# Patient Record
Sex: FEMALE | Race: WHITE | NOT HISPANIC OR LATINO | Employment: OTHER | URBAN - METROPOLITAN AREA
[De-identification: names, ages, dates, MRNs, and addresses within clinical notes are randomized per-mention and may not be internally consistent; named-entity substitution may affect disease eponyms.]

---

## 2017-03-09 DIAGNOSIS — F33.1 MAJOR DEPRESSIVE DISORDER, RECURRENT EPISODE, MODERATE (H): ICD-10-CM

## 2017-03-09 RX ORDER — PAROXETINE 40 MG/1
TABLET, FILM COATED ORAL
Qty: 30 TABLET | Refills: 6 | OUTPATIENT
Start: 2017-03-09

## 2017-03-10 DIAGNOSIS — F33.1 MAJOR DEPRESSIVE DISORDER, RECURRENT EPISODE, MODERATE (H): ICD-10-CM

## 2017-03-10 RX ORDER — PAROXETINE 40 MG/1
TABLET, FILM COATED ORAL
Qty: 30 TABLET | Refills: 6 | OUTPATIENT
Start: 2017-03-10

## 2017-03-11 DIAGNOSIS — F33.1 MAJOR DEPRESSIVE DISORDER, RECURRENT EPISODE, MODERATE (H): Primary | ICD-10-CM

## 2017-03-13 RX ORDER — PAROXETINE 40 MG/1
TABLET, FILM COATED ORAL
Qty: 30 TABLET | Refills: 6 | OUTPATIENT
Start: 2017-03-13

## 2017-03-13 NOTE — TELEPHONE ENCOUNTER
Refill not appropriate at this time.  On 03/08/2017, patient given 30 tablets with 6 refills.     Paulina Garrett RN

## 2017-03-13 NOTE — TELEPHONE ENCOUNTER
Paroxetine       Last Written Prescription Date: 3/8/17  Last Fill Quantity: 30, # refills: 6  Last Office Visit with [facility] primary care provider:  9/23/16        Last PHQ-9 score on record=   PHQ-9 SCORE 8/15/2016   Total Score -   Total Score 5

## 2017-03-22 ENCOUNTER — TELEPHONE (OUTPATIENT)
Dept: INTERNAL MEDICINE | Facility: CLINIC | Age: 77
End: 2017-03-22

## 2017-03-22 DIAGNOSIS — E11.42 TYPE 2 DIABETES MELLITUS WITH DIABETIC POLYNEUROPATHY, WITHOUT LONG-TERM CURRENT USE OF INSULIN (H): Primary | ICD-10-CM

## 2017-03-22 DIAGNOSIS — E78.5 HYPERLIPIDEMIA LDL GOAL <100: ICD-10-CM

## 2017-03-22 NOTE — TELEPHONE ENCOUNTER
Reason for Call: Request for an order or referral:    Order or referral being requested: labs, a1c, and per patient whatever else Mihai wants    Date needed: as soon as possible    Has the patient been seen by the PCP for this problem? YES    Additional comments: please call patient when orders are placed    Phone number Patient can be reached at:  Home number on file [phone] (home)    Best Time:  any    Can we leave a detailed message on this number?  YES    Call taken on 3/22/2017 at 3:54 PM by Alexandra Thorne

## 2017-04-05 DIAGNOSIS — E11.42 TYPE 2 DIABETES MELLITUS WITH DIABETIC POLYNEUROPATHY, WITHOUT LONG-TERM CURRENT USE OF INSULIN (H): ICD-10-CM

## 2017-04-05 DIAGNOSIS — E78.5 HYPERLIPIDEMIA LDL GOAL <100: ICD-10-CM

## 2017-04-05 LAB
ALBUMIN SERPL-MCNC: 3.7 G/DL (ref 3.4–5)
ALP SERPL-CCNC: 82 U/L (ref 40–150)
ALT SERPL W P-5'-P-CCNC: 24 U/L (ref 0–50)
ANION GAP SERPL CALCULATED.3IONS-SCNC: 12 MMOL/L (ref 3–14)
AST SERPL W P-5'-P-CCNC: 17 U/L (ref 0–45)
BILIRUB SERPL-MCNC: 0.5 MG/DL (ref 0.2–1.3)
BUN SERPL-MCNC: 31 MG/DL (ref 7–30)
CALCIUM SERPL-MCNC: 8.3 MG/DL (ref 8.5–10.1)
CHLORIDE SERPL-SCNC: 107 MMOL/L (ref 94–109)
CHOLEST SERPL-MCNC: 167 MG/DL
CO2 SERPL-SCNC: 27 MMOL/L (ref 20–32)
CREAT SERPL-MCNC: 1.21 MG/DL (ref 0.52–1.04)
GFR SERPL CREATININE-BSD FRML MDRD: 43 ML/MIN/1.7M2
GLUCOSE SERPL-MCNC: 242 MG/DL (ref 70–99)
HDLC SERPL-MCNC: 43 MG/DL
LDLC SERPL CALC-MCNC: ABNORMAL MG/DL
LDLC SERPL DIRECT ASSAY-MCNC: 81 MG/DL
NONHDLC SERPL-MCNC: 124 MG/DL
POTASSIUM SERPL-SCNC: 4.4 MMOL/L (ref 3.4–5.3)
PROT SERPL-MCNC: 6.8 G/DL (ref 6.8–8.8)
SODIUM SERPL-SCNC: 146 MMOL/L (ref 133–144)
TRIGL SERPL-MCNC: 462 MG/DL

## 2017-04-05 PROCEDURE — 36415 COLL VENOUS BLD VENIPUNCTURE: CPT | Performed by: INTERNAL MEDICINE

## 2017-04-05 PROCEDURE — 80061 LIPID PANEL: CPT | Performed by: INTERNAL MEDICINE

## 2017-04-05 PROCEDURE — 80053 COMPREHEN METABOLIC PANEL: CPT | Performed by: INTERNAL MEDICINE

## 2017-04-05 PROCEDURE — 83036 HEMOGLOBIN GLYCOSYLATED A1C: CPT | Mod: QW | Performed by: INTERNAL MEDICINE

## 2017-04-05 PROCEDURE — 83721 ASSAY OF BLOOD LIPOPROTEIN: CPT | Mod: 59 | Performed by: INTERNAL MEDICINE

## 2017-04-06 ENCOUNTER — OFFICE VISIT (OUTPATIENT)
Dept: INTERNAL MEDICINE | Facility: CLINIC | Age: 77
End: 2017-04-06
Payer: COMMERCIAL

## 2017-04-06 VITALS
HEIGHT: 64 IN | RESPIRATION RATE: 16 BRPM | HEART RATE: 76 BPM | DIASTOLIC BLOOD PRESSURE: 78 MMHG | BODY MASS INDEX: 26.46 KG/M2 | WEIGHT: 155 LBS | OXYGEN SATURATION: 97 % | SYSTOLIC BLOOD PRESSURE: 124 MMHG | TEMPERATURE: 97 F

## 2017-04-06 DIAGNOSIS — F33.41 RECURRENT MAJOR DEPRESSIVE DISORDER, IN PARTIAL REMISSION (H): ICD-10-CM

## 2017-04-06 DIAGNOSIS — E11.42 TYPE 2 DIABETES MELLITUS WITH DIABETIC POLYNEUROPATHY, WITHOUT LONG-TERM CURRENT USE OF INSULIN (H): Primary | ICD-10-CM

## 2017-04-06 DIAGNOSIS — L30.8 OTHER ECZEMA: ICD-10-CM

## 2017-04-06 DIAGNOSIS — I10 HYPERTENSION GOAL BP (BLOOD PRESSURE) < 140/90: ICD-10-CM

## 2017-04-06 LAB — HBA1C MFR BLD: 8 % (ref 4.3–6)

## 2017-04-06 PROCEDURE — 99214 OFFICE O/P EST MOD 30 MIN: CPT | Performed by: INTERNAL MEDICINE

## 2017-04-06 PROCEDURE — 99207 C FOOT EXAM  NO CHARGE: CPT | Performed by: INTERNAL MEDICINE

## 2017-04-06 RX ORDER — METHYLPREDNISOLONE 4 MG
TABLET, DOSE PACK ORAL
Qty: 21 TABLET | Refills: 0 | Status: SHIPPED | OUTPATIENT
Start: 2017-04-06 | End: 2017-11-06

## 2017-04-06 RX ORDER — METOPROLOL SUCCINATE 100 MG/1
100 TABLET, EXTENDED RELEASE ORAL DAILY
Qty: 90 TABLET | Refills: 3 | Status: SHIPPED | OUTPATIENT
Start: 2017-04-06 | End: 2018-04-07

## 2017-04-06 RX ORDER — TRIAMCINOLONE ACETONIDE 5 MG/G
CREAM TOPICAL
Qty: 30 G | Refills: 1 | Status: SHIPPED | OUTPATIENT
Start: 2017-04-06 | End: 2018-01-11

## 2017-04-06 RX ORDER — GLIPIZIDE 10 MG/1
TABLET ORAL
Qty: 120 TABLET | Refills: 4 | Status: SHIPPED | OUTPATIENT
Start: 2017-04-06 | End: 2017-08-31

## 2017-04-06 RX ORDER — METFORMIN HCL 500 MG
1000 TABLET, EXTENDED RELEASE 24 HR ORAL 2 TIMES DAILY WITH MEALS
Qty: 360 TABLET | Refills: 0 | Status: SHIPPED | OUTPATIENT
Start: 2017-04-06 | End: 2017-07-09

## 2017-04-06 RX ORDER — LISINOPRIL AND HYDROCHLOROTHIAZIDE 20; 25 MG/1; MG/1
1 TABLET ORAL DAILY
Qty: 90 TABLET | Refills: 2 | Status: SHIPPED | OUTPATIENT
Start: 2017-04-06 | End: 2018-02-24

## 2017-04-06 ASSESSMENT — ANXIETY QUESTIONNAIRES
7. FEELING AFRAID AS IF SOMETHING AWFUL MIGHT HAPPEN: NOT AT ALL
1. FEELING NERVOUS, ANXIOUS, OR ON EDGE: NOT AT ALL
GAD7 TOTAL SCORE: 0
6. BECOMING EASILY ANNOYED OR IRRITABLE: NOT AT ALL
5. BEING SO RESTLESS THAT IT IS HARD TO SIT STILL: NOT AT ALL
2. NOT BEING ABLE TO STOP OR CONTROL WORRYING: NOT AT ALL
IF YOU CHECKED OFF ANY PROBLEMS ON THIS QUESTIONNAIRE, HOW DIFFICULT HAVE THESE PROBLEMS MADE IT FOR YOU TO DO YOUR WORK, TAKE CARE OF THINGS AT HOME, OR GET ALONG WITH OTHER PEOPLE: NOT DIFFICULT AT ALL
3. WORRYING TOO MUCH ABOUT DIFFERENT THINGS: NOT AT ALL

## 2017-04-06 ASSESSMENT — PATIENT HEALTH QUESTIONNAIRE - PHQ9: 5. POOR APPETITE OR OVEREATING: NOT AT ALL

## 2017-04-06 ASSESSMENT — PAIN SCALES - GENERAL: PAINLEVEL: NO PAIN (0)

## 2017-04-06 NOTE — MR AVS SNAPSHOT
"              After Visit Summary   4/6/2017    Eliza Dubois    MRN: [id number]           Patient Information     Date Of Birth          1940        Visit Information        Provider Department      4/6/2017 8:20 AM Byron Holm DO [facility]        Today's Diagnoses     Type 2 diabetes mellitus with diabetic polyneuropathy, without long-term current use of insulin (H)    -  1    Hypertension goal BP (blood pressure) < 140/90        Recurrent major depressive disorder, in partial remission (H)        Other eczema           Follow-ups after your visit        Who to contact     If you have questions or need follow up information about today's clinic visit or your schedule please contact [facility] directly at [phone].  Normal or non-critical lab and imaging results will be communicated to you by [employer], letter or phone within 4 business days after the clinic has received the results. If you do not hear from us within 7 days, please contact the clinic through [employer] or phone. If you have a critical or abnormal lab result, we will notify you by phone as soon as possible.  Submit refill requests through [employer] or call your pharmacy and they will forward the refill request to us. Please allow 3 business days for your refill to be completed.          Additional Information About Your Visit        MyChart Information     [employer] lets you send messages to your doctor, view your test results, renew your prescriptions, schedule appointments and more. To sign up, go to [email or web address] . Click on \"Log in\" on the left side of the screen, which will take you to the Welcome page. Then click on \"Sign up Now\" on the right side of the page.     You will be asked to enter the access code listed below, as well as some personal information. Please follow the directions to create your username and password.     Your access code is: [email or web address]  Expires: 7/23/2017  " "8:38 AM     Your access code will  in 90 days. If you need help or a new code, please call your [facility] clinic or [phone].        Care EveryWhere ID     This is your Care EveryWhere ID. This could be used by other organizations to access your [facility] medical records  [id number]        Your Vitals Were     Pulse Temperature Respirations Height Pulse Oximetry BMI (Body Mass Index)    76 97  F (36.1  C) (Tympanic) 16 5' 4\" (1.626 m) 97% 26.61 kg/m2       Blood Pressure from Last 3 Encounters:   17 124/78   16 128/66   16 135/80    Weight from Last 3 Encounters:   17 155 lb (70.3 kg)   16 155 lb 11.2 oz (70.6 kg)   16 154 lb (69.9 kg)              We Performed the Following     DEPRESSION ACTION PLAN (DAP)     FOOT EXAM          Today's Medication Changes          These changes are accurate as of: 17 11:59 PM.  If you have any questions, ask your nurse or doctor.               Start taking these medicines.        Dose/Directions    methylPREDNISolone 4 MG tablet   Commonly known as:  MEDROL DOSEPAK   Used for:  Other eczema   Started by:  Byron Holm DO        Follow package instructions   Quantity:  21 tablet   Refills:  0         These medicines have changed or have updated prescriptions.        Dose/Directions    lisinopril-hydrochlorothiazide 20-25 MG per tablet   Commonly known as:  PRINZIDE/ZESTORETIC   This may have changed:  See the new instructions.   Used for:  Hypertension goal BP (blood pressure) < 140/90   Changed by:  Byron Holm DO        Dose:  1 tablet   Take 1 tablet by mouth daily   Quantity:  90 tablet   Refills:  2       metoprolol 100 MG 24 hr tablet   Commonly known as:  TOPROL-XL   This may have changed:  See the new instructions.   Used for:  Hypertension goal BP (blood pressure) < 140/90   Changed by:  Byron Holm DO        Dose:  100 mg   Take 1 tablet (100 mg) by mouth daily   Quantity:  90 " tablet   Refills:  3       * triamcinolone 0.1 % ointment   Commonly known as:  KENALOG   This may have changed:  Another medication with the same name was added. Make sure you understand how and when to take each.   Used for:  Psoriasis   Changed by:  Byron Holm, DO        Apply sparingly to affected area three times daily for 14 days.   Quantity:  30 g   Refills:  0       * triamcinolone 0.5 % cream   Commonly known as:  KENALOG   This may have changed:  You were already taking a medication with the same name, and this prescription was added. Make sure you understand how and when to take each.   Used for:  Other eczema   Changed by:  Byron Holm, DO        Apply sparingly to affected area three times daily.   Quantity:  30 g   Refills:  1       * Notice:  This list has 2 medication(s) that are the same as other medications prescribed for you. Read the directions carefully, and ask your doctor or other care provider to review them with you.         Where to get your medicines      These medications were sent to [facility] - [address]  [address] [facility] [address]     Phone:  [phone]     glipiZIDE 10 MG tablet    lisinopril-hydrochlorothiazide 20-25 MG per tablet    metFORMIN 500 MG 24 hr tablet    methylPREDNISolone 4 MG tablet    metoprolol 100 MG 24 hr tablet    triamcinolone 0.5 % cream                Primary Care Provider Office Phone # Fax #    Byron Holm [phone]       [address]   [facility] [address]        Thank you!     Thank you for choosing [facility]  for your care. Our goal is always to provide you with excellent care. Hearing back from our patients is one way we can continue to improve our services. Please take a few minutes to complete the written survey that you may receive in the mail after your visit with us. Thank you!             Your Updated Medication  List - Protect others around you: Learn how to safely use, store and throw away your medicines at www.disposemymeds.org.          This list is accurate as of: 4/6/17 11:59 PM.  Always use your most recent med list.                   Brand Name Dispense Instructions for use    aspirin 81 MG tablet      1 TABLET DAILY       atorvastatin 80 MG tablet    LIPITOR     Take 80 mg by mouth daily       blood glucose monitoring lancets     1 Box    Use to test blood sugar 2 times daily or as directed.       blood glucose monitoring meter device kit     1 kit    Use to test blood sugars 2 times daily or as directed.       blood glucose monitoring test strip    ACCU-CHEK COMPACT PLUS    102 each    USE TO CHECK BLOOD SUGARS TWO TIMES A DAY OR AS DIRECTED.  Appointment needed for additional refills.       fluticasone 50 MCG/ACT spray    FLONASE    16 g    INSTILL ONE TO TWO SPRAYS INTO BOTH NOSTRILS DAILY       gabapentin 300 MG capsule    NEURONTIN    60 capsule    TAKE ONE TO TWO CAPSULES BY MOUTH EVERY EVENING       glipiZIDE 10 MG tablet    GLUCOTROL    120 tablet    TAKE TWO TABLETS BY MOUTH TWICE A DAY BEFORE MEALS       IMDUR PO      Take 30 mg by mouth daily       lisinopril-hydrochlorothiazide 20-25 MG per tablet    PRINZIDE/ZESTORETIC    90 tablet    Take 1 tablet by mouth daily       MELATONIN PO      Take 3 mg by mouth nightly as needed       metFORMIN 500 MG 24 hr tablet    GLUCOPHAGE-XR    360 tablet    Take 2 tablets (1,000 mg) by mouth 2 times daily (with meals)       methylPREDNISolone 4 MG tablet    MEDROL DOSEPAK    21 tablet    Follow package instructions       metoprolol 100 MG 24 hr tablet    TOPROL-XL    90 tablet    Take 1 tablet (100 mg) by mouth daily       omeprazole 40 MG capsule    priLOSEC     Take 40 mg by mouth daily Take one tablet daily       PARoxetine 40 MG tablet    PAXIL    30 tablet    TAKE ONE TABLET BY MOUTH EVERY MORNING       PLAVIX PO      Take 75 mg by mouth daily       *  triamcinolone 0.1 % ointment    KENALOG    30 g    Apply sparingly to affected area three times daily for 14 days.       * triamcinolone 0.5 % cream    KENALOG    30 g    Apply sparingly to affected area three times daily.       * Notice:  This list has 2 medication(s) that are the same as other medications prescribed for you. Read the directions carefully, and ask your doctor or other care provider to review them with you.

## 2017-04-06 NOTE — PROGRESS NOTES
SUBJECTIVE:                                                    Eliza Dubois is a 76 year old female who presents to clinic today for the following health issues:      Diabetes Follow-up      Patient is checking blood sugars: not at all    Diabetic concerns: None     Symptoms of hypoglycemia (low blood sugar): none     Paresthesias (numbness or burning in feet) or sores: No     Date of last diabetic eye exam: 2016       Amount of exercise or physical activity: None    Problems taking medications regularly: No    Medication side effects: none    Diet: diabetic      The patient is a pleasant 70 sexual female presents today for follow-up of her diabetes. She has been doing quite well and has been compliant with her medication.  She has concurrent history of hypertension coronary artery disease.  Her blood pressures have been well-controlled she's had no chest pain.  She denies any significant dyspnea upon exertion or radiation or diaphoresis.in the past she's had some anxiety which is currently well-controlled.  She does use the Paxil with good results. With respect to her diabetes, she denies any polyuria or polydipsia.  She's had no visual changes or symptoms of uncontrolled hyperglycemia.  Her home blood sugars are generally not checked.  She is relatively compliant with her diet.  Her last ocular examination was last year. She's also had a slight exacerbation of eczema.  It's on the knees and elbows and lower legs.  She's had this in the past and it generally responds well to topical steroid therapy once control is obtained.                       PAST, FAMILY,SOCIAL HISTORY:     Medical  History:   has a past medical history of Diabetic eye exam (H) (05/01/14); Heart attack (H); Pure hypercholesterolemia; Stented coronary artery; Type II or unspecified type diabetes mellitus without mention of complication, not stated as uncontrolled (2002); Unspecified disease of pericardium (12/05/08); and Unspecified  essential hypertension.     Surgical History:   has a past surgical history that includes NONSPECIFIC PROCEDURE (1988); LAPAROSCOPY, SURGICAL; CHOLECYSTECTOMY (1997); colonoscopy (04/15/09); Colonoscopy (6/18/2014); Abdomen surgery; Phacoemulsification clear cornea with standard intraocular lens implant (Right, 3/16/2016); and Phacoemulsification clear cornea with standard intraocular lens implant (Left, 3/30/2016).     Social History:   reports that she has never smoked. She has never used smokeless tobacco. She reports that she does not drink alcohol or use illicit drugs.     Family History:  family history includes Arthritis in her mother; CANCER in her maternal grandmother; DIABETES in her father and mother; EYE* in her mother; Genitourinary Problems in her father; HEART DISEASE in her father, maternal grandmother, and mother; Hypertension in her mother; Lipids in her mother; Neurologic Disorder in her mother; OSTEOPOROSIS in her mother; Obesity in her mother. There is no history of Alcohol/Drug, Alzheimer Disease, Anesthesia Reaction, Blood Disease, Depression, Genetic Disorder, GASTROINTESTINAL DISEASE, Gynecology, Psychotic Disorder, Respiratory, or CEREBROVASCULAR DISEASE.            MEDICATIONS  Current Outpatient Prescriptions   Medication Sig Dispense Refill     metoprolol (TOPROL-XL) 100 MG 24 hr tablet Take 1 tablet (100 mg) by mouth daily 90 tablet 3     lisinopril-hydrochlorothiazide (PRINZIDE/ZESTORETIC) 20-25 MG per tablet Take 1 tablet by mouth daily 90 tablet 2     metFORMIN (GLUCOPHAGE-XR) 500 MG 24 hr tablet Take 2 tablets (1,000 mg) by mouth 2 times daily (with meals) 360 tablet 0     glipiZIDE (GLUCOTROL) 10 MG tablet TAKE TWO TABLETS BY MOUTH TWICE A DAY BEFORE MEALS 120 tablet 4     methylPREDNISolone (MEDROL DOSEPAK) 4 MG tablet Follow package instructions 21 tablet 0     triamcinolone (KENALOG) 0.5 % cream Apply sparingly to affected area three times daily. 30 g 1     PARoxetine (PAXIL) 40  MG tablet TAKE ONE TABLET BY MOUTH EVERY MORNING 30 tablet 6     fluticasone (FLONASE) 50 MCG/ACT spray INSTILL ONE TO TWO SPRAYS INTO BOTH NOSTRILS DAILY 16 g 8     gabapentin (NEURONTIN) 300 MG capsule TAKE ONE TO TWO CAPSULES BY MOUTH EVERY EVENING 60 capsule 7     blood glucose monitoring (ACCU-CHEK COMPACT PLUS) test strip USE TO CHECK BLOOD SUGARS TWO TIMES A DAY OR AS DIRECTED.  Appointment needed for additional refills. 102 each 0     omeprazole (PRILOSEC) 40 MG capsule Take 40 mg by mouth daily Take one tablet daily       atorvastatin (LIPITOR) 80 MG tablet Take 80 mg by mouth daily       triamcinolone (KENALOG) 0.1 % ointment Apply sparingly to affected area three times daily for 14 days. 30 g 0     blood glucose monitoring (ACCU-CHEK COMPACT CARE KIT) meter device kit Use to test blood sugars 2 times daily or as directed. 1 kit 0     blood glucose monitoring (ACCU-CHEK MULTICLIX) lancets Use to test blood sugar 2 times daily or as directed. 1 Box 11     Clopidogrel Bisulfate (PLAVIX PO) Take 75 mg by mouth daily        Isosorbide Mononitrate (IMDUR PO) Take 30 mg by mouth daily       MELATONIN PO Take 3 mg by mouth nightly as needed        ASPIRIN 81 MG PO TABS 1 TABLET DAILY  11         --------------------------------------------------------------------------------------------------------------------                       PAST, FAMILY,SOCIAL HISTORY:     Medical  History:   has a past medical history of Diabetic eye exam (H) (05/01/14); Heart attack (H); Pure hypercholesterolemia; Stented coronary artery; Type II or unspecified type diabetes mellitus without mention of complication, not stated as uncontrolled (2002); Unspecified disease of pericardium (12/05/08); and Unspecified essential hypertension.     Surgical History:   has a past surgical history that includes NONSPECIFIC PROCEDURE (1988); LAPAROSCOPY, SURGICAL; CHOLECYSTECTOMY (1997); colonoscopy (04/15/09); Colonoscopy (6/18/2014); Abdomen  surgery; Phacoemulsification clear cornea with standard intraocular lens implant (Right, 3/16/2016); and Phacoemulsification clear cornea with standard intraocular lens implant (Left, 3/30/2016).     Social History:   reports that she has never smoked. She has never used smokeless tobacco. She reports that she does not drink alcohol or use illicit drugs.     Family History:  family history includes Arthritis in her mother; CANCER in her maternal grandmother; DIABETES in her father and mother; EYE* in her mother; Genitourinary Problems in her father; HEART DISEASE in her father, maternal grandmother, and mother; Hypertension in her mother; Lipids in her mother; Neurologic Disorder in her mother; OSTEOPOROSIS in her mother; Obesity in her mother. There is no history of Alcohol/Drug, Alzheimer Disease, Anesthesia Reaction, Blood Disease, Depression, Genetic Disorder, GASTROINTESTINAL DISEASE, Gynecology, Psychotic Disorder, Respiratory, or CEREBROVASCULAR DISEASE.            MEDICATIONS  Current Outpatient Prescriptions   Medication Sig Dispense Refill     metoprolol (TOPROL-XL) 100 MG 24 hr tablet Take 1 tablet (100 mg) by mouth daily 90 tablet 3     lisinopril-hydrochlorothiazide (PRINZIDE/ZESTORETIC) 20-25 MG per tablet Take 1 tablet by mouth daily 90 tablet 2     metFORMIN (GLUCOPHAGE-XR) 500 MG 24 hr tablet Take 2 tablets (1,000 mg) by mouth 2 times daily (with meals) 360 tablet 0     glipiZIDE (GLUCOTROL) 10 MG tablet TAKE TWO TABLETS BY MOUTH TWICE A DAY BEFORE MEALS 120 tablet 4     methylPREDNISolone (MEDROL DOSEPAK) 4 MG tablet Follow package instructions 21 tablet 0     triamcinolone (KENALOG) 0.5 % cream Apply sparingly to affected area three times daily. 30 g 1     PARoxetine (PAXIL) 40 MG tablet TAKE ONE TABLET BY MOUTH EVERY MORNING 30 tablet 6     fluticasone (FLONASE) 50 MCG/ACT spray INSTILL ONE TO TWO SPRAYS INTO BOTH NOSTRILS DAILY 16 g 8     gabapentin (NEURONTIN) 300 MG capsule TAKE ONE TO TWO  CAPSULES BY MOUTH EVERY EVENING 60 capsule 7     blood glucose monitoring (ACCU-CHEK COMPACT PLUS) test strip USE TO CHECK BLOOD SUGARS TWO TIMES A DAY OR AS DIRECTED.  Appointment needed for additional refills. 102 each 0     omeprazole (PRILOSEC) 40 MG capsule Take 40 mg by mouth daily Take one tablet daily       atorvastatin (LIPITOR) 80 MG tablet Take 80 mg by mouth daily       triamcinolone (KENALOG) 0.1 % ointment Apply sparingly to affected area three times daily for 14 days. 30 g 0     blood glucose monitoring (ACCU-CHEK COMPACT CARE KIT) meter device kit Use to test blood sugars 2 times daily or as directed. 1 kit 0     blood glucose monitoring (ACCU-CHEK MULTICLIX) lancets Use to test blood sugar 2 times daily or as directed. 1 Box 11     Clopidogrel Bisulfate (PLAVIX PO) Take 75 mg by mouth daily        Isosorbide Mononitrate (IMDUR PO) Take 30 mg by mouth daily       MELATONIN PO Take 3 mg by mouth nightly as needed        ASPIRIN 81 MG PO TABS 1 TABLET DAILY  11         --------------------------------------------------------------------------------------------------------------------                  Review of Systems     LUNGS: Pt denies: cough,excess sputum, hemoptysis, or shortness of breath.   HEART: Pt denies: chest pain, arrythmia, syncope, tachy or bradyarrhythmia.   GI: Pt denies: nausea, vomitting, diarrhea, constipation, melena, or hematochezia.   NEURO: Pt denies: seizures, strokes, diplopia, weakness, paraesthesias, or paralysis.   SKIN: Pt denies: itching,  discoloration, or specific lesions of concern. Denies recent hair loss.   She does have some erythema on the elbows, lower extremities.  This is modest and consistent with eczema.  It is slightly worse than usual.  There is some erythema associated with it.                  Examination     Vital Signs:  B/P: 124/78, T: 97, P: 76, R: 16, BMI: Body mass index is 26.61 kg/(m^2).   Constitutional: The patient appears to be in no acute  distress. The patient appears to be adequately hydrated. No acute respiratory or hemodynamic distress is noted at this time.   LUNGS: clear bilaterally, airflow is brisk, no intercostal retraction or stridor is noted. No coughing is noted during visit.   HEART:  regular without rubs, clicks, gallops, or murmurs. PMI is nondisplaced. Upstrokes are brisk. S1,S2 are heard.   GI: Abdomen is soft, without rebound, guarding or tenderness. Bowel sounds are appropriate. No renal bruits are heard.    NEURO: Pt is alert and appropriate. No neurologic lateralization is noted. Cranial nerves 2-12 are intact. Peripheral sensory and motor function are grossly normal.    SKIN:  warm and dry. Inflammation associated her eczema as described is noted.  No secondary infection is present.  No additionallesions of concern are noted.                        Decision-Making            1. Type 2 diabetes mellitus with diabetic polyneuropathy, without long-term current use of insulin (H)  Recommend checking blood sugars at least once daily in recording.  We discussed appropriate diet.  Continue statin, ACE inhibitor, aspirin, metformin, glipizide  - metFORMIN (GLUCOPHAGE-XR) 500 MG 24 hr tablet; Take 2 tablets (1,000 mg) by mouth 2 times daily (with meals)  Dispense: 360 tablet; Refill: 0  - glipiZIDE (GLUCOTROL) 10 MG tablet; TAKE TWO TABLETS BY MOUTH TWICE A DAY BEFORE MEALS  Dispense: 120 tablet; Refill: 4  - FOOT EXAM    2. Hypertension goal BP (blood pressure) < 140/90  Continue current medications  - metoprolol (TOPROL-XL) 100 MG 24 hr tablet; Take 1 tablet (100 mg) by mouth daily  Dispense: 90 tablet; Refill: 3  - lisinopril-hydrochlorothiazide (PRINZIDE/ZESTORETIC) 20-25 MG per tablet; Take 1 tablet by mouth daily  Dispense: 90 tablet; Refill: 2    3. Recurrent major depressive disorder, in partial remission (H)  Discussed depression action plan, continue current Paxil  - DEPRESSION ACTION PLAN (DAP)    4. Other eczema  Short course  of Medrol and then resume triamcinolone on an as-needed basis.  If symptoms do not resolve, she will notify me.  - methylPREDNISolone (MEDROL DOSEPAK) 4 MG tablet; Follow package instructions  Dispense: 21 tablet; Refill: 0  - triamcinolone (KENALOG) 0.5 % cream; Apply sparingly to affected area three times daily.  Dispense: 30 g; Refill: 1                           FOLLOW UP   I have asked the patient to make an appointment for followup with me   In one month with blood sugar log        I have carefully explained the diagnosis and treatment options with the patient. The patient has displayed an understanding of the above, and all subsequent questions were answered.               DO GEMA Campos    Portions of this note were produced using [employer]  Although every attempt at real-time proof reading has been made, occasional grammar/syntax errors may have been missed.

## 2017-04-06 NOTE — NURSING NOTE
"Chief Complaint   Patient presents with     Diabetes     Recheck       Initial /78  Pulse 76  Temp 97  F (36.1  C) (Tympanic)  Resp 16  Ht 5' 4\" (1.626 m)  Wt 155 lb (70.3 kg)  SpO2 97%  BMI 26.61 kg/m2 Estimated body mass index is 26.61 kg/(m^2) as calculated from the following:    Height as of this encounter: 5' 4\" (1.626 m).    Weight as of this encounter: 155 lb (70.3 kg).  Medication Reconciliation: complete    "

## 2017-04-07 ASSESSMENT — PATIENT HEALTH QUESTIONNAIRE - PHQ9: SUM OF ALL RESPONSES TO PHQ QUESTIONS 1-9: 0

## 2017-04-07 ASSESSMENT — ANXIETY QUESTIONNAIRES: GAD7 TOTAL SCORE: 0

## 2017-04-13 DIAGNOSIS — E78.1 HIGH TRIGLYCERIDES: Primary | ICD-10-CM

## 2017-04-13 NOTE — PROGRESS NOTES
Please contact the patient and notify her of the following:  The glycosylated hemoglobin is down to 8.0 from the previous 8.5.  The cholesterol is well-controlled with an LDL of 81.  The chemistry test demonstrates a blood sugar of 242 [address] too high.    Kidney function is slightly decreased but stable.  Triglycerides are markedly elevated at 462.  This may require specific therapy.  She should try to decrease the fats in her diet and recheck the triglycerides in a couple months.  If they are still greater than 350, we should initiate gemfibrozil or similar medication.          Thank you.  DO GEMA Campos

## 2017-06-30 DIAGNOSIS — I10 HYPERTENSION GOAL BP (BLOOD PRESSURE) < 140/90: ICD-10-CM

## 2017-06-30 RX ORDER — METOPROLOL SUCCINATE 100 MG/1
TABLET, EXTENDED RELEASE ORAL
Qty: 90 TABLET | Refills: 3 | OUTPATIENT
Start: 2017-06-30

## 2017-06-30 NOTE — TELEPHONE ENCOUNTER
Metoprolol      Last Written Prescription Date: 4/6/17  Last Fill Quantity: 90, # refills: 3    Last Office Visit with G, P or [facility] prescribing provider:  4/6/17   Future Office Visit:        BP Readings from Last 3 Encounters:   04/06/17 124/78   09/23/16 128/66   08/29/16 135/80

## 2017-06-30 NOTE — TELEPHONE ENCOUNTER
Prescription was sent 4/6/17 for #90 with 3 refills.  Pharmacy notified via E-Prescribe refusal.     Sheri Francois RN  [facility]

## 2017-07-01 DIAGNOSIS — I10 HYPERTENSION GOAL BP (BLOOD PRESSURE) < 140/90: ICD-10-CM

## 2017-07-03 RX ORDER — METOPROLOL SUCCINATE 100 MG/1
TABLET, EXTENDED RELEASE ORAL
Qty: 90 TABLET | Refills: 3 | OUTPATIENT
Start: 2017-07-03

## 2017-07-03 NOTE — TELEPHONE ENCOUNTER
metoprolol (TOPROL-XL) 100 MG 24 hr tablet 90 tablet 3 4/6/2017  No     Sig: Take 1 tablet (100 mg) by mouth daily     Patient has refills.  Henrik Montana MA

## 2017-07-03 NOTE — TELEPHONE ENCOUNTER
Metoprolol  Refill refused. Filled 4/6/17 for 90 tabs with 3 refills, too soon.    Ki Hernandez RN, BSN

## 2017-07-09 DIAGNOSIS — E11.42 TYPE 2 DIABETES MELLITUS WITH DIABETIC POLYNEUROPATHY, WITHOUT LONG-TERM CURRENT USE OF INSULIN (H): ICD-10-CM

## 2017-07-10 NOTE — TELEPHONE ENCOUNTER
metformin         Last Written Prescription Date: 4/6/17  Last Fill Quantity: 360, # refills: 0  Last Office Visit with [facility], [facility] or [facility] prescribing provider:  4/6/17        BP Readings from Last 3 Encounters:   04/06/17 124/78   09/23/16 128/66   08/29/16 135/80     Lab Results   Component Value Date    MICROL 58 07/25/2016     Lab Results   Component Value Date    UMALCR 52.64 07/25/2016     Creatinine   Date Value Ref Range Status   04/05/2017 1.21 (H) 0.52 - 1.04 mg/dL Final   ]  GFR Estimate   Date Value Ref Range Status   04/05/2017 43 (L) >60 mL/min/1.7m2 Final     Comment:     Non  GFR Calc   07/25/2016 44 (L) >60 mL/min/1.7m2 Final     Comment:     Non  GFR Calc   02/02/2016 57 (L) >60 mL/min/1.7m2 Final     Comment:     Non  GFR Calc     GFR Estimate If Black   Date Value Ref Range Status   04/05/2017 52 (L) >60 mL/min/1.7m2 Final     Comment:      GFR Calc   07/25/2016 53 (L) >60 mL/min/1.7m2 Final     Comment:      GFR Calc   02/02/2016 70 >60 mL/min/1.7m2 Final     Comment:      GFR Calc     Lab Results   Component Value Date    CHOL 167 04/05/2017     Lab Results   Component Value Date    HDL 43 04/05/2017     Lab Results   Component Value Date    LDL  04/05/2017     Cannot estimate LDL when triglyceride exceeds 400 mg/dL    LDL 81 04/05/2017     Lab Results   Component Value Date    TRIG 462 04/05/2017     Lab Results   Component Value Date    CHOLHDLRATIO 4.2 06/09/2015     Lab Results   Component Value Date    AST 17 04/05/2017     Lab Results   Component Value Date    ALT 24 04/05/2017     Lab Results   Component Value Date    A1C 8.0 04/05/2017    A1C 8.5 07/25/2016    A1C 7.8 09/25/2015    A1C 7.9 07/13/2015    A1C 7.9 06/09/2015     Potassium   Date Value Ref Range Status   04/05/2017 4.4 3.4 - 5.3 mmol/L Final

## 2017-07-12 RX ORDER — METFORMIN HCL 500 MG
TABLET, EXTENDED RELEASE 24 HR ORAL
Qty: 360 TABLET | Refills: 0 | Status: SHIPPED | OUTPATIENT
Start: 2017-07-12 | End: 2017-10-16

## 2017-07-12 NOTE — TELEPHONE ENCOUNTER
Routing refill request to provider for review/approval because:  Labs out of range:  Microalbumin, creatinine, LDL.     PCP is currently out of office, will route to covering provider.     Paulina Garrett RN

## 2017-08-31 DIAGNOSIS — E11.42 TYPE 2 DIABETES MELLITUS WITH DIABETIC POLYNEUROPATHY, WITHOUT LONG-TERM CURRENT USE OF INSULIN (H): ICD-10-CM

## 2017-08-31 RX ORDER — GLIPIZIDE 10 MG/1
TABLET ORAL
Qty: 120 TABLET | Refills: 4 | Status: SHIPPED | OUTPATIENT
Start: 2017-08-31 | End: 2018-02-10

## 2017-09-21 DIAGNOSIS — K21.9 GASTROESOPHAGEAL REFLUX DISEASE WITHOUT ESOPHAGITIS: ICD-10-CM

## 2017-09-21 RX ORDER — OMEPRAZOLE 40 MG/1
CAPSULE, DELAYED RELEASE ORAL
Qty: 90 CAPSULE | Refills: 3 | Status: SHIPPED | OUTPATIENT
Start: 2017-09-21 | End: 2018-09-06

## 2017-09-21 NOTE — TELEPHONE ENCOUNTER
Prilosec      Last Written Prescription Date: historical  Last Fill Quantity: ,  # refills:    Last Office Visit with [facility], [facility] or [facility] prescribing provider: 4-6-2017

## 2017-11-03 DIAGNOSIS — E78.1 HIGH TRIGLYCERIDES: ICD-10-CM

## 2017-11-03 LAB — TRIGL SERPL-MCNC: 404 MG/DL

## 2017-11-03 PROCEDURE — 36415 COLL VENOUS BLD VENIPUNCTURE: CPT | Performed by: INTERNAL MEDICINE

## 2017-11-03 PROCEDURE — 84478 ASSAY OF TRIGLYCERIDES: CPT | Performed by: INTERNAL MEDICINE

## 2017-11-06 ENCOUNTER — TELEPHONE (OUTPATIENT)
Dept: INTERNAL MEDICINE | Facility: CLINIC | Age: 77
End: 2017-11-06

## 2017-11-06 ENCOUNTER — OFFICE VISIT (OUTPATIENT)
Dept: INTERNAL MEDICINE | Facility: CLINIC | Age: 77
End: 2017-11-06
Payer: COMMERCIAL

## 2017-11-06 VITALS
TEMPERATURE: 96.5 F | OXYGEN SATURATION: 96 % | SYSTOLIC BLOOD PRESSURE: 138 MMHG | HEART RATE: 80 BPM | DIASTOLIC BLOOD PRESSURE: 70 MMHG | BODY MASS INDEX: 26.43 KG/M2 | WEIGHT: 154 LBS

## 2017-11-06 DIAGNOSIS — Z71.89 ADVANCED DIRECTIVES, COUNSELING/DISCUSSION: ICD-10-CM

## 2017-11-06 DIAGNOSIS — F33.1 MAJOR DEPRESSIVE DISORDER, RECURRENT EPISODE, MODERATE (H): ICD-10-CM

## 2017-11-06 DIAGNOSIS — I10 HYPERTENSION GOAL BP (BLOOD PRESSURE) < 140/90: ICD-10-CM

## 2017-11-06 DIAGNOSIS — I25.10 CORONARY ARTERY DISEASE INVOLVING NATIVE CORONARY ARTERY OF NATIVE HEART WITHOUT ANGINA PECTORIS: ICD-10-CM

## 2017-11-06 DIAGNOSIS — Z23 NEED FOR PROPHYLACTIC VACCINATION AND INOCULATION AGAINST INFLUENZA: ICD-10-CM

## 2017-11-06 DIAGNOSIS — E11.42 TYPE 2 DIABETES MELLITUS WITH DIABETIC POLYNEUROPATHY, WITHOUT LONG-TERM CURRENT USE OF INSULIN (H): ICD-10-CM

## 2017-11-06 DIAGNOSIS — E78.5 HYPERLIPIDEMIA LDL GOAL <100: Primary | ICD-10-CM

## 2017-11-06 LAB
ANION GAP SERPL CALCULATED.3IONS-SCNC: 7 MMOL/L (ref 3–14)
BUN SERPL-MCNC: 34 MG/DL (ref 7–30)
CALCIUM SERPL-MCNC: 8.2 MG/DL (ref 8.5–10.1)
CHLORIDE SERPL-SCNC: 108 MMOL/L (ref 94–109)
CO2 SERPL-SCNC: 28 MMOL/L (ref 20–32)
CREAT SERPL-MCNC: 1.16 MG/DL (ref 0.52–1.04)
GFR SERPL CREATININE-BSD FRML MDRD: 45 ML/MIN/1.7M2
GLUCOSE SERPL-MCNC: 238 MG/DL (ref 70–99)
HBA1C MFR BLD: 8.8 % (ref 4.3–6)
POTASSIUM SERPL-SCNC: 4.7 MMOL/L (ref 3.4–5.3)
SODIUM SERPL-SCNC: 143 MMOL/L (ref 133–144)

## 2017-11-06 PROCEDURE — 83036 HEMOGLOBIN GLYCOSYLATED A1C: CPT | Performed by: INTERNAL MEDICINE

## 2017-11-06 PROCEDURE — 90662 IIV NO PRSV INCREASED AG IM: CPT | Performed by: INTERNAL MEDICINE

## 2017-11-06 PROCEDURE — 80048 BASIC METABOLIC PNL TOTAL CA: CPT | Performed by: INTERNAL MEDICINE

## 2017-11-06 PROCEDURE — 36415 COLL VENOUS BLD VENIPUNCTURE: CPT | Performed by: INTERNAL MEDICINE

## 2017-11-06 PROCEDURE — 99214 OFFICE O/P EST MOD 30 MIN: CPT | Mod: 25 | Performed by: INTERNAL MEDICINE

## 2017-11-06 PROCEDURE — G0008 ADMIN INFLUENZA VIRUS VAC: HCPCS | Performed by: INTERNAL MEDICINE

## 2017-11-06 RX ORDER — PAROXETINE 40 MG/1
TABLET, FILM COATED ORAL
Qty: 30 TABLET | Refills: 5 | Status: SHIPPED | OUTPATIENT
Start: 2017-11-06 | End: 2018-05-10

## 2017-11-06 ASSESSMENT — PAIN SCALES - GENERAL: PAINLEVEL: NO PAIN (0)

## 2017-11-06 ASSESSMENT — PATIENT HEALTH QUESTIONNAIRE - PHQ9: SUM OF ALL RESPONSES TO PHQ QUESTIONS 1-9: 8

## 2017-11-06 NOTE — MR AVS SNAPSHOT
After Visit Summary   11/6/2017    Eliza Dubois    MRN: [id number]           Patient Information     Date Of Birth          1940        Visit Information        Provider Department      11/6/2017 8:40 AM Bryon Holm DO [facility]        Today's Diagnoses     Hyperlipidemia LDL goal <100    -  1    Hypertension goal BP (blood pressure) < 140/90        Coronary artery disease involving native coronary artery of native heart without angina pectoris        Type 2 diabetes mellitus with diabetic polyneuropathy, without long-term current use of insulin (H)        Need for prophylactic vaccination and inoculation against influenza        Advanced directives, counseling/discussion           Follow-ups after your visit        Future tests that were ordered for you today     Open Future Orders        Priority Expected Expires Ordered    Albumin Random Urine Quantitative with Creat Ratio Routine  11/6/2018 11/6/2017    *UA reflex to Microscopic and Culture ([place]; [facility]; [facility]; [facility]; [facility]; [facility]; [place]; [place]) Routine  11/6/2018 11/6/2017            Who to contact     If you have questions or need follow up information about today's clinic visit or your schedule please contact [facility] directly at [phone].  Normal or non-critical lab and imaging results will be communicated to you by MyChart, letter or phone within 4 business days after the clinic has received the results. If you do not hear from us within 7 days, please contact the clinic through MyChart or phone. If you have a critical or abnormal lab result, we will notify you by phone as soon as possible.  Submit refill requests through [employer] or call your pharmacy and they will forward the refill request to us. Please allow 3 business days for your refill to be completed.          Additional Information About Your Visit        [facility]  "Information     [employer] lets you send messages to your doctor, view your test results, renew your prescriptions, schedule appointments and more. To sign up, go to [email or web address] . Click on \"Log in\" on the left side of the screen, which will take you to the Welcome page. Then click on \"Sign up Now\" on the right side of the page.     You will be asked to enter the access code listed below, as well as some personal information. Please follow the directions to create your username and password.     Your access code is: [email or web address]  Expires: 2018  9:43 PM     Your access code will  in 90 days. If you need help or a new code, please call your [facility] clinic or [phone].        Care EveryWhere ID     This is your Care EveryWhere ID. This could be used by other organizations to access your [facility] medical records  [id number]        Your Vitals Were     Pulse Temperature Pulse Oximetry BMI (Body Mass Index)          80 96.5  F (35.8  C) (Temporal) 96% 26.43 kg/m2         Blood Pressure from Last 3 Encounters:   17 138/70   17 124/78   16 128/66    Weight from Last 3 Encounters:   17 154 lb (69.9 kg)   17 155 lb (70.3 kg)   16 155 lb 11.2 oz (70.6 kg)              We Performed the Following     ADMIN INFLUENZA (For MEDICARE Patients ONLY) []     Basic metabolic panel     FLU VACCINE, INCREASED ANTIGEN, PRESV FREE, AGE 65+ [12496]     Hemoglobin A1c          Today's Medication Changes          These changes are accurate as of: 17  9:43 PM.  If you have any questions, ask your nurse or doctor.               These medicines have changed or have updated prescriptions.        Dose/Directions    PARoxetine 40 MG tablet   Commonly known as:  PAXIL   This may have changed:  See the new instructions.   Used for:  Major depressive disorder, recurrent episode, moderate (H)   Changed by:  Byron Holm, DO        TAKE ONE TABLET BY MOUTH EVERY MORNING "   Quantity:  30 tablet   Refills:  5            Where to get your medicines      These medications were sent to [facility] - [place], [place] - [address]  [address], [facility] [address]     Phone:  [phone]     PARoxetine 40 MG tablet                Primary Care Provider Office Phone # Fax #    Byron Holm [phone]       [address] [place] [address]        Equal Access to Services     FAIZA LÓPEZ : Hadii aad ku hadasho Soomaali, waaxda luqadaha, qaybta kaalmada adeegyada, waxay idiin hayaan adeeg kharash la'estrellan . So [facility] [phone].    ATENCIÓN: Si habla español, tiene a hearn disposición servicios gratuitos de asistencia lingüística. [facility] [phone].    We comply with applicable federal civil rights laws and [place] laws. We do not discriminate on the basis of race, color, national origin, age, disability, sex, sexual orientation, or gender identity.            Thank you!     Thank you for choosing [facility]  for your care. Our goal is always to provide you with excellent care. Hearing back from our patients is one way we can continue to improve our services. Please take a few minutes to complete the written survey that you may receive in the mail after your visit with us. Thank you!             Your Updated Medication List - Protect others around you: Learn how to safely use, store and throw away your medicines at www.disposemymeds.org.          This list is accurate as of: 11/6/17  9:43 PM.  Always use your most recent med list.                   Brand Name Dispense Instructions for use Diagnosis    aspirin 81 MG tablet      1 TABLET DAILY        atorvastatin 80 MG tablet    LIPITOR     Take 80 mg by mouth daily        blood glucose monitoring lancets     1 Box    Use to test blood sugar 2 times daily or as directed.    Type 2 diabetes mellitus with diabetic polyneuropathy (H)       blood glucose monitoring test strip    ACCU-CHEK  COMPACT PLUS    102 each    USE TO CHECK BLOOD SUGARS TWO TIMES A DAY OR AS DIRECTED.  Appointment needed for additional refills.    Type 2 diabetes mellitus with diabetic polyneuropathy, without long-term current use of insulin (H)       fluticasone 50 MCG/ACT spray    FLONASE    16 g    INSTILL ONE TO TWO SPRAYS INTO BOTH NOSTRILS DAILY    Seasonal allergic rhinitis       gabapentin 300 MG capsule    NEURONTIN    60 capsule    TAKE ONE TO TWO CAPSULES BY MOUTH EVERY EVENING    Type 2 diabetes mellitus with diabetic polyneuropathy, without long-term current use of insulin (H)       glipiZIDE 10 MG tablet    GLUCOTROL    120 tablet    TAKE TWO TABLETS BY MOUTH TWO TIMES A DAY BEFORE MEALS    Type 2 diabetes mellitus with diabetic polyneuropathy, without long-term current use of insulin (H)       IMDUR PO      Take 30 mg by mouth daily    ASCVD (arteriosclerotic cardiovascular disease)       lisinopril-hydrochlorothiazide 20-25 MG per tablet    PRINZIDE/ZESTORETIC    90 tablet    Take 1 tablet by mouth daily    Hypertension goal BP (blood pressure) < 140/90       MELATONIN PO      Take 3 mg by mouth nightly as needed        metFORMIN 500 MG 24 hr tablet    GLUCOPHAGE-XR    120 tablet    TAKE TWO TABLETS BY MOUTH TWICE A DAY WITH MEALS    Type 2 diabetes mellitus with diabetic polyneuropathy, without long-term current use of insulin (H)       metoprolol 100 MG 24 hr tablet    TOPROL-XL    90 tablet    Take 1 tablet (100 mg) by mouth daily    Hypertension goal BP (blood pressure) < 140/90       * omeprazole 40 MG capsule    priLOSEC     Take 40 mg by mouth daily Take one tablet daily        * omeprazole 40 MG capsule    priLOSEC    90 capsule    TAKE ONE CAPSULE BY MOUTH TWICE A DAY AS NEEDED    Gastroesophageal reflux disease without esophagitis       PARoxetine 40 MG tablet    PAXIL    30 tablet    TAKE ONE TABLET BY MOUTH EVERY MORNING    Major depressive disorder, recurrent episode, moderate (H)       PLAVIX PO       Take 75 mg by mouth daily    ASCVD (arteriosclerotic cardiovascular disease)       * triamcinolone 0.1 % ointment    KENALOG    30 g    Apply sparingly to affected area three times daily for 14 days.    Psoriasis       * triamcinolone 0.5 % cream    KENALOG    30 g    Apply sparingly to affected area three times daily.    Other eczema       * Notice:  This list has 4 medication(s) that are the same as other medications prescribed for you. Read the directions carefully, and ask your doctor or other care provider to review them with you.

## 2017-11-06 NOTE — TELEPHONE ENCOUNTER
Requested Prescriptions   Pending Prescriptions Disp Refills     PARoxetine (PAXIL) 40 MG tablet [Pharmacy Med Name: PAROXETINE HCL 40MG TABS] 30 tablet 0     Sig: TAKE ONE TABLET BY MOUTH EVERY MORNING    SSRIs Protocol Failed    11/6/2017  8:46 AM       Failed - PHQ-9 score less than 5 in past 6 months    Please review PHQ-9 score.          Passed - Medication is NOT Bupropion    If the medication is Bupropion (Wellbutrin), and the patient is taking for smoking cessation; OK to refill.         Passed - Patient is age 18 or older       Passed - No active pregnancy on record       Passed - No positive pregnancy test in last 12 months       Passed - Recent (6 mo) or future visit with authorizing provider's specialty    Patient had office visit in the last 6 months or has a visit in the next 30 days with authorizing provider.  See chart review.             PHQ-9 score:    PHQ-9 SCORE 11/6/2017   Total Score -   Total Score 8

## 2017-11-06 NOTE — TELEPHONE ENCOUNTER
Routing refill request to provider for review/approval because:  PHQ-9 >4    Sheri Francois, RN  [facility]

## 2017-11-06 NOTE — PROGRESS NOTES
CHIEF COMPLAINT:    The patient is a very pleasant 77-year-old female who presents today for follow-up of her type 2 diabetes. She notes that she does not check her blood sugar very often takes her medications compliantly. She's had no symptoms of hyper or hypoglycemia. She watches her diet closely. She does have a history of coronary artery disease but has had no chest pain or shortness of breath with exertion. Her hypertension is well controlled, she takes her medications compliantly including the metoprolol, lisinopril/hydrochlorothiazide. She is appropriate on a statin as well as aspirin. She's had no muscle pain from medication.                       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     Surgical History:   has a past surgical history that includes NONSPECIFIC PROCEDURE (1988); LAPAROSCOPY, SURGICAL; CHOLECYSTECTOMY (1997); colonoscopy (04/15/09); Colonoscopy (6/18/2014); Abdomen surgery; Phacoemulsification clear cornea with standard intraocular lens implant (Right, 3/16/2016); and Phacoemulsification clear cornea with standard intraocular lens implant (Left, 3/30/2016).     Social History:   reports that she has never smoked. She has never used smokeless tobacco. She reports that she does not drink alcohol or use illicit drugs.     Family History:  family history includes Arthritis in her mother; CANCER in her maternal grandmother; DIABETES in her father and mother; EYE* in her mother; Genitourinary Problems in her father; HEART DISEASE in her father, maternal grandmother, and mother; Hypertension in her mother; Lipids in her mother; Neurologic Disorder in her  mother; OSTEOPOROSIS in her mother; Obesity in her mother. There is no history of Alcohol/Drug, Alzheimer Disease, Anesthesia Reaction, Blood Disease, Depression, Genetic Disorder, GASTROINTESTINAL DISEASE, Gynecology, Psychotic Disorder, Respiratory, or CEREBROVASCULAR DISEASE.            MEDICATIONS  Current Outpatient Prescriptions   Medication Sig Dispense Refill     metFORMIN (GLUCOPHAGE-XR) 500 MG 24 hr tablet TAKE TWO TABLETS BY MOUTH TWICE A DAY WITH MEALS 120 tablet 0     PARoxetine (PAXIL) 40 MG tablet Take 1 tablet (40 mg) by mouth every morning 30 tablet 0     omeprazole (PRILOSEC) 40 MG capsule TAKE ONE CAPSULE BY MOUTH TWICE A DAY AS NEEDED 90 capsule 3     glipiZIDE (GLUCOTROL) 10 MG tablet TAKE TWO TABLETS BY MOUTH TWO TIMES A DAY BEFORE MEALS 120 tablet 4     metoprolol (TOPROL-XL) 100 MG 24 hr tablet Take 1 tablet (100 mg) by mouth daily 90 tablet 3     lisinopril-hydrochlorothiazide (PRINZIDE/ZESTORETIC) 20-25 MG per tablet Take 1 tablet by mouth daily 90 tablet 2     triamcinolone (KENALOG) 0.5 % cream Apply sparingly to affected area three times daily. 30 g 1     fluticasone (FLONASE) 50 MCG/ACT spray INSTILL ONE TO TWO SPRAYS INTO BOTH NOSTRILS DAILY 16 g 8     gabapentin (NEURONTIN) 300 MG capsule TAKE ONE TO TWO CAPSULES BY MOUTH EVERY EVENING 60 capsule 7     blood glucose monitoring (ACCU-CHEK COMPACT PLUS) test strip USE TO CHECK BLOOD SUGARS TWO TIMES A DAY OR AS DIRECTED.  Appointment needed for additional refills. 102 each 0     omeprazole (PRILOSEC) 40 MG capsule Take 40 mg by mouth daily Take one tablet daily       atorvastatin (LIPITOR) 80 MG tablet Take 80 mg by mouth daily       triamcinolone (KENALOG) 0.1 % ointment Apply sparingly to affected area three times daily for 14 days. 30 g 0     blood glucose monitoring (ACCU-CHEK MULTICLIX) lancets Use to test blood sugar 2 times daily or as directed. 1 Box 11     Clopidogrel Bisulfate (PLAVIX PO) Take 75 mg by mouth daily         Isosorbide Mononitrate (IMDUR PO) Take 30 mg by mouth daily       MELATONIN PO Take 3 mg by mouth nightly as needed        ASPIRIN 81 MG PO TABS 1 TABLET DAILY  11         --------------------------------------------------------------------------------------------------------------------                          REVIEW OF SYSTEMS:         LUNGS: Pt denies: cough,excess sputum, hemoptysis, or shortness of breath.   HEART: Pt denies: chest pain, arrythmia, syncope, tachy or bradyarrhythmia or excess edema.   GI: Pt denies: nausea, vomitting, diarrhea, constipation, melena, or hematochezia.   NEURO: Pt denies: seizures, strokes, diplopia, weakness, paraesthesias, or paralysis.   MS: Pt denies: significant joint pain, swelling, or acute loss of function. Able to ambulate with little or no assistance.                          EXAMINATION:         /70 (BP Location: Right arm, Patient Position: Chair, Cuff Size: Adult Regular)  Pulse 80  Temp 96.5  F (35.8  C) (Temporal)  Wt 154 lb (69.9 kg)  SpO2 96%  BMI 26.43 kg/m2   Constitutional: The patient appears to be in no acute distress. The patient appears to be adequately hydrated. No acute respiratory or hemodynamic distress is noted at this time.   LUNGS: clear bilaterally, airflow is brisk, no intercostal retraction or stridor is noted. No coughing is noted during visit.   HEART:  regular without rubs, clicks, gallops, or murmurs. PMI is nondisplaced. Upstrokes are brisk. S1,S2 are heard.   GI: Abdomen is soft, without rebound, guarding or tenderness. Bowel sounds are appropriate. No renal bruits are heard.    NEURO: Pt is alert and appropriate. No neurologic lateralization is noted. Cranial nerves 2-12 are intact. Peripheral sensory and motor function are grossly normal   SKIN:  warm and dry. No erythema, or rashes are noted. No specific lesions of concern are noted.                           DECISION MAKIN. Hyperlipidemia LDL goal <100  Continue statin  medication.  Limit fats in the diet  Do not wish to start gemfibrozil or similar at this time    2. Hypertension goal BP (blood pressure) < 140/90  Continue current medication  - Basic metabolic panel  - Albumin Random Urine Quantitative with Creat Ratio  - *UA reflex to Microscopic and Culture ([place]; [facility]; [facility]; [facility]; [facility]; [facility]; [place]; [place])    3. Coronary artery disease involving native coronary artery of native heart without angina pectoris  Stable on current meds.  Continue statin, blood pressure management, beta blocker    4. Type 2 diabetes mellitus with diabetic polyneuropathy, without long-term current use of insulin (H)  Check A1c  Continue current meds as reviewed  - Albumin Random Urine Quantitative with Creat Ratio  - Hemoglobin A1c    5. Need for prophylactic vaccination and inoculation against influenza    - FLU VACCINE, INCREASED ANTIGEN, PRESV FREE, AGE 65+ [53079]  - ADMIN INFLUENZA (For MEDICARE Patients ONLY) []    6. Advanced directives, counseling/discussion  Discussed                               FOLLOW UP    I have asked the patient to make an appointment for follow up with me in 4 months or as needed        I have carefully explained the diagnosis and treatment options with the patient. The patient has displayed an understanding of the above, and all subsequent questions were answered.         DO GEMA Campos    Portions of this note were produced using [employer]  Although every attempt at real-time proof reading has been made, occasional grammar/syntax errors may have been missed.

## 2017-11-06 NOTE — TELEPHONE ENCOUNTER
Patient did not leave enough urine for her tests to be done, Please see if she is able to come in an leave another sample. Left message at # to call back.  Makenzie CONROY

## 2017-11-06 NOTE — PROGRESS NOTES
.    Injectable Influenza Immunization Documentation    1.  Is the person to be vaccinated sick today?   No    2. Does the person to be vaccinated have an allergy to a component   of the vaccine?   No  Egg Allergy Algorithm Link    3. Has the person to be vaccinated ever had a serious reaction   to influenza vaccine in the past?   No    4. Has the person to be vaccinated ever had Guillain-Barré syndrome?   No    Form completed by Makenzie CONROY

## 2017-11-07 ENCOUNTER — TELEPHONE (OUTPATIENT)
Dept: INTERNAL MEDICINE | Facility: CLINIC | Age: 77
End: 2017-11-07

## 2017-11-07 NOTE — PROGRESS NOTES
Please contact the patient and notify her of the following:  Chemistry panel shows a blood sugar of 238.  Renal function is decreased but stable.  The hemoglobin A1c has gone up from 8.0 up to 8.8.  We need to set up an appointment to discuss her triglycerides as well as uncontrolled blood sugar.  Thank you.  DO GEMA Campos

## 2017-11-07 NOTE — TELEPHONE ENCOUNTER
----- Message from Byron Holm DO sent at 11/6/2017 10:59 PM CST -----    Please contact the patient and notify her of the following:  Chemistry panel shows a blood sugar of 238.  Renal function is decreased but stable.  The hemoglobin A1c has gone up from 8.0 up to 8.8.  We need to set up an appointment to discuss her triglycerides as well as uncontrolled blood sugar.  Thank you.  Byron Holm DO FACOI

## 2017-11-07 NOTE — TELEPHONE ENCOUNTER
----- Message from Byron Holm DO sent at 11/6/2017 10:58 PM CST -----    Please contact the patient and notify her of the following:  Fasting triglycerides have returned at 404.  Given her history of diabetes and increased cardiac risk, we should set up an appointment to discuss skin, use of statin medications and fibrates.  Thank you.  Byron Holm DO FACOI

## 2017-11-07 NOTE — PROGRESS NOTES
Please contact the patient and notify her of the following:  Fasting triglycerides have returned at 404.  Given her history of diabetes and increased cardiac risk, we should set up an appointment to discuss skin, use of statin medications and fibrates.  Thank you.  DO GEMA Campos

## 2017-11-16 ENCOUNTER — OFFICE VISIT (OUTPATIENT)
Dept: INTERNAL MEDICINE | Facility: CLINIC | Age: 77
End: 2017-11-16
Payer: COMMERCIAL

## 2017-11-16 VITALS
RESPIRATION RATE: 16 BRPM | WEIGHT: 152.8 LBS | BODY MASS INDEX: 26.23 KG/M2 | HEART RATE: 75 BPM | TEMPERATURE: 98.1 F | DIASTOLIC BLOOD PRESSURE: 72 MMHG | OXYGEN SATURATION: 96 % | SYSTOLIC BLOOD PRESSURE: 135 MMHG

## 2017-11-16 DIAGNOSIS — I25.10 CORONARY ARTERY DISEASE INVOLVING NATIVE CORONARY ARTERY OF NATIVE HEART WITHOUT ANGINA PECTORIS: ICD-10-CM

## 2017-11-16 DIAGNOSIS — I10 ESSENTIAL HYPERTENSION WITH GOAL BLOOD PRESSURE LESS THAN 140/90: ICD-10-CM

## 2017-11-16 DIAGNOSIS — E11.42 TYPE 2 DIABETES MELLITUS WITH DIABETIC POLYNEUROPATHY, WITHOUT LONG-TERM CURRENT USE OF INSULIN (H): Primary | ICD-10-CM

## 2017-11-16 PROCEDURE — 99214 OFFICE O/P EST MOD 30 MIN: CPT | Performed by: INTERNAL MEDICINE

## 2017-11-16 ASSESSMENT — PAIN SCALES - GENERAL: PAINLEVEL: NO PAIN (0)

## 2017-11-16 NOTE — PROGRESS NOTES
SUBJECTIVE:   Eliza Dubois is a 77 year old female who presents to clinic today for the following health issues:      Chief Complaint   Patient presents with     RECHECK     results, skin, statin medication, fibrates                       Chief Complaint           The patient is a pleasant 77-year-old female who presents today for follow-up of her diabetes.  She notes that the dry skin on her upper arms and that she has improved significantly.  She has been using some cream.  Notably, her recent glycosylated chemical and has returned at 8.8.  We discussed that this is in adequately controlled.  She is taking her medications compliantly, she's watching her diet fairly well.  Previous A1c's have not been much better.  She does have a history of coronary artery disease and does have an appointment with her cardiologist first thing next week.  With respect her diabetes, she denies any polyuria or polydipsia, she denies any acute vision changes are symptoms of acute hypoglycemia. She does have some hypertension which is fairly well controlled On the beta-blocker as well as ACE inhibitor/hydrochlorothiazide combination.  Her neuropathy is well-controlled with the gabapentin, she takes compliantly and has had no side effects.                       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     Surgical History:   has a past surgical history that includes NONSPECIFIC PROCEDURE (1988); LAPAROSCOPY, SURGICAL; CHOLECYSTECTOMY (1997); colonoscopy (04/15/09); Colonoscopy (6/18/2014); Abdomen surgery; Phacoemulsification clear cornea with standard intraocular lens implant (Right,  3/16/2016); and Phacoemulsification clear cornea with standard intraocular lens implant (Left, 3/30/2016).     Social History:   reports that she has never smoked. She has never used smokeless tobacco. She reports that she does not drink alcohol or use illicit drugs.     Family History:  family history includes Arthritis in her mother; CANCER in her maternal grandmother; DIABETES in her father and mother; EYE* in her mother; Genitourinary Problems in her father; HEART DISEASE in her father, maternal grandmother, and mother; Hypertension in her mother; Lipids in her mother; Neurologic Disorder in her mother; OSTEOPOROSIS in her mother; Obesity in her mother. There is no history of Alcohol/Drug, Alzheimer Disease, Anesthesia Reaction, Blood Disease, Depression, Genetic Disorder, GASTROINTESTINAL DISEASE, Gynecology, Psychotic Disorder, Respiratory, or CEREBROVASCULAR DISEASE.            MEDICATIONS  Current Outpatient Prescriptions   Medication Sig Dispense Refill     canagliflozin (INVOKANA) 100 MG tablet Take 1 tablet (100 mg) by mouth every morning (before breakfast) 30 tablet 0     PARoxetine (PAXIL) 40 MG tablet TAKE ONE TABLET BY MOUTH EVERY MORNING 30 tablet 5     metFORMIN (GLUCOPHAGE-XR) 500 MG 24 hr tablet TAKE TWO TABLETS BY MOUTH TWICE A DAY WITH MEALS 120 tablet 0     omeprazole (PRILOSEC) 40 MG capsule TAKE ONE CAPSULE BY MOUTH TWICE A DAY AS NEEDED 90 capsule 3     glipiZIDE (GLUCOTROL) 10 MG tablet TAKE TWO TABLETS BY MOUTH TWO TIMES A DAY BEFORE MEALS 120 tablet 4     metoprolol (TOPROL-XL) 100 MG 24 hr tablet Take 1 tablet (100 mg) by mouth daily 90 tablet 3     lisinopril-hydrochlorothiazide (PRINZIDE/ZESTORETIC) 20-25 MG per tablet Take 1 tablet by mouth daily 90 tablet 2     triamcinolone (KENALOG) 0.5 % cream Apply sparingly to affected area three times daily. 30 g 1     fluticasone (FLONASE) 50 MCG/ACT spray INSTILL ONE TO TWO SPRAYS INTO BOTH NOSTRILS DAILY 16 g 8     gabapentin (NEURONTIN) 300  MG capsule TAKE ONE TO TWO CAPSULES BY MOUTH EVERY EVENING 60 capsule 7     blood glucose monitoring (ACCU-CHEK COMPACT PLUS) test strip USE TO CHECK BLOOD SUGARS TWO TIMES A DAY OR AS DIRECTED.  Appointment needed for additional refills. 102 each 0     omeprazole (PRILOSEC) 40 MG capsule Take 40 mg by mouth daily Take one tablet daily       atorvastatin (LIPITOR) 80 MG tablet Take 80 mg by mouth daily       triamcinolone (KENALOG) 0.1 % ointment Apply sparingly to affected area three times daily for 14 days. 30 g 0     blood glucose monitoring (ACCU-CHEK MULTICLIX) lancets Use to test blood sugar 2 times daily or as directed. 1 Box 11     Clopidogrel Bisulfate (PLAVIX PO) Take 75 mg by mouth daily        Isosorbide Mononitrate (IMDUR PO) Take 30 mg by mouth daily       MELATONIN PO Take 3 mg by mouth nightly as needed        ASPIRIN 81 MG PO TABS 1 TABLET DAILY  11         --------------------------------------------------------------------------------------------------------------------                  Review of Systems     LUNGS: Pt denies: cough,excess sputum, hemoptysis, or shortness of breath.   HEART: Pt denies: chest pain, arrythmia, syncope, tachy or bradyarrhythmia.   GI: Pt denies: nausea, vomitting, diarrhea, constipation, melena, or hematochezia.   NEURO: Pt denies: seizures, strokes, diplopia, weakness, paraesthesias, or paralysis.   SKIN: Pt denies: itching, rashes, discoloration, or specific lesions of concern. Denies recent hair loss.   PSYCH: The patient denies significant depression, anxiety, mood imbalance. Specifically denies any suicidal ideation.                        Examination       Vital Signs:  B/P: 142/72, T: 98.1, P: 75, R: 16, BMI: Body mass index is 26.23 kg/(m^2).   Constitutional: The patient appears to be in no acute distress. The patient appears to be adequately hydrated. No acute respiratory or hemodynamic distress is noted at this time.   LUNGS: clear bilaterally, airflow is  brisk, no intercostal retraction or stridor is noted. No coughing is noted during visit.   HEART:  regular without rubs, clicks, gallops, or murmurs. PMI is nondisplaced. Upstrokes are brisk. S1,S2 are heard.   NEURO: Pt is alert and appropriate. No neurologic lateralization is noted. Cranial nerves 2-12 are intact. Peripheral sensory and motor function are grossly normal.    SKIN:  warm and dry. No erythema, or rashes are noted. No specific lesions of concern are noted.                        Decision-Making          1. Type 2 diabetes mellitus with diabetic polyneuropathy, without long-term current use of insulin (H)  Will add Invokana at 100 mg daily  Discuss the potential for yeast infection  Discussed possible diuresis and need to discontinue Heidegger thiazide  - canagliflozin (INVOKANA) 100 MG tablet; Take 1 tablet (100 mg) by mouth every morning (before breakfast)  Dispense: 30 tablet; Refill: 0    2. Essential hypertension with goal blood pressure less than 140/90  As above, continue current medication    3. Coronary artery disease involving native coronary artery of native heart without angina pectoris  Stable, follow up with cardiology next week.                         FOLLOW UP   I have asked the patient to make an appointment for followup with me   In two weeks will blood sugar log        I have carefully explained the diagnosis and treatment options with the patient. The patient has displayed an understanding of the above, and all subsequent questions were answered.               DO GEMA Campos    Portions of this note were produced using [employer]  Although every attempt at real-time proof reading has been made, occasional grammar/syntax errors may have been missed.

## 2017-11-16 NOTE — NURSING NOTE
"Chief Complaint   Patient presents with     RECHECK     results, skin, statin medication, fibrates       Initial /72  Pulse 75  Temp 98.1  F (36.7  C) (Tympanic)  Resp 16  Wt 152 lb 12.8 oz (69.3 kg)  SpO2 96%  BMI 26.23 kg/m2 Estimated body mass index is 26.23 kg/(m^2) as calculated from the following:    Height as of 4/6/17: 5' 4\" (1.626 m).    Weight as of this encounter: 152 lb 12.8 oz (69.3 kg).  Medication Reconciliation: complete  "

## 2017-11-16 NOTE — MR AVS SNAPSHOT
"              After Visit Summary   11/16/2017    Eliza Dubois    MRN: [id number]           Patient Information     Date Of Birth          1940        Visit Information        Provider Department      11/16/2017 8:40 AM Byron Holm DO [facility]        Today's Diagnoses     Type 2 diabetes mellitus with diabetic polyneuropathy, without long-term current use of insulin (H)    -  1    Essential hypertension with goal blood pressure less than 140/90        Coronary artery disease involving native coronary artery of native heart without angina pectoris           Follow-ups after your visit        Who to contact     If you have questions or need follow up information about today's clinic visit or your schedule please contact [facility] directly at [phone].  Normal or non-critical lab and imaging results will be communicated to you by [employer], letter or phone within 4 business days after the clinic has received the results. If you do not hear from us within 7 days, please contact the clinic through [employer] or phone. If you have a critical or abnormal lab result, we will notify you by phone as soon as possible.  Submit refill requests through [employer] or call your pharmacy and they will forward the refill request to us. Please allow 3 business days for your refill to be completed.          Additional Information About Your Visit        MyChart Information     [employer] lets you send messages to your doctor, view your test results, renew your prescriptions, schedule appointments and more. To sign up, go to [email or web address] . Click on \"Log in\" on the left side of the screen, which will take you to the Welcome page. Then click on \"Sign up Now\" on the right side of the page.     You will be asked to enter the access code listed below, as well as some personal information. Please follow the directions to create your username and password.     Your access code " is: [email or web address]  Expires: 2018  9:43 PM     Your access code will  in 90 days. If you need help or a new code, please call your [facility] clinic or [phone].        Care EveryWhere ID     This is your Care EveryWhere ID. This could be used by other organizations to access your [facility] medical records  [id number]        Your Vitals Were     Pulse Temperature Respirations Pulse Oximetry BMI (Body Mass Index)       75 98.1  F (36.7  C) (Tympanic) 16 96% 26.23 kg/m2        Blood Pressure from Last 3 Encounters:   17 135/72   17 138/70   17 124/78    Weight from Last 3 Encounters:   17 152 lb 12.8 oz (69.3 kg)   17 154 lb (69.9 kg)   17 155 lb (70.3 kg)              Today, you had the following     No orders found for display         Today's Medication Changes          These changes are accurate as of: 17 11:59 PM.  If you have any questions, ask your nurse or doctor.               Start taking these medicines.        Dose/Directions    canagliflozin 100 MG tablet   Commonly known as:  INVOKANA   Used for:  Type 2 diabetes mellitus with diabetic polyneuropathy, without long-term current use of insulin (H)   Started by:  Byron Holm DO        Dose:  100 mg   Take 1 tablet (100 mg) by mouth every morning (before breakfast)   Quantity:  30 tablet   Refills:  0            Where to get your medicines      These medications were sent to [address] [place] - [address]  [address], [facility] [address]     Phone:  [phone]     canagliflozin 100 MG tablet                Primary Care Provider Office Phone # Fax #    Byron Holm [phone]       [address] [place] [address]        Equal Access to Services     FAIZA LÓPEZ AH: Lana blanca Sogume, waandreea luqadaha, qaybta kaalmawade rivera, elgin zheng. So [facility] [phone].    ATENCIÓN: Si matteo brumfield  a hearn disposición servicios gratuitos de asistencia lingüística. Chance jean baptiste [phone].    We comply with applicable federal civil rights laws and [place] laws. We do not discriminate on the basis of race, color, national origin, age, disability, sex, sexual orientation, or gender identity.            Thank you!     Thank you for choosing [facility]  for your care. Our goal is always to provide you with excellent care. Hearing back from our patients is one way we can continue to improve our services. Please take a few minutes to complete the written survey that you may receive in the mail after your visit with us. Thank you!             Your Updated Medication List - Protect others around you: Learn how to safely use, store and throw away your medicines at www.disposemymeds.org.          This list is accurate as of: 11/16/17 11:59 PM.  Always use your most recent med list.                   Brand Name Dispense Instructions for use Diagnosis    aspirin 81 MG tablet      1 TABLET DAILY        atorvastatin 80 MG tablet    LIPITOR     Take 80 mg by mouth daily        blood glucose monitoring lancets     1 Box    Use to test blood sugar 2 times daily or as directed.    Type 2 diabetes mellitus with diabetic polyneuropathy (H)       blood glucose monitoring test strip    ACCU-CHEK COMPACT PLUS    102 each    USE TO CHECK BLOOD SUGARS TWO TIMES A DAY OR AS DIRECTED.  Appointment needed for additional refills.    Type 2 diabetes mellitus with diabetic polyneuropathy, without long-term current use of insulin (H)       canagliflozin 100 MG tablet    INVOKANA    30 tablet    Take 1 tablet (100 mg) by mouth every morning (before breakfast)    Type 2 diabetes mellitus with diabetic polyneuropathy, without long-term current use of insulin (H)       fluticasone 50 MCG/ACT spray    FLONASE    16 g    INSTILL ONE TO TWO SPRAYS INTO BOTH NOSTRILS DAILY    Seasonal allergic rhinitis       gabapentin 300 MG capsule     NEURONTIN    60 capsule    TAKE ONE TO TWO CAPSULES BY MOUTH EVERY EVENING    Type 2 diabetes mellitus with diabetic polyneuropathy, without long-term current use of insulin (H)       glipiZIDE 10 MG tablet    GLUCOTROL    120 tablet    TAKE TWO TABLETS BY MOUTH TWO TIMES A DAY BEFORE MEALS    Type 2 diabetes mellitus with diabetic polyneuropathy, without long-term current use of insulin (H)       IMDUR PO      Take 30 mg by mouth daily    ASCVD (arteriosclerotic cardiovascular disease)       lisinopril-hydrochlorothiazide 20-25 MG per tablet    PRINZIDE/ZESTORETIC    90 tablet    Take 1 tablet by mouth daily    Hypertension goal BP (blood pressure) < 140/90       MELATONIN PO      Take 3 mg by mouth nightly as needed        metFORMIN 500 MG 24 hr tablet    GLUCOPHAGE-XR    120 tablet    TAKE TWO TABLETS BY MOUTH TWICE A DAY WITH MEALS    Type 2 diabetes mellitus with diabetic polyneuropathy, without long-term current use of insulin (H)       metoprolol 100 MG 24 hr tablet    TOPROL-XL    90 tablet    Take 1 tablet (100 mg) by mouth daily    Hypertension goal BP (blood pressure) < 140/90       * omeprazole 40 MG capsule    priLOSEC     Take 40 mg by mouth daily Take one tablet daily        * omeprazole 40 MG capsule    priLOSEC    90 capsule    TAKE ONE CAPSULE BY MOUTH TWICE A DAY AS NEEDED    Gastroesophageal reflux disease without esophagitis       PARoxetine 40 MG tablet    PAXIL    30 tablet    TAKE ONE TABLET BY MOUTH EVERY MORNING    Major depressive disorder, recurrent episode, moderate (H)       PLAVIX PO      Take 75 mg by mouth daily    ASCVD (arteriosclerotic cardiovascular disease)       * triamcinolone 0.1 % ointment    KENALOG    30 g    Apply sparingly to affected area three times daily for 14 days.    Psoriasis       * triamcinolone 0.5 % cream    KENALOG    30 g    Apply sparingly to affected area three times daily.    Other eczema       * Notice:  This list has 4 medication(s) that are the same as  other medications prescribed for you. Read the directions carefully, and ask your doctor or other care provider to review them with you.

## 2017-11-20 ENCOUNTER — TRANSFERRED RECORDS (OUTPATIENT)
Dept: HEALTH INFORMATION MANAGEMENT | Facility: CLINIC | Age: 77
End: 2017-11-20

## 2017-11-21 DIAGNOSIS — E11.42 TYPE 2 DIABETES MELLITUS WITH DIABETIC POLYNEUROPATHY, WITHOUT LONG-TERM CURRENT USE OF INSULIN (H): ICD-10-CM

## 2017-11-21 NOTE — TELEPHONE ENCOUNTER
Requested Prescriptions   Pending Prescriptions Disp Refills     metFORMIN (GLUCOPHAGE-XR) 500 MG 24 hr tablet [Pharmacy Med Name: METFORMIN HCL ER 500MG TB24] 120 tablet 0     Sig: TAKE TWO TABLETS BY MOUTH TWICE A DAY WITH MEALS    Biguanide Agents Failed    11/21/2017  1:01 AM       Failed - Patient has had a Microalbumin in the past 12 mos.    Recent Labs   Lab Test  07/25/16   0815   MICROL  58   UMALCR  52.64*            Passed - Patient's BP is less than 140/90    BP Readings from Last 3 Encounters:   11/16/17 135/72   11/06/17 138/70   04/06/17 124/78                Passed - Patient has documented LDL within the past 12 mos.    Recent Labs   Lab Test  04/05/17   0821   LDL  Cannot estimate LDL when triglyceride exceeds 400 mg/dL  81            Passed - Patient is age 10 or older       Passed - Patient has documented A1c within the specified period of time.    Recent Labs   Lab Test  11/06/17   0928   A1C  8.8*            Passed - Patient's CR is NOT>1.4 OR Patient's EGFR is NOT<45 within past 12 mos.    Recent Labs   Lab Test  11/06/17   0928   GFRESTIMATED  45*   GFRESTBLACK  55*       Recent Labs   Lab Test  11/06/17   0928   CR  1.16*            Passed - Patient does NOT have a diagnosis of CHF.       Passed - Patient is not pregnant       Passed - Patient has not had a positive pregnancy test within the past 12 mos.        Passed - Recent (6 mos) or future visit with authorizing provider's specialty    Patient had office visit in the last 6 months or has a visit in the next 30 days with authorizing provider.  See chart review.

## 2017-11-22 RX ORDER — METFORMIN HCL 500 MG
TABLET, EXTENDED RELEASE 24 HR ORAL
Qty: 120 TABLET | Refills: 0 | Status: SHIPPED | OUTPATIENT
Start: 2017-11-22 | End: 2017-12-26

## 2017-11-22 NOTE — TELEPHONE ENCOUNTER
Routing refill request to provider for review/approval because:  Labs out of range:  A1c, GFR, Creatinine.   Labs not current:  Microalbumin.     Paulina Garrett RN

## 2017-12-12 DIAGNOSIS — E11.42 TYPE 2 DIABETES MELLITUS WITH DIABETIC POLYNEUROPATHY, WITHOUT LONG-TERM CURRENT USE OF INSULIN (H): ICD-10-CM

## 2017-12-12 NOTE — TELEPHONE ENCOUNTER
Requested Prescriptions   Pending Prescriptions Disp Refills     INVOKANA 100 MG tablet [Pharmacy Med Name: INVOKANA 100MG TABS] 30 tablet 0     Sig: TAKE ONE TABLET BY MOUTH EVERY MORNING BEFORE BREAKFAST    Sodium Glucose Co-Transport Inhibitor Agents Failed    12/12/2017  1:01 AM       Failed - Patient has had a Microalbumin in the past 12 mos.    Recent Labs   Lab Test  07/25/16   0815   MICROL  58   UMALCR  52.64*            Passed - Patient's BP is less than 140/90    BP Readings from Last 3 Encounters:   11/16/17 135/72   11/06/17 138/70   04/06/17 124/78                Passed - Patient has documented LDL within the past 12 mos.    Recent Labs   Lab Test  04/05/17   0821   LDL  Cannot estimate LDL when triglyceride exceeds 400 mg/dL  81            Passed - Patient has documented A1c within the specified period of time.    Recent Labs   Lab Test  11/06/17   0928   A1C  8.8*            Passed - No creatinine >1.4 or GFR <45 within the past 12 mos    Recent Labs   Lab Test  11/06/17   0928   GFRESTIMATED  45*   GFRESTBLACK  55*       Recent Labs   Lab Test  11/06/17   0928   CR  1.16*            Passed - Patient is age 18 or older       Passed - Patient is not pregnant       Passed - Patient has documented normal Potassium within the last 12 mos.    Recent Labs   Lab Test  11/06/17   0928   POTASSIUM  4.7            Passed - Patient has no positive pregnancy test within the past 12 mos.       Passed - Patient has had an appointment within the past 6 mos.or has a future appt within the next 30 days    Patient had office visit in the last 6 months or has a visit in the next 30 days with authorizing provider.  See chart review.

## 2017-12-13 RX ORDER — CANAGLIFLOZIN 100 MG/1
TABLET, FILM COATED ORAL
Qty: 30 TABLET | Refills: 5 | Status: SHIPPED | OUTPATIENT
Start: 2017-12-13 | End: 2018-06-09

## 2017-12-13 NOTE — TELEPHONE ENCOUNTER
Routing refill request to provider for review/approval because:  Labs out of range:  Microalbumin, LDL, GFR, Creatinine    Sheri Francois RN  [facility]

## 2017-12-26 ENCOUNTER — OFFICE VISIT (OUTPATIENT)
Dept: FAMILY MEDICINE | Facility: OTHER | Age: 77
End: 2017-12-26
Payer: COMMERCIAL

## 2017-12-26 VITALS
BODY MASS INDEX: 25.27 KG/M2 | SYSTOLIC BLOOD PRESSURE: 130 MMHG | RESPIRATION RATE: 16 BRPM | DIASTOLIC BLOOD PRESSURE: 64 MMHG | OXYGEN SATURATION: 98 % | TEMPERATURE: 96 F | WEIGHT: 147.2 LBS | HEART RATE: 72 BPM

## 2017-12-26 DIAGNOSIS — N30.00 ACUTE CYSTITIS WITHOUT HEMATURIA: ICD-10-CM

## 2017-12-26 DIAGNOSIS — I10 ESSENTIAL HYPERTENSION WITH GOAL BLOOD PRESSURE LESS THAN 140/90: ICD-10-CM

## 2017-12-26 DIAGNOSIS — E11.42 TYPE 2 DIABETES MELLITUS WITH DIABETIC POLYNEUROPATHY, WITHOUT LONG-TERM CURRENT USE OF INSULIN (H): ICD-10-CM

## 2017-12-26 DIAGNOSIS — I25.10 CORONARY ARTERY DISEASE INVOLVING NATIVE CORONARY ARTERY OF NATIVE HEART WITHOUT ANGINA PECTORIS: Primary | ICD-10-CM

## 2017-12-26 PROCEDURE — 99214 OFFICE O/P EST MOD 30 MIN: CPT | Performed by: INTERNAL MEDICINE

## 2017-12-26 PROCEDURE — 99207 ZZC FOOT EXAM  NO CHARGE: CPT | Mod: 25 | Performed by: INTERNAL MEDICINE

## 2017-12-26 RX ORDER — METFORMIN HCL 500 MG
TABLET, EXTENDED RELEASE 24 HR ORAL
Qty: 120 TABLET | Refills: 3 | Status: SHIPPED | OUTPATIENT
Start: 2017-12-26 | End: 2018-05-10

## 2017-12-26 ASSESSMENT — PAIN SCALES - GENERAL: PAINLEVEL: NO PAIN (0)

## 2017-12-26 NOTE — PROGRESS NOTES
SUBJECTIVE:   Eliza Dubois is a 77 year old female who presents to clinic today for the following health issues:      Diabetes Follow-up    Patient is checking blood sugars: twice daily.    Blood sugar testing frequency justification: start of a new medication.  Results are as follows:       am - 100       suppertime - 100        Diabetic concerns: None     Symptoms of hypoglycemia (low blood sugar): none     Paresthesias (numbness or burning in feet) or sores: Yes bilateral feet numbness     Date of last diabetic eye exam: due for an eye exam  BP Readings from Last 2 Encounters:   12/26/17 130/64   11/16/17 135/72     Hemoglobin A1C (%)   Date Value   11/06/2017 8.8 (H)   04/05/2017 8.0 (H)     LDL Cholesterol Calculated (mg/dL)   Date Value   04/05/2017     Cannot estimate LDL when triglyceride exceeds 400 mg/dL   01/28/2016 40     LDL Cholesterol Direct (mg/dL)   Date Value   04/05/2017 81                      Chief Complaint    The patient is a pleasant 77-year-old female who presents today for follow-up of her diabetes.  She has type 2 diabetes and control has been less than perfect.  Her most recent glycosylated hemoglobin was 8.8.  She is now much more compliant with her medications and has been watching her diet.  She continues the metformin in combination with the Invokana and glipizide.  She has had no yeast infections from the Invokana.  She does have some mild neuropathy for which she takes gabapentin with good results.  She is appropriately an ACE inhibitor, aspirin, statin.  She recently had some symptoms of suprapubic irritation and frequency of urination.  She questions the possibility of urinary tract infection.  Has had no external burning or symptoms associated with the yeast infection.                         PAST, FAMILY,SOCIAL HISTORY:     Medical  History:   has a past medical history of Diabetic eye exam (H) (05/01/14); Heart attack; Pure hypercholesterolemia; Stented coronary artery;  Type II or unspecified type diabetes mellitus without mention of complication, not stated as uncontrolled (2002); Unspecified disease of pericardium (12/05/08); and Unspecified essential hypertension.     Surgical History:   has a past surgical history that includes NONSPECIFIC PROCEDURE (1988); LAPAROSCOPY, SURGICAL; CHOLECYSTECTOMY (1997); colonoscopy (04/15/09); Colonoscopy (6/18/2014); Abdomen surgery; Phacoemulsification clear cornea with standard intraocular lens implant (Right, 3/16/2016); and Phacoemulsification clear cornea with standard intraocular lens implant (Left, 3/30/2016).     Social History:   reports that she has never smoked. She has never used smokeless tobacco. She reports that she does not drink alcohol or use illicit drugs.     Family History:  family history includes Arthritis in her mother; CANCER in her maternal grandmother and another family member; DIABETES in her father and mother; EYE* in her mother; Genitourinary Problems in her father; HEART DISEASE in her father, maternal grandmother, and mother; Hypertension in her mother; Lipids in her mother; Neurologic Disorder in her mother; OSTEOPOROSIS in her mother; Obesity in her mother. There is no history of Alcohol/Drug, Alzheimer Disease, Anesthesia Reaction, Blood Disease, Depression, Genetic Disorder, GASTROINTESTINAL DISEASE, Gynecology, Psychotic Disorder, Respiratory, or CEREBROVASCULAR DISEASE.            MEDICATIONS  Current Outpatient Prescriptions   Medication Sig Dispense Refill     blood glucose monitoring (ACCU-CHEK COMPACT PLUS) test strip USE TO CHECK BLOOD SUGARS TWO TIMES A DAY OR AS DIRECTED.  Appointment needed for additional refills. 102 each 0     metFORMIN (GLUCOPHAGE-XR) 500 MG 24 hr tablet TAKE TWO TABLETS BY MOUTH TWICE A DAY WITH MEALS 120 tablet 3     INVOKANA 100 MG tablet TAKE ONE TABLET BY MOUTH EVERY MORNING BEFORE BREAKFAST 30 tablet 5     PARoxetine (PAXIL) 40 MG tablet TAKE ONE TABLET BY MOUTH EVERY  MORNING 30 tablet 5     omeprazole (PRILOSEC) 40 MG capsule TAKE ONE CAPSULE BY MOUTH TWICE A DAY AS NEEDED 90 capsule 3     glipiZIDE (GLUCOTROL) 10 MG tablet TAKE TWO TABLETS BY MOUTH TWO TIMES A DAY BEFORE MEALS 120 tablet 4     metoprolol (TOPROL-XL) 100 MG 24 hr tablet Take 1 tablet (100 mg) by mouth daily 90 tablet 3     lisinopril-hydrochlorothiazide (PRINZIDE/ZESTORETIC) 20-25 MG per tablet Take 1 tablet by mouth daily 90 tablet 2     triamcinolone (KENALOG) 0.5 % cream Apply sparingly to affected area three times daily. 30 g 1     fluticasone (FLONASE) 50 MCG/ACT spray INSTILL ONE TO TWO SPRAYS INTO BOTH NOSTRILS DAILY 16 g 8     gabapentin (NEURONTIN) 300 MG capsule TAKE ONE TO TWO CAPSULES BY MOUTH EVERY EVENING 60 capsule 7     omeprazole (PRILOSEC) 40 MG capsule Take 40 mg by mouth daily Take one tablet daily       atorvastatin (LIPITOR) 80 MG tablet Take 80 mg by mouth daily       triamcinolone (KENALOG) 0.1 % ointment Apply sparingly to affected area three times daily for 14 days. 30 g 0     blood glucose monitoring (ACCU-CHEK MULTICLIX) lancets Use to test blood sugar 2 times daily or as directed. 1 Box 11     Clopidogrel Bisulfate (PLAVIX PO) Take 75 mg by mouth daily        Isosorbide Mononitrate (IMDUR PO) Take 30 mg by mouth daily       MELATONIN PO Take 3 mg by mouth nightly as needed        ASPIRIN 81 MG PO TABS 1 TABLET DAILY  11         --------------------------------------------------------------------------------------------------------------------                              Review of Systems     LUNGS: Pt denies: cough,excess sputum, hemoptysis, or shortness of breath.   HEART: Pt denies: chest pain, arrythmia, syncope, tachy or bradyarrhythmia.   GI: Pt denies: nausea, vomitting, diarrhea, constipation, melena, or hematochezia.   NEURO: Pt denies: seizures, strokes, diplopia, weakness, paraesthesias, or paralysis.   SKIN: Pt denies: itching, rashes, discoloration, or specific lesions  of concern. Denies recent hair loss.                                     Examination      /64 (BP Location: Left arm, Patient Position: Chair, Cuff Size: Adult Large)  Pulse 72  Temp 96  F (35.6  C) (Oral)  Resp 16  Wt 147 lb 3.2 oz (66.8 kg)  SpO2 98%  BMI 25.27 kg/m2   Constitutional: The patient appears to be in no acute distress. The patient appears to be adequately hydrated. No acute respiratory or hemodynamic distress is noted at this time.   LUNGS: clear bilaterally, airflow is brisk, no intercostal retraction or stridor is noted. No coughing is noted during visit.   HEART:  regular without rubs, clicks, gallops, or murmurs. PMI is nondisplaced. Upstrokes are brisk. S1,S2 are heard.   GI: Abdomen is soft, without rebound, guarding or tenderness. Bowel sounds are appropriate. No renal bruits are heard.   NEURO: Pt is alert and appropriate. No neurologic lateralization is noted. Cranial nerves 2-12 are intact. Peripheral sensory and motor function are grossly normal.    SKIN:  warm and dry. No erythema, or rashes are noted. No specific lesions of concern are noted.                                           Decision Making    1. Type 2 diabetes mellitus with diabetic polyneuropathy, without long-term current use of insulin (H)  Continue blood sugar monitoring closely.  Continue current medications.  Continue dietary restriction.  - blood glucose monitoring (ACCU-CHEK COMPACT PLUS) test strip; USE TO CHECK BLOOD SUGARS TWO TIMES A DAY OR AS DIRECTED.  Appointment needed for additional refills.  Dispense: 102 each; Refill: 0  - metFORMIN (GLUCOPHAGE-XR) 500 MG 24 hr tablet; TAKE TWO TABLETS BY MOUTH TWICE A DAY WITH MEALS  Dispense: 120 tablet; Refill: 3    2. Coronary artery disease involving native coronary artery of native heart without angina pectoris  Currently free of chest pain, continue statin, aspirin, Imdur, Plavix.    3. Essential hypertension with goal blood pressure less than  140/90  Currently controlled on medication    4. Acute cystitis without hematuria  Check urinalysis for clearance of previous urinary tract infection  - *UA reflex to Microscopic and Culture ([facility] (except [place] and [place]); Future                          FOLLOW UP   I have asked the patient to make an appointment for followup with me as predicated by needs or in approximately 4 months.        I have carefully explained the diagnosis and treatment options to the patient.  The patient has displayed an understanding of the above, and all subsequent questions were answered.      DO GEMA Campos    Portions of this note were produced using [employer]  Although every attempt at real-time proof reading has been made, occasional grammar/syntax errors may have been missed.

## 2017-12-26 NOTE — NURSING NOTE
"Chief Complaint   Patient presents with     Diabetes       Initial /64 (BP Location: Left arm, Patient Position: Chair, Cuff Size: Adult Large)  Pulse 72  Temp 96  F (35.6  C) (Oral)  Resp 16  Wt 147 lb 3.2 oz (66.8 kg)  SpO2 98%  BMI 25.27 kg/m2 Estimated body mass index is 25.27 kg/(m^2) as calculated from the following:    Height as of 4/6/17: 5' 4\" (1.626 m).    Weight as of this encounter: 147 lb 3.2 oz (66.8 kg).  Medication Reconciliation: complete     Serenity BASS LPN      "

## 2017-12-26 NOTE — MR AVS SNAPSHOT
"              After Visit Summary   12/26/2017    Eliza Dubois    MRN: [id number]           Patient Information     Date Of Birth          1940        Visit Information        Provider Department      12/26/2017 2:40 PM Byron Holm DO [facility]        Today's Diagnoses     Coronary artery disease involving native coronary artery of native heart without angina pectoris    -  1    Type 2 diabetes mellitus with diabetic polyneuropathy, without long-term current use of insulin (H)        Essential hypertension with goal blood pressure less than 140/90        Acute cystitis without hematuria           Follow-ups after your visit        Who to contact     If you have questions or need follow up information about today's clinic visit or your schedule please contact [facility] directly at [phone].  Normal or non-critical lab and imaging results will be communicated to you by MyChart, letter or phone within 4 business days after the clinic has received the results. If you do not hear from us within 7 days, please contact the clinic through MyChart or phone. If you have a critical or abnormal lab result, we will notify you by phone as soon as possible.  Submit refill requests through [employer] or call your pharmacy and they will forward the refill request to us. Please allow 3 business days for your refill to be completed.          Additional Information About Your Visit        MyChart Information     [employer] lets you send messages to your doctor, view your test results, renew your prescriptions, schedule appointments and more. To sign up, go to [email or web address] . Click on \"Log in\" on the left side of the screen, which will take you to the Welcome page. Then click on \"Sign up Now\" on the right side of the page.     You will be asked to enter the access code listed below, as well as some personal information. Please follow the directions to create your username " and password.     Your access code is: [email or web address]  Expires: 2018  9:43 PM     Your access code will  in 90 days. If you need help or a new code, please call your [facility] clinic or [phone].        Care EveryWhere ID     This is your Care EveryWhere ID. This could be used by other organizations to access your [facility] medical records  [id number]        Your Vitals Were     Pulse Temperature Respirations Pulse Oximetry BMI (Body Mass Index)       72 96  F (35.6  C) (Oral) 16 98% 25.27 kg/m2        Blood Pressure from Last 3 Encounters:   17 130/64   17 135/72   17 138/70    Weight from Last 3 Encounters:   17 147 lb 3.2 oz (66.8 kg)   17 152 lb 12.8 oz (69.3 kg)   17 154 lb (69.9 kg)                 Today's Medication Changes          These changes are accurate as of: 17 11:59 PM.  If you have any questions, ask your nurse or doctor.               These medicines have changed or have updated prescriptions.        Dose/Directions    metFORMIN 500 MG 24 hr tablet   Commonly known as:  GLUCOPHAGE-XR   This may have changed:  See the new instructions.   Used for:  Type 2 diabetes mellitus with diabetic polyneuropathy, without long-term current use of insulin (H)   Changed by:  Byron Holm DO        TAKE TWO TABLETS BY MOUTH TWICE A DAY WITH MEALS   Quantity:  120 tablet   Refills:  3            Where to get your medicines      [facility] [place] [facility] [address]  [address], [facility] [address]     Phone:  [phone]     blood glucose monitoring test strip    metFORMIN 500 MG 24 hr tablet                Primary Care Provider Office Phone # Fax #    Byron Holm [phone]       [address] [place] [address]        Equal Access to Services     FAIZA LÓPEZ AH: Lana hernandezo Soomaali, waaxda luqadaha, qaybta kaalthomas rivera, elgin crowley  lagudelian norm. So [facility] [phone].    ATENCIÓN: Si habla annalise, tiene a hearn disposición servicios gratuitos de asistencia lingüística. Chance al [phone].    We comply with applicable federal civil rights laws and [place] laws. We do not discriminate on the basis of race, color, national origin, age, disability, sex, sexual orientation, or gender identity.            Thank you!     Thank you for choosing [facility]  for your care. Our goal is always to provide you with excellent care. Hearing back from our patients is one way we can continue to improve our services. Please take a few minutes to complete the written survey that you may receive in the mail after your visit with us. Thank you!             Your Updated Medication List - Protect others around you: Learn how to safely use, store and throw away your medicines at www.disposemymeds.org.          This list is accurate as of: 12/26/17 11:59 PM.  Always use your most recent med list.                   Brand Name Dispense Instructions for use Diagnosis    aspirin 81 MG tablet      1 TABLET DAILY        atorvastatin 80 MG tablet    LIPITOR     Take 80 mg by mouth daily        blood glucose monitoring lancets     1 Box    Use to test blood sugar 2 times daily or as directed.    Type 2 diabetes mellitus with diabetic polyneuropathy (H)       blood glucose monitoring test strip    ACCU-CHEK COMPACT PLUS    102 each    USE TO CHECK BLOOD SUGARS TWO TIMES A DAY OR AS DIRECTED.  Appointment needed for additional refills.    Type 2 diabetes mellitus with diabetic polyneuropathy, without long-term current use of insulin (H)       fluticasone 50 MCG/ACT spray    FLONASE    16 g    INSTILL ONE TO TWO SPRAYS INTO BOTH NOSTRILS DAILY    Seasonal allergic rhinitis       gabapentin 300 MG capsule    NEURONTIN    60 capsule    TAKE ONE TO TWO CAPSULES BY MOUTH EVERY EVENING    Type 2 diabetes mellitus with diabetic polyneuropathy, without long-term current use  of insulin (H)       glipiZIDE 10 MG tablet    GLUCOTROL    120 tablet    TAKE TWO TABLETS BY MOUTH TWO TIMES A DAY BEFORE MEALS    Type 2 diabetes mellitus with diabetic polyneuropathy, without long-term current use of insulin (H)       IMDUR PO      Take 30 mg by mouth daily    ASCVD (arteriosclerotic cardiovascular disease)       INVOKANA 100 MG tablet   Generic drug:  canagliflozin     30 tablet    TAKE ONE TABLET BY MOUTH EVERY MORNING BEFORE BREAKFAST    Type 2 diabetes mellitus with diabetic polyneuropathy, without long-term current use of insulin (H)       lisinopril-hydrochlorothiazide 20-25 MG per tablet    PRINZIDE/ZESTORETIC    90 tablet    Take 1 tablet by mouth daily    Hypertension goal BP (blood pressure) < 140/90       MELATONIN PO      Take 3 mg by mouth nightly as needed        metFORMIN 500 MG 24 hr tablet    GLUCOPHAGE-XR    120 tablet    TAKE TWO TABLETS BY MOUTH TWICE A DAY WITH MEALS    Type 2 diabetes mellitus with diabetic polyneuropathy, without long-term current use of insulin (H)       metoprolol 100 MG 24 hr tablet    TOPROL-XL    90 tablet    Take 1 tablet (100 mg) by mouth daily    Hypertension goal BP (blood pressure) < 140/90       * omeprazole 40 MG capsule    priLOSEC     Take 40 mg by mouth daily Take one tablet daily        * omeprazole 40 MG capsule    priLOSEC    90 capsule    TAKE ONE CAPSULE BY MOUTH TWICE A DAY AS NEEDED    Gastroesophageal reflux disease without esophagitis       PARoxetine 40 MG tablet    PAXIL    30 tablet    TAKE ONE TABLET BY MOUTH EVERY MORNING    Major depressive disorder, recurrent episode, moderate (H)       PLAVIX PO      Take 75 mg by mouth daily    ASCVD (arteriosclerotic cardiovascular disease)       * triamcinolone 0.1 % ointment    KENALOG    30 g    Apply sparingly to affected area three times daily for 14 days.    Psoriasis       * triamcinolone 0.5 % cream    KENALOG    30 g    Apply sparingly to affected area three times daily.    Other  eczema       * Notice:  This list has 4 medication(s) that are the same as other medications prescribed for you. Read the directions carefully, and ask your doctor or other care provider to review them with you.

## 2018-01-11 ENCOUNTER — APPOINTMENT (OUTPATIENT)
Dept: GENERAL RADIOLOGY | Facility: CLINIC | Age: 78
End: 2018-01-11
Attending: FAMILY MEDICINE
Payer: MEDICARE

## 2018-01-11 ENCOUNTER — HOSPITAL ENCOUNTER (EMERGENCY)
Facility: CLINIC | Age: 78
Discharge: HOME | End: 2018-01-11
Attending: FAMILY MEDICINE | Admitting: FAMILY MEDICINE
Payer: MEDICARE

## 2018-01-11 VITALS
BODY MASS INDEX: 25.23 KG/M2 | HEART RATE: 74 BPM | TEMPERATURE: 97.9 F | DIASTOLIC BLOOD PRESSURE: 71 MMHG | SYSTOLIC BLOOD PRESSURE: 155 MMHG | OXYGEN SATURATION: 97 % | WEIGHT: 147 LBS | RESPIRATION RATE: 18 BRPM

## 2018-01-11 DIAGNOSIS — J20.9 ACUTE BRONCHITIS, UNSPECIFIED ORGANISM: ICD-10-CM

## 2018-01-11 DIAGNOSIS — N39.0 URINARY TRACT INFECTION WITHOUT HEMATURIA, SITE UNSPECIFIED: ICD-10-CM

## 2018-01-11 LAB
ALBUMIN UR-MCNC: NEGATIVE MG/DL
ANION GAP SERPL CALCULATED.3IONS-SCNC: 7 MMOL/L (ref 3–14)
APPEARANCE UR: ABNORMAL
BACTERIA #/AREA URNS HPF: ABNORMAL /HPF
BASOPHILS # BLD AUTO: 0 10E9/L (ref 0–0.2)
BASOPHILS NFR BLD AUTO: 0.2 %
BILIRUB UR QL STRIP: NEGATIVE
BUN SERPL-MCNC: 63 MG/DL (ref 7–30)
CALCIUM SERPL-MCNC: 8.9 MG/DL (ref 8.5–10.1)
CHLORIDE SERPL-SCNC: 103 MMOL/L (ref 94–109)
CO2 SERPL-SCNC: 30 MMOL/L (ref 20–32)
COLOR UR AUTO: YELLOW
CREAT SERPL-MCNC: 1.65 MG/DL (ref 0.52–1.04)
DIFFERENTIAL METHOD BLD: ABNORMAL
EOSINOPHIL # BLD AUTO: 1.4 10E9/L (ref 0–0.7)
EOSINOPHIL NFR BLD AUTO: 13.5 %
ERYTHROCYTE [DISTWIDTH] IN BLOOD BY AUTOMATED COUNT: 13.9 % (ref 10–15)
GFR SERPL CREATININE-BSD FRML MDRD: 30 ML/MIN/1.7M2
GLUCOSE SERPL-MCNC: 245 MG/DL (ref 70–99)
GLUCOSE UR STRIP-MCNC: >499 MG/DL
HCT VFR BLD AUTO: 39.8 % (ref 35–47)
HGB BLD-MCNC: 12.8 G/DL (ref 11.7–15.7)
HGB UR QL STRIP: NEGATIVE
HYALINE CASTS #/AREA URNS LPF: 9 /LPF (ref 0–2)
IMM GRANULOCYTES # BLD: 0 10E9/L (ref 0–0.4)
IMM GRANULOCYTES NFR BLD: 0.3 %
KETONES UR STRIP-MCNC: NEGATIVE MG/DL
LEUKOCYTE ESTERASE UR QL STRIP: ABNORMAL
LYMPHOCYTES # BLD AUTO: 2.4 10E9/L (ref 0.8–5.3)
LYMPHOCYTES NFR BLD AUTO: 23.4 %
MCH RBC QN AUTO: 29.5 PG (ref 26.5–33)
MCHC RBC AUTO-ENTMCNC: 32.2 G/DL (ref 31.5–36.5)
MCV RBC AUTO: 92 FL (ref 78–100)
MONOCYTES # BLD AUTO: 0.7 10E9/L (ref 0–1.3)
MONOCYTES NFR BLD AUTO: 7 %
NEUTROPHILS # BLD AUTO: 5.6 10E9/L (ref 1.6–8.3)
NEUTROPHILS NFR BLD AUTO: 55.6 %
NITRATE UR QL: POSITIVE
PH UR STRIP: 5 PH (ref 5–7)
PLATELET # BLD AUTO: 225 10E9/L (ref 150–450)
POTASSIUM SERPL-SCNC: 4.4 MMOL/L (ref 3.4–5.3)
RBC # BLD AUTO: 4.34 10E12/L (ref 3.8–5.2)
RBC #/AREA URNS AUTO: 2 /HPF (ref 0–2)
SODIUM SERPL-SCNC: 140 MMOL/L (ref 133–144)
SOURCE: ABNORMAL
SP GR UR STRIP: 1.02 (ref 1–1.03)
SQUAMOUS #/AREA URNS AUTO: 3 /HPF (ref 0–1)
UROBILINOGEN UR STRIP-MCNC: 0 MG/DL (ref 0–2)
WBC # BLD AUTO: 10 10E9/L (ref 4–11)
WBC #/AREA URNS AUTO: 158 /HPF (ref 0–2)
WBC CLUMPS #/AREA URNS HPF: PRESENT /HPF

## 2018-01-11 PROCEDURE — 87088 URINE BACTERIA CULTURE: CPT | Performed by: FAMILY MEDICINE

## 2018-01-11 PROCEDURE — 96360 HYDRATION IV INFUSION INIT: CPT | Performed by: FAMILY MEDICINE

## 2018-01-11 PROCEDURE — 96361 HYDRATE IV INFUSION ADD-ON: CPT | Performed by: FAMILY MEDICINE

## 2018-01-11 PROCEDURE — 81001 URINALYSIS AUTO W/SCOPE: CPT | Performed by: FAMILY MEDICINE

## 2018-01-11 PROCEDURE — 25000128 H RX IP 250 OP 636: Performed by: FAMILY MEDICINE

## 2018-01-11 PROCEDURE — 71046 X-RAY EXAM CHEST 2 VIEWS: CPT | Mod: TC

## 2018-01-11 PROCEDURE — 99284 EMERGENCY DEPT VISIT MOD MDM: CPT | Mod: Z6 | Performed by: FAMILY MEDICINE

## 2018-01-11 PROCEDURE — 87086 URINE CULTURE/COLONY COUNT: CPT | Performed by: FAMILY MEDICINE

## 2018-01-11 PROCEDURE — 99283 EMERGENCY DEPT VISIT LOW MDM: CPT | Mod: 25 | Performed by: FAMILY MEDICINE

## 2018-01-11 PROCEDURE — 80048 BASIC METABOLIC PNL TOTAL CA: CPT | Performed by: FAMILY MEDICINE

## 2018-01-11 PROCEDURE — 85025 COMPLETE CBC W/AUTO DIFF WBC: CPT | Performed by: FAMILY MEDICINE

## 2018-01-11 PROCEDURE — 87186 SC STD MICRODIL/AGAR DIL: CPT | Performed by: FAMILY MEDICINE

## 2018-01-11 RX ORDER — CEPHALEXIN 500 MG/1
500 CAPSULE ORAL 3 TIMES DAILY
Qty: 21 CAPSULE | Refills: 0 | Status: SHIPPED | OUTPATIENT
Start: 2018-01-11 | End: 2018-01-18

## 2018-01-11 RX ADMIN — SODIUM CHLORIDE 1000 ML: 9 INJECTION, SOLUTION INTRAVENOUS at 10:49

## 2018-01-11 NOTE — ED NOTES
Pt here with her daughter.  Pt lives with her  independently and has a hx of dizziness/falls.  She has not felt well x a week ago Tuesday.  Pt has had a cough, some nausea and vomiting that has resolved, but pt cont to feel very weak, sleeping a lot, and more dizzy than usual.  She has trouble just doing ADL's.  She has no appetite.  Pt is alert.  Mucus membranes are dry.  Afebrile.      Pt was exposed to family member with Flu A.

## 2018-01-11 NOTE — ED NOTES
States has been sick for over a week. Comes in today because a cough developed and also states she is dizzy.

## 2018-01-11 NOTE — DISCHARGE INSTRUCTIONS
"Thank you for giving us the opportunity to see you. The impression that you have bronchitis, but also a urinary tract infection.    Begin Keflex and take 500 mg 3 times a day for 7 days.    Plenty of fluids.  Rest.  Expect improvement in the next 3-5 days.  A urine culture is pending;  the final results may still be pending. We will call you if your plan of care needs to change.     If you are not seeing an improvement within 5 days, please follow up with your primary care provider or clinic.     Return to the Emergency Department at any time if your symptoms worsen.           * BLADDER INFECTION,Female (Adult)    A bladder infection (\"cystitis\" or \"UTI\") usually causes a constant urge to urinate and a burning when passing urine. Urine may be cloudy, smelly or dark. There may be pain in the lower abdomen. A bladder infection occurs when bacteria from the vaginal area enter the bladder opening (urethra). This can occur from sexual intercourse, wearing tight clothing, dehydration and other factors.  HOME CARE:  1. Drink lots of fluids (at least 6-8 glasses a day, unless you must restrict fluids for other medical reasons). This will force the medicine into your urinary system and flush the bacteria out of your body. Cranberry juice has been shown to help clear out the bacteria.  2. Avoid sexual intercourse until your symptoms are gone.  3. A bladder infection is treated with antibiotics. You may also be given Pyridium (generic = phenazopyridine) to reduce the burning sensation. This medicine will cause your urine to become a bright orange color. The orange urine may stain clothing. You may wear a pad or panty-liner to protect clothing.  PREVENTING FUTURE INFECTIONS:  1. Always wipe from front to back after a bowel movement.  2. Keep the genital area clean and dry.  3. Drink plenty of fluids each day to avoid dehydration.  4. Urinate right after intercourse to flush out the bladder.  5. Wear cotton underwear and " cotton-lined panty hose; avoid tight-fitting pants.  6. If you are on birth control pills and are having frequent bladder infections, discuss with your doctor.  FOLLOW UP: Return to this facility or see your doctor if ALL symptoms are not gone after three days of treatment.  GET PROMPT MEDICAL ATTENTION if any of the following occur:    Fever over 101 F (38.3 C)    No improvement by the third day of treatment    Increasing back or abdominal pain    Repeated vomiting; unable to keep medicine down    Weakness, dizziness or fainting    Vaginal discharge    Pain, redness or swelling in the labia (outer vaginal area)    1147-5849 [employer]. [address] [place] [address], [place] [address]. All rights reserved. This information is not intended as a substitute for professional medical care. Always follow your healthcare professional's instructions.  This information has been modified by your health care provider with permission from the publisher.      Acute Bronchitis  Your healthcare provider has told you that you have acute bronchitis. Bronchitis is infection or inflammation of the bronchial tubes (airways in the lungs). Normally, air moves easily in and out of the airways. Bronchitis narrows the airways, making it harder for air to flow in and out of the lungs. This causes symptoms such as shortness of breath, coughing up yellow or green mucus, and wheezing. Bronchitis can be acute or chronic. Acute means the condition comes on quickly and goes away in a short time, usually within 3 to 10 days. Chronic means a condition lasts a long time and often comes back.    What causes acute bronchitis?  Acute bronchitis almost always starts as a viral respiratory infection, such as a cold or the flu. Certain factors make it more likely for a cold or flu to turn into bronchitis. These include being very young, being elderly, having a heart or lung problem, or having a weak immune system. Cigarette smoking also makes  bronchitis more likely.  When bronchitis develops, the airways become swollen. The airways may also become infected with bacteria. This is known as a secondary infection.  Diagnosing acute bronchitis  Your healthcare provider will examine you and ask about your symptoms and health history. You may also have a sputum culture to test the fluid in your lungs. Chest X-rays may be done to look for infection in the lungs.  Treating acute bronchitis  Bronchitis usually clears up as the cold or flu goes away. You can help feel better faster by doing the following:    Take medicine as directed. You may be told to take ibuprofen or other over-the-counter medicines. These help relieve inflammation in your bronchial tubes. Your healthcare provider may prescribe an inhaler to help open up the bronchial tubes. Most of the time, acute bronchitis is caused by a viral infection. Antibiotics are usually not prescribed for viral infections.    Drink plenty of fluids, such as water, juice, or warm soup. Fluids loosen mucus so that you can cough it up. This helps you breathe more easily. Fluids also prevent dehydration.    Make sure you get plenty of rest.    Do not smoke. Do not allow anyone else to smoke in your home.  Recovery and follow-up  Follow up with your doctor as you are told. You will likely feel better in a week or two. But a dry cough can linger beyond that time. Let your doctor know if you still have symptoms (other than a dry cough) after 2 weeks, or if you re prone to getting bronchial infections. Take steps to protect yourself from future infections. These steps include stopping smoking and avoiding tobacco smoke, washing your hands often, and getting a yearly flu shot.  When to call your healthcare provider  Call the healthcare provider if you have any of the following:    Fever of 100.4 F (38.0 C) or higher, or as advised    Symptoms that get worse, or new symptoms    Trouble breathing    Symptoms that don t start to  improve within a week, or within 3 days of taking antibiotics   Date Last Reviewed: 12/1/2016 2000-2017 [employer]. [address] [place] [address], [place] [address]. All rights reserved. This information is not intended as a substitute for professional medical care. Always follow your healthcare professional's instructions.

## 2018-01-11 NOTE — ED AVS SNAPSHOT
[facility] Emergency Department    [address] [place] [address]    Phone:  [phone]    Fax:  [phone]                                       Eliza Dubois   MRN: [id number]    Department:  [facility] Emergency Department   Date of Visit:  1/11/2018           After Visit Summary Signature Page     I have received my discharge instructions, and my questions have been answered. I have discussed any challenges I see with this plan with the nurse or doctor.    ..........................................................................................................................................  Patient/Patient Representative Signature      ..........................................................................................................................................  Patient Representative Print Name and Relationship to Patient    ..................................................               ................................................  Date                                            Time    ..........................................................................................................................................  Reviewed by Signature/Title    ...................................................              ..............................................  Date                                                            Time

## 2018-01-11 NOTE — ED NOTES
Pt ate lunch and was able to get up and walk to BR.  She was wobbly and states she normally holds onto the walls.  She has a cane/walker at home but the walker has wheels on it and she does not feel it is secure. Urine is cloudy.  Pt waiting for labs/md eval/dispo plan.

## 2018-01-11 NOTE — ED PROVIDER NOTES
[facility] ED Provider Note   CC:     Chief Complaint   Patient presents with     Cough     Generalized Weakness     HPI:  Eliza Dubois is a 77 year old female who was brought to the emergency department by her daughter with concerns for a prolonged illness that has lasted for approximately 10-11 days.  She was exposed over new years to a grandson with influenza, and she has had a respiratory illness leading to laryngitis, and now a nonproductive cough for over a week.  She feels extremely weak, and does not have much of an appetite.  She feels thirsty this morning however.  She has not taken her medications this morning.  There has been possible low-grade fevers.  Patient denies any vomiting or diarrhea.  She has a history of diabetes mellitus with a blood sugar this morning of 82.  The patient's  called the daughter to have her bring the patient in to be checked.    Problem List:  Patient Active Problem List    Diagnosis     Type 2 diabetes mellitus with diabetic polyneuropathy, without long-term current use of insulin (H)     Essential hypertension with goal blood pressure less than 140/90     Gastroesophageal reflux disease without esophagitis     Coronary artery disease involving native coronary artery of native heart without angina pectoris     Major depressive disorder, recurrent episode, moderate (H)     Anxiety     Advanced directives, counseling/discussion     Insomnia     Hypertension goal BP (blood pressure) < 140/90     HYPERLIPIDEMIA LDL GOAL <100     Esophageal reflux     Irritable bowel syndrome       MEDS:   Discharge Medication List as of 1/11/2018  1:32 PM      CONTINUE these medications which have NOT CHANGED    Details   metFORMIN (GLUCOPHAGE-XR) 500 MG 24 hr tablet TAKE TWO TABLETS BY MOUTH TWICE A DAY WITH MEALS, Disp-120 tablet, R-3, E-Prescribe      INVOKANA 100 MG tablet TAKE ONE TABLET BY MOUTH EVERY MORNING  BEFORE BREAKFAST, Disp-30 tablet, R-5, E-Prescribe      PARoxetine (PAXIL) 40 MG tablet TAKE ONE TABLET BY MOUTH EVERY MORNING, Disp-30 tablet, R-5, E-Prescribe      omeprazole (PRILOSEC) 40 MG capsule TAKE ONE CAPSULE BY MOUTH TWICE A DAY AS NEEDED, Disp-90 capsule, R-3, E-Prescribe      metoprolol (TOPROL-XL) 100 MG 24 hr tablet Take 1 tablet (100 mg) by mouth daily, Disp-90 tablet, R-3, E-Prescribe      lisinopril-hydrochlorothiazide (PRINZIDE/ZESTORETIC) 20-25 MG per tablet Take 1 tablet by mouth daily, Disp-90 tablet, R-2, E-Prescribe      fluticasone (FLONASE) 50 MCG/ACT spray INSTILL ONE TO TWO SPRAYS INTO BOTH NOSTRILS DAILY, Disp-16 g, R-8, E-Prescribe      gabapentin (NEURONTIN) 300 MG capsule TAKE ONE TO TWO CAPSULES BY MOUTH EVERY EVENING, Disp-60 capsule, R-7, E-Prescribe      atorvastatin (LIPITOR) 80 MG tablet Take 80 mg by mouth daily, Historical      Clopidogrel Bisulfate (PLAVIX PO) Take 75 mg by mouth daily , Historical      Isosorbide Mononitrate (IMDUR PO) Take 30 mg by mouth daily, Historical      MELATONIN PO Take 3 mg by mouth nightly as needed , Historical      ASPIRIN 81 MG PO TABS Take 1 tablet by mouth once daily, R-11, Historical      blood glucose monitoring (ACCU-CHEK COMPACT PLUS) test strip USE TO CHECK BLOOD SUGARS TWO TIMES A DAY OR AS DIRECTED.  Appointment needed for additional refills., Disp-102 each, R-0, E-Prescribe      glipiZIDE (GLUCOTROL) 10 MG tablet TAKE TWO TABLETS BY MOUTH TWO TIMES A DAY BEFORE MEALS, Disp-120 tablet, R-4, E-Prescribe      blood glucose monitoring (ACCU-CHEK MULTICLIX) lancets Use to test blood sugar 2 times daily or as directed.Disp-1 Box, [id number]             ALLERGIES:    Allergies   Allergen Reactions     Sulfa Drugs Hives       Past Surgical History:   Procedure Laterality Date     ABDOMEN SURGERY       C NONSPECIFIC PROCEDURE  1988    Hysterectomy     COLONOSCOPY  04/15/09     COLONOSCOPY  6/18/2014    Procedure: COMBINED COLONOSCOPY,  SINGLE BIOPSY/POLYPECTOMY BY BIOPSY;  Surgeon: Earle Mon MD;  Location: [facility] LAPAROSCOPY, SURGICAL; CHOLECYSTECTOMY  1997    Cholecystectomy, Laparoscopic     PHACOEMULSIFICATION CLEAR CORNEA WITH STANDARD INTRAOCULAR LENS IMPLANT Right 3/16/2016    Procedure: PHACOEMULSIFICATION CLEAR CORNEA WITH STANDARD INTRAOCULAR LENS IMPLANT;  Surgeon: George Lemus MD;  Location: [facility]     PHACOEMULSIFICATION CLEAR CORNEA WITH STANDARD INTRAOCULAR LENS IMPLANT Left 3/30/2016    Procedure: PHACOEMULSIFICATION CLEAR CORNEA WITH STANDARD INTRAOCULAR LENS IMPLANT;  Surgeon: George Lemus MD;  Location: [facility]       Social History   Substance Use Topics     Smoking status: Never Smoker     Smokeless tobacco: Never Used     Alcohol use No         Review of Systems   Except as noted in HPI, all other systems were reviewed and are negative    Physical Exam     Vitals were reviewed  Patient Vitals for the past 8 hrs:   BP Temp Temp src Pulse Heart Rate Resp SpO2 Weight   01/11/18 1331 155/71 - - 74 - 18 97 % -   01/11/18 1222 173/73 - - - 68 16 99 % -   01/11/18 1200 - - - - 64 16 - -   01/11/18 1100 - - - - 61 16 - -   01/11/18 1021 185/80 97.9  F (36.6  C) Oral - 69 18 99 % 66.7 kg (147 lb)     GENERAL APPEARANCE: Alert, pleasant, oriented ×3, no respiratory difficulty  FACE: normal facies  EYES: Pupils are equal; no nystagmus  HENT: normal external exam; mucous membranes are dry  NECK: no adenopathy or asymmetry  RESP: normal respiratory effort; clear breath sounds bilaterally  CV: regular rate and rhythm; no significant murmurs, gallops or rubs  ABD: soft, no tenderness; no rebound or guarding; bowel sounds are normal  MS: no gross deformities noted; normal muscle tone.  EXT: No calf tenderness or pitting edema  SKIN: no worrisome rash  NEURO: no facial droop; no focal deficits, speech is normal  PSYCH: Flat affect      Available Lab/Imaging Results     Results for orders placed or performed during the  hospital encounter of 01/11/18 (from the past 24 hour(s))   CBC with platelets differential   Result Value Ref Range    WBC 10.0 4.0 - 11.0 10e9/L    RBC Count 4.34 3.8 - 5.2 10e12/L    Hemoglobin 12.8 11.7 - 15.7 g/dL    Hematocrit 39.8 35.0 - 47.0 %    MCV 92 78 - 100 fl    MCH 29.5 26.5 - 33.0 pg    MCHC 32.2 31.5 - 36.5 g/dL    RDW 13.9 10.0 - 15.0 %    Platelet Count 225 150 - 450 10e9/L    Diff Method Automated Method     % Neutrophils 55.6 %    % Lymphocytes 23.4 %    % Monocytes 7.0 %    % Eosinophils 13.5 %    % Basophils 0.2 %    % Immature Granulocytes 0.3 %    Absolute Neutrophil 5.6 1.6 - 8.3 10e9/L    Absolute Lymphocytes 2.4 0.8 - 5.3 10e9/L    Absolute Monocytes 0.7 0.0 - 1.3 10e9/L    Absolute Eosinophils 1.4 (H) 0.0 - 0.7 10e9/L    Absolute Basophils 0.0 0.0 - 0.2 10e9/L    Abs Immature Granulocytes 0.0 0 - 0.4 10e9/L   Basic metabolic panel   Result Value Ref Range    Sodium 140 133 - 144 mmol/L    Potassium 4.4 3.4 - 5.3 mmol/L    Chloride 103 94 - 109 mmol/L    Carbon Dioxide 30 20 - 32 mmol/L    Anion Gap 7 3 - 14 mmol/L    Glucose 245 (H) 70 - 99 mg/dL    Urea Nitrogen 63 (H) 7 - 30 mg/dL    Creatinine 1.65 (H) 0.52 - 1.04 mg/dL    GFR Estimate 30 (L) >60 mL/min/1.7m2    GFR Estimate If Black 36 (L) >60 mL/min/1.7m2    Calcium 8.9 8.5 - 10.1 mg/dL   UA reflex to Microscopic   Result Value Ref Range    Color Urine Yellow     Appearance Urine Slightly Cloudy     Glucose Urine >499 (A) NEG^Negative mg/dL    Bilirubin Urine Negative NEG^Negative    Ketones Urine Negative NEG^Negative mg/dL    Specific Gravity Urine 1.017 1.003 - 1.035    Blood Urine Negative NEG^Negative    pH Urine 5.0 5.0 - 7.0 pH    Protein Albumin Urine Negative NEG^Negative mg/dL    Urobilinogen mg/dL 0.0 0.0 - 2.0 mg/dL    Nitrite Urine Positive (A) NEG^Negative    Leukocyte Esterase Urine Moderate (A) NEG^Negative    Source Unspecified Urine     RBC Urine 2 0 - 2 /HPF    WBC Urine 158 (H) 0 - 2 /HPF    WBC Clumps Present  (A) NEG^Negative /HPF    Bacteria Urine Many (A) NEG^Negative /HPF    Squamous Epithelial /HPF Urine 3 (H) 0 - 1 /HPF    Hyaline Casts 9 (H) 0 - 2 /LPF   XR Chest 2 Views    Narrative    CHEST TWO VIEWS   1/11/2018 11:14 AM     HISTORY: Cough x10 days.     COMPARISON: Chest x-rays dated 12/5/2008.    FINDINGS:  The lungs are clear. Heart size, mediastinum and pulmonary  vascularity are within normal limits. No pneumothorax or significant  pleural fluid collection. No fracture.      Impression    IMPRESSION: No evidence of acute cardiopulmonary disease is seen.         Impression     Final diagnoses:   Acute bronchitis   Urinary tract infection       ED Course & Medical Decision Making   Eliza Dubois is a 77 year old female who presented to the emergency department with a prolonged respiratory illness that lasted 10-11 days ago. She was exposed to influenza, developed a upper respiratory infection with loss of her voice, and nonproductive cough.  She continues to have those symptoms but is now extremely weak and fatigued.  Patient was seen shortly after arrival.  Temp is 97.9, blood pressure 185/80, rechecked later at 155/71.  Patient has normal heart rate and oxygen saturation.  Exam revealed clear breath sounds, but a persistent bronchial cough.  The rest of her exam was unremarkable.  She had some mild lower abdominal tenderness.  Laboratory workup reveals a normal white blood count of 10.0 without left shift.  Basic metabolic panel reveals normal electrolytes, but elevated glucose of 245.  BUN is 63, creatinine is 1.65.  Urinalysis revealed greater than 499 glucose, no ketones, positive nitrates, 158 white blood cells and 2 red blood cells.  Patient's chest x-ray was personally reviewed by me and reveals no acute infiltrates.  Patient has acute bronchitis, mild to moderate dehydration, and evidence for a urinary tract infection.  Patient will begin Keflex 500 mg 3 times a day for 7 days.  She has some  limited options for antibiotics due to her other medications.  A urine culture is pending.  I asked her to follow-up in 3-5 days if not improving, sooner if symptoms worsen.      Written after-visit summary and instructions were given at the time of discharge.      Discharge Medication List as of 1/11/2018  1:32 PM      START taking these medications    Details   cephALEXin (KEFLEX) 500 MG capsule Take 1 capsule (500 mg) by mouth 3 times daily for 7 days, Disp-21 capsule, R-0, E-Prescribe               This note was completed in part using Dragon voice recognition, and may contain word and grammatical errors.        Aisha Jameson MD  01/11/18 [id number]

## 2018-01-11 NOTE — ED AVS SNAPSHOT
" [facility] Emergency Department    [address] [place] [address]    Phone:  [phone]    Fax:  [phone]                                       Eliza Dubois   MRN: [id number]    Department:  [facility] Emergency Department   Date of Visit:  1/11/2018           Patient Information     Date Of Birth          1940        Your diagnoses for this visit were:     Acute bronchitis     Urinary tract infection        You were seen by Aisha Jameson MD.      Follow-up Information     Follow up with Byron Holm DO In 5 days.    Specialty:  Internal Medicine    Why:  if not improving    Contact information:    [address] [place] [address]  [phone]          Discharge Instructions       Thank you for giving us the opportunity to see you. The impression that you have bronchitis, but also a urinary tract infection.    Begin Keflex and take 500 mg 3 times a day for 7 days.    Plenty of fluids.  Rest.  Expect improvement in the next 3-5 days.  A urine culture is pending;  the final results may still be pending. We will call you if your plan of care needs to change.     If you are not seeing an improvement within 5 days, please follow up with your primary care provider or clinic.     Return to the Emergency Department at any time if your symptoms worsen.           * BLADDER INFECTION,Female (Adult)    A bladder infection (\"cystitis\" or \"UTI\") usually causes a constant urge to urinate and a burning when passing urine. Urine may be cloudy, smelly or dark. There may be pain in the lower abdomen. A bladder infection occurs when bacteria from the vaginal area enter the bladder opening (urethra). This can occur from sexual intercourse, wearing tight clothing, dehydration and other factors.  HOME CARE:  1. Drink lots of fluids (at least 6-8 glasses a day, unless you must restrict fluids for other medical reasons). This will force the medicine into your urinary " system and flush the bacteria out of your body. Cranberry juice has been shown to help clear out the bacteria.  2. Avoid sexual intercourse until your symptoms are gone.  3. A bladder infection is treated with antibiotics. You may also be given Pyridium (generic = phenazopyridine) to reduce the burning sensation. This medicine will cause your urine to become a bright orange color. The orange urine may stain clothing. You may wear a pad or panty-liner to protect clothing.  PREVENTING FUTURE INFECTIONS:  1. Always wipe from front to back after a bowel movement.  2. Keep the genital area clean and dry.  3. Drink plenty of fluids each day to avoid dehydration.  4. Urinate right after intercourse to flush out the bladder.  5. Wear cotton underwear and cotton-lined panty hose; avoid tight-fitting pants.  6. If you are on birth control pills and are having frequent bladder infections, discuss with your doctor.  FOLLOW UP: Return to this facility or see your doctor if ALL symptoms are not gone after three days of treatment.  GET PROMPT MEDICAL ATTENTION if any of the following occur:    Fever over 101 F (38.3 C)    No improvement by the third day of treatment    Increasing back or abdominal pain    Repeated vomiting; unable to keep medicine down    Weakness, dizziness or fainting    Vaginal discharge    Pain, redness or swelling in the labia (outer vaginal area)    7008-8176 [employer]. [address] [place] [address], [place] [id number]. All rights reserved. This information is not intended as a substitute for professional medical care. Always follow your healthcare professional's instructions.  This information has been modified by your health care provider with permission from the publisher.      Acute Bronchitis  Your healthcare provider has told you that you have acute bronchitis. Bronchitis is infection or inflammation of the bronchial tubes (airways in the lungs). Normally, air moves easily in and out of the  airways. Bronchitis narrows the airways, making it harder for air to flow in and out of the lungs. This causes symptoms such as shortness of breath, coughing up yellow or green mucus, and wheezing. Bronchitis can be acute or chronic. Acute means the condition comes on quickly and goes away in a short time, usually within 3 to 10 days. Chronic means a condition lasts a long time and often comes back.    What causes acute bronchitis?  Acute bronchitis almost always starts as a viral respiratory infection, such as a cold or the flu. Certain factors make it more likely for a cold or flu to turn into bronchitis. These include being very young, being elderly, having a heart or lung problem, or having a weak immune system. Cigarette smoking also makes bronchitis more likely.  When bronchitis develops, the airways become swollen. The airways may also become infected with bacteria. This is known as a secondary infection.  Diagnosing acute bronchitis  Your healthcare provider will examine you and ask about your symptoms and health history. You may also have a sputum culture to test the fluid in your lungs. Chest X-rays may be done to look for infection in the lungs.  Treating acute bronchitis  Bronchitis usually clears up as the cold or flu goes away. You can help feel better faster by doing the following:    Take medicine as directed. You may be told to take ibuprofen or other over-the-counter medicines. These help relieve inflammation in your bronchial tubes. Your healthcare provider may prescribe an inhaler to help open up the bronchial tubes. Most of the time, acute bronchitis is caused by a viral infection. Antibiotics are usually not prescribed for viral infections.    Drink plenty of fluids, such as water, juice, or warm soup. Fluids loosen mucus so that you can cough it up. This helps you breathe more easily. Fluids also prevent dehydration.    Make sure you get plenty of rest.    Do not smoke. Do not allow anyone  else to smoke in your home.  Recovery and follow-up  Follow up with your doctor as you are told. You will likely feel better in a week or two. But a dry cough can linger beyond that time. Let your doctor know if you still have symptoms (other than a dry cough) after 2 weeks, or if you re prone to getting bronchial infections. Take steps to protect yourself from future infections. These steps include stopping smoking and avoiding tobacco smoke, washing your hands often, and getting a yearly flu shot.  When to call your healthcare provider  Call the healthcare provider if you have any of the following:    Fever of 100.4 F (38.0 C) or higher, or as advised    Symptoms that get worse, or new symptoms    Trouble breathing    Symptoms that don t start to improve within a week, or within 3 days of taking antibiotics   Date Last Reviewed: 12/1/2016 2000-2017 [employer]. [address] [place] [address]. All rights reserved. This information is not intended as a substitute for professional medical care. Always follow your healthcare professional's instructions.          24 Hour Appointment Hotline       To make an appointment at any [facility], call [phone] ([phone]). If you don't have a family doctor or clinic, we will help you find one. [facility] are conveniently located to serve the needs of you and your family.             Review of your medicines      START taking        Dose / Directions Last dose taken    cephALEXin 500 MG capsule   Commonly known as:  KEFLEX   Dose:  500 mg   Quantity:  21 capsule        Take 1 capsule (500 mg) by mouth 3 times daily for 7 days   Refills:  0          Our records show that you are taking the medicines listed below. If these are incorrect, please call your family doctor or clinic.        Dose / Directions Last dose taken    aspirin 81 MG tablet        Take 1 tablet by mouth once daily   Refills:  11        atorvastatin 80 MG tablet    Commonly known as:  LIPITOR   Dose:  80 mg        Take 80 mg by mouth daily   Refills:  0        blood glucose monitoring lancets   Quantity:  1 Box        Use to test blood sugar 2 times daily or as directed.   Refills:  11        blood glucose monitoring test strip   Commonly known as:  ACCU-CHEK COMPACT PLUS   Quantity:  102 each        USE TO CHECK BLOOD SUGARS TWO TIMES A DAY OR AS DIRECTED.  Appointment needed for additional refills.   Refills:  0        fluticasone 50 MCG/ACT spray   Commonly known as:  FLONASE   Quantity:  16 g        INSTILL ONE TO TWO SPRAYS INTO BOTH NOSTRILS DAILY   Refills:  8        gabapentin 300 MG capsule   Commonly known as:  NEURONTIN   Quantity:  60 capsule        TAKE ONE TO TWO CAPSULES BY MOUTH EVERY EVENING   Refills:  7        glipiZIDE 10 MG tablet   Commonly known as:  GLUCOTROL   Quantity:  120 tablet        TAKE TWO TABLETS BY MOUTH TWO TIMES A DAY BEFORE MEALS   Refills:  4        IMDUR PO   Dose:  30 mg        Take 30 mg by mouth daily   Refills:  0        INVOKANA 100 MG tablet   Quantity:  30 tablet   Generic drug:  canagliflozin        TAKE ONE TABLET BY MOUTH EVERY MORNING BEFORE BREAKFAST   Refills:  5        lisinopril-hydrochlorothiazide 20-25 MG per tablet   Commonly known as:  PRINZIDE/ZESTORETIC   Dose:  1 tablet   Quantity:  90 tablet        Take 1 tablet by mouth daily   Refills:  2        MELATONIN PO   Dose:  3 mg        Take 3 mg by mouth nightly as needed   Refills:  0        metFORMIN 500 MG 24 hr tablet   Commonly known as:  GLUCOPHAGE-XR   Quantity:  120 tablet        TAKE TWO TABLETS BY MOUTH TWICE A DAY WITH MEALS   Refills:  3        metoprolol succinate 100 MG 24 hr tablet   Commonly known as:  TOPROL-XL   Dose:  100 mg   Quantity:  90 tablet        Take 1 tablet (100 mg) by mouth daily   Refills:  3        omeprazole 40 MG capsule   Commonly known as:  priLOSEC   Quantity:  90 capsule        TAKE ONE CAPSULE BY MOUTH TWICE A DAY AS NEEDED    Refills:  3        PARoxetine 40 MG tablet   Commonly known as:  PAXIL   Quantity:  30 tablet        TAKE ONE TABLET BY MOUTH EVERY MORNING   Refills:  5        PLAVIX PO   Dose:  75 mg        Take 75 mg by mouth daily   Refills:  0                Prescriptions were sent or printed at these locations (1 Prescription)                   [facility] - [place], [place] - [address]   [address], [facility] [address]    Telephone:  [phone]   Fax:  [phone]   Hours:                  E-Prescribed (1 of 1)         cephALEXin (KEFLEX) 500 MG capsule                Procedures and tests performed during your visit     Basic metabolic panel    CBC with platelets differential    Peripheral IV catheter    UA reflex to Microscopic    Urine Culture Aerobic Bacterial    XR Chest 2 Views      Orders Needing Specimen Collection     None      Pending Results     Date and Time Order Name Status Description    1/11/2018 1314 Urine Culture Aerobic Bacterial In process     1/11/2018 1036 XR Chest 2 Views Preliminary             Pending Culture Results     Date and Time Order Name Status Description    1/11/2018 1314 Urine Culture Aerobic Bacterial In process             Pending Results Instructions     If you had any lab results that were not finalized at the time of your Discharge, you can call the ED Lab Result RN at [phone]. You will be contacted by this team for any positive Lab results or changes in treatment. The nurses are available 7 days a week from 10A to 6:30P.  You can leave a message 24 hours per day and they will return your call.        Thank you for choosing [facility]       Thank you for choosing [facility] for your care. Our goal is always to provide you with excellent care. Hearing back from our patients is one way we can continue to improve our services. Please take a few minutes to complete the written survey that you may receive in the mail after you visit with us. Thank you!        MyChart Information  "    [email or web address] lets you send messages to your doctor, view your test results, renew your prescriptions, schedule appointments and more. To sign up, go to [email or web address] . Click on \"Log in\" on the left side of the screen, which will take you to the Welcome page. Then click on \"Sign up Now\" on the right side of the page.     You will be asked to enter the access code listed below, as well as some personal information. Please follow the directions to create your username and password.     Your access code is: [email or web address]  Expires: 2018  9:43 PM     Your access code will  in 90 days. If you need help or a new code, please call your [facility] clinic or [phone].        Care EveryWhere ID     This is your Care EveryWhere ID. This could be used by other organizations to access your [facility] medical records  [id number]        Equal Access to Services     FAIZA LÓPEZ AH: Hadii jeff blanca Sogume, wabritnida fernando, qabrauliota kaalmada nicole, elgin iqbal . So [facility] [phone].    ATENCIÓN: Si habla español, tiene a hearn disposición servicios gratuitos de asistencia lingüística. Llame al [phone].    We comply with applicable federal civil rights laws and [place] laws. We do not discriminate on the basis of race, color, national origin, age, disability, sex, sexual orientation, or gender identity.            After Visit Summary       This is your record. Keep this with you and show to your community pharmacist(s) and doctor(s) at your next visit.                  "

## 2018-01-12 DIAGNOSIS — E11.42 TYPE 2 DIABETES MELLITUS WITH DIABETIC POLYNEUROPATHY, WITHOUT LONG-TERM CURRENT USE OF INSULIN (H): ICD-10-CM

## 2018-01-12 RX ORDER — GABAPENTIN 300 MG/1
CAPSULE ORAL
Qty: 60 CAPSULE | Refills: 0 | Status: SHIPPED | OUTPATIENT
Start: 2018-01-12 | End: 2018-03-17

## 2018-01-12 NOTE — TELEPHONE ENCOUNTER
Gabapentin      Last Written Prescription Date:  11/15/16  Last Fill Quantity: 60,   # refills: 7  Last Office Visit: 12/26/17  Future Office visit:       Routing refill request to provider for review/approval because:  Drug not on the G, P or [facility] refill protocol or controlled substance

## 2018-01-14 LAB
BACTERIA SPEC CULT: ABNORMAL
BACTERIA SPEC CULT: ABNORMAL
Lab: ABNORMAL
SPECIMEN SOURCE: ABNORMAL

## 2018-01-24 ENCOUNTER — OFFICE VISIT (OUTPATIENT)
Dept: FAMILY MEDICINE | Facility: OTHER | Age: 78
End: 2018-01-24
Payer: COMMERCIAL

## 2018-01-24 VITALS
WEIGHT: 138.6 LBS | BODY MASS INDEX: 23.66 KG/M2 | TEMPERATURE: 97.6 F | DIASTOLIC BLOOD PRESSURE: 62 MMHG | OXYGEN SATURATION: 97 % | HEIGHT: 64 IN | SYSTOLIC BLOOD PRESSURE: 132 MMHG | HEART RATE: 84 BPM

## 2018-01-24 DIAGNOSIS — R42 VERTIGO: Primary | ICD-10-CM

## 2018-01-24 DIAGNOSIS — E11.42 TYPE 2 DIABETES MELLITUS WITH DIABETIC POLYNEUROPATHY, WITHOUT LONG-TERM CURRENT USE OF INSULIN (H): ICD-10-CM

## 2018-01-24 PROCEDURE — 99214 OFFICE O/P EST MOD 30 MIN: CPT | Performed by: INTERNAL MEDICINE

## 2018-01-24 ASSESSMENT — PAIN SCALES - GENERAL: PAINLEVEL: NO PAIN (0)

## 2018-01-24 NOTE — NURSING NOTE
"Chief Complaint   Patient presents with     ER F/U       Initial /62 (BP Location: Left arm, Patient Position: Chair, Cuff Size: Adult Regular)  Pulse 84  Temp 97.6  F (36.4  C) (Temporal)  Ht 5' 4\" (1.626 m)  Wt 138 lb 9.6 oz (62.9 kg)  SpO2 97%  BMI 23.79 kg/m2 Estimated body mass index is 23.79 kg/(m^2) as calculated from the following:    Height as of this encounter: 5' 4\" (1.626 m).    Weight as of this encounter: 138 lb 9.6 oz (62.9 kg).  Medication Reconciliation: complete  Makenzie CONROY    "

## 2018-01-24 NOTE — PROGRESS NOTES
SUBJECTIVE:   Eliza Dubois is a 77 year old female who presents to clinic today for the following health issues:    ED/UC Followup:    Facility:  [facility]  Date of visit: 1/11/18  Reason for visit: Bronchitis  Current Status: not better        CHIEF COMPLAINT:    The patient is a pleasant 77-year-old female who recently had bronchitis. She presents today with symptoms of new onset vertigo. She notes that the world seems to be spinning around whenever she moves her head. It's worse when she lays down or rolls over at night. It has been so bad that on a couple occasions, she has had emesis. She has recently had acute bronchitis and was in the emergency department. That is doing much better now. She notes the cough has essentially resolved, she has no excessive sputum production. She does not have any loss of hearing but does have some cerumen in her ears. Patient notes that during the time that she had the bronchitis, her blood sugars drop significantly and she had a temporarily discontinue her metformin. She is now back on metformin as well as glipizide and blood sugars are back to normal.                       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     Surgical History:   has a past surgical history that includes NONSPECIFIC PROCEDURE (1988); LAPAROSCOPY, SURGICAL; CHOLECYSTECTOMY (1997); colonoscopy (04/15/09); Colonoscopy (6/18/2014); Abdomen surgery; Phacoemulsification clear cornea with standard intraocular lens implant (Right, 3/16/2016); and Phacoemulsification clear cornea with standard intraocular lens implant (Left, 3/30/2016).     Social History:    reports that she has never smoked. She has never used smokeless tobacco. She reports that she does not drink alcohol or use illicit drugs.     Family History:  family history includes Arthritis in her mother; CANCER in her maternal grandmother and another family member; DIABETES in her father and mother; EYE* in her mother; Genitourinary Problems in her father; HEART DISEASE in her father, maternal grandmother, and mother; Hypertension in her mother; Lipids in her mother; Neurologic Disorder in her mother; OSTEOPOROSIS in her mother; Obesity in her mother. There is no history of Alcohol/Drug, Alzheimer Disease, Anesthesia Reaction, Blood Disease, Depression, Genetic Disorder, GASTROINTESTINAL DISEASE, Gynecology, Psychotic Disorder, Respiratory, or CEREBROVASCULAR DISEASE.            MEDICATIONS  Current Outpatient Prescriptions   Medication Sig Dispense Refill     gabapentin (NEURONTIN) 300 MG capsule TAKE 1 TO 2 CAPSULES BY MOUTH EVERY EVENING. 60 capsule 0     blood glucose monitoring (ACCU-CHEK COMPACT PLUS) test strip USE TO CHECK BLOOD SUGARS TWO TIMES A DAY OR AS DIRECTED.  Appointment needed for additional refills. 102 each 0     metFORMIN (GLUCOPHAGE-XR) 500 MG 24 hr tablet TAKE TWO TABLETS BY MOUTH TWICE A DAY WITH MEALS 120 tablet 3     INVOKANA 100 MG tablet TAKE ONE TABLET BY MOUTH EVERY MORNING BEFORE BREAKFAST 30 tablet 5     PARoxetine (PAXIL) 40 MG tablet TAKE ONE TABLET BY MOUTH EVERY MORNING 30 tablet 5     omeprazole (PRILOSEC) 40 MG capsule TAKE ONE CAPSULE BY MOUTH TWICE A DAY AS NEEDED (Patient taking differently: TAKE ONE CAPSULE BY MOUTH TWICE A DAY AS NEEDED - Pt states she usually only takes 1 daily) 90 capsule 3     glipiZIDE (GLUCOTROL) 10 MG tablet TAKE TWO TABLETS BY MOUTH TWO TIMES A DAY BEFORE MEALS 120 tablet 4     metoprolol (TOPROL-XL) 100 MG 24 hr tablet Take 1 tablet (100 mg) by mouth daily 90 tablet 3     lisinopril-hydrochlorothiazide (PRINZIDE/ZESTORETIC) 20-25 MG per tablet  "Take 1 tablet by mouth daily 90 tablet 2     fluticasone (FLONASE) 50 MCG/ACT spray INSTILL ONE TO TWO SPRAYS INTO BOTH NOSTRILS DAILY (Patient taking differently: INSTILL ONE TO TWO SPRAYS INTO BOTH NOSTRILS DAILY PRN) 16 g 8     atorvastatin (LIPITOR) 80 MG tablet Take 80 mg by mouth daily       blood glucose monitoring (ACCU-CHEK MULTICLIX) lancets Use to test blood sugar 2 times daily or as directed. 1 Box 11     Clopidogrel Bisulfate (PLAVIX PO) Take 75 mg by mouth daily        Isosorbide Mononitrate (IMDUR PO) Take 30 mg by mouth daily       MELATONIN PO Take 3 mg by mouth nightly as needed        ASPIRIN 81 MG PO TABS Take 1 tablet by mouth once daily  11         --------------------------------------------------------------------------------------------------------------------                          REVIEW OF SYSTEMS:         LUNGS: Pt denies: cough,excess sputum, hemoptysis, or shortness of breath.   HEART: Pt denies: chest pain, arrythmia, syncope, tachy or bradyarrhythmia or excess edema.   GI: Pt denies: nausea, vomitting, diarrhea, constipation, melena, or hematochezia.   NEURO: Pt denies: seizures, strokes, diplopia, weakness, paraesthesias, or paralysis. Severe vertigo is noted.   SKIN: Pt denies: itching, rashes, discoloration, or specific lesions of concern. Denies recent hair loss.                            EXAMINATION:         /62 (BP Location: Left arm, Patient Position: Chair, Cuff Size: Adult Regular)  Pulse 84  Temp 97.6  F (36.4  C) (Temporal)  Ht 5' 4\" (1.626 m)  Wt 138 lb 9.6 oz (62.9 kg)  SpO2 97%  BMI 23.79 kg/m2   Constitutional: The patient appears to be in no acute distress. The patient appears to be adequately hydrated. No acute respiratory or hemodynamic distress is noted at this time.   LUNGS: clear bilaterally, airflow is brisk, no intercostal retraction or stridor is noted. No coughing is noted during visit.   HEART:  regular without rubs, clicks, gallops, or " murmurs. PMI is nondisplaced. Upstrokes are brisk. S1,S2 are heard.   GI: Abdomen is soft, without rebound, guarding or tenderness. Bowel sounds are appropriate. No renal bruits are heard.    NEURO: Pt is alert and appropriate. No neurologic lateralization is noted. Cranial nerves 2-12 are intact. Peripheral sensory and motor function are grossly normal   SKIN:  warm and dry. No erythema, or rashes are noted. No specific lesions of concern are noted.                 ENT: Nasal mucosa is moist, no exudates are seen. No obstruction is present. Pharynx is clear of exudates, no significant cervical adenopathy is present. Tympanic membranes show modest retraction but no acute infection.. Thyroid is not nodular or enlarged. Significant vertigo and nystagmus is noted with any head motion. Some cerumen is present but is not occlusive.                        DECISION MAKIN. Vertigo  Recommend meclizine on an as-needed basis.  - meclizine (ANTIVERT) 25 MG tablet; Take 1 tablet (25 mg) by mouth every 6 hours as needed for dizziness  Dispense: 30 tablet; Refill: 1    2. Type 2 diabetes mellitus with diabetic polyneuropathy, without long-term current use of insulin (H)  Continue current medication and follow for diabetes                               FOLLOW UP    I have asked the patient to make an appointment for follow up with me in one week if not markedly improved        I have carefully explained the diagnosis and treatment options with the patient. The patient has displayed an understanding of the above, and all subsequent questions were answered.         DO GEMA Campos    Portions of this note were produced using [employer]  Although every attempt at real-time proof reading has been made, occasional grammar/syntax errors may have been missed.

## 2018-01-24 NOTE — MR AVS SNAPSHOT
"              After Visit Summary   2018    Eliza Dubois    MRN: [id number]           Patient Information     Date Of Birth          1940        Visit Information        Provider Department      2018 1:40 PM Byron Holm DO [facility]        Today's Diagnoses     Vertigo    -  1    Type 2 diabetes mellitus with diabetic polyneuropathy, without long-term current use of insulin (H)           Follow-ups after your visit        Who to contact     If you have questions or need follow up information about today's clinic visit or your schedule please contact [facility] directly at [phone].  Normal or non-critical lab and imaging results will be communicated to you by [employer], letter or phone within 4 business days after the clinic has received the results. If you do not hear from us within 7 days, please contact the clinic through [employer] or phone. If you have a critical or abnormal lab result, we will notify you by phone as soon as possible.  Submit refill requests through [employer] or call your pharmacy and they will forward the refill request to us. Please allow 3 business days for your refill to be completed.          Additional Information About Your Visit        MyChart Information     [employer] lets you send messages to your doctor, view your test results, renew your prescriptions, schedule appointments and more. To sign up, go to [email or web address] . Click on \"Log in\" on the left side of the screen, which will take you to the Welcome page. Then click on \"Sign up Now\" on the right side of the page.     You will be asked to enter the access code listed below, as well as some personal information. Please follow the directions to create your username and password.     Your access code is: [email or web address]  Expires: 2018  2:19 PM     Your access code will  in 90 days. If you need help or a new code, please call your [facility] clinic or " "[phone].        Care EveryWhere ID     This is your Care EveryWhere ID. This could be used by other organizations to access your [facility] medical records  [id number]        Your Vitals Were     Pulse Temperature Height Pulse Oximetry BMI (Body Mass Index)       84 97.6  F (36.4  C) (Temporal) 5' 4\" (1.626 m) 97% 23.79 kg/m2        Blood Pressure from Last 3 Encounters:   01/24/18 132/62   01/11/18 155/71   12/26/17 130/64    Weight from Last 3 Encounters:   01/24/18 138 lb 9.6 oz (62.9 kg)   01/11/18 147 lb (66.7 kg)   12/26/17 147 lb 3.2 oz (66.8 kg)                 Today's Medication Changes          These changes are accurate as of 1/24/18 11:59 PM.  If you have any questions, ask your nurse or doctor.               Start taking these medicines.        Dose/Directions    meclizine 25 MG tablet   Commonly known as:  ANTIVERT   Used for:  Vertigo   Started by:  Byron Holm DO        Dose:  25 mg   Take 1 tablet (25 mg) by mouth every 6 hours as needed for dizziness   Quantity:  30 tablet   Refills:  1         These medicines have changed or have updated prescriptions.        Dose/Directions    fluticasone 50 MCG/ACT spray   Commonly known as:  FLONASE   This may have changed:  See the new instructions.   Used for:  Seasonal allergic rhinitis        INSTILL ONE TO TWO SPRAYS INTO BOTH NOSTRILS DAILY   Quantity:  16 g   Refills:  8       omeprazole 40 MG capsule   Commonly known as:  priLOSEC   This may have changed:  See the new instructions.   Used for:  Gastroesophageal reflux disease without esophagitis        TAKE ONE CAPSULE BY MOUTH TWICE A DAY AS NEEDED   Quantity:  90 capsule   Refills:  3            Where to get your medicines      These medications were sent to [address] - [address]  [address], [facility] [address]     Phone:  [phone]     meclizine 25 MG tablet                Primary Care Provider Office Phone # Fax #    Byron Holm DO " [phone]       [address] [place] [address]        Equal Access to Services     FAIZA LÓPEZ : Hadmalcom aad ku hadwilfridkrzysztof Sukhjinder, wabritnida luqadaha, qaybta kaalmada nicole, elgin jazminin hayaaheidi francogissel crowley laIraidasho zheng. So [facility] [phone].    ATENCIÓN: Si habla español, tiene a hearn disposición servicios gratuitos de asistencia lingüística. Nazarioame al [phone].    We comply with applicable federal civil rights laws and [place] laws. We do not discriminate on the basis of race, color, national origin, age, disability, sex, sexual orientation, or gender identity.            Thank you!     Thank you for choosing [facility]  for your care. Our goal is always to provide you with excellent care. Hearing back from our patients is one way we can continue to improve our services. Please take a few minutes to complete the written survey that you may receive in the mail after your visit with us. Thank you!             Your Updated Medication List - Protect others around you: Learn how to safely use, store and throw away your medicines at www.disposemymeds.org.          This list is accurate as of 1/24/18 11:59 PM.  Always use your most recent med list.                   Brand Name Dispense Instructions for use Diagnosis    aspirin 81 MG tablet      Take 1 tablet by mouth once daily        atorvastatin 80 MG tablet    LIPITOR     Take 80 mg by mouth daily        blood glucose monitoring lancets     1 Box    Use to test blood sugar 2 times daily or as directed.    Type 2 diabetes mellitus with diabetic polyneuropathy (H)       blood glucose monitoring test strip    ACCU-CHEK COMPACT PLUS    102 each    USE TO CHECK BLOOD SUGARS TWO TIMES A DAY OR AS DIRECTED.  Appointment needed for additional refills.    Type 2 diabetes mellitus with diabetic polyneuropathy, without long-term current use of insulin (H)       fluticasone 50 MCG/ACT spray    FLONASE    16 g    INSTILL ONE TO TWO SPRAYS INTO BOTH  NOSTRILS DAILY    Seasonal allergic rhinitis       gabapentin 300 MG capsule    NEURONTIN    60 capsule    TAKE 1 TO 2 CAPSULES BY MOUTH EVERY EVENING.    Type 2 diabetes mellitus with diabetic polyneuropathy, without long-term current use of insulin (H)       IMDUR PO      Take 30 mg by mouth daily    ASCVD (arteriosclerotic cardiovascular disease)       INVOKANA 100 MG tablet   Generic drug:  canagliflozin     30 tablet    TAKE ONE TABLET BY MOUTH EVERY MORNING BEFORE BREAKFAST    Type 2 diabetes mellitus with diabetic polyneuropathy, without long-term current use of insulin (H)       lisinopril-hydrochlorothiazide 20-25 MG per tablet    PRINZIDE/ZESTORETIC    90 tablet    Take 1 tablet by mouth daily    Hypertension goal BP (blood pressure) < 140/90       meclizine 25 MG tablet    ANTIVERT    30 tablet    Take 1 tablet (25 mg) by mouth every 6 hours as needed for dizziness    Vertigo       MELATONIN PO      Take 3 mg by mouth nightly as needed        metFORMIN 500 MG 24 hr tablet    GLUCOPHAGE-XR    120 tablet    TAKE TWO TABLETS BY MOUTH TWICE A DAY WITH MEALS    Type 2 diabetes mellitus with diabetic polyneuropathy, without long-term current use of insulin (H)       metoprolol succinate 100 MG 24 hr tablet    TOPROL-XL    90 tablet    Take 1 tablet (100 mg) by mouth daily    Hypertension goal BP (blood pressure) < 140/90       omeprazole 40 MG capsule    priLOSEC    90 capsule    TAKE ONE CAPSULE BY MOUTH TWICE A DAY AS NEEDED    Gastroesophageal reflux disease without esophagitis       PARoxetine 40 MG tablet    PAXIL    30 tablet    TAKE ONE TABLET BY MOUTH EVERY MORNING    Major depressive disorder, recurrent episode, moderate (H)       PLAVIX PO      Take 75 mg by mouth daily    ASCVD (arteriosclerotic cardiovascular disease)

## 2018-01-29 ENCOUNTER — TELEPHONE (OUTPATIENT)
Dept: FAMILY MEDICINE | Facility: OTHER | Age: 78
End: 2018-01-29

## 2018-01-29 DIAGNOSIS — H81.13 BENIGN PAROXYSMAL POSITIONAL VERTIGO DUE TO BILATERAL VESTIBULAR DISORDER: Primary | ICD-10-CM

## 2018-01-29 RX ORDER — MECLIZINE HYDROCHLORIDE 25 MG/1
25 TABLET ORAL EVERY 6 HOURS PRN
Qty: 30 TABLET | Refills: 1 | Status: SHIPPED | OUTPATIENT
Start: 2018-01-29 | End: 2020-01-20

## 2018-01-29 NOTE — TELEPHONE ENCOUNTER
The medication we spoke of is available over-the-counter but I have sent a prescription to her pharmacy.  Mihai

## 2018-01-29 NOTE — TELEPHONE ENCOUNTER
Reason for Call:  Medication or medication refill:    Do you use a [facility]?  Name of the pharmacy and phone number for the current request:  [facility] - [phone]    Name of the medication requested: Patient states a medication for dizziness was going to be sent after OV on 1.24, please advise    Other request:     Can we leave a detailed message on this number? YES    Phone number patient can be reached at: Home number on file [phone] (home)    Best Time:     Call taken on 1/29/2018 at 10:20 AM by Kristina Zamudio

## 2018-02-10 DIAGNOSIS — E11.42 TYPE 2 DIABETES MELLITUS WITH DIABETIC POLYNEUROPATHY, WITHOUT LONG-TERM CURRENT USE OF INSULIN (H): ICD-10-CM

## 2018-02-12 NOTE — TELEPHONE ENCOUNTER
"Last Written Prescription Date:  8/31/2017  Last Fill Quantity: 120,  # refills: 4   Last office visit: 11/16/2017 with prescribing provider:  1/24/2018   Future Office Visit:    Requested Prescriptions   Pending Prescriptions Disp Refills     glipiZIDE (GLUCOTROL) 10 MG tablet [Pharmacy Med Name: GLIPIZIDE 10MG TABS] 120 tablet 4     Sig: TAKE TWO TABLETS BY MOUTH TWICE A DAY BEFORE MEALS    Sulfonylurea Agents Failed    2/10/2018  1:00 AM       Failed - Patient has had a Microalbumin in the past 12 mos.    Recent Labs   Lab Test  07/25/16   0815   MICROL  58   UMALCR  52.64*            Failed - Patient has documented A1c within the specified period of time.    Recent Labs   Lab Test  11/06/17   0928   A1C  8.8*            Failed - Patient has a recent creatinine (normal) within the past 12 mos.    Recent Labs   Lab Test  01/11/18   1045   CR  1.65*            Passed - Patient's BP is less than 140/90    BP Readings from Last 3 Encounters:   01/24/18 132/62   01/11/18 155/71   12/26/17 130/64                Passed - Patient has documented LDL within the past 12 mos.    Recent Labs   Lab Test  04/05/17   0821   LDL  Cannot estimate LDL when triglyceride exceeds 400 mg/dL  81            Passed - Patient is age 18 or older       Passed - No active pregnancy on record       Passed - Patient has not had a positive pregnancy test within the past 12 mos.       Passed - Patient has had an appointment with authorizing provider within the past 6 mos. or  within next 30 days    Patient had office visit in the last 6 months or has a visit in the next 30 days with authorizing provider.  See \"Patient Info\" tab in inbasket, or \"Choose Columns\" in Meds & Orders section of the refill encounter.              "

## 2018-02-12 NOTE — TELEPHONE ENCOUNTER
Routing GLUCOTROL refill request to provider for review/approval because:  Pt was seen within the past 30 days, therefore will route to provider........................GEOVANNY Hernandez

## 2018-02-13 RX ORDER — GLIPIZIDE 10 MG/1
TABLET ORAL
Qty: 120 TABLET | Refills: 4 | Status: SHIPPED | OUTPATIENT
Start: 2018-02-13 | End: 2018-07-08

## 2018-02-18 RX ORDER — MECLIZINE HYDROCHLORIDE 25 MG/1
25 TABLET ORAL EVERY 6 HOURS PRN
Qty: 30 TABLET | Refills: 1 | Status: SHIPPED | OUTPATIENT
Start: 2018-02-18 | End: 2018-04-17

## 2018-02-24 DIAGNOSIS — I10 HYPERTENSION GOAL BP (BLOOD PRESSURE) < 140/90: ICD-10-CM

## 2018-02-26 NOTE — TELEPHONE ENCOUNTER
"Requested Prescriptions   Pending Prescriptions Disp Refills     lisinopril-hydrochlorothiazide (PRINZIDE/ZESTORETIC) 20-25 MG per tablet [Pharmacy Med Name: LISINOPRIL-HYDROCHLOROTH 20-25 TABS] 90 tablet 2     Sig: TAKE ONE TABLET BY MOUTH DAILY    Diuretics (Including Combos) Protocol Failed    2/24/2018  1:00 AM       Failed - Normal serum creatinine on file in past 12 months    Recent Labs   Lab Test  01/11/18   1045   CR  1.65*             Failed - No positive pregnancy test in past 12 months       Passed - Blood pressure under 140/90 in past 12 months    BP Readings from Last 3 Encounters:   01/24/18 132/62   01/11/18 155/71   12/26/17 130/64                Passed - Recent or future visit with authorizing provider's specialty    Patient had office visit in the last year or has a visit in the next 30 days with authorizing provider.  See \"Patient Info\" tab in inbasket, or \"Choose Columns\" in Meds & Orders section of the refill encounter.            Passed - Patient is age 18 or older       Passed - No active pregancy on record       Passed - Normal serum potassium on file in past 12 months    Recent Labs   Lab Test  01/11/18   1045   POTASSIUM  4.4                   Passed - Normal serum sodium on file in past 12 months    Recent Labs   Lab Test  01/11/18   1045   NA  140              Last Written Prescription Date:  4/6/17  Last Fill Quantity: 90,  # refills: 2   Last office visit: 11/16/2017 with prescribing provider:     Future Office Visit:      "

## 2018-02-27 RX ORDER — LISINOPRIL AND HYDROCHLOROTHIAZIDE 20; 25 MG/1; MG/1
TABLET ORAL
Qty: 90 TABLET | Refills: 2 | Status: SHIPPED | OUTPATIENT
Start: 2018-02-27 | End: 2018-11-22

## 2018-02-27 NOTE — TELEPHONE ENCOUNTER
Routing refill request to provider for review/approval because:  Labs out of range:  Cr 1.65  Glenda Butler RN

## 2018-03-17 DIAGNOSIS — E11.42 TYPE 2 DIABETES MELLITUS WITH DIABETIC POLYNEUROPATHY, WITHOUT LONG-TERM CURRENT USE OF INSULIN (H): ICD-10-CM

## 2018-03-19 RX ORDER — GABAPENTIN 300 MG/1
CAPSULE ORAL
Qty: 60 CAPSULE | Refills: 0 | Status: SHIPPED | OUTPATIENT
Start: 2018-03-19 | End: 2018-05-17

## 2018-03-19 NOTE — TELEPHONE ENCOUNTER
Gabapentin      Last Written Prescription Date:  1/12/2018  Last Fill Quantity: 60,   # refills: 0  Last Office Visit: 1/24/2018  Future Office visit:       Routing refill request to provider for review/approval because:  Drug not on the G, P or [facility] refill protocol or controlled substance

## 2018-04-17 ENCOUNTER — OFFICE VISIT (OUTPATIENT)
Dept: FAMILY MEDICINE | Facility: OTHER | Age: 78
End: 2018-04-17
Payer: COMMERCIAL

## 2018-04-17 VITALS
SYSTOLIC BLOOD PRESSURE: 138 MMHG | WEIGHT: 144.9 LBS | BODY MASS INDEX: 24.87 KG/M2 | OXYGEN SATURATION: 97 % | TEMPERATURE: 97.1 F | RESPIRATION RATE: 16 BRPM | DIASTOLIC BLOOD PRESSURE: 72 MMHG | HEART RATE: 77 BPM

## 2018-04-17 DIAGNOSIS — I25.10 CORONARY ARTERY DISEASE INVOLVING NATIVE CORONARY ARTERY OF NATIVE HEART WITHOUT ANGINA PECTORIS: ICD-10-CM

## 2018-04-17 DIAGNOSIS — E11.42 TYPE 2 DIABETES MELLITUS WITH DIABETIC POLYNEUROPATHY, WITHOUT LONG-TERM CURRENT USE OF INSULIN (H): Primary | ICD-10-CM

## 2018-04-17 DIAGNOSIS — E78.5 HYPERLIPIDEMIA LDL GOAL <100: ICD-10-CM

## 2018-04-17 DIAGNOSIS — I10 HYPERTENSION GOAL BP (BLOOD PRESSURE) < 140/90: ICD-10-CM

## 2018-04-17 LAB
CREAT UR-MCNC: 78 MG/DL
HBA1C MFR BLD: 6.6 % (ref 0–6.4)
LDLC SERPL DIRECT ASSAY-MCNC: 77 MG/DL
MICROALBUMIN UR-MCNC: 148 MG/L
MICROALBUMIN/CREAT UR: 189.99 MG/G CR (ref 0–25)
TSH SERPL DL<=0.005 MIU/L-ACNC: 1.47 MU/L (ref 0.4–4)

## 2018-04-17 PROCEDURE — 83036 HEMOGLOBIN GLYCOSYLATED A1C: CPT | Performed by: INTERNAL MEDICINE

## 2018-04-17 PROCEDURE — 36415 COLL VENOUS BLD VENIPUNCTURE: CPT | Performed by: INTERNAL MEDICINE

## 2018-04-17 PROCEDURE — 83721 ASSAY OF BLOOD LIPOPROTEIN: CPT | Performed by: INTERNAL MEDICINE

## 2018-04-17 PROCEDURE — 82043 UR ALBUMIN QUANTITATIVE: CPT | Performed by: INTERNAL MEDICINE

## 2018-04-17 PROCEDURE — 84443 ASSAY THYROID STIM HORMONE: CPT | Performed by: INTERNAL MEDICINE

## 2018-04-17 PROCEDURE — 99207 C FOOT EXAM  NO CHARGE: CPT | Performed by: INTERNAL MEDICINE

## 2018-04-17 PROCEDURE — 99214 OFFICE O/P EST MOD 30 MIN: CPT | Performed by: INTERNAL MEDICINE

## 2018-04-17 ASSESSMENT — ANXIETY QUESTIONNAIRES
IF YOU CHECKED OFF ANY PROBLEMS ON THIS QUESTIONNAIRE, HOW DIFFICULT HAVE THESE PROBLEMS MADE IT FOR YOU TO DO YOUR WORK, TAKE CARE OF THINGS AT HOME, OR GET ALONG WITH OTHER PEOPLE: SOMEWHAT DIFFICULT
1. FEELING NERVOUS, ANXIOUS, OR ON EDGE: NOT AT ALL
3. WORRYING TOO MUCH ABOUT DIFFERENT THINGS: NOT AT ALL
2. NOT BEING ABLE TO STOP OR CONTROL WORRYING: NOT AT ALL
7. FEELING AFRAID AS IF SOMETHING AWFUL MIGHT HAPPEN: NOT AT ALL
6. BECOMING EASILY ANNOYED OR IRRITABLE: SEVERAL DAYS
GAD7 TOTAL SCORE: 1
5. BEING SO RESTLESS THAT IT IS HARD TO SIT STILL: NOT AT ALL

## 2018-04-17 ASSESSMENT — PAIN SCALES - GENERAL: PAINLEVEL: NO PAIN (0)

## 2018-04-17 ASSESSMENT — PATIENT HEALTH QUESTIONNAIRE - PHQ9: 5. POOR APPETITE OR OVEREATING: NOT AT ALL

## 2018-04-17 NOTE — NURSING NOTE
"Chief Complaint   Patient presents with     Diabetes     Hypertension       Initial /72  Pulse 77  Temp 97.1  F (36.2  C) (Temporal)  Resp 16  Wt 144 lb 14.4 oz (65.7 kg)  SpO2 97%  BMI 24.87 kg/m2 Estimated body mass index is 24.87 kg/(m^2) as calculated from the following:    Height as of 1/24/18: 5' 4\" (1.626 m).    Weight as of this encounter: 144 lb 14.4 oz (65.7 kg).  Medication Reconciliation: complete  Ivon Don CMA (AAMA)    "

## 2018-04-17 NOTE — PROGRESS NOTES
SUBJECTIVE:   Eliza Dubois is a 77 year old female who presents to clinic today for the following health issues:    Diabetes Follow-up    Patient is checking blood sugars: once daily.  Results are as follows:         am - 70s-120s    Diabetic concerns: None     Symptoms of hypoglycemia (low blood sugar): none     Paresthesias (numbness or burning in feet) or sores: Yes      Date of last diabetic eye exam: Due    BP Readings from Last 2 Encounters:   04/17/18 138/72   01/24/18 132/62     Hemoglobin A1C (%)   Date Value   11/06/2017 8.8 (H)   04/05/2017 8.0 (H)     LDL Cholesterol Calculated (mg/dL)   Date Value   04/05/2017     Cannot estimate LDL when triglyceride exceeds 400 mg/dL   01/28/2016 40     LDL Cholesterol Direct (mg/dL)   Date Value   04/05/2017 81     Hypertension Follow-up      Outpatient blood pressures are not being checked.    Low Salt Diet: not monitoring salt      Amount of exercise or physical activity: None    Problems taking medications regularly: No    Medication side effects: none    Diet: regular (no restrictions)      CHIEF COMPLAINT:    The patient is a pleasant 77-year-old female who presents today for follow-up of her diabetes. She notes she has been doing well and her blood sugars are generally fairly well controlled. She states that she is eating a proper diet and exercising as much is reasonable. She denies any recent hyper or hypoglycemic episodes. She continues to be on the glipizide as well as the metformin and interval,. She's had no yeast infections or other symptoms associated with the interval,. Specifically, she has no signs of severe peripheral vascular disease and none of her toes are currently in the process of falling off. She is also appropriately on an ACE inhibitor as well as aspirin and a statin. Her most recent glycosylated hemoglobin was 8.8 in November. Recheck today confirms 6.6. This is significantly better.                         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     Surgical History:   has a past surgical history that includes NONSPECIFIC PROCEDURE (1988); LAPAROSCOPY, SURGICAL; CHOLECYSTECTOMY (1997); colonoscopy (04/15/09); Colonoscopy (6/18/2014); Abdomen surgery; Phacoemulsification clear cornea with standard intraocular lens implant (Right, 3/16/2016); and Phacoemulsification clear cornea with standard intraocular lens implant (Left, 3/30/2016).     Social History:   reports that she has never smoked. She has never used smokeless tobacco. She reports that she does not drink alcohol or use illicit drugs.     Family History:  family history includes Arthritis in her mother; CANCER in her maternal grandmother and another family member; DIABETES in her father and mother; EYE* in her mother; Genitourinary Problems in her father; HEART DISEASE in her father, maternal grandmother, and mother; Hypertension in her mother; Lipids in her mother; Neurologic Disorder in her mother; OSTEOPOROSIS in her mother; Obesity in her mother. There is no history of Alcohol/Drug, Alzheimer Disease, Anesthesia Reaction, Blood Disease, Depression, Genetic Disorder, GASTROINTESTINAL DISEASE, Gynecology, Psychotic Disorder, Respiratory, or CEREBROVASCULAR DISEASE.            MEDICATIONS  Current Outpatient Prescriptions   Medication Sig Dispense Refill     metoprolol succinate (TOPROL-XL) 100 MG 24 hr tablet Take 1 tablet (100 mg) by mouth daily Appointment needed for additional refills. 30 tablet 0     gabapentin (NEURONTIN) 300 MG capsule TAKE 1 - 2 CAPSULES BY MOUTH EVERY EVENING 60 capsule 0     lisinopril-hydrochlorothiazide  (PRINZIDE/ZESTORETIC) 20-25 MG per tablet TAKE ONE TABLET BY MOUTH DAILY 90 tablet 2     glipiZIDE (GLUCOTROL) 10 MG tablet TAKE TWO TABLETS BY MOUTH TWICE A DAY BEFORE MEALS 120 tablet 4     metFORMIN (GLUCOPHAGE-XR) 500 MG 24 hr tablet TAKE TWO TABLETS BY MOUTH TWICE A DAY WITH MEALS 120 tablet 3     INVOKANA 100 MG tablet TAKE ONE TABLET BY MOUTH EVERY MORNING BEFORE BREAKFAST 30 tablet 5     PARoxetine (PAXIL) 40 MG tablet TAKE ONE TABLET BY MOUTH EVERY MORNING 30 tablet 5     omeprazole (PRILOSEC) 40 MG capsule TAKE ONE CAPSULE BY MOUTH TWICE A DAY AS NEEDED (Patient taking differently: TAKE ONE CAPSULE BY MOUTH TWICE A DAY AS NEEDED - Pt states she usually only takes 1 daily) 90 capsule 3     atorvastatin (LIPITOR) 80 MG tablet Take 80 mg by mouth daily       Clopidogrel Bisulfate (PLAVIX PO) Take 75 mg by mouth daily        Isosorbide Mononitrate (IMDUR PO) Take 30 mg by mouth daily       MELATONIN PO Take 3 mg by mouth nightly as needed        ASPIRIN 81 MG PO TABS Take 1 tablet by mouth once daily  11     meclizine (ANTIVERT) 25 MG tablet Take 1 tablet (25 mg) by mouth every 6 hours as needed for dizziness (Patient not taking: Reported on 4/17/2018) 30 tablet 1     blood glucose monitoring (ACCU-CHEK COMPACT PLUS) test strip USE TO CHECK BLOOD SUGARS TWO TIMES A DAY OR AS DIRECTED.  Appointment needed for additional refills. 102 each 0     fluticasone (FLONASE) 50 MCG/ACT spray INSTILL ONE TO TWO SPRAYS INTO BOTH NOSTRILS DAILY (Patient not taking: Reported on 4/17/2018) 16 g 8     blood glucose monitoring (ACCU-CHEK MULTICLIX) lancets Use to test blood sugar 2 times daily or as directed. 1 Box 11         --------------------------------------------------------------------------------------------------------------------                          REVIEW OF SYSTEMS:         LUNGS: Pt denies: cough,excess sputum, hemoptysis, or shortness of breath.   HEART: Pt denies: chest pain, arrythmia, syncope, tachy or  bradyarrhythmia or excess edema.   GI: Pt denies: nausea, vomitting, diarrhea, constipation, melena, or hematochezia.   NEURO: Pt denies: seizures, strokes, diplopia, weakness,  or paralysis. Her seizures are controlled with use of the gabapentin.   SKIN: Pt denies: itching, rashes, discoloration, or specific lesions of concern. Denies recent hair loss.                          EXAMINATION:         /72  Pulse 77  Temp 97.1  F (36.2  C) (Temporal)  Resp 16  Wt 144 lb 14.4 oz (65.7 kg)  SpO2 97%  BMI 24.87 kg/m2   Constitutional: The patient appears to be in no acute distress. The patient appears to be adequately hydrated. No acute respiratory or hemodynamic distress is noted at this time.   LUNGS: clear bilaterally, airflow is brisk, no intercostal retraction or stridor is noted. No coughing is noted during visit.   HEART:  regular without rubs, clicks, gallops, or murmurs. PMI is nondisplaced. Upstrokes are brisk. S1,S2 are heard.   GI: Abdomen is soft, without rebound, guarding or tenderness. Bowel sounds are appropriate. No renal bruits are heard.    NEURO: Pt is alert and appropriate. No neurologic lateralization is noted. Cranial nerves 2-12 are intact. Peripheral sensory function is slightly decreased in the feet.   SKIN:  warm and dry. No erythema, or rashes are noted. No specific lesions of concern are noted.    Feet: Examination of the feet demonstrates no ulceration, skin breakdown or ischemic changes. Sensation is intact to both vibration and monofilament. Capillary refill is brisk in the toes. No significant skin changes are noted.                        DECISION MAKIN. Type 2 diabetes mellitus with diabetic polyneuropathy, without long-term current use of insulin (H)  Check A1c and appropriate lab work  - Hemoglobin A1c  - Albumin Random Urine Quantitative with Creat Ratio  - TSH with free T4 reflex  - FOOT EXAM    2. Hypertension goal BP (blood pressure) < 140/90  Currently  controlled    3. Coronary artery disease involving native coronary artery of native heart without angina pectoris  Stable. Has had no chest pain or symptoms of ischemia    4. Hyperlipidemia LDL goal <100  Check cholesterol level and adjust statin as necessary  - LDL cholesterol direct                             FOLLOW UP    I have asked the patient to make an appointment for follow up with me in 4 months or as per dictated by her lab results        I have carefully explained the diagnosis and treatment options with the patient. The patient has displayed an understanding of the above, and all subsequent questions were answered.         DO GEMA Campos    Portions of this note were produced using [employer]oc[employer]ore  Although every attempt at real-time proof reading has been made, occasional grammar/syntax errors may have been missed.

## 2018-04-17 NOTE — MR AVS SNAPSHOT
"              After Visit Summary   4/17/2018    Eliza Dubois    MRN: [id number]           Patient Information     Date Of Birth          1940        Visit Information        Provider Department      4/17/2018 1:40 PM Byron Holm DO [facility]        Today's Diagnoses     Type 2 diabetes mellitus with diabetic polyneuropathy, without long-term current use of insulin (H)    -  1    Hypertension goal BP (blood pressure) < 140/90        Coronary artery disease involving native coronary artery of native heart without angina pectoris        Hyperlipidemia LDL goal <100           Follow-ups after your visit        Who to contact     If you have questions or need follow up information about today's clinic visit or your schedule please contact [facility] directly at [phone].  Normal or non-critical lab and imaging results will be communicated to you by MyChart, letter or phone within 4 business days after the clinic has received the results. If you do not hear from us within 7 days, please contact the clinic through MyChart or phone. If you have a critical or abnormal lab result, we will notify you by phone as soon as possible.  Submit refill requests through [employer] or call your pharmacy and they will forward the refill request to us. Please allow 3 business days for your refill to be completed.          Additional Information About Your Visit        MyChart Information     [employer] lets you send messages to your doctor, view your test results, renew your prescriptions, schedule appointments and more. To sign up, go to [email or web address] . Click on \"Log in\" on the left side of the screen, which will take you to the Welcome page. Then click on \"Sign up Now\" on the right side of the page.     You will be asked to enter the access code listed below, as well as some personal information. Please follow the directions to create your username and password.     Your " access code is: [email or web address]  Expires: 2018  3:19 PM     Your access code will  in 90 days. If you need help or a new code, please call your [place] clinic or [phone].        Care EveryWhere ID     This is your Care EveryWhere ID. This could be used by other organizations to access your [place] medical records  [id number]        Your Vitals Were     Pulse Temperature Respirations Pulse Oximetry BMI (Body Mass Index)       77 97.1  F (36.2  C) (Temporal) 16 97% 24.87 kg/m2        Blood Pressure from Last 3 Encounters:   18 138/72   18 132/62   18 155/71    Weight from Last 3 Encounters:   18 144 lb 14.4 oz (65.7 kg)   18 138 lb 9.6 oz (62.9 kg)   18 147 lb (66.7 kg)              We Performed the Following     Albumin Random Urine Quantitative with Creat Ratio     FOOT EXAM     Hemoglobin A1c     LDL cholesterol direct     TSH with free T4 reflex          Today's Medication Changes          These changes are accurate as of 18  5:41 PM.  If you have any questions, ask your nurse or doctor.               These medicines have changed or have updated prescriptions.        Dose/Directions    meclizine 25 MG tablet   Commonly known as:  ANTIVERT   This may have changed:  Another medication with the same name was removed. Continue taking this medication, and follow the directions you see here.   Used for:  Benign paroxysmal positional vertigo due to bilateral vestibular disorder   Changed by:  Byron Holm DO        Dose:  25 mg   Take 1 tablet (25 mg) by mouth every 6 hours as needed for dizziness   Quantity:  30 tablet   Refills:  1       omeprazole 40 MG capsule   Commonly known as:  priLOSEC   This may have changed:  See the new instructions.   Used for:  Gastroesophageal reflux disease without esophagitis        TAKE ONE CAPSULE BY MOUTH TWICE A DAY AS NEEDED   Quantity:  90 capsule   Refills:  3                Primary Care Provider Office Phone  # Fax #    Byron Holm [phone]       [address] [place] [address]        Equal Access to Services     FAIZA LÓPEZ : Hadii aad ku hadwilfrido Soomaali, waaxda luqadaha, qaybta kaalmada adebenny, elgin crowley laIraidasho zheng. So [facility] [phone].    ATENCIÓN: Si habla español, tiene a hearn disposición servicios gratuitos de asistencia lingüística. Llame al [phone].    We comply with applicable federal civil rights laws and [place] laws. We do not discriminate on the basis of race, color, national origin, age, disability, sex, sexual orientation, or gender identity.            Thank you!     Thank you for choosing [facility]  for your care. Our goal is always to provide you with excellent care. Hearing back from our patients is one way we can continue to improve our services. Please take a few minutes to complete the written survey that you may receive in the mail after your visit with us. Thank you!             Your Updated Medication List - Protect others around you: Learn how to safely use, store and throw away your medicines at www.disposemymeds.org.          This list is accurate as of 4/17/18  5:41 PM.  Always use your most recent med list.                   Brand Name Dispense Instructions for use Diagnosis    aspirin 81 MG tablet      Take 1 tablet by mouth once daily        atorvastatin 80 MG tablet    LIPITOR     Take 80 mg by mouth daily        blood glucose monitoring lancets     1 Box    Use to test blood sugar 2 times daily or as directed.    Type 2 diabetes mellitus with diabetic polyneuropathy (H)       blood glucose monitoring test strip    ACCU-CHEK COMPACT PLUS    102 each    USE TO CHECK BLOOD SUGARS TWO TIMES A DAY OR AS DIRECTED.  Appointment needed for additional refills.    Type 2 diabetes mellitus with diabetic polyneuropathy, without long-term current use of insulin (H)       fluticasone 50 MCG/ACT spray    FLONASE    16  g    INSTILL ONE TO TWO SPRAYS INTO BOTH NOSTRILS DAILY    Seasonal allergic rhinitis       gabapentin 300 MG capsule    NEURONTIN    60 capsule    TAKE 1 - 2 CAPSULES BY MOUTH EVERY EVENING    Type 2 diabetes mellitus with diabetic polyneuropathy, without long-term current use of insulin (H)       glipiZIDE 10 MG tablet    GLUCOTROL    120 tablet    TAKE TWO TABLETS BY MOUTH TWICE A DAY BEFORE MEALS    Type 2 diabetes mellitus with diabetic polyneuropathy, without long-term current use of insulin (H)       IMDUR PO      Take 30 mg by mouth daily    ASCVD (arteriosclerotic cardiovascular disease)       INVOKANA 100 MG tablet   Generic drug:  canagliflozin     30 tablet    TAKE ONE TABLET BY MOUTH EVERY MORNING BEFORE BREAKFAST    Type 2 diabetes mellitus with diabetic polyneuropathy, without long-term current use of insulin (H)       lisinopril-hydrochlorothiazide 20-25 MG per tablet    PRINZIDE/ZESTORETIC    90 tablet    TAKE ONE TABLET BY MOUTH DAILY    Hypertension goal BP (blood pressure) < 140/90       meclizine 25 MG tablet    ANTIVERT    30 tablet    Take 1 tablet (25 mg) by mouth every 6 hours as needed for dizziness    Benign paroxysmal positional vertigo due to bilateral vestibular disorder       MELATONIN PO      Take 3 mg by mouth nightly as needed        metFORMIN 500 MG 24 hr tablet    GLUCOPHAGE-XR    120 tablet    TAKE TWO TABLETS BY MOUTH TWICE A DAY WITH MEALS    Type 2 diabetes mellitus with diabetic polyneuropathy, without long-term current use of insulin (H)       metoprolol succinate 100 MG 24 hr tablet    TOPROL-XL    30 tablet    Take 1 tablet (100 mg) by mouth daily Appointment needed for additional refills.    Hypertension goal BP (blood pressure) < 140/90       omeprazole 40 MG capsule    priLOSEC    90 capsule    TAKE ONE CAPSULE BY MOUTH TWICE A DAY AS NEEDED    Gastroesophageal reflux disease without esophagitis       PARoxetine 40 MG tablet    PAXIL    30 tablet    TAKE ONE TABLET BY  MOUTH EVERY MORNING    Major depressive disorder, recurrent episode, moderate (H)       PLAVIX PO      Take 75 mg by mouth daily    ASCVD (arteriosclerotic cardiovascular disease)

## 2018-04-18 ASSESSMENT — PATIENT HEALTH QUESTIONNAIRE - PHQ9: SUM OF ALL RESPONSES TO PHQ QUESTIONS 1-9: 8

## 2018-04-18 ASSESSMENT — ANXIETY QUESTIONNAIRES: GAD7 TOTAL SCORE: 1

## 2018-04-24 ENCOUNTER — TELEPHONE (OUTPATIENT)
Dept: FAMILY MEDICINE | Facility: OTHER | Age: 78
End: 2018-04-24

## 2018-04-24 NOTE — TELEPHONE ENCOUNTER
"** Patient Call Attempt With Normal Lab or Test Results**    I have attempted to contact this patient by phone with the following results: left message to return my call on answering machine.    When patient returns call, please advise of the following result.  If patient has further questions or concerns route call back to team, thank you.    Please contact the patient and notify her of the following:  The microalbumin is markedly elevated.  Thyroid is well adjusted.  The cholesterol is well-controlled with an LDL of 77.  Hemoglobin A1c has improved significantly and is now down to 6.6 from the previous 8.8. This would suggest marked improvement in blood sugar management.    (At this point, in a very high and annoyingly shrill voice, yell \"good job! You did it\")      Thank you.   DO GEMA Campos CMA ([facility])    "

## 2018-04-24 NOTE — LETTER
April 27, 2018      Eliza Dubois  [address]  [facility] [address]        Dear ,    We are writing to inform you of your test results.    The microalbumin is markedly elevated.  Thyroid is well adjusted.  The cholesterol is well-controlled with an LDL of 77.  Hemoglobin A1c has improved significantly and is now down to 6.6 from the previous 8.8. This would suggest marked improvement in blood sugar management.    Resulted Orders   LDL cholesterol direct   Result Value Ref Range    LDL Cholesterol Direct 77 <100 mg/dL      Comment:      Desirable:       <100 mg/dl   Hemoglobin A1c   Result Value Ref Range    Hemoglobin A1C 6.6 (H) 0 - 6.4 %      Comment:      Normal <5.7% Prediabetes 5.7-6.4%  Diabetes 6.5% or higher - adopted from ADA   consensus guidelines.     Albumin Random Urine Quantitative with Creat Ratio   Result Value Ref Range    Creatinine Urine 78 mg/dL    Albumin Urine mg/L 148 mg/L    Albumin Urine mg/g Cr 189.99 (H) 0 - 25 mg/g Cr   TSH with free T4 reflex   Result Value Ref Range    TSH 1.47 0.40 - 4.00 mU/L       If you have any questions or concerns, please call the clinic at the number listed above.     Sincerely,      Byron Holm, DO

## 2018-04-24 NOTE — PROGRESS NOTES
"Please contact the patient and notify her of the following:  The microalbumin is markedly elevated.  Thyroid is well adjusted.  The cholesterol is well-controlled with an LDL of 77.  Hemoglobin A1c has improved significantly and is now down to 6.6 from the previous 8.8. This would suggest marked improvement in blood sugar management.    (At this point, in a very high and annoyingly shrill voice, yell \"good job! You did it\")      Thank you.   DO GEMA Campos  "

## 2018-05-10 DIAGNOSIS — I10 HYPERTENSION GOAL BP (BLOOD PRESSURE) < 140/90: ICD-10-CM

## 2018-05-10 DIAGNOSIS — F33.1 MAJOR DEPRESSIVE DISORDER, RECURRENT EPISODE, MODERATE (H): ICD-10-CM

## 2018-05-10 DIAGNOSIS — E11.42 TYPE 2 DIABETES MELLITUS WITH DIABETIC POLYNEUROPATHY, WITHOUT LONG-TERM CURRENT USE OF INSULIN (H): ICD-10-CM

## 2018-05-10 RX ORDER — PAROXETINE 40 MG/1
TABLET, FILM COATED ORAL
Qty: 30 TABLET | Refills: 5 | Status: SHIPPED | OUTPATIENT
Start: 2018-05-10 | End: 2018-11-01

## 2018-05-10 RX ORDER — METOPROLOL SUCCINATE 100 MG/1
TABLET, EXTENDED RELEASE ORAL
Qty: 30 TABLET | Refills: 0 | Status: SHIPPED | OUTPATIENT
Start: 2018-05-10 | End: 2018-06-09

## 2018-05-10 NOTE — TELEPHONE ENCOUNTER
"Requested Prescriptions   Pending Prescriptions Disp Refills     metoprolol succinate (TOPROL-XL) 100 MG 24 hr tablet [Pharmacy Med Name: METOPROLOL SUCCINATE ER 100MG TB24] 30 tablet 0     Sig: TAKE ONE TABLET BY MOUTH ONCE DAILY ...MUST SEE DOCTOR FOR MORE REFILLS...    Beta-Blockers Protocol Passed    5/10/2018  1:01 AM       Passed - Blood pressure under 140/90 in past 12 months    BP Readings from Last 3 Encounters:   04/17/18 138/72   01/24/18 132/62   01/11/18 155/71                Passed - Patient is age 6 or older       Passed - Recent (12 mo) or future (30 days) visit within the authorizing provider's specialty    Patient had office visit in the last 12 months or has a visit in the next 30 days with authorizing provider or within the authorizing provider's specialty.  See \"Patient Info\" tab in [employer], or \"Choose Columns\" in Meds & Orders section of the refill encounter.         Last Written Prescription Date:  4/9/2018  Last Fill Quantity: 30,  # refills: 0   Last office visit: 11/16/2017 with prescribing provider:  4/17/2018   Future Office Visit:             PARoxetine (PAXIL) 40 MG tablet [Pharmacy Med Name: PAROXETINE HCL 40MG TABS] 30 tablet 5     Sig: TAKE ONE TABLET BY MOUTH EVERY MORNING    SSRIs Protocol Failed    5/10/2018  1:01 AM       Failed - PHQ-9 score less than 5 in past 6 months    Please review last PHQ-9 score.          Passed - Patient is age 18 or older       Passed - No active pregnancy on record       Passed - No positive pregnancy test in last 12 months       Passed - Recent (6 mo) or future (30 days) visit within the authorizing provider's specialty    Patient had office visit in the last 6 months or has a visit in the next 30 days with authorizing provider or within the authorizing provider's specialty.  See \"Patient Info\" tab in inbasket, or \"Choose Columns\" in Meds & Orders section of the refill encounter.            Last Written Prescription Date:  11/6/2017  Last Fill " Quantity: 30,  # refills: 5   Last office visit: 11/16/2017 with prescribing provider:  4/17/2018   Future Office Visit:

## 2018-05-10 NOTE — TELEPHONE ENCOUNTER
"Requested Prescriptions   Pending Prescriptions Disp Refills     metFORMIN (GLUCOPHAGE-XR) 500 MG 24 hr tablet 120 tablet 3    Last Written Prescription Date:  12-  Last Fill Quantity: 120,  # refills: 3   Last office visit: 4/17/2018 with prescribing provider:  4-17-18   Future Office Visit:     Sig: TAKE TWO TABLETS BY MOUTH TWICE A DAY WITH MEALS    Biguanide Agents Failed    5/10/2018  4:07 PM       Failed - Patient's CR is NOT>1.4 OR Patient's EGFR is NOT<45 within past 12 mos.    Recent Labs   Lab Test  01/11/18   1045   GFRESTIMATED  30*   GFRESTBLACK  36*       Recent Labs   Lab Test  01/11/18   1045   CR  1.65*            Passed - Blood pressure less than 140/90 in past 6 months    BP Readings from Last 3 Encounters:   04/17/18 138/72   01/24/18 132/62   01/11/18 155/71                Passed - Patient has documented LDL within the past 12 mos.    Recent Labs   Lab Test  04/17/18   1436   LDL  77            Passed - Patient has had a Microalbumin in the past 12 mos.    Recent Labs   Lab Test  04/17/18   1450   MICROL  148   UMALCR  189.99*            Passed - Patient is age 10 or older       Passed - Patient has documented A1c within the specified period of time.    If HgbA1C is 8 or greater, it needs to be on file within the past 3 months.  If less than 8, must be on file within the past 6 months.     Recent Labs   Lab Test  04/17/18   1436   A1C  6.6*            Passed - Patient does NOT have a diagnosis of CHF.       Passed - Patient is not pregnant       Passed - Patient has not had a positive pregnancy test within the past 12 mos.        Passed - Recent (6 mo) or future (30 days) visit within the authorizing provider's specialty    Patient had office visit in the last 6 months or has a visit in the next 30 days with authorizing provider or within the authorizing provider's specialty.  See \"Patient Info\" tab in inbasket, or \"Choose Columns\" in Meds & Orders section of the refill encounter.        "

## 2018-05-10 NOTE — TELEPHONE ENCOUNTER
"Requested Prescriptions   Pending Prescriptions Disp Refills     metFORMIN (GLUCOPHAGE-XR) 500 MG 24 hr tablet 120 tablet 3     Sig: TAKE TWO TABLETS BY MOUTH TWICE A DAY WITH MEALS    Biguanide Agents Failed    5/10/2018  4:07 PM       Failed - Patient's CR is NOT>1.4 OR Patient's EGFR is NOT<45 within past 12 mos.    Recent Labs   Lab Test  01/11/18   1045   GFRESTIMATED  30*   GFRESTBLACK  36*       Recent Labs   Lab Test  01/11/18   1045   CR  1.65*            Passed - Blood pressure less than 140/90 in past 6 months    BP Readings from Last 3 Encounters:   04/17/18 138/72   01/24/18 132/62   01/11/18 155/71                Passed - Patient has documented LDL within the past 12 mos.    Recent Labs   Lab Test  04/17/18   1436   LDL  77            Passed - Patient has had a Microalbumin in the past 12 mos.    Recent Labs   Lab Test  04/17/18   1450   MICROL  148   UMALCR  189.99*            Passed - Patient is age 10 or older       Passed - Patient has documented A1c within the specified period of time.    If HgbA1C is 8 or greater, it needs to be on file within the past 3 months.  If less than 8, must be on file within the past 6 months.     Recent Labs   Lab Test  04/17/18   1436   A1C  6.6*            Passed - Patient does NOT have a diagnosis of CHF.       Passed - Patient is not pregnant       Passed - Patient has not had a positive pregnancy test within the past 12 mos.        Passed - Recent (6 mo) or future (30 days) visit within the authorizing provider's specialty    Patient had office visit in the last 6 months or has a visit in the next 30 days with authorizing provider or within the authorizing provider's specialty.  See \"Patient Info\" tab in inbasket, or \"Choose Columns\" in Meds & Orders section of the refill encounter.              Last Written Prescription Date:  12/26/17  Last Fill Quantity: 120,  # refills: 3   Last Office Visit with G, P or [facility] prescribing provider:  4/17/18   Future " Office Visit:

## 2018-05-11 RX ORDER — METFORMIN HCL 500 MG
TABLET, EXTENDED RELEASE 24 HR ORAL
Qty: 120 TABLET | Refills: 5 | Status: SHIPPED | OUTPATIENT
Start: 2018-05-11 | End: 2018-11-01

## 2018-05-11 NOTE — TELEPHONE ENCOUNTER
Routing refill request to provider for review/approval because:  Labs out of range:  GFR, Creatinine, Microalbumin    Sheri Francois RN  [facility]

## 2018-05-17 DIAGNOSIS — E11.42 TYPE 2 DIABETES MELLITUS WITH DIABETIC POLYNEUROPATHY, WITHOUT LONG-TERM CURRENT USE OF INSULIN (H): ICD-10-CM

## 2018-05-17 RX ORDER — GABAPENTIN 300 MG/1
CAPSULE ORAL
Qty: 60 CAPSULE | Refills: 0 | Status: SHIPPED | OUTPATIENT
Start: 2018-05-17 | End: 2018-06-19

## 2018-05-17 NOTE — TELEPHONE ENCOUNTER
Gabapentin      Last Written Prescription Date:  3-  Last Fill Quantity: 60,   # refills: 0  Last Office Visit: 4-17-18  Future Office visit:       Routing refill request to provider for review/approval because:  Drug not on the G, P or [facility] refill protocol or controlled substance

## 2018-06-09 DIAGNOSIS — I10 HYPERTENSION GOAL BP (BLOOD PRESSURE) < 140/90: ICD-10-CM

## 2018-06-09 DIAGNOSIS — E11.42 TYPE 2 DIABETES MELLITUS WITH DIABETIC POLYNEUROPATHY, WITHOUT LONG-TERM CURRENT USE OF INSULIN (H): ICD-10-CM

## 2018-06-11 RX ORDER — METOPROLOL SUCCINATE 100 MG/1
100 TABLET, EXTENDED RELEASE ORAL DAILY
Qty: 30 TABLET | Refills: 5 | Status: SHIPPED | OUTPATIENT
Start: 2018-06-11 | End: 2018-12-02

## 2018-06-11 RX ORDER — CANAGLIFLOZIN 100 MG/1
TABLET, FILM COATED ORAL
Qty: 30 TABLET | Refills: 5 | Status: SHIPPED | OUTPATIENT
Start: 2018-06-11 | End: 2018-12-02

## 2018-06-11 NOTE — TELEPHONE ENCOUNTER
Routing refill request to provider for review/approval because:  Labs out of range:  GFR, Creatinine, Microalbumin    hSeri Francois RN  [facility]

## 2018-06-11 NOTE — TELEPHONE ENCOUNTER
Invokana  Last Written Prescription Date:  12/13/2017  Last Fill Quantity: 30,  # refills: 5   Last office visit: 4/17/2018 with prescribing provider:  Dr. Mihai Wooten Office Visit:      Metoprolol  Last Written Prescription Date:  5/10/2018  Last Fill Quantity: 30,  # refills: 0   Last office visit: 4/17/2018 with prescribing provider:  Dr. Mihai Wooten Office Visit:      Requested Prescriptions   Pending Prescriptions Disp Refills     INVOKANA 100 MG tablet [Pharmacy Med Name: INVOKANA 100MG TABS] 30 tablet 5     Sig: TAKE ONE TABLET BY MOUTH EVERY MORNING BEFORE BREAKFAST    Sodium Glucose Co-Transport Inhibitor Agents Failed    6/9/2018  1:01 AM       Failed - No creatinine >1.4 or GFR <45 within the past 12 mos    Recent Labs   Lab Test  01/11/18   1045   GFRESTIMATED  30*   GFRESTBLACK  36*       Recent Labs   Lab Test  01/11/18   1045   CR  1.65*            Passed - Blood pressure less than 140/90 in past 6 months    BP Readings from Last 3 Encounters:   04/17/18 138/72   01/24/18 132/62   01/11/18 155/71                Passed - Patient has documented LDL within the past 12 mos.    Recent Labs   Lab Test  04/17/18   1436   LDL  77            Passed - Patient has had a Microalbumin in the past 12 mos.    Recent Labs   Lab Test  04/17/18   1450   MICROL  148   UMALCR  189.99*            Passed - Patient has documented A1c within the specified period of time.    If HgbA1C is 8 or greater, it needs to be on file within the past 3 months.  If less than 8, must be on file within the past 6 months.     Recent Labs   Lab Test  04/17/18   1436   A1C  6.6*            Passed - Patient is age 18 or older       Passed - Patient is not pregnant       Passed - Patient has documented normal Potassium within the last 12 mos.    Recent Labs   Lab Test  01/11/18   1045   POTASSIUM  4.4            Passed - Patient has no positive pregnancy test within the past 12 mos.       Passed - Recent (6 mo) or future (30 days)  "visit within the authorizing provider's specialty    Patient had office visit in the last 6 months or has a visit in the next 30 days with authorizing provider or within the authorizing provider's specialty.  See \"Patient Info\" tab in inbasket, or \"Choose Columns\" in Meds & Orders section of the refill encounter.            metoprolol succinate (TOPROL-XL) 100 MG 24 hr tablet [Pharmacy Med Name: METOPROLOL SUCCINATE ER 100MG TB24] 30 tablet 0     Sig: TAKE ONE TABLET BY MOUTH ONCE DAILY **MUST SEE DOCTOR FOR MORE REFILLS**    Beta-Blockers Protocol Passed    6/9/2018  1:01 AM       Passed - Blood pressure under 140/90 in past 12 months    BP Readings from Last 3 Encounters:   04/17/18 138/72   01/24/18 132/62   01/11/18 155/71                Passed - Patient is age 6 or older       Passed - Recent (12 mo) or future (30 days) visit within the authorizing provider's specialty    Patient had office visit in the last 12 months or has a visit in the next 30 days with authorizing provider or within the authorizing provider's specialty.  See \"Patient Info\" tab in inbasket, or \"Choose Columns\" in Meds & Orders section of the refill encounter.              "

## 2018-06-19 DIAGNOSIS — E11.42 TYPE 2 DIABETES MELLITUS WITH DIABETIC POLYNEUROPATHY, WITHOUT LONG-TERM CURRENT USE OF INSULIN (H): ICD-10-CM

## 2018-06-19 RX ORDER — GABAPENTIN 300 MG/1
CAPSULE ORAL
Qty: 60 CAPSULE | Refills: 0 | Status: SHIPPED | OUTPATIENT
Start: 2018-06-19 | End: 2018-09-17

## 2018-06-19 NOTE — TELEPHONE ENCOUNTER
Gabapentin      Last Written Prescription Date:  5-  Last Fill Quantity: 60,   # refills: 0  Last Office Visit: 4-  Future Office visit:       Routing refill request to provider for review/approval because:  Drug not on the G, P or [facility] refill protocol or controlled substance

## 2018-07-08 DIAGNOSIS — E11.42 TYPE 2 DIABETES MELLITUS WITH DIABETIC POLYNEUROPATHY, WITHOUT LONG-TERM CURRENT USE OF INSULIN (H): ICD-10-CM

## 2018-07-09 NOTE — TELEPHONE ENCOUNTER
"Requested Prescriptions   Pending Prescriptions Disp Refills     glipiZIDE (GLUCOTROL) 10 MG tablet [Pharmacy Med Name: GLIPIZIDE 10MG TABS] 120 tablet 4     Sig: TAKE TWO TABLETS BY MOUTH TWO TIMES A DAY BEFORE MEALS    Sulfonylurea Agents Failed    7/8/2018  1:03 AM       Failed - Patient has a recent creatinine (normal) within the past 12 mos.    Recent Labs   Lab Test  01/11/18   1045   CR  1.65*            Passed - Blood pressure less than 140/90 in past 6 months    BP Readings from Last 3 Encounters:   04/17/18 138/72   01/24/18 132/62   01/11/18 155/71                Passed - Patient has documented LDL within the past 12 mos.    Recent Labs   Lab Test  04/17/18   1436   LDL  77            Passed - Patient has had a Microalbumin in the past 12 mos.    Recent Labs   Lab Test  04/17/18   1450   MICROL  148   UMALCR  189.99*            Passed - Patient has documented A1c within the specified period of time.    If HgbA1C is 8 or greater, it needs to be on file within the past 3 months.  If less than 8, must be on file within the past 6 months.     Recent Labs   Lab Test  04/17/18   1436   A1C  6.6*            Passed - Patient is age 18 or older       Passed - No active pregnancy on record       Passed - Patient has not had a positive pregnancy test within the past 12 mos.       Passed - Recent (6 mo) or future (30 days) visit within the authorizing provider's specialty    Patient had office visit in the last 6 months or has a visit in the next 30 days with authorizing provider or within the authorizing provider's specialty.  See \"Patient Info\" tab in inbasket, or \"Choose Columns\" in Meds & Orders section of the refill encounter.              Last Written Prescription Date:  2/13/18  Last Fill Quantity: 120,  # refills: 4   Last Office Visit with [facility], P or [facility] prescribing provider:  4/17/18   Future Office Visit:       "

## 2018-07-10 RX ORDER — GLIPIZIDE 10 MG/1
TABLET ORAL
Qty: 120 TABLET | Refills: 4 | Status: SHIPPED | OUTPATIENT
Start: 2018-07-10 | End: 2018-12-15

## 2018-07-10 NOTE — TELEPHONE ENCOUNTER
Routing refill request to provider for review/approval because:  Labs out of range:  Creatinine, Microalbumin    Sheri Francois RN  [facility]

## 2018-09-06 DIAGNOSIS — K21.9 GASTROESOPHAGEAL REFLUX DISEASE WITHOUT ESOPHAGITIS: ICD-10-CM

## 2018-09-06 NOTE — TELEPHONE ENCOUNTER
"Requested Prescriptions   Pending Prescriptions Disp Refills     omeprazole (PRILOSEC) 40 MG capsule [Pharmacy Med Name: OMEPRAZOLE 40MG CPDR] 90 capsule 3     Sig: TAKE ONE CAPSULE BY MOUTH TWICE A DAY AS NEEDED    PPI Protocol Failed    9/6/2018  1:03 AM       Failed - Not on Clopidogrel (unless Pantoprazole ordered)       Passed - No diagnosis of osteoporosis on record       Passed - Recent (12 mo) or future (30 days) visit within the authorizing provider's specialty    Patient had office visit in the last 12 months or has a visit in the next 30 days with authorizing provider or within the authorizing provider's specialty.  See \"Patient Info\" tab in inbasket, or \"Choose Columns\" in Meds & Orders section of the refill encounter.           Passed - Patient is age 18 or older       Passed - No active pregnacy on record       Passed - No positive pregnancy test in past 12 months        Last Written Prescription Date:  9/21/2017  Last Fill Quantity: 90,  # refills: 3   Last office visit: 11/16/2017 with prescribing provider:   Byron Holm,   Future Office Visit:    NONE  "

## 2018-09-07 NOTE — TELEPHONE ENCOUNTER
Routing refill request to provider for review/approval because:  Clopidogrel on medication list. Need PCP to review.     CHARLES CrossN, RN  [facility]

## 2018-09-10 RX ORDER — OMEPRAZOLE 40 MG/1
CAPSULE, DELAYED RELEASE ORAL
Qty: 90 CAPSULE | Refills: 1 | Status: SHIPPED | OUTPATIENT
Start: 2018-09-10 | End: 2018-09-13

## 2018-09-13 ENCOUNTER — TELEPHONE (OUTPATIENT)
Dept: FAMILY MEDICINE | Facility: OTHER | Age: 78
End: 2018-09-13

## 2018-09-13 DIAGNOSIS — K21.9 GASTROESOPHAGEAL REFLUX DISEASE WITHOUT ESOPHAGITIS: Primary | ICD-10-CM

## 2018-09-13 RX ORDER — LANSOPRAZOLE 30 MG/1
30 CAPSULE, DELAYED RELEASE ORAL DAILY
Qty: 90 CAPSULE | Refills: 0 | Status: SHIPPED | OUTPATIENT
Start: 2018-09-13 | End: 2020-10-08

## 2018-09-13 NOTE — TELEPHONE ENCOUNTER
Note from pharmacy: please advise, patient on Clopidogrel and Omeprazole. Potential risk of decreased platelet aggregation of Clopidogrel.     Will route to provider.     RAMESH Cross, RN  [facility]

## 2018-09-13 NOTE — TELEPHONE ENCOUNTER
As there are concerns for a possible interaction between the omeprazole and the Plavix, I have taken the liberty of sending a prescription for Protonix to the patient's pharmacy.  This should eliminate any potential risk.    Mihai

## 2018-09-17 DIAGNOSIS — E11.42 TYPE 2 DIABETES MELLITUS WITH DIABETIC POLYNEUROPATHY, WITHOUT LONG-TERM CURRENT USE OF INSULIN (H): ICD-10-CM

## 2018-09-18 RX ORDER — GABAPENTIN 300 MG/1
CAPSULE ORAL
Qty: 60 CAPSULE | Refills: 0 | Status: SHIPPED | OUTPATIENT
Start: 2018-09-18 | End: 2018-11-08

## 2018-09-18 NOTE — TELEPHONE ENCOUNTER
Last Written Prescription Date:  6/19/18  Last Fill Quantity: 60,  # refills: 0   Last office visit: 4/17/2018 with prescribing provider:  4/17/18   Future Office Visit:      Requested Prescriptions   Pending Prescriptions Disp Refills     gabapentin (NEURONTIN) 300 MG capsule [Pharmacy Med Name: GABAPENTIN 300MG CAPS] 60 capsule 0     Sig: TAKE 1 - 2 CAPSULES BY MOUTH IN THE EVENING    There is no refill protocol information for this order          Routing refill request to provider for review/approval because:  Drug not on the [facility] refill protocol       Teean Lugo RN. . .  9/18/2018, 1:06 PM

## 2018-10-11 ENCOUNTER — ALLIED HEALTH/NURSE VISIT (OUTPATIENT)
Dept: FAMILY MEDICINE | Facility: CLINIC | Age: 78
End: 2018-10-11
Payer: COMMERCIAL

## 2018-10-11 DIAGNOSIS — Z23 NEED FOR PROPHYLACTIC VACCINATION AND INOCULATION AGAINST INFLUENZA: Primary | ICD-10-CM

## 2018-10-11 PROCEDURE — G0008 ADMIN INFLUENZA VIRUS VAC: HCPCS

## 2018-10-11 PROCEDURE — 90662 IIV NO PRSV INCREASED AG IM: CPT

## 2018-10-11 NOTE — MR AVS SNAPSHOT
After Visit Summary   10/11/2018    Eliza Dubois    MRN: [id number]           Patient Information     Date Of Birth          1940        Visit Information        Provider Department      10/11/2018 10:00 AM CESAR DONNELLY MA [facility]        Today's Diagnoses     Need for prophylactic vaccination and inoculation against influenza    -  1       Follow-ups after your visit        Who to contact     If you have questions or need follow up information about today's clinic visit or your schedule please contact [facility] directly at [phone].  Normal or non-critical lab and imaging results will be communicated to you by MyChart, letter or phone within 4 business days after the clinic has received the results. If you do not hear from us within 7 days, please contact the clinic through MyChart or phone. If you have a critical or abnormal lab result, we will notify you by phone as soon as possible.  Submit refill requests through [employer] or call your pharmacy and they will forward the refill request to us. Please allow 3 business days for your refill to be completed.          Additional Information About Your Visit        Care EveryWhere ID     This is your Care EveryWhere ID. This could be used by other organizations to access your [place] medical records  [id number]         Blood Pressure from Last 3 Encounters:   04/17/18 138/72   01/24/18 132/62   01/11/18 155/71    Weight from Last 3 Encounters:   04/17/18 144 lb 14.4 oz (65.7 kg)   01/24/18 138 lb 9.6 oz (62.9 kg)   01/11/18 147 lb (66.7 kg)              We Performed the Following     FLU VACCINE, INCREASED ANTIGEN, PRESV FREE, AGE 65+ [20502]     Vaccine Administration, Initial [84635]        Primary Care Provider Office Phone # Fax #    Byron Marvin Holm [phone]       [address] [place] [address]        Equal Access to Services     FAIZA RUDD: Lana blanca  Sukhjinder, wabritnida luqadaha, qaybta kadaisha rivera, elgin jazminin hayaan salvadorgissel lynneyamileth laIraidasho norm. So [facility] [phone].    ATENCIÓN: Si perla woody, tiene a hearn disposición servicios gratuitos de asistencia lingüística. Chance al [phone].    We comply with applicable federal civil rights laws and [place] laws. We do not discriminate on the basis of race, color, national origin, age, disability, sex, sexual orientation, or gender identity.            Thank you!     Thank you for choosing [facility]  for your care. Our goal is always to provide you with excellent care. Hearing back from our patients is one way we can continue to improve our services. Please take a few minutes to complete the written survey that you may receive in the mail after your visit with us. Thank you!             Your Updated Medication List - Protect others around you: Learn how to safely use, store and throw away your medicines at www.disposemymeds.org.          This list is accurate as of 10/11/18 10:24 AM.  Always use your most recent med list.                   Brand Name Dispense Instructions for use Diagnosis    aspirin 81 MG tablet      Take 1 tablet by mouth once daily        atorvastatin 80 MG tablet    LIPITOR     Take 80 mg by mouth daily        blood glucose monitoring lancets     1 Box    Use to test blood sugar 2 times daily or as directed.    Type 2 diabetes mellitus with diabetic polyneuropathy (H)       blood glucose monitoring test strip    ACCU-CHEK COMPACT PLUS    102 each    USE TO CHECK BLOOD SUGARS TWO TIMES A DAY OR AS DIRECTED.  Appointment needed for additional refills.    Type 2 diabetes mellitus with diabetic polyneuropathy, without long-term current use of insulin (H)       fluticasone 50 MCG/ACT spray    FLONASE    16 g    INSTILL ONE TO TWO SPRAYS INTO BOTH NOSTRILS DAILY    Seasonal allergic rhinitis       gabapentin 300 MG capsule    NEURONTIN    60 capsule    TAKE 1 - 2 CAPSULES BY MOUTH  IN THE EVENING    Type 2 diabetes mellitus with diabetic polyneuropathy, without long-term current use of insulin (H)       glipiZIDE 10 MG tablet    GLUCOTROL    120 tablet    TAKE TWO TABLETS BY MOUTH TWO TIMES A DAY BEFORE MEALS    Type 2 diabetes mellitus with diabetic polyneuropathy, without long-term current use of insulin (H)       IMDUR PO      Take 30 mg by mouth daily    ASCVD (arteriosclerotic cardiovascular disease)       INVOKANA 100 MG tablet   Generic drug:  canagliflozin     30 tablet    TAKE ONE TABLET BY MOUTH EVERY MORNING BEFORE BREAKFAST    Type 2 diabetes mellitus with diabetic polyneuropathy, without long-term current use of insulin (H)       LANsoprazole 30 MG CR capsule    PREVACID    90 capsule    Take 1 capsule (30 mg) by mouth daily Take 30-60 minutes before a meal.    Gastroesophageal reflux disease without esophagitis       lisinopril-hydrochlorothiazide 20-25 MG per tablet    PRINZIDE/ZESTORETIC    90 tablet    TAKE ONE TABLET BY MOUTH DAILY    Hypertension goal BP (blood pressure) < 140/90       meclizine 25 MG tablet    ANTIVERT    30 tablet    Take 1 tablet (25 mg) by mouth every 6 hours as needed for dizziness    Benign paroxysmal positional vertigo due to bilateral vestibular disorder       MELATONIN PO      Take 3 mg by mouth nightly as needed        metFORMIN 500 MG 24 hr tablet    GLUCOPHAGE-XR    120 tablet    TAKE TWO TABLETS BY MOUTH TWICE A DAY WITH MEALS    Type 2 diabetes mellitus with diabetic polyneuropathy, without long-term current use of insulin (H)       metoprolol succinate 100 MG 24 hr tablet    TOPROL-XL    30 tablet    Take 1 tablet (100 mg) by mouth daily    Hypertension goal BP (blood pressure) < 140/90       PARoxetine 40 MG tablet    PAXIL    30 tablet    TAKE ONE TABLET BY MOUTH EVERY MORNING    Major depressive disorder, recurrent episode, moderate (H)       PLAVIX PO      Take 75 mg by mouth daily    ASCVD (arteriosclerotic cardiovascular disease)

## 2018-10-11 NOTE — PROGRESS NOTES
Injectable Influenza Immunization Documentation    1.  Is the person to be vaccinated sick today?   No    2. Does the person to be vaccinated have an allergy to a component   of the vaccine?   No  Egg Allergy Algorithm Link    3. Has the person to be vaccinated ever had a serious reaction   to influenza vaccine in the past?   No    4. Has the person to be vaccinated ever had Guillain-Barré syndrome?   No    Form completed by Elisabeth Mart CMA  Prior to injection verified patient identity using patient's name and date of birth.  Due to injection administration, patient instructed to remain in clinic for 15 minutes  afterwards, and to report any adverse reaction to me immediately.

## 2018-10-12 ASSESSMENT — PATIENT HEALTH QUESTIONNAIRE - PHQ9: SUM OF ALL RESPONSES TO PHQ QUESTIONS 1-9: 8

## 2018-10-26 ENCOUNTER — OFFICE VISIT (OUTPATIENT)
Dept: FAMILY MEDICINE | Facility: OTHER | Age: 78
End: 2018-10-26
Payer: COMMERCIAL

## 2018-10-26 DIAGNOSIS — I25.10 CORONARY ARTERY DISEASE INVOLVING NATIVE CORONARY ARTERY OF NATIVE HEART WITHOUT ANGINA PECTORIS: ICD-10-CM

## 2018-10-26 DIAGNOSIS — I10 ESSENTIAL HYPERTENSION WITH GOAL BLOOD PRESSURE LESS THAN 140/90: ICD-10-CM

## 2018-10-26 DIAGNOSIS — E11.42 TYPE 2 DIABETES MELLITUS WITH DIABETIC POLYNEUROPATHY, WITHOUT LONG-TERM CURRENT USE OF INSULIN (H): Primary | ICD-10-CM

## 2018-10-26 LAB
ANION GAP SERPL CALCULATED.3IONS-SCNC: 11 MMOL/L (ref 3–14)
BUN SERPL-MCNC: 30 MG/DL (ref 7–30)
CALCIUM SERPL-MCNC: 8.3 MG/DL (ref 8.5–10.1)
CHLORIDE SERPL-SCNC: 106 MMOL/L (ref 94–109)
CO2 SERPL-SCNC: 28 MMOL/L (ref 20–32)
CREAT SERPL-MCNC: 1.2 MG/DL (ref 0.52–1.04)
CREAT UR-MCNC: 60 MG/DL
GFR SERPL CREATININE-BSD FRML MDRD: 43 ML/MIN/1.7M2
GLUCOSE SERPL-MCNC: 165 MG/DL (ref 70–99)
HBA1C MFR BLD: 7 % (ref 0–5.6)
LDLC SERPL DIRECT ASSAY-MCNC: 70 MG/DL
MICROALBUMIN UR-MCNC: 149 MG/L
MICROALBUMIN/CREAT UR: 247.92 MG/G CR (ref 0–25)
POTASSIUM SERPL-SCNC: 4.3 MMOL/L (ref 3.4–5.3)
SODIUM SERPL-SCNC: 145 MMOL/L (ref 133–144)

## 2018-10-26 PROCEDURE — 36415 COLL VENOUS BLD VENIPUNCTURE: CPT | Performed by: INTERNAL MEDICINE

## 2018-10-26 PROCEDURE — 83721 ASSAY OF BLOOD LIPOPROTEIN: CPT | Performed by: INTERNAL MEDICINE

## 2018-10-26 PROCEDURE — 83036 HEMOGLOBIN GLYCOSYLATED A1C: CPT | Performed by: INTERNAL MEDICINE

## 2018-10-26 PROCEDURE — 99214 OFFICE O/P EST MOD 30 MIN: CPT | Performed by: INTERNAL MEDICINE

## 2018-10-26 PROCEDURE — 80048 BASIC METABOLIC PNL TOTAL CA: CPT | Performed by: INTERNAL MEDICINE

## 2018-10-26 PROCEDURE — 82043 UR ALBUMIN QUANTITATIVE: CPT | Performed by: INTERNAL MEDICINE

## 2018-10-26 ASSESSMENT — PAIN SCALES - GENERAL: PAINLEVEL: NO PAIN (0)

## 2018-10-26 NOTE — MR AVS SNAPSHOT
After Visit Summary   10/26/2018    Eliza Dubois    MRN: [id number]           Patient Information     Date Of Birth          1940        Visit Information        Provider Department      10/26/2018 1:00 PM Byron Holm DO [facility]        Today's Diagnoses     Type 2 diabetes mellitus with diabetic polyneuropathy, without long-term current use of insulin (H)    -  1    Coronary artery disease involving native coronary artery of native heart without angina pectoris        Essential hypertension with goal blood pressure less than 140/90           Follow-ups after your visit        Follow-up notes from your care team     Return in about 4 months (around 2/26/2019) for follow up.      Who to contact     If you have questions or need follow up information about today's clinic visit or your schedule please contact [facility] directly at [phone].  Normal or non-critical lab and imaging results will be communicated to you by MyChart, letter or phone within 4 business days after the clinic has received the results. If you do not hear from us within 7 days, please contact the clinic through MyChart or phone. If you have a critical or abnormal lab result, we will notify you by phone as soon as possible.  Submit refill requests through [employer] or call your pharmacy and they will forward the refill request to us. Please allow 3 business days for your refill to be completed.          Additional Information About Your Visit        Care EveryWhere ID     This is your Care EveryWhere ID. This could be used by other organizations to access your [place] medical records  [id number]        Your Vitals Were     Pulse Temperature Respirations Pulse Oximetry BMI (Body Mass Index)       74 97.2  F (36.2  C) (Temporal) 12 100% 25.58 kg/m2        Blood Pressure from Last 3 Encounters:   10/26/18 135/70   04/17/18 138/72   01/24/18 132/62    Weight from Last 3  Encounters:   10/26/18 149 lb (67.6 kg)   04/17/18 144 lb 14.4 oz (65.7 kg)   01/24/18 138 lb 9.6 oz (62.9 kg)              We Performed the Following     Albumin Random Urine Quantitative with Creat Ratio     Basic metabolic panel     Hemoglobin A1c     LDL cholesterol direct        Primary Care Provider Office Phone # Fax #    Byron Holm, [phone]       [address] [place] [address]        Equal Access to Services     FAIZA LÓPEZ : Hadii aad ku hadasho Soomaali, waaxda luqadaha, qaybta kaalmada adeegyada, waxay idiin hayaan adeeg kharash rasheed . So [facility] [phone].    ATENCIÓN: Si habla español, tiene a hearn disposición servicios gratuitos de asistencia lingüística. [facility] [phone].    We comply with applicable federal civil rights laws and [place] laws. We do not discriminate on the basis of race, color, national origin, age, disability, sex, sexual orientation, or gender identity.            Thank you!     Thank you for choosing [facility]  for your care. Our goal is always to provide you with excellent care. Hearing back from our patients is one way we can continue to improve our services. Please take a few minutes to complete the written survey that you may receive in the mail after your visit with us. Thank you!             Your Updated Medication List - Protect others around you: Learn how to safely use, store and throw away your medicines at www.disposemymeds.org.          This list is accurate as of 10/26/18 11:59 PM.  Always use your most recent med list.                   Brand Name Dispense Instructions for use Diagnosis    aspirin 81 MG tablet    ASA     Take 1 tablet by mouth once daily        atorvastatin 80 MG tablet    LIPITOR     Take 80 mg by mouth daily        blood glucose monitoring lancets     1 Box    Use to test blood sugar 2 times daily or as directed.    Type 2 diabetes mellitus with diabetic polyneuropathy (H)        blood glucose monitoring test strip    ACCU-CHEK COMPACT PLUS    102 each    USE TO CHECK BLOOD SUGARS TWO TIMES A DAY OR AS DIRECTED.  Appointment needed for additional refills.    Type 2 diabetes mellitus with diabetic polyneuropathy, without long-term current use of insulin (H)       fluticasone 50 MCG/ACT nasal spray    FLONASE    16 g    INSTILL ONE TO TWO SPRAYS INTO BOTH NOSTRILS DAILY    Seasonal allergic rhinitis       glipiZIDE 10 MG tablet    GLUCOTROL    120 tablet    TAKE TWO TABLETS BY MOUTH TWO TIMES A DAY BEFORE MEALS    Type 2 diabetes mellitus with diabetic polyneuropathy, without long-term current use of insulin (H)       IMDUR PO      Take 30 mg by mouth daily    ASCVD (arteriosclerotic cardiovascular disease)       INVOKANA 100 MG tablet   Generic drug:  canagliflozin     30 tablet    TAKE ONE TABLET BY MOUTH EVERY MORNING BEFORE BREAKFAST    Type 2 diabetes mellitus with diabetic polyneuropathy, without long-term current use of insulin (H)       LANsoprazole 30 MG DR capsule    PREVACID    90 capsule    Take 1 capsule (30 mg) by mouth daily Take 30-60 minutes before a meal.    Gastroesophageal reflux disease without esophagitis       meclizine 25 MG tablet    ANTIVERT    30 tablet    Take 1 tablet (25 mg) by mouth every 6 hours as needed for dizziness    Benign paroxysmal positional vertigo due to bilateral vestibular disorder       MELATONIN PO      Take 3 mg by mouth nightly as needed        metoprolol succinate 100 MG 24 hr tablet    TOPROL-XL    30 tablet    Take 1 tablet (100 mg) by mouth daily    Hypertension goal BP (blood pressure) < 140/90       OMEPRAZOLE PO      Take 40 mg by mouth daily    Type 2 diabetes mellitus with diabetic polyneuropathy, without long-term current use of insulin (H)       PLAVIX PO      Take 75 mg by mouth daily    ASCVD (arteriosclerotic cardiovascular disease)

## 2018-10-26 NOTE — PROGRESS NOTES
SUBJECTIVE:   Eliza Dubois is a 78 year old female who presents to clinic today for the following health issues:      Diabetes Follow-up    Patient is checking blood sugars: once daily.  Results are as follows:         am - 70s-80s    Diabetic concerns: None     Symptoms of hypoglycemia (low blood sugar): sometimes     Paresthesias (numbness or burning in feet) or sores: Yes     Date of last diabetic eye exam: she plans to schedule     Diabetes Management Resources    Hyperlipidemia Follow-Up      Rate your low fat/cholesterol diet?: not monitoring fat    Taking statin?  Yes, no muscle aches from statin    Other lipid medications/supplements?:  none    Hypertension Follow-up      Outpatient blood pressures are not being checked.    Low Salt Diet: no added salt    BP Readings from Last 2 Encounters:   10/26/18 142/70   04/17/18 138/72     Hemoglobin A1C (%)   Date Value   04/17/2018 6.6 (H)   11/06/2017 8.8 (H)     LDL Cholesterol Calculated (mg/dL)   Date Value   04/05/2017     Cannot estimate LDL when triglyceride exceeds 400 mg/dL   01/28/2016 40     LDL Cholesterol Direct (mg/dL)   Date Value   04/17/2018 77   04/05/2017 81       Amount of exercise or physical activity: None    Problems taking medications regularly: No    Medication side effects: none    Diet: regular (no restrictions)                      Chief Complaint         The patient is a pleasant 78-year-old female who presents today for review of her diabetes, hypertension and hyperlipidemia.  Notably, she has been taking her medications compliantly without any side effects.  She is managing her sugar with the use of glipizide, Invokana, and metformin.  She is appropriately on aspirin, statin, ACE inhibitor.  Her most recent glycosylated hemoglobin was 6.6 in April and will be rechecked today.  With respect to her hypertension, current blood pressure is 135/70 and she is doing well.  She denies any headaches, chest pain or symptoms of  hypertensive urgency.  She watches her diet fairly well with respect to her fat intake and is aggressively trying to maintain her good health.                       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     Surgical History:   has a past surgical history that includes NONSPECIFIC PROCEDURE (1988); LAPAROSCOPY, SURGICAL; CHOLECYSTECTOMY (1997); colonoscopy (04/15/09); Colonoscopy (6/18/2014); Abdomen surgery; Phacoemulsification clear cornea with standard intraocular lens implant (Right, 3/16/2016); and Phacoemulsification clear cornea with standard intraocular lens implant (Left, 3/30/2016).     Social History:   reports that she has never smoked. She has never used smokeless tobacco. She reports that she does not drink alcohol or use illicit drugs.     Family History:  family history includes Arthritis in her mother; Cancer in her maternal grandmother and another family member; Diabetes in her father and mother; EYE* in her mother; Genitourinary Problems in her father; Heart Disease in her father, maternal grandmother, and mother; Hypertension in her mother; Lipids in her mother; Neurologic Disorder in her mother; Obesity in her mother; Osteoporosis in her mother. There is no history of Alcohol/Drug, Alzheimer Disease, Anesthesia Reaction, Blood Disease, Depression, Genetic Disorder, GASTROINTESTINAL DISEASE, Gynecology, Psychotic Disorder, Respiratory, or Cerebrovascular Disease.            MEDICATIONS  Current Outpatient Prescriptions   Medication Sig Dispense Refill     ASPIRIN 81 MG PO TABS Take 1 tablet by mouth once daily  11     atorvastatin (LIPITOR) 80 MG tablet  Take 80 mg by mouth daily       blood glucose monitoring (ACCU-CHEK COMPACT PLUS) test strip USE TO CHECK BLOOD SUGARS TWO TIMES A DAY OR AS DIRECTED.  Appointment needed for additional refills. 102 each 0     blood glucose monitoring (ACCU-CHEK MULTICLIX) lancets Use to test blood sugar 2 times daily or as directed. 1 Box 11     Clopidogrel Bisulfate (PLAVIX PO) Take 75 mg by mouth daily        fluticasone (FLONASE) 50 MCG/ACT spray INSTILL ONE TO TWO SPRAYS INTO BOTH NOSTRILS DAILY 16 g 8     glipiZIDE (GLUCOTROL) 10 MG tablet TAKE TWO TABLETS BY MOUTH TWO TIMES A DAY BEFORE MEALS 120 tablet 4     INVOKANA 100 MG tablet TAKE ONE TABLET BY MOUTH EVERY MORNING BEFORE BREAKFAST 30 tablet 5     Isosorbide Mononitrate (IMDUR PO) Take 30 mg by mouth daily       meclizine (ANTIVERT) 25 MG tablet Take 1 tablet (25 mg) by mouth every 6 hours as needed for dizziness 30 tablet 1     MELATONIN PO Take 3 mg by mouth nightly as needed        metoprolol succinate (TOPROL-XL) 100 MG 24 hr tablet Take 1 tablet (100 mg) by mouth daily 30 tablet 5     OMEPRAZOLE PO Take 40 mg by mouth daily       gabapentin (NEURONTIN) 300 MG capsule TAKE 1 - 2 CAPSULES BY MOUTH IN THE EVENING 60 capsule 0     LANsoprazole (PREVACID) 30 MG CR capsule Take 1 capsule (30 mg) by mouth daily Take 30-60 minutes before a meal. (Patient not taking: Reported on 10/26/2018) 90 capsule 0     lisinopril-hydrochlorothiazide (PRINZIDE/ZESTORETIC) 20-25 MG per tablet TAKE ONE TABLET BY MOUTH ONCE DAILY 90 tablet 0     metFORMIN (GLUCOPHAGE-XR) 500 MG 24 hr tablet TAKE TWO TABLETS BY MOUTH TWICE A DAY WITH MEALS 120 tablet 5     PARoxetine (PAXIL) 40 MG tablet TAKE ONE TABLET BY MOUTH EVERY MORNING 30 tablet 5         --------------------------------------------------------------------------------------------------------------------                              Review of Systems       LUNGS: Pt denies: cough,excess sputum, hemoptysis, or shortness of  breath.   HEART: Pt denies: chest pain, arrythmia, syncope, tachy or bradyarrhythmia.   GI: Pt denies: nausea, vomitting, diarrhea, constipation, melena, or hematochezia.   NEURO: Pt denies: seizures, strokes, diplopia, weakness, paraesthesias, or paralysis.   SKIN: Pt denies: itching, rashes, discoloration, or specific lesions of concern. Denies recent hair loss.   PSYCH: The patient denies significant depression, anxiety, mood imbalance. Specifically denies any suicidal ideation.                                     Examination         LUNGS: clear bilaterally, airflow is brisk, no intercostal retraction or stridor is noted. No coughing is noted during visit.   HEART:  regular without rubs, clicks, gallops, or murmurs. PMI is nondisplaced. Upstrokes are brisk. S1,S2 are heard.   GI: Abdomen is soft, without rebound, guarding or tenderness. Bowel sounds are appropriate. No renal bruits are heard.   NEURO: Pt is alert and appropriate. No neurologic lateralization is noted. Cranial nerves 2-12 are intact. Peripheral sensory and motor function are grossly normal.  Her peripheral neuropathy is well controlled.   SKIN:  warm and dry. No erythema, or rashes are noted. No specific lesions of concern are noted.    PSYCH: The patient appears grossly appropriate. Maintains good eye contact, does not have any jittery or atypical motion. Displays appropriate affect.                                           Decision Making    1. Type 2 diabetes mellitus with diabetic polyneuropathy, without long-term current use of insulin (H)  Check A1c and renal function, continue management as current  - OMEPRAZOLE PO; Take 40 mg by mouth daily  - Hemoglobin A1c  - Basic metabolic panel  - Albumin Random Urine Quantitative with Creat Ratio    2. Coronary artery disease involving native coronary artery of native heart without angina pectoris  Check lipid levels and continue beta-blocker, Plavix, aspirin, Imdur,  LDL cholesterol direct    3.  Essential hypertension with goal blood pressure less than 140/90  Currently well controlled, continue medication                         FOLLOW UP   I have asked the patient to make an appointment for followup with me in 4 months        I have carefully explained the diagnosis and treatment options to the patient.  The patient has displayed an understanding of the above, and all subsequent questions were answered.      DO GEMA Campos    Portions of this note were produced using [employer]  Although every attempt at real-time proof reading has been made, occasional grammar/syntax errors may have been missed.

## 2018-11-01 DIAGNOSIS — E11.42 TYPE 2 DIABETES MELLITUS WITH DIABETIC POLYNEUROPATHY, WITHOUT LONG-TERM CURRENT USE OF INSULIN (H): ICD-10-CM

## 2018-11-01 DIAGNOSIS — F33.1 MAJOR DEPRESSIVE DISORDER, RECURRENT EPISODE, MODERATE (H): ICD-10-CM

## 2018-11-01 RX ORDER — PAROXETINE 40 MG/1
TABLET, FILM COATED ORAL
Qty: 30 TABLET | Refills: 5 | Status: SHIPPED | OUTPATIENT
Start: 2018-11-01 | End: 2019-04-30

## 2018-11-01 RX ORDER — METFORMIN HCL 500 MG
TABLET, EXTENDED RELEASE 24 HR ORAL
Qty: 120 TABLET | Refills: 5 | Status: SHIPPED | OUTPATIENT
Start: 2018-11-01 | End: 2019-04-30

## 2018-11-01 NOTE — TELEPHONE ENCOUNTER
Patient has been seen in the past 30 days.  Routing to PCP to advise.    CHARLES CrossN, RN  [facility]

## 2018-11-01 NOTE — TELEPHONE ENCOUNTER
"Requested Prescriptions   Pending Prescriptions Disp Refills     PARoxetine (PAXIL) 40 MG tablet [Pharmacy Med Name: PAROXETINE HCL 40MG TABS] 30 tablet 5    Last Written Prescription Date:  5/10/18  Last Fill Quantity: 30,  # refills: 5   Last office visit: 11/16/2017 with prescribing provider:  10/26/18   Future Office Visit:     Sig: TAKE ONE TABLET BY MOUTH EVERY MORNING    SSRIs Protocol Failed    11/1/2018  1:02 AM       Failed - PHQ-9 score less than 5 in past 6 months    Please review last PHQ-9 score.   PHQ-9 score:    PHQ-9 SCORE 10/11/2018   Total Score -   Total Score 8          Passed - Patient is age 18 or older       Passed - No active pregnancy on record       Passed - No positive pregnancy test in last 12 months       Passed - Recent (6 mo) or future (30 days) visit within the authorizing provider's specialty    Patient had office visit in the last 6 months or has a visit in the next 30 days with authorizing provider or within the authorizing provider's specialty.  See \"Patient Info\" tab in inbasket, or \"Choose Columns\" in Meds & Orders section of the refill encounter.            metFORMIN (GLUCOPHAGE-XR) 500 MG 24 hr tablet [Pharmacy Med Name: METFORMIN HCL ER 500MG TB24] 120 tablet 5    Last Written Prescription Date:  5/11/18  Last Fill Quantity: 120,  # refills: 5   Last office visit: 11/16/2017 with prescribing provider:  10/26/18   Future Office Visit:     Sig: TAKE TWO TABLETS BY MOUTH TWICE A DAY WITH MEALS    Biguanide Agents Failed    11/1/2018  1:02 AM       Failed - Blood pressure less than 140/90 in past 6 months    BP Readings from Last 3 Encounters:   10/26/18 142/70   04/17/18 138/72   01/24/18 132/62                Failed - Patient's CR is NOT>1.4 OR Patient's EGFR is NOT<45 within past 12 mos.    Recent Labs   Lab Test  10/26/18   1341   GFRESTIMATED  43*   GFRESTBLACK  53*       Recent Labs   Lab Test  10/26/18   1341   CR  1.20*            Passed - Patient has documented LDL " "within the past 12 mos.    Recent Labs   Lab Test  10/26/18   1341   LDL  70            Passed - Patient has had a Microalbumin in the past 15 mos.    Recent Labs   Lab Test  10/26/18   1401   MICROL  149   UMALCR  247.92*            Passed - Patient is age 10 or older       Passed - Patient has documented A1c within the specified period of time.    If HgbA1C is 8 or greater, it needs to be on file within the past 3 months.  If less than 8, must be on file within the past 6 months.     Recent Labs   Lab Test  10/26/18   1341   A1C  7.0*            Passed - Patient does NOT have a diagnosis of CHF.       Passed - Patient is not pregnant       Passed - Patient has not had a positive pregnancy test within the past 12 mos.        Passed - Recent (6 mo) or future (30 days) visit within the authorizing provider's specialty    Patient had office visit in the last 6 months or has a visit in the next 30 days with authorizing provider or within the authorizing provider's specialty.  See \"Patient Info\" tab in inbasket, or \"Choose Columns\" in Meds & Orders section of the refill encounter.            "

## 2018-11-05 NOTE — PROGRESS NOTES
Please contact the patient and notify her of the following:  The microalbumin is elevated suggesting protein loss to the kidneys.  Cholesterol is well controlled with an LDL of 70.  The chemistry panel shows an elevated blood sugar of 165.  Kidney function has improved slightly.  Hemoglobin A1c is stable at 7.0 suggesting adequate blood sugar control.    Thank you.   DO GEMA Campos

## 2018-11-06 ENCOUNTER — TELEPHONE (OUTPATIENT)
Dept: FAMILY MEDICINE | Facility: OTHER | Age: 78
End: 2018-11-06

## 2018-11-06 NOTE — TELEPHONE ENCOUNTER
----- Message from Byron Holm DO sent at 11/5/2018  5:58 PM CST -----  Please contact the patient and notify her of the following:  The microalbumin is elevated suggesting protein loss to the kidneys.  Cholesterol is well controlled with an LDL of 70.  The chemistry panel shows an elevated blood sugar of 165.  Kidney function has improved slightly.  Hemoglobin A1c is stable at 7.0 suggesting adequate blood sugar control.    Thank you.   Byron Holm DO FACOI

## 2018-11-07 NOTE — TELEPHONE ENCOUNTER
Called patient and relayed information.  No further action is needed as of right now.     Marjan Brown, CMA

## 2018-11-08 DIAGNOSIS — E11.42 TYPE 2 DIABETES MELLITUS WITH DIABETIC POLYNEUROPATHY, WITHOUT LONG-TERM CURRENT USE OF INSULIN (H): ICD-10-CM

## 2018-11-08 NOTE — TELEPHONE ENCOUNTER
Gabapentin 300 MG       Last Written Prescription Date:  9/18/18  Last Fill Quantity: 60,   # refills: 0  Last Office Visit: 10/26/18  Future Office visit:       Routing refill request to provider for review/approval because:  Drug not on the G, P or [facility] refill protocol or controlled substance

## 2018-11-09 RX ORDER — GABAPENTIN 300 MG/1
CAPSULE ORAL
Qty: 60 CAPSULE | Refills: 0 | Status: SHIPPED | OUTPATIENT
Start: 2018-11-09 | End: 2019-01-24

## 2018-11-22 DIAGNOSIS — I10 HYPERTENSION GOAL BP (BLOOD PRESSURE) < 140/90: ICD-10-CM

## 2018-11-23 RX ORDER — LISINOPRIL AND HYDROCHLOROTHIAZIDE 20; 25 MG/1; MG/1
TABLET ORAL
Qty: 90 TABLET | Refills: 0 | Status: SHIPPED | OUTPATIENT
Start: 2018-11-23 | End: 2019-03-01

## 2018-11-23 NOTE — TELEPHONE ENCOUNTER
Routing refill request to provider for review/approval because:  Labs out of range:  BP, Creatinine, Sodium    RAMESH Cross, RN  [facility]

## 2018-11-23 NOTE — TELEPHONE ENCOUNTER
"Requested Prescriptions   Pending Prescriptions Disp Refills     lisinopril-hydrochlorothiazide (PRINZIDE/ZESTORETIC) 20-25 MG per tablet [Pharmacy Med Name: LISINOPRIL-HYDROCHLOROTH 20-25 TABS] 90 tablet 2    Last Written Prescription Date:  2/27/18  Last Fill Quantity: 90,  # refills: 2   Last office visit: 11/16/2017 with prescribing provider:  10/26/18   Future Office Visit:     Sig: TAKE ONE TABLET BY MOUTH ONCE DAILY    Diuretics (Including Combos) Protocol Failed    11/22/2018  1:02 AM       Failed - Blood pressure under 140/90 in past 12 months    BP Readings from Last 3 Encounters:   10/26/18 142/70   04/17/18 138/72   01/24/18 132/62                Failed - Normal serum creatinine on file in past 12 months    Recent Labs   Lab Test  10/26/18   1341   CR  1.20*             Failed - Normal serum sodium on file in past 12 months    Recent Labs   Lab Test  10/26/18   1341   NA  145*             Passed - Recent (12 mo) or future (30 days) visit within the authorizing provider's specialty    Patient had office visit in the last 12 months or has a visit in the next 30 days with authorizing provider or within the authorizing provider's specialty.  See \"Patient Info\" tab in inbasket, or \"Choose Columns\" in Meds & Orders section of the refill encounter.             Passed - Patient is age 18 or older       Passed - No active pregancy on record       Passed - Normal serum potassium on file in past 12 months    Recent Labs   Lab Test  10/26/18   1341   POTASSIUM  4.3                   Passed - No positive pregnancy test in past 12 months        "

## 2018-11-28 VITALS
SYSTOLIC BLOOD PRESSURE: 135 MMHG | OXYGEN SATURATION: 100 % | TEMPERATURE: 97.2 F | RESPIRATION RATE: 12 BRPM | DIASTOLIC BLOOD PRESSURE: 70 MMHG | HEART RATE: 74 BPM | BODY MASS INDEX: 25.58 KG/M2 | WEIGHT: 149 LBS

## 2018-12-02 DIAGNOSIS — I10 HYPERTENSION GOAL BP (BLOOD PRESSURE) < 140/90: ICD-10-CM

## 2018-12-02 DIAGNOSIS — K21.9 GASTROESOPHAGEAL REFLUX DISEASE WITHOUT ESOPHAGITIS: ICD-10-CM

## 2018-12-02 DIAGNOSIS — E11.42 TYPE 2 DIABETES MELLITUS WITH DIABETIC POLYNEUROPATHY, WITHOUT LONG-TERM CURRENT USE OF INSULIN (H): ICD-10-CM

## 2018-12-04 RX ORDER — METOPROLOL SUCCINATE 100 MG/1
TABLET, EXTENDED RELEASE ORAL
Qty: 30 TABLET | Refills: 10 | Status: SHIPPED | OUTPATIENT
Start: 2018-12-04 | End: 2019-11-16

## 2018-12-04 RX ORDER — CANAGLIFLOZIN 100 MG/1
TABLET, FILM COATED ORAL
Qty: 30 TABLET | Refills: 5 | Status: SHIPPED | OUTPATIENT
Start: 2018-12-04 | End: 2019-02-15

## 2018-12-04 RX ORDER — OMEPRAZOLE 40 MG/1
CAPSULE, DELAYED RELEASE ORAL
Qty: 90 CAPSULE | Refills: 1 | Status: SHIPPED | OUTPATIENT
Start: 2018-12-04 | End: 2019-03-01

## 2018-12-04 NOTE — TELEPHONE ENCOUNTER
Metoprolol Succ  Last Written Prescription Date:  06/11/2018  Last Fill Quantity: 30,  # refills: 5   Last office visit: 10/26/2018 with prescribing provider:  Mihai Wooten Office Visit:  None  Prescription approved per [facility] Refill Protocol.    Omeprazole  Last office visit: 10/28/2018with prescribing provider:  mihai Wooten Office Visit:  none  Routing refill request to provider for review/approval because:  Medication is reported/historical    Invokana  Last Written Prescription Date:  06/11/2018  Last Fill Quantity: 30,  # refills: 5   Last office visit: 10/26/2018 with prescribing provider:  Mihai Wooten Office Visit:  None  Routing refill request to provider for review/approval because:  Labs out of range:  GFR, Creatinine    Requested Prescriptions   Pending Prescriptions Disp Refills     INVOKANA 100 MG tablet [Pharmacy Med Name: INVOKANA 100MG TABS] 30 tablet 5     Sig: TAKE ONE TABLET BY MOUTH EVERY MORNING BEFORE BREAKFAST    Sodium Glucose Co-Transport Inhibitor Agents Failed    12/2/2018  1:02 AM       Failed - No creatinine >1.4 or GFR <45 within the past 12 mos    Recent Labs   Lab Test  10/26/18   1341   GFRESTIMATED  43*   GFRESTBLACK  53*     Recent Labs   Lab Test  10/26/18   1341   CR  1.20*          Passed - Blood pressure less than 140/90 in past 6 months    BP Readings from Last 3 Encounters:   10/26/18 135/70   04/17/18 138/72   01/24/18 132/62          Passed - Patient has documented LDL within the past 12 mos.    Recent Labs   Lab Test  10/26/18   1341   LDL  70          Passed - Patient has had a Microalbumin in the past 15 mos.    Recent Labs   Lab Test  10/26/18   1401   MICROL  149   UMALCR  247.92*          Passed - Patient has documented A1c within the specified period of time.    If HgbA1C is 8 or greater, it needs to be on file within the past 3 months.  If less than 8, must be on file within the past 6 months.   Recent Labs   Lab Test  10/26/18   1341   A1C  7.0*        "   Passed - Patient is age 18 or older       Passed - Patient is not pregnant       Passed - Patient has documented normal Potassium within the last 12 mos.    Recent Labs   Lab Test  10/26/18   1341   POTASSIUM  4.3          Passed - Patient has no positive pregnancy test within the past 12 mos.       Passed - Recent (6 mo) or future (30 days) visit within the authorizing provider's specialty    Patient had office visit in the last 6 months or has a visit in the next 30 days with authorizing provider or within the authorizing provider's specialty.  See \"Patient Info\" tab in inbasket, or \"Choose Columns\" in Meds & Orders section of the refill encounter.         omeprazole (PRILOSEC) 40 MG DR capsule [Pharmacy Med Name: OMEPRAZOLE 40MG CPDR] 90 capsule 1     Sig: TAKE ONE CAPSULE BY MOUTH TWICE A DAY AS NEEDED    PPI Protocol Failed    12/2/2018  1:02 AM       Failed - Not on Clopidogrel (unless Pantoprazole ordered)       Passed - No diagnosis of osteoporosis on record       Passed - Recent (12 mo) or future (30 days) visit within the authorizing provider's specialty    Patient had office visit in the last 12 months or has a visit in the next 30 days with authorizing provider or within the authorizing provider's specialty.  See \"Patient Info\" tab in inbasket, or \"Choose Columns\" in Meds & Orders section of the refill encounter.         Passed - Patient is age 18 or older       Passed - No active pregnacy on record       Passed - No positive pregnancy test in past 12 months        metoprolol succinate ER (TOPROL-XL) 100 MG 24 hr tablet [Pharmacy Med Name: METOPROLOL SUCCINATE ER 100MG TB24] 30 tablet 5     Sig: TAKE ONE TABLET BY MOUTH ONCE DAILY    Beta-Blockers Protocol Passed    12/2/2018  1:02 AM       Passed - Blood pressure under 140/90 in past 12 months    BP Readings from Last 3 Encounters:   10/26/18 135/70   04/17/18 138/72   01/24/18 132/62          Passed - Patient is age 6 or older       Passed - Recent " "(12 mo) or future (30 days) visit within the authorizing provider's specialty    Patient had office visit in the last 12 months or has a visit in the next 30 days with authorizing provider or within the authorizing provider's specialty.  See \"Patient Info\" tab in inbasket, or \"Choose Columns\" in Meds & Orders section of the refill encounter.        Paulina Garrett RN  "

## 2018-12-15 DIAGNOSIS — E11.42 TYPE 2 DIABETES MELLITUS WITH DIABETIC POLYNEUROPATHY, WITHOUT LONG-TERM CURRENT USE OF INSULIN (H): ICD-10-CM

## 2018-12-18 RX ORDER — GLIPIZIDE 10 MG/1
TABLET ORAL
Qty: 120 TABLET | Refills: 0 | Status: SHIPPED | OUTPATIENT
Start: 2018-12-18 | End: 2019-01-16

## 2018-12-18 NOTE — TELEPHONE ENCOUNTER
"Requested Prescriptions   Pending Prescriptions Disp Refills     glipiZIDE (GLUCOTROL) 10 MG tablet [Pharmacy Med Name: GLIPIZIDE 10MG TABS] 120 tablet 4    Last Written Prescription Date:  7/20/18  Last Fill Quantity: 120,  # refills: 4   Last office visit: 11/16/2017 with prescribing provider:     Future Office Visit:     Sig: TAKE TWO TABLETS BY MOUTH TWICE A DAY BEFORE MEALS    Sulfonylurea Agents Failed - 12/15/2018  1:03 AM       Failed - Patient has a recent creatinine (normal) within the past 12 mos.    Recent Labs   Lab Test 10/26/18  1341   CR 1.20*          Passed - Blood pressure less than 140/90 in past 6 months    BP Readings from Last 3 Encounters:   10/26/18 135/70   04/17/18 138/72   01/24/18 132/62          Passed - Patient has documented LDL within the past 12 mos.    Recent Labs   Lab Test 10/26/18  1341   LDL 70          Passed - Patient has had a Microalbumin in the past 12 mos.    Recent Labs   Lab Test 10/26/18  1401   MICROL 149   UMALCR 247.92*          Passed - Patient has documented A1c within the specified period of time.    If HgbA1C is 8 or greater, it needs to be on file within the past 3 months.  If less than 8, must be on file within the past 6 months.     Recent Labs   Lab Test 10/26/18  1341   A1C 7.0*            Passed - Patient is age 18 or older       Passed - No active pregnancy on record       Passed - Patient has not had a positive pregnancy test within the past 12 mos.       Passed - Recent (6 mo) or future (30 days) visit within the authorizing provider's specialty    Patient had office visit in the last 6 months or has a visit in the next 30 days with authorizing provider or within the authorizing provider's specialty.  See \"Patient Info\" tab in inbasket, or \"Choose Columns\" in Meds & Orders section of the refill encounter.            Routing refill request to provider for review/approval because:  Labs out of range:  CR, UMALCR, A1C    T'd up 1 month for provider " review.    Mylene Mensah RN

## 2018-12-31 ENCOUNTER — TELEPHONE (OUTPATIENT)
Dept: INTERNAL MEDICINE | Facility: CLINIC | Age: 78
End: 2018-12-31

## 2018-12-31 DIAGNOSIS — E11.42 TYPE 2 DIABETES MELLITUS WITH DIABETIC POLYNEUROPATHY, WITHOUT LONG-TERM CURRENT USE OF INSULIN (H): Primary | ICD-10-CM

## 2018-12-31 NOTE — TELEPHONE ENCOUNTER
Reason for Call:  Medication or medication refill:    Do you use a [facility]?  Name of the pharmacy and phone number for the current request:  [facility] - [phone]    Name of the medication requested: Jardianes? She thought it was this med.    Other request: Pt states her insurance won't invokana anymore, needing a new prescription. She states she talked to you Dr. Holm about this. Please call and advise.     Can we leave a detailed message on this number? YES    Phone number patient can be reached at: Home number on file [phone] (home)    Best Time: anytime    Call taken on 12/31/2018 at 11:42 AM by Laura Abreu

## 2019-01-02 ENCOUNTER — TELEPHONE (OUTPATIENT)
Dept: INTERNAL MEDICINE | Facility: CLINIC | Age: 79
End: 2019-01-02

## 2019-01-02 NOTE — TELEPHONE ENCOUNTER
PA initiated by pharmacy via Cover  Meds  KEY: [id number]    Prior Authorization Retail Medication Request    Medication/Dose: empagliflozin (JARDIANCE) 25 MG TABS tablet  ICD code (if different than what is on RX):  Type 2 diabetes mellitus with diabetic polyneuropathy, without long-term current use of insulin (H) [E11.42]  Previously Tried and Failed:  Invokana  Rationale:  Insurance no longer covers    Insurance Name:  [employer]  Insurance ID:  [id number]      Pharmacy Information (if different than what is on RX)  [facility]  Phone:  [phone]

## 2019-01-04 NOTE — TELEPHONE ENCOUNTER
Central Prior Authorization Team   Phone: [phone]      PA Initiation    Medication: empagliflozin (JARDIANCE) 25 MG TABS tablet  Insurance Company: [employer] - Phone [phone] Fax [phone]  Pharmacy Filling the Rx: [facility] - [place], [place] - [address]  Filling Pharmacy Phone: [phone]  Filling Pharmacy Fax:    Start Date: 1/4/2019

## 2019-01-04 NOTE — TELEPHONE ENCOUNTER
Prior Authorization Not Needed per Insurance    Medication: empagliflozin (JARDIANCE) 25 MG TABS tablet  Insurance Company: [employer] - Phone [phone] Fax [phone]  Expected CoPay:      Pharmacy Filling the Rx: [facility] - [place], [place] - [address]  Pharmacy Notified: Yes  Patient Notified: Yes

## 2019-01-16 DIAGNOSIS — E11.42 TYPE 2 DIABETES MELLITUS WITH DIABETIC POLYNEUROPATHY, WITHOUT LONG-TERM CURRENT USE OF INSULIN (H): ICD-10-CM

## 2019-01-17 RX ORDER — GLIPIZIDE 10 MG/1
TABLET ORAL
Qty: 360 TABLET | Refills: 0 | Status: SHIPPED | OUTPATIENT
Start: 2019-01-17 | End: 2019-04-30

## 2019-01-17 NOTE — TELEPHONE ENCOUNTER
Routing refill request to provider for review/approval because:  Labs out of range:  Creatinine, Microalbumin    RAMESH Cross, RN  [facility]

## 2019-01-17 NOTE — TELEPHONE ENCOUNTER
"Requested Prescriptions   Pending Prescriptions Disp Refills     glipiZIDE (GLUCOTROL) 10 MG tablet [Pharmacy Med Name: GLIPIZIDE 10MG TABS] 120 tablet 0    Last Written Prescription Date:  12/18/18  Last Fill Quantity: 120,  # refills: 0   Last office visit: 11/16/2017 with prescribing provider:  10/26/18   Future Office Visit:     Sig: TAKE TWO TABLETS BY MOUTH TWICE A DAY BEFORE MEALS    Sulfonylurea Agents Failed - 1/16/2019  1:02 AM       Failed - Patient has a recent creatinine (normal) within the past 12 mos.    Recent Labs   Lab Test 10/26/18  1341   CR 1.20*            Passed - Blood pressure less than 140/90 in past 6 months    BP Readings from Last 3 Encounters:   10/26/18 135/70   04/17/18 138/72   01/24/18 132/62                Passed - Patient has documented LDL within the past 12 mos.    Recent Labs   Lab Test 10/26/18  1341   LDL 70            Passed - Patient has had a Microalbumin in the past 12 mos.    Recent Labs   Lab Test 10/26/18  1401   MICROL 149   UMALCR 247.92*            Passed - Patient has documented A1c within the specified period of time.    If HgbA1C is 8 or greater, it needs to be on file within the past 3 months.  If less than 8, must be on file within the past 6 months.     Recent Labs   Lab Test 10/26/18  1341   A1C 7.0*            Passed - Medication is active on med list       Passed - Patient is age 18 or older       Passed - No active pregnancy on record       Passed - Patient has not had a positive pregnancy test within the past 12 mos.       Passed - Recent (6 mo) or future (30 days) visit within the authorizing provider's specialty    Patient had office visit in the last 6 months or has a visit in the next 30 days with authorizing provider or within the authorizing provider's specialty.  See \"Patient Info\" tab in inbasket, or \"Choose Columns\" in Meds & Orders section of the refill encounter.            "

## 2019-01-22 ENCOUNTER — TELEPHONE (OUTPATIENT)
Dept: INTERNAL MEDICINE | Facility: CLINIC | Age: 79
End: 2019-01-22

## 2019-01-22 NOTE — TELEPHONE ENCOUNTER
Called and spoke with Eliza, she will be checking with [facility] pharmacy on status of medication.    Mimi Gutiérrez XRO/  [facility]

## 2019-01-22 NOTE — TELEPHONE ENCOUNTER
Eliza is calling to find out the status of her medication request, patient has 1 week left of her current medication. Please let patient know.

## 2019-01-24 DIAGNOSIS — E11.42 TYPE 2 DIABETES MELLITUS WITH DIABETIC POLYNEUROPATHY, WITHOUT LONG-TERM CURRENT USE OF INSULIN (H): ICD-10-CM

## 2019-01-24 RX ORDER — GABAPENTIN 300 MG/1
CAPSULE ORAL
Qty: 60 CAPSULE | Refills: 0 | Status: SHIPPED | OUTPATIENT
Start: 2019-01-24 | End: 2019-02-20

## 2019-01-24 NOTE — TELEPHONE ENCOUNTER
Gabapentin 300 MG       Last Written Prescription Date:  11/9/18  Last Fill Quantity: 60,   # refills: 0  Last Office Visit: 10/26/18  Future Office visit:       Routing refill request to provider for review/approval because:  Drug not on the G, P or [facility] refill protocol or controlled substance

## 2019-02-05 ENCOUNTER — TELEPHONE (OUTPATIENT)
Dept: INTERNAL MEDICINE | Facility: CLINIC | Age: 79
End: 2019-02-05

## 2019-02-05 NOTE — TELEPHONE ENCOUNTER
PA initiated by pharmacy via Cover my Meds  KEY: [id number]    Prior Authorization Retail Medication Request    Medication/Dose: INVOKANA 100 MG tablet  ICD code (if different than what is on RX):  Type 2 diabetes mellitus with diabetic polyneuropathy, without long-term current use of insulin (H) [E11.42]   Previously Tried and Failed:  na  Rationale:  na    Insurance Name:  PA initiated by pharmacy via Cover my Meds  Insurance ID:  na      Pharmacy Information (if different than what is on RX)  [facility]:  [facility]  Phone:  [phone]

## 2019-02-08 NOTE — TELEPHONE ENCOUNTER
CENTRAL PRIOR AUTHORIZATION  [phone]    PA Initiation    Medication: INVOKANA 100 MG tablet -  INITATED 02/08/2019  Insurance Company: [employer] - Phone [phone] Fax [phone]  Pharmacy Filling the Rx: [facility] - [place] - [address]  Filling Pharmacy Phone: [phone]  Filling Pharmacy Fax:    Start Date: 2/8/2019

## 2019-02-09 NOTE — TELEPHONE ENCOUNTER
Prior Authorization Approval    Authorization Effective Date: 12/30/2018  Authorization Expiration Date: 12/31/2019  Medication: INVOKANA 100 MG tablet - APPROVED 02/08/2019  Approved Dose/Quantity:   Reference #:     Insurance Company: [employer] - Phone [phone] Fax [phone]  Expected CoPay:       CoPay Card Available:      Foundation Assistance Needed:    Which Pharmacy is filling the prescription (Not needed for infusion/clinic administered): [facility] - [place], [place] - [facility] [address]  Pharmacy Notified: Yes  Patient Notified: Yes

## 2019-02-15 ENCOUNTER — OFFICE VISIT (OUTPATIENT)
Dept: FAMILY MEDICINE | Facility: OTHER | Age: 79
End: 2019-02-15
Payer: COMMERCIAL

## 2019-02-15 VITALS
SYSTOLIC BLOOD PRESSURE: 130 MMHG | OXYGEN SATURATION: 97 % | RESPIRATION RATE: 16 BRPM | DIASTOLIC BLOOD PRESSURE: 78 MMHG | BODY MASS INDEX: 25.8 KG/M2 | WEIGHT: 145.6 LBS | HEART RATE: 76 BPM | TEMPERATURE: 95.3 F | HEIGHT: 63 IN

## 2019-02-15 DIAGNOSIS — R45.89 DEPRESSED MOOD: ICD-10-CM

## 2019-02-15 DIAGNOSIS — B49 FUNGAL INFECTION: Primary | ICD-10-CM

## 2019-02-15 PROCEDURE — 99214 OFFICE O/P EST MOD 30 MIN: CPT | Performed by: PHYSICIAN ASSISTANT

## 2019-02-15 RX ORDER — TERBINAFINE HYDROCHLORIDE 250 MG/1
250 TABLET ORAL DAILY
Qty: 14 TABLET | Refills: 0 | Status: SHIPPED | OUTPATIENT
Start: 2019-02-15 | End: 2019-03-01

## 2019-02-15 ASSESSMENT — PAIN SCALES - GENERAL: PAINLEVEL: NO PAIN (0)

## 2019-02-15 ASSESSMENT — MIFFLIN-ST. JEOR: SCORE: 1113.53

## 2019-02-15 NOTE — PROGRESS NOTES
"  SUBJECTIVE:   Eliza Dubois is a 78 year old female who presents to clinic today for the following health issues:      Concern - check under right hand finger nails  Onset: 3 months    Description:   Check finger nails    Intensity: severe    Progression of Symptoms:  worsening    Accompanying Signs & Symptoms:  none    Previous history of similar problem:   no    Precipitating factors:   Worsened by: \" moist nails\"    Alleviating factors:  Improved by: nothing    Therapies Tried and outcome: fungal medication; slight relief    Patient is a 78 year old female who presents with suspected fungal infection under the nail of her right 4th finger. Patient has acrylic nails on and says that she frequently has these placed on, estimated monthly. Patient has noticed some small irritation and discharge from the nail. None today. History of neuropathy, but denies worsening numbness or tingling. Has been trying OTC topical antifungals without relief.     Patient's  passed one month ago due to aspiration during episode of vomiting. Per her report patient was hospitalized following a fall due to broken right hip. He underwent surgery and seemed to be recovering when he suddenly passed. Patient says that she has several family members and friends who have reached out to her, but that she does feel quite lonely. We discussed counseling to help process the grief. Offered [facility] or suggested inquiring with local churches. Patient will consider.     Problem list and histories reviewed & adjusted, as indicated.  Additional history: as documented    Reviewed and updated as needed this visit by clinical staff  Tobacco  Allergies  Meds  Med Hx  Surg Hx  Fam Hx  Soc Hx      Reviewed and updated as needed this visit by Provider         ROS:  Constitutional, HEENT, cardiovascular, pulmonary, gi and gu systems are negative, except as otherwise noted.    OBJECTIVE:     /78 (BP Location: Left arm, Patient " "Position: Chair, Cuff Size: Adult Large)   Pulse 76   Temp 95.3  F (35.2  C) (Oral)   Resp 16   Ht 1.607 m (5' 3.25\")   Wt 66 kg (145 lb 9.6 oz)   SpO2 97%   BMI 25.59 kg/m    Body mass index is 25.59 kg/m .  GENERAL: healthy, alert and no distress  RESP: lungs clear to auscultation - no rales, rhonchi or wheezes  CV: regular rate and rhythm, normal S1 S2, no S3 or S4, no murmur, click or rub, no peripheral edema and peripheral pulses strong  SKIN: patient has red acrylic nails on, small amount of dried discharge present under nail bed of right 4th digit  NEURO: Normal strength and tone, mentation intact and speech normal  PSYCH: mentation appears normal, affect normal/bright    Diagnostic Test Results:  none     ASSESSMENT/PLAN:     1. Fungal infection  Discussed with patient small risk for bradycardia if taken at the same time as metoprolol. Patient will take at opposite time of day and will call if adverse effects occur. Reviewed home care to reduce fungal infections and recommended removal of acrylic nails.   - terbinafine (LAMISIL) 250 MG tablet; Take 1 tablet (250 mg) by mouth daily for 14 days  Dispense: 14 tablet; Refill: 0    2. Depressed mood  Patient will consider counseling and will contact clinic if she would like to use the services available at [facility]. Alternative options include [facility] in [place] as well as services offered through [facility].     Follow up with clinic as needed or sooner if conditions change, worsen or fail to improve as expected.      Reyes Echavarria PA-C  [facility]    "

## 2019-02-15 NOTE — NURSING NOTE
Health Maintenance Due   Topic Date Due     ZOSTER IMMUNIZATION (1 of 2) 07/18/1990     EYE EXAM Q1 YEAR  05/01/2015     FALL RISK ASSESSMENT  12/26/2018     Serenity BASS LPN

## 2019-02-20 DIAGNOSIS — E11.42 TYPE 2 DIABETES MELLITUS WITH DIABETIC POLYNEUROPATHY, WITHOUT LONG-TERM CURRENT USE OF INSULIN (H): ICD-10-CM

## 2019-02-20 RX ORDER — GABAPENTIN 300 MG/1
CAPSULE ORAL
Qty: 60 CAPSULE | Refills: 0 | Status: SHIPPED | OUTPATIENT
Start: 2019-02-20 | End: 2019-03-21

## 2019-02-20 NOTE — TELEPHONE ENCOUNTER
Requested Prescriptions   Pending Prescriptions Disp Refills     gabapentin (NEURONTIN) 300 MG capsule [Pharmacy Med Name: GABAPENTIN 300MG CAPS] 60 capsule 0     Sig: TAKE 1-2 CAPSULES BY MOUTH IN THE EVENING    There is no refill protocol information for this order

## 2019-02-20 NOTE — TELEPHONE ENCOUNTER
gabapentin (NEURONTIN) 300 MG capsule      Last Written Prescription Date:  1/24/19  Last Fill Quantity: 60,   # refills: 0  Last Office Visit: 10/26/18  Future Office visit:       Routing refill request to provider for review/approval because:  Drug not on the [facility], [facility] or [facility] refill protocol or controlled substance

## 2019-03-01 DIAGNOSIS — I10 HYPERTENSION GOAL BP (BLOOD PRESSURE) < 140/90: ICD-10-CM

## 2019-03-01 DIAGNOSIS — K21.9 GASTROESOPHAGEAL REFLUX DISEASE WITHOUT ESOPHAGITIS: ICD-10-CM

## 2019-03-04 RX ORDER — LISINOPRIL AND HYDROCHLOROTHIAZIDE 20; 25 MG/1; MG/1
TABLET ORAL
Qty: 90 TABLET | Refills: 0 | Status: SHIPPED | OUTPATIENT
Start: 2019-03-04 | End: 2019-06-02

## 2019-03-04 RX ORDER — OMEPRAZOLE 40 MG/1
CAPSULE, DELAYED RELEASE ORAL
Qty: 90 CAPSULE | Refills: 1 | Status: SHIPPED | OUTPATIENT
Start: 2019-03-04 | End: 2019-09-24

## 2019-03-04 NOTE — TELEPHONE ENCOUNTER
"Last Written Prescription Date:  11/23/18  Last Fill Quantity: 90,  # refills: 0   Last office visit: 10/26/2017 with prescribing provider:  Milad Wright   Future Office Visit:      Requested Prescriptions   Pending Prescriptions Disp Refills     lisinopril-hydrochlorothiazide (PRINZIDE/ZESTORETIC) 20-25 MG tablet [Pharmacy Med Name: LISINOPRIL-HYDROCHLOROTH 20-25 TABS] 90 tablet 0     Sig: TAKE ONE TABLET BY MOUTH ONCE DAILY    Diuretics (Including Combos) Protocol Failed - 3/1/2019  1:01 AM       Failed - Normal serum creatinine on file in past 12 months    Recent Labs   Lab Test 10/26/18  1341   CR 1.20*             Failed - Normal serum sodium on file in past 12 months    Recent Labs   Lab Test 10/26/18  1341   *             Passed - Blood pressure under 140/90 in past 12 months    BP Readings from Last 3 Encounters:   02/15/19 130/78   10/26/18 135/70   04/17/18 138/72                Passed - Recent (12 mo) or future (30 days) visit within the authorizing provider's specialty    Patient had office visit in the last 12 months or has a visit in the next 30 days with authorizing provider or within the authorizing provider's specialty.  See \"Patient Info\" tab in inbasket, or \"Choose Columns\" in Meds & Orders section of the refill encounter.             Passed - Medication is active on med list       Passed - Patient is age 18 or older       Passed - No active pregancy on record       Passed - Normal serum potassium on file in past 12 months    Recent Labs   Lab Test 10/26/18  1341   POTASSIUM 4.3                   Passed - No positive pregnancy test in past 12 months        Routing refill request to provider for review/approval because:  Labs out of range:  CR, NA    Regla Gutiérrez, RN on 3/4/2019 at 9:34 AM          "

## 2019-03-04 NOTE — TELEPHONE ENCOUNTER
"Requested Prescriptions   Pending Prescriptions Disp Refills     omeprazole (PRILOSEC) 40 MG DR capsule [Pharmacy Med Name: OMEPRAZOLE 40MG CPDR] 90 capsule 1    Last Written Prescription Date:  12/4/18  Last Fill Quantity: 90,  # refills: 1   Last office visit: 10/26/18 with prescribing provider:     Future Office Visit:     Sig: TAKE ONE CAPSULE BY MOUTH TWICE A DAY AS NEEDED    PPI Protocol Failed - 3/1/2019  1:01 AM       Failed - Not on Clopidogrel (unless Pantoprazole ordered)       Passed - No diagnosis of osteoporosis on record       Passed - Recent (12 mo) or future (30 days) visit within the authorizing provider's specialty    Patient had office visit in the last 12 months or has a visit in the next 30 days with authorizing provider or within the authorizing provider's specialty.  See \"Patient Info\" tab in inbasket, or \"Choose Columns\" in Meds & Orders section of the refill encounter.             Passed - Medication is active on med list       Passed - Patient is age 18 or older       Passed - No active pregnacy on record       Passed - No positive pregnancy test in past 12 months        Routing refill request to provider for review/approval because:  Per RN protocol, Failed as stated above    T'd up refill request for provider review.    Mylnee Mensah RN          "

## 2019-03-21 DIAGNOSIS — E11.42 TYPE 2 DIABETES MELLITUS WITH DIABETIC POLYNEUROPATHY, WITHOUT LONG-TERM CURRENT USE OF INSULIN (H): ICD-10-CM

## 2019-03-21 RX ORDER — GABAPENTIN 300 MG/1
CAPSULE ORAL
Qty: 60 CAPSULE | Refills: 0 | Status: SHIPPED | OUTPATIENT
Start: 2019-03-21 | End: 2019-04-30

## 2019-03-21 NOTE — TELEPHONE ENCOUNTER
Gabapentin 300 MG       Last Written Prescription Date:  2/20/19  Last Fill Quantity: 60,   # refills: 0  Last Office Visit: 10/26/18  Future Office visit:       Routing refill request to provider for review/approval because:  Drug not on the [facility], [facility] or [facility] refill protocol or controlled substance

## 2019-04-30 ENCOUNTER — TELEPHONE (OUTPATIENT)
Dept: FAMILY MEDICINE | Facility: OTHER | Age: 79
End: 2019-04-30

## 2019-04-30 DIAGNOSIS — E11.42 TYPE 2 DIABETES MELLITUS WITH DIABETIC POLYNEUROPATHY, WITHOUT LONG-TERM CURRENT USE OF INSULIN (H): ICD-10-CM

## 2019-04-30 DIAGNOSIS — F33.1 MAJOR DEPRESSIVE DISORDER, RECURRENT EPISODE, MODERATE (H): ICD-10-CM

## 2019-04-30 RX ORDER — PAROXETINE 40 MG/1
TABLET, FILM COATED ORAL
Qty: 30 TABLET | Refills: 5 | Status: SHIPPED | OUTPATIENT
Start: 2019-04-30 | End: 2019-12-04

## 2019-04-30 RX ORDER — METFORMIN HCL 500 MG
TABLET, EXTENDED RELEASE 24 HR ORAL
Qty: 120 TABLET | Refills: 5 | Status: SHIPPED | OUTPATIENT
Start: 2019-04-30 | End: 2019-11-16

## 2019-04-30 RX ORDER — GABAPENTIN 300 MG/1
CAPSULE ORAL
Qty: 60 CAPSULE | Refills: 0 | Status: SHIPPED | OUTPATIENT
Start: 2019-04-30 | End: 2019-05-26

## 2019-04-30 RX ORDER — GLIPIZIDE 10 MG/1
TABLET ORAL
Qty: 360 TABLET | Refills: 0 | Status: SHIPPED | OUTPATIENT
Start: 2019-04-30 | End: 2019-07-08

## 2019-05-15 ENCOUNTER — OFFICE VISIT (OUTPATIENT)
Dept: FAMILY MEDICINE | Facility: OTHER | Age: 79
End: 2019-05-15
Payer: COMMERCIAL

## 2019-05-15 DIAGNOSIS — E11.42 TYPE 2 DIABETES MELLITUS WITH DIABETIC POLYNEUROPATHY, WITHOUT LONG-TERM CURRENT USE OF INSULIN (H): ICD-10-CM

## 2019-05-15 DIAGNOSIS — Z13.89 SCREENING FOR DIABETIC PERIPHERAL NEUROPATHY: Primary | ICD-10-CM

## 2019-05-15 DIAGNOSIS — I10 HYPERTENSION GOAL BP (BLOOD PRESSURE) < 140/90: ICD-10-CM

## 2019-05-15 DIAGNOSIS — N18.30 CKD (CHRONIC KIDNEY DISEASE) STAGE 3, GFR 30-59 ML/MIN (H): ICD-10-CM

## 2019-05-15 LAB
ANION GAP SERPL CALCULATED.3IONS-SCNC: 10 MMOL/L (ref 3–14)
BUN SERPL-MCNC: 33 MG/DL (ref 7–30)
CALCIUM SERPL-MCNC: 8.2 MG/DL (ref 8.5–10.1)
CHLORIDE SERPL-SCNC: 107 MMOL/L (ref 94–109)
CO2 SERPL-SCNC: 27 MMOL/L (ref 20–32)
CREAT SERPL-MCNC: 1.31 MG/DL (ref 0.52–1.04)
GFR SERPL CREATININE-BSD FRML MDRD: 39 ML/MIN/{1.73_M2}
GLUCOSE SERPL-MCNC: 233 MG/DL (ref 70–99)
HBA1C MFR BLD: 7.6 % (ref 0–5.6)
POTASSIUM SERPL-SCNC: 5 MMOL/L (ref 3.4–5.3)
SODIUM SERPL-SCNC: 144 MMOL/L (ref 133–144)

## 2019-05-15 PROCEDURE — 99207 C FOOT EXAM  NO CHARGE: CPT | Performed by: INTERNAL MEDICINE

## 2019-05-15 PROCEDURE — 80048 BASIC METABOLIC PNL TOTAL CA: CPT | Performed by: INTERNAL MEDICINE

## 2019-05-15 PROCEDURE — 83036 HEMOGLOBIN GLYCOSYLATED A1C: CPT | Performed by: INTERNAL MEDICINE

## 2019-05-15 PROCEDURE — 36415 COLL VENOUS BLD VENIPUNCTURE: CPT | Performed by: INTERNAL MEDICINE

## 2019-05-15 PROCEDURE — 99214 OFFICE O/P EST MOD 30 MIN: CPT | Performed by: INTERNAL MEDICINE

## 2019-05-15 ASSESSMENT — PAIN SCALES - GENERAL: PAINLEVEL: NO PAIN (0)

## 2019-05-15 ASSESSMENT — PATIENT HEALTH QUESTIONNAIRE - PHQ9: SUM OF ALL RESPONSES TO PHQ QUESTIONS 1-9: 6

## 2019-05-15 NOTE — LETTER
May 30, 2019      Eliza Dubois  [address]  [facility] [address]        Dear ,    We are writing to inform you of your test results.    The chemistry panel shows an elevated blood sugar of 233 with decreased kidney function.   Hemoglobin A1c is up slightly at 7.6.  This is up from the previous 7.0 only 7 months ago.     Feel free to contact me via the office or My Chart if you have any questions regarding the above.     Resulted Orders   HEMOGLOBIN A1C   Result Value Ref Range    Hemoglobin A1C 7.6 (H) 0 - 5.6 %      Comment:      Normal <5.7% Prediabetes 5.7-6.4%  Diabetes 6.5% or higher - adopted from ADA   consensus guidelines.     Basic metabolic panel   Result Value Ref Range    Sodium 144 133 - 144 mmol/L    Potassium 5.0 3.4 - 5.3 mmol/L    Chloride 107 94 - 109 mmol/L    Carbon Dioxide 27 20 - 32 mmol/L    Anion Gap 10 3 - 14 mmol/L    Glucose 233 (H) 70 - 99 mg/dL    Urea Nitrogen 33 (H) 7 - 30 mg/dL    Creatinine 1.31 (H) 0.52 - 1.04 mg/dL    GFR Estimate 39 (L) >60 mL/min/[1.73_m2]      Comment:      Non  GFR Calc  Starting 12/18/2018, serum creatinine based estimated GFR (eGFR) will be   calculated using the Chronic Kidney Disease Epidemiology Collaboration   (CKD-EPI) equation.      GFR Estimate If Black 45 (L) >60 mL/min/[1.73_m2]      Comment:       GFR Calc  Starting 12/18/2018, serum creatinine based estimated GFR (eGFR) will be   calculated using the Chronic Kidney Disease Epidemiology Collaboration   (CKD-EPI) equation.      Calcium 8.2 (L) 8.5 - 10.1 mg/dL       If you have any questions or concerns, please call the clinic at the number listed above.       Sincerely,        Byron Holm, DO

## 2019-05-15 NOTE — PROGRESS NOTES
SUBJECTIVE:   Eliza Dubois is a 78 year old female who presents to clinic today for the following   health issues:      Diabetes Follow-up    Patient is checking blood sugars: once daily.  Results are a little high    Diabetic concerns: None     Symptoms of hypoglycemia (low blood sugar): none     Paresthesias (numbness or burning in feet) or sores: Yes      Date of last diabetic eye exam: Dec 2018.     BP Readings from Last 2 Encounters:   05/15/19 158/80   02/15/19 130/78     Hemoglobin A1C (%)   Date Value   10/26/2018 7.0 (H)   04/17/2018 6.6 (H)     LDL Cholesterol Calculated (mg/dL)   Date Value   04/05/2017     Cannot estimate LDL when triglyceride exceeds 400 mg/dL   01/28/2016 40     LDL Cholesterol Direct (mg/dL)   Date Value   10/26/2018 70   04/17/2018 77       Diabetes Management Resources                        Chief Complaint         The patient is a pleasant 78-year-old female who presents today for follow-up of her type 2 diabetes.  She has a history of some mild neuropathy for which she takes gabapentin and has good results.  She has been controlling the blood sugars with diet, metformin, and glipizide.  She is also on Jardiance and this seems to be causing no problems with polyuria or recurrent yeast infection.  She is appropriately on an ACE inhibitor as well as aspirin and statin.  She is having no side effects from the medications including cough, muscle pain, GI bleeding etc.                       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     Surgical History:   has a past surgical history that includes NONSPECIFIC PROCEDURE (1988); LAPAROSCOPY,  SURGICAL; CHOLECYSTECTOMY (1997); colonoscopy (04/15/09); Colonoscopy (6/18/2014); Abdomen surgery; Phacoemulsification clear cornea with standard intraocular lens implant (Right, 3/16/2016); and Phacoemulsification clear cornea with standard intraocular lens implant (Left, 3/30/2016).     Social History:   reports that she has never smoked. She has never used smokeless tobacco. She reports that she does not drink alcohol or use drugs.     Family History:  family history includes Arthritis in her mother; Cancer in her maternal grandmother and another family member; Diabetes in her father and mother; EYE* in her mother; Genitourinary Problems in her father; Heart Disease in her father, maternal grandmother, and mother; Hypertension in her mother; Lipids in her mother; Neurologic Disorder in her mother; Obesity in her mother; Osteoporosis in her mother.            MEDICATIONS  Current Outpatient Medications   Medication Sig Dispense Refill     ASPIRIN 81 MG PO TABS Take 1 tablet by mouth once daily  11     atorvastatin (LIPITOR) 80 MG tablet Take 80 mg by mouth daily       blood glucose monitoring (ACCU-CHEK COMPACT PLUS) test strip USE TO CHECK BLOOD SUGARS TWO TIMES A DAY OR AS DIRECTED.  Appointment needed for additional refills. 102 each 0     blood glucose monitoring (ACCU-CHEK MULTICLIX) lancets Use to test blood sugar 2 times daily or as directed. 1 Box 11     Clopidogrel Bisulfate (PLAVIX PO) Take 75 mg by mouth daily        empagliflozin (JARDIANCE) 25 MG TABS tablet Take 1 tablet (25 mg) by mouth daily 90 tablet 3     fluticasone (FLONASE) 50 MCG/ACT spray INSTILL ONE TO TWO SPRAYS INTO BOTH NOSTRILS DAILY 16 g 8     gabapentin (NEURONTIN) 300 MG capsule TAKE ONE TO TWO CAPSULES BY MOUTH EVERY EVENING 60 capsule 0     glipiZIDE (GLUCOTROL) 10 MG tablet TAKE TWO TABLETS BY MOUTH TWICE A DAY BEFORE MEALS 360 tablet 0     Isosorbide Mononitrate (IMDUR PO) Take 30 mg by mouth daily       LANsoprazole (PREVACID)  30 MG CR capsule Take 1 capsule (30 mg) by mouth daily Take 30-60 minutes before a meal. 90 capsule 0     lisinopril-hydrochlorothiazide (PRINZIDE/ZESTORETIC) 20-25 MG tablet TAKE ONE TABLET BY MOUTH ONCE DAILY 90 tablet 0     metFORMIN (GLUCOPHAGE-XR) 500 MG 24 hr tablet TAKE TWO TABLETS BY MOUTH TWICE A DAY WITH MEALS 120 tablet 5     metoprolol succinate ER (TOPROL-XL) 100 MG 24 hr tablet TAKE ONE TABLET BY MOUTH ONCE DAILY 30 tablet 10     omeprazole (PRILOSEC) 40 MG DR capsule TAKE ONE CAPSULE BY MOUTH TWICE A DAY AS NEEDED 90 capsule 1     PARoxetine (PAXIL) 40 MG tablet TAKE ONE TABLET BY MOUTH EVERY MORNING 30 tablet 5     meclizine (ANTIVERT) 25 MG tablet Take 1 tablet (25 mg) by mouth every 6 hours as needed for dizziness (Patient not taking: Reported on 2/15/2019) 30 tablet 1     MELATONIN PO Take 3 mg by mouth nightly as needed            --------------------------------------------------------------------------------------------------------------------                              Review of Systems       LUNGS: Pt denies: cough, excess sputum, hemoptysis, or shortness of breath.   HEART: Pt denies: chest pain, arrhythmia, syncope, tachy or bradyarrhythmia.   GI: Pt denies: nausea, vomiting, diarrhea, constipation, melena, or hematochezia.   NEURO: Pt denies: seizures, strokes, diplopia, weakness, paraesthesias, or paralysis.   SKIN: Pt denies: itching, rashes, discoloration, or specific lesions of concern. Denies recent hair loss.   PSYCH: The patient denies significant depression, anxiety, mood imbalance. Specifically denies any suicidal ideation.                                     Examination    /80 (BP Location: Left arm, Patient Position: Sitting, Cuff Size: Adult Regular)   Pulse 68   Temp 97.4  F (36.3  C) (Temporal)   Resp 18   Wt 65.5 kg (144 lb 4.8 oz)   SpO2 94%   BMI 25.36 kg/m       Constitutional: The patient appears to be in no acute distress. The patient appears to be  adequately hydrated. No acute respiratory or hemodynamic distress is noted at this time.   LUNGS: clear bilaterally, airflow is brisk, no intercostal retraction or stridor is noted. No coughing is noted during visit.   HEART:  regular without rubs, clicks, gallops, or murmurs. PMI is nondisplaced. Upstrokes are brisk. S1,S2 are heard.   GI: Abdomen is soft, without rebound, guarding or tenderness. Bowel sounds are appropriate. No renal bruits are heard.   NEURO: Pt is alert and appropriate. No neurologic lateralization is noted. Cranial nerves 2-12 are intact. Peripheral sensory and motor function are grossly normal.    SKIN:  warm and dry. No erythema, or rashes are noted. No specific lesions of concern are noted.    PSYCH: The patient appears grossly appropriate. Maintains good eye contact, does not have any jittery or atypical motion. Displays appropriate affect.   Feet: no evidence of skin breakdown or ulceration is noted.  Sensation is intact to monofilament and vibration.  Pulses are strong, capillary refill is brisk.                                        Decision Making    1. Type 2 diabetes mellitus with diabetic polyneuropathy, without long-term current use of insulin (H)  Check A1c and renal function  - HEMOGLOBIN A1C    2. Screening for diabetic peripheral neuropathy  Done  - FOOT EXAM  NO CHARGE [64776.114]    3. Hypertension goal BP (blood pressure) < 140/90  Continue current medication  - Basic metabolic panel    4. CKD (chronic kidney disease) stage 3, GFR 30-59 ml/min (H)  Blood pressure and blood sugar management                             FOLLOW UP   I have asked the patient to make an appointment for followup with me in 4 months or as needed        I have carefully explained the diagnosis and treatment options to the patient.  The patient has displayed an understanding of the above, and all subsequent questions were answered.      DO GEMA Campos    Portions of this note were  produced using Dragon [employer] 360  Although every attempt at real-time proof reading has been made, occasional grammar/syntax errors may have been missed.

## 2019-05-26 DIAGNOSIS — E11.42 TYPE 2 DIABETES MELLITUS WITH DIABETIC POLYNEUROPATHY, WITHOUT LONG-TERM CURRENT USE OF INSULIN (H): ICD-10-CM

## 2019-05-27 VITALS
SYSTOLIC BLOOD PRESSURE: 138 MMHG | BODY MASS INDEX: 25.36 KG/M2 | WEIGHT: 144.3 LBS | TEMPERATURE: 97.4 F | RESPIRATION RATE: 18 BRPM | DIASTOLIC BLOOD PRESSURE: 80 MMHG | OXYGEN SATURATION: 94 % | HEART RATE: 68 BPM

## 2019-05-27 PROBLEM — N18.30 CKD (CHRONIC KIDNEY DISEASE) STAGE 3, GFR 30-59 ML/MIN (H): Status: ACTIVE | Noted: 2019-05-27

## 2019-05-28 RX ORDER — GABAPENTIN 300 MG/1
CAPSULE ORAL
Qty: 60 CAPSULE | Refills: 0 | Status: SHIPPED | OUTPATIENT
Start: 2019-05-28 | End: 2019-09-21

## 2019-05-28 NOTE — TELEPHONE ENCOUNTER
Requested Prescriptions   Pending Prescriptions Disp Refills     gabapentin (NEURONTIN) 300 MG capsule [Pharmacy Med Name: GABAPENTIN 300MG CAPS] 60 capsule 0     Sig: TAKE 1 TO 2 CAPSULES BY MOUTH IN THE EVENING       There is no refill protocol information for this order        Last Written Prescription Date:  4/30/2019  Last Fill Quantity: 60,  # refills:    Last office visit: 5/15/2019 with prescribing provider:     Future Office Visit:      Routing refill request to provider for review/approval because:  Drug not on the [facility] refill protocol     Mylene Mensah RN

## 2019-05-30 NOTE — RESULT ENCOUNTER NOTE
Dear Eliza, your recent test results are attached.  The chemistry panel shows an elevated blood sugar of 233 with decreased kidney function.  Hemoglobin A1c is up slightly at 7.6.  This is up from the previous 7.0 only 7 months ago.    Feel free to contact me via the office or My Chart if you have any questions regarding the above.

## 2019-06-02 DIAGNOSIS — I10 HYPERTENSION GOAL BP (BLOOD PRESSURE) < 140/90: ICD-10-CM

## 2019-06-03 RX ORDER — LISINOPRIL AND HYDROCHLOROTHIAZIDE 20; 25 MG/1; MG/1
TABLET ORAL
Qty: 90 TABLET | Refills: 1 | Status: SHIPPED | OUTPATIENT
Start: 2019-06-03 | End: 2019-12-04

## 2019-06-03 NOTE — TELEPHONE ENCOUNTER
Routing refill request to provider for review/approval because:  Labs out of range:  Creatinine    CHARLES CrossN, RN  [facility]

## 2019-06-03 NOTE — TELEPHONE ENCOUNTER
"Requested Prescriptions   Pending Prescriptions Disp Refills     lisinopril-hydrochlorothiazide (PRINZIDE/ZESTORETIC) 20-25 MG tablet [Pharmacy Med Name: LISINOPRIL-HYDROCHLOROTH 20-25 TABS] 90 tablet 0     Sig: TAKE ONE TABLET BY MOUTH ONCE DAILY   Last Written Prescription Date:  3/4/19  Last Fill Quantity: 90,  # refills: 0   Last office visit: 11/16/2017 with prescribing provider:  5/15/19   Future Office Visit:        Diuretics (Including Combos) Protocol Failed - 6/2/2019  1:05 AM        Failed - Normal serum creatinine on file in past 12 months     Recent Labs   Lab Test 05/15/19  1132   CR 1.31*              Passed - Blood pressure under 140/90 in past 12 months     BP Readings from Last 3 Encounters:   05/15/19 138/80   02/15/19 130/78   10/26/18 135/70                 Passed - Recent (12 mo) or future (30 days) visit within the authorizing provider's specialty     Patient had office visit in the last 12 months or has a visit in the next 30 days with authorizing provider or within the authorizing provider's specialty.  See \"Patient Info\" tab in inbasket, or \"Choose Columns\" in Meds & Orders section of the refill encounter.              Passed - Medication is active on med list        Passed - Patient is age 18 or older        Passed - No active pregancy on record        Passed - Normal serum potassium on file in past 12 months     Recent Labs   Lab Test 05/15/19  1132   POTASSIUM 5.0                    Passed - Normal serum sodium on file in past 12 months     Recent Labs   Lab Test 05/15/19  1132                 Passed - No positive pregnancy test in past 12 months        "

## 2019-07-08 DIAGNOSIS — E11.42 TYPE 2 DIABETES MELLITUS WITH DIABETIC POLYNEUROPATHY, WITHOUT LONG-TERM CURRENT USE OF INSULIN (H): ICD-10-CM

## 2019-07-08 RX ORDER — GLIPIZIDE 10 MG/1
TABLET ORAL
Qty: 360 TABLET | Refills: 0 | Status: SHIPPED | OUTPATIENT
Start: 2019-07-08 | End: 2019-09-28

## 2019-07-08 NOTE — TELEPHONE ENCOUNTER
"Requested Prescriptions   Pending Prescriptions Disp Refills     glipiZIDE (GLUCOTROL) 10 MG tablet [Pharmacy Med Name: GLIPIZIDE 10MG TABS] 360 tablet 0     Sig: TAKE TWO TABLETS BY MOUTH TWICE A DAY BEFORE MEALS   Last Written Prescription Date:  4/30/19  Last Fill Quantity: 360,  # refills: 0   Last office visit: 5/15/2019 with prescribing provider:  5/15/19   Future Office Visit:        Sulfonylurea Agents Failed - 7/8/2019  1:04 AM        Failed - Patient has a recent creatinine (normal) within the past 12 mos.     Recent Labs   Lab Test 05/15/19  1132   CR 1.31*             Passed - Blood pressure less than 140/90 in past 6 months     BP Readings from Last 3 Encounters:   05/15/19 138/80   02/15/19 130/78   10/26/18 135/70                 Passed - Patient has documented LDL within the past 12 mos.     Recent Labs   Lab Test 10/26/18  1341   LDL 70             Passed - Patient has had a Microalbumin in the past 15 mos.     Recent Labs   Lab Test 10/26/18  1401   MICROL 149   UMALCR 247.92*             Passed - Patient has documented A1c within the specified period of time.     If HgbA1C is 8 or greater, it needs to be on file within the past 3 months.  If less than 8, must be on file within the past 6 months.     Recent Labs   Lab Test 05/15/19  1132   A1C 7.6*             Passed - Medication is active on med list        Passed - Patient is age 18 or older        Passed - No active pregnancy on record        Passed - Patient has not had a positive pregnancy test within the past 12 mos.        Passed - Recent (6 mo) or future (30 days) visit within the authorizing provider's specialty     Patient had office visit in the last 6 months or has a visit in the next 30 days with authorizing provider or within the authorizing provider's specialty.  See \"Patient Info\" tab in inbasket, or \"Choose Columns\" in Meds & Orders section of the refill encounter.            "

## 2019-07-08 NOTE — TELEPHONE ENCOUNTER
Routing refill request to provider for review/approval because:  Labs out of range:  Creatinine    CHARLES CrossN, RN  [facility]

## 2019-07-10 DIAGNOSIS — E11.42 TYPE 2 DIABETES MELLITUS WITH DIABETIC POLYNEUROPATHY, WITHOUT LONG-TERM CURRENT USE OF INSULIN (H): ICD-10-CM

## 2019-07-11 RX ORDER — GLIPIZIDE 10 MG/1
TABLET ORAL
Qty: 360 TABLET | Refills: 0 | OUTPATIENT
Start: 2019-07-11

## 2019-07-11 NOTE — TELEPHONE ENCOUNTER
Prescription was sent 7/8/19 for #360 with 0 refills.  Pharmacy notified via E-Prescribe refusal.     Sheri Francois RN  [facility]

## 2019-07-11 NOTE — TELEPHONE ENCOUNTER
"Requested Prescriptions   Pending Prescriptions Disp Refills     glipiZIDE (GLUCOTROL) 10 MG tablet [Pharmacy Med Name: GLIPIZIDE 10MG TABS] 360 tablet 0     Sig: TAKE TWO TABLETS BY MOUTH TWICE A DAY BEFORE MEALS   Last Written Prescription Date:  7/8/19  Last Fill Quantity: 360,  # refills: 0   Last office visit: 5/15/2019 with prescribing provider:  5/15/19   Future Office Visit:        Sulfonylurea Agents Failed - 7/10/2019  1:05 AM        Failed - Patient has a recent creatinine (normal) within the past 12 mos.     Recent Labs   Lab Test 05/15/19  1132   CR 1.31*             Passed - Blood pressure less than 140/90 in past 6 months     BP Readings from Last 3 Encounters:   05/15/19 138/80   02/15/19 130/78   10/26/18 135/70                 Passed - Patient has documented LDL within the past 12 mos.     Recent Labs   Lab Test 10/26/18  1341   LDL 70             Passed - Patient has had a Microalbumin in the past 15 mos.     Recent Labs   Lab Test 10/26/18  1401   MICROL 149   UMALCR 247.92*             Passed - Patient has documented A1c within the specified period of time.     If HgbA1C is 8 or greater, it needs to be on file within the past 3 months.  If less than 8, must be on file within the past 6 months.     Recent Labs   Lab Test 05/15/19  1132   A1C 7.6*             Passed - Medication is active on med list        Passed - Patient is age 18 or older        Passed - No active pregnancy on record        Passed - Patient has not had a positive pregnancy test within the past 12 mos.        Passed - Recent (6 mo) or future (30 days) visit within the authorizing provider's specialty     Patient had office visit in the last 6 months or has a visit in the next 30 days with authorizing provider or within the authorizing provider's specialty.  See \"Patient Info\" tab in inbasket, or \"Choose Columns\" in Meds & Orders section of the refill encounter.            "

## 2019-09-21 DIAGNOSIS — E11.42 TYPE 2 DIABETES MELLITUS WITH DIABETIC POLYNEUROPATHY, WITHOUT LONG-TERM CURRENT USE OF INSULIN (H): ICD-10-CM

## 2019-09-23 RX ORDER — GABAPENTIN 300 MG/1
CAPSULE ORAL
Qty: 60 CAPSULE | Refills: 0 | Status: SHIPPED | OUTPATIENT
Start: 2019-09-23 | End: 2019-12-07

## 2019-09-23 NOTE — TELEPHONE ENCOUNTER
Requested Prescriptions   Pending Prescriptions Disp Refills     gabapentin (NEURONTIN) 300 MG capsule [Pharmacy Med Name: GABAPENTIN 300MG CAPS] 60 capsule 0     Sig: TAKE 1 TO 2 CAPSULES BY MOUTH IN THE EVENING       There is no refill protocol information for this order

## 2019-09-23 NOTE — TELEPHONE ENCOUNTER
gabapentin (NEURONTIN) 300 MG capsule      Last Written Prescription Date:  5/28/19  Last Fill Quantity: 60,   # refills: 0  Last Office Visit: 5/15/19  Future Office visit:       Routing refill request to provider for review/approval because:  Drug not on the [facility], [facility] or [facility] refill protocol or controlled substance

## 2019-09-24 DIAGNOSIS — K21.9 GASTROESOPHAGEAL REFLUX DISEASE WITHOUT ESOPHAGITIS: ICD-10-CM

## 2019-09-24 RX ORDER — OMEPRAZOLE 40 MG/1
CAPSULE, DELAYED RELEASE ORAL
Qty: 90 CAPSULE | Refills: 0 | Status: SHIPPED | OUTPATIENT
Start: 2019-09-24 | End: 2019-11-21

## 2019-09-24 NOTE — TELEPHONE ENCOUNTER
"Omeprazole  Last Written Prescription Date:  03/04/2019  Last Fill Quantity: 90,  # refills: 1   Last office visit: 05/15/2019 with prescribing provider:  Mihai   Future Office Visit:  None  Routing refill request to provider for review/approval because:  Patient has Plavix on med list.     Requested Prescriptions   Pending Prescriptions Disp Refills     omeprazole (PRILOSEC) 40 MG DR capsule [Pharmacy Med Name: OMEPRAZOLE 40MG CPDR] 90 capsule 1     Sig: TAKE ONE CAPSULE BY MOUTH TWICE A DAY AS NEEDED       PPI Protocol Failed - 9/24/2019  1:05 AM        Failed - Not on Clopidogrel (unless Pantoprazole ordered)        Passed - No diagnosis of osteoporosis on record        Passed - Recent (12 mo) or future (30 days) visit within the authorizing provider's specialty     Patient had office visit in the last 12 months or has a visit in the next 30 days with authorizing provider or within the authorizing provider's specialty.  See \"Patient Info\" tab in inbasket, or \"Choose Columns\" in Meds & Orders section of the refill encounter.          Passed - Medication is active on med list        Passed - Patient is age 18 or older        Passed - No active pregnacy on record        Passed - No positive pregnancy test in past 12 months      Paulina Garrett RN   "

## 2019-09-28 DIAGNOSIS — E11.42 TYPE 2 DIABETES MELLITUS WITH DIABETIC POLYNEUROPATHY, WITHOUT LONG-TERM CURRENT USE OF INSULIN (H): ICD-10-CM

## 2019-09-30 RX ORDER — GLIPIZIDE 10 MG/1
TABLET ORAL
Qty: 360 TABLET | Refills: 0 | Status: SHIPPED | OUTPATIENT
Start: 2019-09-30 | End: 2020-01-06

## 2019-09-30 NOTE — TELEPHONE ENCOUNTER
"Last Written Prescription Date:  7/8/19  Last Fill Quantity: 360,  # refills: 0   Last office visit: 5/15/2019 with prescribing provider:  Byron Holm   Future Office Visit:      Requested Prescriptions   Pending Prescriptions Disp Refills     glipiZIDE (GLUCOTROL) 10 MG tablet [Pharmacy Med Name: GLIPIZIDE 10MG TABS] 360 tablet 0     Sig: TAKE TWO TABLETS BY MOUTH TWICE A DAY BEFORE MEALS       Sulfonylurea Agents Failed - 9/28/2019  1:03 AM        Failed - Patient has a recent creatinine (normal) within the past 12 mos.     Recent Labs   Lab Test 05/15/19  1132   CR 1.31*             Passed - Blood pressure less than 140/90 in past 6 months     BP Readings from Last 3 Encounters:   05/15/19 138/80   02/15/19 130/78   10/26/18 135/70                 Passed - Patient has documented LDL within the past 12 mos.     Recent Labs   Lab Test 10/26/18  1341   LDL 70             Passed - Patient has had a Microalbumin in the past 15 mos.     Recent Labs   Lab Test 10/26/18  1401   MICROL 149   UMALCR 247.92*             Passed - Patient has documented A1c within the specified period of time.     If HgbA1C is 8 or greater, it needs to be on file within the past 3 months.  If less than 8, must be on file within the past 6 months.     Recent Labs   Lab Test 05/15/19  1132   A1C 7.6*             Passed - Medication is active on med list        Passed - Patient is age 18 or older        Passed - No active pregnancy on record        Passed - Patient has not had a positive pregnancy test within the past 12 mos.        Passed - Recent (6 mo) or future (30 days) visit within the authorizing provider's specialty     Patient had office visit in the last 6 months or has a visit in the next 30 days with authorizing provider or within the authorizing provider's specialty.  See \"Patient Info\" tab in inbasket, or \"Choose Columns\" in Meds & Orders section of the refill encounter.            Routing refill request to provider for " review/approval because:  Labs out of range:  CESAR Gutiérrez RN on 9/30/2019 at 8:50 AM

## 2019-10-31 ENCOUNTER — OFFICE VISIT (OUTPATIENT)
Dept: FAMILY MEDICINE | Facility: OTHER | Age: 79
End: 2019-10-31
Payer: COMMERCIAL

## 2019-10-31 VITALS
HEIGHT: 63 IN | SYSTOLIC BLOOD PRESSURE: 128 MMHG | RESPIRATION RATE: 16 BRPM | BODY MASS INDEX: 25.07 KG/M2 | WEIGHT: 141.5 LBS | OXYGEN SATURATION: 97 % | HEART RATE: 72 BPM | DIASTOLIC BLOOD PRESSURE: 62 MMHG | TEMPERATURE: 98.1 F

## 2019-10-31 DIAGNOSIS — Z00.00 MEDICARE ANNUAL WELLNESS VISIT, INITIAL: ICD-10-CM

## 2019-10-31 DIAGNOSIS — R82.90 NONSPECIFIC FINDING ON EXAMINATION OF URINE: ICD-10-CM

## 2019-10-31 DIAGNOSIS — E11.42 TYPE 2 DIABETES MELLITUS WITH DIABETIC POLYNEUROPATHY, WITHOUT LONG-TERM CURRENT USE OF INSULIN (H): Primary | ICD-10-CM

## 2019-10-31 DIAGNOSIS — I10 ESSENTIAL HYPERTENSION WITH GOAL BLOOD PRESSURE LESS THAN 140/90: ICD-10-CM

## 2019-10-31 DIAGNOSIS — Z23 FLU VACCINE NEED: ICD-10-CM

## 2019-10-31 DIAGNOSIS — E78.5 HYPERLIPIDEMIA LDL GOAL <100: ICD-10-CM

## 2019-10-31 DIAGNOSIS — I25.10 CORONARY ARTERY DISEASE INVOLVING NATIVE CORONARY ARTERY OF NATIVE HEART WITHOUT ANGINA PECTORIS: ICD-10-CM

## 2019-10-31 LAB
ALBUMIN UR-MCNC: 30 MG/DL
ANION GAP SERPL CALCULATED.3IONS-SCNC: 8 MMOL/L (ref 3–14)
APPEARANCE UR: CLEAR
BACTERIA #/AREA URNS HPF: ABNORMAL /HPF
BILIRUB UR QL STRIP: NEGATIVE
BUN SERPL-MCNC: 37 MG/DL (ref 7–30)
CALCIUM SERPL-MCNC: 8 MG/DL (ref 8.5–10.1)
CHLORIDE SERPL-SCNC: 105 MMOL/L (ref 94–109)
CHOLEST SERPL-MCNC: 157 MG/DL
CO2 SERPL-SCNC: 27 MMOL/L (ref 20–32)
COLOR UR AUTO: YELLOW
CREAT SERPL-MCNC: 1.59 MG/DL (ref 0.52–1.04)
CREAT UR-MCNC: 88 MG/DL
GFR SERPL CREATININE-BSD FRML MDRD: 31 ML/MIN/{1.73_M2}
GLUCOSE SERPL-MCNC: 151 MG/DL (ref 70–99)
GLUCOSE UR STRIP-MCNC: 500 MG/DL
HBA1C MFR BLD: 7.9 % (ref 0–5.6)
HDLC SERPL-MCNC: 50 MG/DL
HGB UR QL STRIP: ABNORMAL
KETONES UR STRIP-MCNC: ABNORMAL MG/DL
LDLC SERPL CALC-MCNC: 44 MG/DL
LEUKOCYTE ESTERASE UR QL STRIP: ABNORMAL
MICROALBUMIN UR-MCNC: 84 MG/L
MICROALBUMIN/CREAT UR: 96.02 MG/G CR (ref 0–25)
NITRATE UR QL: POSITIVE
NON-SQ EPI CELLS #/AREA URNS LPF: ABNORMAL /LPF
NONHDLC SERPL-MCNC: 107 MG/DL
PH UR STRIP: 5.5 PH (ref 5–7)
POTASSIUM SERPL-SCNC: 5 MMOL/L (ref 3.4–5.3)
RBC #/AREA URNS AUTO: ABNORMAL /HPF
SODIUM SERPL-SCNC: 140 MMOL/L (ref 133–144)
SOURCE: ABNORMAL
SP GR UR STRIP: 1.02 (ref 1–1.03)
TRIGL SERPL-MCNC: 317 MG/DL
UROBILINOGEN UR STRIP-ACNC: 0.2 EU/DL (ref 0.2–1)
WBC #/AREA URNS AUTO: ABNORMAL /HPF

## 2019-10-31 PROCEDURE — 80048 BASIC METABOLIC PNL TOTAL CA: CPT | Performed by: INTERNAL MEDICINE

## 2019-10-31 PROCEDURE — 99397 PER PM REEVAL EST PAT 65+ YR: CPT | Mod: 25 | Performed by: INTERNAL MEDICINE

## 2019-10-31 PROCEDURE — G0008 ADMIN INFLUENZA VIRUS VAC: HCPCS | Performed by: INTERNAL MEDICINE

## 2019-10-31 PROCEDURE — 82043 UR ALBUMIN QUANTITATIVE: CPT | Performed by: INTERNAL MEDICINE

## 2019-10-31 PROCEDURE — 83036 HEMOGLOBIN GLYCOSYLATED A1C: CPT | Performed by: INTERNAL MEDICINE

## 2019-10-31 PROCEDURE — 87186 SC STD MICRODIL/AGAR DIL: CPT | Performed by: INTERNAL MEDICINE

## 2019-10-31 PROCEDURE — 36415 COLL VENOUS BLD VENIPUNCTURE: CPT | Performed by: INTERNAL MEDICINE

## 2019-10-31 PROCEDURE — 81001 URINALYSIS AUTO W/SCOPE: CPT | Performed by: INTERNAL MEDICINE

## 2019-10-31 PROCEDURE — 87086 URINE CULTURE/COLONY COUNT: CPT | Performed by: INTERNAL MEDICINE

## 2019-10-31 PROCEDURE — 90662 IIV NO PRSV INCREASED AG IM: CPT | Performed by: INTERNAL MEDICINE

## 2019-10-31 PROCEDURE — 87088 URINE BACTERIA CULTURE: CPT | Performed by: INTERNAL MEDICINE

## 2019-10-31 PROCEDURE — 80061 LIPID PANEL: CPT | Performed by: INTERNAL MEDICINE

## 2019-10-31 RX ORDER — LANCETS
EACH MISCELLANEOUS
Qty: 100 EACH | Refills: 6 | Status: SHIPPED | OUTPATIENT
Start: 2019-10-31 | End: 2024-01-12

## 2019-10-31 RX ORDER — GLUCOSAMINE HCL/CHONDROITIN SU 500-400 MG
CAPSULE ORAL
Qty: 100 EACH | Refills: 3 | Status: SHIPPED | OUTPATIENT
Start: 2019-10-31 | End: 2023-12-12

## 2019-10-31 ASSESSMENT — MIFFLIN-ST. JEOR: SCORE: 1089.14

## 2019-10-31 ASSESSMENT — PAIN SCALES - GENERAL: PAINLEVEL: NO PAIN (0)

## 2019-10-31 ASSESSMENT — ACTIVITIES OF DAILY LIVING (ADL): CURRENT_FUNCTION: NO ASSISTANCE NEEDED

## 2019-10-31 NOTE — PROGRESS NOTES
"SUBJECTIVE:   Eliza Dubois is a 79 year old female who presents for Preventive Visit.    Are you in the first 12 months of your Medicare coverage?  No    Healthy Habits:    In general, how would you rate your overall health?  Fair    Frequency of exercise:  4-5 days/week    Duration of exercise:  Other    Do you usually eat at least 4 servings of fruit and vegetables a day, include whole grains    & fiber and avoid regularly eating high fat or \"junk\" foods?  Yes    Taking medications regularly:  Yes    Barriers to taking medications:  None    Medication side effects:  None    Ability to successfully perform activities of daily living:  No assistance needed    Home Safety:  No safety concerns identified    Hearing Impairment:  No hearing concerns    In the past 6 months, have you been bothered by leaking of urine? Yes    In general, how would you rate your overall mental or emotional health?  Good      PHQ-2 Total Score:    Additional concerns today:  No    Do you feel safe in your environment? Yes    Have you ever done Advance Care Planning? (For example, a Health Directive, POLST, or a discussion with a medical provider about your wishes): No, advance care planning information given to patient to review.  Patient plans to discuss their wishes with loved ones or provider.        Fall risk  Fallen 2 or more times in the past year?: No  Any fall with injury in the past year?: No    Cognitive Screening   1) Repeat 3 items (Leader, Season, Table)    2) Clock draw: NORMAL  3) 3 item recall: Recalls 3 objects  Results: 3 items recalled: COGNITIVE IMPAIRMENT LESS LIKELY    Mini-CogTM Copyright SHELBY Tanner. Licensed by the author for use in [facility]; reprinted with permission ([email or web address]). All rights reserved.      Do you have sleep apnea, excessive snoring or daytime drowsiness?: no    Reviewed and updated as needed this visit by clinical staff  Tobacco  Allergies  Meds  Med Hx  Surg Hx  Fam Hx  " Soc Hx        Reviewed and updated as needed this visit by Provider        Social History     Tobacco Use     Smoking status: Never Smoker     Smokeless tobacco: Never Used   Substance Use Topics     Alcohol use: No     Alcohol/week: 0.0 standard drinks         No flowsheet data found.        -------------------------------------    Current providers sharing in care for this patient include:   Patient Care Team:  yBron Holm DO as PCP - General (Internal Medicine)  Byron Holm DO as Assigned PCP    The following health maintenance items are reviewed in Epic and correct as of today:  Health Maintenance   Topic Date Due     ZOSTER IMMUNIZATION (1 of 2) 07/18/1990     MEDICARE ANNUAL WELLNESS VISIT  12/07/2008     EYE EXAM  05/01/2015     LIPID  04/05/2018     INFLUENZA VACCINE (1) 09/01/2019     DTAP/TDAP/TD IMMUNIZATION (2 - Td) 09/23/2019     MICROALBUMIN  10/26/2019     PHQ-9  11/15/2019     A1C  11/15/2019     FALL RISK ASSESSMENT  02/15/2020     TSH W/FREE T4 REFLEX  04/17/2020     BMP  05/15/2020     DIABETIC FOOT EXAM  05/15/2020     ADVANCE CARE PLANNING  11/06/2022     DEXA  Completed     DEPRESSION ACTION PLAN  Completed     PNEUMOCOCCAL IMMUNIZATION 65+ LOW/MEDIUM RISK  Completed     IPV IMMUNIZATION  Aged Out     MENINGITIS IMMUNIZATION  Aged Out     Lab work is in process  Labs reviewed in EPIC  BP Readings from Last 3 Encounters:   10/31/19 128/62   05/15/19 138/80   02/15/19 130/78    Wt Readings from Last 3 Encounters:   10/31/19 64.2 kg (141 lb 8 oz)   05/15/19 65.5 kg (144 lb 4.8 oz)   02/15/19 66 kg (145 lb 9.6 oz)                  Patient Active Problem List   Diagnosis     Irritable bowel syndrome     Esophageal reflux     HYPERLIPIDEMIA LDL GOAL <100     Hypertension goal BP (blood pressure) < 140/90     Advanced directives, counseling/discussion     Insomnia     Anxiety     Major depressive disorder, recurrent episode, moderate (H)     Coronary artery disease  involving native coronary artery of native heart without angina pectoris     Gastroesophageal reflux disease without esophagitis     Essential hypertension with goal blood pressure less than 140/90     Type 2 diabetes mellitus with diabetic polyneuropathy, without long-term current use of insulin (H)     CKD (chronic kidney disease) stage 3, GFR 30-59 ml/min (H)     Past Surgical History:   Procedure Laterality Date     ABDOMEN SURGERY       C NONSPECIFIC PROCEDURE  1988    Hysterectomy     COLONOSCOPY  04/15/09     COLONOSCOPY  6/18/2014    Procedure: COMBINED COLONOSCOPY, SINGLE BIOPSY/POLYPECTOMY BY BIOPSY;  Surgeon: Earle Mon MD;  Location: [facility] LAPAROSCOPY, SURGICAL; CHOLECYSTECTOMY  1997    Cholecystectomy, Laparoscopic     PHACOEMULSIFICATION CLEAR CORNEA WITH STANDARD INTRAOCULAR LENS IMPLANT Right 3/16/2016    Procedure: PHACOEMULSIFICATION CLEAR CORNEA WITH STANDARD INTRAOCULAR LENS IMPLANT;  Surgeon: George Lemus MD;  Location: [facility]     PHACOEMULSIFICATION CLEAR CORNEA WITH STANDARD INTRAOCULAR LENS IMPLANT Left 3/30/2016    Procedure: PHACOEMULSIFICATION CLEAR CORNEA WITH STANDARD INTRAOCULAR LENS IMPLANT;  Surgeon: George Lemus MD;  Location: [facility]       Social History     Tobacco Use     Smoking status: Never Smoker     Smokeless tobacco: Never Used   Substance Use Topics     Alcohol use: No     Alcohol/week: 0.0 standard drinks     Family History   Problem Relation Age of Onset     Diabetes Mother      Arthritis Mother      Heart Disease Mother      Hypertension Mother      Lipids Mother      Neurologic Disorder Mother         neuropathy     Osteoporosis Mother      Obesity Mother      EYE* Mother         blind     Diabetes Father      Genitourinary Problems Father         gall stones     Heart Disease Father         MI     Cancer Maternal Grandmother      Heart Disease Maternal Grandmother      Cancer Other         Grandparents     Alcohol/Drug No family hx of       Alzheimer Disease No family hx of      Anesthesia Reaction No family hx of      Blood Disease No family hx of      Depression No family hx of      Genetic Disorder No family hx of      Gastrointestinal Disease No family hx of      Gynecology No family hx of      Psychotic Disorder No family hx of      Respiratory No family hx of      Cerebrovascular Disease No family hx of          Current Outpatient Medications   Medication Sig Dispense Refill     alcohol swab prep pads Use to swab area of injection/sarah as directed. 100 each 3     ASPIRIN 81 MG PO TABS Take 1 tablet by mouth once daily  11     atorvastatin (LIPITOR) 80 MG tablet Take 80 mg by mouth daily       blood glucose (NO BRAND SPECIFIED) test strip Use to test blood sugar 2 times daily or as directed. To accompany: Blood Glucose Monitor Brands: per insurance. 100 strip 6     blood glucose calibration (NO BRAND SPECIFIED) solution To accompany: Blood Glucose Monitor Brands: per insurance. 1 Bottle 3     blood glucose monitoring (ACCU-CHEK COMPACT PLUS) test strip USE TO CHECK BLOOD SUGARS TWO TIMES A DAY OR AS DIRECTED.  Appointment needed for additional refills. 102 each 0     blood glucose monitoring (ACCU-CHEK MULTICLIX) lancets Use to test blood sugar 2 times daily or as directed. 1 Box 11     blood glucose monitoring (NO BRAND SPECIFIED) meter device kit Use to test blood sugar 2 times daily or as directed. Preferred blood glucose meter OR supplies to accompany: Blood Glucose Monitor Brands: per insurance. 1 kit 0     Clopidogrel Bisulfate (PLAVIX PO) Take 75 mg by mouth daily        empagliflozin (JARDIANCE) 25 MG TABS tablet Take 1 tablet (25 mg) by mouth daily 90 tablet 3     fluticasone (FLONASE) 50 MCG/ACT spray INSTILL ONE TO TWO SPRAYS INTO BOTH NOSTRILS DAILY 16 g 8     glipiZIDE (GLUCOTROL) 10 MG tablet TAKE TWO TABLETS BY MOUTH TWICE A DAY BEFORE MEALS 360 tablet 0     Isosorbide Mononitrate (IMDUR PO) Take 30 mg by mouth daily        LANsoprazole (PREVACID) 30 MG CR capsule Take 1 capsule (30 mg) by mouth daily Take 30-60 minutes before a meal. 90 capsule 0     MELATONIN PO Take 3 mg by mouth nightly as needed        thin (NO BRAND SPECIFIED) lancets Use with lanceting device. To accompany: Blood Glucose Monitor Brands: per insurance. 100 each 6     gabapentin (NEURONTIN) 300 MG capsule TAKE ONE TO TWO CAPSULES BY MOUTH EVERY EVENING 60 capsule 0     lisinopril-hydrochlorothiazide (PRINZIDE/ZESTORETIC) 20-25 MG tablet TAKE ONE TABLET BY MOUTH ONCE DAILY 90 tablet 1     meclizine (ANTIVERT) 25 MG tablet Take 1 tablet (25 mg) by mouth every 6 hours as needed for dizziness (Patient not taking: Reported on 2/15/2019) 30 tablet 1     metFORMIN (GLUCOPHAGE-XR) 500 MG 24 hr tablet TAKE TWO TABLETS BY MOUTH TWICE A DAY WITH MEALS 120 tablet 0     metoprolol succinate ER (TOPROL-XL) 100 MG 24 hr tablet TAKE ONE TABLET BY MOUTH ONCE DAILY 30 tablet 8     omeprazole (PRILOSEC) 40 MG DR capsule TAKE ONE CAPSULE BY MOUTH TWICE A DAY AS NEEDED 90 capsule 3     PARoxetine (PAXIL) 40 MG tablet TAKE ONE TABLET BY MOUTH EVERY MORNING 30 tablet 5     Allergies   Allergen Reactions     Sulfa Drugs Hives     Recent Labs   Lab Test 10/31/19  1449 05/15/19  1132 10/26/18  1341 04/17/18  1436  11/03/17  0828 04/05/17  0821  01/28/16  0831  06/09/15  0740  05/23/14  0802 03/14/13  0745   A1C 7.9* 7.6* 7.0* 6.6*   < >  --  8.0*   < >  --    < > 7.9*   < > 8.9* 7.7*   LDL 44  --  70 77  --   --  Cannot estimate LDL when triglyceride exceeds 400 mg/dL  81  --  40  --  40  --  69 44   HDL 50  --   --   --   --   --  43*  --  36*  --  37*  --  30* 35*   TRIG 317*  --   --   --   --  404* 462*  --  362*  --  386*  --  315* 358*   ALT  --   --   --   --   --   --  24  --   --   --   --   --  42 55*   CR 1.59* 1.31* 1.20*  --    < >  --  1.21*   < > 1.13*   < > 1.03  --  0.87 1.00   GFRESTIMATED 31* 39* 43*  --    < >  --  43*   < > 47*   < > 52*  --  64 55*   GFRESTBLACK 35*  "45* 53*  --    < >  --  52*   < > 57*   < > 63  --  77 66   POTASSIUM 5.0 5.0 4.3  --    < >  --  4.4   < > 4.3   < > 4.1  --  4.1 4.0   TSH  --   --   --  1.47  --   --   --   --   --   --  2.78   < >  --  3.91    < > = values in this interval not displayed.      Pneumonia Vaccine:Adults age 65+ who received Pneumovax (PPSV23) at 65 years or older: Should be given PCV13 > 1 year after their most recent PPSV23  Mammogram Screening: Patient over age 75, has elected to stop mammography screening.    Review of Systems  CONSTITUTIONAL: NEGATIVE for fever, chills, change in weight  INTEGUMENTARY/SKIN: NEGATIVE for worrisome rashes, moles or lesions  EYES: NEGATIVE for vision changes or irritation  ENT/MOUTH: NEGATIVE for ear, mouth and throat problems  RESP: NEGATIVE for significant cough or SOB  CV: NEGATIVE for chest pain, palpitations or peripheral edema  GI: NEGATIVE for nausea, abdominal pain, heartburn, or change in bowel habits  : NEGATIVE for frequency, dysuria, or hematuria  MUSCULOSKELETAL: NEGATIVE for significant arthralgias or myalgia  NEURO: NEGATIVE for weakness, dizziness or paresthesias  ENDOCRINE: NEGATIVE for temperature intolerance, skin/hair changes  HEME: NEGATIVE for bleeding problems  PSYCHIATRIC: NEGATIVE for changes in mood or affect    OBJECTIVE:   /62 (BP Location: Left arm, Patient Position: Sitting, Cuff Size: Adult Regular)   Pulse 72   Temp 98.1  F (36.7  C) (Temporal)   Resp 16   Ht 1.605 m (5' 3.2\")   Wt 64.2 kg (141 lb 8 oz)   SpO2 97%   BMI 24.91 kg/m   Estimated body mass index is 24.91 kg/m  as calculated from the following:    Height as of this encounter: 1.605 m (5' 3.2\").    Weight as of this encounter: 64.2 kg (141 lb 8 oz).  Physical Exam  GENERAL: healthy, alert and no distress  EYES: Eyes grossly normal to inspection, PERRL and conjunctivae and sclerae normal  HENT: ear canals and TM's normal, nose and mouth without ulcers or lesions  NECK: no adenopathy, no " asymmetry, masses, or scars and thyroid normal to palpation  RESP: lungs clear to auscultation - no rales, rhonchi or wheezes  CV: regular rate and rhythm, normal S1 S2, no S3 or S4, no murmur, click or rub, no peripheral edema and peripheral pulses strong  ABDOMEN: soft, nontender, no hepatosplenomegaly, no masses and bowel sounds normal  MS: no gross musculoskeletal defects noted, no edema  SKIN: no suspicious lesions or rashes  NEURO: Normal strength and tone, mentation intact and speech normal  PSYCH: mentation appears normal, affect normal/bright    Diagnostic Test Results:  Results for orders placed or performed in visit on 10/31/19   Lipid panel reflex to direct LDL Non-fasting     Status: Abnormal   Result Value Ref Range    Cholesterol 157 <200 mg/dL    Triglycerides 317 (H) <150 mg/dL    HDL Cholesterol 50 >49 mg/dL    LDL Cholesterol Calculated 44 <100 mg/dL    Non HDL Cholesterol 107 <130 mg/dL   Albumin Random Urine Quantitative with Creat Ratio     Status: Abnormal   Result Value Ref Range    Creatinine Urine 88 mg/dL    Albumin Urine mg/L 84 mg/L    Albumin Urine mg/g Cr 96.02 (H) 0 - 25 mg/g Cr   Hemoglobin A1c     Status: Abnormal   Result Value Ref Range    Hemoglobin A1C 7.9 (H) 0 - 5.6 %   Basic metabolic panel     Status: Abnormal   Result Value Ref Range    Sodium 140 133 - 144 mmol/L    Potassium 5.0 3.4 - 5.3 mmol/L    Chloride 105 94 - 109 mmol/L    Carbon Dioxide 27 20 - 32 mmol/L    Anion Gap 8 3 - 14 mmol/L    Glucose 151 (H) 70 - 99 mg/dL    Urea Nitrogen 37 (H) 7 - 30 mg/dL    Creatinine 1.59 (H) 0.52 - 1.04 mg/dL    GFR Estimate 31 (L) >60 mL/min/[1.73_m2]    GFR Estimate If Black 35 (L) >60 mL/min/[1.73_m2]    Calcium 8.0 (L) 8.5 - 10.1 mg/dL   *UA reflex to Microscopic and Culture ([facility] (except [place] and [place])     Status: Abnormal   Result Value Ref Range    Color Urine Yellow     Appearance Urine Clear     Glucose Urine 500 (A) NEG^Negative mg/dL     Bilirubin Urine Negative NEG^Negative    Ketones Urine Trace (A) NEG^Negative mg/dL    Specific Gravity Urine 1.020 1.003 - 1.035    Blood Urine Small (A) NEG^Negative    pH Urine 5.5 5.0 - 7.0 pH    Protein Albumin Urine 30 (A) NEG^Negative mg/dL    Urobilinogen Urine 0.2 0.2 - 1.0 EU/dL    Nitrite Urine Positive (A) NEG^Negative    Leukocyte Esterase Urine Small (A) NEG^Negative    Source Midstream Urine    Urine Microscopic     Status: Abnormal   Result Value Ref Range    WBC Urine 10-25 (A) OTO5^0 - 5 /HPF    RBC Urine 2-5 (A) OTO2^O - 2 /HPF    Squamous Epithelial /LPF Urine Few FEW^Few /LPF    Bacteria Urine Moderate (A) NEG^Negative /HPF   Urine Culture Aerobic Bacterial     Status: Abnormal   Result Value Ref Range    Specimen Description Midstream Urine     Culture Micro >100,000 colonies/mL  Escherichia coli   (A)        Susceptibility    Escherichia coli - LACEY     AMPICILLIN <=2 Sensitive ug/mL     CEFAZOLIN* <=4 Sensitive ug/mL      * Cefazolin LACEY breakpoints are for the treatment of uncomplicated urinary tract infections.  For the treatment of systemic infections, please contact the laboratory for additional testing.     CEFOXITIN <=4 Sensitive ug/mL     CEFTAZIDIME <=1 Sensitive ug/mL     CEFTRIAXONE <=1 Sensitive ug/mL     CIPROFLOXACIN <=0.25 Sensitive ug/mL     GENTAMICIN <=1 Sensitive ug/mL     LEVOFLOXACIN <=0.12 Sensitive ug/mL     NITROFURANTOIN <=16 Sensitive ug/mL     TOBRAMYCIN <=1 Sensitive ug/mL     Trimethoprim/Sulfa <=1/19 Sensitive ug/mL     AMPICILLIN/SULBACTAM <=2 Sensitive ug/mL     Piperacillin/Tazo <=4 Sensitive ug/mL     CEFEPIME <=1 Sensitive ug/mL       ASSESSMENT / PLAN:       ICD-10-CM    1. Type 2 diabetes mellitus with diabetic polyneuropathy, without long-term current use of insulin (H) E11.42 Albumin Random Urine Quantitative with Creat Ratio     blood glucose monitoring (NO BRAND SPECIFIED) meter device kit     blood glucose (NO BRAND SPECIFIED) test strip     blood  "glucose calibration (NO BRAND SPECIFIED) solution     thin (NO BRAND SPECIFIED) lancets     alcohol swab prep pads     Hemoglobin A1c     Basic metabolic panel     *UA reflex to Microscopic and Culture ([facility] (except [place] and [place])     Urine Microscopic   2. Medicare annual wellness visit, initial Z00.00    3. Coronary artery disease involving native coronary artery of native heart without angina pectoris I25.10    4. Hyperlipidemia LDL goal <100 E78.5 Lipid panel reflex to direct LDL Non-fasting   5. Flu vaccine need Z23 INFLUENZA (HIGH DOSE) 3 VALENT VACCINE [98154]   6. Essential hypertension with goal blood pressure less than 140/90 I10    7. Nonspecific finding on examination of urine R82.90 Urine Culture Aerobic Bacterial       COUNSELING:  Reviewed preventive health counseling, as reflected in patient instructions       Regular exercise       Healthy diet/nutrition       Vision screening       Hearing screening       Dental care       Bladder control       Osteoporosis Prevention/Bone Health       Colon cancer screening    Estimated body mass index is 24.91 kg/m  as calculated from the following:    Height as of this encounter: 1.605 m (5' 3.2\").    Weight as of this encounter: 64.2 kg (141 lb 8 oz).         reports that she has never smoked. She has never used smokeless tobacco.      Appropriate preventive services were discussed with this patient, including applicable screening as appropriate for cardiovascular disease, diabetes, osteopenia/osteoporosis, and glaucoma.  As appropriate for age/gender, discussed screening for colorectal cancer, prostate cancer, breast cancer, and cervical cancer. Checklist reviewing preventive services available has been given to the patient.    Reviewed patients plan of care and provided an AVS. The Basic Care Plan (routine screening as documented in Health Maintenance) for Eliza meets the Care Plan requirement. This Care Plan has been " established and reviewed with the Patient.    Counseling Resources:  ATP IV Guidelines  Pooled Cohorts Equation Calculator  Breast Cancer Risk Calculator  FRAX Risk Assessment  ICSI Preventive Guidelines  Dietary Guidelines for Americans, 2010  USDA's MyPlate  ASA Prophylaxis  Lung CA Screening    Byron Holm DO  [facility]    Identified Health Risks:

## 2019-11-01 LAB
BACTERIA SPEC CULT: ABNORMAL
SPECIMEN SOURCE: ABNORMAL

## 2019-11-16 DIAGNOSIS — E11.42 TYPE 2 DIABETES MELLITUS WITH DIABETIC POLYNEUROPATHY, WITHOUT LONG-TERM CURRENT USE OF INSULIN (H): ICD-10-CM

## 2019-11-16 DIAGNOSIS — I10 HYPERTENSION GOAL BP (BLOOD PRESSURE) < 140/90: ICD-10-CM

## 2019-11-18 RX ORDER — METFORMIN HCL 500 MG
TABLET, EXTENDED RELEASE 24 HR ORAL
Qty: 120 TABLET | Refills: 0 | Status: SHIPPED | OUTPATIENT
Start: 2019-11-18 | End: 2019-12-18

## 2019-11-18 RX ORDER — EMPAGLIFLOZIN 25 MG/1
TABLET, FILM COATED ORAL
Qty: 90 TABLET | Refills: 3 | OUTPATIENT
Start: 2019-11-18

## 2019-11-18 RX ORDER — METOPROLOL SUCCINATE 100 MG/1
TABLET, EXTENDED RELEASE ORAL
Qty: 30 TABLET | Refills: 8 | Status: SHIPPED | OUTPATIENT
Start: 2019-11-18 | End: 2020-08-19

## 2019-11-18 NOTE — TELEPHONE ENCOUNTER
metFORMIN (GLUCOPHAGE-XR) 500 MG 24 hr tablet [Pharmacy Med Name: METFORMIN HCL ER 500MG TB24] 120 tablet 5    Sig: TAKE TWO TABLETS BY MOUTH TWICE A DAY WITH MEALS     Failed - Patient's CR is NOT>1.4 OR Patient's EGFR is NOT<45 within past 12 mos.   Recent Labs   Lab Test 10/31/19  1449   GFRESTIMATED 31*   GFRESTBLACK 35*     Recent Labs   Lab Test 10/31/19  1449   CR 1.59*      Routing refill request to provider for review/approval because:  Labs out of range:  CR  T'd up 1 month for provider review.      Mylene Mensah RN

## 2019-11-18 NOTE — TELEPHONE ENCOUNTER
"Requested Prescriptions   Pending Prescriptions Disp Refills     metFORMIN (GLUCOPHAGE-XR) 500 MG 24 hr tablet [Pharmacy Med Name: METFORMIN HCL ER 500MG TB24] 120 tablet 5     Sig: TAKE TWO TABLETS BY MOUTH TWICE A DAY WITH MEALS   Last Written Prescription Date:  4/30/2019  Last Fill Quantity: 120,  # refills: 5   Last office visit: 10/31/2019 with prescribing provider:     Future Office Visit:        Biguanide Agents Failed - 11/16/2019  1:15 AM        Failed - Patient's CR is NOT>1.4 OR Patient's EGFR is NOT<45 within past 12 mos.     Recent Labs   Lab Test 10/31/19  1449   GFRESTIMATED 31*   GFRESTBLACK 35*     Recent Labs   Lab Test 10/31/19  1449   CR 1.59*           Passed - Blood pressure less than 140/90 in past 6 months     BP Readings from Last 3 Encounters:   10/31/19 128/62   05/15/19 138/80   02/15/19 130/78           Passed - Patient has documented LDL within the past 12 mos.     Recent Labs   Lab Test 10/31/19  1449   LDL 44             Passed - Patient has had a Microalbumin in the past 15 mos.     Recent Labs   Lab Test 10/31/19  1513   MICROL 84   UMALCR 96.02*           Passed - Patient is age 10 or older        Passed - Patient has documented A1c within the specified period of time.     If HgbA1C is 8 or greater, it needs to be on file within the past 3 months.  If less than 8, must be on file within the past 6 months.     Recent Labs   Lab Test 10/31/19  1449   A1C 7.9*           Passed - Patient does NOT have a diagnosis of CHF.        Passed - Medication is active on med list        Passed - Patient is not pregnant        Passed - Patient has not had a positive pregnancy test within the past 12 mos.         Passed - Recent (6 mo) or future (30 days) visit within the authorizing provider's specialty     Patient had office visit in the last 6 months or has a visit in the next 30 days with authorizing provider or within the authorizing provider's specialty.  See \"Patient Info\" tab in inbasket, " "or \"Choose Columns\" in Meds & Orders section of the refill encounter.       Routing refill request to provider for review/approval        JARDIANCE 25 MG TABS tablet [Pharmacy Med Name: JARDIANCE 25MG TABS] 90 tablet 3     Sig: TAKE ONE TABLET BY MOUTH ONCE DAILY   Last Written Prescription Date:  12/31/2018  Last Fill Quantity: 90,  # refills: 3   Last office visit: 10/31/2019 with prescribing provider:     Future Office Visit:        Sodium Glucose Co-Transport Inhibitor Agents Failed - 11/16/2019  1:15 AM        Failed - No creatinine >1.4 or GFR <45 within the past 12 mos     Recent Labs   Lab Test 10/31/19  1449   GFRESTIMATED 31*   GFRESTBLACK 35*       Recent Labs   Lab Test 10/31/19  1449   CR 1.59*           Passed - Blood pressure less than 140/90 in past 6 months     BP Readings from Last 3 Encounters:   10/31/19 128/62   05/15/19 138/80   02/15/19 130/78             Passed - Patient has documented LDL within the past 12 mos.     Recent Labs   Lab Test 10/31/19  1449   LDL 44           Passed - Patient has had a Microalbumin in the past 15 mos.     Recent Labs   Lab Test 10/31/19  1513   MICROL 84   UMALCR 96.02*           Passed - Patient has documented A1c within the specified period of time.     If HgbA1C is 8 or greater, it needs to be on file within the past 3 months.  If less than 8, must be on file within the past 6 months.     Recent Labs   Lab Test 10/31/19  1449   A1C 7.9*           Passed - Medication is active on med list        Passed - Patient is age 18 or older        Passed - Patient is not pregnant        Passed - Patient has documented normal Potassium within the last 12 mos.     Recent Labs   Lab Test 10/31/19  1449   POTASSIUM 5.0           Passed - Patient has no positive pregnancy test within the past 12 mos.        Passed - Recent (6 mo) or future (30 days) visit within the authorizing provider's specialty     Patient had office visit in the last 6 months or has a visit in the next " "30 days with authorizing provider or within the authorizing provider's specialty.  See \"Patient Info\" tab in inbasket, or \"Choose Columns\" in Meds & Orders section of the refill encounter.       Denied  Too early for refill  Refill after 12/31/2019           metoprolol succinate ER (TOPROL-XL) 100 MG 24 hr tablet [Pharmacy Med Name: METOPROLOL SUCCINATE ER 100MG TB24] 30 tablet 10     Sig: TAKE ONE TABLET BY MOUTH ONCE DAILY   Last Written Prescription Date:  12/4/2018  Last Fill Quantity: 30,  # refills: 10   Last office visit: 10/31/2019 with prescribing provider:     Future Office Visit:        Beta-Blockers Protocol Passed - 11/16/2019  1:15 AM        Passed - Blood pressure under 140/90 in past 12 months     BP Readings from Last 3 Encounters:   10/31/19 128/62   05/15/19 138/80   02/15/19 130/78           Passed - Patient is age 6 or older        Passed - Recent (12 mo) or future (30 days) visit within the authorizing provider's specialty     Patient has had an office visit with the authorizing provider or a provider within the authorizing providers department within the previous 12 mos or has a future within next 30 days. See \"Patient Info\" tab in inbasket, or \"Choose Columns\" in Meds & Orders section of the refill encounter.            Passed - Medication is active on med list      Prescription approved per [facility] Refill Protocol.  Mylene Mensah RN      "

## 2019-11-21 DIAGNOSIS — K21.9 GASTROESOPHAGEAL REFLUX DISEASE WITHOUT ESOPHAGITIS: ICD-10-CM

## 2019-11-21 RX ORDER — OMEPRAZOLE 40 MG/1
CAPSULE, DELAYED RELEASE ORAL
Qty: 90 CAPSULE | Refills: 3 | Status: SHIPPED | OUTPATIENT
Start: 2019-11-21 | End: 2020-06-05

## 2019-11-21 NOTE — TELEPHONE ENCOUNTER
"Requested Prescriptions   Pending Prescriptions Disp Refills     omeprazole (PRILOSEC) 40 MG DR capsule [Pharmacy Med Name: OMEPRAZOLE 40MG CPDR] 90 capsule 0     Sig: TAKE ONE CAPSULE BY MOUTH TWICE A DAY AS NEEDED   Last Written Prescription Date:  9/24/19  Last Fill Quantity: 90,  # refills: 0   Last office visit: 11/16/2017 with prescribing provider:  10/31/19   Future Office Visit:        PPI Protocol Failed - 11/21/2019  3:57 AM        Failed - Not on Clopidogrel (unless Pantoprazole ordered)        Passed - No diagnosis of osteoporosis on record        Passed - Recent (12 mo) or future (30 days) visit within the authorizing provider's specialty     Patient has had an office visit with the authorizing provider or a provider within the authorizing providers department within the previous 12 mos or has a future within next 30 days. See \"Patient Info\" tab in inbasket, or \"Choose Columns\" in Meds & Orders section of the refill encounter.              Passed - Medication is active on med list        Passed - Patient is age 18 or older        Passed - No active pregnacy on record        Passed - No positive pregnancy test in past 12 months        "

## 2019-11-21 NOTE — TELEPHONE ENCOUNTER
Routing refill request to provider for review/approval because:  On Plavix, needs PCP to refill    CHARLES CrossN, RN  [facility]

## 2019-12-01 ENCOUNTER — TRANSFERRED RECORDS (OUTPATIENT)
Dept: MULTI SPECIALTY CLINIC | Facility: CLINIC | Age: 79
End: 2019-12-01

## 2019-12-01 LAB — RETINOPATHY: NORMAL

## 2019-12-03 ENCOUNTER — TELEPHONE (OUTPATIENT)
Dept: FAMILY MEDICINE | Facility: OTHER | Age: 79
End: 2019-12-03

## 2019-12-03 NOTE — TELEPHONE ENCOUNTER
----- Message from Byron Holm DO sent at 12/2/2019 10:10 PM CST -----  Some abnormalities of previous blood work are noted.  Please asked the patient to set up an appointment to discuss management as her A1c is persistently elevated.    Mihai

## 2019-12-03 NOTE — TELEPHONE ENCOUNTER
Called and LM for patient to call back. Please relay results below to patient. Help schedule appointment.     Jaqueline Musa MA

## 2019-12-04 DIAGNOSIS — F33.1 MAJOR DEPRESSIVE DISORDER, RECURRENT EPISODE, MODERATE (H): ICD-10-CM

## 2019-12-04 DIAGNOSIS — I10 HYPERTENSION GOAL BP (BLOOD PRESSURE) < 140/90: ICD-10-CM

## 2019-12-04 NOTE — TELEPHONE ENCOUNTER
Spoke with patient and informed of results below. Patient understood. Appointment made for next week.     Jaqueline Musa MA

## 2019-12-05 NOTE — TELEPHONE ENCOUNTER
"Requested Prescriptions   Pending Prescriptions Disp Refills     PARoxetine (PAXIL) 40 MG tablet [Pharmacy Med Name: PAROXETINE HCL 40MG TABS] 30 tablet 5     Sig: TAKE ONE TABLET BY MOUTH EVERY MORNING   Last Written Prescription Date:  4/30/19  Last Fill Quantity: 30,  # refills: 5   Last office visit: 10/31/2019 with prescribing provider:  Mihai Wooten Office Visit:   Next 5 appointments (look out 90 days)    Dec 11, 2019  8:40 AM CST  SHORT with Byron Holm DO  [facility] [address] [place] [address]  [phone]             SSRIs Protocol Failed - 12/4/2019  1:25 AM        Failed - PHQ-9 score less than 5 in past 6 months     Please review last PHQ-9 score.   PHQ-9 score:    PHQ-9 SCORE 5/15/2019   PHQ-9 Total Score -   PHQ-9 Total Score 6           Passed - Medication is active on med list        Passed - Patient is age 18 or older        Passed - No active pregnancy on record        Passed - No positive pregnancy test in last 12 months        Passed - Recent (6 mo) or future (30 days) visit within the authorizing provider's specialty     Patient had office visit in the last 6 months or has a visit in the next 30 days with authorizing provider or within the authorizing provider's specialty.  See \"Patient Info\" tab in inbasket, or \"Choose Columns\" in Meds & Orders section of the refill encounter.            lisinopril-hydrochlorothiazide (PRINZIDE/ZESTORETIC) 20-25 MG tablet [Pharmacy Med Name: LISINOPRIL-HCTZ 20-25MG TABS] 90 tablet 1     Sig: TAKE ONE TABLET BY MOUTH ONCE DAILY   Last Written Prescription Date:  6/3/19  Last Fill Quantity: 90,  # refills: 1   Last office visit: 10/31/2019 with prescribing provider:  Mihai Wooten Office Visit:   Next 5 appointments (look out 90 days)    Dec 11, 2019  8:40 AM CST  SHORT with Byron Holm DO  [facility] [address] [facility] " "[id number]  [phone]             Diuretics (Including Combos) Protocol Failed - 12/4/2019  1:25 AM        Failed - Normal serum creatinine on file in past 12 months     Recent Labs   Lab Test 10/31/19  1449   CR 1.59*            Passed - Blood pressure under 140/90 in past 12 months     BP Readings from Last 3 Encounters:   10/31/19 128/62   05/15/19 138/80   02/15/19 130/78           Passed - Recent (12 mo) or future (30 days) visit within the authorizing provider's specialty     Patient has had an office visit with the authorizing provider or a provider within the authorizing providers department within the previous 12 mos or has a future within next 30 days. See \"Patient Info\" tab in inbasket, or \"Choose Columns\" in Meds & Orders section of the refill encounter.              Passed - Medication is active on med list        Passed - Patient is age 18 or older        Passed - No active pregancy on record        Passed - Normal serum potassium on file in past 12 months     Recent Labs   Lab Test 10/31/19  1449   POTASSIUM 5.0            Passed - Normal serum sodium on file in past 12 months     Recent Labs   Lab Test 10/31/19  1449               Passed - No positive pregnancy test in past 12 months          "

## 2019-12-05 NOTE — TELEPHONE ENCOUNTER
Routing refill request to provider for review/approval because:  Labs out of range:  PHQ 9    Makenzie Barraza RN on 12/5/2019 at 2:44 PM

## 2019-12-07 DIAGNOSIS — E11.42 TYPE 2 DIABETES MELLITUS WITH DIABETIC POLYNEUROPATHY, WITHOUT LONG-TERM CURRENT USE OF INSULIN (H): ICD-10-CM

## 2019-12-09 RX ORDER — GABAPENTIN 300 MG/1
CAPSULE ORAL
Qty: 60 CAPSULE | Refills: 0 | Status: SHIPPED | OUTPATIENT
Start: 2019-12-09 | End: 2020-02-06

## 2019-12-09 RX ORDER — LISINOPRIL AND HYDROCHLOROTHIAZIDE 20; 25 MG/1; MG/1
TABLET ORAL
Qty: 90 TABLET | Refills: 1 | Status: SHIPPED | OUTPATIENT
Start: 2019-12-09 | End: 2020-06-05

## 2019-12-09 RX ORDER — PAROXETINE 40 MG/1
TABLET, FILM COATED ORAL
Qty: 30 TABLET | Refills: 5 | Status: SHIPPED | OUTPATIENT
Start: 2019-12-09 | End: 2020-06-22

## 2019-12-09 NOTE — TELEPHONE ENCOUNTER
Gabapentin      Last Written Prescription Date:  9/23/2019  Last Fill Quantity: 60,   # refills: 0  Last Office Visit: 10/31/2019  Future Office visit:    Next 5 appointments (look out 90 days)    Dec 11, 2019  8:40 AM HUA FLEMING with Byron Holm DO  [facility] [address] [facility] [address]  [phone]           Routing refill request to provider for review/approval because:  Drug not on the [facility], [facility] or [facility] refill protocol or controlled substance

## 2019-12-18 ENCOUNTER — TRANSFERRED RECORDS (OUTPATIENT)
Dept: HEALTH INFORMATION MANAGEMENT | Facility: CLINIC | Age: 79
End: 2019-12-18

## 2019-12-18 DIAGNOSIS — E11.42 TYPE 2 DIABETES MELLITUS WITH DIABETIC POLYNEUROPATHY, WITHOUT LONG-TERM CURRENT USE OF INSULIN (H): ICD-10-CM

## 2019-12-18 RX ORDER — METFORMIN HCL 500 MG
TABLET, EXTENDED RELEASE 24 HR ORAL
Qty: 120 TABLET | Refills: 3 | Status: SHIPPED | OUTPATIENT
Start: 2019-12-18 | End: 2020-03-30

## 2019-12-18 NOTE — TELEPHONE ENCOUNTER
"Requested Prescriptions   Pending Prescriptions Disp Refills     metFORMIN (GLUCOPHAGE-XR) 500 MG 24 hr tablet [Pharmacy Med Name: METFORMIN HCL ER 500MG TB24] 120 tablet 0     Sig: TAKE TWO TABLETS BY MOUTH TWICE A DAY WITH MEALS   Last Written Prescription Date:  11/18/19  Last Fill Quantity: 120,  # refills: 0   Last office visit: 10/31/2019 with prescribing provider:  10/31/19   Future Office Visit:        Biguanide Agents Failed - 12/18/2019  1:06 AM        Failed - Patient's CR is NOT>1.4 OR Patient's EGFR is NOT<45 within past 12 mos.     Recent Labs   Lab Test 10/31/19  1449   GFRESTIMATED 31*   GFRESTBLACK 35*       Recent Labs   Lab Test 10/31/19  1449   CR 1.59*             Passed - Blood pressure less than 140/90 in past 6 months     BP Readings from Last 3 Encounters:   10/31/19 128/62   05/15/19 138/80   02/15/19 130/78                 Passed - Patient has documented LDL within the past 12 mos.     Recent Labs   Lab Test 10/31/19  1449   LDL 44             Passed - Patient has had a Microalbumin in the past 15 mos.     Recent Labs   Lab Test 10/31/19  1513   MICROL 84   UMALCR 96.02*             Passed - Patient is age 10 or older        Passed - Patient has documented A1c within the specified period of time.     If HgbA1C is 8 or greater, it needs to be on file within the past 3 months.  If less than 8, must be on file within the past 6 months.     Recent Labs   Lab Test 10/31/19  1449   A1C 7.9*             Passed - Patient does NOT have a diagnosis of CHF.        Passed - Medication is active on med list        Passed - Patient is not pregnant        Passed - Patient has not had a positive pregnancy test within the past 12 mos.         Passed - Recent (6 mo) or future (30 days) visit within the authorizing provider's specialty     Patient had office visit in the last 6 months or has a visit in the next 30 days with authorizing provider or within the authorizing provider's specialty.  See \"Patient " "Info\" tab in inbasket, or \"Choose Columns\" in Meds & Orders section of the refill encounter.            "

## 2019-12-18 NOTE — TELEPHONE ENCOUNTER
Routing refill request to provider for review/approval because:  Labs out of range:  Microalbumin, Creatinine, GFR    CHARLES CrossN, RN  [facility]

## 2020-01-04 DIAGNOSIS — E11.42 TYPE 2 DIABETES MELLITUS WITH DIABETIC POLYNEUROPATHY, WITHOUT LONG-TERM CURRENT USE OF INSULIN (H): ICD-10-CM

## 2020-01-06 RX ORDER — GLIPIZIDE 10 MG/1
TABLET ORAL
Qty: 360 TABLET | Refills: 0 | Status: SHIPPED | OUTPATIENT
Start: 2020-01-06 | End: 2020-04-13

## 2020-01-06 NOTE — TELEPHONE ENCOUNTER
"Glipizide  Last Written Prescription Date:  09/30/2019  Last Fill Quantity: 360,  # refills: 0   Last office visit: 10/31/2019 with prescribing provider:     Future Office Visit:    Requested Prescriptions   Pending Prescriptions Disp Refills     glipiZIDE (GLUCOTROL) 10 MG tablet [Pharmacy Med Name: GLIPIZIDE 10MG TABS] 360 tablet 0     Sig: TAKE TWO TABLETS BY MOUTH TWICE A DAY BEFORE MEALS       Sulfonylurea Agents Failed - 1/4/2020  1:26 AM        Failed - Patient has a recent creatinine (normal) within the past 12 mos.     Recent Labs   Lab Test 10/31/19  1449   CR 1.59*             Passed - Blood pressure less than 140/90 in past 6 months     BP Readings from Last 3 Encounters:   10/31/19 128/62   05/15/19 138/80   02/15/19 130/78                 Passed - Patient has documented LDL within the past 12 mos.     Recent Labs   Lab Test 10/31/19  1449   LDL 44             Passed - Patient has had a Microalbumin in the past 15 mos.     Recent Labs   Lab Test 10/31/19  1513   MICROL 84   UMALCR 96.02*             Passed - Patient has documented A1c within the specified period of time.     If HgbA1C is 8 or greater, it needs to be on file within the past 3 months.  If less than 8, must be on file within the past 6 months.     Recent Labs   Lab Test 10/31/19  1449   A1C 7.9*             Passed - Medication is active on med list        Passed - Patient is age 18 or older        Passed - No active pregnancy on record        Passed - Patient has not had a positive pregnancy test within the past 12 mos.        Passed - Recent (6 mo) or future (30 days) visit within the authorizing provider's specialty     Patient had office visit in the last 6 months or has a visit in the next 30 days with authorizing provider or within the authorizing provider's specialty.  See \"Patient Info\" tab in inbasket, or \"Choose Columns\" in Meds & Orders section of the refill encounter.              "

## 2020-01-06 NOTE — TELEPHONE ENCOUNTER
Routing refill request to provider for review/approval because:  Labs out of range:  Creatinine and microalbumin    Janine Mckinney RN BSN

## 2020-01-20 ENCOUNTER — OFFICE VISIT (OUTPATIENT)
Dept: FAMILY MEDICINE | Facility: OTHER | Age: 80
End: 2020-01-20
Payer: COMMERCIAL

## 2020-01-20 VITALS
OXYGEN SATURATION: 98 % | WEIGHT: 139 LBS | SYSTOLIC BLOOD PRESSURE: 134 MMHG | HEIGHT: 64 IN | DIASTOLIC BLOOD PRESSURE: 62 MMHG | RESPIRATION RATE: 18 BRPM | BODY MASS INDEX: 23.73 KG/M2 | HEART RATE: 72 BPM | TEMPERATURE: 98.2 F

## 2020-01-20 DIAGNOSIS — F33.41 RECURRENT MAJOR DEPRESSIVE DISORDER, IN PARTIAL REMISSION (H): ICD-10-CM

## 2020-01-20 DIAGNOSIS — R82.90 NONSPECIFIC FINDING ON EXAMINATION OF URINE: ICD-10-CM

## 2020-01-20 DIAGNOSIS — N18.30 CKD (CHRONIC KIDNEY DISEASE) STAGE 3, GFR 30-59 ML/MIN (H): ICD-10-CM

## 2020-01-20 DIAGNOSIS — F43.21 GRIEF: Primary | ICD-10-CM

## 2020-01-20 DIAGNOSIS — E11.42 TYPE 2 DIABETES MELLITUS WITH DIABETIC POLYNEUROPATHY, WITHOUT LONG-TERM CURRENT USE OF INSULIN (H): ICD-10-CM

## 2020-01-20 LAB
ALBUMIN UR-MCNC: NEGATIVE MG/DL
ANION GAP SERPL CALCULATED.3IONS-SCNC: 4 MMOL/L (ref 3–14)
APPEARANCE UR: CLEAR
BACTERIA #/AREA URNS HPF: ABNORMAL /HPF
BILIRUB UR QL STRIP: NEGATIVE
BUN SERPL-MCNC: 46 MG/DL (ref 7–30)
CALCIUM SERPL-MCNC: 8.2 MG/DL (ref 8.5–10.1)
CHLORIDE SERPL-SCNC: 108 MMOL/L (ref 94–109)
CO2 SERPL-SCNC: 27 MMOL/L (ref 20–32)
COLOR UR AUTO: YELLOW
CREAT SERPL-MCNC: 1.47 MG/DL (ref 0.52–1.04)
CREAT UR-MCNC: 66 MG/DL
GFR SERPL CREATININE-BSD FRML MDRD: 33 ML/MIN/{1.73_M2}
GLUCOSE SERPL-MCNC: 210 MG/DL (ref 70–99)
GLUCOSE UR STRIP-MCNC: 500 MG/DL
HBA1C MFR BLD: 7.5 % (ref 0–5.6)
HGB UR QL STRIP: ABNORMAL
KETONES UR STRIP-MCNC: NEGATIVE MG/DL
LEUKOCYTE ESTERASE UR QL STRIP: ABNORMAL
MICROALBUMIN UR-MCNC: 46 MG/L
MICROALBUMIN/CREAT UR: 69.8 MG/G CR (ref 0–25)
NITRATE UR QL: POSITIVE
NON-SQ EPI CELLS #/AREA URNS LPF: ABNORMAL /LPF
PH UR STRIP: 5.5 PH (ref 5–7)
POTASSIUM SERPL-SCNC: 4.8 MMOL/L (ref 3.4–5.3)
RBC #/AREA URNS AUTO: ABNORMAL /HPF
SODIUM SERPL-SCNC: 139 MMOL/L (ref 133–144)
SOURCE: ABNORMAL
SP GR UR STRIP: 1.02 (ref 1–1.03)
UROBILINOGEN UR STRIP-ACNC: 0.2 EU/DL (ref 0.2–1)
WBC #/AREA URNS AUTO: ABNORMAL /HPF

## 2020-01-20 PROCEDURE — 87186 SC STD MICRODIL/AGAR DIL: CPT | Performed by: INTERNAL MEDICINE

## 2020-01-20 PROCEDURE — 87086 URINE CULTURE/COLONY COUNT: CPT | Performed by: INTERNAL MEDICINE

## 2020-01-20 PROCEDURE — 82043 UR ALBUMIN QUANTITATIVE: CPT | Performed by: INTERNAL MEDICINE

## 2020-01-20 PROCEDURE — 80048 BASIC METABOLIC PNL TOTAL CA: CPT | Performed by: INTERNAL MEDICINE

## 2020-01-20 PROCEDURE — 83036 HEMOGLOBIN GLYCOSYLATED A1C: CPT | Performed by: INTERNAL MEDICINE

## 2020-01-20 PROCEDURE — 81001 URINALYSIS AUTO W/SCOPE: CPT | Performed by: INTERNAL MEDICINE

## 2020-01-20 PROCEDURE — 87088 URINE BACTERIA CULTURE: CPT | Performed by: INTERNAL MEDICINE

## 2020-01-20 PROCEDURE — 36415 COLL VENOUS BLD VENIPUNCTURE: CPT | Performed by: INTERNAL MEDICINE

## 2020-01-20 PROCEDURE — 99214 OFFICE O/P EST MOD 30 MIN: CPT | Performed by: INTERNAL MEDICINE

## 2020-01-20 RX ORDER — CEPHALEXIN 500 MG/1
500 CAPSULE ORAL 3 TIMES DAILY
Qty: 21 CAPSULE | Refills: 0 | Status: SHIPPED | OUTPATIENT
Start: 2020-01-20 | End: 2020-10-08

## 2020-01-20 ASSESSMENT — MIFFLIN-ST. JEOR: SCORE: 1082.56

## 2020-01-20 ASSESSMENT — PAIN SCALES - GENERAL: PAINLEVEL: NO PAIN (0)

## 2020-01-20 ASSESSMENT — PATIENT HEALTH QUESTIONNAIRE - PHQ9: SUM OF ALL RESPONSES TO PHQ QUESTIONS 1-9: 0

## 2020-01-20 NOTE — PROGRESS NOTES
Subjective     Eliza Dubois is a 79 year old female who presents to clinic today for the following health issues:    HPI   Diabetes Follow-up       How often are you checking your blood sugar? Not at all    What concerns do you have today about your diabetes? None     Do you have any of these symptoms? (Select all that apply)  Numbness in feet and Burning in feet    Have you had a diabetic eye exam in the last 12 months? Yes- Date of last eye exam: 12/2019,  Location: [facility]        BP Readings from Last 2 Encounters:   01/20/20 134/62   10/31/19 128/62     Hemoglobin A1C (%)   Date Value   10/31/2019 7.9 (H)   05/15/2019 7.6 (H)     LDL Cholesterol Calculated (mg/dL)   Date Value   10/31/2019 44   04/05/2017     Cannot estimate LDL when triglyceride exceeds 400 mg/dL     LDL Cholesterol Direct (mg/dL)   Date Value   10/26/2018 70   04/17/2018 77                               Chief Complaint         The patient is a pleasant 79-year-old female who presents today for follow-up of her diabetes.  Most notably, her most recent like also hemoglobin was slightly elevated at 7.9.  She has been otherwise doing quite well.  She takes her medications compliantly including the metformin and glipizide.  She is had no hyper or hypoglycemic episodes.  She is appropriately on an ACE inhibitor as well as aspirin.  She is not on a statin stating that she does not want to be on a statin.  She tolerating the Jardiance without difficulty.  She notes that it is little difficult on the pocketbook.  She notes it has been about a year since the passing of her .  She still has some grief does have good family support but is interested in a counselor.  She has seen people for group grief counseling but states this is not as beneficial as she thought it would be.  With respect to her diabetes, she watches her blood sugar, she watches her diet.  She is had no hyper or hypoglycemic episodes.  She denies any polyuria or polydipsia.   With respect to her depression, she continues the Paxil at 40 mg daily with good results.  She has no suicidal ideation.                       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     Surgical History:   has a past surgical history that includes NONSPECIFIC PROCEDURE (1988); LAPAROSCOPY, SURGICAL; CHOLECYSTECTOMY (1997); colonoscopy (04/15/09); Colonoscopy (6/18/2014); Abdomen surgery; Phacoemulsification clear cornea with standard intraocular lens implant (Right, 3/16/2016); and Phacoemulsification clear cornea with standard intraocular lens implant (Left, 3/30/2016).     Social History:   reports that she has never smoked. She has never used smokeless tobacco. She reports that she does not drink alcohol or use drugs.     Family History:  family history includes Arthritis in her mother; Cancer in her maternal grandmother and another family member; Diabetes in her father and mother; EYE* in her mother; Genitourinary Problems in her father; Heart Disease in her father, maternal grandmother, and mother; Hypertension in her mother; Lipids in her mother; Neurologic Disorder in her mother; Obesity in her mother; Osteoporosis in her mother.            MEDICATIONS  Current Outpatient Medications   Medication Sig Dispense Refill     ASPIRIN 81 MG PO TABS Take 1 tablet by mouth once daily  11     atorvastatin (LIPITOR) 80 MG tablet Take 80 mg by mouth daily       Clopidogrel Bisulfate (PLAVIX PO) Take 75 mg by mouth daily        empagliflozin (JARDIANCE) 25 MG TABS tablet Take 1 tablet (25 mg) by mouth daily 90 tablet 3     gabapentin (NEURONTIN) 300 MG capsule TAKE ONE TO TWO  CAPSULES BY MOUTH EVERY EVENING 60 capsule 0     glipiZIDE (GLUCOTROL) 10 MG tablet TAKE TWO TABLETS BY MOUTH TWICE A DAY BEFORE MEALS 360 tablet 0     Isosorbide Mononitrate (IMDUR PO) Take 30 mg by mouth daily       LANsoprazole (PREVACID) 30 MG CR capsule Take 1 capsule (30 mg) by mouth daily Take 30-60 minutes before a meal. 90 capsule 0     lisinopril-hydrochlorothiazide (PRINZIDE/ZESTORETIC) 20-25 MG tablet TAKE ONE TABLET BY MOUTH ONCE DAILY 90 tablet 1     MELATONIN PO Take 3 mg by mouth nightly as needed        metFORMIN (GLUCOPHAGE-XR) 500 MG 24 hr tablet TAKE TWO TABLETS BY MOUTH TWICE A DAY WITH MEALS 120 tablet 3     metoprolol succinate ER (TOPROL-XL) 100 MG 24 hr tablet TAKE ONE TABLET BY MOUTH ONCE DAILY 30 tablet 8     omeprazole (PRILOSEC) 40 MG DR capsule TAKE ONE CAPSULE BY MOUTH TWICE A DAY AS NEEDED 90 capsule 3     PARoxetine (PAXIL) 40 MG tablet TAKE ONE TABLET BY MOUTH EVERY MORNING 30 tablet 5     alcohol swab prep pads Use to swab area of injection/sarah as directed. (Patient not taking: Reported on 1/20/2020) 100 each 3     blood glucose (NO BRAND SPECIFIED) test strip Use to test blood sugar 2 times daily or as directed. To accompany: Blood Glucose Monitor Brands: per insurance. (Patient not taking: Reported on 1/20/2020) 100 strip 6     blood glucose calibration (NO BRAND SPECIFIED) solution To accompany: Blood Glucose Monitor Brands: per insurance. (Patient not taking: Reported on 1/20/2020) 1 Bottle 3     blood glucose monitoring (ACCU-CHEK COMPACT PLUS) test strip USE TO CHECK BLOOD SUGARS TWO TIMES A DAY OR AS DIRECTED.  Appointment needed for additional refills. (Patient not taking: Reported on 1/20/2020) 102 each 0     blood glucose monitoring (ACCU-CHEK MULTICLIX) lancets Use to test blood sugar 2 times daily or as directed. (Patient not taking: Reported on 1/20/2020) 1 Box 11     blood glucose monitoring (NO BRAND SPECIFIED) meter device kit Use to test blood sugar 2 times daily  "or as directed. Preferred blood glucose meter OR supplies to accompany: Blood Glucose Monitor Brands: per insurance. (Patient not taking: Reported on 1/20/2020) 1 kit 0     thin (NO BRAND SPECIFIED) lancets Use with lanceting device. To accompany: Blood Glucose Monitor Brands: per insurance. (Patient not taking: Reported on 1/20/2020) 100 each 6         --------------------------------------------------------------------------------------------------------------------                              Review of Systems       LUNGS: Pt denies: cough, excess sputum, hemoptysis, or shortness of breath.   HEART: Pt denies: chest pain, arrhythmia, syncope, tachy or bradyarrhythmia.   GI: Pt denies: nausea, vomiting, diarrhea, constipation, melena, or hematochezia.   NEURO: Pt denies: seizures, strokes, diplopia, weakness, paraesthesias, or paralysis.   SKIN: Pt denies: itching, rashes, discoloration, or specific lesions of concern. Denies recent hair loss.   PSYCH: The patient denies significant depression, anxiety, mood imbalance. Specifically denies any suicidal ideation.                                     Examination    /62 (BP Location: Right arm, Patient Position: Sitting, Cuff Size: Adult Regular)   Pulse 72   Temp 98.2  F (36.8  C) (Temporal)   Resp 18   Ht 1.613 m (5' 3.5\")   Wt 63 kg (139 lb)   SpO2 98%   BMI 24.24 kg/m       Constitutional: The patient appears to be in no acute distress. The patient appears to be adequately hydrated. No acute respiratory or hemodynamic distress is noted at this time.   LUNGS: clear bilaterally, airflow is brisk, no intercostal retraction or stridor is noted. No coughing is noted during visit.   HEART:  regular without rubs, clicks, gallops, or murmurs. PMI is nondisplaced. Upstrokes are brisk. S1,S2 are heard.   GI: Abdomen is soft, without rebound, guarding or tenderness. Bowel sounds are appropriate. No renal bruits are heard.   NEURO: Pt is alert and appropriate. No " neurologic lateralization is noted. Cranial nerves 2-12 are intact. Peripheral sensory and motor function are grossly normal.    SKIN:  warm and dry. No erythema, or rashes are noted. No specific lesions of concern are noted.    PSYCH: The patient appears grossly appropriate. Maintains good eye contact, does not have any jittery or atypical motion. Displays appropriate affect.                                           Decision Making    1. Recurrent major depressive disorder, in partial remission (H)  Markedly improved medical therapy    2. Type 2 diabetes mellitus with diabetic polyneuropathy, without long-term current use of insulin (H)  Check A1c and renal function.  Continue current medications and adjust accordingly  - Hemoglobin A1c  - Basic metabolic panel  - *UA reflex to Microscopic and Culture ([facility] (except [place] and [place])  - Albumin Random Urine Quantitative with Creat Ratio    3. CKD (chronic kidney disease) stage 3, GFR 30-59 ml/min (H)  Check renal function    4. Grief  Set up with primary care integrated behavioral evaluation  - PRIMARY CARE [facility] REFERRAL  - Urine Microscopic    5. Nonspecific finding on examination of urine  Await culture results.  May be contaminant variant  - Urine Culture Aerobic Bacterial                           FOLLOW UP   I have asked the patient to make an appointment for followup with me as predicated by lab results or in 4 months.        I have carefully explained the diagnosis and treatment options to the patient.  The patient has displayed an understanding of the above, and all subsequent questions were answered.      DO GEMA Campos    Portions of this note were produced using [employer]  Although every attempt at real-time proof reading has been made, occasional grammar/syntax errors may have been missed.

## 2020-01-21 ENCOUNTER — TELEPHONE (OUTPATIENT)
Dept: FAMILY MEDICINE | Facility: OTHER | Age: 80
End: 2020-01-21

## 2020-01-21 NOTE — TELEPHONE ENCOUNTER
2nd attempt, unable to reach patient line is busy. Looked on C2C attempted to call daughter Lucy and number is unable to make call. Called Son Terrell C2C on file and LM to have Eliza call the clinic back. Please relay results below to patient.     Jaqueline Musa MA

## 2020-01-21 NOTE — TELEPHONE ENCOUNTER
3rd attempt tired reaching patient at new cell number provided. There is no VM set up unable to leave message. If she calls back. Please relay results below. Otherwise will try to call patient tomorrow.     Jaqueline Musa MA

## 2020-01-21 NOTE — TELEPHONE ENCOUNTER
Son called to let us know that Eliza has been having issues with her home phone. She does have a cell phone now. Said to try her on that number. She can be reached at [phone].  Thank you,  Yeni Wright   for [facility]

## 2020-01-21 NOTE — TELEPHONE ENCOUNTER
The patient called back and information has been given. Would like a call back from PCP to explain what that all means.    Thank you,  Yeni Wright   for [facility]

## 2020-01-21 NOTE — RESULT ENCOUNTER NOTE
Dear Eliza, your recent test results are attached.  The urinary analysis shows a very high sugar content.  Additionally, it would appear that there is a urinary tract infection.  The chemistry panel shows a blood sugar of 210 and stable but decreased kidney function.  The hemoglobin A1c is decreased slightly and is 7.5.    I have called over a prescription for antibiotics for the urinary tract infection.  We should repeat the urinary analysis upon completion of the antibiotic.    Feel free to contact me via the office or My Chart if you have any questions regarding the above.

## 2020-01-21 NOTE — TELEPHONE ENCOUNTER
----- Message from Byron Holm DO sent at 1/20/2020  8:17 PM CST -----  Dear Eliza, your recent test results are attached.  The urinary analysis shows a very high sugar content.  Additionally, it would appear that there is a urinary tract infection.  The chemistry panel shows a blood sugar of 210 and stable but decreased kidney function.  The hemoglobin A1c is decreased slightly and is 7.5.    I have called over a prescription for antibiotics for the urinary tract infection.  We should repeat the urinary analysis upon completion of the antibiotic.    Feel free to contact me via the office or My Chart if you have any questions regarding the above.

## 2020-01-21 NOTE — LETTER
January 29, 2020      Eliza Dubois  [address] [facility] [id number]        Dear ,    We are writing to inform you of your test results.    Dear Eliza, your recent test results are attached.  The urine culture demonstrates sensitivity to the antibiotics upon which you were already placed.  Please follow-up for repeat urinary analysis upon conclusion of the antibiotics.     Feel free to contact me via the office or My Chart if you have any questions regarding the above.    Resulted Orders   Hemoglobin A1c   Result Value Ref Range    Hemoglobin A1C 7.5 (H) 0 - 5.6 %      Comment:      Normal <5.7% Prediabetes 5.7-6.4%  Diabetes 6.5% or higher - adopted from ADA   consensus guidelines.     Basic metabolic panel   Result Value Ref Range    Sodium 139 133 - 144 mmol/L    Potassium 4.8 3.4 - 5.3 mmol/L    Chloride 108 94 - 109 mmol/L    Carbon Dioxide 27 20 - 32 mmol/L    Anion Gap 4 3 - 14 mmol/L    Glucose 210 (H) 70 - 99 mg/dL    Urea Nitrogen 46 (H) 7 - 30 mg/dL    Creatinine 1.47 (H) 0.52 - 1.04 mg/dL    GFR Estimate 33 (L) >60 mL/min/[1.73_m2]      Comment:      Non  GFR Calc  Starting 12/18/2018, serum creatinine based estimated GFR (eGFR) will be   calculated using the Chronic Kidney Disease Epidemiology Collaboration   (CKD-EPI) equation.      GFR Estimate If Black 39 (L) >60 mL/min/[1.73_m2]      Comment:       GFR Calc  Starting 12/18/2018, serum creatinine based estimated GFR (eGFR) will be   calculated using the Chronic Kidney Disease Epidemiology Collaboration   (CKD-EPI) equation.      Calcium 8.2 (L) 8.5 - 10.1 mg/dL   *UA reflex to Microscopic and Culture ([place] [facility] (except [place] and [place])   Result Value Ref Range    Color Urine Yellow     Appearance Urine Clear     Glucose Urine 500 (A) NEG^Negative mg/dL    Bilirubin Urine Negative NEG^Negative    Ketones Urine Negative NEG^Negative mg/dL    Specific Gravity Urine 1.020  1.003 - 1.035    Blood Urine Trace (A) NEG^Negative    pH Urine 5.5 5.0 - 7.0 pH    Protein Albumin Urine Negative NEG^Negative mg/dL    Urobilinogen Urine 0.2 0.2 - 1.0 EU/dL    Nitrite Urine Positive (A) NEG^Negative    Leukocyte Esterase Urine Small (A) NEG^Negative    Source Midstream Urine    Albumin Random Urine Quantitative with Creat Ratio   Result Value Ref Range    Creatinine Urine 66 mg/dL    Albumin Urine mg/L 46 mg/L    Albumin Urine mg/g Cr 69.80 (H) 0 - 25 mg/g Cr   Urine Microscopic   Result Value Ref Range    WBC Urine 5-10 (A) OTO5^0 - 5 /HPF    RBC Urine 2-5 (A) OTO2^O - 2 /HPF    Squamous Epithelial /LPF Urine Few FEW^Few /LPF    Bacteria Urine Moderate (A) NEG^Negative /HPF   Urine Culture Aerobic Bacterial   Result Value Ref Range    Specimen Description Midstream Urine     Special Requests Specimen received in preservative     Culture Micro 50,000 to 100,000 colonies/mL  Escherichia coli   (A)     Culture Micro (A)      10,000 to 50,000 colonies/mL  Strain 2  Escherichia coli         If you have any questions or concerns, please call the clinic at the number listed above.       Sincerely,        Byron Holm, DO

## 2020-01-21 NOTE — TELEPHONE ENCOUNTER
Attempted to reach patient line is busy. Will try again later. If patient calls back. Please relay results below to patient.     Jaqueline Musa MA

## 2020-01-22 ENCOUNTER — OFFICE VISIT (OUTPATIENT)
Dept: BEHAVIORAL HEALTH | Facility: CLINIC | Age: 80
End: 2020-01-22
Payer: COMMERCIAL

## 2020-01-22 DIAGNOSIS — F43.20 ADJUSTMENT DISORDER, UNSPECIFIED TYPE: Primary | ICD-10-CM

## 2020-01-22 LAB
BACTERIA SPEC CULT: ABNORMAL
BACTERIA SPEC CULT: ABNORMAL
Lab: ABNORMAL
SPECIMEN SOURCE: ABNORMAL

## 2020-01-22 PROCEDURE — 90834 PSYTX W PT 45 MINUTES: CPT | Performed by: MARRIAGE & FAMILY THERAPIST

## 2020-01-22 ASSESSMENT — ANXIETY QUESTIONNAIRES
7. FEELING AFRAID AS IF SOMETHING AWFUL MIGHT HAPPEN: NOT AT ALL
GAD7 TOTAL SCORE: 2
1. FEELING NERVOUS, ANXIOUS, OR ON EDGE: NOT AT ALL
5. BEING SO RESTLESS THAT IT IS HARD TO SIT STILL: NOT AT ALL
2. NOT BEING ABLE TO STOP OR CONTROL WORRYING: NOT AT ALL
6. BECOMING EASILY ANNOYED OR IRRITABLE: SEVERAL DAYS
3. WORRYING TOO MUCH ABOUT DIFFERENT THINGS: SEVERAL DAYS
IF YOU CHECKED OFF ANY PROBLEMS ON THIS QUESTIONNAIRE, HOW DIFFICULT HAVE THESE PROBLEMS MADE IT FOR YOU TO DO YOUR WORK, TAKE CARE OF THINGS AT HOME, OR GET ALONG WITH OTHER PEOPLE: SOMEWHAT DIFFICULT

## 2020-01-22 ASSESSMENT — PATIENT HEALTH QUESTIONNAIRE - PHQ9: 5. POOR APPETITE OR OVEREATING: NOT AT ALL

## 2020-01-22 NOTE — Clinical Note
Hi Dr. Holm,I met with Eliza and she stated that she feels that she is doing really well and only came because her daughter felt she needed it. It sounds like there is more concern about their relationship rather than grief. I informed her that she can schedule as she would like to address either of those. If there are any other concerns or questions regarding this patient, please let me know. Thanks, Jaja

## 2020-01-22 NOTE — PROGRESS NOTES
"[facility]: Integrated Behavioral Health  2020      Behavioral Health Clinician Progress Note    Patient Name: Eliza Dubois           Service Type:  Individual      Service Location:   Face to Face in Clinic     Session Start Time: 3:00  Session End Time: 3:50      Session Length: 38 - 52      Attendees: Patient    Visit Activities (Refresh list every visit): NEW and [facility] Only    Diagnostic Assessment Date: due next visit  Treatment Plan Review Date: due 3rd visit  See Flowsheets for today's PHQ-9 and MATIAS-7 results  Previous PHQ-9:   PHQ-9 SCORE 10/11/2018 5/15/2019 2020   PHQ-9 Total Score - - -   PHQ-9 Total Score 8 6 0     Previous MATIAS-7:   MATIAS-7 SCORE 2017   Total Score 0 1 2       SHERRI LEVEL:  SHERRI Score (Last Two) 2011   SHERRI Raw Score 43   Activation Score 68.5   SHERRI Level 4       DATA  Extended Session (60+ minutes): No  Interactive Complexity: No  Crisis: No  [facility] Patient: No    Treatment Objective(s) Addressed in This Session:  Target Behavior(s): disease management/lifestyle changes grief, relationship dynamics with daughter    Adjustment Difficulties: will develop coping/problem-solving skills to facilitate more adaptive adjustment  Relationship Problems: will address relationship difficulties in a more adaptive manner  Grief / Loss: will engage in effective approach to address and resolve grief/loss issues    Current Stressors / Issues:  Patient was referred to this writer by her PCP. She states that she is here because her \"daughter believes I need to be here\". Patient's spouse  a year ago and she attended two separate grief groups. She states that the second time she went to grief group she had her daughter join as patient was concerned about her daughter's grief process. Patient states that she has tried to understand why her daughter wants her to talk with a therapist and she is uncertain. Patient states that there are " "times that she will not be as active and sit around. Patient denies being sad or frequent crying that is disruptive to her ability to function. She reports that she continues to be active and volunteers.   She just recently passed the one year anniversary of her spouse's death. They were  for 42 years. Patient states that she never had any birth children. She has a step-son whom is in his 50's and has a 23yo son. She states that she has good relationships with both of them.  Patient states that there has been some issues in her relationship with her daughter. Patient was her  and ended up adopting her at the age of 10. She reported that her daughter was the \"worst case of abuse\" she had seen. Her daughter has a 15yo son that is being treated for behavioral issues. Patient states that she has helped raise her grandson and he has stayed with her and her spouse off an on throughout his life.   Patient reports that she lives alone.     Processed recent events and explored relationship dynamics between patient and her daughter. Discussed boundaries and boundary setting. Reinforced patient previously attending grief group. Encouraged her to continue to volunteer and maintain social connections.     Progress on Treatment Objective(s) / Homework:  In development-first visit    Motivational Interviewing    MI Intervention: Expressed Empathy/Understanding, Supported Autonomy, Collaboration, Evocation, Permission to raise concern or advise, Open-ended questions, Reflections: simple and complex, Change talk (evoked) and Reframe     Change Talk Expressed by the Patient: Desire to change Ability to change Reasons to change Need to change Committment to change Activation Taking steps    Provider Response to Change Talk: E - Evoked more info from patient about behavior change, A - Affirmed patient's thoughts, decisions, or attempts at behavior change, R - Reflected patient's change talk and S - " Summarized patient's change talk statements    Also provided psychoeducation about behavioral health condition, symptoms, and treatment options    Care Plan review completed: Yes    Medication Review:  No changes to current psychiatric medication(s)    Medication Compliance:  Yes    Changes in Health Issues:   None reported    Chemical Use Review:   Substance Use: Chemical use reviewed, no active concerns identified      Tobacco Use: No current tobacco use.      Assessment: Current Emotional / Mental Status (status of significant symptoms):  Risk status (Self / Other harm or suicidal ideation)  Patient denies a history of suicidal ideation, suicide attempts, self-injurious behavior, homicidal ideation, homicidal behavior and and other safety concerns  Patient denies current fears or concerns for personal safety.  Patient denies current or recent suicidal ideation or behaviors.  Patient denies current or recent homicidal ideation or behaviors.  Patient denies current or recent self injurious behavior or ideation.  Patient denies other safety concerns.  A safety and risk management plan has not been developed at this time, however patient was encouraged to call [place] [phone] should there be a change in any of these risk factors.    Appearance:   Appropriate   Eye Contact:   Good   Psychomotor Behavior: Normal   Attitude:   Cooperative   Orientation:   All  Speech   Rate / Production: Normal    Volume:  Normal   Mood:    Normal  Affect:    Appropriate   Thought Content:  Clear   Thought Form:  Coherent  Logical   Insight:    Good     Diagnoses:  1. Adjustment disorder, unspecified type        Collateral Reports Completed:  Routed note to PCP  GAD7, WHODAS 2.0    Plan: (Homework, other):  Patient was given information about behavioral services and encouraged to schedule a follow up appointment with the clinic [facility] as needed.  She was also given information about mental health symptoms and treatment options  and  Cognitive Behavioral Therapy skills to practice when experiencing depression and grief.  CD Recommendations: No indications of CD issues. Patient does not feel the need to attend counseling as she feels that she is doing well, overall. She understands that she can schedule as needed to address grief and/or boundaries and relationship dynamics with daughter.  YANDY Longo, [facility]

## 2020-01-22 NOTE — RESULT ENCOUNTER NOTE
Dear Eliza, your recent test results are attached.    The urine culture demonstrates E. coli.  The microalbumin is slightly elevated.  Antibiotic susceptibilities are not yet available.  Please continue your current antibiotic.  Feel free to contact me via the office or My Chart if you have any questions regarding the above.

## 2020-01-23 ASSESSMENT — ANXIETY QUESTIONNAIRES: GAD7 TOTAL SCORE: 2

## 2020-01-28 NOTE — TELEPHONE ENCOUNTER
2nd Attempted to reach patient to inform of results below. Unable to leave message. Will try again later.   Please relay message if patient calls back.   Dr. Holm would suggest of visit if she would like to discuss more about her sugars and results of the previous message.      Jaqueline Musa MA

## 2020-01-28 NOTE — TELEPHONE ENCOUNTER
----- Message from Byron Holm DO sent at 1/27/2020  9:30 PM CST -----  Dear Eliza, your recent test results are attached.  The urine culture demonstrates sensitivity to the antibiotics upon which you were already placed.  Please follow-up for repeat urinary analysis upon conclusion of the antibiotics.    Feel free to contact me via the office or My Chart if you have any questions regarding the above.

## 2020-01-28 NOTE — TELEPHONE ENCOUNTER
Attempted to reach patient to inform of results below. Unable to leave message. Will try again later.   Please relay message if patient calls back.   Dr. Holm would suggest of visit if she would like to discuss more about her sugars and results of the previous message.     Jaqueline Musa MA

## 2020-01-28 NOTE — RESULT ENCOUNTER NOTE
Dear Eliza, your recent test results are attached.  The urine culture demonstrates sensitivity to the antibiotics upon which you were already placed.  Please follow-up for repeat urinary analysis upon conclusion of the antibiotics.    Feel free to contact me via the office or My Chart if you have any questions regarding the above.

## 2020-01-29 NOTE — TELEPHONE ENCOUNTER
3rd attempt, unable to reach patient. Letter sent. Please relay results to patient if she calls back.     Jaqueline Musa MA

## 2020-02-05 DIAGNOSIS — E11.42 TYPE 2 DIABETES MELLITUS WITH DIABETIC POLYNEUROPATHY, WITHOUT LONG-TERM CURRENT USE OF INSULIN (H): ICD-10-CM

## 2020-02-06 RX ORDER — EMPAGLIFLOZIN 25 MG/1
TABLET, FILM COATED ORAL
Qty: 90 TABLET | Refills: 1 | Status: SHIPPED | OUTPATIENT
Start: 2020-02-06 | End: 2020-08-04

## 2020-02-06 RX ORDER — GABAPENTIN 300 MG/1
CAPSULE ORAL
Qty: 60 CAPSULE | Refills: 0 | Status: SHIPPED | OUTPATIENT
Start: 2020-02-06 | End: 2020-03-26

## 2020-02-06 NOTE — TELEPHONE ENCOUNTER
Requested Prescriptions   Pending Prescriptions Disp Refills     gabapentin (NEURONTIN) 300 MG capsule [Pharmacy Med Name: GABAPENTIN 300MG CAPS] 60 capsule 0     Sig: TAKE ONE TO TWO CAPSULES BY MOUTH EVERY EVENING   Last Written Prescription Date:  12/9/19  Last Fill Quantity: 60,  # refills: 0   Last office visit: 1/20/2020 with prescribing provider:  1/20/20   Future Office Visit:        There is no refill protocol information for this order        JARDIANCE 25 MG TABS tablet [Pharmacy Med Name: JARDIANCE 25MG TABS] 90 tablet 3     Sig: TAKE ONE TABLET BY MOUTH ONCE DAILY   Last Written Prescription Date:  12/31/19  Last Fill Quantity: 90,  # refills: 3   Last office visit: 1/20/2020 with prescribing provider:  1/20/2020   Future Office Visit:        Sodium Glucose Co-Transport Inhibitor Agents Failed - 2/5/2020  2:18 PM        Failed - No creatinine >1.4 or GFR <45 within the past 12 mos     Recent Labs   Lab Test 01/20/20  1029   GFRESTIMATED 33*   GFRESTBLACK 39*       Recent Labs   Lab Test 01/20/20  1029   CR 1.47*             Failed - Medication is active on med list        Passed - Blood pressure less than 140/90 in past 6 months     BP Readings from Last 3 Encounters:   01/20/20 134/62   10/31/19 128/62   05/15/19 138/80                 Passed - Patient has documented LDL within the past 12 mos.     Recent Labs   Lab Test 10/31/19  1449   LDL 44             Passed - Patient has had a Microalbumin in the past 15 mos.     Recent Labs   Lab Test 01/20/20  1041   MICROL 46   UMALCR 69.80*             Passed - Patient has documented A1c within the specified period of time.     If HgbA1C is 8 or greater, it needs to be on file within the past 3 months.  If less than 8, must be on file within the past 6 months.     Recent Labs   Lab Test 01/20/20  1029   A1C 7.5*             Passed - Patient is age 18 or older        Passed - Patient is not pregnant        Passed - Patient has documented normal Potassium within  "the last 12 mos.     Recent Labs   Lab Test 01/20/20  1029   POTASSIUM 4.8             Passed - Patient has no positive pregnancy test within the past 12 mos.        Passed - Recent (6 mo) or future (30 days) visit within the authorizing provider's specialty     Patient had office visit in the last 6 months or has a visit in the next 30 days with authorizing provider or within the authorizing provider's specialty.  See \"Patient Info\" tab in inbasket, or \"Choose Columns\" in Meds & Orders section of the refill encounter.            "

## 2020-02-06 NOTE — TELEPHONE ENCOUNTER
Routing refill request to provider for review/approval because:  Drug not on the [facility] refill protocol   Labs out of range:  GFR, Creatinine, Microalbumin    CHARLES CrossN, RN  [facility]

## 2020-02-10 ENCOUNTER — TELEPHONE (OUTPATIENT)
Dept: FAMILY MEDICINE | Facility: OTHER | Age: 80
End: 2020-02-10

## 2020-02-10 DIAGNOSIS — R30.0 DYSURIA: Primary | ICD-10-CM

## 2020-02-10 NOTE — TELEPHONE ENCOUNTER
Reason for Call: Request for an order or referral:    Order or referral being requested: lab-UA    Date needed: at your convenience    Has the patient been seen by the PCP for this problem? YES    Additional comments: pt stated she finished her antibiotic and needs to do another UA to make sure infection is gone. Aware CM out of clinic today.    Phone number Patient can be reached at:  Home number on file [phone] (home)    Best Time:      Can we leave a detailed message on this number?  YES    Call taken on 2/10/2020 at 11:54 AM by Bety Sheridan

## 2020-03-25 DIAGNOSIS — E11.42 TYPE 2 DIABETES MELLITUS WITH DIABETIC POLYNEUROPATHY, WITHOUT LONG-TERM CURRENT USE OF INSULIN (H): ICD-10-CM

## 2020-03-25 NOTE — TELEPHONE ENCOUNTER
Gabapentin      Last Written Prescription Date:  2/6/20  Last Fill Quantity: 60,   # refills: 0  Last Office Visit: 1/20/20  Future Office visit:       Routing refill request to provider for review/approval because:  Drug not on the G, P or [facility] refill protocol or controlled substance    Makenzie Barraza RN on 3/25/2020 at 6:28 PM

## 2020-03-26 RX ORDER — GABAPENTIN 300 MG/1
CAPSULE ORAL
Qty: 60 CAPSULE | Refills: 0 | Status: SHIPPED | OUTPATIENT
Start: 2020-03-26 | End: 2020-06-09

## 2020-03-30 DIAGNOSIS — E11.42 TYPE 2 DIABETES MELLITUS WITH DIABETIC POLYNEUROPATHY, WITHOUT LONG-TERM CURRENT USE OF INSULIN (H): ICD-10-CM

## 2020-03-30 RX ORDER — METFORMIN HCL 500 MG
TABLET, EXTENDED RELEASE 24 HR ORAL
Qty: 120 TABLET | Refills: 3 | Status: SHIPPED | OUTPATIENT
Start: 2020-03-30 | End: 2020-07-30

## 2020-03-30 NOTE — TELEPHONE ENCOUNTER
"Requested Prescriptions   Pending Prescriptions Disp Refills     metFORMIN (GLUCOPHAGE-XR) 500 MG 24 hr tablet [Pharmacy Med Name: METFORMIN HCL ER 500MG TB24] 120 tablet 3     Sig: TAKE TWO TABLETS BY MOUTH TWICE A DAY WITH MEALS       Biguanide Agents Failed - 3/30/2020  1:05 AM        Failed - Patient's CR is NOT>1.4 OR Patient's EGFR is NOT<45 within past 12 mos.     Recent Labs   Lab Test 01/20/20  1029   GFRESTIMATED 33*   GFRESTBLACK 39*       Recent Labs   Lab Test 01/20/20  1029   CR 1.47*             Passed - Patient is age 10 or older        Passed - Patient has documented A1c within the specified period of time.     If HgbA1C is 8 or greater, it needs to be on file within the past 3 months.  If less than 8, must be on file within the past 6 months.     Recent Labs   Lab Test 01/20/20  1029   A1C 7.5*             Passed - Patient does NOT have a diagnosis of CHF.        Passed - Medication is active on med list        Passed - Patient is not pregnant        Passed - Patient has not had a positive pregnancy test within the past 12 mos.         Passed - Recent (6 mo) or future (30 days) visit within the authorizing provider's specialty     Patient had office visit in the last 6 months or has a visit in the next 30 days with authorizing provider or within the authorizing provider's specialty.  See \"Patient Info\" tab in inbasket, or \"Choose Columns\" in Meds & Orders section of the refill encounter.                 "

## 2020-04-12 DIAGNOSIS — E11.42 TYPE 2 DIABETES MELLITUS WITH DIABETIC POLYNEUROPATHY, WITHOUT LONG-TERM CURRENT USE OF INSULIN (H): ICD-10-CM

## 2020-04-13 RX ORDER — GLIPIZIDE 10 MG/1
TABLET ORAL
Qty: 360 TABLET | Refills: 0 | Status: SHIPPED | OUTPATIENT
Start: 2020-04-13 | End: 2020-07-10

## 2020-04-13 NOTE — TELEPHONE ENCOUNTER
"Glipizide  Last Written Prescription Date:  01/06/2020  Last Fill Quantity: 360,  # refills: 0   Last office visit: 1/20/2020 with prescribing provider:  Mihai Wooten Office Visit:  None  Routing refill request to provider for review/approval because:  Labs out of range:  Creatinine.     Requested Prescriptions   Pending Prescriptions Disp Refills     glipiZIDE (GLUCOTROL) 10 MG tablet [Pharmacy Med Name: GLIPIZIDE 10MG TABS] 360 tablet 0     Sig: TAKE TWO TABLETS BY MOUTH TWICE A DAY BEFORE MEALS       Sulfonylurea Agents Failed - 4/12/2020  1:05 AM        Failed - Patient has a recent creatinine (normal) within the past 12 mos.     Recent Labs   Lab Test 01/20/20  1029   CR 1.47*     Ok to refill medication if creatinine is low        Passed - Patient has documented A1c within the specified period of time.     If HgbA1C is 8 or greater, it needs to be on file within the past 3 months.  If less than 8, must be on file within the past 6 months.   Recent Labs   Lab Test 01/20/20  1029   A1C 7.5*           Passed - Medication is active on med list        Passed - Patient is age 18 or older        Passed - No active pregnancy on record        Passed - Patient has not had a positive pregnancy test within the past 12 mos.        Passed - Recent (6 mo) or future (30 days) visit within the authorizing provider's specialty     Patient had office visit in the last 6 months or has a visit in the next 30 days with authorizing provider or within the authorizing provider's specialty.  See \"Patient Info\" tab in inbasket, or \"Choose Columns\" in Meds & Orders section of the refill encounter.           Paulina Garrett RN   "

## 2020-06-04 DIAGNOSIS — K21.9 GASTROESOPHAGEAL REFLUX DISEASE WITHOUT ESOPHAGITIS: ICD-10-CM

## 2020-06-04 DIAGNOSIS — I10 HYPERTENSION GOAL BP (BLOOD PRESSURE) < 140/90: ICD-10-CM

## 2020-06-05 RX ORDER — OMEPRAZOLE 40 MG/1
CAPSULE, DELAYED RELEASE ORAL
Qty: 180 CAPSULE | Refills: 0 | Status: SHIPPED | OUTPATIENT
Start: 2020-06-05 | End: 2020-09-01

## 2020-06-05 RX ORDER — LISINOPRIL AND HYDROCHLOROTHIAZIDE 20; 25 MG/1; MG/1
TABLET ORAL
Qty: 90 TABLET | Refills: 0 | Status: SHIPPED | OUTPATIENT
Start: 2020-06-05 | End: 2020-09-01

## 2020-06-05 NOTE — TELEPHONE ENCOUNTER
Routing refill request to provider for review/approval because:  Labs out of range:  Creatinine  On Plavix, Omeprazole needs provider approval    CHARLES CrossN, RN  [facility]

## 2020-06-09 DIAGNOSIS — E11.42 TYPE 2 DIABETES MELLITUS WITH DIABETIC POLYNEUROPATHY, WITHOUT LONG-TERM CURRENT USE OF INSULIN (H): ICD-10-CM

## 2020-06-09 RX ORDER — GABAPENTIN 300 MG/1
CAPSULE ORAL
Qty: 60 CAPSULE | Refills: 0 | Status: SHIPPED | OUTPATIENT
Start: 2020-06-09 | End: 2020-08-19

## 2020-06-09 NOTE — TELEPHONE ENCOUNTER
Gabapentin      Last Written Prescription Date:  3/26/2020  Last Fill Quantity: 60,   # refills: 0  Last Office Visit: 1/20/2020  Future Office visit:       Routing refill request to provider for review/approval because:  Drug not on the [facility], [facility] or [facility] refill protocol or controlled substance

## 2020-06-21 DIAGNOSIS — F33.1 MAJOR DEPRESSIVE DISORDER, RECURRENT EPISODE, MODERATE (H): ICD-10-CM

## 2020-06-22 RX ORDER — PAROXETINE 40 MG/1
TABLET, FILM COATED ORAL
Qty: 30 TABLET | Refills: 0 | Status: SHIPPED | OUTPATIENT
Start: 2020-06-22 | End: 2020-07-23

## 2020-06-22 NOTE — TELEPHONE ENCOUNTER
Prescription approved per [facility] Refill Protocol.    CHARLES CrossN, RN  [facility]

## 2020-07-10 DIAGNOSIS — E11.42 TYPE 2 DIABETES MELLITUS WITH DIABETIC POLYNEUROPATHY, WITHOUT LONG-TERM CURRENT USE OF INSULIN (H): ICD-10-CM

## 2020-07-10 RX ORDER — GLIPIZIDE 10 MG/1
TABLET ORAL
Qty: 360 TABLET | Refills: 0 | Status: SHIPPED | OUTPATIENT
Start: 2020-07-10 | End: 2020-10-16

## 2020-07-23 DIAGNOSIS — F33.1 MAJOR DEPRESSIVE DISORDER, RECURRENT EPISODE, MODERATE (H): ICD-10-CM

## 2020-07-23 RX ORDER — PAROXETINE 40 MG/1
TABLET, FILM COATED ORAL
Qty: 30 TABLET | Refills: 1 | Status: SHIPPED | OUTPATIENT
Start: 2020-07-23 | End: 2020-09-15

## 2020-07-23 NOTE — TELEPHONE ENCOUNTER
Routing refill request to provider for review/approval because:  PHQ-9 overdue    CHARLES CrossN, RN  [facility]

## 2020-07-30 DIAGNOSIS — E11.42 TYPE 2 DIABETES MELLITUS WITH DIABETIC POLYNEUROPATHY, WITHOUT LONG-TERM CURRENT USE OF INSULIN (H): ICD-10-CM

## 2020-07-30 NOTE — TELEPHONE ENCOUNTER
Routing refill request to provider for review/approval because:  Labs out of range:  Creatinine, GFR  Labs not current:  A1C    CHARLES CrossN, RN  [facility]

## 2020-07-31 RX ORDER — METFORMIN HCL 500 MG
TABLET, EXTENDED RELEASE 24 HR ORAL
Qty: 360 TABLET | Refills: 0 | Status: SHIPPED | OUTPATIENT
Start: 2020-07-31 | End: 2020-10-30

## 2020-08-03 DIAGNOSIS — E11.42 TYPE 2 DIABETES MELLITUS WITH DIABETIC POLYNEUROPATHY, WITHOUT LONG-TERM CURRENT USE OF INSULIN (H): ICD-10-CM

## 2020-08-04 RX ORDER — EMPAGLIFLOZIN 25 MG/1
TABLET, FILM COATED ORAL
Qty: 90 TABLET | Refills: 0 | Status: SHIPPED | OUTPATIENT
Start: 2020-08-04 | End: 2020-10-21

## 2020-08-04 NOTE — TELEPHONE ENCOUNTER
Routing refill request to provider for review/approval because:  Labs out of range:  Creatinine, GFR  Labs not current:  A1C    CHARLES CrossN, RN  [facility]

## 2020-08-19 DIAGNOSIS — E11.42 TYPE 2 DIABETES MELLITUS WITH DIABETIC POLYNEUROPATHY, WITHOUT LONG-TERM CURRENT USE OF INSULIN (H): ICD-10-CM

## 2020-08-19 RX ORDER — GABAPENTIN 300 MG/1
CAPSULE ORAL
Qty: 60 CAPSULE | Refills: 0 | Status: SHIPPED | OUTPATIENT
Start: 2020-08-19 | End: 2020-10-13

## 2020-08-19 NOTE — TELEPHONE ENCOUNTER
Gabapentin 300 MG       Last Written Prescription Date:  6-9-2020  Last Fill Quantity: 60,   # refills: 0  Last Office Visit: 1/20/2020  Future Office visit:       Routing refill request to provider for review/approval because:  Drug not on the [facility], [facility] or [facility] refill protocol or controlled substance

## 2020-09-01 DIAGNOSIS — K21.9 GASTROESOPHAGEAL REFLUX DISEASE WITHOUT ESOPHAGITIS: ICD-10-CM

## 2020-09-01 DIAGNOSIS — I10 HYPERTENSION GOAL BP (BLOOD PRESSURE) < 140/90: ICD-10-CM

## 2020-09-01 RX ORDER — LISINOPRIL AND HYDROCHLOROTHIAZIDE 20; 25 MG/1; MG/1
TABLET ORAL
Qty: 90 TABLET | Refills: 0 | Status: ON HOLD | OUTPATIENT
Start: 2020-09-01 | End: 2020-12-11

## 2020-09-01 RX ORDER — OMEPRAZOLE 40 MG/1
CAPSULE, DELAYED RELEASE ORAL
Qty: 180 CAPSULE | Refills: 0 | Status: ON HOLD | OUTPATIENT
Start: 2020-09-01 | End: 2020-12-11

## 2020-09-01 NOTE — TELEPHONE ENCOUNTER
Routing refill request to provider for review/approval because:  Labs out of range:  Creatinine  On Plavix, needs provider approval for Omeprazole    CHARLES CrossN, RN  [facility]

## 2020-09-15 DIAGNOSIS — F33.1 MAJOR DEPRESSIVE DISORDER, RECURRENT EPISODE, MODERATE (H): ICD-10-CM

## 2020-09-15 RX ORDER — PAROXETINE 40 MG/1
TABLET, FILM COATED ORAL
Qty: 30 TABLET | Refills: 1 | Status: SHIPPED | OUTPATIENT
Start: 2020-09-15 | End: 2020-11-13

## 2020-09-15 NOTE — TELEPHONE ENCOUNTER
Routing refill request to provider for review/approval because:  PHQ-9 overdue    CHARLES CrossN, RN  [facility]

## 2020-10-08 ENCOUNTER — OFFICE VISIT (OUTPATIENT)
Dept: INTERNAL MEDICINE | Facility: CLINIC | Age: 80
End: 2020-10-08
Payer: COMMERCIAL

## 2020-10-08 VITALS
RESPIRATION RATE: 16 BRPM | TEMPERATURE: 97.1 F | SYSTOLIC BLOOD PRESSURE: 132 MMHG | WEIGHT: 132.8 LBS | HEART RATE: 68 BPM | DIASTOLIC BLOOD PRESSURE: 70 MMHG | OXYGEN SATURATION: 95 % | BODY MASS INDEX: 23.16 KG/M2

## 2020-10-08 DIAGNOSIS — I25.10 CORONARY ARTERY DISEASE INVOLVING NATIVE CORONARY ARTERY OF NATIVE HEART WITHOUT ANGINA PECTORIS: ICD-10-CM

## 2020-10-08 DIAGNOSIS — Z23 NEED FOR PROPHYLACTIC VACCINATION AND INOCULATION AGAINST INFLUENZA: Primary | ICD-10-CM

## 2020-10-08 DIAGNOSIS — I10 HYPERTENSION GOAL BP (BLOOD PRESSURE) < 140/90: ICD-10-CM

## 2020-10-08 DIAGNOSIS — N30.00 ACUTE CYSTITIS WITHOUT HEMATURIA: ICD-10-CM

## 2020-10-08 DIAGNOSIS — E11.42 TYPE 2 DIABETES MELLITUS WITH DIABETIC POLYNEUROPATHY, WITHOUT LONG-TERM CURRENT USE OF INSULIN (H): ICD-10-CM

## 2020-10-08 DIAGNOSIS — E78.5 HYPERLIPIDEMIA LDL GOAL <100: ICD-10-CM

## 2020-10-08 LAB
ALBUMIN UR-MCNC: 100 MG/DL
ANION GAP SERPL CALCULATED.3IONS-SCNC: 8 MMOL/L (ref 3–14)
APPEARANCE UR: ABNORMAL
BACTERIA #/AREA URNS HPF: ABNORMAL /HPF
BILIRUB UR QL STRIP: NEGATIVE
BUN SERPL-MCNC: 51 MG/DL (ref 7–30)
CALCIUM SERPL-MCNC: 8.4 MG/DL (ref 8.5–10.1)
CHLORIDE SERPL-SCNC: 107 MMOL/L (ref 94–109)
CHOLEST SERPL-MCNC: 131 MG/DL
CO2 SERPL-SCNC: 29 MMOL/L (ref 20–32)
COLOR UR AUTO: ABNORMAL
CREAT SERPL-MCNC: 1.93 MG/DL (ref 0.52–1.04)
GFR SERPL CREATININE-BSD FRML MDRD: 24 ML/MIN/{1.73_M2}
GLUCOSE SERPL-MCNC: 139 MG/DL (ref 70–99)
GLUCOSE UR STRIP-MCNC: >499 MG/DL
HBA1C MFR BLD: 6.9 % (ref 0–5.6)
HDLC SERPL-MCNC: 41 MG/DL
HGB UR QL STRIP: ABNORMAL
HYALINE CASTS #/AREA URNS LPF: 20 /LPF (ref 0–2)
KETONES UR STRIP-MCNC: NEGATIVE MG/DL
LDLC SERPL CALC-MCNC: 39 MG/DL
LEUKOCYTE ESTERASE UR QL STRIP: ABNORMAL
MUCOUS THREADS #/AREA URNS LPF: PRESENT /LPF
NITRATE UR QL: NEGATIVE
NONHDLC SERPL-MCNC: 90 MG/DL
PH UR STRIP: 5 PH (ref 5–7)
POTASSIUM SERPL-SCNC: 4 MMOL/L (ref 3.4–5.3)
RBC #/AREA URNS AUTO: 21 /HPF (ref 0–2)
SODIUM SERPL-SCNC: 144 MMOL/L (ref 133–144)
SOURCE: ABNORMAL
SP GR UR STRIP: 1.02 (ref 1–1.03)
SQUAMOUS #/AREA URNS AUTO: 23 /HPF (ref 0–1)
TRANS CELLS #/AREA URNS HPF: 4 /HPF
TRIGL SERPL-MCNC: 255 MG/DL
UROBILINOGEN UR STRIP-MCNC: 2 MG/DL (ref 0–2)
WBC #/AREA URNS AUTO: 83 /HPF (ref 0–5)
WBC CLUMPS #/AREA URNS HPF: PRESENT /HPF

## 2020-10-08 PROCEDURE — 80048 BASIC METABOLIC PNL TOTAL CA: CPT | Performed by: INTERNAL MEDICINE

## 2020-10-08 PROCEDURE — G0008 ADMIN INFLUENZA VIRUS VAC: HCPCS | Performed by: INTERNAL MEDICINE

## 2020-10-08 PROCEDURE — 99214 OFFICE O/P EST MOD 30 MIN: CPT | Mod: 25 | Performed by: INTERNAL MEDICINE

## 2020-10-08 PROCEDURE — 87086 URINE CULTURE/COLONY COUNT: CPT | Performed by: INTERNAL MEDICINE

## 2020-10-08 PROCEDURE — 80061 LIPID PANEL: CPT | Performed by: INTERNAL MEDICINE

## 2020-10-08 PROCEDURE — 90662 IIV NO PRSV INCREASED AG IM: CPT | Performed by: INTERNAL MEDICINE

## 2020-10-08 PROCEDURE — 83036 HEMOGLOBIN GLYCOSYLATED A1C: CPT | Performed by: INTERNAL MEDICINE

## 2020-10-08 PROCEDURE — 99207 PR FOOT EXAM NO CHARGE: CPT | Performed by: INTERNAL MEDICINE

## 2020-10-08 PROCEDURE — 87186 SC STD MICRODIL/AGAR DIL: CPT | Performed by: INTERNAL MEDICINE

## 2020-10-08 PROCEDURE — 81001 URINALYSIS AUTO W/SCOPE: CPT | Performed by: INTERNAL MEDICINE

## 2020-10-08 PROCEDURE — 36415 COLL VENOUS BLD VENIPUNCTURE: CPT | Performed by: INTERNAL MEDICINE

## 2020-10-08 PROCEDURE — 87088 URINE BACTERIA CULTURE: CPT | Performed by: INTERNAL MEDICINE

## 2020-10-08 ASSESSMENT — PATIENT HEALTH QUESTIONNAIRE - PHQ9: SUM OF ALL RESPONSES TO PHQ QUESTIONS 1-9: 1

## 2020-10-08 ASSESSMENT — PAIN SCALES - GENERAL: PAINLEVEL: NO PAIN (1)

## 2020-10-08 NOTE — LETTER
October 14, 2020      Eliza Dubois  [address] [facility] [address]        Dear ,    We are writing to inform you of your test results.    Renal function is markedly decreased.   At this time, if you do not have one, I recommend we set up a nephrology referral.     Urinary analysis is consistent with urinary tract infection.   I have taken the opportunity of sending an antibiotic to your pharmacy.  We should repeat urinary analysis upon conclusion of the antibiotic.   You will be contacted with any outstanding results as they are available.   Feel free to contact me via the office or My Chart if you have any questions regarding the above.     Resulted Orders   Lipid panel reflex to direct LDL Non-fasting   Result Value Ref [place]    Cholesterol 131 <200 mg/dL    Triglycerides 255 (H) <150 mg/dL      Comment:      Borderline high:  150-199 mg/dl  High:             200-499 mg/dl  Very high:       >499 mg/dl  Fasting specimen      HDL Cholesterol 41 (L) >49 mg/dL    LDL Cholesterol Calculated 39 <100 mg/dL      Comment:      Desirable:       <100 mg/dl    Non HDL Cholesterol 90 <130 mg/dL   Hemoglobin A1c   Result Value Ref [place]    Hemoglobin A1C 6.9 (H) 0 - 5.6 %      Comment:      Normal <5.7% Prediabetes 5.7-6.4%  Diabetes 6.5% or higher - adopted from ADA   consensus guidelines.     Basic metabolic panel  (Ca, Cl, CO2, Creat, Gluc, K, Na, BUN)   Result Value Ref [place]    Sodium 144 133 - 144 mmol/L    Potassium 4.0 3.4 - 5.3 mmol/L    Chloride 107 94 - 109 mmol/L    Carbon Dioxide 29 20 - 32 mmol/L    Anion Gap 8 3 - 14 mmol/L    Glucose 139 (H) 70 - 99 mg/dL      Comment:      Fasting specimen    Urea Nitrogen 51 (H) 7 - 30 mg/dL    Creatinine 1.93 (H) 0.52 - 1.04 mg/dL    GFR Estimate 24 (L) >60 mL/min/[1.73_m2]      Comment:      Non  GFR Calc  Starting 12/18/2018, serum creatinine based estimated GFR (eGFR) will be   calculated using the Chronic Kidney Disease  Epidemiology Collaboration   (CKD-EPI) equation.      GFR Estimate If Black 28 (L) >60 mL/min/[1.73_m2]      Comment:       GFR Calc  Starting 12/18/2018, serum creatinine based estimated GFR (eGFR) will be   calculated using the Chronic Kidney Disease Epidemiology Collaboration   (CKD-EPI) equation.      Calcium 8.4 (L) 8.5 - 10.1 mg/dL   *UA reflex to Microscopic and Culture ([place]; [facility]; [facility]; [facility]; [facility]; [facility]; [place]; [place])   Result Value Ref [place]    Color Urine Leyla     Appearance Urine Cloudy     Glucose Urine >499 (A) NEG^Negative mg/dL    Bilirubin Urine Negative NEG^Negative    Ketones Urine Negative NEG^Negative mg/dL    Specific Gravity Urine 1.021 1.003 - 1.035    Blood Urine Small (A) NEG^Negative    pH Urine 5.0 5.0 - 7.0 pH    Protein Albumin Urine 100 (A) NEG^Negative mg/dL    Urobilinogen mg/dL 2.0 0.0 - 2.0 mg/dL    Nitrite Urine Negative NEG^Negative    Leukocyte Esterase Urine Large (A) NEG^Negative    Source Unspecified Urine     RBC Urine 21 (H) 0 - 2 /HPF    WBC Urine 83 (H) 0 - 5 /HPF    WBC Clumps Present (A) NEG^Negative /HPF    Bacteria Urine Many (A) NEG^Negative /HPF    Squamous Epithelial /HPF Urine 23 (H) 0 - 1 /HPF    Transitional Epi 4 (A) FEW^Few /HPF    Mucous Urine Present (A) NEG^Negative /LPF    Hyaline Casts 20 (H) 0 - 2 /LPF   Urine Culture Aerobic Bacterial   Result Value Ref [place]    Specimen Description Unspecified Urine     Culture Micro >100,000 colonies/mL  Escherichia coli   (A)     Culture Micro <10,000 colonies/mL  urogenital yaima          If you have any questions or concerns, please call the clinic at the number listed above.       Sincerely,    Byron Holm DO

## 2020-10-08 NOTE — PROGRESS NOTES
Subjective     Eliza Dubois is a 80 year old female who presents to clinic today for the following health issues:    HPI         Diabetes Follow-up      How often are you checking your blood sugar? Not at all    What concerns do you have today about your diabetes? None     Do you have any of these symptoms? (Select all that apply)  Numbness in feet and Burning in feet      BP Readings from Last 2 Encounters:   10/08/20 132/70   01/20/20 134/62     Hemoglobin A1C (%)   Date Value   01/20/2020 7.5 (H)   10/31/2019 7.9 (H)     LDL Cholesterol Calculated (mg/dL)   Date Value   10/31/2019 44   04/05/2017     Cannot estimate LDL when triglyceride exceeds 400 mg/dL     LDL Cholesterol Direct (mg/dL)   Date Value   10/26/2018 70   04/17/2018 77        EMR reviewed including: History of chief complaint, pertinent distant history, medications, concurrent diagnoses.             Chief Complaint:  #1   Follow-up on diabetes.  Taking medication compliantly.  Questionable rare hypoglycemic episodes  Eating appropriate diet  No skin lesions  Continued diabetic neuropathy of the feet.  Ocular examination is up-to-date.  On aspirin, ACE inhibitor, statin.    #2   Follow-up of hypertension.  Controlled on current medication  No headache or neurologic changes  No chest pain or cardiac concerns    #3   History of coronary artery disease.  Blood pressure and blood sugar control  Compliant with medication: Plavix, beta-blocker, aspirin.                         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     Surgical History:   has a past surgical history that includes NONSPECIFIC PROCEDURE  (1988); LAPAROSCOPY, SURGICAL; CHOLECYSTECTOMY (1997); colonoscopy (04/15/09); Colonoscopy (6/18/2014); Abdomen surgery; Phacoemulsification clear cornea with standard intraocular lens implant (Right, 3/16/2016); and Phacoemulsification clear cornea with standard intraocular lens implant (Left, 3/30/2016).     Social History:   reports that she has never smoked. She has never used smokeless tobacco. She reports that she does not drink alcohol or use drugs.     Family History:  family history includes Arthritis in her mother; Cancer in her maternal grandmother and another family member; Diabetes in her father and mother; EYE* in her mother; Genitourinary Problems in her father; Heart Disease in her father, maternal grandmother, and mother; Hypertension in her mother; Lipids in her mother; Neurologic Disorder in her mother; Obesity in her mother; Osteoporosis in her mother.            MEDICATIONS  Current Outpatient Medications   Medication Sig Dispense Refill     alcohol swab prep pads Use to swab area of injection/sarah as directed. 100 each 3     ASPIRIN 81 MG PO TABS Take 1 tablet by mouth once daily  11     atorvastatin (LIPITOR) 80 MG tablet Take 80 mg by mouth daily       Clopidogrel Bisulfate (PLAVIX PO) Take 75 mg by mouth daily        gabapentin (NEURONTIN) 300 MG capsule TAKE ONE TO TWO CAPSULES BY MOUTH EVERY EVENING 60 capsule 0     glipiZIDE (GLUCOTROL) 10 MG tablet TAKE TWO TABLETS BY MOUTH TWICE A DAY BEFORE MEALS 360 tablet 0     Isosorbide Mononitrate (IMDUR PO) Take 30 mg by mouth daily       JARDIANCE 25 MG TABS tablet TAKE ONE TABLET BY MOUTH ONCE DAILY 90 tablet 0     lisinopril-hydrochlorothiazide (ZESTORETIC) 20-25 MG tablet TAKE ONE TABLET BY MOUTH ONCE DAILY 90 tablet 0     MELATONIN PO Take 3 mg by mouth nightly as needed        metFORMIN (GLUCOPHAGE-XR) 500 MG 24 hr tablet TAKE TWO TABLETS BY MOUTH TWICE A DAY WITH MEALS 360 tablet 0     metoprolol succinate ER (TOPROL-XL) 100 MG 24 hr  tablet TAKE ONE TABLET BY MOUTH ONCE DAILY 30 tablet 4     omeprazole (PRILOSEC) 40 MG DR capsule TAKE ONE CAPSULE BY MOUTH TWICE A DAY AS NEEDED 180 capsule 0     PARoxetine (PAXIL) 40 MG tablet TAKE ONE TABLET BY MOUTH EVERY MORNING 30 tablet 1     blood glucose (NO BRAND SPECIFIED) test strip Use to test blood sugar 2 times daily or as directed. To accompany: Blood Glucose Monitor Brands: per insurance. (Patient not taking: Reported on 1/20/2020) 100 strip 6     blood glucose calibration (NO BRAND SPECIFIED) solution To accompany: Blood Glucose Monitor Brands: per insurance. (Patient not taking: Reported on 1/20/2020) 1 Bottle 3     blood glucose monitoring (ACCU-CHEK COMPACT PLUS) test strip USE TO CHECK BLOOD SUGARS TWO TIMES A DAY OR AS DIRECTED.  Appointment needed for additional refills. (Patient not taking: Reported on 1/20/2020) 102 each 0     blood glucose monitoring (ACCU-CHEK MULTICLIX) lancets Use to test blood sugar 2 times daily or as directed. (Patient not taking: Reported on 1/20/2020) 1 Box 11     blood glucose monitoring (NO BRAND SPECIFIED) meter device kit Use to test blood sugar 2 times daily or as directed. Preferred blood glucose meter OR supplies to accompany: Blood Glucose Monitor Brands: per insurance. (Patient not taking: Reported on 1/20/2020) 1 kit 0     thin (NO BRAND SPECIFIED) lancets Use with lanceting device. To accompany: Blood Glucose Monitor Brands: per insurance. (Patient not taking: Reported on 1/20/2020) 100 each 6         --------------------------------------------------------------------------------------------------------------------                              Review of Systems       LUNGS: Pt denies: cough, excess sputum, hemoptysis, or shortness of breath.   HEART: Pt denies: chest pain, arrhythmia, syncope, tachy or bradyarrhythmia.   GI: Pt denies: nausea, vomiting, diarrhea, constipation, melena, or hematochezia.   NEURO: Pt denies: seizures, strokes, diplopia,  "weakness, paraesthesias, or paralysis.   SKIN: Pt denies: itching, rashes, discoloration, or specific lesions of concern. Denies recent hair loss.   PSYCH: The patient denies significant depression, anxiety, mood imbalance. Specifically denies any suicidal ideation.        Examination    /70 (BP Location: Left arm, Patient Position: Sitting, Cuff Size: Adult Regular)   Pulse 68   Temp 97.1  F (36.2  C) (Temporal)   Resp 16   Wt 60.2 kg (132 lb 12.8 oz)   SpO2 95%   BMI 23.16 kg/m      Constitutional: The patient appears to be in no acute distress. The patient appears to be adequately hydrated. No acute respiratory or hemodynamic distress is noted at this time.   LUNGS: clear bilaterally, airflow is brisk, no intercostal retraction or stridor is noted. No coughing is noted during visit.   HEART:  regular without rubs, clicks, gallops, or murmurs. PMI is nondisplaced. Upstrokes are brisk. S1,S2 are heard.   GI: Abdomen is soft, without rebound, guarding or tenderness. Bowel sounds are appropriate. No renal bruits are heard.   NEURO: Pt is alert and appropriate. No neurologic lateralization is noted. Cranial nerves 2-12 are intact.  Peripheral sensory function is decreased in the feet   SKIN:  warm and dry. No erythema, or rashes are noted. No specific lesions of concern are noted.    PSYCH: The patient appears grossly appropriate. Maintains good eye contact, does not have any jittery or atypical motion. Displays appropriate affect.   Feet: no evidence of skin breakdown or ulceration is noted.  Sensation is decreased to monofilament and vibration.  Pulses are strong, capillary refill is brisk.         Decision Making       1. Type 2 diabetes mellitus with diabetic polyneuropathy, without long-term current use of insulin (H)  Check A1c and renal function.  Continue current medications.  Check blood sugar during \"a spell\" to determine if truly hypoglycemic.  - Hemoglobin A1c  - FOOT EXAM    2. Hypertension " goal BP (blood pressure) < 140/90  Check renal function and electrolytes  - Basic metabolic panel  (Ca, Cl, CO2, Creat, Gluc, K, Na, BUN)  - *UA reflex to Microscopic and Culture ([place]; [facility]; [facility]; [facility]; [facility]; [facility]; [place]; [place])    3. Coronary artery disease involving native coronary artery of native heart without angina pectoris  Continue current medication.  Currently stable    4. Need for prophylactic vaccination and inoculation against influenza  Given  - FLUZONE HIGH DOSE 65+  [59010]  - ADMIN INFLUENZA (For MEDICARE Patients ONLY) []    5. Hyperlipidemia LDL goal <100  Check lipids.  Adjust statin accordingly  - Lipid panel reflex to direct LDL Non-fasting                                 FOLLOW UP   I have asked the patient to make an appointment for followup with me in 4 months            I have carefully explained the diagnosis and treatment options to the patient.  The patient has displayed an understanding of the above, and all subsequent questions were answered.      DO GEMA Campos    Portions of this note were produced using [employer]  Although every attempt at real-time proof reading has been made, occasional grammar/syntax errors may have been missed.

## 2020-10-09 LAB
BACTERIA SPEC CULT: ABNORMAL
BACTERIA SPEC CULT: ABNORMAL
SPECIMEN SOURCE: ABNORMAL

## 2020-10-12 DIAGNOSIS — E11.42 TYPE 2 DIABETES MELLITUS WITH DIABETIC POLYNEUROPATHY, WITHOUT LONG-TERM CURRENT USE OF INSULIN (H): ICD-10-CM

## 2020-10-12 NOTE — TELEPHONE ENCOUNTER
Gabapentin      Last Written Prescription Date:  08/19/2020  Last Fill Quantity: 60,   # refills: 0  Last Office Visit: 10/08/2020  Future Office visit:   None    Routing refill request to provider for review/approval because:  Drug not on the [facility], [facility] or [facility] refill protocol or controlled substance    Paulina Garrett RN

## 2020-10-13 RX ORDER — CEPHALEXIN 500 MG/1
500 CAPSULE ORAL 3 TIMES DAILY
Qty: 21 CAPSULE | Refills: 0 | Status: ON HOLD | OUTPATIENT
Start: 2020-10-13 | End: 2020-12-11

## 2020-10-13 RX ORDER — GABAPENTIN 300 MG/1
CAPSULE ORAL
Qty: 60 CAPSULE | Refills: 0 | Status: ON HOLD | OUTPATIENT
Start: 2020-10-13 | End: 2020-12-11

## 2020-10-14 NOTE — RESULT ENCOUNTER NOTE
Letter sent to patient informing of results.  ................Juan Luis Duggan LPN,   October 14, 2020,      9:30 AM,   [facility]

## 2020-10-14 NOTE — RESULT ENCOUNTER NOTE
Please call        Dear Eliza, your recent test results are attached.    Renal function is markedly decreased.  At this time, if you do not have one, I recommend we set up a nephrology referral.    Urinary analysis is consistent with urinary tract infection.  I have taken the opportunity of sending an antibiotic to your pharmacy.  We should repeat urinary analysis upon conclusion of the antibiotic.  You will be contacted with any outstanding results as they are available.  Feel free to contact me via the office or My Chart if you have any questions regarding the above.

## 2020-10-16 DIAGNOSIS — E11.42 TYPE 2 DIABETES MELLITUS WITH DIABETIC POLYNEUROPATHY, WITHOUT LONG-TERM CURRENT USE OF INSULIN (H): ICD-10-CM

## 2020-10-16 NOTE — TELEPHONE ENCOUNTER
Routing refill request to provider for review/approval because:  Labs out of range:  Creatinine    CHARLES CrossN, RN  [facility]

## 2020-10-19 RX ORDER — GLIPIZIDE 10 MG/1
TABLET ORAL
Qty: 360 TABLET | Refills: 1 | Status: ON HOLD | OUTPATIENT
Start: 2020-10-19 | End: 2020-12-11

## 2020-10-21 DIAGNOSIS — E11.42 TYPE 2 DIABETES MELLITUS WITH DIABETIC POLYNEUROPATHY, WITHOUT LONG-TERM CURRENT USE OF INSULIN (H): ICD-10-CM

## 2020-10-30 DIAGNOSIS — E11.42 TYPE 2 DIABETES MELLITUS WITH DIABETIC POLYNEUROPATHY, WITHOUT LONG-TERM CURRENT USE OF INSULIN (H): ICD-10-CM

## 2020-10-30 RX ORDER — METFORMIN HCL 500 MG
TABLET, EXTENDED RELEASE 24 HR ORAL
Qty: 360 TABLET | Refills: 1 | Status: ON HOLD | OUTPATIENT
Start: 2020-10-30 | End: 2020-12-11

## 2020-10-30 NOTE — TELEPHONE ENCOUNTER
Routing refill request to provider for review/approval because:  Labs out of range:  Creatinine, GFR    CHARLES CrossN, RN  [facility]

## 2020-11-22 ENCOUNTER — APPOINTMENT (OUTPATIENT)
Dept: GENERAL RADIOLOGY | Facility: CLINIC | Age: 80
End: 2020-11-22
Attending: FAMILY MEDICINE
Payer: MEDICARE

## 2020-11-22 ENCOUNTER — APPOINTMENT (OUTPATIENT)
Dept: CT IMAGING | Facility: CLINIC | Age: 80
End: 2020-11-22
Attending: FAMILY MEDICINE
Payer: MEDICARE

## 2020-11-22 ENCOUNTER — HOSPITAL ENCOUNTER (EMERGENCY)
Facility: CLINIC | Age: 80
Discharge: HOME | End: 2020-11-22
Attending: FAMILY MEDICINE | Admitting: FAMILY MEDICINE
Payer: MEDICARE

## 2020-11-22 VITALS
HEART RATE: 70 BPM | WEIGHT: 134 LBS | BODY MASS INDEX: 23.36 KG/M2 | RESPIRATION RATE: 18 BRPM | OXYGEN SATURATION: 98 % | SYSTOLIC BLOOD PRESSURE: 198 MMHG | DIASTOLIC BLOOD PRESSURE: 89 MMHG | TEMPERATURE: 98 F
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
## 2020-11-22 DIAGNOSIS — S42.202A CLOSED FRACTURE OF PROXIMAL END OF LEFT HUMERUS, UNSPECIFIED FRACTURE MORPHOLOGY, INITIAL ENCOUNTER: ICD-10-CM

## 2020-11-22 PROCEDURE — 99285 EMERGENCY DEPT VISIT HI MDM: CPT | Mod: 25 | Performed by: FAMILY MEDICINE

## 2020-11-22 PROCEDURE — 73060 X-RAY EXAM OF HUMERUS: CPT | Mod: LT

## 2020-11-22 PROCEDURE — 23600 CLTX PROX HUMRL FX W/O MNPJ: CPT | Mod: 54 | Performed by: FAMILY MEDICINE

## 2020-11-22 PROCEDURE — 73030 X-RAY EXAM OF SHOULDER: CPT | Mod: LT

## 2020-11-22 PROCEDURE — 250N000011 HC RX IP 250 OP 636: Performed by: FAMILY MEDICINE

## 2020-11-22 PROCEDURE — 72125 CT NECK SPINE W/O DYE: CPT

## 2020-11-22 PROCEDURE — 70450 CT HEAD/BRAIN W/O DYE: CPT

## 2020-11-22 PROCEDURE — 96374 THER/PROPH/DIAG INJ IV PUSH: CPT | Performed by: FAMILY MEDICINE

## 2020-11-22 PROCEDURE — 23600 CLTX PROX HUMRL FX W/O MNPJ: CPT | Mod: LT | Performed by: FAMILY MEDICINE

## 2020-11-22 RX ORDER — OXYCODONE HYDROCHLORIDE 5 MG/1
5 TABLET ORAL EVERY 4 HOURS PRN
Qty: 10 TABLET | Refills: 0 | Status: ON HOLD | OUTPATIENT
Start: 2020-11-22 | End: 2020-12-11

## 2020-11-22 RX ORDER — OXYCODONE HYDROCHLORIDE 5 MG/1
5 TABLET ORAL EVERY 4 HOURS PRN
Qty: 4 TABLET | Refills: 0 | Status: SHIPPED | OUTPATIENT
Start: 2020-11-22 | End: 2020-11-22

## 2020-11-22 RX ORDER — FENTANYL CITRATE 50 UG/ML
25 INJECTION, SOLUTION INTRAMUSCULAR; INTRAVENOUS ONCE
Status: COMPLETED | OUTPATIENT
Start: 2020-11-22 | End: 2020-11-22

## 2020-11-22 RX ORDER — OXYCODONE HYDROCHLORIDE 5 MG/1
5 TABLET ORAL EVERY 4 HOURS PRN
Status: DISCONTINUED | OUTPATIENT
Start: 2020-11-22 | End: 2020-11-23 | Stop reason: HOSPADM

## 2020-11-22 RX ADMIN — FENTANYL CITRATE 25 MCG: 50 INJECTION INTRAMUSCULAR; INTRAVENOUS at 19:56

## 2020-11-22 NOTE — ED AVS SNAPSHOT
[facility] Emergency Dept  [address] [place] [address]  Phone: [phone]  Fax: [phone]                                    Eliza Dubois   MRN: [id number]    Department: [facility] Emergency Dept   Date of Visit: 11/22/2020           After Visit Summary Signature Page    I have received my discharge instructions, and my questions have been answered. I have discussed any challenges I see with this plan with the nurse or doctor.    ..........................................................................................................................................  Patient/Patient Representative Signature      ..........................................................................................................................................  Patient Representative Print Name and Relationship to Patient    ..................................................               ................................................  Date                                   Time    ..........................................................................................................................................  Reviewed by Signature/Title    ...................................................              ..............................................  Date                                               Time          22EPIC Rev 08/18

## 2020-11-23 NOTE — ED NOTES
Patient reports feeling wet after xray. Noted to have urinated in underwear. Patient changed into brief and dry scrubs. New ice pack in place under arm.

## 2020-11-23 NOTE — ED TRIAGE NOTES
Pt was walking into her home and fell.  Landed on her left shoulder/arm.  Also hit her head. No loc, Is on blood thinners.

## 2020-11-23 NOTE — ED PROVIDER NOTES
[facility] Trauma Record    Level of trauma activation: Trauma Evaluation  MD lily degroot at: 1940     History obtained from:  the patient    Chief Complaint:  Major Trauma    History of Present Illness   Eliza Dubois is a 80 year old old female who was transported by ambulance from home after a traumatic event that occurred just prior to arrival.   Injury mechanism is specifically described as a fall. Height of fall:  Ground level  Protective devices used by the patient include: None.   Patient arrived without rigid cervical collar or back board.   Current Symptoms: left shoulder/upper arm pain  Suspected Intoxicants:  None    Prehospital interventions:   C-collar placement: Not indicated    Spine board placement: Not indicated    Respiratory Support: None  Pre-hospital Medications Administered: none  Other: N/A      Walking from her car to the house and lost her balance and fell backwards.  She hit the back of her head on concrete but she states it was not very hard.  Main complaint is left shoulder and upper arm pain.  Denies any hip pain.  No neck or back pain.  No chest abdominal pain, shortness of breath or other injuries.  She did not lose consciousness.  Is alert and oriented x3.  She takes clopidogrel.        Past Medical, Surgical, Social History     Active Problem List:   Patient Active Problem List   Diagnosis     Irritable bowel syndrome     Esophageal reflux     HYPERLIPIDEMIA LDL GOAL <100     Hypertension goal BP (blood pressure) < 140/90     Advanced directives, counseling/discussion     Insomnia     Anxiety     Major depressive disorder, recurrent episode, moderate (H)     Coronary artery disease involving native coronary artery of native heart without angina pectoris     Gastroesophageal reflux disease without esophagitis     Essential hypertension with goal blood pressure less than 140/90     Type 2 diabetes mellitus with diabetic polyneuropathy, without long-term current use of  insulin (H)     CKD (chronic kidney disease) stage 3, GFR 30-59 ml/min       Past Medical History:   Diagnosis Date     Diabetic eye exam (H) 05/01/14     Heart attack (H)      Pure hypercholesterolemia      Stented coronary artery      Type II or unspecified type diabetes mellitus without mention of complication, not stated as uncontrolled 2002    [place]and[place]et.     Unspecified disease of pericardium 12/05/08    Pericarditis w/mild to moderate pericardial effusion.     Unspecified essential hypertension        Past Surgical History:   Procedure Laterality Date     ABDOMEN SURGERY       COLONOSCOPY  04/15/09     COLONOSCOPY  6/18/2014    Procedure: COMBINED COLONOSCOPY, SINGLE BIOPSY/POLYPECTOMY BY BIOPSY;  Surgeon: Earle Mon MD;  Location: [facility] LAPAROSCOPY, SURGICAL; CHOLECYSTECTOMY  1997    Cholecystectomy, Laparoscopic     PHACOEMULSIFICATION CLEAR CORNEA WITH STANDARD INTRAOCULAR LENS IMPLANT Right 3/16/2016    Procedure: PHACOEMULSIFICATION CLEAR CORNEA WITH STANDARD INTRAOCULAR LENS IMPLANT;  Surgeon: George Lemus MD;  Location: [facility]     PHACOEMULSIFICATION CLEAR CORNEA WITH STANDARD INTRAOCULAR LENS IMPLANT Left 3/30/2016    Procedure: PHACOEMULSIFICATION CLEAR CORNEA WITH STANDARD INTRAOCULAR LENS IMPLANT;  Surgeon: George Lemus MD;  Location:  [facility]     ZZC NONSPECIFIC PROCEDURE  1988    Hysterectomy       Social History     Socioeconomic History     Marital status:      Spouse name: Not on file     Number of children: Not on file     Years of education: Not on file     Highest education level: Not on file   Occupational History     Not on file   Social Needs     Financial resource strain: Not on file     Food insecurity     Worry: Not on file     Inability: Not on file     Transportation needs     Medical: Not on file     Non-medical: Not on file   Tobacco Use     Smoking status: Never Smoker     Smokeless tobacco: Never Used   Substance and Sexual Activity     Alcohol use: No      Alcohol/week: 0.0 standard drinks     Drug use: No     Sexual activity: Yes     Partners: Male     Birth control/protection: Surgical   Lifestyle     Physical activity     Days per week: Not on file     Minutes per session: Not on file     Stress: Not on file   Relationships     Social connections     Talks on phone: Not on file     Gets together: Not on file     Attends Oriental orthodox service: Not on file     Active member of club or organization: Not on file     Attends meetings of clubs or organizations: Not on file     Relationship status: Not on file     Intimate partner violence     Fear of current or ex partner: Not on file     Emotionally abused: Not on file     Physically abused: Not on file     Forced sexual activity: Not on file   Other Topics Concern     Parent/sibling w/ CABG, MI or angioplasty before 65F 55M? Not Asked   Social History Narrative     Not on file          Allergies   Allergen Reactions     Sulfa Drugs Hives       Med List Reviewed        Past medical, surgical, and social history reviewed to the extent possible.      ROS: All other systems reviewed and are negative.       Physical Examination   Vitals were reviewed    BP (!) 198/89   Pulse 70   Temp 97.9  F (36.6  C) (Oral)   Resp 20   Wt 60.8 kg (134 lb)   SpO2 99%   BMI 23.36 kg/m      Primary Survey:  Airway: Intact, Patent and Talking  Breathing: Spontaneous, Bilateral breath sounds and Non labored  Circulation: Awake and alert with normal blood pressure and normal central and peripheral perfusion   Disability:  Pupils: EOMI PERRL     GCS:   Motor 6=Obeys commands   Verbal 5=Oriented   Eye Opening 4=Spontaneous   Total: 15     Environment & Exposure: Patient was completely exposed and a head-to-toe visual inspection was done.      Bedside FAST Exam: not indicated, no chest/abd pain or injury      Secondary Survey:  Neurologic: Alert, oriented, and coherent., Motor strength intact in the upper and lower extremities on manual  "muscle testing except left upper arm due to pain., Sensation intact in the upper and lower extremities,   HEENT  Eyes: PERRL, EOMI, lids, lashes, conjunctivae and corneas normal  Head: Atraumatic normocephalic, no areas of erythema, ecchymosis, swelling, deformity or other injury.  She does have some exophytes on her scalp  Ears: Pinnas normal. EACs clear.  TMs normal. No hemotympanum otorrhea  Nose/Sinus: No external injury. No pain or instability on palpation. Nares normal. Septum midline. No septal hematoma,  Throat/Oropharynx: Lips, tongue, and buccal mucosa intact without evidence of injury. , Teeth intact, Posterior pharynx clear. and Jaw motion normal and without trismus or jaw tendersness. No malocclusion.  Face: No areas of  erythema, ecchymosis, swelling, or deformity.  Neck/C-Spine: Able to focus attention on neck, Full, pain free active range of motion of neck and No tenderness to palpation or percussion over the midline cervical spine  Chest: External Exam - No areas of  erythema, ecchymosis, swelling, or deformity, crepitus or subcutaneous emphysema, \"External Exam - No areas of  erythema, ecchymosis, swelling, or deformity, crepitus or subcutaneous emphysema\",     Pulmonary: Breathing unlabored. Breath sounds clear bilaterally with good air entry and no retractions, tachypnea, or adventitious sounds.  Cardiovascular: Rhythm regular, rate normal, no murmur  Pulses: Bilateral DP and PT normal   Abdomen: Non-distended, bowel sounds active, soft, non-tender, no hepatosplenomegaly or masses. No areas of  abrasion, laceration, or ecchymosis.  Rectal: Not examined  Genitourinary: Not examined  Back:  No areas of  erythema, ecchymosis, swelling, or deformity. and No midline tenderness to palpation or percussion over the length of the spine  Extremities: Tenderness about the left shoulder and proximal humerus.  Decreased range of motion due to pain.  Elbow wrist and hand are normal.  Hips are nontender and " pelvis is stable.  No clavicular tenderness or swelling.            C-spine clearance: CT negative and no midline tenderness   Spine clearance: Patient competent, no spine tenderness or pain - Time Spine Board Removed: NA    Available Lab/Imaging Results     Results for orders placed or performed during the hospital encounter of 11/22/20 (from the past 24 hour(s))   Head CT w/o contrast    Narrative    CT SCAN OF THE HEAD WITHOUT CONTRAST   11/22/2020 8:28 PM     HISTORY: fall, hit back of head, on anticoagulation    TECHNIQUE:  Axial images of the head and coronal reformations without  IV contrast material. Radiation dose for this scan was reduced using  automated exposure control, adjustment of the mA and/or kV according  to patient size, or iterative reconstruction technique.    COMPARISON: None.    FINDINGS:     Intracranial contents: There is diffuse parenchymal volume loss.   White matter changes are present in the cerebral hemispheres that are  consistent with small vessel ischemic disease in this age patient.  There is no evidence of intracranial hemorrhage, mass, acute infarct  or anomaly.    Visualized orbits/sinuses/mastoids:  The visualized portions of the  sinuses and mastoids appear normal.    Osseous structures/soft tissues:  No fractures are identified. There  is a 1 cm osteoma projected off the outer table of the skull in the  right frontal region. several hypodense cystic structures are seen in  the subcutaneous fat. These are consistent with sebaceous cyst.      Impression    IMPRESSION: No intracranial hemorrhage or skull fractures.      BRITANY MIRAMONTES MD   Cervical spine CT w/o contrast    Narrative    CT CERVICAL SPINE WITHOUT CONTRAST   11/22/2020 8:28 PM     HISTORY: fell backwards, hit head     TECHNIQUE: Axial images of the cervical spine were obtained without  intravenous contrast. Multiplanar reformations were performed.   Radiation dose for this scan was reduced using automated  exposure  control, adjustment of the mA and/or kV according to patient size, or  iterative reconstruction technique.    COMPARISON: None.    FINDINGS: There is no evidence of fracture. There is reversal of the  cervical lordosis.      Craniocervical junction: Normal.     C1-C2:  Calcified pannus is seen posterior to the odontoid. This can  be seen in patients with calcium pyrophosphate deposition disease.     C2-C3:  Mild annular disc bulge. Degenerative changes in the facet  joints with ankylosis of the facet joints.     C3-C4:  Mild annular disc bulge. Right central disc protrusion causing  mass effect on the dural sac. This extends laterally to the level of  the right C3-4 neural foramen. There are severe degenerative changes  in the right facet joint.     C4-C5:  Mild annular bulge. Central canal and neural foramen are  patent.     C5-C6:  Central canal and neural foramen are patent. Degenerative  change left facet joint      C6-C7:  Central canal and neural foramen are patent.      C7-T1:   Central canal and neural foramen are patent.      Impression    IMPRESSION:    1. No fractures are identified.  2. Multilevel degenerative changes.    3. There is a small right-sided disc protrusion at C3-4 extending  laterally to the level of the neural foramen. This could affect the  exiting right C4 nerve root.    BRITANY MIRAMONTES MD   XR Shoulder Left G/E 3 Views    Narrative    EXAM: XR SHOULDER LT G/E 3 VW  LOCATION: [facility]  DATE/TIME: 11/22/2020 8:43 PM    INDICATION: Pain after fall  COMPARISON: None.      Impression    IMPRESSION: Acute comminuted fracture of the proximal left humerus. There is a transversely oriented fracture line at the surgical neck with mild impaction. There is longitudinally oriented fracture line at the greater tuberosity with mild displacement.   Normal joint alignment maintained. There is normal joint spacing. Osteopenia. Aortic atherosclerosis.   Humerus XR,  G/E 2 views, left     Narrative    EXAM: XR HUMERUS LT G/E 2 VW  LOCATION: [facility]  DATE/TIME: 11/22/2020 8:43 PM    INDICATION: Pain after fall  COMPARISON: None.      Impression    IMPRESSION: Acute comminuted fracture of the proximal left humerus. There is a transversely oriented fracture line at the surgical neck with impaction. There is longitudinally oriented fracture line at the greater tuberosity with mild displacement.   Normal joint alignment maintained. There is normal joint spacing. Osteopenia.             Impression     Final diagnoses:   Closed fracture of proximal end of left humerus, unspecified fracture morphology, initial encounter   Fall from ground level       ED Course & Medical Decision Making:  Eliza Dubois is a 80 year old female seen in the emergency department following trauma activation for left shoulder and upper arm pain after a ground-level fall at home.  She hit the back of her head and is on clopidogrel so we did do a CT of the head and C-spine, both of which were negative.    Left shoulder and humerus x-rays show impacted comminuted fracture of the proximal humerus fracture line at the greater tuberosity with mild displacement.    I spoke with Dr. Cameron from orthopedics.  He recommended either a sling or shoulder immobilizer whichever is more comfortable and then follow-up with orthopedics in 1 week.  The safest medication for her to use would be Tylenol for pain.  A prescription for oxycodone was also provided to use for more severe pain.  She opted for the sling and I asked registration to set her up to see orthopedics in 1 week.  Written discharge instructions given.  She is comfortable with this plan.           GCS prior to admit/transfer/discharge:  Motor 6=Obeys commands   Verbal 5=Oriented   Eye Opening 4=Spontaneous   Total: 15       0 mins of critical care time, not including procedures, was spent directly at patient s bedside, reviewing records, and coordinating care  with consultants.       Bernardo Hopper MD  11/22/20 [id number]

## 2020-11-23 NOTE — ED NOTES
Bed: ED03  Expected date: 11/22/20  Expected time: 7:30 PM  Means of arrival: Ambulance  Comments:

## 2020-11-23 NOTE — ED NOTES
Spoke with patient's daughter, Neyda, on the phone about discharge instructions regarding pain management and when to return to ED. Also informed her that we will be setting up a follow up appointment with orthopedics. She verbalized understanding and will be here to  patient shortly. Gladys Chase RN

## 2020-11-23 NOTE — DISCHARGE INSTRUCTIONS
Wear the sling for comfort and support.  Ice 20 minutes per hour, (20 minutes on, 40 minutes off) at least 4 times a day.  Tylenol would be the safest medication to use for pain.  You can use the oxycodone sparingly for more severe pain.    All narcotics can cause drowsiness and constipation so be careful to avoid those problems.  Take an OTC stool softener if needed.  Narcotics also have addictive potential and can actually make you more sensitive to pain in as little as 3 days so only use them for a very short time.  You should not drive or operate machinery while taking narcotics.  Follow-up with orthopedics in clinic in 1 week.  The CT of your head and neck did not show any bleeding or fractures which is reassuring.  It was a true pleasure visiting with you this evening.  I hope you heal up quickly. Happy Thanksgiving!    Thank you for choosing [facility]. We appreciate the opportunity to meet your urgent medical needs. Please let us know if we could have done anything to make your stay more satisfying.    After discharge, please closely monitor for any new or worsening symptoms. Return to the Emergency Department if you develop any acute worsening signs or symptoms.    If you had lab work, cultures or imaging studies done during your stay, the final results may still be pending. We will call you if your plan of care needs to change. However, if you are not improving as expected, please follow up with your primary care provider or clinic.     Start any prescription medications that were prescribed to you and take them as directed.     Please see additional handouts that may be pertinent to your condition.

## 2020-12-05 ENCOUNTER — TELEPHONE (OUTPATIENT)
Dept: FAMILY MEDICINE | Facility: OTHER | Age: 80
End: 2020-12-05

## 2020-12-05 NOTE — TELEPHONE ENCOUNTER
Reason for call:  Other   Patient called regarding (reason for call): call back  Additional comments: Patient's daughter is calling to get some direction or information concerning Eliza's recent COVID diagnosis    Phone number to reach patient:  Cell number on file:    No relevant phone numbers on file.       Best Time:  As soon as possible    Can we leave a detailed message on this number?  YES    Travel screening: Positive: unable to schedule an appointment, due to positive travel screen.  Informed patient/caller that a message will be sent to the clinic; who will then call them back to discuss next steps.     Patient screened positive for: Coronavirus

## 2020-12-08 ENCOUNTER — HOSPITAL ENCOUNTER (INPATIENT)
Facility: CLINIC | Age: 80
LOS: 4 days | Discharge: NURSING, REHAB OR LONG-TERM CARE | DRG: 177 | End: 2020-12-12
Attending: FAMILY MEDICINE | Admitting: INTERNAL MEDICINE
Payer: MEDICARE

## 2020-12-08 DIAGNOSIS — K21.9 GASTROESOPHAGEAL REFLUX DISEASE WITHOUT ESOPHAGITIS: ICD-10-CM

## 2020-12-08 DIAGNOSIS — I10 HYPERTENSION GOAL BP (BLOOD PRESSURE) < 140/90: ICD-10-CM

## 2020-12-08 DIAGNOSIS — E11.42 TYPE 2 DIABETES MELLITUS WITH DIABETIC POLYNEUROPATHY, WITHOUT LONG-TERM CURRENT USE OF INSULIN (H): ICD-10-CM

## 2020-12-08 DIAGNOSIS — F33.1 MAJOR DEPRESSIVE DISORDER, RECURRENT EPISODE, MODERATE (H): ICD-10-CM

## 2020-12-08 DIAGNOSIS — M62.81 GENERALIZED MUSCLE WEAKNESS: ICD-10-CM

## 2020-12-08 DIAGNOSIS — N39.0 E. COLI UTI: ICD-10-CM

## 2020-12-08 DIAGNOSIS — B96.20 E. COLI UTI: ICD-10-CM

## 2020-12-08 DIAGNOSIS — U07.1 2019 NOVEL CORONAVIRUS DISEASE (COVID-19): ICD-10-CM

## 2020-12-08 DIAGNOSIS — S42.202D CLOSED FRACTURE OF PROXIMAL END OF LEFT HUMERUS WITH ROUTINE HEALING, UNSPECIFIED FRACTURE MORPHOLOGY, SUBSEQUENT ENCOUNTER: ICD-10-CM

## 2020-12-08 DIAGNOSIS — N39.0 URINARY TRACT INFECTION WITHOUT HEMATURIA, SITE UNSPECIFIED: ICD-10-CM

## 2020-12-08 DIAGNOSIS — I25.10 CORONARY ARTERY DISEASE INVOLVING NATIVE CORONARY ARTERY OF NATIVE HEART WITHOUT ANGINA PECTORIS: Primary | ICD-10-CM

## 2020-12-08 LAB
ALBUMIN SERPL-MCNC: 3.3 G/DL (ref 3.4–5)
ALBUMIN UR-MCNC: 30 MG/DL
ALP SERPL-CCNC: 121 U/L (ref 40–150)
ALT SERPL W P-5'-P-CCNC: 19 U/L (ref 0–50)
ANION GAP SERPL CALCULATED.3IONS-SCNC: 10 MMOL/L (ref 3–14)
APPEARANCE UR: ABNORMAL
AST SERPL W P-5'-P-CCNC: 24 U/L (ref 0–45)
BACTERIA #/AREA URNS HPF: ABNORMAL /HPF
BASOPHILS # BLD AUTO: 0 10E9/L (ref 0–0.2)
BASOPHILS NFR BLD AUTO: 0.2 %
BILIRUB SERPL-MCNC: 0.7 MG/DL (ref 0.2–1.3)
BILIRUB UR QL STRIP: NEGATIVE
BUN SERPL-MCNC: 38 MG/DL (ref 7–30)
CALCIUM SERPL-MCNC: 8.8 MG/DL (ref 8.5–10.1)
CHLORIDE SERPL-SCNC: 103 MMOL/L (ref 94–109)
CO2 SERPL-SCNC: 25 MMOL/L (ref 20–32)
COLOR UR AUTO: YELLOW
CREAT SERPL-MCNC: 1.12 MG/DL (ref 0.52–1.04)
CRP SERPL-MCNC: 10.5 MG/L (ref 0–8)
D DIMER PPP FEU-MCNC: 1.1 UG/ML FEU (ref 0–0.5)
DIFFERENTIAL METHOD BLD: ABNORMAL
EOSINOPHIL NFR BLD AUTO: 0 %
ERYTHROCYTE [DISTWIDTH] IN BLOOD BY AUTOMATED COUNT: 15.4 % (ref 10–15)
GFR SERPL CREATININE-BSD FRML MDRD: 46 ML/MIN/{1.73_M2}
GLUCOSE SERPL-MCNC: 289 MG/DL (ref 70–99)
GLUCOSE UR STRIP-MCNC: >499 MG/DL
HCT VFR BLD AUTO: 39 % (ref 35–47)
HGB BLD-MCNC: 12.3 G/DL (ref 11.7–15.7)
HGB UR QL STRIP: ABNORMAL
IMM GRANULOCYTES # BLD: 0 10E9/L (ref 0–0.4)
IMM GRANULOCYTES NFR BLD: 0.4 %
KETONES UR STRIP-MCNC: 20 MG/DL
LEUKOCYTE ESTERASE UR QL STRIP: ABNORMAL
LYMPHOCYTES # BLD AUTO: 0.6 10E9/L (ref 0.8–5.3)
LYMPHOCYTES NFR BLD AUTO: 11.2 %
MCH RBC QN AUTO: 28.5 PG (ref 26.5–33)
MCHC RBC AUTO-ENTMCNC: 31.5 G/DL (ref 31.5–36.5)
MCV RBC AUTO: 90 FL (ref 78–100)
MONOCYTES # BLD AUTO: 0.4 10E9/L (ref 0–1.3)
MONOCYTES NFR BLD AUTO: 7.6 %
MUCOUS THREADS #/AREA URNS LPF: PRESENT /LPF
NEUTROPHILS # BLD AUTO: 4.3 10E9/L (ref 1.6–8.3)
NEUTROPHILS NFR BLD AUTO: 80.6 %
NITRATE UR QL: POSITIVE
NRBC # BLD AUTO: 0 10*3/UL
NRBC BLD AUTO-RTO: 0 /100
PH UR STRIP: 5 PH (ref 5–7)
PLATELET # BLD AUTO: 164 10E9/L (ref 150–450)
POTASSIUM SERPL-SCNC: 3.9 MMOL/L (ref 3.4–5.3)
PROT SERPL-MCNC: 7.9 G/DL (ref 6.8–8.8)
RBC # BLD AUTO: 4.32 10E12/L (ref 3.8–5.2)
RBC #/AREA URNS AUTO: 10 /HPF (ref 0–2)
SODIUM SERPL-SCNC: 138 MMOL/L (ref 133–144)
SOURCE: ABNORMAL
SP GR UR STRIP: 1.02 (ref 1–1.03)
SQUAMOUS #/AREA URNS AUTO: <1 /HPF (ref 0–1)
TROPONIN I SERPL-MCNC: 0.03 UG/L (ref 0–0.04)
UROBILINOGEN UR STRIP-MCNC: 0 MG/DL (ref 0–2)
WBC # BLD AUTO: 5.4 10E9/L (ref 4–11)
WBC #/AREA URNS AUTO: 8 /HPF (ref 0–5)

## 2020-12-08 PROCEDURE — 86140 C-REACTIVE PROTEIN: CPT | Performed by: FAMILY MEDICINE

## 2020-12-08 PROCEDURE — 250N000011 HC RX IP 250 OP 636: Performed by: FAMILY MEDICINE

## 2020-12-08 PROCEDURE — 96361 HYDRATE IV INFUSION ADD-ON: CPT | Performed by: FAMILY MEDICINE

## 2020-12-08 PROCEDURE — 85025 COMPLETE CBC W/AUTO DIFF WBC: CPT | Performed by: FAMILY MEDICINE

## 2020-12-08 PROCEDURE — 258N000003 HC RX IP 258 OP 636: Performed by: FAMILY MEDICINE

## 2020-12-08 PROCEDURE — 87088 URINE BACTERIA CULTURE: CPT | Performed by: FAMILY MEDICINE

## 2020-12-08 PROCEDURE — 99285 EMERGENCY DEPT VISIT HI MDM: CPT | Performed by: FAMILY MEDICINE

## 2020-12-08 PROCEDURE — 99222 1ST HOSP IP/OBS MODERATE 55: CPT | Mod: AI | Performed by: INTERNAL MEDICINE

## 2020-12-08 PROCEDURE — 84484 ASSAY OF TROPONIN QUANT: CPT | Performed by: FAMILY MEDICINE

## 2020-12-08 PROCEDURE — 99285 EMERGENCY DEPT VISIT HI MDM: CPT | Mod: 25 | Performed by: FAMILY MEDICINE

## 2020-12-08 PROCEDURE — 87086 URINE CULTURE/COLONY COUNT: CPT | Performed by: FAMILY MEDICINE

## 2020-12-08 PROCEDURE — 80053 COMPREHEN METABOLIC PANEL: CPT | Performed by: FAMILY MEDICINE

## 2020-12-08 PROCEDURE — 85379 FIBRIN DEGRADATION QUANT: CPT | Performed by: INTERNAL MEDICINE

## 2020-12-08 PROCEDURE — 96367 TX/PROPH/DG ADDL SEQ IV INF: CPT | Performed by: FAMILY MEDICINE

## 2020-12-08 PROCEDURE — 81001 URINALYSIS AUTO W/SCOPE: CPT | Performed by: FAMILY MEDICINE

## 2020-12-08 PROCEDURE — 120N000001 HC R&B MED SURG/OB

## 2020-12-08 PROCEDURE — 96376 TX/PRO/DX INJ SAME DRUG ADON: CPT | Performed by: FAMILY MEDICINE

## 2020-12-08 PROCEDURE — 96365 THER/PROPH/DIAG IV INF INIT: CPT | Performed by: FAMILY MEDICINE

## 2020-12-08 PROCEDURE — 87186 SC STD MICRODIL/AGAR DIL: CPT | Performed by: FAMILY MEDICINE

## 2020-12-08 RX ORDER — SENNOSIDES 8.6 MG
650 CAPSULE ORAL EVERY 8 HOURS PRN
Status: ON HOLD | COMMUNITY
End: 2020-12-11

## 2020-12-08 RX ORDER — CLOPIDOGREL BISULFATE 75 MG/1
75 TABLET ORAL DAILY
Status: ON HOLD | COMMUNITY
Start: 2020-10-17 | End: 2020-12-11

## 2020-12-08 RX ORDER — PAROXETINE 20 MG/1
40 TABLET, FILM COATED ORAL AT BEDTIME
Status: DISCONTINUED | OUTPATIENT
Start: 2020-12-09 | End: 2020-12-12 | Stop reason: HOSPADM

## 2020-12-08 RX ORDER — DEXTROSE MONOHYDRATE 25 G/50ML
25-50 INJECTION, SOLUTION INTRAVENOUS
Status: DISCONTINUED | OUTPATIENT
Start: 2020-12-08 | End: 2020-12-12 | Stop reason: HOSPADM

## 2020-12-08 RX ORDER — ASPIRIN 81 MG/1
81 TABLET, CHEWABLE ORAL DAILY
Status: DISCONTINUED | OUTPATIENT
Start: 2020-12-09 | End: 2020-12-09

## 2020-12-08 RX ORDER — METOPROLOL SUCCINATE 100 MG/1
100 TABLET, EXTENDED RELEASE ORAL AT BEDTIME
Status: DISCONTINUED | OUTPATIENT
Start: 2020-12-09 | End: 2020-12-10

## 2020-12-08 RX ORDER — CLOPIDOGREL BISULFATE 75 MG/1
75 TABLET ORAL AT BEDTIME
Status: DISCONTINUED | OUTPATIENT
Start: 2020-12-09 | End: 2020-12-12 | Stop reason: HOSPADM

## 2020-12-08 RX ORDER — ISOSORBIDE MONONITRATE 30 MG/1
30 TABLET, EXTENDED RELEASE ORAL DAILY
Status: ON HOLD | COMMUNITY
Start: 2020-10-17 | End: 2020-12-11

## 2020-12-08 RX ORDER — ONDANSETRON 4 MG/1
4 TABLET, ORALLY DISINTEGRATING ORAL EVERY 6 HOURS PRN
Status: DISCONTINUED | OUTPATIENT
Start: 2020-12-08 | End: 2020-12-12 | Stop reason: HOSPADM

## 2020-12-08 RX ORDER — ISOSORBIDE MONONITRATE 30 MG/1
30 TABLET, EXTENDED RELEASE ORAL AT BEDTIME
Status: DISCONTINUED | OUTPATIENT
Start: 2020-12-09 | End: 2020-12-12 | Stop reason: HOSPADM

## 2020-12-08 RX ORDER — HEPARIN SODIUM 5000 [USP'U]/.5ML
5000 INJECTION, SOLUTION INTRAVENOUS; SUBCUTANEOUS EVERY 12 HOURS
Status: DISCONTINUED | OUTPATIENT
Start: 2020-12-09 | End: 2020-12-09

## 2020-12-08 RX ORDER — ATORVASTATIN CALCIUM 40 MG/1
80 TABLET, FILM COATED ORAL AT BEDTIME
Status: DISCONTINUED | OUTPATIENT
Start: 2020-12-09 | End: 2020-12-12 | Stop reason: HOSPADM

## 2020-12-08 RX ORDER — IBUPROFEN 200 MG
200 TABLET ORAL EVERY 4 HOURS PRN
Status: ON HOLD | COMMUNITY
End: 2020-12-11

## 2020-12-08 RX ORDER — CEFTRIAXONE 1 G/1
1 INJECTION, POWDER, FOR SOLUTION INTRAMUSCULAR; INTRAVENOUS EVERY 24 HOURS
Status: DISCONTINUED | OUTPATIENT
Start: 2020-12-09 | End: 2020-12-11

## 2020-12-08 RX ORDER — NICOTINE POLACRILEX 4 MG
15-30 LOZENGE BUCCAL
Status: DISCONTINUED | OUTPATIENT
Start: 2020-12-08 | End: 2020-12-12 | Stop reason: HOSPADM

## 2020-12-08 RX ORDER — ALBUTEROL SULFATE 90 UG/1
2 AEROSOL, METERED RESPIRATORY (INHALATION) EVERY 4 HOURS PRN
Status: DISCONTINUED | OUTPATIENT
Start: 2020-12-08 | End: 2020-12-12 | Stop reason: HOSPADM

## 2020-12-08 RX ORDER — PANTOPRAZOLE SODIUM 40 MG/1
40 TABLET, DELAYED RELEASE ORAL
Status: DISCONTINUED | OUTPATIENT
Start: 2020-12-09 | End: 2020-12-12 | Stop reason: HOSPADM

## 2020-12-08 RX ORDER — LISINOPRIL 10 MG/1
20 TABLET ORAL AT BEDTIME
Status: DISCONTINUED | OUTPATIENT
Start: 2020-12-09 | End: 2020-12-10

## 2020-12-08 RX ORDER — CEFTRIAXONE 1 G/1
1 INJECTION, POWDER, FOR SOLUTION INTRAMUSCULAR; INTRAVENOUS ONCE
Status: COMPLETED | OUTPATIENT
Start: 2020-12-08 | End: 2020-12-08

## 2020-12-08 RX ORDER — LIDOCAINE 40 MG/G
CREAM TOPICAL
Status: DISCONTINUED | OUTPATIENT
Start: 2020-12-08 | End: 2020-12-12 | Stop reason: HOSPADM

## 2020-12-08 RX ORDER — ONDANSETRON 2 MG/ML
4 INJECTION INTRAMUSCULAR; INTRAVENOUS ONCE
Status: COMPLETED | OUTPATIENT
Start: 2020-12-08 | End: 2020-12-08

## 2020-12-08 RX ORDER — LABETALOL HYDROCHLORIDE 5 MG/ML
10 INJECTION, SOLUTION INTRAVENOUS EVERY 4 HOURS PRN
Status: DISCONTINUED | OUTPATIENT
Start: 2020-12-08 | End: 2020-12-12 | Stop reason: HOSPADM

## 2020-12-08 RX ORDER — ACETAMINOPHEN 500 MG
500-1000 TABLET ORAL EVERY 6 HOURS PRN
Status: ON HOLD | COMMUNITY
End: 2020-12-11

## 2020-12-08 RX ORDER — ONDANSETRON 2 MG/ML
4 INJECTION INTRAMUSCULAR; INTRAVENOUS EVERY 6 HOURS PRN
Status: DISCONTINUED | OUTPATIENT
Start: 2020-12-08 | End: 2020-12-12 | Stop reason: HOSPADM

## 2020-12-08 RX ORDER — SODIUM CHLORIDE 9 MG/ML
INJECTION, SOLUTION INTRAVENOUS CONTINUOUS
Status: DISCONTINUED | OUTPATIENT
Start: 2020-12-09 | End: 2020-12-09

## 2020-12-08 RX ADMIN — CEFTRIAXONE SODIUM 1 G: 1 INJECTION, POWDER, FOR SOLUTION INTRAMUSCULAR; INTRAVENOUS at 21:40

## 2020-12-08 RX ADMIN — SODIUM CHLORIDE 1000 ML: 9 INJECTION, SOLUTION INTRAVENOUS at 20:19

## 2020-12-08 RX ADMIN — ONDANSETRON 4 MG: 2 INJECTION INTRAMUSCULAR; INTRAVENOUS at 19:54

## 2020-12-08 RX ADMIN — SODIUM CHLORIDE 1000 ML: 9 INJECTION, SOLUTION INTRAVENOUS at 19:28

## 2020-12-08 NOTE — TELEPHONE ENCOUNTER
Would recommend isolation per CDC guidelines.  I believe it is now 10 days and 24 hours without a fever or the use of Tylenol or ibuprofen.  Fluids, rest, over-the-counter cough medication as needed.  If symptoms worsen, presentation to the emergency department is recommended.  Mihai

## 2020-12-08 NOTE — TELEPHONE ENCOUNTER
Patients daughter was notified C2C on file. Patients daughter stated she may be bringing her in to ER today, patient is not drinking and severe diarrhea.   Katrina Lobo MA 12/8/2020

## 2020-12-09 ENCOUNTER — APPOINTMENT (OUTPATIENT)
Dept: OCCUPATIONAL THERAPY | Facility: CLINIC | Age: 80
DRG: 177 | End: 2020-12-09
Attending: INTERNAL MEDICINE
Payer: MEDICARE

## 2020-12-09 ENCOUNTER — APPOINTMENT (OUTPATIENT)
Dept: PHYSICAL THERAPY | Facility: CLINIC | Age: 80
DRG: 177 | End: 2020-12-09
Attending: INTERNAL MEDICINE
Payer: MEDICARE

## 2020-12-09 PROBLEM — S42.202D CLOSED FRACTURE OF PROXIMAL END OF LEFT HUMERUS WITH ROUTINE HEALING: Status: ACTIVE | Noted: 2020-12-09

## 2020-12-09 LAB
ANION GAP SERPL CALCULATED.3IONS-SCNC: 8 MMOL/L (ref 3–14)
BASOPHILS # BLD AUTO: 0 10E9/L (ref 0–0.2)
BASOPHILS NFR BLD AUTO: 0 %
BUN SERPL-MCNC: 31 MG/DL (ref 7–30)
CALCIUM SERPL-MCNC: 7.8 MG/DL (ref 8.5–10.1)
CHLORIDE SERPL-SCNC: 113 MMOL/L (ref 94–109)
CO2 SERPL-SCNC: 22 MMOL/L (ref 20–32)
CREAT SERPL-MCNC: 0.92 MG/DL (ref 0.52–1.04)
CRP SERPL-MCNC: 9 MG/L (ref 0–8)
D DIMER PPP FEU-MCNC: 1 UG/ML FEU (ref 0–0.5)
DIFFERENTIAL METHOD BLD: ABNORMAL
EOSINOPHIL NFR BLD AUTO: 0 %
ERYTHROCYTE [DISTWIDTH] IN BLOOD BY AUTOMATED COUNT: 15.3 % (ref 10–15)
FIBRINOGEN PPP-MCNC: 575 MG/DL (ref 200–420)
GFR SERPL CREATININE-BSD FRML MDRD: 58 ML/MIN/{1.73_M2}
GLUCOSE BLDC GLUCOMTR-MCNC: 143 MG/DL (ref 70–99)
GLUCOSE BLDC GLUCOMTR-MCNC: 196 MG/DL (ref 70–99)
GLUCOSE BLDC GLUCOMTR-MCNC: 216 MG/DL (ref 70–99)
GLUCOSE BLDC GLUCOMTR-MCNC: 221 MG/DL (ref 70–99)
GLUCOSE BLDC GLUCOMTR-MCNC: 221 MG/DL (ref 70–99)
GLUCOSE SERPL-MCNC: 160 MG/DL (ref 70–99)
HCT VFR BLD AUTO: 30.1 % (ref 35–47)
HGB BLD-MCNC: 9.5 G/DL (ref 11.7–15.7)
IMM GRANULOCYTES # BLD: 0 10E9/L (ref 0–0.4)
IMM GRANULOCYTES NFR BLD: 0.4 %
INR PPP: 1.07 (ref 0.86–1.14)
LACTATE BLD-SCNC: 0.8 MMOL/L (ref 0.7–2)
LDH SERPL L TO P-CCNC: 157 U/L (ref 81–234)
LYMPHOCYTES # BLD AUTO: 1.1 10E9/L (ref 0.8–5.3)
LYMPHOCYTES NFR BLD AUTO: 22.2 %
MAGNESIUM SERPL-MCNC: 1.2 MG/DL (ref 1.6–2.3)
MCH RBC QN AUTO: 28.8 PG (ref 26.5–33)
MCHC RBC AUTO-ENTMCNC: 31.6 G/DL (ref 31.5–36.5)
MCV RBC AUTO: 91 FL (ref 78–100)
MONOCYTES # BLD AUTO: 0.5 10E9/L (ref 0–1.3)
MONOCYTES NFR BLD AUTO: 10.8 %
NEUTROPHILS # BLD AUTO: 3.1 10E9/L (ref 1.6–8.3)
NEUTROPHILS NFR BLD AUTO: 66.6 %
NRBC # BLD AUTO: 0 10*3/UL
NRBC BLD AUTO-RTO: 0 /100
PHOSPHATE SERPL-MCNC: 3.6 MG/DL (ref 2.5–4.5)
PLATELET # BLD AUTO: 154 10E9/L (ref 150–450)
POTASSIUM SERPL-SCNC: 3.9 MMOL/L (ref 3.4–5.3)
PROCALCITONIN SERPL-MCNC: <0.05 NG/ML
RBC # BLD AUTO: 3.3 10E12/L (ref 3.8–5.2)
RETICS # AUTO: 63.4 10E9/L (ref 25–95)
RETICS/RBC NFR AUTO: 1.9 % (ref 0.5–2)
SODIUM SERPL-SCNC: 143 MMOL/L (ref 133–144)
TROPONIN I SERPL-MCNC: 0.04 UG/L (ref 0–0.04)
TROPONIN I SERPL-MCNC: 0.04 UG/L (ref 0–0.04)
WBC # BLD AUTO: 4.7 10E9/L (ref 4–11)

## 2020-12-09 PROCEDURE — 120N000001 HC R&B MED SURG/OB

## 2020-12-09 PROCEDURE — 84484 ASSAY OF TROPONIN QUANT: CPT | Performed by: INTERNAL MEDICINE

## 2020-12-09 PROCEDURE — 83605 ASSAY OF LACTIC ACID: CPT | Performed by: INTERNAL MEDICINE

## 2020-12-09 PROCEDURE — 83615 LACTATE (LD) (LDH) ENZYME: CPT | Performed by: INTERNAL MEDICINE

## 2020-12-09 PROCEDURE — 250N000011 HC RX IP 250 OP 636: Performed by: PEDIATRICS

## 2020-12-09 PROCEDURE — 83735 ASSAY OF MAGNESIUM: CPT | Performed by: INTERNAL MEDICINE

## 2020-12-09 PROCEDURE — 258N000003 HC RX IP 258 OP 636: Performed by: PEDIATRICS

## 2020-12-09 PROCEDURE — 250N000011 HC RX IP 250 OP 636: Performed by: INTERNAL MEDICINE

## 2020-12-09 PROCEDURE — 85379 FIBRIN DEGRADATION QUANT: CPT | Performed by: INTERNAL MEDICINE

## 2020-12-09 PROCEDURE — 99207 PR CDG-MDM COMPONENT: MEETS HIGH - UP CODED: CPT | Performed by: PEDIATRICS

## 2020-12-09 PROCEDURE — 84100 ASSAY OF PHOSPHORUS: CPT | Performed by: INTERNAL MEDICINE

## 2020-12-09 PROCEDURE — 97162 PT EVAL MOD COMPLEX 30 MIN: CPT | Mod: GP

## 2020-12-09 PROCEDURE — 99233 SBSQ HOSP IP/OBS HIGH 50: CPT | Performed by: PEDIATRICS

## 2020-12-09 PROCEDURE — 80048 BASIC METABOLIC PNL TOTAL CA: CPT | Performed by: INTERNAL MEDICINE

## 2020-12-09 PROCEDURE — 250N000013 HC RX MED GY IP 250 OP 250 PS 637: Performed by: PEDIATRICS

## 2020-12-09 PROCEDURE — 250N000012 HC RX MED GY IP 250 OP 636 PS 637: Performed by: INTERNAL MEDICINE

## 2020-12-09 PROCEDURE — 250N000013 HC RX MED GY IP 250 OP 250 PS 637: Performed by: INTERNAL MEDICINE

## 2020-12-09 PROCEDURE — 85025 COMPLETE CBC W/AUTO DIFF WBC: CPT | Performed by: INTERNAL MEDICINE

## 2020-12-09 PROCEDURE — 97530 THERAPEUTIC ACTIVITIES: CPT | Mod: GP

## 2020-12-09 PROCEDURE — 97166 OT EVAL MOD COMPLEX 45 MIN: CPT | Mod: GO

## 2020-12-09 PROCEDURE — 36415 COLL VENOUS BLD VENIPUNCTURE: CPT | Performed by: PEDIATRICS

## 2020-12-09 PROCEDURE — 999N001017 HC STATISTIC GLUCOSE BY METER IP

## 2020-12-09 PROCEDURE — 84484 ASSAY OF TROPONIN QUANT: CPT | Performed by: PEDIATRICS

## 2020-12-09 PROCEDURE — 85384 FIBRINOGEN ACTIVITY: CPT | Performed by: INTERNAL MEDICINE

## 2020-12-09 PROCEDURE — 258N000003 HC RX IP 258 OP 636: Performed by: INTERNAL MEDICINE

## 2020-12-09 PROCEDURE — 84145 PROCALCITONIN (PCT): CPT | Performed by: INTERNAL MEDICINE

## 2020-12-09 PROCEDURE — 36415 COLL VENOUS BLD VENIPUNCTURE: CPT | Performed by: INTERNAL MEDICINE

## 2020-12-09 PROCEDURE — 86140 C-REACTIVE PROTEIN: CPT | Performed by: INTERNAL MEDICINE

## 2020-12-09 PROCEDURE — 85610 PROTHROMBIN TIME: CPT | Performed by: INTERNAL MEDICINE

## 2020-12-09 PROCEDURE — 85045 AUTOMATED RETICULOCYTE COUNT: CPT | Performed by: INTERNAL MEDICINE

## 2020-12-09 RX ORDER — SODIUM CHLORIDE, SODIUM LACTATE, POTASSIUM CHLORIDE, CALCIUM CHLORIDE 600; 310; 30; 20 MG/100ML; MG/100ML; MG/100ML; MG/100ML
INJECTION, SOLUTION INTRAVENOUS CONTINUOUS
Status: DISCONTINUED | OUTPATIENT
Start: 2020-12-09 | End: 2020-12-10

## 2020-12-09 RX ORDER — ASPIRIN 81 MG/1
81 TABLET, CHEWABLE ORAL AT BEDTIME
Status: DISCONTINUED | OUTPATIENT
Start: 2020-12-09 | End: 2020-12-12 | Stop reason: HOSPADM

## 2020-12-09 RX ORDER — ACETAMINOPHEN 500 MG
1000 TABLET ORAL EVERY 6 HOURS PRN
Status: DISCONTINUED | OUTPATIENT
Start: 2020-12-09 | End: 2020-12-12 | Stop reason: HOSPADM

## 2020-12-09 RX ORDER — MAGNESIUM OXIDE 400 MG/1
400 TABLET ORAL 2 TIMES DAILY
Status: CANCELLED | OUTPATIENT
Start: 2020-12-09 | End: 2020-12-11

## 2020-12-09 RX ORDER — ISOSORBIDE MONONITRATE 30 MG/1
30 TABLET, EXTENDED RELEASE ORAL ONCE
Status: DISCONTINUED | OUTPATIENT
Start: 2020-12-09 | End: 2020-12-09

## 2020-12-09 RX ORDER — MAGNESIUM SULFATE HEPTAHYDRATE 40 MG/ML
2 INJECTION, SOLUTION INTRAVENOUS ONCE
Status: COMPLETED | OUTPATIENT
Start: 2020-12-09 | End: 2020-12-09

## 2020-12-09 RX ADMIN — ASPIRIN 81 MG 81 MG: 81 TABLET ORAL at 00:42

## 2020-12-09 RX ADMIN — ASPIRIN 81 MG 81 MG: 81 TABLET ORAL at 21:36

## 2020-12-09 RX ADMIN — PANTOPRAZOLE SODIUM 40 MG: 40 TABLET, DELAYED RELEASE ORAL at 06:39

## 2020-12-09 RX ADMIN — INSULIN ASPART 1 UNITS: 100 INJECTION, SOLUTION INTRAVENOUS; SUBCUTANEOUS at 17:24

## 2020-12-09 RX ADMIN — LISINOPRIL 20 MG: 10 TABLET ORAL at 21:33

## 2020-12-09 RX ADMIN — HEPARIN SODIUM 5000 UNITS: 10000 INJECTION, SOLUTION INTRAVENOUS; SUBCUTANEOUS at 10:05

## 2020-12-09 RX ADMIN — CLOPIDOGREL BISULFATE 75 MG: 75 TABLET ORAL at 21:37

## 2020-12-09 RX ADMIN — METOPROLOL SUCCINATE 100 MG: 100 TABLET, EXTENDED RELEASE ORAL at 00:46

## 2020-12-09 RX ADMIN — MAGNESIUM SULFATE IN WATER 2 G: 40 INJECTION, SOLUTION INTRAVENOUS at 10:48

## 2020-12-09 RX ADMIN — PAROXETINE HYDROCHLORIDE 40 MG: 20 TABLET, FILM COATED ORAL at 00:46

## 2020-12-09 RX ADMIN — ATORVASTATIN CALCIUM 80 MG: 40 TABLET, FILM COATED ORAL at 00:43

## 2020-12-09 RX ADMIN — ONDANSETRON 4 MG: 2 INJECTION INTRAMUSCULAR; INTRAVENOUS at 21:28

## 2020-12-09 RX ADMIN — ISOSORBIDE MONONITRATE 30 MG: 30 TABLET, EXTENDED RELEASE ORAL at 00:42

## 2020-12-09 RX ADMIN — METOPROLOL SUCCINATE 100 MG: 100 TABLET, EXTENDED RELEASE ORAL at 21:37

## 2020-12-09 RX ADMIN — ENOXAPARIN SODIUM 40 MG: 40 INJECTION SUBCUTANEOUS at 21:31

## 2020-12-09 RX ADMIN — ISOSORBIDE MONONITRATE 30 MG: 30 TABLET, EXTENDED RELEASE ORAL at 21:33

## 2020-12-09 RX ADMIN — CLOPIDOGREL BISULFATE 75 MG: 75 TABLET ORAL at 00:44

## 2020-12-09 RX ADMIN — CEFTRIAXONE SODIUM 1 G: 1 INJECTION, POWDER, FOR SOLUTION INTRAMUSCULAR; INTRAVENOUS at 21:41

## 2020-12-09 RX ADMIN — ONDANSETRON 4 MG: 2 INJECTION INTRAMUSCULAR; INTRAVENOUS at 08:49

## 2020-12-09 RX ADMIN — ONDANSETRON 4 MG: 2 INJECTION INTRAMUSCULAR; INTRAVENOUS at 14:41

## 2020-12-09 RX ADMIN — ACETAMINOPHEN 1000 MG: 500 TABLET, FILM COATED ORAL at 08:43

## 2020-12-09 RX ADMIN — ACETAMINOPHEN 1000 MG: 500 TABLET, FILM COATED ORAL at 15:41

## 2020-12-09 RX ADMIN — LISINOPRIL 20 MG: 10 TABLET ORAL at 00:42

## 2020-12-09 RX ADMIN — INSULIN ASPART 1 UNITS: 100 INJECTION, SOLUTION INTRAVENOUS; SUBCUTANEOUS at 12:23

## 2020-12-09 RX ADMIN — ATORVASTATIN CALCIUM 80 MG: 40 TABLET, FILM COATED ORAL at 21:34

## 2020-12-09 RX ADMIN — SODIUM CHLORIDE, POTASSIUM CHLORIDE, SODIUM LACTATE AND CALCIUM CHLORIDE: 600; 310; 30; 20 INJECTION, SOLUTION INTRAVENOUS at 13:25

## 2020-12-09 RX ADMIN — HEPARIN SODIUM 5000 UNITS: 10000 INJECTION, SOLUTION INTRAVENOUS; SUBCUTANEOUS at 00:56

## 2020-12-09 RX ADMIN — ACETAMINOPHEN 1000 MG: 500 TABLET, FILM COATED ORAL at 22:23

## 2020-12-09 RX ADMIN — SODIUM CHLORIDE: 9 INJECTION, SOLUTION INTRAVENOUS at 00:47

## 2020-12-09 RX ADMIN — PAROXETINE HYDROCHLORIDE 40 MG: 20 TABLET, FILM COATED ORAL at 21:36

## 2020-12-09 ASSESSMENT — ACTIVITIES OF DAILY LIVING (ADL)
ADLS_ACUITY_SCORE: 16
WEAR_GLASSES_OR_BLIND: NO
NUMBER_OF_TIMES_PATIENT_HAS_FALLEN_WITHIN_LAST_SIX_MONTHS: 1
ADLS_ACUITY_SCORE: 22
PREVIOUS_RESPONSIBILITIES: MEAL PREP;HOUSEKEEPING;LAUNDRY;SHOPPING;MEDICATION MANAGEMENT;FINANCES;DRIVING;WORK
TOILETING_ISSUES: NO
DRESSING/BATHING: BATHING DIFFICULTY, ASSISTANCE 1 PERSON
WALKING_OR_CLIMBING_STAIRS: AMBULATION DIFFICULTY, DEPENDENT
ADLS_ACUITY_SCORE: 16
WALKING_OR_CLIMBING_STAIRS_DIFFICULTY: NO
DEPENDENT_IADLS:: SHOPPING
DIFFICULTY_EATING/SWALLOWING: NO
FALL_HISTORY_WITHIN_LAST_SIX_MONTHS: YES
ADLS_ACUITY_SCORE: 16
ADLS_ACUITY_SCORE: 22
DRESSING/BATHING_DIFFICULTY: YES
ADLS_ACUITY_SCORE: 22
HEARING_DIFFICULTY_OR_DEAF: NO
CONCENTRATING,_REMEMBERING_OR_MAKING_DECISIONS_DIFFICULTY: NO
DOING_ERRANDS_INDEPENDENTLY_DIFFICULTY: NO
EQUIPMENT_CURRENTLY_USED_AT_HOME: CANE, STRAIGHT
DIFFICULTY_COMMUNICATING: NO

## 2020-12-09 ASSESSMENT — MIFFLIN-ST. JEOR: SCORE: 1026.53

## 2020-12-09 NOTE — PROGRESS NOTES
12/09/20 0807   Quick Adds   Type of Visit Initial PT Evaluation       Present no   Living Environment   People in home alone   Current Living Arrangements house   Home Accessibility stairs to enter home   Number of Stairs, Main Entrance 6   Stair Railings, Main Entrance railings safe and in good condition   Transportation Anticipated car, drives self   Living Environment Comments Patient lives alone in a house with 6 stairs to enter and rails. She has family that lives by and can assist her if needed when called upon. Patient PLOF was independent while using an SEC and was able to perform all house tasks and errands independently.   Self-Care   Usual Activity Tolerance good   Current Activity Tolerance poor   Regular Exercise No   Equipment Currently Used at Home cane, straight;raised toilet seat   Disability/Function   Walking or Climbing Stairs ambulation difficulty, requires equipment   Mobility Management SEC   General Information   Onset of Illness/Injury or Date of Surgery 12/08/20   Referring Physician Brady Boyle MD   Patient/Family Therapy Goals Statement (PT) To regain her strength so she can stand and walk around without limitations.   Pertinent History of Current Problem (include personal factors and/or comorbidities that impact the POC) Patient is an 80 year old female admitted to Inpatient care from ED for Weakness, UTI and COVID-19. Patient has a PMHx of CAD, HTN, HL, NIDDM, Left Humeral Fracture and Fall History. Patient required minimal assistance for supine to sit and sit to supine, maximal assistance for scooting in sitting and in supine, and was unable to perform transfers or standing due to dizziness, nausea and weakness.   Existing Precautions/Restrictions shoulder   Weight-Bearing Status - LUE nonweight-bearing   Weight-Bearing Status - RUE full weight-bearing   Weight-Bearing Status - LLE full weight-bearing   Weight-Bearing Status - RLE full weight-bearing   Heart  Disease Risk Factors Medical history;High blood pressure   General Observations Currently wearing sling in left shoulder.   Cognition   Orientation Status (Cognition) oriented x 4   Pain Assessment   Patient Currently in Pain Yes, see Vital Sign flowsheet   Integumentary/Edema   Integumentary/Edema other (describe)   Integumentary/Edema Comments Sling for left shoulder.   Posture    Posture Forward head position;Protracted shoulders   Posture Comments Unremarkable   Range of Motion (ROM)   ROM Quick Adds ROM WNL   ROM Comment Unremarkable in LE's   Strength Comprehensive (MMT)   Comment, General Manual Muscle Testing (MMT) Assessment 4/5 Hip Flexors, Quads and Hamstrings, however had to discontinue due to dizziness and nausea developing.   Strength   Strength Comments 4/5 Hip Flexors, Quads and Hamstrings, however had to discontinue due to dizziness and nausea developing.   Bed Mobility   Bed Mobility supine-sit;sit-supine;scooting/bridging   Scooting/Bridging [place] (Bed Mobility) maximum assist (25% patient effort)   Supine-Sit [place] (Bed Mobility) minimum assist (75% patient effort)   Sit-Supine [place] (Bed Mobility) minimum assist (75% patient effort)   Bed Mobility Limitations decreased ability to use arms for pushing/pulling   Impairments Contributing to Impaired Bed Mobility decreased strength   Transfers   Impairments Contributing to Impaired Transfers decreased strength   Transfer Safety Comments Unable to assess today due to weakness and development of dizziness/nausea.   Gait/Stairs (Locomotion)   Cost[place] Level (Gait) unable to assess;not tested   Comment (Gait/Stairs) Unable to assess today due to weakness and development of dizziness/nausea.   Balance   Balance Comments Only sitting balance assessed. Sitting EOB independent.   Coordination   Coordination no deficits were identified   Muscle Tone   Muscle Tone no deficits were identified   Clinical Impression   Criteria for  Skilled Therapeutic Intervention yes, treatment indicated   PT Diagnosis (PT) Weakness   Influenced by the following impairments Weakness, Dizziness, Deconditioning, Pain   Functional limitations due to impairments Patient is unable to independently perform bed mobility, transfers or ambulation due to weakness, development of dizziness and nausea.   Clinical Presentation Evolving/Changing   Clinical Presentation Rationale Multiple symptoms, multiple systems involved, pmhx, observation, evaluation and clinical reasoning   Clinical Decision Making (Complexity) moderate complexity   Therapy Frequency (PT) Daily   Predicted Duration of Therapy Intervention (days/wks) 2-4 days   Planned Therapy Interventions (PT) neuromuscular re-education;ROM (range of motion);strengthening;stair training;stretching;transfer training;bed mobility training;gait training   Anticipated Equipment Needs at Discharge (PT) cane, straight   Risk & Benefits of therapy have been explained evaluation/treatment results reviewed;care plan/treatment goals reviewed;risks/benefits reviewed;current/potential barriers reviewed;participants voiced agreement with care plan;participants included;patient   Clinical Impression Comments Patient was unable to perform functional mobility independently due to weakness, unable to use left shoulder due to fracture, dizziness and needed to vomit.  Patient previously was modified independent with all activities using a SEC, however does have a fall history of 3+ falls in the last 6 months including one resulting in a left humeral fracture.   PT Discharge Planning    PT Discharge Recommendation (DC Rec) Transitional Care Facility   PT Rationale for DC Rec Patient is too weak, too dizzy and experiencing too many symptoms to tolerate MMT sitting EOB, Transfers, Standing or Ambulation. Patient has a recent fall history with 3+ falls in the last 6 months as well as 1 fall at the end of Novemeber resulting in a left  humeral fracture.    Total Evaluation Time   Total Evaluation Time (Minutes) 14

## 2020-12-09 NOTE — PROGRESS NOTES
[facility]    Medicine Progress Note - Hospitalist Service       Date of Admission:  12/8/2020  Assessment & Plan       80-year-old woman with positive COVID-19 PCR testing December 3, 2020 admitted last night due to concerns for gastrointestinal infection probably due to coronavirus but also possible UTI with associated severe weakness and functional decline superimposed upon limitations she had sustained since proximal left humeral fracture about 2 to 3 weeks ago.  She continues to have significant nausea with poor oral intake although no vomiting or diarrhea.  Urine culture results are pending.  She continues to be quite weak and orthopedics has advised nonweightbearing on the left upper extremity because of her humerus fracture.  Severe hypertension last night was probably due to her not taking oral medications at home for a day prior to admission because she was so weak she could not refill her medication box at home.  Blood pressure has improved after restarting her normal antihypertensive medications with the exception of hydrochlorothiazide.  She is hypomagnesemic today probably due to a combination of poor oral nutritional intake recently and chronic hydrochlorothiazide therapy, and hypomagnesemia could contribute to muscle weakness.  Troponin has started to trend upward today for reasons that are unclear, but she does have history of coronary artery disease although has not had anginal pain.  Severe hypertension yesterday may have contributed to rising troponin.  Glycemic control of diabetes has not been optimal here in the hospital although she is normally well controlled but is likely exacerbated by corticosteroid therapy for treatment of COVID-19.    Principal Problem:    2019 novel coronavirus disease (COVID-19)  Active Problems:    Nausea vomiting and diarrhea    Coronary artery disease involving native coronary artery of native heart without angina pectoris    Type 2  diabetes mellitus with diabetic polyneuropathy, without long-term current use of insulin (H)    CKD (chronic kidney disease) stage 3, GFR 30-59 ml/min    Generalized muscle weakness    Urinary tract infection without hematuria, site unspecified    Hypomagnesemia    Hypertension goal BP (blood pressure) < 140/90    Closed fracture of proximal end of left humerus with routine healing    Follow serial troponin until it has stabilized or improved, will check an EKG and possibly echocardiogram if it continues to trend upward into the abnormal range or if there is a change in her clinical status raising concern for acute heart disease  Switch subcutaneous heparin to prophylactic Lovenox dosing because renal function is adequate and she has COVID-19  Switch IV fluids to lactated Ringer's because of evolving hyperchloremia with normal saline infusion  Provide magnesium supplementation according to protocol  Continue empiric IV Rocephin while awaiting urine culture results  PT and OT assessments to assist with disposition planning       Diet: Combination Diet [id number] Calories: Moderate Consistent CHO (4-6 CHO units/meal); 2 gm NA Diet  Room Service    DVT Prophylaxis: Enoxaparin (Lovenox) SQ  Uriarte Catheter: not present  Code Status: No CPR- Do NOT Intubate           Disposition Plan   Expected discharge: 2 - 3 days, recommended to Be determined once Adequate oral intake, able to reliably take her oral medications, urine culture results are available, troponins are trending downward, and she is tolerating advancing activity with safe disposition plan.  Entered: Deon Pierre MD 12/09/2020, 12:44 PM       The patient's care was discussed with the Bedside Nurse, Care Coordinator/ and Patient.    Deon Pierre MD  Hospitalist Service  [facility]  Contact information available via [facility]  Arely/Fawad    ______________________________________________________________________    Interval History   She thinks she is starting to feel better today.  She continues to be very nauseated but has been able to tolerate some sips of liquids and bites of food in very small amounts today.  She has not vomited.  She has not had any diarrhea here in the hospital and says that her recent diarrhea had been improving at home.  She says she has not had any antibiotic treatments in the last few months.  She denies any cough or shortness of breath.  She continues to feel weak but feels a bit stronger today than she did yesterday.  Yesterday she says she did not take her home medications.  She says she normally fills her medication box at home for a week at a time and yesterday that medication box was empty but she could not refill it at home yesterday because she was so weak she could not ambulate to get her pills.  Today she denies any dizziness.  Her COVID-19 testing had been performed via the [employer] which is an organization that has partnered with the [place] Department [facility] to perform this testing and she had a PCR test performed on December 3 in [place] that was positive for COVID-19.    Data reviewed today: I reviewed all medications, new labs and imaging results over the last 24 hours. I personally reviewed no images or EKG's today.    Physical Exam   Vital Signs: Temp: 97.8  F (36.6  C) Temp src: Oral BP: (!) 148/74 Pulse: 75   Resp: 18 SpO2: 96 % O2 Device: None (Room air)    Weight: 126 lbs 0 oz   Wt Readings from Last 4 Encounters:   12/09/20 57.2 kg (126 lb)   11/22/20 60.8 kg (134 lb)   10/08/20 60.2 kg (132 lb 12.8 oz)   01/20/20 63 kg (139 lb)     General Appearance: Chronically ill-appearing without signs of acute distress, wearing a sling on her left  upper extremity  Respiratory: Normal respiratory effort, clear lungs  Cardiovascular: Regular rate and rhythm, good radial pulse, brisk  capillary refill  GI: Nondistended abdomen, soft without tenderness  Skin: Pale, no rash  Other: Alert and oriented    Data   Recent Labs   Lab 12/09/20  0614 12/08/20  1929   WBC 4.7 5.4   HGB 9.5* 12.3   MCV 91 90    164   INR 1.07  --     138   POTASSIUM 3.9 3.9   CHLORIDE 113* 103   CO2 22 25   BUN 31* 38*   CR 0.92 1.12*   ANIONGAP 8 10   ANDREI 7.8* 8.8   * 289*   ALBUMIN  --  3.3*   PROTTOTAL  --  7.9   BILITOTAL  --  0.7   ALKPHOS  --  121   ALT  --  19   AST  --  24   TROPI 0.044 0.029     Blood sugars ranged 143-289 over the last day, most recent hemoglobin A1c on October 8 was 6.9    Procalcitonin was undetectable, urine culture is pending  Magnesium 1.2 today, phosphorus 3.6  CRP 9.0 today, improved from 10.5 yesterday    Medications     - MEDICATION INSTRUCTIONS -       sodium chloride 75 mL/hr at 12/09/20 0047       aspirin  81 mg Oral At Bedtime     atorvastatin  80 mg Oral At Bedtime     cefTRIAXone  1 g Intravenous Q24H     clopidogrel  75 mg Oral At Bedtime     heparin ANTICOAGULANT  5,000 Units Subcutaneous Q12H     insulin aspart  1-3 Units Subcutaneous TID AC     insulin aspart  1-3 Units Subcutaneous At Bedtime     isosorbide mononitrate  30 mg Oral At Bedtime     lisinopril  20 mg Oral At Bedtime     metoprolol succinate ER  100 mg Oral At Bedtime     pantoprazole  40 mg Oral QAM AC     PARoxetine  40 mg Oral At Bedtime     sodium chloride (PF)  3 mL Intracatheter Q8H

## 2020-12-09 NOTE — CONSULTS
Care Management Initial Consult    General Information  Assessment completed with: Patient,    Type of CM/SW Visit: Initial Assessment  Primary Care Provider verified and updated as needed: Yes   Readmission within the last 30 days:        Reason for Consult: discharge planning  Advance Care Planning:    Has no scanned ACP documents        Communication Assessment  Patient's communication style: spoken language (English or Bilingual)    Hearing Difficulty or Deaf: no   Wear Glasses or Blind: no    Cognitive  Cognitive/Neuro/Behavioral: WDL                      Living Environment:   People in home: alone     Current living Arrangements: house      Able to return to prior arrangements: yes       Family/Social Support:  Care provided by: self;child(darrell)  Provides care for: no one  Marital Status:   Children          Description of Support System: Supportive;Involved    Support Assessment: Adequate family and caregiver support;Adequate social supports    Current Resources:   Skilled Home Care Services:  None   Community Resources: None  Equipment currently used at home: cane, straight;raised toilet seat  Supplies currently used at home: None    Employment/Financial:  Employment Status: retired        Financial Concerns: No concerns identified           Lifestyle & Psychosocial Needs:        Socioeconomic History     Marital status:      Spouse name: Not on file     Number of children: Not on file     Years of education: Not on file     Highest education level: Not on file     Tobacco Use     Smoking status: Never Smoker     Smokeless tobacco: Never Used   Substance and Sexual Activity     Alcohol use: No     Alcohol/week: 0.0 standard drinks     Drug use: No     Sexual activity: Yes     Partners: Male     Birth control/protection: Surgical       Functional Status:  Prior to admission patient needed assistance:      Dependent IADLs:: Shopping       Mental Health Status:  Mental Health Status: No Current  "Concerns       Chemical Dependency Status:  Chemical Dependency Status: No Current Concerns             Values/Beliefs:  Spiritual, Cultural Beliefs, Confucianist Practices, Values that affect care: no               Additional Information:  Writer visited with patient over the phone.  Discussed current recommendations from hospital PT and OT services for TCU placement at discharge.  Patient not opposed to TCU if it is needed.  She is hoping she will improve enough to be able to return home maybe with some home care, which was also discussed.  Patient was verbally provided with a Medicare certified nursing home list. Pts choices are as follows [facility]  (Phone: [phone] Fax: [phone])  [facility]  (Phone: [phone] Fax [phone])- as they are the closest TCU facilities accepting COVID positive patients. Referrals have been sent.      Patient has been independent with ADL's at home, even with her arm in a sling.  She reported that she has been sponge bathing.  Patient usually drives, but has not recently.  Daughter, Neyda, has been getting patient her groceries and \"lives a block away.\"  Patient reports that she thinks she got COVID from her daughter.      Care Transitions to continue to follow patient and assess for discharge needs.      Sabine Ma [facility]   [phone]       "

## 2020-12-09 NOTE — UTILIZATION REVIEW
Admission Status; Secondary Review Determination    Under the authority of the Utilization Management Committee, the utilization review process indicated a secondary review on the above patient. The review outcome is based on review of the medical records, discussions with staff, and applying clinical experience noted on the date of the review.    (x) Inpatient Status Appropriate - This patient's medical care is consistent with medical management for inpatient care and reasonable inpatient medical practice.    RATIONALE FOR DETERMINATION:80-year-old female with significant comorbidities of coronary disease, hypertension, non-insulin-dependent diabetes and tested positive for Covid 5 days prior to admission now presents with significant profound weakness associated with anorexia, nausea vomiting and diarrhea.  The severity of the symptoms will require supportive cares, including IV fluids for greater than 2 nights in the hospital.  Furthermore patient has positive UTI requiring IV antibiotics appropriate for inpatient management due to the severity of patient's symptoms.    At the time of admission with the information available to the attending physician more than 2 nights Hospital complex care was anticipated, based on patient risk of adverse outcome if treated as outpatient and complex care required. Inpatient admission is appropriate based on the Medicare guidelines.    This document was produced using voice recognition software    The information on this document is developed by the utilization review team in order for the business office to ensure compliance. This only denotes the appropriateness of proper admission status and does not reflect the quality of care rendered.    The definitions of Inpatient Status and Observation Status used in making the determination above are those provided in the CMS Coverage Manual, Chapter 1 and Chapter 6, section 70.4.    Sincerely,    Clinton Lazaro MD  Utilization  Review  Physician Advisor  [facility].

## 2020-12-09 NOTE — PLAN OF CARE
BP elevated, MD aware. BP recheck 148/74, no prns needed. Pt has very little appetite, small snacks given. Pt is drinking well. Pt is incontinent of urine. No BM today. Nausea improved after Zofran 4mg. Mag 1.2, replaced with 2g IV, recheck ordered for am. Daughter updated and given pt's room number.

## 2020-12-09 NOTE — PROGRESS NOTES
Pt arm back in sling this morning.   Pt complaining of shoulder pain and headache this morning.   Tylenol requested.     Pt daughter called for update.   Pt okay to give information to daughter.   Update given to patient daughter.

## 2020-12-09 NOTE — H&P
[facility]    History and Physical  Hospitalist       Date of Admission:  12/8/2020    Assessment & Plan   Eliza Dubois is a 80 year old female with PMH CAD, HTN, HL, NIDDM who presents with general weakness. The patient tested positive for COVID last Thursday as outpatient after her daughter and grandson were both exposed, they also tested positive. She denies fever/chills, diaphoresis, body aches, loss of taste. But she endorses poor appetite, nausea/vomiting, and diarrhea for the past day. This has caused her to feel very weak and unable to get around at home. She has also not taken any of her medications for a few days for the same reason.    She also reports urinary incontinence that began today. She also reports increased urinary frequency. But she denies dysuria, hematuria, foul smell, or cloudy urine.      # UTI  Afebrile, non-tachy, BP elevated, on room air  WBC 5.4  CRP 10.5  Creat 1.12 (at baseline)  UA/micro c/w UTI  Urine cx sent in ED  - cont Ceftriaxone 1G IV daily  - cont IVF NS @ 75cc/hr  - monitor culture data    # COVID Infection  D-Dimer 1.1  - hold Dexamethasone and Remdesivir, patient is stable on room air  - Albuterol inhaler prn  - continuous pulse ox monitoring  - maintain special precautions    # Diarrhea likely related to COVID infection  - check c.diff and stool cultures  - enteric precautions    # General Weakness  - PT/OT eval    # CAD  # HTN  # Hyperlipidemia  - cont ASA, statin, Plavix  - cont Imdur, Lisinopril, Metoprolol  - hold hydrochlorothiazide    # NIDDM  10/2020 Hgb A1c 6.9%  - hold oral agents  - Lispro s/s    # FEN  - low Na, diabetic diet    # Dispo  - admit to inpatient    DVT Prophylaxis: Heparin SQ  Code Status: DNR/DNI  Expected discharge: 2 - 3 days, recommended to pending PT/OT evals once antibiotic plan established.    Brady Boyle MD    Primary Care Physician   Byron Holm    Chief Complaint   General weakness,  urinary incontinence    History is obtained from the patient    History of Present Illness   Eliza Dubois is a 80 year old female with PMH CAD, HTN, HL, NIDDM who presents with general weakness. The patient tested positive for COVID last Thursday as outpatient after her daughter and grandson were both exposed, they also tested positive. She denies fever/chills, diaphoresis, body aches, loss of taste. But she endorses poor appetite, nausea/vomiting, and diarrhea for the past day. This has caused her to feel very weak and unable to get around at home. She has also not taken any of her medications for a few days for the same reason.    Past Medical History    I have reviewed this patient's medical history and updated it with pertinent information if needed.   Past Medical History:   Diagnosis Date     Diabetic eye exam (H) 05/01/14     Heart attack (H)      Pure hypercholesterolemia      Stented coronary artery      Type II or unspecified type diabetes mellitus without mention of complication, not stated as uncontrolled 2002    [place].     Unspecified disease of pericardium 12/05/08    Pericarditis w/mild to moderate pericardial effusion.     Unspecified essential hypertension        Past Surgical History   I have reviewed this patient's surgical history and updated it with pertinent information if needed.  Past Surgical History:   Procedure Laterality Date     ABDOMEN SURGERY       COLONOSCOPY  04/15/09     COLONOSCOPY  6/18/2014    Procedure: COMBINED COLONOSCOPY, SINGLE BIOPSY/POLYPECTOMY BY BIOPSY;  Surgeon: Earle Mon MD;  Location: [facility] LAPAROSCOPY, SURGICAL; CHOLECYSTECTOMY  1997    Cholecystectomy, Laparoscopic     PHACOEMULSIFICATION CLEAR CORNEA WITH STANDARD INTRAOCULAR LENS IMPLANT Right 3/16/2016    Procedure: PHACOEMULSIFICATION CLEAR CORNEA WITH STANDARD INTRAOCULAR LENS IMPLANT;  Surgeon: George Lemus MD;  Location: [facility]     PHACOEMULSIFICATION CLEAR CORNEA WITH STANDARD  INTRAOCULAR LENS IMPLANT Left 3/30/2016    Procedure: PHACOEMULSIFICATION CLEAR CORNEA WITH STANDARD INTRAOCULAR LENS IMPLANT;  Surgeon: George Lemus MD;  Location: [facility] NONSPECIFIC PROCEDURE  1988    Hysterectomy       Prior to Admission Medications   Prior to Admission Medications   Prescriptions Last Dose Informant Patient Reported? Taking?   ASPIRIN 81 MG PO TABS 12/6/2020 at pm Daughter Yes Yes   Sig: Take 1 tablet by mouth once daily   MELATONIN PO  Daughter Yes No   Sig: Take 3 mg by mouth nightly as needed    PARoxetine (PAXIL) 40 MG tablet  Daughter No No   Sig: TAKE ONE TABLET BY MOUTH EVERY MORNING   acetaminophen (TYLENOL 8 HOUR ARTHRITIS PAIN) 650 MG CR tablet  Daughter Yes Yes   Sig: Take 650 mg by mouth every 8 hours as needed for mild pain or fever   acetaminophen (TYLENOL) 500 MG tablet  Daughter Yes Yes   Sig: Take 500-1,000 mg by mouth every 6 hours as needed for mild pain   alcohol swab prep pads Past Week at Unknown time Daughter No Yes   Sig: Use to swab area of injection/sarah as directed.   atorvastatin (LIPITOR) 80 MG tablet 12/6/2020 at pm Daughter Yes Yes   Sig: Take 80 mg by mouth daily   blood glucose (NO BRAND SPECIFIED) test strip Past Week at Unknown time Daughter No Yes   Sig: Use to test blood sugar 2 times daily or as directed. To accompany: Blood Glucose Monitor Brands: per insurance.   blood glucose calibration (NO BRAND SPECIFIED) solution Past Week at Unknown time Daughter No Yes   Sig: To accompany: Blood Glucose Monitor Brands: per insurance.   blood glucose monitoring (NO BRAND SPECIFIED) meter device kit Past Week at Unknown time Daughter No Yes   Sig: Use to test blood sugar 2 times daily or as directed. Preferred blood glucose meter OR supplies to accompany: Blood Glucose Monitor Brands: per insurance.   cephALEXin (KEFLEX) 500 MG capsule  Daughter No No   Sig: Take 1 capsule (500 mg) by mouth 3 times daily   clopidogrel (PLAVIX) 75 MG tablet 12/6/2020 at pm  Daughter Yes Yes   Sig: Take 75 mg by mouth daily   empagliflozin (JARDIANCE) 25 MG TABS tablet 12/6/2020 at pm Daughter No Yes   Sig: Take 1 tablet (25 mg) by mouth daily   gabapentin (NEURONTIN) 300 MG capsule 12/6/2020 at pm Daughter No Yes   Sig: TAKE 1 TO 2 CAPSULES BY MOUTH IN THE EVENING   glipiZIDE (GLUCOTROL) 10 MG tablet 12/6/2020 at pm Daughter No Yes   Sig: TAKE TWO TABLETS BY MOUTH TWICE A DAY BEFORE MEALS   ibuprofen (ADVIL/MOTRIN) 200 MG tablet  Daughter Yes Yes   Sig: Take 200 mg by mouth every 4 hours as needed for mild pain   isosorbide mononitrate (IMDUR) 30 MG 24 hr tablet 12/6/2020 at pm Daughter Yes Yes   Sig: Take 30 mg by mouth daily   lisinopril-hydrochlorothiazide (ZESTORETIC) 20-25 MG tablet 12/6/2020 at pm Daughter No Yes   Sig: TAKE ONE TABLET BY MOUTH ONCE DAILY   metFORMIN (GLUCOPHAGE-XR) 500 MG 24 hr tablet 12/6/2020 at pm Daughter No Yes   Sig: TAKE TWO TABLETS BY MOUTH TWICE A DAY WITH MEALS   metoprolol succinate ER (TOPROL-XL) 100 MG 24 hr tablet 12/6/2020 at pm Daughter No Yes   Sig: TAKE ONE TABLET BY MOUTH ONCE DAILY   omeprazole (PRILOSEC) 40 MG DR capsule 12/6/2020 at pm Daughter No Yes   Sig: TAKE ONE CAPSULE BY MOUTH TWICE A DAY AS NEEDED   oxyCODONE (ROXICODONE) 5 MG tablet Unknown at Unknown time Daughter No No   Sig: Take 1 tablet (5 mg) by mouth every 4 hours as needed for pain   thin (NO BRAND SPECIFIED) lancets Past Week at Unknown time Daughter No Yes   Sig: Use with lanceting device. To accompany: Blood Glucose Monitor Brands: per insurance.      Facility-Administered Medications: None     Allergies   Allergies   Allergen Reactions     Sulfa Drugs Hives       Social History   I have reviewed this patient's social history and updated it with pertinent information if needed. Eliza Dubois  reports that she has never smoked. She has never used smokeless tobacco. She reports that she does not drink alcohol or use drugs.    Family History   I have reviewed this  patient's family history and updated it with pertinent information if needed.   Family History   Problem Relation Age of Onset     Diabetes Mother      Arthritis Mother      Heart Disease Mother      Hypertension Mother      Lipids Mother      Neurologic Disorder Mother         neuropathy     Osteoporosis Mother      Obesity Mother      EYE* Mother         blind     Diabetes Father      Genitourinary Problems Father         gall stones     Heart Disease Father         MI     Cancer Maternal Grandmother      Heart Disease Maternal Grandmother      Cancer Other         Grandparents     Alcohol/Drug No family hx of      Alzheimer Disease No family hx of      Anesthesia Reaction No family hx of      Blood Disease No family hx of      Depression No family hx of      Genetic Disorder No family hx of      Gastrointestinal Disease No family hx of      Gynecology No family hx of      Psychotic Disorder No family hx of      Respiratory No family hx of      Cerebrovascular Disease No family hx of        Review of Systems   The 10 point Review of Systems is negative other than noted in the HPI.     Physical Exam   Temp: 97.8  F (36.6  C) Temp src: Oral BP: (!) 181/74 Pulse: 84   Resp: 16 SpO2: 97 % O2 Device: None (Room air)    Vital Signs with Ranges  Temp:  [97.8  F (36.6  C)] 97.8  F (36.6  C)  Pulse:  [77-91] 84  Resp:  [15-20] 16  BP: (157-187)/(74-97) 181/74  SpO2:  [95 %-98 %] 97 %  0 lbs 0 oz    Constitutional: Awake, appears tired, but pleasant and cooperative, no apparent distress.  Respiratory: Clear to auscultation bilaterally, no crackles or wheezing.  Cardiovascular: Regular rate and rhythm, normal S1 and S2, and no murmur noted.  GI: Soft, non-distended, non-tender, normal bowel sounds.  Skin: No rashes, no cyanosis, no edema.  Musculoskeletal: No joint swelling, erythema or tenderness.  Neurologic: Cranial nerves 2-12 intact, normal strength and sensation.  Psychiatric: Alert, oriented to person, place and time,  no obvious anxiety or depression.    Data   Data reviewed today:  I personally reviewed no images or EKG's today.  Recent Labs   Lab 12/08/20 1929   WBC 5.4   HGB 12.3   MCV 90         POTASSIUM 3.9   CHLORIDE 103   CO2 25   BUN 38*   CR 1.12*   ANIONGAP 10   ANDREI 8.8   *   ALBUMIN 3.3*   PROTTOTAL 7.9   BILITOTAL 0.7   ALKPHOS 121   ALT 19   AST 24   TROPI 0.029       No results found for this or any previous visit (from the past 24 hour(s)).

## 2020-12-09 NOTE — PROGRESS NOTES
S-(situation): Patient arrives to room 267 via cart from ED    B-(background): UTI    A-(assessment): Pt arrived alert and oriented x4 , pleasant and cooperative and able to participate in the admission process.   Pt denies any pain or arrival despite reporting a recent broken left shoulder.   On arrival patient was able to stand and and transfer with assist of one. Pt is weak and needs help with simple tasks with the broken left shoulder.   Skin is CDI.   Lungs are clear.   Purwik in place for frequent voids with UTI.   Initial vitals present with tachycardia and hypertension. Blood pressure medications given shortly after arrival to the floor.   BP (!) 210/95   Pulse 102   Temp 98.4  F (36.9  C) (Oral)   Resp 20   SpO2 98%   Initial glucose stable.   Iv patent and infusing.   Pt states that she has not taken her normal medications in a couple of days because she was too weak, but also reported that she was able to get up and walk to the door when EMS arrived.   Covid isolation precautions in place.  Pt demonstrated an understanding of the admission process.     R-(recommendations): Orders reviewed with patient. Will monitor patient per MD orders. YES    Inpatient nursing criteria listed below were met:    Health care directives status obtained and documented: Yes - Patient  States no documents.   SCD's Documented: No - Heparin for VTE  Skin issues/needs documented:Yes  Isolation addressed and Signage used: Yes  Fall Prevention: Care plan updated, Education given and documented Yes  Care Plan initiated: Yes  Education Assessment documented:Yes  Education Documented (Pre-existing chronic infection such as, MRSA/VRE need education on admission): Yes  Allergies Reviewed: Yes  Admission Medication Reconciliation completed: Yes  New medication patient education completed and documented (Possible Side Effects of Common Medications handout): Yes  Home medications if not able to send immediately home with family stored  here: Yes  Reminder note placed in discharge instructions: Yes  Discharge planning review completed (admission navigator) Yes

## 2020-12-09 NOTE — ED NOTES
Offered patient oral fluids and crackers. Patient states she is not nauseated but does not feel like eating or drinking. Patient appears very tired and needs to be aroused when RN enters room.

## 2020-12-09 NOTE — PROGRESS NOTES
12/09/20 0800   Quick Adds   Type of Visit Initial Occupational Therapy Evaluation   Living Environment   People in home alone   Current Living Arrangements house   Living Environment Comments See PT note for house details.    Self-Care   Usual Activity Tolerance good   Current Activity Tolerance poor   Equipment Currently Used at Home cane, straight;raised toilet seat   Activity/Exercise/Self-Care Comment walk in shower with built in chair but not successful to transfer from.    Disability/Function   Hearing Difficulty or Deaf no   Wear Glasses or Blind no   Concentrating, Remembering or Making Decisions Difficulty no   Difficulty Communicating no   Difficulty Eating/Swallowing no   Walking or Climbing Stairs Difficulty yes   Walking or Climbing Stairs ambulation difficulty, requires equipment   Mobility Management used cane   Dressing/Bathing Difficulty no   Toileting issues no   Doing Errands Independently Difficulty (such as shopping) yes   Fall history within last six months yes   Number of times patient has fallen within last six months 1   General Information   Onset of Illness/Injury or Date of Surgery 12/08/20   Referring Physician Brady Boyle MD   Patient/Family Therapy Goal Statement (OT) unsure at this time due to weakness.    Additional Occupational Profile Info/Pertinent History of Current Problem Patient is an 80 year old female with PMH CAD, HTN, HL, NIDDM who presents with general weakness. The patient tested positive for COVID last Thursday. Per chart review, She endorsed poor appetite, nausea/vomiting, and diarrhea for the past day. Noted not taken any of her medications for a few days for the same reason.  Patient with UTI.    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    Limitations/Impairments other (see comments)  (L shoulder sling)   General Observations and Info Pt significantly fatigued, lower arousal, pleasant, oriented to self, place  and time.    Cognitive Status Examination   Orientation Status orientation to person, place and time   Affect/Mental Status (Cognitive) WNL   Follows Commands WNL   Cognitive Status Comments None identified.    Pain Assessment   Patient Currently in Pain   (minor pain in left shoulder, improving. )   Integumentary/Edema   Integumentary/Edema Comments None noted in left UE   Range of Motion Comprehensive   Comment, General Range of Motion Not assessed on this date. L shoulder precautions unknown.    Strength Comprehensive (MMT)   Comment, General Manual Muscle Testing (MMT) Assessment Not assessed on this date. L shoulder precautions unknown.    Instrumental Activities of Daily Living (IADL)   Previous Responsibilities meal prep;housekeeping;laundry;shopping;medication management;finances;driving;work   Clinical Impression   Criteria for Skilled Therapeutic Interventions Met (OT) yes;meets criteria;skilled treatment is necessary   OT Diagnosis decreased ease and efficiency with BADLs/IADLs   OT Problem List-Impairments impacting ADL activity tolerance impaired;range of motion (ROM);strength;pain   Assessment of Occupational Performance 3-5 Performance Deficits   Identified Performance Deficits dressing, grooming, bathing, community mobility, work, leisure, driving, safety with transfers and ambulation; covid+, L shoulder fracture.    Planned Therapy Interventions (OT) ADL retraining;ROM;progressive activity/exercise   Clinical Decision Making Complexity (OT) moderate complexity   Therapy Frequency (OT) Daily   Predicted Duration of Therapy 2-3x   Anticipated Equipment Needs Upon Discharge (OT) shower chair   Risks and Benefits of Treatment have been explained. Yes   Patient, Family & other staff in agreement with plan of care Yes   Comment-Clinical Impression Patient would benefit from skilled OT to work on activity tolerance with endurance and BADLs with L shoulder fracture.    OT Discharge Planning    OT Discharge  Recommendation (DC Rec) Transitional Care Facility   OT Rationale for DC Rec TCU recommended at this time due to significant fatigue and poor activity tolerance.  Patient does have family support, however, patient may have higher level of need.  Re-assess tomorrow with possible recommendation of home with family assist.    Total Evaluation Time (Minutes)   Total Evaluation Time (Minutes) 20     Thank you for your referral.     DONOVAN Koenig  [facility]  Occupational Therapy     Phone: [phone]  Email: [email or web address]

## 2020-12-09 NOTE — ED NOTES
Spoke with patient's daughter, Neyda, who is very scared about her mother's deterioration over the last few days. Reports patient has not been eating or drinking much and has been unable to take her medications. Informed her that patient is resting comfortably and as soon as there is an update, RN will contact her.

## 2020-12-09 NOTE — ED TRIAGE NOTES
Pt reports about 2 weeks ago she fell and broke her left shoulder. On Thursday last week was diagnosed with covid. Pt states she has been having increased weakness over the past couple day. Pt reports she hasn't been eating or drinking much and has not taken her medications for the past 2 days. Pt vomited when getting into the ambulance and states at that time she was incontinent of stool.

## 2020-12-09 NOTE — ED PROVIDER NOTES
[facility] ED Provider Note   Patient: Eliza Dubois  MRN #:  [id number]  Date of Visit: December 8, 2020    CC:     Chief Complaint   Patient presents with     Fatigue     HPI:  Eliza Dubois is a 80 year old female who presented to the emergency department with severe weakness that has been gradually worsening over the past several days.  Tonight she had a bout of profuse diarrhea and was incontinent of stool.  Patient is concerned that she may be dehydrated.  She was positive for COVID-19 last Thursday, and has not had any fevers, headache, sore throat, chest pain, shortness of breath, or abdominal pain.  She has not had much of an appetite but was able to drink some Ensure today.  She has had decreased urine output.  Patient lives by herself, but her daughter and grandson are nearby.  All 3 of them tested positive for coronavirus.  Patient has a history of diabetes, hypertension and coronary artery disease.  She has not been taking her medications these last few days because she has not been able to eat.  She fell at the end of November and had a left humeral fracture.    Problem List:  Patient Active Problem List    Diagnosis Date Noted     Generalized muscle weakness 12/08/2020     Priority: Medium     Urinary tract infection without hematuria, site unspecified 12/08/2020     Priority: Medium     2019 novel coronavirus disease (COVID-19) 12/08/2020     Priority: Medium     CKD (chronic kidney disease) stage 3, GFR 30-59 ml/min 05/27/2019     Priority: Medium     Type 2 diabetes mellitus with diabetic polyneuropathy, without long-term current use of insulin (H) 10/11/2016     Priority: Medium     Essential hypertension with goal blood pressure less than 140/90 09/12/2016     Priority: Medium     Gastroesophageal reflux disease without esophagitis 05/13/2016     Priority: Medium     Coronary artery disease involving native coronary  artery of native heart without angina pectoris 02/25/2016     Priority: Medium     Major depressive disorder, recurrent episode, moderate (H) 11/10/2015     Priority: Medium     Anxiety 05/08/2012     Priority: Medium     Advanced directives, counseling/discussion 05/03/2012     Priority: Medium     Doing at home through her [employer], will bring copy when complete       Insomnia 05/03/2012     Priority: Medium     Hypertension goal BP (blood pressure) < 140/90 01/20/2011     Priority: Medium     HYPERLIPIDEMIA LDL GOAL <100 10/31/2010     Priority: Medium     Esophageal reflux 08/30/2007     Priority: Medium     Irritable bowel syndrome 09/25/2006     Priority: Medium       Past Medical History:   Diagnosis Date     Diabetic eye exam (H) 05/01/14     Heart attack (H)      Pure hypercholesterolemia      Stented coronary artery      Type II or unspecified type diabetes mellitus without mention of complication, not stated as uncontrolled 2002     Unspecified disease of pericardium 12/05/08     Unspecified essential hypertension        MEDS:      acetaminophen (TYLENOL 8 HOUR ARTHRITIS PAIN) 650 MG CR tablet       acetaminophen (TYLENOL) 500 MG tablet       alcohol swab prep pads       ASPIRIN 81 MG PO TABS       atorvastatin (LIPITOR) 80 MG tablet       blood glucose (NO BRAND SPECIFIED) test strip       blood glucose calibration (NO BRAND SPECIFIED) solution       blood glucose monitoring (NO BRAND SPECIFIED) meter device kit       clopidogrel (PLAVIX) 75 MG tablet       empagliflozin (JARDIANCE) 25 MG TABS tablet       gabapentin (NEURONTIN) 300 MG capsule       glipiZIDE (GLUCOTROL) 10 MG tablet       ibuprofen (ADVIL/MOTRIN) 200 MG tablet       isosorbide mononitrate (IMDUR) 30 MG 24 hr tablet       lisinopril-hydrochlorothiazide (ZESTORETIC) 20-25 MG tablet       metFORMIN (GLUCOPHAGE-XR) 500 MG 24 hr tablet       metoprolol succinate ER (TOPROL-XL) 100 MG 24 hr tablet       omeprazole (PRILOSEC) 40 MG   capsule       thin (NO BRAND SPECIFIED) lancets       cephALEXin (KEFLEX) 500 MG capsule       MELATONIN PO       oxyCODONE (ROXICODONE) 5 MG tablet       PARoxetine (PAXIL) 40 MG tablet        ALLERGIES:    Allergies   Allergen Reactions     Sulfa Drugs Hives       Past Surgical History:   Procedure Laterality Date     ABDOMEN SURGERY       COLONOSCOPY  04/15/09     COLONOSCOPY  6/18/2014    Procedure: COMBINED COLONOSCOPY, SINGLE BIOPSY/POLYPECTOMY BY BIOPSY;  Surgeon: Earle Mon MD;  Location: [facility] LAPAROSCOPY, SURGICAL; CHOLECYSTECTOMY  1997    Cholecystectomy, Laparoscopic     PHACOEMULSIFICATION CLEAR CORNEA WITH STANDARD INTRAOCULAR LENS IMPLANT Right 3/16/2016    Procedure: PHACOEMULSIFICATION CLEAR CORNEA WITH STANDARD INTRAOCULAR LENS IMPLANT;  Surgeon: George Lemus MD;  Location:  [facility]     PHACOEMULSIFICATION CLEAR CORNEA WITH STANDARD INTRAOCULAR LENS IMPLANT Left 3/30/2016    Procedure: PHACOEMULSIFICATION CLEAR CORNEA WITH STANDARD INTRAOCULAR LENS IMPLANT;  Surgeon: George Lemus MD;  Location: [facility]     ZZC NONSPECIFIC PROCEDURE  1988    Hysterectomy       Social History     Tobacco Use     Smoking status: Never Smoker     Smokeless tobacco: Never Used   Substance Use Topics     Alcohol use: No     Alcohol/week: 0.0 standard drinks     Drug use: No         Review of Systems   Except as noted in HPI, all other systems were reviewed and are negative    Physical Exam     Vitals were reviewed  Patient Vitals for the past 12 hrs:   BP Temp Temp src Pulse Resp SpO2   12/08/20 2050 -- -- -- 84 16 97 %   12/08/20 2045 -- -- -- 80 17 96 %   12/08/20 2040 (!) 181/74 -- -- 77 16 96 %   12/1940 (!) 181/79 -- -- 82 15 95 %   12/08/20 1925 (!) 157/97 -- -- 91 -- 98 %   12/08/20 1915 (!) 175/91 -- -- 88 -- 95 %   12/08/20 1856 (!) 187/92 97.8  F (36.6  C) Oral 88 20 97 %     GENERAL APPEARANCE: Alert and oriented x3.  Patient is generally weak  FACE: normal facies  EYES: Pupils are  equal: Eyes are slightly sunken  HENT: normal external exam; mucous membranes are dry  NECK: no adenopathy or asymmetry  RESP: normal respiratory effort; clear breath sounds bilaterally  CV: regular rate and rhythm; no significant murmurs, gallops or rubs  ABD: soft, no tenderness; no rebound or guarding; bowel sounds are normal  MS: no gross deformities noted; normal muscle tone.  EXT: No calf tenderness or pitting edema  SKIN: no worrisome rash  NEURO: no facial droop; no focal deficits, speech is normal  PSYCH: normal mood and affect      Available Lab/Imaging Results     Results for orders placed or performed during the hospital encounter of 12/08/20 (from the past 24 hour(s))   CBC with platelets differential   Result Value Ref Range    WBC 5.4 4.0 - 11.0 10e9/L    RBC Count 4.32 3.8 - 5.2 10e12/L    Hemoglobin 12.3 11.7 - 15.7 g/dL    Hematocrit 39.0 35.0 - 47.0 %    MCV 90 78 - 100 fl    MCH 28.5 26.5 - 33.0 pg    MCHC 31.5 31.5 - 36.5 g/dL    RDW 15.4 (H) 10.0 - 15.0 %    Platelet Count 164 150 - 450 10e9/L    Diff Method Automated Method     % Neutrophils 80.6 %    % Lymphocytes 11.2 %    % Monocytes 7.6 %    % Eosinophils 0.0 %    % Basophils 0.2 %    % Immature Granulocytes 0.4 %    Nucleated RBCs 0 0 /100    Absolute Neutrophil 4.3 1.6 - 8.3 10e9/L    Absolute Lymphocytes 0.6 (L) 0.8 - 5.3 10e9/L    Absolute Monocytes 0.4 0.0 - 1.3 10e9/L    Absolute Basophils 0.0 0.0 - 0.2 10e9/L    Abs Immature Granulocytes 0.0 0 - 0.4 10e9/L    Absolute Nucleated RBC 0.0    Comprehensive metabolic panel   Result Value Ref Range    Sodium 138 133 - 144 mmol/L    Potassium 3.9 3.4 - 5.3 mmol/L    Chloride 103 94 - 109 mmol/L    Carbon Dioxide 25 20 - 32 mmol/L    Anion Gap 10 3 - 14 mmol/L    Glucose 289 (H) 70 - 99 mg/dL    Urea Nitrogen 38 (H) 7 - 30 mg/dL    Creatinine 1.12 (H) 0.52 - 1.04 mg/dL    GFR Estimate 46 (L) >60 mL/min/[1.73_m2]    GFR Estimate If Black 54 (L) >60 mL/min/[1.73_m2]    Calcium 8.8 8.5 - 10.1  mg/dL    Bilirubin Total 0.7 0.2 - 1.3 mg/dL    Albumin 3.3 (L) 3.4 - 5.0 g/dL    Protein Total 7.9 6.8 - 8.8 g/dL    Alkaline Phosphatase 121 40 - 150 U/L    ALT 19 0 - 50 U/L    AST 24 0 - 45 U/L   Troponin I   Result Value Ref Range    Troponin I ES 0.029 0.000 - 0.045 ug/L   CRP inflammation   Result Value Ref Range    CRP Inflammation 10.5 (H) 0.0 - 8.0 mg/L   UA reflex to Microscopic   Result Value Ref Range    Color Urine Yellow     Appearance Urine Slightly Cloudy     Glucose Urine >499 (A) NEG^Negative mg/dL    Bilirubin Urine Negative NEG^Negative    Ketones Urine 20 (A) NEG^Negative mg/dL    Specific Gravity Urine 1.017 1.003 - 1.035    Blood Urine Moderate (A) NEG^Negative    pH Urine 5.0 5.0 - 7.0 pH    Protein Albumin Urine 30 (A) NEG^Negative mg/dL    Urobilinogen mg/dL 0.0 0.0 - 2.0 mg/dL    Nitrite Urine Positive (A) NEG^Negative    Leukocyte Esterase Urine Trace (A) NEG^Negative    Source Midstream Urine     RBC Urine 10 (H) 0 - 2 /HPF    WBC Urine 8 (H) 0 - 5 /HPF    Bacteria Urine Many (A) NEG^Negative /HPF    Squamous Epithelial /HPF Urine <1 0 - 1 /HPF    Mucous Urine Present (A) NEG^Negative /LPF           Impression     Final diagnoses:   Generalized muscle weakness   2019 novel coronavirus disease (COVID-19)   Urinary tract infection without hematuria, site unspecified         ED Course & Medical Decision Making   Eliza Dubois is a 80 year old female who presented to the emergency department with with profound fatigue and weakness in the setting of recent coronavirus infection.  Patient has not been able to eat or drink and was concerned about dehydration as well.  She had a profuse episode of diarrhea this evening and was incontinent of both stool and urine.  She lives by herself, and has not been able to manage safely at home.  Patient was seen shortly after arrival.  Vital signs reveal a temp of 97.8, blood pressure 187/92, heart rate of 88, respiratory rate of 20 with 97% oxygen  saturation.  Exam revealed elderly female who appears weak with sunken eyes and dry mucous membranes.  CBC reveals white blood count of 5.4, hemoglobin of 12.3, platelet count of 164.  Comprehensive metabolic panel reveals normal electrolytes, glucose of 289, BUN of 38, creatinine of 1.12.  Troponin is 0.029 and CRP is 10.5.  Patient received IV fluids and eventually provided a urine specimen which revealed positive nitrites with 10 red blood cells and 8 white blood cells with many bacteria.  Formal urine culture is pending.  Patient was treated with Rocephin 1 g IV.  We did a test trial of ambulation and the patient was too weak to be safely ambulatory on her own.  Patient will be admitted to observation.  Continue supportive measures.  Anticipated length of stay is less than 48 hours.           ED to Inpatient Handoff:    Discussed with Dr. Brady Boyle at 9:50 PM  Patient accepted for Observation Stay  Pending studies include urine culture  Code Status: Full Code                  Disclaimer: This note consists of words and symbols derived from keyboarding and dictation using voice recognition software.  As a result, there may be errors that have gone undetected.  Please consider this when interpreting information found in this note.       Aisha Jameson MD  12/08/20 2132

## 2020-12-09 NOTE — ED NOTES
Pt incontinent of stool, jacy care complete, no skin complications noted. Pt denies any episodes of diarrhea prior to tonight.

## 2020-12-09 NOTE — ED NOTES
"Patient up to use the bedside commode with heavy assistance of RN. Patient unable to hold bladder while transferring to commode. 700 mL Dark yellow urine in hat. UA collected and sent. Patient tolerated fair. States, \"I don't know if I feel safe to go home. I'm so weak.\" Dr. Jameson notified and will speak with patient.   "

## 2020-12-10 ENCOUNTER — APPOINTMENT (OUTPATIENT)
Dept: OCCUPATIONAL THERAPY | Facility: CLINIC | Age: 80
DRG: 177 | End: 2020-12-10
Payer: MEDICARE

## 2020-12-10 ENCOUNTER — APPOINTMENT (OUTPATIENT)
Dept: PHYSICAL THERAPY | Facility: CLINIC | Age: 80
DRG: 177 | End: 2020-12-10
Payer: MEDICARE

## 2020-12-10 PROBLEM — B96.20 E. COLI UTI: Status: ACTIVE | Noted: 2020-12-08

## 2020-12-10 LAB
ANION GAP SERPL CALCULATED.3IONS-SCNC: 5 MMOL/L (ref 3–14)
BACTERIA SPEC CULT: ABNORMAL
BACTERIA SPEC CULT: ABNORMAL
BUN SERPL-MCNC: 25 MG/DL (ref 7–30)
CALCIUM SERPL-MCNC: 8.1 MG/DL (ref 8.5–10.1)
CHLORIDE SERPL-SCNC: 109 MMOL/L (ref 94–109)
CO2 SERPL-SCNC: 26 MMOL/L (ref 20–32)
CREAT SERPL-MCNC: 0.85 MG/DL (ref 0.52–1.04)
CRP SERPL-MCNC: 10.4 MG/L (ref 0–8)
D DIMER PPP FEU-MCNC: 0.7 UG/ML FEU (ref 0–0.5)
ERYTHROCYTE [DISTWIDTH] IN BLOOD BY AUTOMATED COUNT: 15.4 % (ref 10–15)
FIBRINOGEN PPP-MCNC: 521 MG/DL (ref 200–420)
GFR SERPL CREATININE-BSD FRML MDRD: 64 ML/MIN/{1.73_M2}
GLUCOSE BLDC GLUCOMTR-MCNC: 149 MG/DL (ref 70–99)
GLUCOSE BLDC GLUCOMTR-MCNC: 175 MG/DL (ref 70–99)
GLUCOSE BLDC GLUCOMTR-MCNC: 195 MG/DL (ref 70–99)
GLUCOSE BLDC GLUCOMTR-MCNC: 210 MG/DL (ref 70–99)
GLUCOSE BLDC GLUCOMTR-MCNC: 227 MG/DL (ref 70–99)
GLUCOSE SERPL-MCNC: 164 MG/DL (ref 70–99)
HCT VFR BLD AUTO: 31.3 % (ref 35–47)
HGB BLD-MCNC: 9.9 G/DL (ref 11.7–15.7)
INR PPP: 1.11 (ref 0.86–1.14)
LDH SERPL L TO P-CCNC: 177 U/L (ref 81–234)
Lab: ABNORMAL
MAGNESIUM SERPL-MCNC: 1.7 MG/DL (ref 1.6–2.3)
MCH RBC QN AUTO: 28.7 PG (ref 26.5–33)
MCHC RBC AUTO-ENTMCNC: 31.6 G/DL (ref 31.5–36.5)
MCV RBC AUTO: 91 FL (ref 78–100)
PLATELET # BLD AUTO: 149 10E9/L (ref 150–450)
POTASSIUM SERPL-SCNC: 3.9 MMOL/L (ref 3.4–5.3)
RBC # BLD AUTO: 3.45 10E12/L (ref 3.8–5.2)
RETICS # AUTO: 76.2 10E9/L (ref 25–95)
RETICS/RBC NFR AUTO: 2.2 % (ref 0.5–2)
SODIUM SERPL-SCNC: 140 MMOL/L (ref 133–144)
SPECIMEN SOURCE: ABNORMAL
WBC # BLD AUTO: 4.2 10E9/L (ref 4–11)

## 2020-12-10 PROCEDURE — 85045 AUTOMATED RETICULOCYTE COUNT: CPT | Performed by: INTERNAL MEDICINE

## 2020-12-10 PROCEDURE — 85610 PROTHROMBIN TIME: CPT | Performed by: INTERNAL MEDICINE

## 2020-12-10 PROCEDURE — 250N000013 HC RX MED GY IP 250 OP 250 PS 637: Performed by: PEDIATRICS

## 2020-12-10 PROCEDURE — 250N000011 HC RX IP 250 OP 636: Performed by: PEDIATRICS

## 2020-12-10 PROCEDURE — 999N001017 HC STATISTIC GLUCOSE BY METER IP

## 2020-12-10 PROCEDURE — 36415 COLL VENOUS BLD VENIPUNCTURE: CPT | Performed by: INTERNAL MEDICINE

## 2020-12-10 PROCEDURE — 85027 COMPLETE CBC AUTOMATED: CPT | Performed by: INTERNAL MEDICINE

## 2020-12-10 PROCEDURE — 250N000011 HC RX IP 250 OP 636: Performed by: INTERNAL MEDICINE

## 2020-12-10 PROCEDURE — 99233 SBSQ HOSP IP/OBS HIGH 50: CPT | Performed by: PEDIATRICS

## 2020-12-10 PROCEDURE — 258N000003 HC RX IP 258 OP 636: Performed by: PEDIATRICS

## 2020-12-10 PROCEDURE — 86140 C-REACTIVE PROTEIN: CPT | Performed by: INTERNAL MEDICINE

## 2020-12-10 PROCEDURE — 97535 SELF CARE MNGMENT TRAINING: CPT | Mod: GO

## 2020-12-10 PROCEDURE — 83735 ASSAY OF MAGNESIUM: CPT | Performed by: INTERNAL MEDICINE

## 2020-12-10 PROCEDURE — 83615 LACTATE (LD) (LDH) ENZYME: CPT | Performed by: INTERNAL MEDICINE

## 2020-12-10 PROCEDURE — 85379 FIBRIN DEGRADATION QUANT: CPT | Performed by: INTERNAL MEDICINE

## 2020-12-10 PROCEDURE — 80048 BASIC METABOLIC PNL TOTAL CA: CPT | Performed by: INTERNAL MEDICINE

## 2020-12-10 PROCEDURE — 85384 FIBRINOGEN ACTIVITY: CPT | Performed by: INTERNAL MEDICINE

## 2020-12-10 PROCEDURE — 99207 PR CDG-MDM COMPONENT: MEETS HIGH - UP CODED: CPT | Performed by: PEDIATRICS

## 2020-12-10 PROCEDURE — 250N000013 HC RX MED GY IP 250 OP 250 PS 637: Performed by: INTERNAL MEDICINE

## 2020-12-10 PROCEDURE — 120N000001 HC R&B MED SURG/OB

## 2020-12-10 PROCEDURE — 97110 THERAPEUTIC EXERCISES: CPT | Mod: GP | Performed by: PHYSICAL THERAPIST

## 2020-12-10 RX ORDER — PROCHLORPERAZINE 25 MG
12.5 SUPPOSITORY, RECTAL RECTAL EVERY 12 HOURS PRN
Status: DISCONTINUED | OUTPATIENT
Start: 2020-12-10 | End: 2020-12-12 | Stop reason: HOSPADM

## 2020-12-10 RX ORDER — LISINOPRIL 40 MG/1
40 TABLET ORAL AT BEDTIME
Status: DISCONTINUED | OUTPATIENT
Start: 2020-12-10 | End: 2020-12-12 | Stop reason: HOSPADM

## 2020-12-10 RX ORDER — PROCHLORPERAZINE MALEATE 5 MG
5 TABLET ORAL EVERY 6 HOURS PRN
Status: DISCONTINUED | OUTPATIENT
Start: 2020-12-10 | End: 2020-12-12 | Stop reason: HOSPADM

## 2020-12-10 RX ORDER — AMLODIPINE BESYLATE 5 MG/1
5 TABLET ORAL DAILY
Status: DISCONTINUED | OUTPATIENT
Start: 2020-12-10 | End: 2020-12-11

## 2020-12-10 RX ORDER — CLONIDINE 0.1 MG/24H
1 PATCH, EXTENDED RELEASE TRANSDERMAL WEEKLY
Status: DISCONTINUED | OUTPATIENT
Start: 2020-12-10 | End: 2020-12-11

## 2020-12-10 RX ADMIN — INSULIN ASPART 1 UNITS: 100 INJECTION, SOLUTION INTRAVENOUS; SUBCUTANEOUS at 17:27

## 2020-12-10 RX ADMIN — PROCHLORPERAZINE EDISYLATE 5 MG: 5 INJECTION INTRAMUSCULAR; INTRAVENOUS at 18:08

## 2020-12-10 RX ADMIN — ONDANSETRON 4 MG: 2 INJECTION INTRAMUSCULAR; INTRAVENOUS at 21:15

## 2020-12-10 RX ADMIN — ENOXAPARIN SODIUM 40 MG: 40 INJECTION SUBCUTANEOUS at 21:15

## 2020-12-10 RX ADMIN — LISINOPRIL 40 MG: 40 TABLET ORAL at 21:15

## 2020-12-10 RX ADMIN — AMLODIPINE BESYLATE 5 MG: 5 TABLET ORAL at 18:08

## 2020-12-10 RX ADMIN — PROCHLORPERAZINE EDISYLATE 5 MG: 5 INJECTION INTRAMUSCULAR; INTRAVENOUS at 12:25

## 2020-12-10 RX ADMIN — ISOSORBIDE MONONITRATE 30 MG: 30 TABLET, EXTENDED RELEASE ORAL at 21:16

## 2020-12-10 RX ADMIN — PAROXETINE HYDROCHLORIDE 40 MG: 20 TABLET, FILM COATED ORAL at 21:16

## 2020-12-10 RX ADMIN — ASPIRIN 81 MG 81 MG: 81 TABLET ORAL at 21:16

## 2020-12-10 RX ADMIN — CEFTRIAXONE SODIUM 1 G: 1 INJECTION, POWDER, FOR SOLUTION INTRAMUSCULAR; INTRAVENOUS at 21:15

## 2020-12-10 RX ADMIN — ACETAMINOPHEN 1000 MG: 500 TABLET, FILM COATED ORAL at 09:56

## 2020-12-10 RX ADMIN — CLOPIDOGREL BISULFATE 75 MG: 75 TABLET ORAL at 21:16

## 2020-12-10 RX ADMIN — INSULIN ASPART 1 UNITS: 100 INJECTION, SOLUTION INTRAVENOUS; SUBCUTANEOUS at 08:16

## 2020-12-10 RX ADMIN — ATORVASTATIN CALCIUM 80 MG: 40 TABLET, FILM COATED ORAL at 21:15

## 2020-12-10 RX ADMIN — PANTOPRAZOLE SODIUM 40 MG: 40 TABLET, DELAYED RELEASE ORAL at 06:54

## 2020-12-10 RX ADMIN — SODIUM CHLORIDE, POTASSIUM CHLORIDE, SODIUM LACTATE AND CALCIUM CHLORIDE: 600; 310; 30; 20 INJECTION, SOLUTION INTRAVENOUS at 03:47

## 2020-12-10 RX ADMIN — CLONIDINE 1 PATCH: 0.1 PATCH TRANSDERMAL at 12:22

## 2020-12-10 RX ADMIN — LABETALOL HYDROCHLORIDE 10 MG: 5 INJECTION INTRAVENOUS at 08:16

## 2020-12-10 RX ADMIN — METOPROLOL SUCCINATE 150 MG: 100 TABLET, EXTENDED RELEASE ORAL at 21:16

## 2020-12-10 RX ADMIN — ONDANSETRON 4 MG: 2 INJECTION INTRAMUSCULAR; INTRAVENOUS at 08:03

## 2020-12-10 RX ADMIN — INSULIN ASPART 1 UNITS: 100 INJECTION, SOLUTION INTRAVENOUS; SUBCUTANEOUS at 12:24

## 2020-12-10 RX ADMIN — ACETAMINOPHEN 1000 MG: 500 TABLET, FILM COATED ORAL at 18:07

## 2020-12-10 ASSESSMENT — ACTIVITIES OF DAILY LIVING (ADL)
ADLS_ACUITY_SCORE: 18
ADLS_ACUITY_SCORE: 22

## 2020-12-10 NOTE — PROGRESS NOTES
Pt continues to have a poor appetite.   Pt stated that she only wanted a banana and cheerios. Nutrition was called and it was ordered.

## 2020-12-10 NOTE — PROGRESS NOTES
SPIRITUAL HEALTH SERVICES  SPIRITUAL ASSESSMENT Progress Note  [facility]    REFERRAL SOURCE: Patient    Eliza requested a  visit, but I haven't seen her d/t her COVID+ dx.     PLAN: I remain available to visit w/her electronically, if requested.    Chaplain Edward Lopez, Ph.D  Office: [phone]

## 2020-12-10 NOTE — PLAN OF CARE
Took over care at 1530..  Pt continues to have very little appetite, was taken off room service as pt unable to order independently. Continue to be incontinent, purewick in place. No BM this shift. IV infusing per orders. Will continue to monitor. Pt declined talking to daughter this shift, nurse did give daughter and update though.

## 2020-12-10 NOTE — PROGRESS NOTES
Care Management Follow Up    Length of Stay (days): 2    Expected Discharge Date: 12/11/20     Concerns to be Addressed: discharge planning       Patient plan of care discussed at interdisciplinary rounds: Yes    Anticipated Discharge Disposition: Transitional Care  Disposition Comments: Patient in agreement with TCU placement.    Patient/family educated on Medicare website which has current facility and service quality ratings: yes     Patient/Family in Agreement with the Plan: yes    Referrals Placed by CM/SW: Internal Clinic Care Coordination;Post Acute Facilities     Private pay costs discussed: Not applicable    Additional Information:  Writer visited with patient over the phone regarding discharge planning.  Discussed continued recommendation for TCU placement. Patient in agreement, stating that she does not feel strong enough to be pipe to manage at home alone. Reviewed with patient that she has been accepted for TCU placement at both [facility] and [facility].  Discussed that it is anticipated that she may be ready for discharge tomorrow, but if she is not, then could be over the weekend.  Both facilities are checking into whether they could accommodate at pre-arranged weekend admission.  Patient stated that her first choice is [facility].      Discussed transportation. Patient stated that she does not want to ask her daughter to transport her (daughter is COVID positive also).  Discussed agency transport for COVID positive patients at no charge and patient is interested in this.  Care Transitions to help set up transport when discharge date is determined.      Patient thanked writer for the visit over the phone today.        Sabine Ma YIFAN  [facility]   [phone]

## 2020-12-10 NOTE — PLAN OF CARE
"  Problem: Adult Inpatient Plan of Care  Goal: Plan of Care Review  Outcome: Improving  Goal: Absence of Hospital-Acquired Illness or Injury  Outcome: Improving  Intervention: Identify and Manage Fall Risk  Recent Flowsheet Documentation  Taken 12/10/2020 0147 by Corey Gabriel RN  Safety Promotion/Fall Prevention:   activity supervised   clutter free environment maintained   fall prevention program maintained   lighting adjusted   nonskid shoes/slippers when out of bed   room organization consistent   safety round/check completed   supervised activity  Intervention: Prevent Skin Injury  Recent Flowsheet Documentation  Taken 12/10/2020 0147 by Corey Gabriel RN  Body Position: position changed independently  Intervention: Prevent and Manage VTE (Venous Thromboembolism) Risk  Recent Flowsheet Documentation  Taken 12/10/2020 0147 by Corey Gabriel RN  VTE Prevention/Management: anticoagulant therapy maintained  Goal: Optimal Comfort and Wellbeing  Outcome: Improving  Goal: Readiness for Transition of Care  Outcome: Improving     Problem: UTI (Urinary Tract Infection)  Goal: Improved Infection Symptoms  Outcome: Improving     Problem: OT General Care Plan  Goal: Toilet Transfer/Toileting (OT)  Description: Toilet Transfer/Toileting (OT)  Outcome: Improving     Problem: Discharge Planning  Goal: Discharge Planning (Adult, OB, Behavioral, Peds)  Outcome: Improving       Pt experienced some moderate nausea early this shift , zofran given with favorable results.   Tylenol given for shoulder pain.   Purwik remains in place.   Covid precautions remain in place.   Pt hypertensive this shift.   BP (!) 168/76 (BP Location: Right arm)   Pulse 68   Temp 98.2  F (36.8  C) (Oral)   Resp 18   Ht 1.626 m (5' 4\")   Wt 57.2 kg (126 lb)   SpO2 94%   BMI 21.63 kg/m    Iv patent and infusing.   Will continue to monitor and follow plan of care.  "

## 2020-12-10 NOTE — PROGRESS NOTES
[facility]    Medicine Progress Note - Hospitalist Service       Date of Admission:  12/8/2020  Assessment & Plan       80-year-old woman admitted with COVID-19 with predominantly gastrointestinal symptoms which are starting to improve and with fatigue.  E. coli UTI has also been identified.  Hypertension has been worsened although she has been asymptomatic from moderately to intermittently severely elevated blood pressure readings.  Chronic kidney disease and CAD have been stable.  Glycemic control of diabetes has not been optimal.  She has had hypomagnesemia.  She appears to be continuing to recover from her previous left proximal humerus fracture, but functional capacity remains limited with plan to discharge to TCU for rehab.    Principal Problem:    2019 novel coronavirus disease (COVID-19)  Active Problems:    E. coli UTI    Hypertension goal BP (blood pressure) < 140/90    Coronary artery disease involving native coronary artery of native heart without angina pectoris    Type 2 diabetes mellitus with diabetic polyneuropathy, without long-term current use of insulin (H)    CKD (chronic kidney disease) stage 3, GFR 30-59 ml/min    Nausea vomiting and diarrhea    Hypomagnesemia    Generalized muscle weakness    Closed fracture of proximal end of left humerus with routine healing    Discontinue IV fluids and advance diet as tolerated  Continue symptomatic treatment of nausea, added prochlorperazine as needed  Add clonidine patch for treatment of hypertension because of her nausea  Add amlodipine for treatment of hypertension  Increase doses of lisinopril and Toprol-XL tonight for treatment of hypertension  May discontinue enteric isolation because she has not had any stools and will also discontinue testing for enteric pathogens or C. difficile as GI infection other than COVID-19 is not suspected       Diet: Combination Diet 1053-9078 Calories: Moderate Consistent CHO (4-6 CHO  units/meal); 2 gm NA Diet    DVT Prophylaxis: Enoxaparin (Lovenox) SQ  Uriarte Catheter: not present  Code Status: No CPR- Do NOT Intubate           Disposition Plan   Expected discharge: 1 to 2 days, recommended to transitional care unit once Medically stable with improved blood pressure and adequate oral intake.  Entered: Deon Pierre MD 12/10/2020, 3:57 PM       The patient's care was discussed with the Care Coordinator/ and Patient.    Deon Pierre MD  Hospitalist Service  [facility]  Contact information available via [facility] Paging/Directory    ______________________________________________________________________    Interval History   She says she feels tired.  She was able to sleep this afternoon for the first time in days.  Her nausea is better and she has been able to tolerate small amounts of oral intake today.  She has not vomited.  She has not had any diarrhea during her hospital stay.  She denies any shortness of breath or cough.  She denies chest pain or headache.  Nursing had noted some vaginal discharge, but patient denies having noticed any vaginal discharge herself and she denies any vaginal irritation or itching.  She has intermittently had a pure wick urinary collection device placed.  She has been afebrile.  Blood pressure has ranged from 148/70 4-2 100/89 over the last day.  Oxygenation remains normal.  Urine output has been adequate.    Data reviewed today: I reviewed all medications, new labs and imaging results over the last 24 hours. I personally reviewed no images or EKG's today.    Physical Exam   Vital Signs: Temp: 97.6  F (36.4  C) Temp src: Oral BP: (!) 188/77 Pulse: 71   Resp: 20 SpO2: 95 % O2 Device: None (Room air)    Weight: 126 lbs 0 oz  General Appearance: Appears tired but no signs of acute distress while resting in bed  Respiratory: Normal respiratory effort, clear lungs  Cardiovascular: Regular rate and rhythm, good radial  pulse, brisk capillary refill  GI: Nondistended abdomen, soft, no tenderness  Skin: No rash, mild flushing in the cheeks  Other: Alert and oriented, able to follow simple instructions, moves all limbs purposefully and symmetrically    Data   Recent Labs   Lab 12/10/20  0633 12/09/20  1243 12/09/20  0614 12/08/20  1929   WBC 4.2  --  4.7 5.4   HGB 9.9*  --  9.5* 12.3   MCV 91  --  91 90   *  --  154 164   INR 1.11  --  1.07  --      --  143 138   POTASSIUM 3.9  --  3.9 3.9   CHLORIDE 109  --  113* 103   CO2 26  --  22 25   BUN 25  --  31* 38*   CR 0.85  --  0.92 1.12*   ANIONGAP 5  --  8 10   ANDREI 8.1*  --  7.8* 8.8   *  --  160* 289*   ALBUMIN  --   --   --  3.3*   PROTTOTAL  --   --   --  7.9   BILITOTAL  --   --   --  0.7   ALKPHOS  --   --   --  121   ALT  --   --   --  19   AST  --   --   --  24   TROPI  --  0.041 0.044 0.029     Urine culture grew E. Coli ,000 colonies per mL that was susceptible to all antibiotics tested     Magnesium 1.7 today    Blood sugars range 149-221 over the last day    CRP 10.4 today, unchanged from 10.5 yesterday   today,  not significantly changed from 157 yesterday    Medications     lactated ringers 75 mL/hr at 12/10/20 0347     - MEDICATION INSTRUCTIONS -         aspirin  81 mg Oral At Bedtime     atorvastatin  80 mg Oral At Bedtime     cefTRIAXone  1 g Intravenous Q24H     cloNIDine   Transdermal Q8H     cloNIDine  1 patch Transdermal Weekly     clopidogrel  75 mg Oral At Bedtime     enoxaparin ANTICOAGULANT  40 mg Subcutaneous Q24H     insulin aspart  1-3 Units Subcutaneous TID AC     insulin aspart  1-3 Units Subcutaneous At Bedtime     isosorbide mononitrate  30 mg Oral At Bedtime     lisinopril  20 mg Oral At Bedtime     metoprolol succinate ER  100 mg Oral At Bedtime     pantoprazole  40 mg Oral QAM AC     PARoxetine  40 mg Oral At Bedtime     sodium chloride (PF)  3 mL Intracatheter Q8H

## 2020-12-10 NOTE — PLAN OF CARE
Major shift events: nausea and hypertension-allen meds  Neuro: alert and oriented, very tired  Pulmonary: lung sounds clear throughout  CV: hypertensive-given labetolol prn x1 started on clonidine patch to right arm  GI: continuous nausea throughout the day, given both zofran and compazine. Poor appetite but tolerating food well. Encouraged to eat as much as able. No stools, passing large amounts of gas  : purewick in place with adequate urine output  Skin: intact  Lines/Tubes/Drains: peripheral IV x1  Drips: LR infusing    Plan: continue to monitor blood pressures and vitals

## 2020-12-10 NOTE — PLAN OF CARE
S-(situation): PT treatment scheduled for AM session.    B-(background): Pt is admitted secondary to COVID+ however also recently fractured her L shoulder and is NWB on the L UE.    A-(assessment): PT attempted to see pt this AM.  Pt was awake but reports being very nauseous and not feeling well.  States her arm is feeling fine.  Reports not moving with RN staff much.  Would prefer if PT come back later.    R-(recommendations): PT will attempt treatment session this PM.    Thank you for your referral.    Miroslava Lanza, PT, DPT  [facility]  [phone]    PM: PT was able to see pt for bed exercises this PM.   Please refer to flowsheet and care plan for information.  However PT is recommending trying to encourage pt to move more and get out of bed for meals and use bed side commode to void at this time.  Pt is declining bed mobility today for PT and OT.  If pt wants to go home we need to get her moving and assess ambulation and stairs without walker, would need unilateral assistive device secondary to NWB on L UE.    Thank you for your referral.    Miroslava Lanza, PT, DPT  [facility]  [phone]

## 2020-12-11 ENCOUNTER — APPOINTMENT (OUTPATIENT)
Dept: PHYSICAL THERAPY | Facility: CLINIC | Age: 80
DRG: 177 | End: 2020-12-11
Payer: MEDICARE

## 2020-12-11 ENCOUNTER — APPOINTMENT (OUTPATIENT)
Dept: OCCUPATIONAL THERAPY | Facility: CLINIC | Age: 80
DRG: 177 | End: 2020-12-11
Payer: MEDICARE

## 2020-12-11 LAB
ALDOST SERPL-MCNC: 5.2 NG/DL (ref 0–31)
ANION GAP SERPL CALCULATED.3IONS-SCNC: 6 MMOL/L (ref 3–14)
BUN SERPL-MCNC: 21 MG/DL (ref 7–30)
CALCIUM SERPL-MCNC: 8 MG/DL (ref 8.5–10.1)
CHLORIDE SERPL-SCNC: 103 MMOL/L (ref 94–109)
CO2 SERPL-SCNC: 27 MMOL/L (ref 20–32)
CREAT SERPL-MCNC: 0.81 MG/DL (ref 0.52–1.04)
CRP SERPL-MCNC: 26.4 MG/L (ref 0–8)
ERYTHROCYTE [DISTWIDTH] IN BLOOD BY AUTOMATED COUNT: 15.1 % (ref 10–15)
GFR SERPL CREATININE-BSD FRML MDRD: 69 ML/MIN/{1.73_M2}
GLUCOSE BLDC GLUCOMTR-MCNC: 189 MG/DL (ref 70–99)
GLUCOSE BLDC GLUCOMTR-MCNC: 190 MG/DL (ref 70–99)
GLUCOSE BLDC GLUCOMTR-MCNC: 218 MG/DL (ref 70–99)
GLUCOSE BLDC GLUCOMTR-MCNC: 279 MG/DL (ref 70–99)
GLUCOSE BLDC GLUCOMTR-MCNC: 369 MG/DL (ref 70–99)
GLUCOSE SERPL-MCNC: 226 MG/DL (ref 70–99)
HCT VFR BLD AUTO: 34.8 % (ref 35–47)
HGB BLD-MCNC: 11.2 G/DL (ref 11.7–15.7)
LDH SERPL L TO P-CCNC: 210 U/L (ref 81–234)
MAGNESIUM SERPL-MCNC: 1.3 MG/DL (ref 1.6–2.3)
MCH RBC QN AUTO: 28.5 PG (ref 26.5–33)
MCHC RBC AUTO-ENTMCNC: 32.2 G/DL (ref 31.5–36.5)
MCV RBC AUTO: 89 FL (ref 78–100)
PHOSPHATE SERPL-MCNC: 2.3 MG/DL (ref 2.5–4.5)
PHOSPHATE SERPL-MCNC: 2.4 MG/DL (ref 2.5–4.5)
PLATELET # BLD AUTO: 160 10E9/L (ref 150–450)
POTASSIUM SERPL-SCNC: 3.6 MMOL/L (ref 3.4–5.3)
RBC # BLD AUTO: 3.93 10E12/L (ref 3.8–5.2)
SODIUM SERPL-SCNC: 136 MMOL/L (ref 133–144)
WBC # BLD AUTO: 5.9 10E9/L (ref 4–11)

## 2020-12-11 PROCEDURE — 80048 BASIC METABOLIC PNL TOTAL CA: CPT | Performed by: INTERNAL MEDICINE

## 2020-12-11 PROCEDURE — 999N000156 HC STATISTIC RCP CONSULT EA 30 MIN

## 2020-12-11 PROCEDURE — 83615 LACTATE (LD) (LDH) ENZYME: CPT | Performed by: INTERNAL MEDICINE

## 2020-12-11 PROCEDURE — 84100 ASSAY OF PHOSPHORUS: CPT | Performed by: INTERNAL MEDICINE

## 2020-12-11 PROCEDURE — 250N000013 HC RX MED GY IP 250 OP 250 PS 637: Performed by: PEDIATRICS

## 2020-12-11 PROCEDURE — 999N000123 HC STATISTIC OXYGEN O2DAILY TECH TIME

## 2020-12-11 PROCEDURE — 84100 ASSAY OF PHOSPHORUS: CPT | Performed by: PEDIATRICS

## 2020-12-11 PROCEDURE — 250N000011 HC RX IP 250 OP 636: Performed by: PEDIATRICS

## 2020-12-11 PROCEDURE — 99207 PR CDG-MDM COMPONENT: MEETS HIGH - UP CODED: CPT | Performed by: PEDIATRICS

## 2020-12-11 PROCEDURE — 258N000003 HC RX IP 258 OP 636: Performed by: PEDIATRICS

## 2020-12-11 PROCEDURE — 120N000001 HC R&B MED SURG/OB

## 2020-12-11 PROCEDURE — 97535 SELF CARE MNGMENT TRAINING: CPT | Mod: GO

## 2020-12-11 PROCEDURE — 86140 C-REACTIVE PROTEIN: CPT | Performed by: INTERNAL MEDICINE

## 2020-12-11 PROCEDURE — 250N000013 HC RX MED GY IP 250 OP 250 PS 637: Performed by: INTERNAL MEDICINE

## 2020-12-11 PROCEDURE — 85027 COMPLETE CBC AUTOMATED: CPT | Performed by: INTERNAL MEDICINE

## 2020-12-11 PROCEDURE — 97530 THERAPEUTIC ACTIVITIES: CPT | Mod: GP | Performed by: PHYSICAL THERAPIST

## 2020-12-11 PROCEDURE — 83735 ASSAY OF MAGNESIUM: CPT | Performed by: INTERNAL MEDICINE

## 2020-12-11 PROCEDURE — 97110 THERAPEUTIC EXERCISES: CPT | Mod: GO

## 2020-12-11 PROCEDURE — 999N001017 HC STATISTIC GLUCOSE BY METER IP

## 2020-12-11 PROCEDURE — 36415 COLL VENOUS BLD VENIPUNCTURE: CPT | Performed by: PEDIATRICS

## 2020-12-11 PROCEDURE — 82088 ASSAY OF ALDOSTERONE: CPT | Performed by: PEDIATRICS

## 2020-12-11 PROCEDURE — 99233 SBSQ HOSP IP/OBS HIGH 50: CPT | Performed by: PEDIATRICS

## 2020-12-11 PROCEDURE — 250N000011 HC RX IP 250 OP 636: Performed by: INTERNAL MEDICINE

## 2020-12-11 PROCEDURE — 84244 ASSAY OF RENIN: CPT | Performed by: INTERNAL MEDICINE

## 2020-12-11 PROCEDURE — 36415 COLL VENOUS BLD VENIPUNCTURE: CPT | Performed by: INTERNAL MEDICINE

## 2020-12-11 PROCEDURE — 250N000009 HC RX 250: Performed by: PEDIATRICS

## 2020-12-11 RX ORDER — CLOPIDOGREL BISULFATE 75 MG/1
75 TABLET ORAL DAILY
Qty: 30 TABLET | Refills: 0 | Status: SHIPPED | OUTPATIENT
Start: 2020-12-11 | End: 2021-09-28

## 2020-12-11 RX ORDER — MAGNESIUM SULFATE HEPTAHYDRATE 40 MG/ML
2 INJECTION, SOLUTION INTRAVENOUS ONCE
Status: COMPLETED | OUTPATIENT
Start: 2020-12-11 | End: 2020-12-11

## 2020-12-11 RX ORDER — GABAPENTIN 300 MG/1
300 CAPSULE ORAL AT BEDTIME
Status: DISCONTINUED | OUTPATIENT
Start: 2020-12-11 | End: 2020-12-12 | Stop reason: HOSPADM

## 2020-12-11 RX ORDER — AMLODIPINE BESYLATE 5 MG/1
10 TABLET ORAL DAILY
Status: DISCONTINUED | OUTPATIENT
Start: 2020-12-11 | End: 2020-12-12 | Stop reason: HOSPADM

## 2020-12-11 RX ORDER — METOPROLOL SUCCINATE 100 MG/1
150 TABLET, EXTENDED RELEASE ORAL DAILY
Qty: 45 TABLET | Refills: 0 | Status: SHIPPED | OUTPATIENT
Start: 2020-12-11 | End: 2021-04-02

## 2020-12-11 RX ORDER — GABAPENTIN 300 MG/1
300 CAPSULE ORAL AT BEDTIME
Qty: 30 CAPSULE | Refills: 0 | Status: SHIPPED | OUTPATIENT
Start: 2020-12-11 | End: 2021-01-12

## 2020-12-11 RX ORDER — LISINOPRIL 40 MG/1
40 TABLET ORAL AT BEDTIME
Qty: 30 TABLET | Refills: 0 | Status: SHIPPED | OUTPATIENT
Start: 2020-12-11 | End: 2021-01-14

## 2020-12-11 RX ORDER — HYDRALAZINE HYDROCHLORIDE 20 MG/ML
10 INJECTION INTRAMUSCULAR; INTRAVENOUS ONCE
Status: COMPLETED | OUTPATIENT
Start: 2020-12-11 | End: 2020-12-11

## 2020-12-11 RX ORDER — CLONIDINE 0.1 MG/24H
2 PATCH, EXTENDED RELEASE TRANSDERMAL WEEKLY
Status: DISCONTINUED | OUTPATIENT
Start: 2020-12-11 | End: 2020-12-12 | Stop reason: HOSPADM

## 2020-12-11 RX ORDER — PANTOPRAZOLE SODIUM 40 MG/1
40 TABLET, DELAYED RELEASE ORAL
Qty: 30 TABLET | Refills: 0 | Status: SHIPPED | OUTPATIENT
Start: 2020-12-12 | End: 2021-01-14

## 2020-12-11 RX ORDER — CEFDINIR 300 MG/1
600 CAPSULE ORAL EVERY EVENING
Qty: 6 CAPSULE | Refills: 0 | Status: ON HOLD | OUTPATIENT
Start: 2020-12-12 | End: 2020-12-15

## 2020-12-11 RX ORDER — AMLODIPINE BESYLATE 10 MG/1
10 TABLET ORAL DAILY
Qty: 30 TABLET | Refills: 0 | Status: SHIPPED | OUTPATIENT
Start: 2020-12-12 | End: 2020-12-24

## 2020-12-11 RX ORDER — ACETAMINOPHEN 500 MG
500-1000 TABLET ORAL EVERY 6 HOURS PRN
Qty: 100 TABLET | Refills: 0 | Status: SHIPPED | OUTPATIENT
Start: 2020-12-11 | End: 2023-12-12

## 2020-12-11 RX ORDER — ISOSORBIDE MONONITRATE 30 MG/1
30 TABLET, EXTENDED RELEASE ORAL DAILY
Qty: 30 TABLET | Refills: 0 | Status: SHIPPED | OUTPATIENT
Start: 2020-12-11 | End: 2022-08-02

## 2020-12-11 RX ORDER — ATORVASTATIN CALCIUM 80 MG/1
80 TABLET, FILM COATED ORAL DAILY
Qty: 30 TABLET | Refills: 0 | Status: SHIPPED | OUTPATIENT
Start: 2020-12-11 | End: 2021-01-28

## 2020-12-11 RX ORDER — GLIPIZIDE 5 MG/1
20 TABLET ORAL
Status: DISCONTINUED | OUTPATIENT
Start: 2020-12-11 | End: 2020-12-12 | Stop reason: HOSPADM

## 2020-12-11 RX ORDER — PAROXETINE 40 MG/1
40 TABLET, FILM COATED ORAL EVERY MORNING
Qty: 30 TABLET | Refills: 0 | Status: SHIPPED | OUTPATIENT
Start: 2020-12-11 | End: 2021-04-21

## 2020-12-11 RX ORDER — METFORMIN HCL 500 MG
1000 TABLET, EXTENDED RELEASE 24 HR ORAL 2 TIMES DAILY WITH MEALS
Qty: 120 TABLET | Refills: 0 | Status: SHIPPED | OUTPATIENT
Start: 2020-12-11 | End: 2021-05-11

## 2020-12-11 RX ORDER — CEFDINIR 300 MG/1
600 CAPSULE ORAL EVERY EVENING
Status: DISCONTINUED | OUTPATIENT
Start: 2020-12-11 | End: 2020-12-12 | Stop reason: HOSPADM

## 2020-12-11 RX ORDER — CLONIDINE 0.1 MG/24H
2 PATCH, EXTENDED RELEASE TRANSDERMAL WEEKLY
Qty: 10 PATCH | Refills: 0 | Status: SHIPPED | OUTPATIENT
Start: 2020-12-18 | End: 2020-12-22

## 2020-12-11 RX ORDER — GLIPIZIDE 10 MG/1
20 TABLET ORAL
Qty: 120 TABLET | Refills: 0 | Status: SHIPPED | OUTPATIENT
Start: 2020-12-11 | End: 2021-05-11

## 2020-12-11 RX ADMIN — HYDRALAZINE HYDROCHLORIDE 10 MG: 20 INJECTION INTRAMUSCULAR; INTRAVENOUS at 05:14

## 2020-12-11 RX ADMIN — ISOSORBIDE MONONITRATE 30 MG: 30 TABLET, EXTENDED RELEASE ORAL at 20:32

## 2020-12-11 RX ADMIN — AMLODIPINE BESYLATE 5 MG: 5 TABLET ORAL at 08:01

## 2020-12-11 RX ADMIN — CEFDINIR 600 MG: 300 CAPSULE ORAL at 20:33

## 2020-12-11 RX ADMIN — ENOXAPARIN SODIUM 40 MG: 40 INJECTION SUBCUTANEOUS at 20:34

## 2020-12-11 RX ADMIN — LABETALOL HYDROCHLORIDE 10 MG: 5 INJECTION INTRAVENOUS at 08:06

## 2020-12-11 RX ADMIN — GABAPENTIN 300 MG: 300 CAPSULE ORAL at 20:32

## 2020-12-11 RX ADMIN — PROCHLORPERAZINE EDISYLATE 5 MG: 5 INJECTION INTRAMUSCULAR; INTRAVENOUS at 07:49

## 2020-12-11 RX ADMIN — PANTOPRAZOLE SODIUM 40 MG: 40 TABLET, DELAYED RELEASE ORAL at 06:43

## 2020-12-11 RX ADMIN — ONDANSETRON 4 MG: 4 TABLET, ORALLY DISINTEGRATING ORAL at 06:43

## 2020-12-11 RX ADMIN — GLIPIZIDE 20 MG: 5 TABLET ORAL at 16:42

## 2020-12-11 RX ADMIN — INSULIN ASPART 1 UNITS: 100 INJECTION, SOLUTION INTRAVENOUS; SUBCUTANEOUS at 07:54

## 2020-12-11 RX ADMIN — CLOPIDOGREL BISULFATE 75 MG: 75 TABLET ORAL at 20:32

## 2020-12-11 RX ADMIN — SODIUM PHOSPHATE, MONOBASIC, MONOHYDRATE 9 MMOL: 276; 142 INJECTION, SOLUTION INTRAVENOUS at 11:46

## 2020-12-11 RX ADMIN — MAGNESIUM SULFATE IN WATER 2 G: 40 INJECTION, SOLUTION INTRAVENOUS at 11:39

## 2020-12-11 RX ADMIN — INSULIN ASPART 2 UNITS: 100 INJECTION, SOLUTION INTRAVENOUS; SUBCUTANEOUS at 11:43

## 2020-12-11 RX ADMIN — ATORVASTATIN CALCIUM 80 MG: 40 TABLET, FILM COATED ORAL at 20:33

## 2020-12-11 RX ADMIN — LABETALOL HYDROCHLORIDE 10 MG: 5 INJECTION INTRAVENOUS at 04:19

## 2020-12-11 RX ADMIN — AMLODIPINE BESYLATE 10 MG: 5 TABLET ORAL at 08:09

## 2020-12-11 RX ADMIN — ASPIRIN 81 MG 81 MG: 81 TABLET ORAL at 20:31

## 2020-12-11 RX ADMIN — PAROXETINE HYDROCHLORIDE 40 MG: 20 TABLET, FILM COATED ORAL at 20:33

## 2020-12-11 RX ADMIN — METOPROLOL SUCCINATE 150 MG: 100 TABLET, EXTENDED RELEASE ORAL at 20:31

## 2020-12-11 RX ADMIN — CLONIDINE 2 PATCH: 0.1 PATCH TRANSDERMAL at 08:33

## 2020-12-11 RX ADMIN — LISINOPRIL 40 MG: 40 TABLET ORAL at 20:32

## 2020-12-11 ASSESSMENT — ACTIVITIES OF DAILY LIVING (ADL)
ADLS_ACUITY_SCORE: 18
ADLS_ACUITY_SCORE: 18
ADLS_ACUITY_SCORE: 22
ADLS_ACUITY_SCORE: 18
ADLS_ACUITY_SCORE: 18
ADLS_ACUITY_SCORE: 22

## 2020-12-11 NOTE — PLAN OF CARE
Physical Therapy Discharge Summary    Reason for therapy discharge:    Discharged to transitional care facility.    Progress towards therapy goal(s). See goals on Care Plan in [facility] electronic health record for goal details.  Goals not met.  Barriers to achieving goals:   discharge from facility.    Therapy recommendation(s):    Continued therapy is recommended.  Rationale/Recommendations:  strength, mobility, transfers, and ambulation.     Thank you for your referral.    Miroslava Lanza, PT, DPT  [facility]  [phone]

## 2020-12-11 NOTE — PROGRESS NOTES
Occupational Therapy Discharge Summary    Reason for therapy discharge:    Discharged to transitional care facility.    Progress towards therapy goal(s). See goals on Care Plan in [facility] electronic health record for goal details.  Goals partially met.  Barriers to achieving goals:   discharge from facility.    Therapy recommendation(s):    Continued therapy is recommended.  Rationale/Recommendations:  UE strength and activity tolerance required for functional independence in I/ADLs.      Zeinab Paiz, OTR/L   [facility]

## 2020-12-11 NOTE — PLAN OF CARE
S: End of shift note    B:Covid +, nausea, UTI    A:BP  182/81 Labetalol given this a.m recheck /83 .  Pt up in recliner for breakfast and lunch, transfers well with assist of 1 gait belt.  Pt up to bedside commode.  Pure afua are out of stock. Pt continent at home.  Mag and phos both replaced today. Saline locked, anticipates discharge to [facility] tomorrow.    R: continue with POC

## 2020-12-11 NOTE — PLAN OF CARE
A/Ox4. BP elevated this shift. Received a dose of labetalol this morning for elevated BP, with recheck pts blood pressure was even more elevated. Received one time dose of hydralazine. Pt awake most of the night. Denies pain. Just reports being unable to sleep. Purewick in place draining adequate amounts of urine. Heart rate regular, lung sounds clear. IV to right arm, saline locked. Will continue to monitor.

## 2020-12-11 NOTE — PLAN OF CARE
Problem: Adult Inpatient Plan of Care  Goal: Absence of Hospital-Acquired Illness or Injury  Intervention: Identify and Manage Fall Risk  Recent Flowsheet Documentation  Taken 12/10/2020 1800 by Roseann Silverman RN  Safety Promotion/Fall Prevention: activity supervised  Refused to get up in chair for supper. Intermittent nausea, receiving alternating Zofran and Compazine. Was able to tolerate clear liquids and strawberries without incident.   Intervention: Prevent Skin Injury  Recent Flowsheet Documentation  Taken 12/10/2020 1800 by Roseann Silverman RN  Body Position:   supine   supine, legs elevated   supine, head elevated  Intervention: Prevent and Manage VTE (Venous Thromboembolism) Risk  Recent Flowsheet Documentation  Taken 12/10/2020 1800 by Roseann Silverman RN  VTE Prevention/Management: anticoagulant therapy maintained

## 2020-12-11 NOTE — PROGRESS NOTES
[facility]    Medicine Progress Note - Hospitalist Service       Date of Admission:  12/8/2020  Assessment & Plan       80-year-old woman admitted with COVID-19 and symptoms of fatigue, nausea, vomiting, and diarrhea.  She has not had symptoms or signs of respiratory tract infection so far.  She also had E. coli urinary tract infection.  She appears to be improving, starting to tolerate more reliable oral intake and diarrhea has resolved.  However, severe systolic hypertension persists although she has not been overtly symptomatic from the severely elevated blood pressure readings.  She continues to require intermittent doses of IV antihypertensive medications because of severely elevated blood pressure.  She also remains moderately hyperglycemic although is not symptomatic from hyperglycemia and diabetes is normally well controlled with most recent A1c 6.9 in October.  Nevertheless, uncontrolled hyperglycemia could impair healing from an infection.  She has persistent hypomagnesemia and now has hypophosphatemia today likely due to inadequate oral nutritional intake.  These could contribute to muscle weakness.    Principal Problem:    2019 novel coronavirus disease (COVID-19)  Active Problems:    E. coli UTI    Hypertension goal BP (blood pressure) < 140/90    Coronary artery disease involving native coronary artery of native heart without angina pectoris    Type 2 diabetes mellitus with diabetic polyneuropathy, without long-term current use of insulin (H)    Hypomagnesemia    Hypophosphatemia    CKD (chronic kidney disease) stage 3, GFR 30-59 ml/min    Generalized muscle weakness    Closed fracture of proximal end of left humerus with routine healing    Nausea vomiting and diarrhea    Switch IV Rocephin to oral cefdinir today now that she is more reliably tolerating oral intake  Doses of Toprol-XL and lisinopril were increased last evening and amlodipine and clonidine were added  yesterday so today will increase her dose of amlodipine and clonidine  Check serum aldosterone and plasma renin activity for evaluation of possible hyperaldosteronism given severe refractory hypertension and relative hypokalemia  Resume chronic dose of glipizide 20 mg twice daily and increase NovoLog sliding scale dose today  Provide magnesium and phosphorus supplementation today according to respective protocols  Resume home dose of chronic gabapentin in the evening     Diet: Combination Diet 2919-2484 Calories: Moderate Consistent CHO (4-6 CHO units/meal); 2 gm NA Diet    DVT Prophylaxis: Enoxaparin (Lovenox) SQ  Uriarte Catheter: not present  Code Status: No CPR- Do NOT Intubate           Disposition Plan   Expected discharge: Tomorrow, recommended to transitional care unit once safe disposition plan/ TCU bed available and Blood pressure is improved not requiring regular doses of IV antihypertensive medications.  Entered: Deon Pierre MD 12/11/2020, 1:15 PM       The patient's care was discussed with the Patient.    Deon Pierre MD  Hospitalist Service  [facility]  Contact information available via [facility] Paging/Directory    ______________________________________________________________________    Interval History   She is not sure if she feels better today.  However, she has not been vomiting nor has she had diarrhea and she is tolerating more oral intake.  She feels tired.  She has not had fever.  Heart rate has been normal.  Systolic blood pressure continues to be elevated ranging from 166-203 over the last day with normal diastolic readings.  She received doses of IV hydralazine and IV labetalol overnight because of severe systolic hypertension.  She denies shortness of breath and oxygenation has been normal.  Urine output has been good.    Data reviewed today: I reviewed all medications, new labs and imaging results over the last 24 hours. I personally reviewed no images  or EKG's today.    Physical Exam   Vital Signs: Temp: 98.3  F (36.8  C) Temp src: Oral BP: (!) 150/74 Pulse: 78   Resp: 18 SpO2: 96 % O2 Device: None (Room air)    Weight: 126 lbs 0 oz  General Appearance: No acute distress sitting up in a chair  Respiratory: Normal respiratory effort, clear lungs  Cardiovascular: Regular rate and rhythm, brisk capillary refill  GI: Soft abdomen without tenderness  Skin: Pale color  Other: Alert and oriented    Data   Recent Labs   Lab 12/11/20  0637 12/10/20  0633 12/09/20  1243 12/09/20  0614 12/08/20  1929   WBC 5.9 4.2  --  4.7 5.4   HGB 11.2* 9.9*  --  9.5* 12.3   MCV 89 91  --  91 90    149*  --  154 164   INR  --  1.11  --  1.07  --     140  --  143 138   POTASSIUM 3.6 3.9  --  3.9 3.9   CHLORIDE 103 109  --  113* 103   CO2 27 26  --  22 25   BUN 21 25  --  31* 38*   CR 0.81 0.85  --  0.92 1.12*   ANIONGAP 6 5  --  8 10   ANDREI 8.0* 8.1*  --  7.8* 8.8   * 164*  --  160* 289*   ALBUMIN  --   --   --   --  3.3*   PROTTOTAL  --   --   --   --  7.9   BILITOTAL  --   --   --   --  0.7   ALKPHOS  --   --   --   --  121   ALT  --   --   --   --  19   AST  --   --   --   --  24   TROPI  --   --  0.041 0.044 0.029     Blood sugars range 189-279 over the last day, hemoglobin A1c on October 8 was 6.9    Magnesium 1.3, phosphorus 2.4 today    CRP 26.4, increased from 10.4 yesterday  , increased from 177 yesterday    Medications     - MEDICATION INSTRUCTIONS -         amLODIPine  10 mg Oral Daily     aspirin  81 mg Oral At Bedtime     atorvastatin  80 mg Oral At Bedtime     cefTRIAXone  1 g Intravenous Q24H     cloNIDine   Transdermal Q8H     cloNIDine  2 patch Transdermal Weekly     clopidogrel  75 mg Oral At Bedtime     enoxaparin ANTICOAGULANT  40 mg Subcutaneous Q24H     insulin aspart  1-3 Units Subcutaneous TID AC     insulin aspart  1-3 Units Subcutaneous At Bedtime     isosorbide mononitrate  30 mg Oral At Bedtime     lisinopril  40 mg Oral At Bedtime      metoprolol succinate ER  150 mg Oral At Bedtime     pantoprazole  40 mg Oral QAM AC     PARoxetine  40 mg Oral At Bedtime     sodium chloride (PF)  3 mL Intracatheter Q8H     sodium phosphate  9 mmol Intravenous Once

## 2020-12-11 NOTE — PROGRESS NOTES
Care Management Follow Up    Length of Stay (days): 3    Expected Discharge Date: 12/12/20     Concerns to be Addressed: discharge planning        Patient plan of care discussed at interdisciplinary rounds: Yes    Anticipated Discharge Disposition: Transitional Care  Disposition Comments: Patient in agreement with TCU placement at [facility] for Saturday or Monday.    Anticipated Discharge Services: None      Patient/family educated on Medicare website which has current facility and service quality ratings: yes    Education Provided on the Discharge Plan:  yes    Patient/Family in Agreement with the Plan: yes    Referrals Placed by CM/SW: Internal Clinic Care Coordination;Post Acute Facilities    Private pay costs discussed: Not applicable    Additional Information:  Patient is not ready for discharge today.  [facility] and [facility] have accepted patient for TCU admission.  Patient has chosen [facility].  Writer spoke with Migdalia in admissions at [facility] and she stated that [facility] is able to accept patient for admission on Saturday or Monday, not on Sunday, due to staffing.      Writer has called the COVID transport line- [facility] #[phone] option #3, they are looking for a wheelchair transport agency to provide transport for this patient tomorrow to [facility] at 10:30 am.  Writer is awaiting their call back.      [facility]- [facility] Transport line called back and stated that [employer] agency #[phone] will  patient tomorrow at 1045 for transport to [facility].  [employer] has stated that they will be bringing a wheelchair for the transport.  No transport fee for this ride.      2217- Writer has spoken with patient to review discharge planning.  Discussed that it is anticipated that patient will be ready or discharge tomorrow to the TCU at [facility].  Patient has spoken with her daughter, Neyda.  Daughter told patient that she could transport her tomorrow at 1030, as daughter  is COVID positive also.  Patient now agreeing that her daughter can transport her.  Writer has cancelled the ride with [employer] by calling [facility].      PAS-RR    Per DHS regulation, CTS team completed and submitted PAS-RR to [place] [employer] Direct Connect via the Senior [employer] Line. CTS team advised SNF and they are aware a PAS-RR has been submitted.     CTS team reviewed with pt or health care agent that they may be contacted for a follow up appointment within 10 days of hospital discharge if SNF stay is <30 days. Contact information for [employer] was also provided.     Pt or health care agent verbalized understanding.     PAS-RR # [id number]      DELORES Alvarado  "[employer]"[facility]   [phone]

## 2020-12-12 ENCOUNTER — APPOINTMENT (OUTPATIENT)
Dept: GENERAL RADIOLOGY | Facility: CLINIC | Age: 80
DRG: 177 | End: 2020-12-12
Attending: FAMILY MEDICINE
Payer: MEDICARE

## 2020-12-12 ENCOUNTER — APPOINTMENT (OUTPATIENT)
Dept: CT IMAGING | Facility: CLINIC | Age: 80
DRG: 177 | End: 2020-12-12
Attending: FAMILY MEDICINE
Payer: MEDICARE

## 2020-12-12 ENCOUNTER — TELEPHONE (OUTPATIENT)
Dept: GERIATRICS | Facility: CLINIC | Age: 80
End: 2020-12-12

## 2020-12-12 ENCOUNTER — HOSPITAL ENCOUNTER (INPATIENT)
Facility: CLINIC | Age: 80
LOS: 3 days | Discharge: NURSING, REHAB OR LONG-TERM CARE | DRG: 177 | End: 2020-12-15
Attending: FAMILY MEDICINE | Admitting: HOSPITALIST
Payer: MEDICARE

## 2020-12-12 VITALS
DIASTOLIC BLOOD PRESSURE: 76 MMHG | OXYGEN SATURATION: 92 % | TEMPERATURE: 98.3 F | HEART RATE: 78 BPM | RESPIRATION RATE: 20 BRPM | WEIGHT: 126 LBS | SYSTOLIC BLOOD PRESSURE: 154 MMHG | HEIGHT: 64 IN | BODY MASS INDEX: 21.51 KG/M2

## 2020-12-12 DIAGNOSIS — E11.42 TYPE 2 DIABETES MELLITUS WITH DIABETIC POLYNEUROPATHY, WITHOUT LONG-TERM CURRENT USE OF INSULIN (H): ICD-10-CM

## 2020-12-12 DIAGNOSIS — R79.89 ELEVATED D-DIMER: ICD-10-CM

## 2020-12-12 DIAGNOSIS — N18.31 STAGE 3A CHRONIC KIDNEY DISEASE (H): ICD-10-CM

## 2020-12-12 DIAGNOSIS — Z79.84 LONG TERM CURRENT USE OF ORAL HYPOGLYCEMIC DRUG: ICD-10-CM

## 2020-12-12 DIAGNOSIS — R79.89 ELEVATED BRAIN NATRIURETIC PEPTIDE (BNP) LEVEL: ICD-10-CM

## 2020-12-12 DIAGNOSIS — R79.82 ELEVATED C-REACTIVE PROTEIN (CRP): ICD-10-CM

## 2020-12-12 DIAGNOSIS — R53.1 GENERALIZED WEAKNESS: ICD-10-CM

## 2020-12-12 DIAGNOSIS — J12.82 PNEUMONIA DUE TO 2019 NOVEL CORONAVIRUS: ICD-10-CM

## 2020-12-12 DIAGNOSIS — N18.31 TYPE 2 DIABETES MELLITUS WITH STAGE 3A CHRONIC KIDNEY DISEASE, WITHOUT LONG-TERM CURRENT USE OF INSULIN (H): ICD-10-CM

## 2020-12-12 DIAGNOSIS — R11.0 NAUSEA: ICD-10-CM

## 2020-12-12 DIAGNOSIS — J96.01 ACUTE RESPIRATORY FAILURE WITH HYPOXIA (H): ICD-10-CM

## 2020-12-12 DIAGNOSIS — I10 ESSENTIAL HYPERTENSION WITH GOAL BLOOD PRESSURE LESS THAN 140/90: ICD-10-CM

## 2020-12-12 DIAGNOSIS — E11.22 TYPE 2 DIABETES MELLITUS WITH STAGE 3A CHRONIC KIDNEY DISEASE, WITHOUT LONG-TERM CURRENT USE OF INSULIN (H): ICD-10-CM

## 2020-12-12 DIAGNOSIS — I12.9 MALIGNANT HYPERTENSIVE KIDNEY DISEASE WITH CHRONIC KIDNEY DISEASE STAGE I THROUGH STAGE IV, OR UNSPECIFIED(403.00): ICD-10-CM

## 2020-12-12 DIAGNOSIS — R73.9 HYPERGLYCEMIA: ICD-10-CM

## 2020-12-12 DIAGNOSIS — E86.0 DEHYDRATION: ICD-10-CM

## 2020-12-12 DIAGNOSIS — U07.1 PNEUMONIA DUE TO 2019 NOVEL CORONAVIRUS: ICD-10-CM

## 2020-12-12 DIAGNOSIS — E11.65 UNCONTROLLED TYPE 2 DIABETES MELLITUS WITH HYPERGLYCEMIA (H): ICD-10-CM

## 2020-12-12 DIAGNOSIS — R79.89 ELEVATED TROPONIN I LEVEL: ICD-10-CM

## 2020-12-12 PROBLEM — N17.9 ACUTE KIDNEY FAILURE (H): Status: ACTIVE | Noted: 2020-12-12

## 2020-12-12 LAB
ALBUMIN SERPL-MCNC: 2.3 G/DL (ref 3.4–5)
ALP SERPL-CCNC: 94 U/L (ref 40–150)
ALT SERPL W P-5'-P-CCNC: 25 U/L (ref 0–50)
ANION GAP SERPL CALCULATED.3IONS-SCNC: 6 MMOL/L (ref 3–14)
ANION GAP SERPL CALCULATED.3IONS-SCNC: 9 MMOL/L (ref 3–14)
AST SERPL W P-5'-P-CCNC: 32 U/L (ref 0–45)
BASE EXCESS BLDV CALC-SCNC: 2.5 MMOL/L
BASOPHILS # BLD AUTO: 0 10E9/L (ref 0–0.2)
BASOPHILS NFR BLD AUTO: 0 %
BILIRUB SERPL-MCNC: 0.5 MG/DL (ref 0.2–1.3)
BUN SERPL-MCNC: 29 MG/DL (ref 7–30)
BUN SERPL-MCNC: 34 MG/DL (ref 7–30)
CALCIUM SERPL-MCNC: 7.9 MG/DL (ref 8.5–10.1)
CALCIUM SERPL-MCNC: 8.1 MG/DL (ref 8.5–10.1)
CHLORIDE SERPL-SCNC: 102 MMOL/L (ref 94–109)
CHLORIDE SERPL-SCNC: 97 MMOL/L (ref 94–109)
CO2 SERPL-SCNC: 27 MMOL/L (ref 20–32)
CO2 SERPL-SCNC: 27 MMOL/L (ref 20–32)
CREAT SERPL-MCNC: 0.8 MG/DL (ref 0.52–1.04)
CREAT SERPL-MCNC: 1.11 MG/DL (ref 0.52–1.04)
CRP SERPL-MCNC: 40.1 MG/L (ref 0–8)
CRP SERPL-MCNC: 52.8 MG/L (ref 0–8)
D DIMER PPP FEU-MCNC: 1.6 UG/ML FEU (ref 0–0.5)
DIFFERENTIAL METHOD BLD: ABNORMAL
EOSINOPHIL NFR BLD AUTO: 0 %
ERYTHROCYTE [DISTWIDTH] IN BLOOD BY AUTOMATED COUNT: 15.1 % (ref 10–15)
GFR SERPL CREATININE-BSD FRML MDRD: 47 ML/MIN/{1.73_M2}
GFR SERPL CREATININE-BSD FRML MDRD: 70 ML/MIN/{1.73_M2}
GLUCOSE BLDC GLUCOMTR-MCNC: 158 MG/DL (ref 70–99)
GLUCOSE BLDC GLUCOMTR-MCNC: 188 MG/DL (ref 70–99)
GLUCOSE BLDC GLUCOMTR-MCNC: 266 MG/DL (ref 70–99)
GLUCOSE SERPL-MCNC: 194 MG/DL (ref 70–99)
GLUCOSE SERPL-MCNC: 273 MG/DL (ref 70–99)
HCO3 BLDV-SCNC: 27 MMOL/L (ref 21–28)
HCT VFR BLD AUTO: 32.2 % (ref 35–47)
HGB BLD-MCNC: 10.6 G/DL (ref 11.7–15.7)
IMM GRANULOCYTES # BLD: 0 10E9/L (ref 0–0.4)
IMM GRANULOCYTES NFR BLD: 0.5 %
INR PPP: 1.17 (ref 0.86–1.14)
LACTATE BLD-SCNC: 0.9 MMOL/L (ref 0.7–2)
LDH SERPL L TO P-CCNC: 230 U/L (ref 81–234)
LIPASE SERPL-CCNC: 177 U/L (ref 73–393)
LYMPHOCYTES # BLD AUTO: 0.6 10E9/L (ref 0.8–5.3)
LYMPHOCYTES NFR BLD AUTO: 7.2 %
MAGNESIUM SERPL-MCNC: 1.7 MG/DL (ref 1.6–2.3)
MCH RBC QN AUTO: 28.6 PG (ref 26.5–33)
MCHC RBC AUTO-ENTMCNC: 32.9 G/DL (ref 31.5–36.5)
MCV RBC AUTO: 87 FL (ref 78–100)
MONOCYTES # BLD AUTO: 0.4 10E9/L (ref 0–1.3)
MONOCYTES NFR BLD AUTO: 5 %
NEUTROPHILS # BLD AUTO: 7.5 10E9/L (ref 1.6–8.3)
NEUTROPHILS NFR BLD AUTO: 87.3 %
NRBC # BLD AUTO: 0 10*3/UL
NRBC BLD AUTO-RTO: 0 /100
NT-PROBNP SERPL-MCNC: 5933 PG/ML (ref 0–1800)
O2/TOTAL GAS SETTING VFR VENT: NORMAL %
PCO2 BLDV: 42 MM HG (ref 40–50)
PH BLDV: 7.42 PH (ref 7.32–7.43)
PHOSPHATE SERPL-MCNC: 2.6 MG/DL (ref 2.5–4.5)
PLATELET # BLD AUTO: 160 10E9/L (ref 150–450)
PLATELET # BLD AUTO: 171 10E9/L (ref 150–450)
PO2 BLDV: 47 MM HG (ref 25–47)
POTASSIUM SERPL-SCNC: 3.4 MMOL/L (ref 3.4–5.3)
POTASSIUM SERPL-SCNC: 3.7 MMOL/L (ref 3.4–5.3)
PROT SERPL-MCNC: 6.3 G/DL (ref 6.8–8.8)
RBC # BLD AUTO: 3.71 10E12/L (ref 3.8–5.2)
SODIUM SERPL-SCNC: 133 MMOL/L (ref 133–144)
SODIUM SERPL-SCNC: 135 MMOL/L (ref 133–144)
TROPONIN I SERPL-MCNC: 0.05 UG/L (ref 0–0.04)
WBC # BLD AUTO: 8.6 10E9/L (ref 4–11)

## 2020-12-12 PROCEDURE — 250N000013 HC RX MED GY IP 250 OP 250 PS 637: Performed by: PEDIATRICS

## 2020-12-12 PROCEDURE — 84484 ASSAY OF TROPONIN QUANT: CPT | Performed by: FAMILY MEDICINE

## 2020-12-12 PROCEDURE — 86140 C-REACTIVE PROTEIN: CPT | Performed by: INTERNAL MEDICINE

## 2020-12-12 PROCEDURE — 250N000013 HC RX MED GY IP 250 OP 250 PS 637: Performed by: INTERNAL MEDICINE

## 2020-12-12 PROCEDURE — 36415 COLL VENOUS BLD VENIPUNCTURE: CPT | Performed by: INTERNAL MEDICINE

## 2020-12-12 PROCEDURE — 85610 PROTHROMBIN TIME: CPT | Performed by: FAMILY MEDICINE

## 2020-12-12 PROCEDURE — 86140 C-REACTIVE PROTEIN: CPT | Performed by: FAMILY MEDICINE

## 2020-12-12 PROCEDURE — 80048 BASIC METABOLIC PNL TOTAL CA: CPT | Performed by: INTERNAL MEDICINE

## 2020-12-12 PROCEDURE — 85379 FIBRIN DEGRADATION QUANT: CPT | Performed by: FAMILY MEDICINE

## 2020-12-12 PROCEDURE — 85025 COMPLETE CBC W/AUTO DIFF WBC: CPT | Performed by: FAMILY MEDICINE

## 2020-12-12 PROCEDURE — 83605 ASSAY OF LACTIC ACID: CPT | Performed by: FAMILY MEDICINE

## 2020-12-12 PROCEDURE — 85049 AUTOMATED PLATELET COUNT: CPT | Performed by: INTERNAL MEDICINE

## 2020-12-12 PROCEDURE — 93010 ELECTROCARDIOGRAM REPORT: CPT | Performed by: FAMILY MEDICINE

## 2020-12-12 PROCEDURE — 71275 CT ANGIOGRAPHY CHEST: CPT

## 2020-12-12 PROCEDURE — 250N000011 HC RX IP 250 OP 636: Performed by: FAMILY MEDICINE

## 2020-12-12 PROCEDURE — 99285 EMERGENCY DEPT VISIT HI MDM: CPT | Mod: 25 | Performed by: FAMILY MEDICINE

## 2020-12-12 PROCEDURE — 93005 ELECTROCARDIOGRAM TRACING: CPT | Performed by: FAMILY MEDICINE

## 2020-12-12 PROCEDURE — 120N000001 HC R&B MED SURG/OB

## 2020-12-12 PROCEDURE — 258N000003 HC RX IP 258 OP 636: Performed by: FAMILY MEDICINE

## 2020-12-12 PROCEDURE — 99207 PR CDG-CODE CATEGORY CHANGED: CPT | Performed by: PEDIATRICS

## 2020-12-12 PROCEDURE — 82803 BLOOD GASES ANY COMBINATION: CPT | Performed by: FAMILY MEDICINE

## 2020-12-12 PROCEDURE — 999N001017 HC STATISTIC GLUCOSE BY METER IP

## 2020-12-12 PROCEDURE — 83880 ASSAY OF NATRIURETIC PEPTIDE: CPT | Performed by: FAMILY MEDICINE

## 2020-12-12 PROCEDURE — 80053 COMPREHEN METABOLIC PANEL: CPT | Performed by: FAMILY MEDICINE

## 2020-12-12 PROCEDURE — 99232 SBSQ HOSP IP/OBS MODERATE 35: CPT | Performed by: PEDIATRICS

## 2020-12-12 PROCEDURE — 83690 ASSAY OF LIPASE: CPT | Performed by: FAMILY MEDICINE

## 2020-12-12 PROCEDURE — 84100 ASSAY OF PHOSPHORUS: CPT | Performed by: INTERNAL MEDICINE

## 2020-12-12 PROCEDURE — 96361 HYDRATE IV INFUSION ADD-ON: CPT | Performed by: FAMILY MEDICINE

## 2020-12-12 PROCEDURE — 84145 PROCALCITONIN (PCT): CPT | Performed by: FAMILY MEDICINE

## 2020-12-12 PROCEDURE — 71045 X-RAY EXAM CHEST 1 VIEW: CPT

## 2020-12-12 PROCEDURE — 999N000123 HC STATISTIC OXYGEN O2DAILY TECH TIME

## 2020-12-12 PROCEDURE — 999N000156 HC STATISTIC RCP CONSULT EA 30 MIN

## 2020-12-12 PROCEDURE — 83615 LACTATE (LD) (LDH) ENZYME: CPT | Performed by: INTERNAL MEDICINE

## 2020-12-12 PROCEDURE — 83735 ASSAY OF MAGNESIUM: CPT | Performed by: INTERNAL MEDICINE

## 2020-12-12 RX ORDER — DEXAMETHASONE SODIUM PHOSPHATE 10 MG/ML
6 INJECTION, SOLUTION INTRAMUSCULAR; INTRAVENOUS ONCE
Status: COMPLETED | OUTPATIENT
Start: 2020-12-12 | End: 2020-12-13

## 2020-12-12 RX ORDER — SODIUM CHLORIDE 9 MG/ML
INJECTION, SOLUTION INTRAVENOUS CONTINUOUS
Status: DISCONTINUED | OUTPATIENT
Start: 2020-12-12 | End: 2020-12-13

## 2020-12-12 RX ORDER — IOPAMIDOL 755 MG/ML
500 INJECTION, SOLUTION INTRAVASCULAR ONCE
Status: COMPLETED | OUTPATIENT
Start: 2020-12-12 | End: 2020-12-12

## 2020-12-12 RX ORDER — ONDANSETRON 2 MG/ML
4 INJECTION INTRAMUSCULAR; INTRAVENOUS EVERY 30 MIN PRN
Status: DISCONTINUED | OUTPATIENT
Start: 2020-12-12 | End: 2020-12-13

## 2020-12-12 RX ADMIN — AMLODIPINE BESYLATE 10 MG: 5 TABLET ORAL at 09:25

## 2020-12-12 RX ADMIN — SODIUM CHLORIDE 250 ML: 9 INJECTION, SOLUTION INTRAVENOUS at 22:50

## 2020-12-12 RX ADMIN — SODIUM CHLORIDE: 9 INJECTION, SOLUTION INTRAVENOUS at 23:28

## 2020-12-12 RX ADMIN — GLIPIZIDE 20 MG: 5 TABLET ORAL at 09:24

## 2020-12-12 RX ADMIN — PANTOPRAZOLE SODIUM 40 MG: 40 TABLET, DELAYED RELEASE ORAL at 06:36

## 2020-12-12 RX ADMIN — ACETAMINOPHEN 1000 MG: 500 TABLET, FILM COATED ORAL at 05:46

## 2020-12-12 RX ADMIN — IOPAMIDOL 65 ML: 755 INJECTION, SOLUTION INTRAVENOUS at 23:51

## 2020-12-12 ASSESSMENT — ACTIVITIES OF DAILY LIVING (ADL)
ADLS_ACUITY_SCORE: 22

## 2020-12-12 NOTE — DISCHARGE SUMMARY
[facility]  Hospitalist Discharge Summary      Date of Admission:  12/8/2020  Date of Discharge:  12/12/2020 10:40 AM  Discharging Provider: Deon Pierre MD      Discharge Diagnoses   Principal Problem:    2019 novel coronavirus disease (COVID-19)  Active Problems:    E. coli UTI    Hypertension goal BP (blood pressure) < 140/90    Coronary artery disease involving native coronary artery of native heart without angina pectoris    Type 2 diabetes mellitus with diabetic polyneuropathy, without long-term current use of insulin (H)    Hypomagnesemia    Hypophosphatemia    CKD (chronic kidney disease) stage 3, GFR 30-59 ml/min    Generalized muscle weakness    Closed fracture of proximal end of left humerus with routine healing    Nausea vomiting and diarrhea      Follow-ups Needed After Discharge   Follow-up Appointments     Follow Up and recommended labs and tests      Follow up with Nursing home physician. Follow up with Orthopedic surgeon   Dr. Cameron at [facility] on 12/21/20 as scheduled.             Unresulted Labs Ordered in the Past 30 Days of this Admission     Date and Time Order Name Status Description    12/11/2020 0953 Renin activity In process       These results will be followed up by PCP    Discharge Disposition   Discharged to TCU  Condition at discharge: Stable      Hospital Course   80-year-old woman with diabetes, hypertension, CAD, and CKD as well as recent left proximal humerus fracture in November 2020 tested positive for COVID-19 on December 3 and presented to the ER because of 1 day history of nausea, vomiting, diarrhea, and worsening weakness.  Due to concerns for worsening COVID-19 infection and UTI, she was admitted.    Problem #1 COVID-19.  She had no respiratory symptoms or signs associated with COVID-19.  Chest x-ray was negative for infiltrates.  She maintained normal oxygen saturations.  She did not receive any specific COVID-19 treatment such as  dexamethasone or remdesivir during this hospitalization.  She was started on anticoagulation and advised to complete a 30-day treatment course of anticoagulation prophylactically after discharge because of COVID-19.  She did have nausea, vomiting, and diarrhea all of which subsided during hospitalization.  She initially required treatment with IV fluids, but by discharge was maintaining adequate oral intake to maintain hydration.  She also had associated electrolyte disturbances because ofinadequate oral intake and GI losses including hypomagnesemia and hypophosphatemia which were treated with supplementation during hospitalization and improved.      Problem #2 E. coli UTI.  She presented with new onset urinary incontinence and urinary frequency.  Urinalysis was abnormal with pyuria and urine culture grew E. coli.  She was treated with with ceftriaxone during hospitalization and advised to complete a treatment course of UTI with cefdinir after discharge.    Problem #3 severe hypertension.  Systolic blood pressure was severely elevated fairly consistently during hospitalization even after continuing her normal home medications except for hydrochlorothiazide which was held because of vomiting and diarrhea.  She did not have overt symptoms associate with severely elevated blood pressure readings.  Antihypertensive medication treatment was adjusted during hospitalization.  Chronic doses of lisinopril and Toprol-XL were increased.  Amlodipine and clonidine patch were added.  After these adjustments, blood pressures did improve overall although she continued to have persistent hypertension.  She was mildly hypokalemic as well as hypertensive requiring multiple antihypertensive medications raising question of possible underlying hyperaldosteronism.  Serum aldosterone level was normal.  Plasma renin activity results were pending at the time of discharge.    Problem #4 proximal left humerus fracture.  Orthopedics recommended  nonweightbearing of the left upper extremity.  She was evaluated and treated by PT and OT during hospitalization.  Pain was controlled with oral analgesics.  Therapy services advised discharge to TCU for rehabilitation with which the patient was agreeable.      Consultations This Hospital Stay   PHYSICAL THERAPY ADULT IP CONSULT  OCCUPATIONAL THERAPY ADULT IP CONSULT  CARE MANAGEMENT / SOCIAL WORK IP CONSULT  PHYSICAL THERAPY ADULT IP CONSULT  OCCUPATIONAL THERAPY ADULT IP CONSULT    Code Status   No CPR- Do NOT Intubate    Time Spent on this Encounter   I, Deon Pierre MD, personally saw the patient today and spent greater than 30 minutes discharging this patient.       Deon Pierre MD  [address] MEDICAL SURGICAL  [address] [facility]   [place] [address]  Phone: [phone]  ______________________________________________________________________    Physical Exam   Vital Signs: Temp: 98.3  F (36.8  C) Temp src: Oral BP: (!) 154/76 Pulse: 78   Resp: 20 SpO2: 92 % O2 Device: None (Room air)    Weight: 126 lbs 0 oz  General Appearance: Appears tired while sitting in a chair  Respiratory: Normal respiratory effort, clear lungs  Cardiovascular: Regular rate and rhythm, brisk capillary refill  GI: Nondistended abdomen, soft         Primary Care Physician   Byron Holm    Discharge Orders      General info for SNF    Length of Stay Estimate: Short Term Care: Estimated # of Days <30  Condition at Discharge: Stable  Level of care:skilled   Rehabilitation Potential: Fair  Admission H&P remains valid and up-to-date: Yes  Recent Chemotherapy: N/A  Use Nursing Home Standing Orders: Yes     Mantoux instructions    Give two-step Mantoux (PPD) Per Facility Policy Yes     Quarantine/Isolation Instruction    Date of symptom onset:     Date of first positive test:  12/3/20  Quarantine/Isolation per facility policy     Reason for your hospital stay    Hospitalized due to COVID-19 and urinary  infection with vomiting, diarrhea and weakness and improved     Glucose monitor nursing POCT    Twice daily at varying times before meals and at bedtime     Follow Up and recommended labs and tests    Follow up with Nursing home physician. Follow up with Orthopedic surgeon Dr. Cameron at [facility] [place] on 12/21/20 as scheduled.     Activity - Up with nursing assistance     Weight bearing status    Non-weight bearing left arm     No CPR- Do NOT Intubate     Physical Therapy Adult Consult    Evaluate and treat as clinically indicated.    Reason:  COVID-19, weakness     Occupational Therapy Adult Consult    Evaluate and treat as clinically indicated.    Reason:  COVID-19, left proximal humerus fracture     Advance Diet as Tolerated    Follow this diet upon discharge: Orders Placed This Encounter      Combination Diet 3728-4611 Calories: Moderate Consistent CHO (4-6 CHO units/meal); 2 gm NA Diet       Significant Results and Procedures   Most Recent 3 CBC's:  Recent Labs   Lab Test 12/12/20  0554 12/11/20  0637 12/10/20  0633 12/09/20  0614   WBC  --  5.9 4.2 4.7   HGB  --  11.2* 9.9* 9.5*   MCV  --  89 91 91    160 149* 154     Most Recent 3 BMP's:  Recent Labs   Lab Test 12/12/20  0554 12/11/20  0637 12/10/20  0633    136 140   POTASSIUM 3.4 3.6 3.9   CHLORIDE 102 103 109   CO2 27 27 26   BUN 29 21 25   CR 0.80 0.81 0.85   ANIONGAP 6 6 5   ANDREI 7.9* 8.0* 8.1*   * 226* 164*     Most Recent 2 LFT's:  Recent Labs   Lab Test 12/08/20  1929 04/05/17  0821   AST 24 17   ALT 19 24   ALKPHOS 121 82   BILITOTAL 0.7 0.5     Most Recent 3 Troponin's:  Recent Labs   Lab Test 12/09/20  1243 12/09/20  0614 12/08/20 1929   TROPI 0.041 0.044 0.029     Most Recent 3 BNP's:No lab results found.  Most Recent 6 Bacteria Isolates From Any Culture (See EPIC Reports for Culture Details):  Recent Labs   Lab Test 12/08/20  2115 10/08/20  1032 01/20/20  1041 10/31/19  1536 01/11/18  1045 08/18/13  1220   CULT 50,000 to 100,000  colonies/mL  Escherichia coli  *  <10,000 colonies/mL  urogenital yaima  Susceptibility testing not routinely done   >100,000 colonies/mL  Escherichia coli  *  <10,000 colonies/mL  urogenital yaima   50,000 to 100,000 colonies/mL  Escherichia coli  *  10,000 to 50,000 colonies/mL  Strain 2  Escherichia coli  * >100,000 colonies/mL  Escherichia coli  * >100,000 colonies/mL  Escherichia coli  *  50,000 to 100,000 colonies/mL  Escherichia coli  * >100,000 colonies/mL Escherichia coli     Most Recent Hemoglobin A1c:  Recent Labs   Lab Test 10/08/20  1032   A1C 6.9*     Most Recent 6 glucoses:  Recent Labs   Lab Test 12/12/20  0554 12/11/20  0637 12/10/20  0633 12/09/20  0614 12/08/20  1929 10/08/20  1032   * 226* 164* 160* 289* 139*     Most Recent Urinalysis:  Recent Labs   Lab Test 12/08/20  2115 01/20/20  1041 01/20/20  1041   COLOR Yellow   < > Yellow   APPEARANCE Slightly Cloudy   < > Clear   URINEGLC >499*   < > 500*   URINEBILI Negative   < > Negative   URINEKETONE 20*   < > Negative   SG 1.017   < > 1.020   UBLD Moderate*   < > Trace*   URINEPH 5.0   < > 5.5   PROTEIN 30*   < > Negative   UROBILINOGEN  --   --  0.2   NITRITE Positive*   < > Positive*   LEUKEST Trace*   < > Small*   RBCU 10*   < > 2-5*   WBCU 8*   < > 5-10*    < > = values in this interval not displayed.   ,   Results for orders placed or performed during the hospital encounter of 11/22/20   Head CT w/o contrast    Narrative    CT SCAN OF THE HEAD WITHOUT CONTRAST   11/22/2020 8:28 PM     HISTORY: fall, hit back of head, on anticoagulation    TECHNIQUE:  Axial images of the head and coronal reformations without  IV contrast material. Radiation dose for this scan was reduced using  automated exposure control, adjustment of the mA and/or kV according  to patient size, or iterative reconstruction technique.    COMPARISON: None.    FINDINGS:     Intracranial contents: There is diffuse parenchymal volume loss.   White matter changes are  present in the cerebral hemispheres that are  consistent with small vessel ischemic disease in this age patient.  There is no evidence of intracranial hemorrhage, mass, acute infarct  or anomaly.    Visualized orbits/sinuses/mastoids:  The visualized portions of the  sinuses and mastoids appear normal.    Osseous structures/soft tissues:  No fractures are identified. There  is a 1 cm osteoma projected off the outer table of the skull in the  right frontal region. several hypodense cystic structures are seen in  the subcutaneous fat. These are consistent with sebaceous cyst.      Impression    IMPRESSION: No intracranial hemorrhage or skull fractures.      BRITANY MIRAMONTES MD   Cervical spine CT w/o contrast    Narrative    CT CERVICAL SPINE WITHOUT CONTRAST   11/22/2020 8:28 PM     HISTORY: fell backwards, hit head     TECHNIQUE: Axial images of the cervical spine were obtained without  intravenous contrast. Multiplanar reformations were performed.   Radiation dose for this scan was reduced using automated exposure  control, adjustment of the mA and/or kV according to patient size, or  iterative reconstruction technique.    COMPARISON: None.    FINDINGS: There is no evidence of fracture. There is reversal of the  cervical lordosis.      Craniocervical junction: Normal.     C1-C2:  Calcified pannus is seen posterior to the odontoid. This can  be seen in patients with calcium pyrophosphate deposition disease.     C2-C3:  Mild annular disc bulge. Degenerative changes in the facet  joints with ankylosis of the facet joints.     C3-C4:  Mild annular disc bulge. Right central disc protrusion causing  mass effect on the dural sac. This extends laterally to the level of  the right C3-4 neural foramen. There are severe degenerative changes  in the right facet joint.     C4-C5:  Mild annular bulge. Central canal and neural foramen are  patent.     C5-C6:  Central canal and neural foramen are patent. Degenerative  change left facet  joint      C6-C7:  Central canal and neural foramen are patent.      C7-T1:   Central canal and neural foramen are patent.      Impression    IMPRESSION:    1. No fractures are identified.  2. Multilevel degenerative changes.    3. There is a small right-sided disc protrusion at C3-4 extending  laterally to the level of the neural foramen. This could affect the  exiting right C4 nerve root.    BRITANY MIRAMONTES MD   XR Shoulder Left G/E 3 Views    Narrative    EXAM: XR SHOULDER LT G/E 3 VW  LOCATION: [facility]  DATE/TIME: 11/22/2020 8:43 PM    INDICATION: Pain after fall  COMPARISON: None.      Impression    IMPRESSION: Acute comminuted fracture of the proximal left humerus. There is a transversely oriented fracture line at the surgical neck with mild impaction. There is longitudinally oriented fracture line at the greater tuberosity with mild displacement.   Normal joint alignment maintained. There is normal joint spacing. Osteopenia. Aortic atherosclerosis.   Humerus XR,  G/E 2 views, left    Narrative    EXAM: XR HUMERUS LT G/E 2 VW  LOCATION: [facility]  DATE/TIME: 11/22/2020 8:43 PM    INDICATION: Pain after fall  COMPARISON: None.      Impression    IMPRESSION: Acute comminuted fracture of the proximal left humerus. There is a transversely oriented fracture line at the surgical neck with impaction. There is longitudinally oriented fracture line at the greater tuberosity with mild displacement.   Normal joint alignment maintained. There is normal joint spacing. Osteopenia.       Discharge Medications   Current Discharge Medication List      START taking these medications    Details   amLODIPine (NORVASC) 10 MG tablet Take 1 tablet (10 mg) by mouth daily  Qty: 30 tablet, Refills: 0    Associated Diagnoses: Hypertension goal BP (blood pressure) < 140/90      apixaban ANTICOAGULANT (ELIQUIS) 2.5 MG tablet Take 1 tablet (2.5 mg) by mouth 2 times daily  Qty: 60 tablet, Refills: 0    Associated  Diagnoses: 2019 novel coronavirus disease (COVID-19)      aspirin (ASA) 81 MG EC tablet Take 1 tablet (81 mg) by mouth daily  Qty: 30 tablet, Refills: 0    Associated Diagnoses: Coronary artery disease involving native coronary artery of native heart without angina pectoris      cefdinir (OMNICEF) 300 MG capsule Take 2 capsules (600 mg) by mouth every evening for 3 days  Qty: 6 capsule, Refills: 0    Associated Diagnoses: E. coli UTI      cloNIDine (CATAPRES-TTS1) 0.1 MG/24HR WK patch Place 2 patches onto the skin once a week  Qty: 10 patch, Refills: 0    Associated Diagnoses: Hypertension goal BP (blood pressure) < 140/90      lisinopril (ZESTRIL) 40 MG tablet Take 1 tablet (40 mg) by mouth At Bedtime  Qty: 30 tablet, Refills: 0    Associated Diagnoses: Hypertension goal BP (blood pressure) < 140/90      pantoprazole (PROTONIX) 40 MG EC tablet Take 1 tablet (40 mg) by mouth every morning (before breakfast)  Qty: 30 tablet, Refills: 0    Associated Diagnoses: Gastroesophageal reflux disease without esophagitis         CONTINUE these medications which have CHANGED    Details   acetaminophen (TYLENOL) 500 MG tablet Take 1-2 tablets (500-1,000 mg) by mouth every 6 hours as needed for pain  Qty: 100 tablet, Refills: 0    Associated Diagnoses: Closed fracture of proximal end of left humerus with routine healing, unspecified fracture morphology, subsequent encounter      atorvastatin (LIPITOR) 80 MG tablet Take 1 tablet (80 mg) by mouth daily  Qty: 30 tablet, Refills: 0    Associated Diagnoses: Coronary artery disease involving native coronary artery of native heart without angina pectoris      clopidogrel (PLAVIX) 75 MG tablet Take 1 tablet (75 mg) by mouth daily  Qty: 30 tablet, Refills: 0    Associated Diagnoses: Coronary artery disease involving native coronary artery of native heart without angina pectoris      empagliflozin (JARDIANCE) 25 MG TABS tablet Take 1 tablet (25 mg) by mouth daily  Qty: 30 tablet, Refills:  0    Associated Diagnoses: Type 2 diabetes mellitus with diabetic polyneuropathy, without long-term current use of insulin (H)      gabapentin (NEURONTIN) 300 MG capsule Take 1 capsule (300 mg) by mouth At Bedtime  Qty: 30 capsule, Refills: 0    Associated Diagnoses: Type 2 diabetes mellitus with diabetic polyneuropathy, without long-term current use of insulin (H)      glipiZIDE (GLUCOTROL) 10 MG tablet Take 2 tablets (20 mg) by mouth 2 times daily (before meals)  Qty: 120 tablet, Refills: 0    Associated Diagnoses: Type 2 diabetes mellitus with diabetic polyneuropathy, without long-term current use of insulin (H)      isosorbide mononitrate (IMDUR) 30 MG 24 hr tablet Take 1 tablet (30 mg) by mouth daily  Qty: 30 tablet, Refills: 0    Associated Diagnoses: Coronary artery disease involving native coronary artery of native heart without angina pectoris      metFORMIN (GLUCOPHAGE-XR) 500 MG 24 hr tablet Take 2 tablets (1,000 mg) by mouth 2 times daily (with meals)  Qty: 120 tablet, Refills: 0    Associated Diagnoses: Type 2 diabetes mellitus with diabetic polyneuropathy, without long-term current use of insulin (H)      metoprolol succinate ER (TOPROL-XL) 100 MG 24 hr tablet Take 1.5 tablets (150 mg) by mouth daily  Qty: 45 tablet, Refills: 0    Associated Diagnoses: Hypertension goal BP (blood pressure) < 140/90      PARoxetine (PAXIL) 40 MG tablet Take 1 tablet (40 mg) by mouth every morning  Qty: 30 tablet, Refills: 0    Associated Diagnoses: Major depressive disorder, recurrent episode, moderate (H)         CONTINUE these medications which have NOT CHANGED    Details   alcohol swab prep pads Use to swab area of injection/sarah as directed.  Qty: 100 each, Refills: 3    Associated Diagnoses: Type 2 diabetes mellitus with diabetic polyneuropathy, without long-term current use of insulin (H)      blood glucose (NO BRAND SPECIFIED) test strip Use to test blood sugar 2 times daily or as directed. To accompany: Blood  Glucose Monitor Brands: per insurance.  Qty: 100 strip, Refills: 6    Associated Diagnoses: Type 2 diabetes mellitus with diabetic polyneuropathy, without long-term current use of insulin (H)      blood glucose calibration (NO BRAND SPECIFIED) solution To accompany: Blood Glucose Monitor Brands: per insurance.  Qty: 1 Bottle, Refills: 3    Associated Diagnoses: Type 2 diabetes mellitus with diabetic polyneuropathy, without long-term current use of insulin (H)      blood glucose monitoring (NO BRAND SPECIFIED) meter device kit Use to test blood sugar 2 times daily or as directed. Preferred blood glucose meter OR supplies to accompany: Blood Glucose Monitor Brands: per insurance.  Qty: 1 kit, Refills: 0    Associated Diagnoses: Type 2 diabetes mellitus with diabetic polyneuropathy, without long-term current use of insulin (H)      thin (NO BRAND SPECIFIED) lancets Use with lanceting device. To accompany: Blood Glucose Monitor Brands: per insurance.  Qty: 100 each, Refills: 6    Associated Diagnoses: Type 2 diabetes mellitus with diabetic polyneuropathy, without long-term current use of insulin (H)      MELATONIN PO Take 3 mg by mouth nightly as needed          STOP taking these medications       acetaminophen (TYLENOL 8 HOUR ARTHRITIS PAIN) 650 MG CR tablet Comments:   Reason for Stopping:         ASPIRIN 81 MG PO TABS Comments:   Reason for Stopping:         cephALEXin (KEFLEX) 500 MG capsule Comments:   Reason for Stopping:         ibuprofen (ADVIL/MOTRIN) 200 MG tablet Comments:   Reason for Stopping:         lisinopril-hydrochlorothiazide (ZESTORETIC) 20-25 MG tablet Comments:   Reason for Stopping:         omeprazole (PRILOSEC) 40 MG DR capsule Comments:   Reason for Stopping:         oxyCODONE (ROXICODONE) 5 MG tablet Comments:   Reason for Stopping:             Allergies   Allergies   Allergen Reactions     Sulfa Drugs Hives

## 2020-12-12 NOTE — PLAN OF CARE
Vital signs:  Temp: 98.4  F (36.9  C) Temp src: Oral BP: (!) 174/75 Pulse: 88   Resp: 18 SpO2: 92 % O2 Device: None (Room air)   Pt denies pain. A&Ox4. Blood sugar of 369. IV SL. Up in chair for dinner. Good appetite. Voiding adequately and BM this shift. No complaints at this time. Will continue to monitor.

## 2020-12-12 NOTE — PLAN OF CARE
Problem: Adult Inpatient Plan of Care  Goal: Plan of Care Review  Outcome: Improving  Goal: Absence of Hospital-Acquired Illness or Injury  Outcome: Improving  Intervention: Identify and Manage Fall Risk  Recent Flowsheet Documentation  Taken 12/12/2020 0000 by Corey Gabriel RN  Safety Promotion/Fall Prevention: activity supervised  Intervention: Prevent Skin Injury  Recent Flowsheet Documentation  Taken 12/12/2020 0000 by Corey Gabriel RN  Body Position:   supine, legs elevated   supine, head elevated  Intervention: Prevent and Manage VTE (Venous Thromboembolism) Risk  Recent Flowsheet Documentation  Taken 12/12/2020 0000 by Corey Gabriel RN  VTE Prevention/Management: anticoagulant therapy maintained  Goal: Optimal Comfort and Wellbeing  Outcome: Improving  Goal: Readiness for Transition of Care  Outcome: Improving     Problem: UTI (Urinary Tract Infection)  Goal: Improved Infection Symptoms  Outcome: Improving     Problem: Discharge Planning  Goal: Discharge Planning (Adult, OB, Behavioral, Peds)  Outcome: Improving     Pt alert and oriented , weakness continues , oxygen saturations have been boarder line low, vitals otherwise stable, pt afebrile, pt uses call light as advised, covid precautions remain in place, Pt encouraged to use IS , does use it well , and able to get to 500. Lungs clear, Pt complains of left shoulder pain this morning, left arm remains in a sling.   Will continue to monitor and follow plan of care.

## 2020-12-12 NOTE — PROGRESS NOTES
Care Management Discharge Note    Discharge Date: 12/12/20     Discharge Disposition: Transitional Care    Discharge Services: None    Discharge DME:      Discharge Transportation: family or friend will provide    Private pay costs discussed: Not applicable    PAS Confirmation Code: [id number]  Patient/family educated on Medicare website which has current facility and service quality ratings: yes    Education Provided on the Discharge Plan:    Persons Notified of Discharge Plans: Patient/daughter, Neyda  Patient/Family in Agreement with the Plan: yes    Handoff Referral Completed: Yes    Additional Information:  Patient discharging to [facility] (Admissions: [phone] Main Phone: [phone] Fax: [phone]),   COVID + bed.  DaughterNeyda, to transport at 1030.    DELORES [place]  [facility] [phone]/ [facility] [phone]  Care Management

## 2020-12-12 NOTE — PLAN OF CARE
S-(situation): Patient discharged to [facility] via wheelchair with daughter.    B-(background): Covid Positive    A-(assessment): VSS, on room air. Pain to left shoulder tolerable with prn Tylenol. Alert and Orientated x4. Lungs are clear, non-productive cough, denies SOB. Denies nausea, tolerating regular diet, appetite fair. Bruised area to left arm. Sling in place. Up with assist of 1.   Last bowel movement: 12/11     R-(recommendations):Report called to GEOVANNY Carter. Listed belongings gathered and sent with patient.     Discharge Nursing Criteria:     Care Plan and Patient education resolved: Yes    Vaccines  Influenza status verified at discharge:  Yes    [facility]  Home and hospital aquired medications returned to patient: Yes  Medication Bin checked and emptied on discharge Yes  All paperwork sent with patient/Copy of AVS given to patient or family Yes.

## 2020-12-13 ENCOUNTER — APPOINTMENT (OUTPATIENT)
Dept: PHYSICAL THERAPY | Facility: CLINIC | Age: 80
DRG: 177 | End: 2020-12-13
Payer: MEDICARE

## 2020-12-13 PROBLEM — J90 BILATERAL PLEURAL EFFUSION: Status: ACTIVE | Noted: 2020-12-13

## 2020-12-13 PROBLEM — J18.9 BILATERAL PNEUMONIA: Status: ACTIVE | Noted: 2020-12-13

## 2020-12-13 PROBLEM — J12.82 PNEUMONIA DUE TO COVID-19 VIRUS: Status: ACTIVE | Noted: 2020-12-13

## 2020-12-13 PROBLEM — U07.1 PNEUMONIA DUE TO COVID-19 VIRUS: Status: ACTIVE | Noted: 2020-12-13

## 2020-12-13 PROBLEM — R79.89 PRERENAL AZOTEMIA: Status: ACTIVE | Noted: 2020-12-12

## 2020-12-13 LAB
ANION GAP SERPL CALCULATED.3IONS-SCNC: 7 MMOL/L (ref 3–14)
BASOPHILS # BLD AUTO: 0 10E9/L (ref 0–0.2)
BASOPHILS NFR BLD AUTO: 0.1 %
BUN SERPL-MCNC: 31 MG/DL (ref 7–30)
CALCIUM SERPL-MCNC: 8 MG/DL (ref 8.5–10.1)
CHLORIDE SERPL-SCNC: 105 MMOL/L (ref 94–109)
CO2 SERPL-SCNC: 25 MMOL/L (ref 20–32)
CREAT SERPL-MCNC: 0.88 MG/DL (ref 0.52–1.04)
CRP SERPL-MCNC: 54.7 MG/L (ref 0–8)
D DIMER PPP FEU-MCNC: 1.8 UG/ML FEU (ref 0–0.5)
DIFFERENTIAL METHOD BLD: ABNORMAL
EOSINOPHIL NFR BLD AUTO: 0 %
ERYTHROCYTE [DISTWIDTH] IN BLOOD BY AUTOMATED COUNT: 15 % (ref 10–15)
FIBRINOGEN PPP-MCNC: 625 MG/DL (ref 200–420)
GFR SERPL CREATININE-BSD FRML MDRD: 62 ML/MIN/{1.73_M2}
GLUCOSE BLDC GLUCOMTR-MCNC: 201 MG/DL (ref 70–99)
GLUCOSE BLDC GLUCOMTR-MCNC: 266 MG/DL (ref 70–99)
GLUCOSE BLDC GLUCOMTR-MCNC: 284 MG/DL (ref 70–99)
GLUCOSE BLDC GLUCOMTR-MCNC: 304 MG/DL (ref 70–99)
GLUCOSE BLDC GLUCOMTR-MCNC: 360 MG/DL (ref 70–99)
GLUCOSE SERPL-MCNC: 264 MG/DL (ref 70–99)
HCT VFR BLD AUTO: 31.3 % (ref 35–47)
HGB BLD-MCNC: 10.1 G/DL (ref 11.7–15.7)
IMM GRANULOCYTES # BLD: 0 10E9/L (ref 0–0.4)
IMM GRANULOCYTES NFR BLD: 0.6 %
INR PPP: 1.2 (ref 0.86–1.14)
LDH SERPL L TO P-CCNC: 239 U/L (ref 81–234)
LYMPHOCYTES # BLD AUTO: 0.6 10E9/L (ref 0.8–5.3)
LYMPHOCYTES NFR BLD AUTO: 8.4 %
MCH RBC QN AUTO: 28.4 PG (ref 26.5–33)
MCHC RBC AUTO-ENTMCNC: 32.3 G/DL (ref 31.5–36.5)
MCV RBC AUTO: 88 FL (ref 78–100)
MONOCYTES # BLD AUTO: 0.2 10E9/L (ref 0–1.3)
MONOCYTES NFR BLD AUTO: 2.4 %
NEUTROPHILS # BLD AUTO: 5.9 10E9/L (ref 1.6–8.3)
NEUTROPHILS NFR BLD AUTO: 88.5 %
NRBC # BLD AUTO: 0 10*3/UL
NRBC BLD AUTO-RTO: 0 /100
PLATELET # BLD AUTO: 139 10E9/L (ref 150–450)
POTASSIUM SERPL-SCNC: 3.8 MMOL/L (ref 3.4–5.3)
PROCALCITONIN SERPL-MCNC: 0.22 NG/ML
RBC # BLD AUTO: 3.56 10E12/L (ref 3.8–5.2)
RETICS # AUTO: 51.3 10E9/L (ref 25–95)
RETICS/RBC NFR AUTO: 1.4 % (ref 0.5–2)
SODIUM SERPL-SCNC: 137 MMOL/L (ref 133–144)
TROPONIN I SERPL-MCNC: 0.05 UG/L (ref 0–0.04)
WBC # BLD AUTO: 6.7 10E9/L (ref 4–11)

## 2020-12-13 PROCEDURE — 85384 FIBRINOGEN ACTIVITY: CPT | Performed by: HOSPITALIST

## 2020-12-13 PROCEDURE — XW033E5 INTRODUCTION OF REMDESIVIR ANTI-INFECTIVE INTO PERIPHERAL VEIN, PERCUTANEOUS APPROACH, NEW TECHNOLOGY GROUP 5: ICD-10-PCS | Performed by: PEDIATRICS

## 2020-12-13 PROCEDURE — 99207 PR APP CREDIT; MD BILLING SHARED VISIT: CPT | Performed by: PEDIATRICS

## 2020-12-13 PROCEDURE — 84484 ASSAY OF TROPONIN QUANT: CPT | Performed by: HOSPITALIST

## 2020-12-13 PROCEDURE — 36415 COLL VENOUS BLD VENIPUNCTURE: CPT | Performed by: HOSPITALIST

## 2020-12-13 PROCEDURE — 250N000012 HC RX MED GY IP 250 OP 636 PS 637: Performed by: HOSPITALIST

## 2020-12-13 PROCEDURE — 85025 COMPLETE CBC W/AUTO DIFF WBC: CPT | Performed by: HOSPITALIST

## 2020-12-13 PROCEDURE — 85045 AUTOMATED RETICULOCYTE COUNT: CPT | Performed by: HOSPITALIST

## 2020-12-13 PROCEDURE — 99207 PR CDG-CODE CATEGORY CHANGED: CPT | Performed by: HOSPITALIST

## 2020-12-13 PROCEDURE — 250N000011 HC RX IP 250 OP 636: Performed by: FAMILY MEDICINE

## 2020-12-13 PROCEDURE — 250N000013 HC RX MED GY IP 250 OP 250 PS 637: Performed by: PEDIATRICS

## 2020-12-13 PROCEDURE — 85379 FIBRIN DEGRADATION QUANT: CPT | Performed by: HOSPITALIST

## 2020-12-13 PROCEDURE — 80048 BASIC METABOLIC PNL TOTAL CA: CPT | Performed by: HOSPITALIST

## 2020-12-13 PROCEDURE — 96374 THER/PROPH/DIAG INJ IV PUSH: CPT | Mod: 59 | Performed by: FAMILY MEDICINE

## 2020-12-13 PROCEDURE — 87205 SMEAR GRAM STAIN: CPT | Performed by: PEDIATRICS

## 2020-12-13 PROCEDURE — 83615 LACTATE (LD) (LDH) ENZYME: CPT | Performed by: HOSPITALIST

## 2020-12-13 PROCEDURE — 999N001017 HC STATISTIC GLUCOSE BY METER IP

## 2020-12-13 PROCEDURE — 97162 PT EVAL MOD COMPLEX 30 MIN: CPT | Mod: GP | Performed by: PHYSICAL THERAPIST

## 2020-12-13 PROCEDURE — 87070 CULTURE OTHR SPECIMN AEROBIC: CPT | Performed by: PEDIATRICS

## 2020-12-13 PROCEDURE — 85610 PROTHROMBIN TIME: CPT | Performed by: HOSPITALIST

## 2020-12-13 PROCEDURE — 86140 C-REACTIVE PROTEIN: CPT | Performed by: HOSPITALIST

## 2020-12-13 PROCEDURE — 99233 SBSQ HOSP IP/OBS HIGH 50: CPT | Performed by: HOSPITALIST

## 2020-12-13 PROCEDURE — 258N000003 HC RX IP 258 OP 636: Performed by: HOSPITALIST

## 2020-12-13 PROCEDURE — 250N000009 HC RX 250: Performed by: HOSPITALIST

## 2020-12-13 PROCEDURE — 120N000001 HC R&B MED SURG/OB

## 2020-12-13 PROCEDURE — 250N000013 HC RX MED GY IP 250 OP 250 PS 637: Performed by: HOSPITALIST

## 2020-12-13 RX ORDER — LIDOCAINE 40 MG/G
CREAM TOPICAL
Status: DISCONTINUED | OUTPATIENT
Start: 2020-12-13 | End: 2020-12-15 | Stop reason: HOSPADM

## 2020-12-13 RX ORDER — ISOSORBIDE MONONITRATE 30 MG/1
30 TABLET, EXTENDED RELEASE ORAL DAILY
Status: DISCONTINUED | OUTPATIENT
Start: 2020-12-13 | End: 2020-12-15 | Stop reason: HOSPADM

## 2020-12-13 RX ORDER — ASPIRIN 81 MG/1
81 TABLET ORAL DAILY
Status: DISCONTINUED | OUTPATIENT
Start: 2020-12-13 | End: 2020-12-15 | Stop reason: HOSPADM

## 2020-12-13 RX ORDER — NICOTINE POLACRILEX 4 MG
15-30 LOZENGE BUCCAL
Status: DISCONTINUED | OUTPATIENT
Start: 2020-12-13 | End: 2020-12-15 | Stop reason: HOSPADM

## 2020-12-13 RX ORDER — GABAPENTIN 300 MG/1
300 CAPSULE ORAL AT BEDTIME
Status: DISCONTINUED | OUTPATIENT
Start: 2020-12-13 | End: 2020-12-15 | Stop reason: HOSPADM

## 2020-12-13 RX ORDER — DEXTROSE MONOHYDRATE 25 G/50ML
25-50 INJECTION, SOLUTION INTRAVENOUS
Status: DISCONTINUED | OUTPATIENT
Start: 2020-12-13 | End: 2020-12-15 | Stop reason: HOSPADM

## 2020-12-13 RX ORDER — CLONIDINE 0.1 MG/24H
2 PATCH, EXTENDED RELEASE TRANSDERMAL WEEKLY
Status: DISCONTINUED | OUTPATIENT
Start: 2020-12-13 | End: 2020-12-15 | Stop reason: HOSPADM

## 2020-12-13 RX ORDER — VITAMIN B COMPLEX
25 TABLET ORAL DAILY
Status: DISCONTINUED | OUTPATIENT
Start: 2020-12-13 | End: 2020-12-15 | Stop reason: HOSPADM

## 2020-12-13 RX ORDER — ONDANSETRON 2 MG/ML
4 INJECTION INTRAMUSCULAR; INTRAVENOUS EVERY 6 HOURS PRN
Status: DISCONTINUED | OUTPATIENT
Start: 2020-12-13 | End: 2020-12-15 | Stop reason: HOSPADM

## 2020-12-13 RX ORDER — GLIPIZIDE 5 MG/1
20 TABLET ORAL
Status: DISCONTINUED | OUTPATIENT
Start: 2020-12-13 | End: 2020-12-15 | Stop reason: HOSPADM

## 2020-12-13 RX ORDER — CLOPIDOGREL BISULFATE 75 MG/1
75 TABLET ORAL DAILY
Status: DISCONTINUED | OUTPATIENT
Start: 2020-12-13 | End: 2020-12-15 | Stop reason: HOSPADM

## 2020-12-13 RX ORDER — AMLODIPINE BESYLATE 5 MG/1
10 TABLET ORAL DAILY
Status: DISCONTINUED | OUTPATIENT
Start: 2020-12-13 | End: 2020-12-15 | Stop reason: HOSPADM

## 2020-12-13 RX ORDER — ACETAMINOPHEN 325 MG/1
650 TABLET ORAL EVERY 4 HOURS PRN
Status: DISCONTINUED | OUTPATIENT
Start: 2020-12-13 | End: 2020-12-15 | Stop reason: HOSPADM

## 2020-12-13 RX ORDER — PANTOPRAZOLE SODIUM 40 MG/1
40 TABLET, DELAYED RELEASE ORAL
Status: DISCONTINUED | OUTPATIENT
Start: 2020-12-13 | End: 2020-12-13

## 2020-12-13 RX ORDER — ATORVASTATIN CALCIUM 40 MG/1
80 TABLET, FILM COATED ORAL DAILY
Status: DISCONTINUED | OUTPATIENT
Start: 2020-12-13 | End: 2020-12-15 | Stop reason: HOSPADM

## 2020-12-13 RX ORDER — ONDANSETRON 4 MG/1
4 TABLET, ORALLY DISINTEGRATING ORAL EVERY 6 HOURS PRN
Status: DISCONTINUED | OUTPATIENT
Start: 2020-12-13 | End: 2020-12-15 | Stop reason: HOSPADM

## 2020-12-13 RX ORDER — CEFDINIR 300 MG/1
600 CAPSULE ORAL EVERY EVENING
Status: DISCONTINUED | OUTPATIENT
Start: 2020-12-13 | End: 2020-12-15 | Stop reason: HOSPADM

## 2020-12-13 RX ORDER — SODIUM CHLORIDE 9 MG/ML
INJECTION, SOLUTION INTRAVENOUS CONTINUOUS
Status: DISCONTINUED | OUTPATIENT
Start: 2020-12-13 | End: 2020-12-13

## 2020-12-13 RX ORDER — PANTOPRAZOLE SODIUM 40 MG/1
40 TABLET, DELAYED RELEASE ORAL
Status: DISCONTINUED | OUTPATIENT
Start: 2020-12-13 | End: 2020-12-15 | Stop reason: HOSPADM

## 2020-12-13 RX ORDER — PAROXETINE 20 MG/1
40 TABLET, FILM COATED ORAL EVERY MORNING
Status: DISCONTINUED | OUTPATIENT
Start: 2020-12-13 | End: 2020-12-15 | Stop reason: HOSPADM

## 2020-12-13 RX ORDER — ZINC SULFATE 50(220)MG
220 CAPSULE ORAL DAILY
Status: DISCONTINUED | OUTPATIENT
Start: 2020-12-13 | End: 2020-12-15 | Stop reason: HOSPADM

## 2020-12-13 RX ORDER — LISINOPRIL 40 MG/1
40 TABLET ORAL AT BEDTIME
Status: DISCONTINUED | OUTPATIENT
Start: 2020-12-13 | End: 2020-12-15 | Stop reason: HOSPADM

## 2020-12-13 RX ORDER — ASCORBIC ACID 500 MG
1000 TABLET ORAL 2 TIMES DAILY
Status: DISCONTINUED | OUTPATIENT
Start: 2020-12-13 | End: 2020-12-15 | Stop reason: HOSPADM

## 2020-12-13 RX ADMIN — ASPIRIN 81 MG: 81 TABLET, COATED ORAL at 09:46

## 2020-12-13 RX ADMIN — CLOPIDOGREL BISULFATE 75 MG: 75 TABLET ORAL at 09:45

## 2020-12-13 RX ADMIN — SODIUM CHLORIDE: 9 INJECTION, SOLUTION INTRAVENOUS at 02:00

## 2020-12-13 RX ADMIN — SODIUM CHLORIDE: 9 INJECTION, SOLUTION INTRAVENOUS at 13:24

## 2020-12-13 RX ADMIN — METOPROLOL SUCCINATE 150 MG: 100 TABLET, EXTENDED RELEASE ORAL at 09:44

## 2020-12-13 RX ADMIN — CEFDINIR 600 MG: 300 CAPSULE ORAL at 20:26

## 2020-12-13 RX ADMIN — ZINC SULFATE 220 MG (50 MG) CAPSULE 220 MG: CAPSULE at 09:45

## 2020-12-13 RX ADMIN — OXYCODONE HYDROCHLORIDE AND ACETAMINOPHEN 1000 MG: 500 TABLET ORAL at 20:24

## 2020-12-13 RX ADMIN — PANTOPRAZOLE SODIUM 40 MG: 40 TABLET, DELAYED RELEASE ORAL at 06:54

## 2020-12-13 RX ADMIN — OXYCODONE HYDROCHLORIDE AND ACETAMINOPHEN 1000 MG: 500 TABLET ORAL at 09:46

## 2020-12-13 RX ADMIN — ISOSORBIDE MONONITRATE 30 MG: 30 TABLET, EXTENDED RELEASE ORAL at 09:46

## 2020-12-13 RX ADMIN — DEXAMETHASONE 6 MG: 2 TABLET ORAL at 09:45

## 2020-12-13 RX ADMIN — DEXAMETHASONE SODIUM PHOSPHATE 6 MG: 10 INJECTION, SOLUTION INTRAMUSCULAR; INTRAVENOUS at 00:23

## 2020-12-13 RX ADMIN — REMDESIVIR 200 MG: 100 INJECTION, POWDER, LYOPHILIZED, FOR SOLUTION INTRAVENOUS at 03:06

## 2020-12-13 RX ADMIN — AMLODIPINE BESYLATE 10 MG: 5 TABLET ORAL at 09:46

## 2020-12-13 RX ADMIN — PAROXETINE HYDROCHLORIDE 40 MG: 20 TABLET, FILM COATED ORAL at 09:45

## 2020-12-13 RX ADMIN — CLONIDINE 2 PATCH: 0.1 PATCH TRANSDERMAL at 02:55

## 2020-12-13 RX ADMIN — GLIPIZIDE 20 MG: 5 TABLET ORAL at 16:29

## 2020-12-13 RX ADMIN — APIXABAN 2.5 MG: 2.5 TABLET, FILM COATED ORAL at 20:26

## 2020-12-13 RX ADMIN — APIXABAN 2.5 MG: 2.5 TABLET, FILM COATED ORAL at 09:45

## 2020-12-13 RX ADMIN — INSULIN ASPART 3 UNITS: 100 INJECTION, SOLUTION INTRAVENOUS; SUBCUTANEOUS at 09:43

## 2020-12-13 RX ADMIN — INSULIN ASPART 5 UNITS: 100 INJECTION, SOLUTION INTRAVENOUS; SUBCUTANEOUS at 12:13

## 2020-12-13 RX ADMIN — ATORVASTATIN CALCIUM 80 MG: 40 TABLET, FILM COATED ORAL at 10:51

## 2020-12-13 RX ADMIN — GABAPENTIN 300 MG: 300 CAPSULE ORAL at 20:27

## 2020-12-13 RX ADMIN — LISINOPRIL 40 MG: 40 TABLET ORAL at 20:26

## 2020-12-13 RX ADMIN — VITAMIN D 25 MCG: 25 TAB ORAL at 09:46

## 2020-12-13 ASSESSMENT — ACTIVITIES OF DAILY LIVING (ADL)
DRESSING/BATHING_DIFFICULTY: YES
DOING_ERRANDS_INDEPENDENTLY_DIFFICULTY: NO
WEAR_GLASSES_OR_BLIND: NO
DIFFICULTY_EATING/SWALLOWING: NO
ADLS_ACUITY_SCORE: 19
NUMBER_OF_TIMES_PATIENT_HAS_FALLEN_WITHIN_LAST_SIX_MONTHS: 1
TOILETING_ISSUES: NO
ADLS_ACUITY_SCORE: 19
ADLS_ACUITY_SCORE: 19
FALL_HISTORY_WITHIN_LAST_SIX_MONTHS: YES
EQUIPMENT_CURRENTLY_USED_AT_HOME: CANE, STRAIGHT
ADLS_ACUITY_SCORE: 19
CONCENTRATING,_REMEMBERING_OR_MAKING_DECISIONS_DIFFICULTY: NO
ADLS_ACUITY_SCORE: 19
WALKING_OR_CLIMBING_STAIRS_DIFFICULTY: NO
HEARING_DIFFICULTY_OR_DEAF: NO
DIFFICULTY_COMMUNICATING: NO

## 2020-12-13 ASSESSMENT — ENCOUNTER SYMPTOMS
ARTHRALGIAS: 1
NEUROLOGICAL NEGATIVE: 1
EYES NEGATIVE: 1
ABDOMINAL PAIN: 0
NAUSEA: 1
CHEST TIGHTNESS: 1
ALLERGIC/IMMUNOLOGIC NEGATIVE: 1
PALPITATIONS: 0
SHORTNESS OF BREATH: 1
BRUISES/BLEEDS EASILY: 0
DYSURIA: 0
ACTIVITY CHANGE: 1
APPETITE CHANGE: 1
PSYCHIATRIC NEGATIVE: 1
FATIGUE: 1
CHILLS: 1
ENDOCRINE NEGATIVE: 1

## 2020-12-13 ASSESSMENT — MIFFLIN-ST. JEOR: SCORE: 1052

## 2020-12-13 NOTE — PROGRESS NOTES
12/13/20 0800   Quick Adds   Type of Visit Initial PT Evaluation   Living Environment   People in home alone   Current Living Arrangements house   Home Accessibility stairs to enter home   Number of Stairs, Main Entrance 6   Stair Railings, Main Entrance railings safe and in good condition   Transportation Anticipated car, drives self   Living Environment Comments Was living at home with family nearby.  Was ind with [facility] & ind with ADLs/housework/errands.  However, was recently DC to TCU due to UTI/COVID.   Self-Care   Usual Activity Tolerance good   Current Activity Tolerance poor   Regular Exercise No   Equipment Currently Used at Home cane, straight;raised toilet seat   General Information   Onset of Illness/Injury or Date of Surgery 12/13/20   Referring Physician David Cleaning MD   Patient/Family Therapy Goals Statement (PT) plans to return to [facility] for rehab   Pertinent History of Current Problem (include personal factors and/or comorbidities that impact the POC) Pt re-admitted to hospital with COVID-19/pneumonia after just discharging to TCU yesterday.  PT orders received for weakness.  Pt reports she is feeling much better today, nausea from yesterday is gone.  Recent L humeral fx from a fall in November, NWB L UE.   Existing Precautions/Restrictions fall;oxygen therapy device and L/min;shoulder   Weight-Bearing Status - LUE nonweight-bearing   General Observations Up in chair with chair alarm, wearing a sling on the L, pure wick in place, on 3L O2.   Cognition   Orientation Status (Cognition) oriented x 4   Affect/Mental Status (Cognition)   (slightly lethargic)   Cognitive Status Comments no issues noted   Integumentary/Edema   Integumentary/Edema Comments feet appear mildly swollen   Strength Comprehensive (MMT)   Comment, General Manual Muscle Testing (MMT) Assessment at least 3+/5 B ankle DF, B hip flex, R elbow flex/ext   Bed Mobility   Comment (Bed Mobility) min A sit > sup   Transfers    Transfer Safety Comments min A sit>stand   Gait/Stairs (Locomotion)   Comment (Gait/Stairs) able to take a few steps from chair to bed with min A, pt wearing a sling on the L side   Balance   Balance Comments unsteady with standing, reaching for bed rail with R UE, good edge of bed sitting balance   Clinical Impression   Criteria for Skilled Therapeutic Intervention yes, treatment indicated   PT Diagnosis (PT) weakness, impaired bed mobility, transfers and ambulation   Influenced by the following impairments weakness, respiratory compromise, NWB status on L UE   Functional limitations due to impairments bed mobility, transfers, ambulation, self-cares/ADLs   Clinical Presentation Evolving/Changing   Clinical Presentation Rationale PmHx, multiples sx involved, recent readmission; examination and clinical reasoning   Clinical Decision Making (Complexity) moderate complexity   Therapy Frequency (PT) Daily   Predicted Duration of Therapy Intervention (days/wks) 4 days   Planned Therapy Interventions (PT) balance training;bed mobility training;gait training;ROM (range of motion);strengthening;transfer training   Anticipated Equipment Needs at Discharge (PT)   (defer to TCU)   Risk & Benefits of therapy have been explained evaluation/treatment results reviewed;care plan/treatment goals reviewed;participants voiced agreement with care plan;participants included;patient   PT Discharge Planning    PT Discharge Recommendation (DC Rec) Transitional Care Facility   PT Rationale for DC Rec anticipate return to TCU for further rehab as pt currently not able to care for self due to weakness, repiratory compromise and limitations due to L humeral fx   Total Evaluation Time   Total Evaluation Time (Minutes) 25

## 2020-12-13 NOTE — ED TRIAGE NOTES
Pt brought in by [facility] from [facility].   Staff called over concerns of elevated BS (330) and increased fatigue.  Pt is covid +.

## 2020-12-13 NOTE — PROGRESS NOTES
"S-(situation): Patient arrives to room 267 via cart from ED    B-(background): Covid     A-(assessment): Pt arrived alert and oriented x4, weak , and fatigued. Pt moved on slide herve.   Lungs clear with crackles in the bases.   Oxygen saturations in the low 90's on 3lpm.   Clonidine patch falling off on arrival was removed and discarded.   Pt denies having any pain. Left shoulder broken after fall in November, Pt readmitted in less than a day, please see notes for information from prior admission.   Initial vitals stable with hypertension.  BP (!) 160/72   Pulse 86   Temp 98.9  F (37.2  C) (Oral)   Resp 20   Ht 1.626 m (5' 4\")   Wt 59.7 kg (131 lb 9.8 oz)   SpO2 90%   BMI 22.59 kg/m    Iv infusing.   Soft Harvest music played for comfort.   Covid isolation in place.       R-(recommendations): Orders reviewed with patient. Will monitor patient per MD orders. Yes     Inpatient nursing criteria listed below were met:    Health care directives status obtained and documented: Yes  SCD's Documented: Yes  Skin issues/needs documented:Yes  Isolation addressed and Signage used: Yes  Fall Prevention: Care plan updated, Education given and documented Yes  Care Plan initiated: Yes  Education Assessment documented:Yes  Education Documented (Pre-existing chronic infection such as, MRSA/VRE need education on admission): Yes  Allergies Reviewed: Yes  Admission Medication Reconciliation completed: Yes  New medication patient education completed and documented (Possible Side Effects of Common Medications handout): Yes  Home medications if not able to send immediately home with family stored here: NA  Reminder note placed in discharge instructions: NA  Discharge planning review completed (admission navigator) Yes        "

## 2020-12-13 NOTE — H&P
[facility]    History and Physical  Hospitalist       Date of Admission:  12/12/2020    Assessment & Plan   Principal Problem:    Pneumonia due to 2019 novel coronavirus    Acute respiratory failure with hypoxia (H)    Assessment: currently hypoxic was hospitalized and discharge on 12/12/20. Was not require oxygen. D dimer noted to be elevated, on eliquis on discharge. Cxr show pneumonia. Ct chest pending    Plan: will start remdesivir and dexa, will also start vit c , d and zinc, will also continue eliquis for covid coagulopathy.     Active Problems:      Coronary artery disease involving native coronary artery of native heart without angina pectoris    Assessment: on aspirin and also plavix. Also on statin. Trop minimally up at 0.05. ekg is stable    Plan: will trend troponin. Continue aspirin, plavix and statin. Will put on telemetry      Type 2 diabetes mellitus with diabetic polyneuropathy, without long-term current use of insulin (H)    Assessment: on jardiance and glipizide and metformin    Plan: will put on iss novolog. HOLD jardiance and glipizide and metformin      E. coli UTI    Assessment: was admitted the last time with e coli uti, was discharge home with 6 more dose of omnicef    Plan: continue omnicef      Acute kidney failure (H)    Assessment: cr is up t 1.1, was 0.8 on discharge.     Plan: will give hydration monitor kidney fx. Renally dose medication       Hyperlipidemia LDL goal <100    Assessment: on lipitor    Plan: continue lipitor       Essential hypertension with goal blood pressure less than 140/90    Assessment: on amlodipine, clonidine, imdur, and lisinopril    Plan: continue the same       Closed fracture of proximal end of left humerus with routine healing    Assessment: on sling    Plan: continue sling      Eliza Dubois is a 80 year old female who presents with weakness and shortness of breath. Pt was recently hospitalized for weakness and diarrhea.  Apparently has covid on 12/3/20, pt was hospitalized initiallly due to UTI and weakness and diarrhea. Apparently pt was doing better an was discharge on 12/12/20 unfortunately pt continue to get weak and start to have shortness of breath and hypoxic. During hospital stay pt actually has no respiratory issue and was not started on remdesivir and dexa. Pt o2 level was down to 85% and due to that pt was sent to the ER, In the ER pt o2 sat up to 98% with 2 lpm oxygen. Pt has cxr and was noted to have bilateral infiltrate. Pt d dimer was up and cta chest is ordered. Pt has no fever,       # Pain Assessment:  Current Pain Score 12/12/2020   Patient currently in pain? denies   Pain score (0-10) -   Eliza romano pain level was assessed and she currently denies pain.      DVT Prophylaxis: eliquis 2.5mg bid, if CTA chest show PE will change to PE treatment dose  Code Status: DNR / DNI    Disposition: Expected discharge in 4-7 days days once o2 sat improving pt stronger.    David Cleaning    Primary Care Physician   Byron Holm    Chief Complaint   Weakness and hypoxia    History is obtained from the patient and ER physician    History of Present Illness   Eliza Dubois is a 80 year old female who presents with weakness and shortness of breath. Pt was recently hospitalized for weakness and diarrhea. Apparently has covid on 12/3/20, pt was hospitalized initiallly due to UTI and weakness and diarrhea. Apparently pt was doing better an was discharge on 12/12/20 unfortunately pt continue to get weak and start to have shortness of breath and hypoxic. During hospital stay pt actually has no respiratory issue and was not started on remdesivir and dexa. Pt o2 level was down to 85% and due to that pt was sent to the ER, In the ER pt o2 sat up to 98% with 2 lpm oxygen. Pt has cxr and was noted to have bilateral infiltrate. Pt d dimer was up and cta chest is ordered. Pt has no fever,       Past Medical History    I have  reviewed this patient's medical history and updated it with pertinent information if needed.   Past Medical History:   Diagnosis Date     Diabetic eye exam (H) 05/01/14     Heart attack (H)      Pure hypercholesterolemia      Stented coronary artery      Type II or unspecified type diabetes mellitus without mention of complication, not stated as uncontrolled 2002    Avandamet.     Unspecified disease of pericardium 12/05/08    Pericarditis w/mild to moderate pericardial effusion.     Unspecified essential hypertension        Past Surgical History   I have reviewed this patient's surgical history and updated it with pertinent information if needed.  Past Surgical History:   Procedure Laterality Date     ABDOMEN SURGERY       COLONOSCOPY  04/15/09     COLONOSCOPY  6/18/2014    Procedure: COMBINED COLONOSCOPY, SINGLE BIOPSY/POLYPECTOMY BY BIOPSY;  Surgeon: Earle Mon MD;  Location: [facility] LAPAROSCOPY, SURGICAL; CHOLECYSTECTOMY  1997    Cholecystectomy, Laparoscopic     PHACOEMULSIFICATION CLEAR CORNEA WITH STANDARD INTRAOCULAR LENS IMPLANT Right 3/16/2016    Procedure: PHACOEMULSIFICATION CLEAR CORNEA WITH STANDARD INTRAOCULAR LENS IMPLANT;  Surgeon: George Lemus MD;  Location: [facility]     PHACOEMULSIFICATION CLEAR CORNEA WITH STANDARD INTRAOCULAR LENS IMPLANT Left 3/30/2016    Procedure: PHACOEMULSIFICATION CLEAR CORNEA WITH STANDARD INTRAOCULAR LENS IMPLANT;  Surgeon: George Lemus MD;  Location: [facility]     Z NONSPECIFIC PROCEDURE  1988    Hysterectomy       Prior to Admission Medications   Prior to Admission Medications   Prescriptions Last Dose Informant Patient Reported? Taking?   MELATONIN PO  Daughter Yes No   Sig: Take 3 mg by mouth nightly as needed    PARoxetine (PAXIL) 40 MG tablet   No No   Sig: Take 1 tablet (40 mg) by mouth every morning   acetaminophen (TYLENOL) 500 MG tablet   No No   Sig: Take 1-2 tablets (500-1,000 mg) by mouth every 6 hours as needed for pain   alcohol swab prep  pads  Daughter No No   Sig: Use to swab area of injection/sarah as directed.   amLODIPine (NORVASC) 10 MG tablet   No No   Sig: Take 1 tablet (10 mg) by mouth daily   apixaban ANTICOAGULANT (ELIQUIS) 2.5 MG tablet   No No   Sig: Take 1 tablet (2.5 mg) by mouth 2 times daily   aspirin (ASA) 81 MG EC tablet   No No   Sig: Take 1 tablet (81 mg) by mouth daily   atorvastatin (LIPITOR) 80 MG tablet   No No   Sig: Take 1 tablet (80 mg) by mouth daily   blood glucose (NO BRAND SPECIFIED) test strip  Daughter No No   Sig: Use to test blood sugar 2 times daily or as directed. To accompany: Blood Glucose Monitor Brands: per insurance.   blood glucose calibration (NO BRAND SPECIFIED) solution  Daughter No No   Sig: To accompany: Blood Glucose Monitor Brands: per insurance.   blood glucose monitoring (NO BRAND SPECIFIED) meter device kit  Daughter No No   Sig: Use to test blood sugar 2 times daily or as directed. Preferred blood glucose meter OR supplies to accompany: Blood Glucose Monitor Brands: per insurance.   cefdinir (OMNICEF) 300 MG capsule   No No   Sig: Take 2 capsules (600 mg) by mouth every evening for 3 days   cloNIDine (CATAPRES-TTS1) 0.1 MG/24HR WK patch   No No   Sig: Place 2 patches onto the skin once a week   clopidogrel (PLAVIX) 75 MG tablet   No No   Sig: Take 1 tablet (75 mg) by mouth daily   empagliflozin (JARDIANCE) 25 MG TABS tablet   No No   Sig: Take 1 tablet (25 mg) by mouth daily   gabapentin (NEURONTIN) 300 MG capsule   No No   Sig: Take 1 capsule (300 mg) by mouth At Bedtime   glipiZIDE (GLUCOTROL) 10 MG tablet   No No   Sig: Take 2 tablets (20 mg) by mouth 2 times daily (before meals)   isosorbide mononitrate (IMDUR) 30 MG 24 hr tablet   No No   Sig: Take 1 tablet (30 mg) by mouth daily   lisinopril (ZESTRIL) 40 MG tablet   No No   Sig: Take 1 tablet (40 mg) by mouth At Bedtime   metFORMIN (GLUCOPHAGE-XR) 500 MG 24 hr tablet   No No   Sig: Take 2 tablets (1,000 mg) by mouth 2 times daily (with  meals)   metoprolol succinate ER (TOPROL-XL) 100 MG 24 hr tablet   No No   Sig: Take 1.5 tablets (150 mg) by mouth daily   pantoprazole (PROTONIX) 40 MG EC tablet   No No   Sig: Take 1 tablet (40 mg) by mouth every morning (before breakfast)   thin (NO BRAND SPECIFIED) lancets  Daughter No No   Sig: Use with lanceting device. To accompany: Blood Glucose Monitor Brands: per insurance.      Facility-Administered Medications: None     Allergies   Allergies   Allergen Reactions     Sulfa Drugs Hives       Social History   I have reviewed this patient's social history and updated it with pertinent information if needed. Eliza Dubois  reports that she has never smoked. She has never used smokeless tobacco. She reports that she does not drink alcohol or use drugs.    Family History   I have reviewed this patient's family history and updated it with pertinent information if needed.   Family History   Problem Relation Age of Onset     Diabetes Mother      Arthritis Mother      Heart Disease Mother      Hypertension Mother      Lipids Mother      Neurologic Disorder Mother         neuropathy     Osteoporosis Mother      Obesity Mother      EYE* Mother         blind     Diabetes Father      Genitourinary Problems Father         gall stones     Heart Disease Father         MI     Cancer Maternal Grandmother      Heart Disease Maternal Grandmother      Cancer Other         Grandparents     Alcohol/Drug No family hx of      Alzheimer Disease No family hx of      Anesthesia Reaction No family hx of      Blood Disease No family hx of      Depression No family hx of      Genetic Disorder No family hx of      Gastrointestinal Disease No family hx of      Gynecology No family hx of      Psychotic Disorder No family hx of      Respiratory No family hx of      Cerebrovascular Disease No family hx of        Review of Systems   The 10 point Review of Systems is negative other than noted in the HPI or here.     Physical Exam   Temp:  98.5  F (36.9  C) Temp src: Oral BP: (!) 146/66 Pulse: 82   Resp: 20 SpO2: 95 % O2 Device: Nasal cannula Oxygen Delivery: 2 LPM  Vital Signs with Ranges  Temp:  [98.1  F (36.7  C)-99.6  F (37.6  C)] 98.5  F (36.9  C)  Pulse:  [78-91] 82  Resp:  [20-24] 20  BP: (146-174)/(66-82) 146/66  SpO2:  [88 %-96 %] 95 %  0 lbs 0 oz    Constitutional: alert, sleepy, oriented, moderate distress  Eyes: PERRLA  HEENT: within normal limit  Respiratory: minimal crackles noted in all lung area  Cardiovascular: regular rate and rhythm  GI: supple, non tender, non distended,   Lymph/Hematologic: no lymph node enlargement  Genitourinary: not examined  Skin: no rash or bruise  Musculoskeletal: left arm sling noted. No leg edema  Neurologic: sleepy but able to be awaken. Normal speech. Able to follow simple command  Psychiatric: affect normal    Data     Data reviewed today:    Recent Results (from the past 168 hour(s))   CBC with platelets differential    Collection Time: 12/08/20  7:29 PM   Result Value Ref Range    WBC 5.4 4.0 - 11.0 10e9/L    RBC Count 4.32 3.8 - 5.2 10e12/L    Hemoglobin 12.3 11.7 - 15.7 g/dL    Hematocrit 39.0 35.0 - 47.0 %    MCV 90 78 - 100 fl    MCH 28.5 26.5 - 33.0 pg    MCHC 31.5 31.5 - 36.5 g/dL    RDW 15.4 (H) 10.0 - 15.0 %    Platelet Count 164 150 - 450 10e9/L    Diff Method Automated Method     % Neutrophils 80.6 %    % Lymphocytes 11.2 %    % Monocytes 7.6 %    % Eosinophils 0.0 %    % Basophils 0.2 %    % Immature Granulocytes 0.4 %    Nucleated RBCs 0 0 /100    Absolute Neutrophil 4.3 1.6 - 8.3 10e9/L    Absolute Lymphocytes 0.6 (L) 0.8 - 5.3 10e9/L    Absolute Monocytes 0.4 0.0 - 1.3 10e9/L    Absolute Basophils 0.0 0.0 - 0.2 10e9/L    Abs Immature Granulocytes 0.0 0 - 0.4 10e9/L    Absolute Nucleated RBC 0.0    Comprehensive metabolic panel    Collection Time: 12/08/20  7:29 PM   Result Value Ref Range    Sodium 138 133 - 144 mmol/L    Potassium 3.9 3.4 - 5.3 mmol/L    Chloride 103 94 - 109 mmol/L     Carbon Dioxide 25 20 - 32 mmol/L    Anion Gap 10 3 - 14 mmol/L    Glucose 289 (H) 70 - 99 mg/dL    Urea Nitrogen 38 (H) 7 - 30 mg/dL    Creatinine 1.12 (H) 0.52 - 1.04 mg/dL    GFR Estimate 46 (L) >60 mL/min/[1.73_m2]    GFR Estimate If Black 54 (L) >60 mL/min/[1.73_m2]    Calcium 8.8 8.5 - 10.1 mg/dL    Bilirubin Total 0.7 0.2 - 1.3 mg/dL    Albumin 3.3 (L) 3.4 - 5.0 g/dL    Protein Total 7.9 6.8 - 8.8 g/dL    Alkaline Phosphatase 121 40 - 150 U/L    ALT 19 0 - 50 U/L    AST 24 0 - 45 U/L   Troponin I    Collection Time: 12/08/20  7:29 PM   Result Value Ref Range    Troponin I ES 0.029 0.000 - 0.045 ug/L   CRP inflammation    Collection Time: 12/08/20  7:29 PM   Result Value Ref Range    CRP Inflammation 10.5 (H) 0.0 - 8.0 mg/L   D dimer quantitative    Collection Time: 12/08/20  7:29 PM   Result Value Ref Range    D Dimer 1.1 (H) 0.0 - 0.50 ug/ml FEU   UA reflex to Microscopic    Collection Time: 12/08/20  9:15 PM   Result Value Ref Range    Color Urine Yellow     Appearance Urine Slightly Cloudy     Glucose Urine >499 (A) NEG^Negative mg/dL    Bilirubin Urine Negative NEG^Negative    Ketones Urine 20 (A) NEG^Negative mg/dL    Specific Gravity Urine 1.017 1.003 - 1.035    Blood Urine Moderate (A) NEG^Negative    pH Urine 5.0 5.0 - 7.0 pH    Protein Albumin Urine 30 (A) NEG^Negative mg/dL    Urobilinogen mg/dL 0.0 0.0 - 2.0 mg/dL    Nitrite Urine Positive (A) NEG^Negative    Leukocyte Esterase Urine Trace (A) NEG^Negative    Source Midstream Urine     RBC Urine 10 (H) 0 - 2 /HPF    WBC Urine 8 (H) 0 - 5 /HPF    Bacteria Urine Many (A) NEG^Negative /HPF    Squamous Epithelial /HPF Urine <1 0 - 1 /HPF    Mucous Urine Present (A) NEG^Negative /LPF   Urine Culture    Collection Time: 12/08/20  9:15 PM    Specimen: Unspecified Urine   Result Value Ref Range    Specimen Description Unspecified Urine     Special Requests Specimen received in preservative     Culture Micro 50,000 to 100,000 colonies/mL  Escherichia coli    (A)     Culture Micro       <10,000 colonies/mL  urogenital yaima  Susceptibility testing not routinely done         Susceptibility    Escherichia coli - LACEY     AMPICILLIN <=2 Sensitive ug/mL     CEFAZOLIN* <=4 Sensitive ug/mL      * Cefazolin LACEY breakpoints are for the treatment of uncomplicated urinary tract infections.  For the treatment of systemic infections, please contact the laboratory for additional testing.     CEFOXITIN <=4 Sensitive ug/mL     CEFTAZIDIME <=1 Sensitive ug/mL     CEFTRIAXONE <=1 Sensitive ug/mL     CIPROFLOXACIN <=0.25 Sensitive ug/mL     GENTAMICIN <=1 Sensitive ug/mL     LEVOFLOXACIN <=0.12 Sensitive ug/mL     NITROFURANTOIN <=16 Sensitive ug/mL     TOBRAMYCIN <=1 Sensitive ug/mL     Trimethoprim/Sulfa <=1/19 Sensitive ug/mL     AMPICILLIN/SULBACTAM <=2 Sensitive ug/mL     Piperacillin/Tazo <=4 Sensitive ug/mL     CEFEPIME <=1 Sensitive ug/mL   Glucose by meter    Collection Time: 12/09/20 12:50 AM   Result Value Ref Range    Glucose 196 (H) 70 - 99 mg/dL   CBC with Platelets & Differential    Collection Time: 12/09/20  6:14 AM   Result Value Ref Range    WBC 4.7 4.0 - 11.0 10e9/L    RBC Count 3.30 (L) 3.8 - 5.2 10e12/L    Hemoglobin 9.5 (L) 11.7 - 15.7 g/dL    Hematocrit 30.1 (L) 35.0 - 47.0 %    MCV 91 78 - 100 fl    MCH 28.8 26.5 - 33.0 pg    MCHC 31.6 31.5 - 36.5 g/dL    RDW 15.3 (H) 10.0 - 15.0 %    Platelet Count 154 150 - 450 10e9/L    Diff Method Automated Method     % Neutrophils 66.6 %    % Lymphocytes 22.2 %    % Monocytes 10.8 %    % Eosinophils 0.0 %    % Basophils 0.0 %    % Immature Granulocytes 0.4 %    Nucleated RBCs 0 0 /100    Absolute Neutrophil 3.1 1.6 - 8.3 10e9/L    Absolute Lymphocytes 1.1 0.8 - 5.3 10e9/L    Absolute Monocytes 0.5 0.0 - 1.3 10e9/L    Absolute Basophils 0.0 0.0 - 0.2 10e9/L    Abs Immature Granulocytes 0.0 0 - 0.4 10e9/L    Absolute Nucleated RBC 0.0    Reticulocyte count    Collection Time: 12/09/20  6:14 AM   Result Value Ref Range    % Retic  1.9 0.5 - 2.0 %    Absolute Retic 63.4 25 - 95 10e9/L   Basic metabolic panel    Collection Time: 12/09/20  6:14 AM   Result Value Ref Range    Sodium 143 133 - 144 mmol/L    Potassium 3.9 3.4 - 5.3 mmol/L    Chloride 113 (H) 94 - 109 mmol/L    Carbon Dioxide 22 20 - 32 mmol/L    Anion Gap 8 3 - 14 mmol/L    Glucose 160 (H) 70 - 99 mg/dL    Urea Nitrogen 31 (H) 7 - 30 mg/dL    Creatinine 0.92 0.52 - 1.04 mg/dL    GFR Estimate 58 (L) >60 mL/min/[1.73_m2]    GFR Estimate If Black 68 >60 mL/min/[1.73_m2]    Calcium 7.8 (L) 8.5 - 10.1 mg/dL   Lactate Dehydrogenase    Collection Time: 12/09/20  6:14 AM   Result Value Ref Range    Lactate Dehydrogenase 157 81 - 234 U/L   INR    Collection Time: 12/09/20  6:14 AM   Result Value Ref Range    INR 1.07 0.86 - 1.14   Fibrinogen activity    Collection Time: 12/09/20  6:14 AM   Result Value Ref Range    Fibrinogen 575 (H) 200 - 420 mg/dL   CRP inflammation    Collection Time: 12/09/20  6:14 AM   Result Value Ref Range    CRP Inflammation 9.0 (H) 0.0 - 8.0 mg/L   D dimer quantitative    Collection Time: 12/09/20  6:14 AM   Result Value Ref Range    D Dimer 1.0 (H) 0.0 - 0.50 ug/ml FEU   Troponin I    Collection Time: 12/09/20  6:14 AM   Result Value Ref Range    Troponin I ES 0.044 0.000 - 0.045 ug/L   Lactic acid whole blood    Collection Time: 12/09/20  6:14 AM   Result Value Ref Range    Lactic Acid 0.8 0.7 - 2.0 mmol/L   Procalcitonin    Collection Time: 12/09/20  6:14 AM   Result Value Ref Range    Procalcitonin <0.05 ng/ml   Magnesium    Collection Time: 12/09/20  6:14 AM   Result Value Ref Range    Magnesium 1.2 (L) 1.6 - 2.3 mg/dL   Phosphorus    Collection Time: 12/09/20  6:14 AM   Result Value Ref Range    Phosphorus 3.6 2.5 - 4.5 mg/dL   Glucose by meter    Collection Time: 12/09/20  8:11 AM   Result Value Ref Range    Glucose 143 (H) 70 - 99 mg/dL   Glucose by meter    Collection Time: 12/09/20 11:26 AM   Result Value Ref Range    Glucose 221 (H) 70 - 99 mg/dL    Troponin I    Collection Time: 12/09/20 12:43 PM   Result Value Ref Range    Troponin I ES 0.041 0.000 - 0.045 ug/L   Glucose by meter    Collection Time: 12/09/20  5:18 PM   Result Value Ref Range    Glucose 221 (H) 70 - 99 mg/dL   Glucose by meter    Collection Time: 12/09/20  9:29 PM   Result Value Ref Range    Glucose 216 (H) 70 - 99 mg/dL   Glucose by meter    Collection Time: 12/10/20  2:52 AM   Result Value Ref Range    Glucose 175 (H) 70 - 99 mg/dL   Reticulocyte count    Collection Time: 12/10/20  6:33 AM   Result Value Ref Range    % Retic 2.2 (H) 0.5 - 2.0 %    Absolute Retic 76.2 25 - 95 10e9/L   Basic metabolic panel    Collection Time: 12/10/20  6:33 AM   Result Value Ref Range    Sodium 140 133 - 144 mmol/L    Potassium 3.9 3.4 - 5.3 mmol/L    Chloride 109 94 - 109 mmol/L    Carbon Dioxide 26 20 - 32 mmol/L    Anion Gap 5 3 - 14 mmol/L    Glucose 164 (H) 70 - 99 mg/dL    Urea Nitrogen 25 7 - 30 mg/dL    Creatinine 0.85 0.52 - 1.04 mg/dL    GFR Estimate 64 >60 mL/min/[1.73_m2]    GFR Estimate If Black 74 >60 mL/min/[1.73_m2]    Calcium 8.1 (L) 8.5 - 10.1 mg/dL   Lactate Dehydrogenase    Collection Time: 12/10/20  6:33 AM   Result Value Ref Range    Lactate Dehydrogenase 177 81 - 234 U/L   INR    Collection Time: 12/10/20  6:33 AM   Result Value Ref Range    INR 1.11 0.86 - 1.14   Fibrinogen activity    Collection Time: 12/10/20  6:33 AM   Result Value Ref Range    Fibrinogen 521 (H) 200 - 420 mg/dL   CRP inflammation    Collection Time: 12/10/20  6:33 AM   Result Value Ref Range    CRP Inflammation 10.4 (H) 0.0 - 8.0 mg/L   D dimer quantitative    Collection Time: 12/10/20  6:33 AM   Result Value Ref Range    D Dimer 0.7 (H) 0.0 - 0.50 ug/ml FEU   CBC with platelets    Collection Time: 12/10/20  6:33 AM   Result Value Ref Range    WBC 4.2 4.0 - 11.0 10e9/L    RBC Count 3.45 (L) 3.8 - 5.2 10e12/L    Hemoglobin 9.9 (L) 11.7 - 15.7 g/dL    Hematocrit 31.3 (L) 35.0 - 47.0 %    MCV 91 78 - 100 fl    MCH  28.7 26.5 - 33.0 pg    MCHC 31.6 31.5 - 36.5 g/dL    RDW 15.4 (H) 10.0 - 15.0 %    Platelet Count 149 (L) 150 - 450 10e9/L   Magnesium    Collection Time: 12/10/20  6:33 AM   Result Value Ref Range    Magnesium 1.7 1.6 - 2.3 mg/dL   Glucose by meter    Collection Time: 12/10/20  8:01 AM   Result Value Ref Range    Glucose 149 (H) 70 - 99 mg/dL   Glucose by meter    Collection Time: 12/10/20 11:45 AM   Result Value Ref Range    Glucose 195 (H) 70 - 99 mg/dL   Glucose by meter    Collection Time: 12/10/20  5:15 PM   Result Value Ref Range    Glucose 210 (H) 70 - 99 mg/dL   Glucose by meter    Collection Time: 12/10/20  8:36 PM   Result Value Ref Range    Glucose 227 (H) 70 - 99 mg/dL   Glucose by meter    Collection Time: 12/11/20  2:15 AM   Result Value Ref Range    Glucose 189 (H) 70 - 99 mg/dL   Basic metabolic panel    Collection Time: 12/11/20  6:37 AM   Result Value Ref Range    Sodium 136 133 - 144 mmol/L    Potassium 3.6 3.4 - 5.3 mmol/L    Chloride 103 94 - 109 mmol/L    Carbon Dioxide 27 20 - 32 mmol/L    Anion Gap 6 3 - 14 mmol/L    Glucose 226 (H) 70 - 99 mg/dL    Urea Nitrogen 21 7 - 30 mg/dL    Creatinine 0.81 0.52 - 1.04 mg/dL    GFR Estimate 69 >60 mL/min/[1.73_m2]    GFR Estimate If Black 80 >60 mL/min/[1.73_m2]    Calcium 8.0 (L) 8.5 - 10.1 mg/dL   Lactate Dehydrogenase    Collection Time: 12/11/20  6:37 AM   Result Value Ref Range    Lactate Dehydrogenase 210 81 - 234 U/L   CRP inflammation    Collection Time: 12/11/20  6:37 AM   Result Value Ref Range    CRP Inflammation 26.4 (H) 0.0 - 8.0 mg/L   CBC with platelets    Collection Time: 12/11/20  6:37 AM   Result Value Ref Range    WBC 5.9 4.0 - 11.0 10e9/L    RBC Count 3.93 3.8 - 5.2 10e12/L    Hemoglobin 11.2 (L) 11.7 - 15.7 g/dL    Hematocrit 34.8 (L) 35.0 - 47.0 %    MCV 89 78 - 100 fl    MCH 28.5 26.5 - 33.0 pg    MCHC 32.2 31.5 - 36.5 g/dL    RDW 15.1 (H) 10.0 - 15.0 %    Platelet Count 160 150 - 450 10e9/L   Magnesium    Collection Time:  12/11/20  6:37 AM   Result Value Ref Range    Magnesium 1.3 (L) 1.6 - 2.3 mg/dL   Phosphorus    Collection Time: 12/11/20  6:37 AM   Result Value Ref Range    Phosphorus 2.3 (L) 2.5 - 4.5 mg/dL   Glucose by meter    Collection Time: 12/11/20  7:42 AM   Result Value Ref Range    Glucose 218 (H) 70 - 99 mg/dL   Phosphorus    Collection Time: 12/11/20  9:53 AM   Result Value Ref Range    Phosphorus 2.4 (L) 2.5 - 4.5 mg/dL   Aldosterone    Collection Time: 12/11/20  9:54 AM   Result Value Ref Range    Aldosterone 5.2 0.0 - 31.0 ng/dL   Glucose by meter    Collection Time: 12/11/20 11:42 AM   Result Value Ref Range    Glucose 279 (H) 70 - 99 mg/dL   Glucose by meter    Collection Time: 12/11/20  4:39 PM   Result Value Ref Range    Glucose 369 (H) 70 - 99 mg/dL   Glucose by meter    Collection Time: 12/11/20  8:30 PM   Result Value Ref Range    Glucose 190 (H) 70 - 99 mg/dL   Glucose by meter    Collection Time: 12/12/20  2:01 AM   Result Value Ref Range    Glucose 158 (H) 70 - 99 mg/dL   Basic metabolic panel    Collection Time: 12/12/20  5:54 AM   Result Value Ref Range    Sodium 135 133 - 144 mmol/L    Potassium 3.4 3.4 - 5.3 mmol/L    Chloride 102 94 - 109 mmol/L    Carbon Dioxide 27 20 - 32 mmol/L    Anion Gap 6 3 - 14 mmol/L    Glucose 194 (H) 70 - 99 mg/dL    Urea Nitrogen 29 7 - 30 mg/dL    Creatinine 0.80 0.52 - 1.04 mg/dL    GFR Estimate 70 >60 mL/min/[1.73_m2]    GFR Estimate If Black 81 >60 mL/min/[1.73_m2]    Calcium 7.9 (L) 8.5 - 10.1 mg/dL   Lactate Dehydrogenase    Collection Time: 12/12/20  5:54 AM   Result Value Ref Range    Lactate Dehydrogenase 230 81 - 234 U/L   CRP inflammation    Collection Time: 12/12/20  5:54 AM   Result Value Ref Range    CRP Inflammation 40.1 (H) 0.0 - 8.0 mg/L   Platelet count    Collection Time: 12/12/20  5:54 AM   Result Value Ref Range    Platelet Count 171 150 - 450 10e9/L   Magnesium    Collection Time: 12/12/20  5:54 AM   Result Value Ref Range    Magnesium 1.7 1.6 - 2.3  mg/dL   Phosphorus    Collection Time: 12/12/20  5:54 AM   Result Value Ref Range    Phosphorus 2.6 2.5 - 4.5 mg/dL   Glucose by meter    Collection Time: 12/12/20  7:50 AM   Result Value Ref Range    Glucose 188 (H) 70 - 99 mg/dL   Glucose by meter    Collection Time: 12/12/20 10:41 PM   Result Value Ref Range    Glucose 266 (H) 70 - 99 mg/dL   CBC with platelets differential    Collection Time: 12/12/20 10:44 PM   Result Value Ref Range    WBC 8.6 4.0 - 11.0 10e9/L    RBC Count 3.71 (L) 3.8 - 5.2 10e12/L    Hemoglobin 10.6 (L) 11.7 - 15.7 g/dL    Hematocrit 32.2 (L) 35.0 - 47.0 %    MCV 87 78 - 100 fl    MCH 28.6 26.5 - 33.0 pg    MCHC 32.9 31.5 - 36.5 g/dL    RDW 15.1 (H) 10.0 - 15.0 %    Platelet Count 160 150 - 450 10e9/L    Diff Method Automated Method     % Neutrophils 87.3 %    % Lymphocytes 7.2 %    % Monocytes 5.0 %    % Eosinophils 0.0 %    % Basophils 0.0 %    % Immature Granulocytes 0.5 %    Nucleated RBCs 0 0 /100    Absolute Neutrophil 7.5 1.6 - 8.3 10e9/L    Absolute Lymphocytes 0.6 (L) 0.8 - 5.3 10e9/L    Absolute Monocytes 0.4 0.0 - 1.3 10e9/L    Absolute Basophils 0.0 0.0 - 0.2 10e9/L    Abs Immature Granulocytes 0.0 0 - 0.4 10e9/L    Absolute Nucleated RBC 0.0    D dimer quantitative    Collection Time: 12/12/20 10:44 PM   Result Value Ref Range    D Dimer 1.6 (H) 0.0 - 0.50 ug/ml FEU   INR    Collection Time: 12/12/20 10:44 PM   Result Value Ref Range    INR 1.17 (H) 0.86 - 1.14   Comprehensive metabolic panel    Collection Time: 12/12/20 10:44 PM   Result Value Ref Range    Sodium 133 133 - 144 mmol/L    Potassium 3.7 3.4 - 5.3 mmol/L    Chloride 97 94 - 109 mmol/L    Carbon Dioxide 27 20 - 32 mmol/L    Anion Gap 9 3 - 14 mmol/L    Glucose 273 (H) 70 - 99 mg/dL    Urea Nitrogen 34 (H) 7 - 30 mg/dL    Creatinine 1.11 (H) 0.52 - 1.04 mg/dL    GFR Estimate 47 (L) >60 mL/min/[1.73_m2]    GFR Estimate If Black 54 (L) >60 mL/min/[1.73_m2]    Calcium 8.1 (L) 8.5 - 10.1 mg/dL    Bilirubin Total 0.5 0.2  - 1.3 mg/dL    Albumin 2.3 (L) 3.4 - 5.0 g/dL    Protein Total 6.3 (L) 6.8 - 8.8 g/dL    Alkaline Phosphatase 94 40 - 150 U/L    ALT 25 0 - 50 U/L    AST 32 0 - 45 U/L   Lipase    Collection Time: 12/12/20 10:44 PM   Result Value Ref Range    Lipase 177 73 - 393 U/L   Troponin I    Collection Time: 12/12/20 10:44 PM   Result Value Ref Range    Troponin I ES 0.050 (H) 0.000 - 0.045 ug/L   CRP inflammation    Collection Time: 12/12/20 10:44 PM   Result Value Ref Range    CRP Inflammation 52.8 (H) 0.0 - 8.0 mg/L   Nt probnp inpatient (BNP)    Collection Time: 12/12/20 10:44 PM   Result Value Ref Range    N-Terminal Pro BNP Inpatient 5,933 (H) 0 - 1,800 pg/mL   Lactic acid whole blood    Collection Time: 12/12/20 11:03 PM   Result Value Ref Range    Lactic Acid 0.9 0.7 - 2.0 mmol/L   Blood gas venous    Collection Time: 12/12/20 11:03 PM   Result Value Ref Range    Ph Venous 7.42 7.32 - 7.43 pH    PCO2 Venous 42 40 - 50 mm Hg    PO2 Venous 47 25 - 47 mm Hg    Bicarbonate Venous 27 21 - 28 mmol/L    Base Excess Venous 2.5 mmol/L    FIO2 28% Fio2.   (2 LPM)       Recent Results (from the past 24 hour(s))   XR Chest Port 1 View    Narrative    EXAM: XR CHEST PORT 1 VW  LOCATION: [facility]  DATE/TIME: 12/12/2020 10:52 PM    INDICATION: Shortness of breath in a Covid positive patient.  COMPARISON: 01/11/2008      Impression    IMPRESSION: Patchy interstitial and alveolar opacities involving both lower lungs compatible with pneumonia, COVID 19 pneumonia given clinical history. Normal heart size and pulmonary vascularity. Atherosclerotic vascular calcification. Partially   visualized healing oblique fracture proximal left humerus. Degenerative changes in the spine and both shoulders.

## 2020-12-13 NOTE — PROGRESS NOTES
[facility]    Medicine Progress Note - Hospitalist Service       Date of Admission:  12/12/2020  Assessment & Plan       80-year-old woman with COVID-19 diagnosed by testing December 3 and recently hospitalized for gastrointestinal symptoms but no respiratory problems from COVID-19 who was discharged to TCU yesterday but subsequently was readmitted overnight due to pneumonia with acute hypoxic respiratory failure presumably from COVID-19.  She appears to be stabilizing.  She presented with mildly reduced renal function compared with previously with rapid resolution after IV fluid treatment consistent with prerenal azotemia.  She also has mildly elevated troponin new compared with recent hospitalization and is known to have CAD but does not have angina or ischemic EKG changes.  This is probably due to to hypoxia from COVID-19 superimposed upon CAD.  Her BNP was elevated and bilateral pleural effusions were evident radiographically, but she does not have history of heart failure and does not exhibit signs of volume overload otherwise.  Her recently diagnosed E. coli UTI appears to be continuing to improve as does her closed left proximal humerus fracture.  Blood pressure is much better controlled so far this hospital stay than her recent hospital stay after adjustments in her antihypertensive medications.  Glycemic control of diabetes has worsened after starting dexamethasone.    Principal Problem:    Pneumonia due to 2019 novel coronavirus  Active Problems:    Acute respiratory failure with hypoxia (H)    Bilateral pneumonia    Coronary artery disease involving native coronary artery of native heart without angina pectoris    Essential hypertension with goal blood pressure less than 140/90    Type 2 diabetes mellitus with diabetic polyneuropathy, without long-term current use of insulin (H)    Prerenal azotemia    Bilateral pleural effusion    Hyperlipidemia LDL goal <100    E. coli  "UTI    Closed fracture of proximal end of left humerus with routine healing    Discontinue IV fluids  Reconsider adding a loop diuretic if there are concerns that volume overload is contributing to symptoms or signs for example worsening hypoxia  Continuing dexamethasone and remdesivir for treatment of COVID-19  Check sputum Gram stain and culture when able  No antibiotic therapy at this time, monitor clinically but would consider obtaining appropriate cultures with procalcitonin and potentially starting empiric antibiotics if she develops new fever  Resume chronic glipizide 20 mg twice daily  Escalate NovoLog insulin dose to the high correction scale before meals and at bedtime  Does not appear to have clear indication to recheck urine culture, continue cefdinir to complete previous treatment course for E. coli UTI       Diet: Combination Diet 7323-2007 Calories: Moderate Consistent CHO (4-6 CHO units/meal); Low Saturated Fat Na <2400mg Diet    DVT Prophylaxis: Eliquis  Uriarte Catheter: not present  Code Status: No CPR- Do NOT Intubate           Disposition Plan   Expected discharge: 2 to 5 days, recommended to transitional care unit once Medically stable.  Entered: Deon Pierre MD 12/13/2020, 3:09 PM       The patient's care was discussed with the Patient.    Deon Pierre MD  Hospitalist Service  [facility]  Contact information available via [facility] Paging/Directory    ______________________________________________________________________    Interval History   She says she is starting to feel better today.  She denies shortness of breath while wearing oxygen supplementation.  She denies any chest pain.  She reports that at the previous time of heart problems with CAD she had what she describes \"heartburn\" but does not currently have any of those type of symptoms.  She has a cough that is productive of clear phlegm.  She feels tired.  She denies any nausea and has been able to " eat some food today.  She has been afebrile and hemodynamically stable today.  Oxygenation appears to have improved over the course of the day.  Urine output has been adequate.    Data reviewed today: I reviewed all medications, new labs and imaging results over the last 24 hours. I personally reviewed no images or EKG's today.    Physical Exam   Vital Signs: Temp: 97.7  F (36.5  C) Temp src: Axillary BP: 135/68 Pulse: 75   Resp: 20 SpO2: 98 % O2 Device: Nasal cannula Oxygen Delivery: 2 LPM  Weight: 131 lbs 9.83 oz  General Appearance: Appears tired but no signs of acute distress while sitting in a chair, easily speaks full sentences  Respiratory: Normal respiratory effort, diminished breath sounds at the bases bilaterally, inspiratory crackles present more on the right than the left   Cardiovascular: regular rate and rhythm, palpable radial pulse, normal capillary refill, no significant peripheral edema  Skin: Pale, no rash  Other: Somnolent but opens eyes to voice and maintains wakefulness and attention, no confusion evident    Data   Recent Labs   Lab 12/13/20  0603 12/13/20  0300 12/12/20  2244 12/12/20  0554 12/11/20  0637 12/10/20  0633 12/09/20  1243 12/08/20  1929 12/08/20 1929   WBC 6.7  --  8.6  --  5.9 4.2  --    < > 5.4   HGB 10.1*  --  10.6*  --  11.2* 9.9*  --    < > 12.3   MCV 88  --  87  --  89 91  --    < > 90   *  --  160 171 160 149*  --    < > 164   INR 1.20*  --  1.17*  --   --  1.11  --    < >  --      --  133 135 136 140  --    < > 138   POTASSIUM 3.8  --  3.7 3.4 3.6 3.9  --    < > 3.9   CHLORIDE 105  --  97 102 103 109  --    < > 103   CO2 25  --  27 27 27 26  --    < > 25   BUN 31*  --  34* 29 21 25  --    < > 38*   CR 0.88  --  1.11* 0.80 0.81 0.85  --    < > 1.12*   ANIONGAP 7  --  9 6 6 5  --    < > 10   ANDREI 8.0*  --  8.1* 7.9* 8.0* 8.1*  --    < > 8.8   *  --  273* 194* 226* 164*  --    < > 289*   ALBUMIN  --   --  2.3*  --   --   --   --   --  3.3*   PROTTOTAL  --    --  6.3*  --   --   --   --   --  7.9   BILITOTAL  --   --  0.5  --   --   --   --   --  0.7   ALKPHOS  --   --  94  --   --   --   --   --  121   ALT  --   --  25  --   --   --   --   --  19   AST  --   --  32  --   --   --   --   --  24   LIPASE  --   --  177  --   --   --   --   --   --    TROPI  --  0.051* 0.050*  --   --   --  0.041   < > 0.029    < > = values in this interval not displayed.     Blood sugars have ranged from 201-360  CRP 54.7 and was 52.8 yesterday      Recent Results (from the past 24 hour(s))   XR Chest Port 1 View    Narrative    EXAM: XR CHEST PORT 1 VW  LOCATION: [facility]  DATE/TIME: 12/12/2020 10:52 PM    INDICATION: Shortness of breath in a Covid positive patient.  COMPARISON: 01/11/2008      Impression    IMPRESSION: Patchy interstitial and alveolar opacities involving both lower lungs compatible with pneumonia, COVID 19 pneumonia given clinical history. Normal heart size and pulmonary vascularity. Atherosclerotic vascular calcification. Partially   visualized healing oblique fracture proximal left humerus. Degenerative changes in the spine and both shoulders.   CT Chest Pulmonary Embolism w Contrast    Narrative    EXAM: CT CHEST PULMONARY EMBOLISM W CONTRAST  LOCATION: [facility]  DATE/TIME: 12/12/2020 12:12 AM    INDICATION: Shortness of breath.  COMPARISON: 06/12/2011.  TECHNIQUE: CT chest pulmonary angiogram during arterial phase injection of IV contrast. Multiplanar reformats and MIP reconstructions were performed. Dose reduction techniques were used.   CONTRAST: Isovue 370, 65 mL.    FINDINGS:  ANGIOGRAM CHEST: Pulmonary arteries are normal caliber and negative for pulmonary emboli. Thoracic aorta is negative for dissection. No CT evidence of right heart strain.    LUNGS AND PLEURA: There are patchy regions of faint infiltrate throughout both lungs with small pleural effusions. Regions of mucous plugging in the lung  bases.    MEDIASTINUM/AXILLAE: Mild subcarinal and hilar lymph node enlargement.    UPPER ABDOMEN: 2.8 cm well-defined right adrenal nodule increased in size previously measuring 2.3 cm. This should be benign given the relative stability. 3 mm nonobstructing stone in the right kidney. Liver appearance likely cirrhotic. Cholecystectomy.    MUSCULOSKELETAL: Normal.      Impression    IMPRESSION:  1.  No pulmonary embolism.    2.  Patchy regions of infiltrate throughout the lungs with small pleural effusions. Regions of mucous plugging in the lung bases. Mild subcarinal lymphadenopathy likely reactive.    3.  Liver appearance suggests cirrhosis. The spleen is borderline enlarged.    4.  2.8 cm well-defined right adrenal nodule has increased in size since the prior exam 06/12/2011 although given the slow rate of change it should be benign.    5.  Nonobstructing stone in the right kidney.      Medications     - MEDICATION INSTRUCTIONS -       - MEDICATION INSTRUCTIONS -         amLODIPine  10 mg Oral Daily     apixaban ANTICOAGULANT  2.5 mg Oral BID     aspirin  81 mg Oral Daily     atorvastatin  80 mg Oral Daily     cefdinir  600 mg Oral QPM     cloNIDine   Transdermal Q8H     cloNIDine  2 patch Transdermal Weekly     clopidogrel  75 mg Oral Daily     dexamethasone  6 mg Oral Daily     gabapentin  300 mg Oral At Bedtime     glipiZIDE  20 mg Oral BID AC     insulin aspart  1-10 Units Subcutaneous TID AC     insulin aspart  1-7 Units Subcutaneous At Bedtime     isosorbide mononitrate  30 mg Oral Daily     lisinopril  40 mg Oral At Bedtime     metoprolol succinate ER  150 mg Oral Daily     pantoprazole  40 mg Oral QAM AC     PARoxetine  40 mg Oral QAM     [START ON 12/14/2020] remdesivir  100 mg Intravenous Q24H     sodium chloride (PF)  3 mL Intracatheter Q8H     vitamin C  1,000 mg Oral BID     cholecalciferol  25 mcg Oral Daily     zinc sulfate  220 mg Oral Daily

## 2020-12-13 NOTE — TELEPHONE ENCOUNTER
Patient admitted today from [facility]. She had tested positive for COVID on 12/3. While at the hospital she was minimally symptomatic. She did not receive covid treatment.     Now her SaO2 77% on room, she is lethargic, BG 300s.    A/P  Patient now experiencing more severe symptoms and could likely benefit from treatment. Will send back to hospital

## 2020-12-13 NOTE — ED PROVIDER NOTES
History     Chief Complaint   Patient presents with     Fatigue     HPI  Eliza Dubois is a 80 year old female who presented to the emergency room via ambulance from her nursing home in [place], [place] secondary to concerns of increasingly severe weakness, elevated blood glucose levels, loss of appetite, nausea, shortness of breath, and hypoxia.  EMS reported her oxygen saturations in the mid 80s but improved into the 90s with oxygen therapy.  Also reported that her glucose levels were elevated beyond typical.  Glucose level reported to be in 300 range at the nursing home.  Patient admits to feeling increasingly weak.  Patient was discharged from this hospital yesterday after a 3-day stay for symptoms associated with UTI and COVID-19 infection.  Patient has a history for diabetes, hypertension, coronary artery disease, and chronic kidney disease.  She also has a sling on her left arm secondary to a recent fall and fracture to her left upper arm.  This occurred on 11/20/2020.  She denied specific chest pain.  She denied abdominal pain.  She denied any swelling in her extremities more than typical.    I reviewed her discharge summary from her recent hospitalization and copied an excerpt from that summary below:  Discharge Disposition      Discharged to TCU  Condition at discharge: Stable     Hospital Course     80-year-old woman with diabetes, hypertension, CAD, and CKD as well as recent left proximal humerus fracture in November 2020 tested positive for COVID-19 on December 3 and presented to the ER because of 1 day history of nausea, vomiting, diarrhea, and worsening weakness.  Due to concerns for worsening COVID-19 infection and UTI, she was admitted.     Problem #1 COVID-19.  She had no respiratory symptoms or signs associated with COVID-19.  Chest x-ray was negative for infiltrates.  She maintained normal oxygen saturations.  She did not receive any specific COVID-19 treatment such as dexamethasone or  remdesivir during this hospitalization.  She was started on anticoagulation and advised to complete a 30-day treatment course of anticoagulation prophylactically after discharge because of COVID-19.  She did have nausea, vomiting, and diarrhea all of which subsided during hospitalization.  She initially required treatment with IV fluids, but by discharge was maintaining adequate oral intake to maintain hydration.  She also had associated electrolyte disturbances because ofinadequate oral intake and GI losses including hypomagnesemia and hypophosphatemia which were treated with supplementation during hospitalization and improved.       Problem #2 E. coli UTI.  She presented with new onset urinary incontinence and urinary frequency.  Urinalysis was abnormal with pyuria and urine culture grew E. coli.  She was treated with with ceftriaxone during hospitalization and advised to complete a treatment course of UTI with cefdinir after discharge.     Problem #3 severe hypertension.  Systolic blood pressure was severely elevated fairly consistently during hospitalization even after continuing her normal home medications except for hydrochlorothiazide which was held because of vomiting and diarrhea.  She did not have overt symptoms associate with severely elevated blood pressure readings.  Antihypertensive medication treatment was adjusted during hospitalization.  Chronic doses of lisinopril and Toprol-XL were increased.  Amlodipine and clonidine patch were added.  After these adjustments, blood pressures did improve overall although she continued to have persistent hypertension.  She was mildly hypokalemic as well as hypertensive requiring multiple antihypertensive medications raising question of possible underlying hyperaldosteronism.  Serum aldosterone level was normal.  Plasma renin activity results were pending at the time of discharge.     Problem #4 proximal left humerus fracture.  Orthopedics recommended  nonweightbearing of the left upper extremity.  She was evaluated and treated by PT and OT during hospitalization.  Pain was controlled with oral analgesics.  Therapy services advised discharge to TCU for rehabilitation with which the patient was agreeable.      Allergies:  Allergies   Allergen Reactions     Sulfa Drugs Hives       Problem List:    Patient Active Problem List    Diagnosis Date Noted     Pneumonia due to 2019 novel coronavirus 12/12/2020     Priority: Medium     Acute kidney failure (H) 12/12/2020     Priority: Medium     Acute respiratory failure with hypoxia (H) 12/12/2020     Priority: Medium     Hypophosphatemia 12/11/2020     Priority: Medium     Closed fracture of proximal end of left humerus with routine healing 12/09/2020     Priority: Medium     Nausea vomiting and diarrhea 12/09/2020     Priority: Medium     Hypomagnesemia 12/09/2020     Priority: Medium     Generalized muscle weakness 12/08/2020     Priority: Medium     E. coli UTI 12/08/2020     Priority: Medium     2019 novel coronavirus disease (COVID-19) 12/08/2020     Priority: Medium     CKD (chronic kidney disease) stage 3, GFR 30-59 ml/min 05/27/2019     Priority: Medium     Type 2 diabetes mellitus with diabetic polyneuropathy, without long-term current use of insulin (H) 10/11/2016     Priority: Medium     Essential hypertension with goal blood pressure less than 140/90 09/12/2016     Priority: Medium     Gastroesophageal reflux disease without esophagitis 05/13/2016     Priority: Medium     Coronary artery disease involving native coronary artery of native heart without angina pectoris 02/25/2016     Priority: Medium     Major depressive disorder, recurrent episode, moderate (H) 11/10/2015     Priority: Medium     Anxiety 05/08/2012     Priority: Medium     Advanced directives, counseling/discussion 05/03/2012     Priority: Medium     Doing at home through her [employer], will bring copy when complete       Insomnia 05/03/2012      Priority: Medium     Hypertension goal BP (blood pressure) < 140/90 01/20/2011     Priority: Medium     Hyperlipidemia LDL goal <100 10/31/2010     Priority: Medium     Esophageal reflux 08/30/2007     Priority: Medium     Irritable bowel syndrome 09/25/2006     Priority: Medium        Past Medical History:    Past Medical History:   Diagnosis Date     Diabetic eye exam (H) 05/01/14     Heart attack (H)      Pure hypercholesterolemia      Stented coronary artery      Type II or unspecified type diabetes mellitus without mention of complication, not stated as uncontrolled 2002     Unspecified disease of pericardium 12/05/08     Unspecified essential hypertension        Past Surgical History:    Past Surgical History:   Procedure Laterality Date     ABDOMEN SURGERY       COLONOSCOPY  04/15/09     COLONOSCOPY  6/18/2014    Procedure: COMBINED COLONOSCOPY, SINGLE BIOPSY/POLYPECTOMY BY BIOPSY;  Surgeon: Earle Mon MD;  Location:  GI      LAPAROSCOPY, SURGICAL; CHOLECYSTECTOMY  1997    Cholecystectomy, Laparoscopic     PHACOEMULSIFICATION CLEAR CORNEA WITH STANDARD INTRAOCULAR LENS IMPLANT Right 3/16/2016    Procedure: PHACOEMULSIFICATION CLEAR CORNEA WITH STANDARD INTRAOCULAR LENS IMPLANT;  Surgeon: George Lemus MD;  Location: [facility]     PHACOEMULSIFICATION CLEAR CORNEA WITH STANDARD INTRAOCULAR LENS IMPLANT Left 3/30/2016    Procedure: PHACOEMULSIFICATION CLEAR CORNEA WITH STANDARD INTRAOCULAR LENS IMPLANT;  Surgeon: George Lemus MD;  Location: [facility]     ZZC NONSPECIFIC PROCEDURE  1988    Hysterectomy       Family History:    Family History   Problem Relation Age of Onset     Diabetes Mother      Arthritis Mother      Heart Disease Mother      Hypertension Mother      Lipids Mother      Neurologic Disorder Mother         neuropathy     Osteoporosis Mother      Obesity Mother      EYE* Mother         blind     Diabetes Father      Genitourinary Problems Father         gall stones     Heart Disease  Father         MI     Cancer Maternal Grandmother      Heart Disease Maternal Grandmother      Cancer Other         Grandparents     Alcohol/Drug No family hx of      Alzheimer Disease No family hx of      Anesthesia Reaction No family hx of      Blood Disease No family hx of      Depression No family hx of      Genetic Disorder No family hx of      Gastrointestinal Disease No family hx of      Gynecology No family hx of      Psychotic Disorder No family hx of      Respiratory No family hx of      Cerebrovascular Disease No family hx of        Social History:  Marital Status:   [5]  Social History     Tobacco Use     Smoking status: Never Smoker     Smokeless tobacco: Never Used   Substance Use Topics     Alcohol use: No     Alcohol/week: 0.0 standard drinks     Drug use: No        Medications:         acetaminophen (TYLENOL) 500 MG tablet       alcohol swab prep pads       amLODIPine (NORVASC) 10 MG tablet       apixaban ANTICOAGULANT (ELIQUIS) 2.5 MG tablet       aspirin (ASA) 81 MG EC tablet       atorvastatin (LIPITOR) 80 MG tablet       blood glucose (NO BRAND SPECIFIED) test strip       blood glucose calibration (NO BRAND SPECIFIED) solution       blood glucose monitoring (NO BRAND SPECIFIED) meter device kit       cefdinir (OMNICEF) 300 MG capsule       [START ON 12/18/2020] cloNIDine (CATAPRES-TTS1) 0.1 MG/24HR WK patch       clopidogrel (PLAVIX) 75 MG tablet       empagliflozin (JARDIANCE) 25 MG TABS tablet       gabapentin (NEURONTIN) 300 MG capsule       glipiZIDE (GLUCOTROL) 10 MG tablet       isosorbide mononitrate (IMDUR) 30 MG 24 hr tablet       lisinopril (ZESTRIL) 40 MG tablet       MELATONIN PO       metFORMIN (GLUCOPHAGE-XR) 500 MG 24 hr tablet       metoprolol succinate ER (TOPROL-XL) 100 MG 24 hr tablet       pantoprazole (PROTONIX) 40 MG EC tablet       PARoxetine (PAXIL) 40 MG tablet       thin (NO BRAND SPECIFIED) lancets      Review of Systems   Constitutional: Positive for activity  change, appetite change, chills and fatigue.   HENT: Negative.    Eyes: Negative.    Respiratory: Positive for chest tightness and shortness of breath.    Cardiovascular: Negative for chest pain, palpitations and leg swelling.   Gastrointestinal: Positive for nausea. Negative for abdominal pain.   Endocrine: Negative.    Genitourinary: Positive for decreased urine volume. Negative for dysuria.   Musculoskeletal: Positive for arthralgias (Left upper arm pain with movement. Wearing sling x 3 weeks. Hx of fracture after a fall.).   Skin: Negative for rash.   Allergic/Immunologic: Negative.    Neurological: Negative.    Hematological: Does not bruise/bleed easily.   Psychiatric/Behavioral: Negative.    All other systems reviewed and are negative.      Physical Exam   BP: (!) 156/71  Pulse: 91  Temp: 98.5  F (36.9  C)  Resp: 20  SpO2: (!) 88 %      Physical Exam  Vitals signs and nursing note reviewed.   Constitutional:       General: She is in acute distress.      Appearance: She is normal weight. She is ill-appearing. She is not diaphoretic.   HENT:      Head: Normocephalic and atraumatic.   Eyes:      General: No scleral icterus.     Conjunctiva/sclera: Conjunctivae normal.      Pupils: Pupils are equal, round, and reactive to light.   Neck:      Musculoskeletal: Normal range of motion and neck supple.   Cardiovascular:      Rate and Rhythm: Normal rate. Occasional extrasystoles are present.     Pulses: Normal pulses.   Pulmonary:      Effort: Respiratory distress (Tachypnea) present.      Breath sounds: No stridor. Rhonchi present. No wheezing or rales.   Abdominal:      General: Bowel sounds are increased. There is no distension.      Palpations: Abdomen is soft.      Tenderness: There is no abdominal tenderness. There is no guarding. Negative signs include Garcia's sign.   Musculoskeletal:      Right lower leg: No edema.      Left lower leg: No edema.      Comments: Left arm sling.   Skin:     Capillary Refill:  Capillary refill takes less than 2 seconds.   Neurological:      Mental Status: She is alert and oriented to person, place, and time.   Psychiatric:         Mood and Affect: Mood normal.         Behavior: Behavior normal.         ED Course        Procedures               EKG Interpretation:      Interpreted by Anuj Katz DO  Time reviewed: 22:35  Symptoms at time of EKG: weakness   Rhythm: normal sinus   Rate: Normal  Axis: Normal  Ectopy: premature ventricular contractions (unifocal)  Conduction: poor R-wave progression  ST Segments/ T Waves: No acute ischemic changes  Q Waves: none  Comparison to prior: Unchanged    Clinical Impression: no acute changes    Critical Care time:  none            Results for orders placed or performed during the hospital encounter of 12/12/20 (from the past 24 hour(s))   Glucose by meter   Result Value Ref Range    Glucose 266 (H) 70 - 99 mg/dL   CBC with platelets differential   Result Value Ref Range    WBC 8.6 4.0 - 11.0 10e9/L    RBC Count 3.71 (L) 3.8 - 5.2 10e12/L    Hemoglobin 10.6 (L) 11.7 - 15.7 g/dL    Hematocrit 32.2 (L) 35.0 - 47.0 %    MCV 87 78 - 100 fl    MCH 28.6 26.5 - 33.0 pg    MCHC 32.9 31.5 - 36.5 g/dL    RDW 15.1 (H) 10.0 - 15.0 %    Platelet Count 160 150 - 450 10e9/L    Diff Method Automated Method     % Neutrophils 87.3 %    % Lymphocytes 7.2 %    % Monocytes 5.0 %    % Eosinophils 0.0 %    % Basophils 0.0 %    % Immature Granulocytes 0.5 %    Nucleated RBCs 0 0 /100    Absolute Neutrophil 7.5 1.6 - 8.3 10e9/L    Absolute Lymphocytes 0.6 (L) 0.8 - 5.3 10e9/L    Absolute Monocytes 0.4 0.0 - 1.3 10e9/L    Absolute Basophils 0.0 0.0 - 0.2 10e9/L    Abs Immature Granulocytes 0.0 0 - 0.4 10e9/L    Absolute Nucleated RBC 0.0    D dimer quantitative   Result Value Ref Range    D Dimer 1.6 (H) 0.0 - 0.50 ug/ml FEU   INR   Result Value Ref Range    INR 1.17 (H) 0.86 - 1.14   Comprehensive metabolic panel   Result Value Ref Range    Sodium 133 133 - 144  mmol/L    Potassium 3.7 3.4 - 5.3 mmol/L    Chloride 97 94 - 109 mmol/L    Carbon Dioxide 27 20 - 32 mmol/L    Anion Gap 9 3 - 14 mmol/L    Glucose 273 (H) 70 - 99 mg/dL    Urea Nitrogen 34 (H) 7 - 30 mg/dL    Creatinine 1.11 (H) 0.52 - 1.04 mg/dL    GFR Estimate 47 (L) >60 mL/min/[1.73_m2]    GFR Estimate If Black 54 (L) >60 mL/min/[1.73_m2]    Calcium 8.1 (L) 8.5 - 10.1 mg/dL    Bilirubin Total 0.5 0.2 - 1.3 mg/dL    Albumin 2.3 (L) 3.4 - 5.0 g/dL    Protein Total 6.3 (L) 6.8 - 8.8 g/dL    Alkaline Phosphatase 94 40 - 150 U/L    ALT 25 0 - 50 U/L    AST 32 0 - 45 U/L   Lipase   Result Value Ref Range    Lipase 177 73 - 393 U/L   Troponin I   Result Value Ref Range    Troponin I ES 0.050 (H) 0.000 - 0.045 ug/L   CRP inflammation   Result Value Ref Range    CRP Inflammation 52.8 (H) 0.0 - 8.0 mg/L   Nt probnp inpatient (BNP)   Result Value Ref Range    N-Terminal Pro BNP Inpatient 5,933 (H) 0 - 1,800 pg/mL   Lactic acid whole blood   Result Value Ref Range    Lactic Acid 0.9 0.7 - 2.0 mmol/L   Blood gas venous   Result Value Ref Range    Ph Venous 7.42 7.32 - 7.43 pH    PCO2 Venous 42 40 - 50 mm Hg    PO2 Venous 47 25 - 47 mm Hg    Bicarbonate Venous 27 21 - 28 mmol/L    Base Excess Venous 2.5 mmol/L    FIO2 28% Fio2.   (2 LPM)    XR Chest Port 1 View    Narrative    EXAM: XR CHEST PORT 1 VW  LOCATION: [facility]  DATE/TIME: 12/12/2020 10:52 PM    INDICATION: Shortness of breath in a Covid positive patient.  COMPARISON: 01/11/2008      Impression    IMPRESSION: Patchy interstitial and alveolar opacities involving both lower lungs compatible with pneumonia, COVID 19 pneumonia given clinical history. Normal heart size and pulmonary vascularity. Atherosclerotic vascular calcification. Partially   visualized healing oblique fracture proximal left humerus. Degenerative changes in the spine and both shoulders.       Medications   0.9% sodium chloride BOLUS (0 mLs Intravenous Stopped 12/12/20 7006)      Followed by   sodium chloride 0.9% infusion ( Intravenous New Bag 12/12/20 9542)   ondansetron (ZOFRAN) injection 4 mg (has no administration in time range)   dexamethasone PF (DECADRON) injection 6 mg (has no administration in time range)   dexamethasone (DECADRON) tablet 6 mg (has no administration in time range)   sodium chloride (PF) 0.9% PF flush 100 mL (70 mLs Intracatheter Given 12/12/20 2352)   sodium chloride (PF) 0.9% PF flush 3 mL (3 mLs Intravenous Given 12/12/20 2351)   iopamidol (ISOVUE-370) solution 500 mL (65 mLs Intravenous Given 12/12/20 2351)       Assessments & Plan (with Medical Decision Making)  80-year-old female to the ER via ambulance secondary to concerns of increasing weakness, elevated glucose levels, decreased appetite, shortness of breath, and hypoxia. Reported patient's oxygen saturations in the mid 80s on room air. Patient was just discharged from this hospital yesterday after stay in the hospital secondary to UTI and also COVID-19 infection. She continues on oral antibiotics for her UTI. She has been unable to give us a urine sample to this point stating that she really has not been able to tolerate much for oral fluids or oral intake of food the last several days. Patient's exam suggesting need for oxygen therapy to keep her oxygen saturations normalized. Patient also with chest x-ray findings consistent with COVID-19 pneumonia bilaterally. Patient also with significant generalized weakness but no focal weakness identified. Patient needing oxygen therapy to support her oxygenation. Patient with a elevated screening CRP, D-dimer, troponin, and BNP likely result of the inflammatory process associated with COVID-19 infection. Decision was to readmit the patient to the hospital given the multiple comorbid conditions she has along with the finding of the Covid pneumonia needing oxygen support. Patient initiated on dexamethasone. Spoke with the hospitalist, Dr Cleaning, who also wanted a  CTA of the lungs performed prior to transfer to the hospital floor. He otherwise except the patient to his hospitalist service. He also requested a procalcitonin level be added to her current blood work. Patient is in agreement with the plan.     I have reviewed the nursing notes.    I have reviewed the findings, diagnosis, plan and need for hospitalization with the patient.       Final diagnoses:   Pneumonia due to 2019 novel coronavirus   Generalized weakness   Acute respiratory failure with hypoxia (H)   Stage 3a chronic kidney disease   Essential hypertension with goal blood pressure less than 140/90   Type 2 diabetes mellitus with diabetic polyneuropathy, without long-term current use of insulin (H)   Nausea   Dehydration   Elevated brain natriuretic peptide (BNP) level   Elevated d-dimer   Elevated troponin I level   Elevated C-reactive protein (CRP)   Hyperglycemia       12/12/2020   [facility] EMERGENCY DEPT     Anuj Katz,   12/13/20 0028

## 2020-12-13 NOTE — CONSULTS
Care Management Initial Consult    General Information  Assessment completed with: Family, (Neyda)  Type of CM/SW Visit: Initial Assessment  Primary Care Provider verified and updated as needed:     Readmission within the last 30 days:        Reason for Consult: discharge planning  Advance Care Planning:          Communication Assessment  Patient's communication style: spoken language (English or Bilingual)    Hearing Difficulty or Deaf: no   Wear Glasses or Blind: no    Cognitive  Cognitive/Neuro/Behavioral: WDL                      Living Environment:   People in home: facility resident     Current living Arrangements: extended care facility  Name of Facility: [facility]   Able to return to prior arrangements:       Family/Social Support:  Care provided by:    Provides care for:    Marital Status:   Children          Description of Support System: Supportive;Involved       Current Resources:   Skilled Home Care Services:    Community Resources:    Equipment currently used at home: cane, straight;raised toilet seat  Supplies currently used at home:      Employment/Financial:  Employment Status:          Financial Concerns:           Lifestyle & Psychosocial Needs:        Socioeconomic History     Marital status:      Spouse name: Not on file     Number of children: Not on file     Years of education: Not on file     Highest education level: Not on file     Tobacco Use     Smoking status: Never Smoker     Smokeless tobacco: Never Used   Substance and Sexual Activity     Alcohol use: No     Alcohol/week: 0.0 standard drinks     Drug use: No     Sexual activity: Yes     Partners: Male     Birth control/protection: Surgical       Functional Status:  Prior to admission patient needed assistance:          Mental Health Status:          Chemical Dependency Status:              Values/Beliefs:  Spiritual, Cultural Beliefs, Judaism Practices, Values that affect care: no               Additional  Information:  Patient is a re-admission.  Patient discharged yesterday to [facility] (Admissions: [phone] Main Phone: [phone] Fax: [phone]) TCU.  Patient is COVID +.  Patient is not answering her phone in her room at this time.  Writer spoke with patient's daughter, Neyda.  Neyda states she DID hold the bed at [facility] (Admissions: [phone] Main Phone: [phone] Fax: [phone]).  Neyda is requesting to transport patient back to [facility] when medically ready.    DELORES [place]  [facility] [phone]/ [place] [facility] [phone]  Care Management

## 2020-12-13 NOTE — PLAN OF CARE
"/68 (BP Location: Right arm)   Pulse 75   Temp 97.7  F (36.5  C) (Axillary)   Resp 20   Ht 1.626 m (5' 4\")   Wt 59.7 kg (131 lb 9.8 oz)   SpO2 98%   BMI 22.59 kg/m      Problem: Adult Inpatient Plan of Care  Goal: Absence of Hospital-Acquired Illness or Injury  Intervention: Identify and Manage Fall Risk  Recent Flowsheet Documentation  Taken 12/13/2020 3606 by Rosalinda Merlos, RN  Safety Promotion/Fall Prevention:   activity supervised   clutter free environment maintained   fall prevention program maintained   lighting adjusted   nonskid shoes/slippers when out of bed   Patient up in recliner x2 for meals today. Patient pleasant and cooperative, alert and oriented x4. Patient very sleepy today, denies pain or discomfort, on cont. Pulse ox, using 02 via NC at 3L/min. Infrequent cough noted, lung sounds diminished.  "

## 2020-12-14 ENCOUNTER — APPOINTMENT (OUTPATIENT)
Dept: PHYSICAL THERAPY | Facility: CLINIC | Age: 80
DRG: 177 | End: 2020-12-14
Payer: MEDICARE

## 2020-12-14 ENCOUNTER — PATIENT OUTREACH (OUTPATIENT)
Dept: CARE COORDINATION | Facility: CLINIC | Age: 80
End: 2020-12-14

## 2020-12-14 ENCOUNTER — APPOINTMENT (OUTPATIENT)
Dept: OCCUPATIONAL THERAPY | Facility: CLINIC | Age: 80
DRG: 177 | End: 2020-12-14
Attending: HOSPITALIST
Payer: MEDICARE

## 2020-12-14 LAB
ANION GAP SERPL CALCULATED.3IONS-SCNC: 7 MMOL/L (ref 3–14)
BASOPHILS # BLD AUTO: 0 10E9/L (ref 0–0.2)
BASOPHILS NFR BLD AUTO: 0 %
BUN SERPL-MCNC: 39 MG/DL (ref 7–30)
CALCIUM SERPL-MCNC: 8.3 MG/DL (ref 8.5–10.1)
CHLORIDE SERPL-SCNC: 106 MMOL/L (ref 94–109)
CO2 SERPL-SCNC: 25 MMOL/L (ref 20–32)
CREAT SERPL-MCNC: 1 MG/DL (ref 0.52–1.04)
CRP SERPL-MCNC: 31.1 MG/L (ref 0–8)
D DIMER PPP FEU-MCNC: 1.5 UG/ML FEU (ref 0–0.5)
DIFFERENTIAL METHOD BLD: ABNORMAL
EOSINOPHIL NFR BLD AUTO: 0 %
ERYTHROCYTE [DISTWIDTH] IN BLOOD BY AUTOMATED COUNT: 15 % (ref 10–15)
FIBRINOGEN PPP-MCNC: 562 MG/DL (ref 200–420)
GFR SERPL CREATININE-BSD FRML MDRD: 53 ML/MIN/{1.73_M2}
GLUCOSE BLDC GLUCOMTR-MCNC: 172 MG/DL (ref 70–99)
GLUCOSE BLDC GLUCOMTR-MCNC: 201 MG/DL (ref 70–99)
GLUCOSE BLDC GLUCOMTR-MCNC: 332 MG/DL (ref 70–99)
GLUCOSE BLDC GLUCOMTR-MCNC: 378 MG/DL (ref 70–99)
GLUCOSE BLDC GLUCOMTR-MCNC: 425 MG/DL (ref 70–99)
GLUCOSE SERPL-MCNC: 200 MG/DL (ref 70–99)
GRAM STN SPEC: ABNORMAL
HCT VFR BLD AUTO: 31.5 % (ref 35–47)
HGB BLD-MCNC: 10.2 G/DL (ref 11.7–15.7)
IMM GRANULOCYTES # BLD: 0 10E9/L (ref 0–0.4)
IMM GRANULOCYTES NFR BLD: 0.5 %
INR PPP: 1.33 (ref 0.86–1.14)
LDH SERPL L TO P-CCNC: 213 U/L (ref 81–234)
LYMPHOCYTES # BLD AUTO: 1 10E9/L (ref 0.8–5.3)
LYMPHOCYTES NFR BLD AUTO: 12.5 %
MCH RBC QN AUTO: 28.3 PG (ref 26.5–33)
MCHC RBC AUTO-ENTMCNC: 32.4 G/DL (ref 31.5–36.5)
MCV RBC AUTO: 88 FL (ref 78–100)
MONOCYTES # BLD AUTO: 0.7 10E9/L (ref 0–1.3)
MONOCYTES NFR BLD AUTO: 8.6 %
NEUTROPHILS # BLD AUTO: 6 10E9/L (ref 1.6–8.3)
NEUTROPHILS NFR BLD AUTO: 78.4 %
NRBC # BLD AUTO: 0 10*3/UL
NRBC BLD AUTO-RTO: 0 /100
PLATELET # BLD AUTO: 199 10E9/L (ref 150–450)
PLATELET # BLD EST: ABNORMAL 10*3/UL
POTASSIUM SERPL-SCNC: 4 MMOL/L (ref 3.4–5.3)
RBC # BLD AUTO: 3.6 10E12/L (ref 3.8–5.2)
RBC MORPH BLD: NORMAL
RETICS # AUTO: 58 10E9/L (ref 25–95)
RETICS/RBC NFR AUTO: 1.6 % (ref 0.5–2)
SODIUM SERPL-SCNC: 138 MMOL/L (ref 133–144)
SPECIMEN SOURCE: ABNORMAL
TROPONIN I SERPL-MCNC: 0.03 UG/L (ref 0–0.04)
WBC # BLD AUTO: 7.7 10E9/L (ref 4–11)

## 2020-12-14 PROCEDURE — 85610 PROTHROMBIN TIME: CPT | Performed by: HOSPITALIST

## 2020-12-14 PROCEDURE — 999N001017 HC STATISTIC GLUCOSE BY METER IP

## 2020-12-14 PROCEDURE — 250N000013 HC RX MED GY IP 250 OP 250 PS 637: Performed by: HOSPITALIST

## 2020-12-14 PROCEDURE — 84484 ASSAY OF TROPONIN QUANT: CPT | Performed by: HOSPITALIST

## 2020-12-14 PROCEDURE — 250N000009 HC RX 250: Performed by: HOSPITALIST

## 2020-12-14 PROCEDURE — 85045 AUTOMATED RETICULOCYTE COUNT: CPT | Performed by: HOSPITALIST

## 2020-12-14 PROCEDURE — 86140 C-REACTIVE PROTEIN: CPT | Performed by: HOSPITALIST

## 2020-12-14 PROCEDURE — 85379 FIBRIN DEGRADATION QUANT: CPT | Performed by: HOSPITALIST

## 2020-12-14 PROCEDURE — 99232 SBSQ HOSP IP/OBS MODERATE 35: CPT | Performed by: PEDIATRICS

## 2020-12-14 PROCEDURE — 80048 BASIC METABOLIC PNL TOTAL CA: CPT | Performed by: HOSPITALIST

## 2020-12-14 PROCEDURE — 97530 THERAPEUTIC ACTIVITIES: CPT | Mod: GP | Performed by: PHYSICAL THERAPIST

## 2020-12-14 PROCEDURE — 36415 COLL VENOUS BLD VENIPUNCTURE: CPT | Performed by: HOSPITALIST

## 2020-12-14 PROCEDURE — 97165 OT EVAL LOW COMPLEX 30 MIN: CPT | Mod: GO

## 2020-12-14 PROCEDURE — 250N000013 HC RX MED GY IP 250 OP 250 PS 637: Performed by: PEDIATRICS

## 2020-12-14 PROCEDURE — 250N000012 HC RX MED GY IP 250 OP 636 PS 637: Performed by: HOSPITALIST

## 2020-12-14 PROCEDURE — 97110 THERAPEUTIC EXERCISES: CPT | Mod: GP | Performed by: PHYSICAL THERAPIST

## 2020-12-14 PROCEDURE — 85025 COMPLETE CBC W/AUTO DIFF WBC: CPT | Performed by: HOSPITALIST

## 2020-12-14 PROCEDURE — 258N000003 HC RX IP 258 OP 636: Performed by: HOSPITALIST

## 2020-12-14 PROCEDURE — 97530 THERAPEUTIC ACTIVITIES: CPT | Mod: GO

## 2020-12-14 PROCEDURE — 250N000012 HC RX MED GY IP 250 OP 636 PS 637: Performed by: FAMILY MEDICINE

## 2020-12-14 PROCEDURE — 83615 LACTATE (LD) (LDH) ENZYME: CPT | Performed by: HOSPITALIST

## 2020-12-14 PROCEDURE — 85384 FIBRINOGEN ACTIVITY: CPT | Performed by: HOSPITALIST

## 2020-12-14 PROCEDURE — 120N000001 HC R&B MED SURG/OB

## 2020-12-14 RX ADMIN — LISINOPRIL 40 MG: 40 TABLET ORAL at 20:47

## 2020-12-14 RX ADMIN — PAROXETINE HYDROCHLORIDE 40 MG: 20 TABLET, FILM COATED ORAL at 08:40

## 2020-12-14 RX ADMIN — DEXAMETHASONE 6 MG: 2 TABLET ORAL at 08:40

## 2020-12-14 RX ADMIN — PANTOPRAZOLE SODIUM 40 MG: 40 TABLET, DELAYED RELEASE ORAL at 06:38

## 2020-12-14 RX ADMIN — METOPROLOL SUCCINATE 150 MG: 100 TABLET, EXTENDED RELEASE ORAL at 08:40

## 2020-12-14 RX ADMIN — ZINC SULFATE 220 MG (50 MG) CAPSULE 220 MG: CAPSULE at 08:40

## 2020-12-14 RX ADMIN — GLIPIZIDE 20 MG: 5 TABLET ORAL at 08:40

## 2020-12-14 RX ADMIN — VITAMIN D 25 MCG: 25 TAB ORAL at 08:40

## 2020-12-14 RX ADMIN — OXYCODONE HYDROCHLORIDE AND ACETAMINOPHEN 1000 MG: 500 TABLET ORAL at 08:40

## 2020-12-14 RX ADMIN — GLIPIZIDE 20 MG: 5 TABLET ORAL at 15:50

## 2020-12-14 RX ADMIN — INSULIN ASPART 10 UNITS: 100 INJECTION, SOLUTION INTRAVENOUS; SUBCUTANEOUS at 21:42

## 2020-12-14 RX ADMIN — ISOSORBIDE MONONITRATE 30 MG: 30 TABLET, EXTENDED RELEASE ORAL at 08:40

## 2020-12-14 RX ADMIN — OXYCODONE HYDROCHLORIDE AND ACETAMINOPHEN 1000 MG: 500 TABLET ORAL at 20:48

## 2020-12-14 RX ADMIN — ATORVASTATIN CALCIUM 80 MG: 40 TABLET, FILM COATED ORAL at 08:40

## 2020-12-14 RX ADMIN — APIXABAN 2.5 MG: 2.5 TABLET, FILM COATED ORAL at 08:40

## 2020-12-14 RX ADMIN — REMDESIVIR 100 MG: 100 INJECTION, POWDER, LYOPHILIZED, FOR SOLUTION INTRAVENOUS at 17:15

## 2020-12-14 RX ADMIN — CLOPIDOGREL BISULFATE 75 MG: 75 TABLET ORAL at 08:40

## 2020-12-14 RX ADMIN — CEFDINIR 600 MG: 300 CAPSULE ORAL at 20:47

## 2020-12-14 RX ADMIN — ASPIRIN 81 MG: 81 TABLET, COATED ORAL at 08:41

## 2020-12-14 RX ADMIN — GABAPENTIN 300 MG: 300 CAPSULE ORAL at 20:46

## 2020-12-14 RX ADMIN — APIXABAN 2.5 MG: 2.5 TABLET, FILM COATED ORAL at 20:46

## 2020-12-14 RX ADMIN — AMLODIPINE BESYLATE 10 MG: 5 TABLET ORAL at 08:40

## 2020-12-14 ASSESSMENT — ACTIVITIES OF DAILY LIVING (ADL)
PREVIOUS_RESPONSIBILITIES: MEAL PREP;HOUSEKEEPING;LAUNDRY;SHOPPING;MEDICATION MANAGEMENT;FINANCES;DRIVING;WORK
ADLS_ACUITY_SCORE: 18
ADLS_ACUITY_SCORE: 19

## 2020-12-14 NOTE — PROGRESS NOTES
Clinic Care Coordination Contact    Situation: Patient chart reviewed by care coordinator.    Background: She was hospitalized from 12/8 - 12/122.  [facility]    Assessment: Patient returned to the emergency room and was readmitted 12/13.  Patient is currently hospitalized.    Plan/Recommendations: RNCC will continue to monitor for discharge back to [facility].      Janey CHANDLER, RN, PHN, CCM  Primary Clinic Care Coordination    [facility]  Primary Care Clinics  [email or web address]   Office: [phone]  Employed by [facility]

## 2020-12-14 NOTE — PROGRESS NOTES
12/14/20 0900   Quick Adds   Type of Visit Initial Occupational Therapy Evaluation   Living Environment   People in home facility resident   Current Living Arrangements   (TCU)   Home Accessibility no concerns   Living Environment Comments Pt lives in a house, however, pt transfered to TCU then returned due to 02 needs. Plan to return to TCU.    Disability/Function   Hearing Difficulty or Deaf no   Wear Glasses or Blind no   Concentrating, Remembering or Making Decisions Difficulty no   Difficulty Communicating no   Difficulty Eating/Swallowing no   Walking or Climbing Stairs Difficulty yes   Walking or Climbing Stairs ambulation difficulty, requires equipment   Mobility Management cane   Dressing/Bathing Difficulty no   Dressing/Bathing Management doing sponge baths due to LUE precautions   Toileting issues yes   Toileting Management difficulty with transfer due to LUE   Doing Errands Independently Difficulty (such as shopping) no   Errands Management Pt completing IND prior to fall.    Fall history within last six months yes   Number of times patient has fallen within last six months 1   General Information   Onset of Illness/Injury or Date of Surgery 12/12/20   Referring Physician David Cleaning MD   Patient/Family Therapy Goal Statement (OT) TCU   Additional Occupational Profile Info/Pertinent History of Current Problem Patient is an 80 year old female who presents with weakness and shortness of breath from TCU on 12/12/20. Patient tested positive for Covid on 12/3/20 with hospitalization 12/8/20 to 12/12/20 for UTI and weakness and diarrhea.  Patient completed OT and PT during hospitalization. In ER, patients 02 level were down to 85% requiring 02.   Performance Patterns (Routines, Roles, Habits) enjoys reading, works at [employer] volunteer.   Existing Precautions/Restrictions weight bearing   Left Upper Extremity (Weight-bearing Status) non weight-bearing (NWB)   Right Upper Extremity  (Weight-bearing Status) full weight-bearing (FWB)   Cognitive Status Examination   Orientation Status orientation to person, place and time   Affect/Mental Status (Cognitive) WNL   Follows Commands WNL   Cognitive Status Comments None identified.    Range of Motion Comprehensive   General Range of Motion other (see comments)   Comment, General Range of Motion RUE shoulder, elbow, wrist, hand WNL.    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    Clinical Impression   Criteria for Skilled Therapeutic Interventions Met (OT) yes;meets criteria;skilled treatment is necessary   OT Diagnosis decreased ease and independence with BADLs/IADLs   OT Problem List-Impairments impacting ADL activity tolerance impaired;range of motion (ROM);strength;pain   Assessment of Occupational Performance 3-5 Performance Deficits   Identified Performance Deficits dressing, grooming, bathing, community mobility, work, leisure, driving, safety with transfers and ambulation; covid+, L shoulder fracture.    Planned Therapy Interventions (OT) ADL retraining;ROM;progressive activity/exercise   Clinical Decision Making Complexity (OT) low complexity   Therapy Frequency (OT) Daily   Predicted Duration of Therapy 2-3x   Risks and Benefits of Treatment have been explained. Yes   Patient, Family & other staff in agreement with plan of care Yes   Comment-Clinical Impression Patient would benefit from skilled OT to work on activity tolerance with endurance and BADLs with L shoulder fracture.    OT Discharge Planning    OT Discharge Recommendation (DC Rec) Transitional Care Facility   OT Rationale for DC Rec Pt requires a higher level of assistance with ADL tasks than  family is able to provide. Pt would benefit from further skilled OT services within the TCU setting to improve independence with ADL and strength and activity tolerance to progress toward PLOF   Total Evaluation Time (Minutes)   Total Evaluation Time (Minutes) 15     Thank you for your referral.     DONOVAN [facility]  Occupational Therapy     Phone: [phone]  Email: [email or web address]

## 2020-12-14 NOTE — PROGRESS NOTES
"Care Management Follow Up    Length of Stay (days): 2    Expected Discharge Date: 12/15/20     Concerns to be Addressed: all concerns addressed in this encounter       Patient plan of care discussed at interdisciplinary rounds: Yes    Anticipated Discharge Disposition: Transitional Care     Anticipated Discharge Services:  PT and OT at TCU     Anticipated Discharge DME: Oxygen    Patient/Family in Agreement with the Plan: yes    Referrals Placed by CM/SW: Internal Clinic Care Coordination;Post Acute Facilities    Private pay costs discussed: Not applicable at this time.     Additional Information:  Care Transitions continues to follow this patient for discharge planning.  Will continue to assess if patient is needing oxygen upon discharge and during transport.      Writer has left a message with Migdalia, the admissions person at [facility].  Needing to discuss possible need for oxygen once discharged and orders needed for this for the TCU.  Writer is awaiting call back.     1413- Provider has indicated that patient will be ready for hospital discharge tomorrow morning.  Writer called and spoke with daughter, Neyda, as she had stated that she would transport patient back to [facility].  Neyda is available to transport at 1030 tomorrow. She will be at the front entrance and call the nursing station when she arrives.  Daughter stated, \"You guys have taken such good care of my mom\".      Writer has called and spoken with patient over the phone.  Discussed the planned discharge time of 1030 and that her daughter will transport her.  Patient in agreement. Patient requesting wheelchair with foot rests when she is brought down to the car and when she arrives at [facility].  Patient stated, \"Maybe it was at [place], they did not have foot rests on the wheelchair and it was hard to hold my legs up\".  Writer will pass this on to hospital nursing and [facility].  Writer has tried to reach Migdalia again at [facility]. Will await call back. "     Sabine Ma, [facility]   [phone]

## 2020-12-14 NOTE — PROGRESS NOTES
[facility]    Medicine Progress Note - Hospitalist Service       Date of Admission:  12/12/2020  Assessment & Plan             80-year-old woman with COVID-19 diagnosed by testing December 3 and recently hospitalized for gastrointestinal symptoms from December 8 to December 12 and discharged to TCU.  She had no respiratory problems from COVID-19 during that hospital stay with negative radiographic findings and normal oxygen saturations.  Unfortunately, within 24 hours of discharge to TCU, she was readmitted due to pneumonia with acute hypoxic respiratory failure presumably from COVID-1 which is now stabilizing.      Principal Problem:    Pneumonia due to 2019 novel coronavirus  Assessment: Bilateral radiographic infiltrates, bacterial infection not suspected so far  Plan: Continue dexamethasone dose 3 today, consider either a lower dose or shorter treatment course with dexamethasone after discharge because of worsening hyperglycemia and rapid clinical improvement, continue remdesivir dose 2 today but anticipate she will discharge prior to completing the full 5-day treatment course of remdesivir    Active Problems:    Acute respiratory failure with hypoxia (H)  Assessment: Resolving and has weaned off of oxygen supplementation this morning  Plan: Monitor oxygenation today, advance diet and activity as tolerated, anticipate she may require oxygen supplementation intermittently to keep saturations 90% and higher including at the TCU after discharge for which she will need a specific order for the TCU but not anticipating at this time that she will require oxygen for transport      Bilateral pneumonia  Assessment: Bilateral infiltrates on chest CT attributed to COVID-19 as neither test results and her clinical course have been suspicious for bacterial infection  Plan: Treat COVID-19 and follow clinically      Coronary artery disease involving native coronary artery of native heart without  angina pectoris  Assessment: Admitted with slightly elevated troponin 0 0.51 but did not have angina or acute ischemic EKG changes and troponin quickly normalized, suspect this was due to hypoxia from respiratory failure and pneumonia due to COVID-19 rather than active CAD  Plan: Treat acute infection and respiratory failure, continue chronic cardiac medications      Essential hypertension with goal blood pressure less than 140/90  Assessment: Had severely elevated blood pressures during recent hospitalization that improved after the adjustment of antihypertensive medications at that time, blood pressure adequately controlled during this hospital stay so far on those medications  Plan: Continue current antihypertensive medications      Type 2 diabetes mellitus with diabetic polyneuropathy, without long-term current use of insulin (H)  Assessment: Worsening hyperglycemia after starting dexamethasone, not symptomatic from hyperglycemia  Plan: Continue chronic glipizide, could resume Metformin at discharge as diarrhea resolves, continuing sliding scale NovoLog here in the hospital      Prerenal azotemia  Assessment: Admitted with slightly worsened creatinine compared with her normal baseline which rapidly resolved after a bolus of IV fluids consistent with prerenal azotemia, IV fluids discontinued yesterday and renal function remained stable  Plan: Advance oral intake      Bilateral pleural effusion  Assessment: Noted radiographically probably due to COVID-19 pneumonia, also had elevated BNP but CHF has not otherwise been suspected  Plan: Follow clinically while treating pneumonia      Hyperlipidemia LDL goal <100  Assessment: Chronic  Plan: Continue chronic medication      E. coli UTI  Assessment: Recently diagnosed and completing a treatment course of Omnicef  Plan: Complete course of Omnicef in the next 1 to 2 days as previously prescribed      Closed fracture of proximal end of left humerus with routine  healing  Assessment: Nonweightbearing on the left arm as advised by orthopedics  Plan: Anticipate outpatient orthopedic follow-up after discharge, anticipate discharge to TCU due to the decline in her functional status from this fracture and recent COVID-19 infection         Diet: Combination Diet 9699-6375 Calories: Moderate Consistent CHO (4-6 CHO units/meal); Low Saturated Fat Na <2400mg Diet    DVT Prophylaxis: Eliquis which was started at recent hospital discharge to treat prophylactically for 30 days because of COVID-19 infection  Uriarte Catheter: not present  Code Status: No CPR- Do NOT Intubate           Disposition Plan   Expected discharge: Tomorrow, recommended to transitional care unit once Oxygenation has been stable for 24 hours.  Entered: Deon Pierre MD 12/14/2020, 1:05 PM       The patient's care was discussed with the Bedside Nurse, Care Coordinator/ and Patient.    Deon Pierre MD  Hospitalist Service  [facility]  Contact information available via [facility] Paging/Directory    ______________________________________________________________________    Interval History   She had no significant events overnight.  She was able to wean off of oxygen supplementation this morning and has remained without oxygen supplementation so far during the day today.  She says she feels much better.  Cough lingers but is resolving.  She denies any shortness of breath at rest.  She has not yet been up much.  She been afebrile and hemodynamically stable with blood pressures ranging from normal to mildly elevated.  Urine output has been adequate although on the low side.  She has no other complaints.    Data reviewed today: I reviewed all medications, new labs and imaging results over the last 24 hours. I personally reviewed telemetry demonstrating normal sinus rhythm and no dysrhythmias.    Physical Exam   Vital Signs: Temp: 98  F (36.7  C) Temp src: Oral BP: (!) 153/73  Pulse: 74   Resp: 16 SpO2: 96 % O2 Device: None (Room air)   Weight: 131 lbs 9.83 oz     General Appearance: No distress resting in bed, talkative today without any signs of breathing difficulty  Respiratory: Normal respiratory effort, clear lungs  Cardiovascular: Regular rate and rhythm, brisk capillary refill, good radial pulse, no peripheral edema  Other: Alert and conversive, no confusion    Data   Recent Labs   Lab 12/14/20  0604 12/13/20  0603 12/13/20  0300 12/12/20  2244 12/08/20 1929 12/08/20 1929   WBC 7.7 6.7  --  8.6   < > 5.4   HGB 10.2* 10.1*  --  10.6*   < > 12.3   MCV 88 88  --  87   < > 90    139*  --  160   < > 164   INR 1.33* 1.20*  --  1.17*   < >  --     137  --  133   < > 138   POTASSIUM 4.0 3.8  --  3.7   < > 3.9   CHLORIDE 106 105  --  97   < > 103   CO2 25 25  --  27   < > 25   BUN 39* 31*  --  34*   < > 38*   CR 1.00 0.88  --  1.11*   < > 1.12*   ANIONGAP 7 7  --  9   < > 10   ANDREI 8.3* 8.0*  --  8.1*   < > 8.8   * 264*  --  273*   < > 289*   ALBUMIN  --   --   --  2.3*  --  3.3*   PROTTOTAL  --   --   --  6.3*  --  7.9   BILITOTAL  --   --   --  0.5  --  0.7   ALKPHOS  --   --   --  94  --  121   ALT  --   --   --  25  --  19   AST  --   --   --  32  --  24   LIPASE  --   --   --  177  --   --    TROPI 0.030  --  0.051* 0.050*   < > 0.029    < > = values in this interval not displayed.     Blood sugars have ranged 200-360 over the last day  CRP 31.1 today, improved from 54.7 yesterday   today, improved from 239 yesterday  Sputum culture results are pending, sputum Gram stain demonstrated few gram-positive cocci    Medications     - MEDICATION INSTRUCTIONS -       - MEDICATION INSTRUCTIONS -         amLODIPine  10 mg Oral Daily     apixaban ANTICOAGULANT  2.5 mg Oral BID     aspirin  81 mg Oral Daily     atorvastatin  80 mg Oral Daily     cefdinir  600 mg Oral QPM     cloNIDine   Transdermal Q8H     cloNIDine  2 patch Transdermal Weekly     clopidogrel  75 mg  Oral Daily     dexamethasone  6 mg Oral Daily     gabapentin  300 mg Oral At Bedtime     glipiZIDE  20 mg Oral BID AC     insulin aspart  1-10 Units Subcutaneous TID AC     insulin aspart  1-7 Units Subcutaneous At Bedtime     isosorbide mononitrate  30 mg Oral Daily     lisinopril  40 mg Oral At Bedtime     metoprolol succinate ER  150 mg Oral Daily     pantoprazole  40 mg Oral QAM AC     PARoxetine  40 mg Oral QAM     remdesivir  100 mg Intravenous Q24H     sodium chloride (PF)  3 mL Intracatheter Q8H     vitamin C  1,000 mg Oral BID     cholecalciferol  25 mcg Oral Daily     zinc sulfate  220 mg Oral Daily

## 2020-12-14 NOTE — PLAN OF CARE
Pt is A & O x 4, VSS, afebrile, denies pain, maintaining sats >92 % on 2.5 L. Was on 2 L until sleeping then increased to 2.5 L. Pt has productive cough with clear thick sputum, sputum sample obtained and sent to lab. Poor appetite, very fatigued and tired. Blood sugars of 284 and 304, insulin coverage given. Lung sounds clear and diminished to the bases. Normal sinus rhythm on telemetry. 350 mL urine output via purewick. Will continue to monitor POC.

## 2020-12-14 NOTE — PLAN OF CARE
Problem: Adult Inpatient Plan of Care  Goal: Plan of Care Review  Outcome: Improving  Goal: Patient-Specific Goal (Individualized)  Outcome: Improving  Goal: Absence of Hospital-Acquired Illness or Injury  Outcome: Improving  Intervention: Identify and Manage Fall Risk  Recent Flowsheet Documentation  Taken 12/14/2020 0100 by Marjan Chao RN  Safety Promotion/Fall Prevention:    fall prevention program maintained    clutter free environment maintained  Intervention: Prevent Skin Injury  Recent Flowsheet Documentation  Taken 12/14/2020 0100 by Marjan Chao RN  Body Position: supine, head elevated  Intervention: Prevent and Manage VTE (Venous Thromboembolism) Risk  Recent Flowsheet Documentation  Taken 12/14/2020 0100 by Marjan Chao RN  VTE Prevention/Management: anticoagulant therapy maintained  Goal: Optimal Comfort and Wellbeing  Outcome: Improving  Goal: Readiness for Transition of Care  Outcome: Improving   Situation: Shift note  Background: Pneumonia due to covid.   Assessment: A&O x4 VSS. O2 is maintaining above 94% on 2LPM by NC. Writer titrated O2 down to RA and patient has been able to maintain O2 above 92% on RA.  LS diminished at bases. BS are active x4. Pt has a sling on her left arm d/t fx of shoulder blade that occurred 3 weeks ago. Pt has pure wick in place and is voiding clear yellow urine. BG at 0300 was 201  Recommendation: Continue to monitor. Continue to monitor resp status.

## 2020-12-14 NOTE — PLAN OF CARE
Problem: Respiratory Compromise (Pneumonia)  Goal: Effective Oxygenation and Ventilation  Outcome: No Change   Patient doing well. VSS has maintained O2 sats on room air all shift. Denies any pain. Doing well. Anticipate transfer to TCU tomorrow if remains on room air.  Mckinley Hooks RN

## 2020-12-15 VITALS
OXYGEN SATURATION: 94 % | BODY MASS INDEX: 22.47 KG/M2 | DIASTOLIC BLOOD PRESSURE: 84 MMHG | TEMPERATURE: 98.8 F | HEIGHT: 64 IN | RESPIRATION RATE: 20 BRPM | WEIGHT: 131.61 LBS | SYSTOLIC BLOOD PRESSURE: 187 MMHG | HEART RATE: 80 BPM

## 2020-12-15 LAB
ANION GAP SERPL CALCULATED.3IONS-SCNC: 4 MMOL/L (ref 3–14)
BASOPHILS # BLD AUTO: 0 10E9/L (ref 0–0.2)
BASOPHILS NFR BLD AUTO: 0.1 %
BUN SERPL-MCNC: 45 MG/DL (ref 7–30)
CALCIUM SERPL-MCNC: 8.2 MG/DL (ref 8.5–10.1)
CHLORIDE SERPL-SCNC: 107 MMOL/L (ref 94–109)
CO2 SERPL-SCNC: 27 MMOL/L (ref 20–32)
CREAT SERPL-MCNC: 0.91 MG/DL (ref 0.52–1.04)
CRP SERPL-MCNC: 14.9 MG/L (ref 0–8)
D DIMER PPP FEU-MCNC: 1.4 UG/ML FEU (ref 0–0.5)
DIFFERENTIAL METHOD BLD: ABNORMAL
EOSINOPHIL NFR BLD AUTO: 0 %
ERYTHROCYTE [DISTWIDTH] IN BLOOD BY AUTOMATED COUNT: 14.7 % (ref 10–15)
FIBRINOGEN PPP-MCNC: 450 MG/DL (ref 200–420)
GFR SERPL CREATININE-BSD FRML MDRD: 59 ML/MIN/{1.73_M2}
GLUCOSE BLDC GLUCOMTR-MCNC: 156 MG/DL (ref 70–99)
GLUCOSE BLDC GLUCOMTR-MCNC: 235 MG/DL (ref 70–99)
GLUCOSE BLDC GLUCOMTR-MCNC: 369 MG/DL (ref 70–99)
GLUCOSE SERPL-MCNC: 182 MG/DL (ref 70–99)
HCT VFR BLD AUTO: 35.7 % (ref 35–47)
HGB BLD-MCNC: 11.4 G/DL (ref 11.7–15.7)
IMM GRANULOCYTES # BLD: 0.1 10E9/L (ref 0–0.4)
IMM GRANULOCYTES NFR BLD: 0.8 %
INR PPP: 1.28 (ref 0.86–1.14)
LDH SERPL L TO P-CCNC: 266 U/L (ref 81–234)
LYMPHOCYTES # BLD AUTO: 1.3 10E9/L (ref 0.8–5.3)
LYMPHOCYTES NFR BLD AUTO: 14.2 %
MCH RBC QN AUTO: 28.1 PG (ref 26.5–33)
MCHC RBC AUTO-ENTMCNC: 31.9 G/DL (ref 31.5–36.5)
MCV RBC AUTO: 88 FL (ref 78–100)
MONOCYTES # BLD AUTO: 0.9 10E9/L (ref 0–1.3)
MONOCYTES NFR BLD AUTO: 10 %
NEUTROPHILS # BLD AUTO: 6.9 10E9/L (ref 1.6–8.3)
NEUTROPHILS NFR BLD AUTO: 74.9 %
NRBC # BLD AUTO: 0 10*3/UL
NRBC BLD AUTO-RTO: 0 /100
PLATELET # BLD AUTO: 292 10E9/L (ref 150–450)
PLATELET # BLD EST: ABNORMAL 10*3/UL
POTASSIUM SERPL-SCNC: 4.2 MMOL/L (ref 3.4–5.3)
RBC # BLD AUTO: 4.06 10E12/L (ref 3.8–5.2)
RBC MORPH BLD: NORMAL
RETICS # AUTO: 76.3 10E9/L (ref 25–95)
RETICS/RBC NFR AUTO: 1.9 % (ref 0.5–2)
SODIUM SERPL-SCNC: 138 MMOL/L (ref 133–144)
WBC # BLD AUTO: 9.3 10E9/L (ref 4–11)

## 2020-12-15 PROCEDURE — 86140 C-REACTIVE PROTEIN: CPT | Performed by: HOSPITALIST

## 2020-12-15 PROCEDURE — 250N000013 HC RX MED GY IP 250 OP 250 PS 637: Performed by: PEDIATRICS

## 2020-12-15 PROCEDURE — 85610 PROTHROMBIN TIME: CPT | Performed by: HOSPITALIST

## 2020-12-15 PROCEDURE — 99239 HOSP IP/OBS DSCHRG MGMT >30: CPT | Performed by: NURSE PRACTITIONER

## 2020-12-15 PROCEDURE — 999N001017 HC STATISTIC GLUCOSE BY METER IP

## 2020-12-15 PROCEDURE — 36415 COLL VENOUS BLD VENIPUNCTURE: CPT | Performed by: HOSPITALIST

## 2020-12-15 PROCEDURE — 250N000012 HC RX MED GY IP 250 OP 636 PS 637: Performed by: FAMILY MEDICINE

## 2020-12-15 PROCEDURE — 85384 FIBRINOGEN ACTIVITY: CPT | Performed by: HOSPITALIST

## 2020-12-15 PROCEDURE — 83615 LACTATE (LD) (LDH) ENZYME: CPT | Performed by: HOSPITALIST

## 2020-12-15 PROCEDURE — 85045 AUTOMATED RETICULOCYTE COUNT: CPT | Performed by: HOSPITALIST

## 2020-12-15 PROCEDURE — 80048 BASIC METABOLIC PNL TOTAL CA: CPT | Performed by: HOSPITALIST

## 2020-12-15 PROCEDURE — 250N000012 HC RX MED GY IP 250 OP 636 PS 637: Performed by: HOSPITALIST

## 2020-12-15 PROCEDURE — 250N000013 HC RX MED GY IP 250 OP 250 PS 637: Performed by: HOSPITALIST

## 2020-12-15 PROCEDURE — 85379 FIBRIN DEGRADATION QUANT: CPT | Performed by: HOSPITALIST

## 2020-12-15 PROCEDURE — 85025 COMPLETE CBC W/AUTO DIFF WBC: CPT | Performed by: HOSPITALIST

## 2020-12-15 RX ORDER — DEXAMETHASONE 6 MG/1
6 TABLET ORAL DAILY
Qty: 7 TABLET | Refills: 0 | Status: SHIPPED | OUTPATIENT
Start: 2020-12-16 | End: 2020-12-17

## 2020-12-15 RX ADMIN — ASPIRIN 81 MG: 81 TABLET, COATED ORAL at 08:15

## 2020-12-15 RX ADMIN — ATORVASTATIN CALCIUM 80 MG: 40 TABLET, FILM COATED ORAL at 08:15

## 2020-12-15 RX ADMIN — METOPROLOL SUCCINATE 150 MG: 100 TABLET, EXTENDED RELEASE ORAL at 08:14

## 2020-12-15 RX ADMIN — ISOSORBIDE MONONITRATE 30 MG: 30 TABLET, EXTENDED RELEASE ORAL at 08:16

## 2020-12-15 RX ADMIN — INSULIN ASPART 5 UNITS: 100 INJECTION, SOLUTION INTRAVENOUS; SUBCUTANEOUS at 01:35

## 2020-12-15 RX ADMIN — VITAMIN D 25 MCG: 25 TAB ORAL at 08:14

## 2020-12-15 RX ADMIN — OXYCODONE HYDROCHLORIDE AND ACETAMINOPHEN 1000 MG: 500 TABLET ORAL at 08:15

## 2020-12-15 RX ADMIN — GLIPIZIDE 20 MG: 5 TABLET ORAL at 08:15

## 2020-12-15 RX ADMIN — ZINC SULFATE 220 MG (50 MG) CAPSULE 220 MG: CAPSULE at 08:16

## 2020-12-15 RX ADMIN — CLOPIDOGREL BISULFATE 75 MG: 75 TABLET ORAL at 08:15

## 2020-12-15 RX ADMIN — APIXABAN 2.5 MG: 2.5 TABLET, FILM COATED ORAL at 08:15

## 2020-12-15 RX ADMIN — DEXAMETHASONE 6 MG: 2 TABLET ORAL at 08:15

## 2020-12-15 RX ADMIN — AMLODIPINE BESYLATE 10 MG: 5 TABLET ORAL at 08:15

## 2020-12-15 RX ADMIN — PAROXETINE HYDROCHLORIDE 40 MG: 20 TABLET, FILM COATED ORAL at 08:15

## 2020-12-15 RX ADMIN — PANTOPRAZOLE SODIUM 40 MG: 40 TABLET, DELAYED RELEASE ORAL at 08:15

## 2020-12-15 ASSESSMENT — ACTIVITIES OF DAILY LIVING (ADL)
ADLS_ACUITY_SCORE: 18

## 2020-12-15 NOTE — PLAN OF CARE
Pt is A & O x 4, VSS, afebrile, on room air maintaining sats 95 % - 96 %. Infrequent congested cough, pt states not getting much sputum up anymore. Crackles to RLL. Hands remain edematous. Blood sugar of 332, insulin coverage given. Fair appetite for dinner. Pure wick in place. Pt is very pleasant. Will continue to monitor POC.

## 2020-12-15 NOTE — PLAN OF CARE
VSS. Slightly elevated BP's. Max temp of 98. A&Ox4. Denies any pain. Remains on room air all of shift with sats >92%. Blood sugar at bed time of 425. MD notified extra 10 units given on top of sliding scale. Recheck at midnight 369; 5 extra units given. 0200 recheck of 235. Bm this shift. Purwick in place. Assist of 1 to bathroom. Plan to discharge to [facility] today. No complaints at this time. Will continue to monitor.

## 2020-12-15 NOTE — PLAN OF CARE
Physical Therapy Discharge Summary    Reason for therapy discharge:    Discharged to transitional care facility.    Progress towards therapy goal(s). See goals on Care Plan in [facility] electronic health record for goal details.  Goals not met.  Barriers to achieving goals:   discharge from facility.    Therapy recommendation(s):    Continued therapy is recommended.  Rationale/Recommendations:  strength, endurance, and mobility.    Thank you for your referral.    Miroslava Lanza, PT, DPT  [facility]  [phone]

## 2020-12-15 NOTE — PROVIDER NOTIFICATION
Blood sugar 425. Receiving 7 units Novolog per sliding scale.     MD notified. Give additional 10 units Novolog one time. Recheck at 0000. Notify MD if >300.

## 2020-12-15 NOTE — PLAN OF CARE
Occupational Therapy Discharge Summary    Reason for therapy discharge:    Discharged to transitional care facility.    Progress towards therapy goal(s). See goals on Care Plan in [facility] electronic health record for goal details.  Goals partially met.  Barriers to achieving goals:   discharge from facility.    Therapy recommendation(s):    Continued therapy is recommended.  Rationale/Recommendations:  to progress independence with ADL and return to PLOF.       Jaja Hdez OTR/L  [facility]  [phone]

## 2020-12-15 NOTE — DISCHARGE SUMMARY
[facility]  Hospitalist Discharge Summary      Date of Admission:  12/12/2020  Date of Discharge:  12/15/2020 10:40 AM  Discharging Provider: Jayla Moser CNP      Discharge Diagnoses   Principal Problem:    Pneumonia due to 2019 novel coronavirus  Active Problems:    Hyperlipidemia LDL goal <100    Coronary artery disease involving native coronary artery of native heart without angina pectoris    Essential hypertension with goal blood pressure less than 140/90    Type 2 diabetes mellitus with diabetic polyneuropathy, without long-term current use of insulin (H)    E. coli UTI    Closed fracture of proximal end of left humerus with routine healing    Prerenal azotemia    Acute respiratory failure with hypoxia (H)    Bilateral pleural effusion    Bilateral pneumonia        Follow-ups Needed After Discharge   Follow-up Appointments     Follow Up and recommended labs and tests      Follow up with Nursing home physician.             Unresulted Labs Ordered in the Past 30 Days of this Admission     Date and Time Order Name Status Description    12/13/2020 1532 Sputum Culture Aerobic Bacterial Preliminary     12/11/2020 0953 Renin activity In process       These results will be followed up by PCP/Nursing home provider    Discharge Disposition   Discharged to short-term care facility  Condition at discharge: Stable      Hospital Course            Pneumonia due to 2019 novel coronavirus    Bilateral pneumonia    Bilateral radiographic infiltrates, bacterial infection not suspected so far    Acute respiratory failure with hypoxia (H)  80-year-old woman with COVID-19 diagnosed by testing December 3 and recently hospitalized for gastrointestinal symptoms from December 8 to December 12 and discharged to TCU.  She had no respiratory problems from COVID-19 during that hospital stay with negative radiographic findings and normal oxygen saturations.  Unfortunately, within 24 hours of  discharge to TCU, she was readmitted due to pneumonia with acute hypoxic respiratory failure presumably from COVID-19 which is now stabilizing. Bilateral infiltrates on chest CT attributed to COVID-19 as neither test results and her clinical course have been suspicious for bacterial infection. Continue dexamethasone 6 mg daily, patient will need to have ongoing assessment to consider either a lower dose or shorter treatment course with dexamethasone after discharge because of worsening hyperglycemia and rapid clinical improvement. Oxygen PRN ordered for discharge. Patient received two doses of Remdesivir while she was hospitalized. Patient was discharged in stable condition.         Coronary artery disease involving native coronary artery of native heart without angina pectoris  Admitted with slightly elevated troponin 0 0.51 but did not have angina or acute ischemic EKG changes and troponin quickly normalized, suspect this was due to hypoxia from respiratory failure and pneumonia due to COVID-19 rather than active CAD        Essential hypertension with goal blood pressure less than 140/90  Had severely elevated blood pressures during recent hospitalization that improved after the adjustment of antihypertensive medications at that time, blood pressure adequately controlled during this hospital stay so far on those medications.       Type 2 diabetes mellitus with diabetic polyneuropathy, without long-term current use of insulin (H)  Worsening hyperglycemia after starting dexamethasone, not symptomatic from hyperglycemia.  Continue chronic glipizide and Metformin at discharge. Will continuing sliding scale NovoLog and titrate as needed at facility. Patient will likely be able to stop sliding scale insulin after the completion of Decadron and will require ongoing assessment.       Prerenal azotemia  Admitted with slightly worsened creatinine compared with her normal baseline which rapidly resolved after a bolus of IV  fluids consistent with prerenal azotemia, IV fluids discontinued yesterday and renal function remained stable.       Bilateral pleural effusion  Noted radiographically probably due to COVID-19 pneumonia, also had elevated BNP but CHF has not otherwise been suspected.         Hyperlipidemia LDL goal <100  Chronic, continue home medication.       E. coli UTI  Recently diagnosed and completing a treatment course of Omnicef. Patient was able to complete course of Omnicef while in the hospital.       Closed fracture of proximal end of left humerus with routine healing  Nonweightbearing on the left arm as advised by orthopedics. Anticipate outpatient orthopedic follow-up after discharge.         Consultations This Hospital Stay   PHYSICAL THERAPY ADULT IP CONSULT  OCCUPATIONAL THERAPY ADULT IP CONSULT  CARE MANAGEMENT / SOCIAL WORK IP CONSULT    Code Status   No CPR- Do NOT Intubate    Time Spent on this Encounter   I, Jayla Moser CNP, personally saw the patient today and spent greater than 30 minutes discharging this patient.       Jayla Moser CNP  [address] MEDICAL SURGICAL  [address] [facility]   [place] [address]  Phone: [phone]  ______________________________________________________________________    Physical Exam   Vital Signs: Temp: 98.8  F (37.1  C) Temp src: Oral BP: (!) 187/84 Pulse: 80   Resp: 20 SpO2: 94 % O2 Device: None (Room air)    Weight: 131 lbs 9.83 oz  Constitutional: Awake, alert, cooperative, no apparent distress   Eyes:  Sclera clear, conjunctiva normal. PERRLA  ENT: Normocephalic, without obvious abnormality   Respiratory: No increased work of breathing, good air exchange, clear to auscultation bilaterally, no crackles or wheezing.    Cardiovascular: Normal apical impulse, regular rate and rhythm   GI:  Normal bowel sounds, soft, non-distended, non-tender   Skin: No bruising or bleeding, normal skin color   Musculoskeletal: There is no redness, warmth,  or swelling of the joints   Neurologic: Awake, alert, oriented to name, place and time.   Neuropsychiatric: Calm, normal eye contact, alert, normal affect          Primary Care Physician   Byron Holm    Discharge Orders      Care Coordination Referral      General info for SNF    Length of Stay Estimate: Short Term Care: Estimated # of Days <30  Condition at Discharge: Improving  Level of care:skilled   Rehabilitation Potential: Good  Admission H&P remains valid and up-to-date: Yes  Recent Chemotherapy: N/A  Use Nursing Home Standing Orders: Yes     Mantoux instructions    Give two-step Mantoux (PPD) Per Facility Policy Yes     Glucose monitor nursing POCT    Before meals and at bedtime     Reason for your hospital stay    You were in the hospital for COVID and needing oxygen.     Follow Up and recommended labs and tests    Follow up with Nursing home physician.     Activity - Up with nursing assistance     No CPR- Do NOT Intubate     Oxygen Adult/Peds    I, the undersigned, certify that the above prescribed supplies are medically necessary for this patient and is both reasonable and necessary in reference to accepted standards of medical and necessary in reference to accepted standards of medical practice in the treatment of this patient's condition and is not prescribed as a convenience.     Diet    Follow this diet upon discharge: Orders Placed This Encounter      Combination Diet 6273-0595 Calories: Moderate Consistent CHO (4-6 CHO units/meal); Low Saturated Fat Na <2400mg Diet       Significant Results and Procedures   Most Recent 3 CBC's:  Recent Labs   Lab Test 12/15/20  0552 12/14/20  0604 12/13/20  0603   WBC 9.3 7.7 6.7   HGB 11.4* 10.2* 10.1*   MCV 88 88 88    199 139*     Most Recent 3 BMP's:  Recent Labs   Lab Test 12/15/20  0552 12/14/20  0604 12/13/20  0603    138 137   POTASSIUM 4.2 4.0 3.8   CHLORIDE 107 106 105   CO2 27 25 25   BUN 45* 39* 31*   CR 0.91 1.00 0.88    ANIONGAP 4 7 7   ANDREI 8.2* 8.3* 8.0*   * 200* 264*   ,   Results for orders placed or performed during the hospital encounter of 12/12/20   XR Chest Port 1 View    Narrative    EXAM: XR CHEST PORT 1 VW  LOCATION: [facility]  DATE/TIME: 12/12/2020 10:52 PM    INDICATION: Shortness of breath in a Covid positive patient.  COMPARISON: 01/11/2008      Impression    IMPRESSION: Patchy interstitial and alveolar opacities involving both lower lungs compatible with pneumonia, COVID 19 pneumonia given clinical history. Normal heart size and pulmonary vascularity. Atherosclerotic vascular calcification. Partially   visualized healing oblique fracture proximal left humerus. Degenerative changes in the spine and both shoulders.   CT Chest Pulmonary Embolism w Contrast    Narrative    EXAM: CT CHEST PULMONARY EMBOLISM W CONTRAST  LOCATION: [facility]  DATE/TIME: 12/12/2020 12:12 AM    INDICATION: Shortness of breath.  COMPARISON: 06/12/2011.  TECHNIQUE: CT chest pulmonary angiogram during arterial phase injection of IV contrast. Multiplanar reformats and MIP reconstructions were performed. Dose reduction techniques were used.   CONTRAST: Isovue 370, 65 mL.    FINDINGS:  ANGIOGRAM CHEST: Pulmonary arteries are normal caliber and negative for pulmonary emboli. Thoracic aorta is negative for dissection. No CT evidence of right heart strain.    LUNGS AND PLEURA: There are patchy regions of faint infiltrate throughout both lungs with small pleural effusions. Regions of mucous plugging in the lung bases.    MEDIASTINUM/AXILLAE: Mild subcarinal and hilar lymph node enlargement.    UPPER ABDOMEN: 2.8 cm well-defined right adrenal nodule increased in size previously measuring 2.3 cm. This should be benign given the relative stability. 3 mm nonobstructing stone in the right kidney. Liver appearance likely cirrhotic. Cholecystectomy.    MUSCULOSKELETAL: Normal.      Impression    IMPRESSION:  1.  No  pulmonary embolism.    2.  Patchy regions of infiltrate throughout the lungs with small pleural effusions. Regions of mucous plugging in the lung bases. Mild subcarinal lymphadenopathy likely reactive.    3.  Liver appearance suggests cirrhosis. The spleen is borderline enlarged.    4.  2.8 cm well-defined right adrenal nodule has increased in size since the prior exam 06/12/2011 although given the slow rate of change it should be benign.    5.  Nonobstructing stone in the right kidney.        Discharge Medications   Current Discharge Medication List      START taking these medications    Details   dexamethasone (DECADRON) 6 MG tablet Take 1 tablet (6 mg) by mouth daily  Qty: 7 tablet, Refills: 0    Associated Diagnoses: Pneumonia due to 2019 novel coronavirus      !! insulin aspart (NOVOLOG PEN) 100 UNIT/ML pen Inject 1-7 Units Subcutaneous At Bedtime HIGH INSULIN RESISTANCE DOSING    Do Not give Bedtime Correction Insulin if BG less than 200.   For  - 224 give 1 units.   For  - 249 give 2 units.   For  - 274 give 3 units.   For  - 299 give 4 units.   For  - 324 give 5 units.   For  - 349 give 6 units.   For BG greater than or equal to 350 give 7 units.   Notify provider if glucose greater than or equal to 350 mg/dL after administration of correction dose. If given at mealtime, administer within 30 minutes of start of meal  Qty: 3 mL, Refills: 0    Associated Diagnoses: Pneumonia due to 2019 novel coronavirus; Hyperglycemia      !! insulin aspart (NOVOLOG PEN) 100 UNIT/ML pen Inject 1-10 Units Subcutaneous 3 times daily (before meals) Correction Scale - HIGH INSULIN RESISTANCE DOSING     Do Not give Correction Insulin if Pre-Meal BG less than 140.   For Pre-Meal  - 164 give 1 unit.   For Pre-Meal  - 189 give 2 units.   For Pre-Meal  - 214 give 3 units.   For Pre-Meal  - 239 give 4 units.   For Pre-Meal  - 264 give 5 units.   For Pre-Meal  - 289  give 6 units.   For Pre-Meal  - 314 give 7 units.   For Pre-Meal  - 339 give 8 units.   For Pre-Meal  - 364 give 9 units.   For Pre-Meal BG greater than or equal to 365 give 10 units  To be given with prandial insulin, and based on pre-meal blood glucose.   Notify provider if glucose greater than or equal to 350 mg/dL after administration of correction dose. If given at mealtime, administer within 30 minutes of start of meal  Qty: 3 mL, Refills: 0    Associated Diagnoses: Pneumonia due to 2019 novel coronavirus; Hyperglycemia       !! - Potential duplicate medications found. Please discuss with provider.      CONTINUE these medications which have NOT CHANGED    Details   apixaban ANTICOAGULANT (ELIQUIS) 2.5 MG tablet Take 1 tablet (2.5 mg) by mouth 2 times daily  Qty: 60 tablet, Refills: 0    Associated Diagnoses: 2019 novel coronavirus disease (COVID-19)      gabapentin (NEURONTIN) 300 MG capsule Take 1 capsule (300 mg) by mouth At Bedtime  Qty: 30 capsule, Refills: 0    Associated Diagnoses: Type 2 diabetes mellitus with diabetic polyneuropathy, without long-term current use of insulin (H)      glipiZIDE (GLUCOTROL) 10 MG tablet Take 2 tablets (20 mg) by mouth 2 times daily (before meals)  Qty: 120 tablet, Refills: 0    Associated Diagnoses: Type 2 diabetes mellitus with diabetic polyneuropathy, without long-term current use of insulin (H)      lisinopril (ZESTRIL) 40 MG tablet Take 1 tablet (40 mg) by mouth At Bedtime  Qty: 30 tablet, Refills: 0    Associated Diagnoses: Hypertension goal BP (blood pressure) < 140/90      metFORMIN (GLUCOPHAGE-XR) 500 MG 24 hr tablet Take 2 tablets (1,000 mg) by mouth 2 times daily (with meals)  Qty: 120 tablet, Refills: 0    Associated Diagnoses: Type 2 diabetes mellitus with diabetic polyneuropathy, without long-term current use of insulin (H)      acetaminophen (TYLENOL) 500 MG tablet Take 1-2 tablets (500-1,000 mg) by mouth every 6 hours as needed for  pain  Qty: 100 tablet, Refills: 0    Associated Diagnoses: Closed fracture of proximal end of left humerus with routine healing, unspecified fracture morphology, subsequent encounter      alcohol swab prep pads Use to swab area of injection/sarah as directed.  Qty: 100 each, Refills: 3    Associated Diagnoses: Type 2 diabetes mellitus with diabetic polyneuropathy, without long-term current use of insulin (H)      amLODIPine (NORVASC) 10 MG tablet Take 1 tablet (10 mg) by mouth daily  Qty: 30 tablet, Refills: 0    Associated Diagnoses: Hypertension goal BP (blood pressure) < 140/90      aspirin (ASA) 81 MG EC tablet Take 1 tablet (81 mg) by mouth daily  Qty: 30 tablet, Refills: 0    Associated Diagnoses: Coronary artery disease involving native coronary artery of native heart without angina pectoris      atorvastatin (LIPITOR) 80 MG tablet Take 1 tablet (80 mg) by mouth daily  Qty: 30 tablet, Refills: 0    Associated Diagnoses: Coronary artery disease involving native coronary artery of native heart without angina pectoris      blood glucose (NO BRAND SPECIFIED) test strip Use to test blood sugar 2 times daily or as directed. To accompany: Blood Glucose Monitor Brands: per insurance.  Qty: 100 strip, Refills: 6    Associated Diagnoses: Type 2 diabetes mellitus with diabetic polyneuropathy, without long-term current use of insulin (H)      blood glucose calibration (NO BRAND SPECIFIED) solution To accompany: Blood Glucose Monitor Brands: per insurance.  Qty: 1 Bottle, Refills: 3    Associated Diagnoses: Type 2 diabetes mellitus with diabetic polyneuropathy, without long-term current use of insulin (H)      blood glucose monitoring (NO BRAND SPECIFIED) meter device kit Use to test blood sugar 2 times daily or as directed. Preferred blood glucose meter OR supplies to accompany: Blood Glucose Monitor Brands: per insurance.  Qty: 1 kit, Refills: 0    Associated Diagnoses: Type 2 diabetes mellitus with diabetic  polyneuropathy, without long-term current use of insulin (H)      cloNIDine (CATAPRES-TTS1) 0.1 MG/24HR WK patch Place 2 patches onto the skin once a week  Qty: 10 patch, Refills: 0    Associated Diagnoses: Hypertension goal BP (blood pressure) < 140/90      clopidogrel (PLAVIX) 75 MG tablet Take 1 tablet (75 mg) by mouth daily  Qty: 30 tablet, Refills: 0    Associated Diagnoses: Coronary artery disease involving native coronary artery of native heart without angina pectoris      empagliflozin (JARDIANCE) 25 MG TABS tablet Take 1 tablet (25 mg) by mouth daily  Qty: 30 tablet, Refills: 0    Associated Diagnoses: Type 2 diabetes mellitus with diabetic polyneuropathy, without long-term current use of insulin (H)      isosorbide mononitrate (IMDUR) 30 MG 24 hr tablet Take 1 tablet (30 mg) by mouth daily  Qty: 30 tablet, Refills: 0    Associated Diagnoses: Coronary artery disease involving native coronary artery of native heart without angina pectoris      MELATONIN PO Take 3 mg by mouth nightly as needed       metoprolol succinate ER (TOPROL-XL) 100 MG 24 hr tablet Take 1.5 tablets (150 mg) by mouth daily  Qty: 45 tablet, Refills: 0    Associated Diagnoses: Hypertension goal BP (blood pressure) < 140/90      pantoprazole (PROTONIX) 40 MG EC tablet Take 1 tablet (40 mg) by mouth every morning (before breakfast)  Qty: 30 tablet, Refills: 0    Associated Diagnoses: Gastroesophageal reflux disease without esophagitis      PARoxetine (PAXIL) 40 MG tablet Take 1 tablet (40 mg) by mouth every morning  Qty: 30 tablet, Refills: 0    Associated Diagnoses: Major depressive disorder, recurrent episode, moderate (H)      thin (NO BRAND SPECIFIED) lancets Use with lanceting device. To accompany: Blood Glucose Monitor Brands: per insurance.  Qty: 100 each, Refills: 6    Associated Diagnoses: Type 2 diabetes mellitus with diabetic polyneuropathy, without long-term current use of insulin (H)         STOP taking these medications        cefdinir (OMNICEF) 300 MG capsule Comments:   Reason for Stopping:             Allergies   Allergies   Allergen Reactions     Sulfa Drugs Hives

## 2020-12-15 NOTE — PLAN OF CARE
S-(situation): Patient discharged to [facility] TCU via wheelchair with daughter.    B-(background): Covid Positive    A-(assessment): VSS, on room air. Denies pain. Alert and Orientated x4. Lungs are clear. Denies any SOB. Tolerating regular diet. Denies nausea. Skin intact with large bruise to left upper arm, sling in place from previous left shoulder fracture. Adequate urine output. Patient up with assist of 1.  Last bowel movement: 12/14/20     R-(recommendations):Report called to [facility] [place]. Listed belongings gathered and sent with patient.     Discharge Nursing Criteria:     Care Plan and Patient education resolved: Yes    Vaccines  Influenza status verified at discharge:  Yes    MISC  Home and hospital aquired medications returned to patient: Yes  Medication Bin checked and emptied on discharge Yes  All paperwork sent with patient/Copy of AVS given to patient or family Yes.

## 2020-12-15 NOTE — PROGRESS NOTES
Eliza Dubois  Gender: female  : 1940  [address]  [facility] [id number] [phone] (home)     Medical Record: [id number]  Pharmacy:    [id number] - [place], [place] - [address] [facility] - [place], [place] - [address]  [facility] - [place], [place] - [address]  Primary Care Provider: Byron Holm    Parent's names are: Data Unavailable (mother) and Data Unavailable (father).      [facility]  December 15, 2020     Discharge Phone Call:  Discharge to [facility]

## 2020-12-16 ENCOUNTER — PATIENT OUTREACH (OUTPATIENT)
Dept: CARE COORDINATION | Facility: CLINIC | Age: 80
End: 2020-12-16

## 2020-12-16 DIAGNOSIS — U07.1 2019 NOVEL CORONAVIRUS DISEASE (COVID-19): Primary | ICD-10-CM

## 2020-12-16 LAB
BACTERIA SPEC CULT: NORMAL
SPECIMEN SOURCE: NORMAL

## 2020-12-16 NOTE — PROGRESS NOTES
Clinic Care Coordination Contact  Care Coordination Transition Communication      Clinical Data: Patient was hospitalized at [facility] from 12/12 to 12/15 with diagnosis of pneumonia, Covid.     Transition to Facility:              [facility]  (Phone: [phone] Fax: [phone])              Contact name and phone number/fax: Milly    Plan: RN/SW Care Coordinator will await notification from facility staff informing RN/SW Care Coordinator of patient's discharge plans/needs. RN/SW Care Coordinator will review chart and outreach to facility staff every 4 weeks and as needed.     Janey SOTON, RN, PHN, CCM  Primary Clinic Care Coordination    [facility]  Primary Care Clinics  [email or web address]   Office: [phone]  Employed by [facility]

## 2020-12-16 NOTE — PROGRESS NOTES
[place] [facility]  PRIMARY CARE PROVIDER AND CLINIC:  Byron Holm, DO, [address] [facility]  / [place] [id number]  Chief Complaint   Patient presents with     Hospital F/U     [place] Medical Record Number:  [id number]  Place of Service where encounter took place:  [facility] [place] ([facility]) [[id number]]    Eliza Dubois  is a 80 year old  (1940), admitted to the above facility from  [facility]. Hospital stay 12/12/20 through 12/15/20..  Admitted to this facility for  rehab, medical management and nursing care.    HPI:    HPI information obtained from: facility chart records, facility staff, patient report and [facility] chart review.   Brief Summary of Hospital Course: Eliza tested + for COVID on 12/3  12/8 - 12/12 -  NL hosp stay for N/V, diarrhea and weakness , tx with IVF and Eliquis started;  + UTI - tx with ceftriaxone; HTN - amlodipine and clonidine patch started, hydrochlorothiazide stopped given N/V/D.   12/12 - [facility] TCU - O2 dats 77% and lethargic - sent back to ER.   12/12 - 12/15 -  NL COVID Pneumonia/hypoxia - Bilateral infiltrates on chest CT attributed to COVID-19. Treated with  dexamethasone 6 mg daily,; two doses of Remdesivir and discharged to TCU with goal to wean Dex when able.      Updates on Status Since Skilled nursing Admission: She has had sig Hyperglycemia due to Dex - covered with novolog s/s insulin - generally takes BGM q day at home and well controlled.   States she does feel back to baseline with lungs now and is on room air sating above 95%.     CODE STATUS/ADVANCE DIRECTIVES DISCUSSION:   DNR / DNI  Patient's living condition: lives alone  ALLERGIES: Sulfa drugs  PAST MEDICAL HISTORY:  has a past medical history of Diabetic eye exam (H) (05/01/14), Heart attack (H), Pure hypercholesterolemia, Stented coronary artery, Type II or unspecified type diabetes mellitus without mention of complication, not stated as  uncontrolled (2002), Unspecified disease of pericardium (12/05/08), and Unspecified essential hypertension. She also has no past medical history of Malignant hyperthermia or PONV (postoperative nausea and vomiting).  PAST SURGICAL HISTORY:   has a past surgical history that includes NONSPECIFIC PROCEDURE (1988); LAPAROSCOPY, SURGICAL; CHOLECYSTECTOMY (1997); colonoscopy (04/15/09); Colonoscopy (6/18/2014); Abdomen surgery; Phacoemulsification clear cornea with standard intraocular lens implant (Right, 3/16/2016); and Phacoemulsification clear cornea with standard intraocular lens implant (Left, 3/30/2016).  FAMILY HISTORY: family history includes Arthritis in her mother; Cancer in her maternal grandmother and another family member; Diabetes in her father and mother; EYE* in her mother; Genitourinary Problems in her father; Heart Disease in her father, maternal grandmother, and mother; Hypertension in her mother; Lipids in her mother; Neurologic Disorder in her mother; Obesity in her mother; Osteoporosis in her mother.  SOCIAL HISTORY:   reports that she has never smoked. She has never used smokeless tobacco. She reports that she does not drink alcohol or use drugs.    Post Discharge Medication Reconciliation Status: discharge medications reconciled, continue medications without change    Current Outpatient Medications   Medication Sig Dispense Refill     dexamethasone (DECADRON) 6 MG tablet Take 0.5 tablets (3 mg) by mouth daily 7 tablet 0     insulin aspart (NOVOLOG PEN) 100 UNIT/ML pen Inject 1-7 Units Subcutaneous At Bedtime HIGH INSULIN RESISTANCE DOSING    if greater than 350 given 6 unit(s) 3 mL 0     insulin aspart (NOVOLOG PEN) 100 UNIT/ML pen Inject 1-10 Units Subcutaneous 3 times daily (before meals) Correction Scale - HIGH INSULIN RESISTANCE DOSING      200 - 250 - 4 unit(s)  250 - 350 - 8 unit(s)  >350 - 10 unit(s) and recheck in 2 hrs iff still greater than 351 call provider 3 mL 0     acetaminophen  (TYLENOL) 500 MG tablet Take 1-2 tablets (500-1,000 mg) by mouth every 6 hours as needed for pain 100 tablet 0     alcohol swab prep pads Use to swab area of injection/sarah as directed. 100 each 3     amLODIPine (NORVASC) 10 MG tablet Take 1 tablet (10 mg) by mouth daily 30 tablet 0     apixaban ANTICOAGULANT (ELIQUIS) 2.5 MG tablet Take 1 tablet (2.5 mg) by mouth 2 times daily 60 tablet 0     aspirin (ASA) 81 MG EC tablet Take 1 tablet (81 mg) by mouth daily 30 tablet 0     atorvastatin (LIPITOR) 80 MG tablet Take 1 tablet (80 mg) by mouth daily 30 tablet 0     blood glucose (NO BRAND SPECIFIED) test strip Use to test blood sugar 2 times daily or as directed. To accompany: Blood Glucose Monitor Brands: per insurance. 100 strip 6     blood glucose calibration (NO BRAND SPECIFIED) solution To accompany: Blood Glucose Monitor Brands: per insurance. 1 Bottle 3     blood glucose monitoring (NO BRAND SPECIFIED) meter device kit Use to test blood sugar 2 times daily or as directed. Preferred blood glucose meter OR supplies to accompany: Blood Glucose Monitor Brands: per insurance. 1 kit 0     [START ON 12/18/2020] cloNIDine (CATAPRES-TTS1) 0.1 MG/24HR WK patch Place 2 patches onto the skin once a week 10 patch 0     clopidogrel (PLAVIX) 75 MG tablet Take 1 tablet (75 mg) by mouth daily 30 tablet 0     empagliflozin (JARDIANCE) 25 MG TABS tablet Take 1 tablet (25 mg) by mouth daily 30 tablet 0     gabapentin (NEURONTIN) 300 MG capsule Take 1 capsule (300 mg) by mouth At Bedtime 30 capsule 0     glipiZIDE (GLUCOTROL) 10 MG tablet Take 2 tablets (20 mg) by mouth 2 times daily (before meals) 120 tablet 0     isosorbide mononitrate (IMDUR) 30 MG 24 hr tablet Take 1 tablet (30 mg) by mouth daily 30 tablet 0     lisinopril (ZESTRIL) 40 MG tablet Take 1 tablet (40 mg) by mouth At Bedtime 30 tablet 0     MELATONIN PO Take 3 mg by mouth nightly as needed        metFORMIN (GLUCOPHAGE-XR) 500 MG 24 hr tablet Take 2 tablets (1,000  "mg) by mouth 2 times daily (with meals) 120 tablet 0     metoprolol succinate ER (TOPROL-XL) 100 MG 24 hr tablet Take 1.5 tablets (150 mg) by mouth daily 45 tablet 0     pantoprazole (PROTONIX) 40 MG EC tablet Take 1 tablet (40 mg) by mouth every morning (before breakfast) 30 tablet 0     PARoxetine (PAXIL) 40 MG tablet Take 1 tablet (40 mg) by mouth every morning 30 tablet 0     thin (NO BRAND SPECIFIED) lancets Use with lanceting device. To accompany: Blood Glucose Monitor Brands: per insurance. 100 each 6         ROS:  10 point ROS of systems including Constitutional, Eyes, Respiratory, Cardiovascular, Gastroenterology, Genitourinary, Integumentary, Musculoskeletal, Psychiatric were all negative except for pertinent positives noted in my HPI.    Vitals:  /63   Pulse 82   Temp 98.9  F (37.2  C)   Resp 16   Ht 1.626 m (5' 4\")   Wt 61.2 kg (134 lb 14.4 oz)   SpO2 94%   BMI 23.16 kg/m    Exam:  GENERAL APPEARANCE:  Alert, in no distress  RESP:  respiratory effort and palpation of chest normal, auscultation of lungs clear , no respiratory distress  CV:  Palpation and auscultation of heart done , rate and rhythm reg, no murmur, no peripheral edema  ABDOMEN:  normal bowel sounds, soft, nontender, no hepatosplenomegaly or other masses  M/S:   Gait and station SBA, Digits and nails intact  SKIN:  Inspection and Palpation of skin and subcutaneous tissue intact  NEURO: 2-12 in normal limits and at patient's baseline  PSYCH:  insight and judgement, memory intact , affect and mood Pleasant     Lab/Diagnostic data:  Recent labs in [facility] reviewed by me today.  and   Most Recent 3 CBC's:  Recent Labs   Lab Test 12/15/20  0552 12/14/20  0604 12/13/20  0603   WBC 9.3 7.7 6.7   HGB 11.4* 10.2* 10.1*   MCV 88 88 88    199 139*     Most Recent 3 BMP's:  Recent Labs   Lab Test 12/15/20  0552 12/14/20  0604 12/13/20  0603    138 137   POTASSIUM 4.2 4.0 3.8   CHLORIDE 107 106 105   CO2 27 25 25   BUN 45* 39* 31* "   CR 0.91 1.00 0.88   ANIONGAP 4 7 7   ANDREI 8.2* 8.3* 8.0*   * 200* 264*     Most Recent Hemoglobin A1c:  Recent Labs   Lab Test 10/08/20  1032   A1C 6.9*     SNF accuchecks reviewed by me - 150 this am   Others in 200-300's  400 at evening.     ASSESSMENTPLAN:  Pneumonia due to 2019 novel coronavirus and Acute respiratory failure with hypoxia (H)  Significantly improved respiratory status -will back down on dex given adverse effect of hyperglycemia   follow-up on Tuesday with her. - she agrees    Type 2 diabetes mellitus with diabetic polyneuropathy, without long-term current use of insulin (H)  Hyperglycemia given Dex.   Will back down on dex, but given this am's reading won't add long acting - will change s/s to be higher coverage    Essential hypertension with goal blood pressure less than 140/90  Stable now, but suspect bp's will start to drop as time from covid and off dex   F/u on Tuesday and look to decrease clonidine first - .        Orders written by provider at facility    1. Change current TID AC Novolog sliding scale/ to :   200 - 250 - 4 unit(s)  250 - 350 - 8 unit(s)  >350 - 10 unit(s) and recheck in 2 hrs iff still greater than 351 call provider    2. Change HS s/s to if greater than 350 given 6 unit(s)  3. discontinue eliquis on 1/4/21  4. Decrease dex to 3 mg po q day.     Discussed all changes with pt and she agrees to new poc above.   Electronically signed by:  ANKITA Pike CNP

## 2020-12-17 ENCOUNTER — NURSING HOME VISIT (OUTPATIENT)
Dept: GERIATRICS | Facility: CLINIC | Age: 80
End: 2020-12-17
Payer: COMMERCIAL

## 2020-12-17 VITALS
TEMPERATURE: 98.9 F | WEIGHT: 134.9 LBS | BODY MASS INDEX: 23.03 KG/M2 | DIASTOLIC BLOOD PRESSURE: 63 MMHG | RESPIRATION RATE: 16 BRPM | SYSTOLIC BLOOD PRESSURE: 131 MMHG | OXYGEN SATURATION: 94 % | HEIGHT: 64 IN | HEART RATE: 82 BPM

## 2020-12-17 DIAGNOSIS — J12.82 PNEUMONIA DUE TO 2019 NOVEL CORONAVIRUS: Primary | ICD-10-CM

## 2020-12-17 DIAGNOSIS — U07.1 PNEUMONIA DUE TO 2019 NOVEL CORONAVIRUS: Primary | ICD-10-CM

## 2020-12-17 DIAGNOSIS — J96.01 ACUTE RESPIRATORY FAILURE WITH HYPOXIA (H): ICD-10-CM

## 2020-12-17 DIAGNOSIS — I10 ESSENTIAL HYPERTENSION WITH GOAL BLOOD PRESSURE LESS THAN 140/90: ICD-10-CM

## 2020-12-17 DIAGNOSIS — E11.42 TYPE 2 DIABETES MELLITUS WITH DIABETIC POLYNEUROPATHY, WITHOUT LONG-TERM CURRENT USE OF INSULIN (H): ICD-10-CM

## 2020-12-17 PROCEDURE — 99305 1ST NF CARE MODERATE MDM 35: CPT | Performed by: NURSE PRACTITIONER

## 2020-12-17 RX ORDER — DEXAMETHASONE 6 MG/1
3 TABLET ORAL DAILY
Qty: 7 TABLET | Refills: 0
Start: 2020-12-17 | End: 2020-12-22

## 2020-12-17 ASSESSMENT — MIFFLIN-ST. JEOR: SCORE: 1066.9

## 2020-12-17 NOTE — LETTER
12/17/2020        RE: Eliza Dubois  [address]  [facility] [address]        [place] [facility]  PRIMARY CARE PROVIDER AND CLINIC:  Byron Holm DO, [address] [facility]  / [place] [address]  Chief Complaint   Patient presents with     Hospital F/U     [place] Medical Record Number:  [id number]  Place of Service where encounter took place:  [facility] ([facility] [[id number]]    Eliza Dubois  is a 80 year old  (1940), admitted to the above facility from  [facility]. Hospital stay 12/12/20 through 12/15/20..  Admitted to this facility for  rehab, medical management and nursing care.    HPI:    HPI information obtained from: facility chart records, facility staff, patient report and [facility] chart review.   Brief Summary of Hospital Course: Eliza tested + for COVID on 12/3  12/8 - 12/12 - [facility] NL hosp stay for N/V, diarrhea and weakness , tx with IVF and Eliquis started;  + UTI - tx with ceftriaxone; HTN - amlodipine and clonidine patch started, hydrochlorothiazide stopped given N/V/D.   12/12 - [facility] TCU - O2 dats 77% and lethargic - sent back to ER.   12/12 - 12/15 -  NL COVID Pneumonia/hypoxia - Bilateral infiltrates on chest CT attributed to COVID-19. Treated with  dexamethasone 6 mg daily,; two doses of Remdesivir and discharged to TCU with goal to wean Dex when able.      Updates on Status Since Skilled nursing Admission: She has had sig Hyperglycemia due to Dex - covered with novolog s/s insulin - generally takes BGM q day at home and well controlled.   States she does feel back to baseline with lungs now and is on room air sating above 95%.     CODE STATUS/ADVANCE DIRECTIVES DISCUSSION:   DNR / DNI  Patient's living condition: lives alone  ALLERGIES: Sulfa drugs  PAST MEDICAL HISTORY:  has a past medical history of Diabetic eye exam (H) (05/01/14), Heart attack (H), Pure hypercholesterolemia, Stented coronary artery, Type II  or unspecified type diabetes mellitus without mention of complication, not stated as uncontrolled (2002), Unspecified disease of pericardium (12/05/08), and Unspecified essential hypertension. She also has no past medical history of Malignant hyperthermia or PONV (postoperative nausea and vomiting).  PAST SURGICAL HISTORY:   has a past surgical history that includes NONSPECIFIC PROCEDURE (1988); LAPAROSCOPY, SURGICAL; CHOLECYSTECTOMY (1997); colonoscopy (04/15/09); Colonoscopy (6/18/2014); Abdomen surgery; Phacoemulsification clear cornea with standard intraocular lens implant (Right, 3/16/2016); and Phacoemulsification clear cornea with standard intraocular lens implant (Left, 3/30/2016).  FAMILY HISTORY: family history includes Arthritis in her mother; Cancer in her maternal grandmother and another family member; Diabetes in her father and mother; EYE* in her mother; Genitourinary Problems in her father; Heart Disease in her father, maternal grandmother, and mother; Hypertension in her mother; Lipids in her mother; Neurologic Disorder in her mother; Obesity in her mother; Osteoporosis in her mother.  SOCIAL HISTORY:   reports that she has never smoked. She has never used smokeless tobacco. She reports that she does not drink alcohol or use drugs.    Post Discharge Medication Reconciliation Status: discharge medications reconciled, continue medications without change    Current Outpatient Medications   Medication Sig Dispense Refill     dexamethasone (DECADRON) 6 MG tablet Take 0.5 tablets (3 mg) by mouth daily 7 tablet 0     insulin aspart (NOVOLOG PEN) 100 UNIT/ML pen Inject 1-7 Units Subcutaneous At Bedtime HIGH INSULIN RESISTANCE DOSING    if greater than 350 given 6 unit(s) 3 mL 0     insulin aspart (NOVOLOG PEN) 100 UNIT/ML pen Inject 1-10 Units Subcutaneous 3 times daily (before meals) Correction Scale - HIGH INSULIN RESISTANCE DOSING      200 - 250 - 4 unit(s)  250 - 350 - 8 unit(s)  >350 - 10 unit(s) and  recheck in 2 hrs iff still greater than 351 call provider 3 mL 0     acetaminophen (TYLENOL) 500 MG tablet Take 1-2 tablets (500-1,000 mg) by mouth every 6 hours as needed for pain 100 tablet 0     alcohol swab prep pads Use to swab area of injection/sarah as directed. 100 each 3     amLODIPine (NORVASC) 10 MG tablet Take 1 tablet (10 mg) by mouth daily 30 tablet 0     apixaban ANTICOAGULANT (ELIQUIS) 2.5 MG tablet Take 1 tablet (2.5 mg) by mouth 2 times daily 60 tablet 0     aspirin (ASA) 81 MG EC tablet Take 1 tablet (81 mg) by mouth daily 30 tablet 0     atorvastatin (LIPITOR) 80 MG tablet Take 1 tablet (80 mg) by mouth daily 30 tablet 0     blood glucose (NO BRAND SPECIFIED) test strip Use to test blood sugar 2 times daily or as directed. To accompany: Blood Glucose Monitor Brands: per insurance. 100 strip 6     blood glucose calibration (NO BRAND SPECIFIED) solution To accompany: Blood Glucose Monitor Brands: per insurance. 1 Bottle 3     blood glucose monitoring (NO BRAND SPECIFIED) meter device kit Use to test blood sugar 2 times daily or as directed. Preferred blood glucose meter OR supplies to accompany: Blood Glucose Monitor Brands: per insurance. 1 kit 0     [START ON 12/18/2020] cloNIDine (CATAPRES-TTS1) 0.1 MG/24HR WK patch Place 2 patches onto the skin once a week 10 patch 0     clopidogrel (PLAVIX) 75 MG tablet Take 1 tablet (75 mg) by mouth daily 30 tablet 0     empagliflozin (JARDIANCE) 25 MG TABS tablet Take 1 tablet (25 mg) by mouth daily 30 tablet 0     gabapentin (NEURONTIN) 300 MG capsule Take 1 capsule (300 mg) by mouth At Bedtime 30 capsule 0     glipiZIDE (GLUCOTROL) 10 MG tablet Take 2 tablets (20 mg) by mouth 2 times daily (before meals) 120 tablet 0     isosorbide mononitrate (IMDUR) 30 MG 24 hr tablet Take 1 tablet (30 mg) by mouth daily 30 tablet 0     lisinopril (ZESTRIL) 40 MG tablet Take 1 tablet (40 mg) by mouth At Bedtime 30 tablet 0     MELATONIN PO Take 3 mg by mouth nightly as  "needed        metFORMIN (GLUCOPHAGE-XR) 500 MG 24 hr tablet Take 2 tablets (1,000 mg) by mouth 2 times daily (with meals) 120 tablet 0     metoprolol succinate ER (TOPROL-XL) 100 MG 24 hr tablet Take 1.5 tablets (150 mg) by mouth daily 45 tablet 0     pantoprazole (PROTONIX) 40 MG EC tablet Take 1 tablet (40 mg) by mouth every morning (before breakfast) 30 tablet 0     PARoxetine (PAXIL) 40 MG tablet Take 1 tablet (40 mg) by mouth every morning 30 tablet 0     thin (NO BRAND SPECIFIED) lancets Use with lanceting device. To accompany: Blood Glucose Monitor Brands: per insurance. 100 each 6         ROS:  10 point ROS of systems including Constitutional, Eyes, Respiratory, Cardiovascular, Gastroenterology, Genitourinary, Integumentary, Musculoskeletal, Psychiatric were all negative except for pertinent positives noted in my HPI.    Vitals:  /63   Pulse 82   Temp 98.9  F (37.2  C)   Resp 16   Ht 1.626 m (5' 4\")   Wt 61.2 kg (134 lb 14.4 oz)   SpO2 94%   BMI 23.16 kg/m    Exam:  GENERAL APPEARANCE:  Alert, in no distress  RESP:  respiratory effort and palpation of chest normal, auscultation of lungs clear , no respiratory distress  CV:  Palpation and auscultation of heart done , rate and rhythm reg, no murmur, no peripheral edema  ABDOMEN:  normal bowel sounds, soft, nontender, no hepatosplenomegaly or other masses  M/S:   Gait and station SBA, Digits and nails intact  SKIN:  Inspection and Palpation of skin and subcutaneous tissue intact  NEURO: 2-12 in normal limits and at patient's baseline  PSYCH:  insight and judgement, memory intact , affect and mood Pleasant     Lab/Diagnostic data:  Recent labs in [facility] reviewed by me today.  and   Most Recent 3 CBC's:  Recent Labs   Lab Test 12/15/20  0552 12/14/20  0604 12/13/20  0603   WBC 9.3 7.7 6.7   HGB 11.4* 10.2* 10.1*   MCV 88 88 88    199 139*     Most Recent 3 BMP's:  Recent Labs   Lab Test 12/15/20  0552 12/14/20  0604 12/13/20  0603    138 " 137   POTASSIUM 4.2 4.0 3.8   CHLORIDE 107 106 105   CO2 27 25 25   BUN 45* 39* 31*   CR 0.91 1.00 0.88   ANIONGAP 4 7 7   ANDREI 8.2* 8.3* 8.0*   * 200* 264*     Most Recent Hemoglobin A1c:  Recent Labs   Lab Test 10/08/20  1032   A1C 6.9*     SNF accuchecks reviewed by me - 150 this am   Others in 200-300's  400 at evening.     ASSESSMENTPLAN:  Pneumonia due to 2019 novel coronavirus and Acute respiratory failure with hypoxia (H)  Significantly improved respiratory status -will back down on dex given adverse effect of hyperglycemia   follow-up on Tuesday with her. - she agrees    Type 2 diabetes mellitus with diabetic polyneuropathy, without long-term current use of insulin (H)  Hyperglycemia given Dex.   Will back down on dex, but given this am's reading won't add long acting - will change s/s to be higher coverage    Essential hypertension with goal blood pressure less than 140/90  Stable now, but suspect bp's will start to drop as time from covid and off dex   F/u on Tuesday and look to decrease clonidine first - .        Orders written by provider at facility    1. Change current TID AC Novolog sliding scale/ to :   200 - 250 - 4 unit(s)  250 - 350 - 8 unit(s)  >350 - 10 unit(s) and recheck in 2 hrs iff still greater than 351 call provider    2. Change HS s/s to if greater than 350 given 6 unit(s)  3. discontinue eliquis on 1/4/21  4. Decrease dex to 3 mg po q day.     Discussed all changes with pt and she agrees to new poc above.   Electronically signed by:  ANKITA Pike CNP                           Sincerely,        ANKITA Pike CNP

## 2020-12-18 ENCOUNTER — DOCUMENTATION ONLY (OUTPATIENT)
Dept: OTHER | Facility: CLINIC | Age: 80
End: 2020-12-18

## 2020-12-19 LAB — RENIN PLAS-CCNC: 0.2 NG/ML/HR

## 2020-12-20 ENCOUNTER — TELEPHONE (OUTPATIENT)
Dept: GERIATRICS | Facility: CLINIC | Age: 80
End: 2020-12-20

## 2020-12-21 ENCOUNTER — OFFICE VISIT (OUTPATIENT)
Dept: ORTHOPEDICS | Facility: CLINIC | Age: 80
End: 2020-12-21
Payer: COMMERCIAL

## 2020-12-21 ENCOUNTER — ANCILLARY PROCEDURE (OUTPATIENT)
Dept: GENERAL RADIOLOGY | Facility: CLINIC | Age: 80
End: 2020-12-21
Attending: ORTHOPAEDIC SURGERY
Payer: COMMERCIAL

## 2020-12-21 VITALS
WEIGHT: 131 LBS | SYSTOLIC BLOOD PRESSURE: 110 MMHG | HEIGHT: 64 IN | BODY MASS INDEX: 22.36 KG/M2 | DIASTOLIC BLOOD PRESSURE: 60 MMHG

## 2020-12-21 DIAGNOSIS — S42.292A OTHER CLOSED DISPLACED FRACTURE OF PROXIMAL END OF LEFT HUMERUS, INITIAL ENCOUNTER: Primary | ICD-10-CM

## 2020-12-21 DIAGNOSIS — M25.512 LEFT SHOULDER PAIN: ICD-10-CM

## 2020-12-21 PROCEDURE — 73030 X-RAY EXAM OF SHOULDER: CPT | Mod: TC | Performed by: RADIOLOGY

## 2020-12-21 PROCEDURE — 99203 OFFICE O/P NEW LOW 30 MIN: CPT | Performed by: ORTHOPAEDIC SURGERY

## 2020-12-21 ASSESSMENT — PAIN SCALES - GENERAL: PAINLEVEL: NO PAIN (0)

## 2020-12-21 ASSESSMENT — MIFFLIN-ST. JEOR: SCORE: 1049.21

## 2020-12-21 NOTE — TELEPHONE ENCOUNTER
Patient's BG level remains 469 at 1930 following dinner insulin - is scheduled to receive 6u at     ORDERS  -Give additional 4u  -Recheck BG 1h after administration and update provider as needed    ANKITA Tolbert, [facility]  [place] Office: [address] [place] [address] [address] [place] [address]   [place] Cell: [phone]  [place] Fax: [phone]    [facility] Offce: [address] [facility] [address] [place] [address]  [facility] Phone: [phone]  [facility] Voicemail: [phone]    Email: [email or web address]

## 2020-12-21 NOTE — LETTER
12/21/2020         RE: Eliza Dubois  [address]  [facility] [address]        Dear Colleague,    Thank you for referring your patient, Eliza Dubois, to the [facility]. Please see a copy of my visit note below.    ORTHOPEDIC CONSULT      Chief Complaint: Eliza Dubois is a 80 year old female who is being seen for   Chief Complaint   Patient presents with     Musculoskeletal Problem     left humerus injury DOI: 11/22/2020     Consult       History of Present Illness:   Initially seen in the ER November 22, 2020.  Ground-level fall.  It was recommended a sling/shoulder immobilizer with 1 week follow-up.  Chart review shows she was subsequently admitted twice to the hospital for Covid.  Following up today for their first follow-up.  Minimal shoulder pain.  She has been using her sling.  Resting no major issues or pain.    Patient's past medical, surgical, social and family histories reviewed.     Past Medical History:   Diagnosis Date     Diabetic eye exam (H) 05/01/14     Heart attack (H)      Pure hypercholesterolemia      Stented coronary artery      Type II or unspecified type diabetes mellitus without mention of complication, not stated as uncontrolled 2002    Avandamet.     Unspecified disease of pericardium 12/05/08    Pericarditis w/mild to moderate pericardial effusion.     Unspecified essential hypertension        Past Surgical History:   Procedure Laterality Date     ABDOMEN SURGERY       COLONOSCOPY  04/15/09     COLONOSCOPY  6/18/2014    Procedure: COMBINED COLONOSCOPY, SINGLE BIOPSY/POLYPECTOMY BY BIOPSY;  Surgeon: Earle Mon MD;  Location: [facility] LAPAROSCOPY, SURGICAL; CHOLECYSTECTOMY  1997    Cholecystectomy, Laparoscopic     PHACOEMULSIFICATION CLEAR CORNEA WITH STANDARD INTRAOCULAR LENS IMPLANT Right 3/16/2016    Procedure: PHACOEMULSIFICATION CLEAR CORNEA WITH STANDARD INTRAOCULAR LENS IMPLANT;  Surgeon: George Lemus MD;  Location:   [facility]     PHACOEMULSIFICATION CLEAR CORNEA WITH STANDARD INTRAOCULAR LENS IMPLANT Left 3/30/2016    Procedure: PHACOEMULSIFICATION CLEAR CORNEA WITH STANDARD INTRAOCULAR LENS IMPLANT;  Surgeon: George Lemus MD;  Location: [facility] NONSPECIFIC PROCEDURE  1988    Hysterectomy       Medications:       acetaminophen (TYLENOL) 500 MG tablet, Take 1-2 tablets (500-1,000 mg) by mouth every 6 hours as needed for pain       alcohol swab prep pads, Use to swab area of injection/sarah as directed.       amLODIPine (NORVASC) 10 MG tablet, Take 1 tablet (10 mg) by mouth daily       apixaban ANTICOAGULANT (ELIQUIS) 2.5 MG tablet, Take 1 tablet (2.5 mg) by mouth 2 times daily       aspirin (ASA) 81 MG EC tablet, Take 1 tablet (81 mg) by mouth daily       atorvastatin (LIPITOR) 80 MG tablet, Take 1 tablet (80 mg) by mouth daily       blood glucose (NO BRAND SPECIFIED) test strip, Use to test blood sugar 2 times daily or as directed. To accompany: Blood Glucose Monitor Brands: per insurance.       blood glucose calibration (NO BRAND SPECIFIED) solution, To accompany: Blood Glucose Monitor Brands: per insurance.       blood glucose monitoring (NO BRAND SPECIFIED) meter device kit, Use to test blood sugar 2 times daily or as directed. Preferred blood glucose meter OR supplies to accompany: Blood Glucose Monitor Brands: per insurance.       cloNIDine (CATAPRES-TTS1) 0.1 MG/24HR WK patch, Place 2 patches onto the skin once a week       clopidogrel (PLAVIX) 75 MG tablet, Take 1 tablet (75 mg) by mouth daily       dexamethasone (DECADRON) 6 MG tablet, Take 0.5 tablets (3 mg) by mouth daily       empagliflozin (JARDIANCE) 25 MG TABS tablet, Take 1 tablet (25 mg) by mouth daily       gabapentin (NEURONTIN) 300 MG capsule, Take 1 capsule (300 mg) by mouth At Bedtime       glipiZIDE (GLUCOTROL) 10 MG tablet, Take 2 tablets (20 mg) by mouth 2 times daily (before meals)       insulin aspart (NOVOLOG PEN) 100 UNIT/ML pen, Inject 1-7 Units  Subcutaneous At Bedtime HIGH INSULIN RESISTANCE DOSING    if greater than 350 given 6 unit(s)       insulin aspart (NOVOLOG PEN) 100 UNIT/ML pen, Inject 1-10 Units Subcutaneous 3 times daily (before meals) Correction Scale - HIGH INSULIN RESISTANCE DOSING      200 - 250 - 4 unit(s)  250 - 350 - 8 unit(s)  >350 - 10 unit(s) and recheck in 2 hrs iff still greater than 351 call provider       isosorbide mononitrate (IMDUR) 30 MG 24 hr tablet, Take 1 tablet (30 mg) by mouth daily       lisinopril (ZESTRIL) 40 MG tablet, Take 1 tablet (40 mg) by mouth At Bedtime       MELATONIN PO, Take 3 mg by mouth nightly as needed        metFORMIN (GLUCOPHAGE-XR) 500 MG 24 hr tablet, Take 2 tablets (1,000 mg) by mouth 2 times daily (with meals)       metoprolol succinate ER (TOPROL-XL) 100 MG 24 hr tablet, Take 1.5 tablets (150 mg) by mouth daily       pantoprazole (PROTONIX) 40 MG EC tablet, Take 1 tablet (40 mg) by mouth every morning (before breakfast)       PARoxetine (PAXIL) 40 MG tablet, Take 1 tablet (40 mg) by mouth every morning       thin (NO BRAND SPECIFIED) lancets, Use with lanceting device. To accompany: Blood Glucose Monitor Brands: per insurance.    No current facility-administered medications on file prior to visit.       Allergies   Allergen Reactions     Sulfa Drugs Hives       Social History     Occupational History     Not on file   Tobacco Use     Smoking status: Never Smoker     Smokeless tobacco: Never Used   Substance and Sexual Activity     Alcohol use: No     Alcohol/week: 0.0 standard drinks     Drug use: No     Sexual activity: Yes     Partners: Male     Birth control/protection: Surgical       Family History   Problem Relation Age of Onset     Diabetes Mother      Arthritis Mother      Heart Disease Mother      Hypertension Mother      Lipids Mother      Neurologic Disorder Mother         neuropathy     Osteoporosis Mother      Obesity Mother      EYE* Mother         blind     Diabetes Father       "Genitourinary Problems Father         gall stones     Heart Disease Father         MI     Cancer Maternal Grandmother      Heart Disease Maternal Grandmother      Cancer Other         Grandparents     Alcohol/Drug No family hx of      Alzheimer Disease No family hx of      Anesthesia Reaction No family hx of      Blood Disease No family hx of      Depression No family hx of      Genetic Disorder No family hx of      Gastrointestinal Disease No family hx of      Gynecology No family hx of      Psychotic Disorder No family hx of      Respiratory No family hx of      Cerebrovascular Disease No family hx of        REVIEW OF SYSTEMS  10 point review systems performed otherwise negative as noted as per history of present illness.    Physical Exam:  Vitals: /60   Ht 1.626 m (5' 4\")   Wt 59.4 kg (131 lb)   BMI 22.49 kg/m    BMI= Body mass index is 22.49 kg/m .  Constitutional: healthy, alert and no acute distress   Psychiatric: mentation appears normal and affect normal/bright  NEURO: no focal deficits  RESP: Normal with easy respirations and no use of accessory muscles to breathe, no audible wheezing or retractions  CV: LUE:   no edema         Regular rate and rhythm by palpation  SKIN: No erythema, rashes, excoriation, or breakdown. No evidence of infection.   JOINT/EXTREMITIES:left older: No gross deformity.  Passive motion tested to proximally 90/90.  Soft endpoint.  Range of motion not pushed.  Shoulder appears to move as a unit.  Distal neurovascular is intact.  Elbow with full motion.    GAIT: not tested     Diagnostic Modalities:  left shoulder X-ray: Impacted left surgical neck proximal humerus fracture.  There is some slight varus alignment.  Overall articular space is well-maintained.  No subluxation or dislocation.  Independent visualization of the images was performed.      Impression: left proximal humerus fracture-date of injury November 22, 2020 and 80-year-old female    Plan:  All of the above " pertinent physical exam and imaging modalities findings was reviewed with Eliza.    Overall making some progress clinically.  Unchanged radiographs.  Recommend continue nonoperative care.  Continue the sling for comfort.  I would recommend not using the arm to lift yet.  Okay to work on some elbow range of motion.  Plan to see her back in 4 weeks.  At that time repeat radiographs and anticipate starting some therapy.      Return to clinic 4, week(s), or sooner as needed for changes.  Re-x-ray on return: Yes.  Dee and sal Cameron D.O.      Again, thank you for allowing me to participate in the care of your patient.        Sincerely,        Deon Cameron, DO

## 2020-12-21 NOTE — PROGRESS NOTES
ORTHOPEDIC CONSULT      Chief Complaint: Eliza Dubois is a 80 year old female who is being seen for   Chief Complaint   Patient presents with     Musculoskeletal Problem     left humerus injury DOI: 11/22/2020     Consult       History of Present Illness:   Initially seen in the ER November 22, 2020.  Ground-level fall.  It was recommended a sling/shoulder immobilizer with 1 week follow-up.  Chart review shows she was subsequently admitted twice to the hospital for Covid.  Following up today for their first follow-up.  Minimal shoulder pain.  She has been using her sling.  Resting no major issues or pain.    Patient's past medical, surgical, social and family histories reviewed.     Past Medical History:   Diagnosis Date     Diabetic eye exam (H) 05/01/14     Heart attack (H)      Pure hypercholesterolemia      Stented coronary artery      Type II or unspecified type diabetes mellitus without mention of complication, not stated as uncontrolled 2002    Avandamet.     Unspecified disease of pericardium 12/05/08    Pericarditis w/mild to moderate pericardial effusion.     Unspecified essential hypertension        Past Surgical History:   Procedure Laterality Date     ABDOMEN SURGERY       COLONOSCOPY  04/15/09     COLONOSCOPY  6/18/2014    Procedure: COMBINED COLONOSCOPY, SINGLE BIOPSY/POLYPECTOMY BY BIOPSY;  Surgeon: Earle Mon MD;  Location: [facility] LAPAROSCOPY, SURGICAL; CHOLECYSTECTOMY  1997    Cholecystectomy, Laparoscopic     PHACOEMULSIFICATION CLEAR CORNEA WITH STANDARD INTRAOCULAR LENS IMPLANT Right 3/16/2016    Procedure: PHACOEMULSIFICATION CLEAR CORNEA WITH STANDARD INTRAOCULAR LENS IMPLANT;  Surgeon: George Lemus MD;  Location: [facility]     PHACOEMULSIFICATION CLEAR CORNEA WITH STANDARD INTRAOCULAR LENS IMPLANT Left 3/30/2016    Procedure: PHACOEMULSIFICATION CLEAR CORNEA WITH STANDARD INTRAOCULAR LENS IMPLANT;  Surgeon: George Lemus MD;  Location: [facility] NONSPECIFIC  PROCEDURE  1988    Hysterectomy       Medications:       acetaminophen (TYLENOL) 500 MG tablet, Take 1-2 tablets (500-1,000 mg) by mouth every 6 hours as needed for pain       alcohol swab prep pads, Use to swab area of injection/sarah as directed.       amLODIPine (NORVASC) 10 MG tablet, Take 1 tablet (10 mg) by mouth daily       apixaban ANTICOAGULANT (ELIQUIS) 2.5 MG tablet, Take 1 tablet (2.5 mg) by mouth 2 times daily       aspirin (ASA) 81 MG EC tablet, Take 1 tablet (81 mg) by mouth daily       atorvastatin (LIPITOR) 80 MG tablet, Take 1 tablet (80 mg) by mouth daily       blood glucose (NO BRAND SPECIFIED) test strip, Use to test blood sugar 2 times daily or as directed. To accompany: Blood Glucose Monitor Brands: per insurance.       blood glucose calibration (NO BRAND SPECIFIED) solution, To accompany: Blood Glucose Monitor Brands: per insurance.       blood glucose monitoring (NO BRAND SPECIFIED) meter device kit, Use to test blood sugar 2 times daily or as directed. Preferred blood glucose meter OR supplies to accompany: Blood Glucose Monitor Brands: per insurance.       cloNIDine (CATAPRES-TTS1) 0.1 MG/24HR WK patch, Place 2 patches onto the skin once a week       clopidogrel (PLAVIX) 75 MG tablet, Take 1 tablet (75 mg) by mouth daily       dexamethasone (DECADRON) 6 MG tablet, Take 0.5 tablets (3 mg) by mouth daily       empagliflozin (JARDIANCE) 25 MG TABS tablet, Take 1 tablet (25 mg) by mouth daily       gabapentin (NEURONTIN) 300 MG capsule, Take 1 capsule (300 mg) by mouth At Bedtime       glipiZIDE (GLUCOTROL) 10 MG tablet, Take 2 tablets (20 mg) by mouth 2 times daily (before meals)       insulin aspart (NOVOLOG PEN) 100 UNIT/ML pen, Inject 1-7 Units Subcutaneous At Bedtime HIGH INSULIN RESISTANCE DOSING    if greater than 350 given 6 unit(s)       insulin aspart (NOVOLOG PEN) 100 UNIT/ML pen, Inject 1-10 Units Subcutaneous 3 times daily (before meals) Correction Scale - HIGH INSULIN RESISTANCE  DOSING      200 - 250 - 4 unit(s)  250 - 350 - 8 unit(s)  >350 - 10 unit(s) and recheck in 2 hrs iff still greater than 351 call provider       isosorbide mononitrate (IMDUR) 30 MG 24 hr tablet, Take 1 tablet (30 mg) by mouth daily       lisinopril (ZESTRIL) 40 MG tablet, Take 1 tablet (40 mg) by mouth At Bedtime       MELATONIN PO, Take 3 mg by mouth nightly as needed        metFORMIN (GLUCOPHAGE-XR) 500 MG 24 hr tablet, Take 2 tablets (1,000 mg) by mouth 2 times daily (with meals)       metoprolol succinate ER (TOPROL-XL) 100 MG 24 hr tablet, Take 1.5 tablets (150 mg) by mouth daily       pantoprazole (PROTONIX) 40 MG EC tablet, Take 1 tablet (40 mg) by mouth every morning (before breakfast)       PARoxetine (PAXIL) 40 MG tablet, Take 1 tablet (40 mg) by mouth every morning       thin (NO BRAND SPECIFIED) lancets, Use with lanceting device. To accompany: Blood Glucose Monitor Brands: per insurance.    No current facility-administered medications on file prior to visit.       Allergies   Allergen Reactions     Sulfa Drugs Hives       Social History     Occupational History     Not on file   Tobacco Use     Smoking status: Never Smoker     Smokeless tobacco: Never Used   Substance and Sexual Activity     Alcohol use: No     Alcohol/week: 0.0 standard drinks     Drug use: No     Sexual activity: Yes     Partners: Male     Birth control/protection: Surgical       Family History   Problem Relation Age of Onset     Diabetes Mother      Arthritis Mother      Heart Disease Mother      Hypertension Mother      Lipids Mother      Neurologic Disorder Mother         neuropathy     Osteoporosis Mother      Obesity Mother      EYE* Mother         blind     Diabetes Father      Genitourinary Problems Father         gall stones     Heart Disease Father         MI     Cancer Maternal Grandmother      Heart Disease Maternal Grandmother      Cancer Other         Grandparents     Alcohol/Drug No family hx of      Alzheimer Disease No  "family hx of      Anesthesia Reaction No family hx of      Blood Disease No family hx of      Depression No family hx of      Genetic Disorder No family hx of      Gastrointestinal Disease No family hx of      Gynecology No family hx of      Psychotic Disorder No family hx of      Respiratory No family hx of      Cerebrovascular Disease No family hx of        REVIEW OF SYSTEMS  10 point review systems performed otherwise negative as noted as per history of present illness.    Physical Exam:  Vitals: /60   Ht 1.626 m (5' 4\")   Wt 59.4 kg (131 lb)   BMI 22.49 kg/m    BMI= Body mass index is 22.49 kg/m .  Constitutional: healthy, alert and no acute distress   Psychiatric: mentation appears normal and affect normal/bright  NEURO: no focal deficits  RESP: Normal with easy respirations and no use of accessory muscles to breathe, no audible wheezing or retractions  CV: LUE:   no edema         Regular rate and rhythm by palpation  SKIN: No erythema, rashes, excoriation, or breakdown. No evidence of infection.   JOINT/EXTREMITIES:left older: No gross deformity.  Passive motion tested to proximally 90/90.  Soft endpoint.  Range of motion not pushed.  Shoulder appears to move as a unit.  Distal neurovascular is intact.  Elbow with full motion.    GAIT: not tested     Diagnostic Modalities:  left shoulder X-ray: Impacted left surgical neck proximal humerus fracture.  There is some slight varus alignment.  Overall articular space is well-maintained.  No subluxation or dislocation.  Independent visualization of the images was performed.      Impression: left proximal humerus fracture-date of injury November 22, 2020 and 80-year-old female    Plan:  All of the above pertinent physical exam and imaging modalities findings was reviewed with Eliza.    Overall making some progress clinically.  Unchanged radiographs.  Recommend continue nonoperative care.  Continue the sling for comfort.  I would recommend not using the arm " to lift yet.  Okay to work on some elbow range of motion.  Plan to see her back in 4 weeks.  At that time repeat radiographs and anticipate starting some therapy.      Return to clinic 4, week(s), or sooner as needed for changes.  Re-x-ray on return: Yes.  Gala Cameron D.O.

## 2020-12-22 ENCOUNTER — NURSING HOME VISIT (OUTPATIENT)
Dept: GERIATRICS | Facility: CLINIC | Age: 80
End: 2020-12-22
Payer: COMMERCIAL

## 2020-12-22 VITALS
HEIGHT: 64 IN | RESPIRATION RATE: 18 BRPM | TEMPERATURE: 98.9 F | SYSTOLIC BLOOD PRESSURE: 135 MMHG | BODY MASS INDEX: 21.54 KG/M2 | OXYGEN SATURATION: 95 % | DIASTOLIC BLOOD PRESSURE: 71 MMHG | HEART RATE: 78 BPM | WEIGHT: 126.2 LBS

## 2020-12-22 DIAGNOSIS — J12.82 PNEUMONIA DUE TO 2019 NOVEL CORONAVIRUS: Primary | ICD-10-CM

## 2020-12-22 DIAGNOSIS — U07.1 PNEUMONIA DUE TO 2019 NOVEL CORONAVIRUS: Primary | ICD-10-CM

## 2020-12-22 DIAGNOSIS — E11.42 TYPE 2 DIABETES MELLITUS WITH DIABETIC POLYNEUROPATHY, WITHOUT LONG-TERM CURRENT USE OF INSULIN (H): ICD-10-CM

## 2020-12-22 DIAGNOSIS — I10 HYPERTENSION GOAL BP (BLOOD PRESSURE) < 140/90: ICD-10-CM

## 2020-12-22 PROCEDURE — 99309 SBSQ NF CARE MODERATE MDM 30: CPT | Performed by: NURSE PRACTITIONER

## 2020-12-22 RX ORDER — CLONIDINE 0.1 MG/24H
1 PATCH, EXTENDED RELEASE TRANSDERMAL WEEKLY
Qty: 10 PATCH | Refills: 0
Start: 2020-12-22 | End: 2020-12-28

## 2020-12-22 ASSESSMENT — MIFFLIN-ST. JEOR: SCORE: 1027.44

## 2020-12-22 NOTE — LETTER
12/22/2020        RE: Eliza Dubois  [address]  [facility] [address]        [facility]    Chief Complaint   Patient presents with     Nursing Home Acute     HPI:    Eliza Dubois is a 80 year old  (1940), who is being seen today for an episodic care visit at: [facility] ([facility]) [[id number]]  Today's concern is: f/u DMII - sig hyperglycemia due to Dex. F/u on lung condition post COVID after Dex decrease.    Eliza is rehabbing s/p testing + for COVID on 12/3,   12/8 - 12/12 - [facility] hosp stay for N/V, diarrhea and weakness , tx with IVF and Eliquis started;  + UTI - tx with ceftriaxone; HTN - amlodipine and clonidine patch started, hydrochlorothiazide stopped given N/V/D. Noted previous fall on 11/22/2020 with L shoulder and humerus x-ray =  impacted comminuted fracture of the proximal humerus fracture line at the greater tuberosity with mild displacement. Dr. Cameron consulted and recommended either a sling or shoulder immobilizer.    12/12 - [facility] TCU - O2 dats 77% and lethargic - sent back to ER.   12/12 - 12/15 -  NL COVID Pneumonia/hypoxia - Bilateral infiltrates on chest CT attributed to COVID-19. Treated with  dexamethasone 6 mg daily,; two doses of Remdesivir and discharged to TCU with goal to wean Dex when able.    At SNF:  12/17: Novolg s/s changed to be less variable, orders to discontinue eliquis on 1/4/21; decreased dex to 3 mg from 6 12/21: Dr. Cameron Ortho f/u - cont sling for comfort - OK for Elbow ROM, but NWB/lift.  - f/u in 4 weeks.     F/u today and she is doing great - off O2 (only needed twice in the last 3 days), doesn't have any increase cough given Dex decrease, Worried about her hypoglycemia t/o day - noted am's stable at 90 - 100's.       PMH/PSH reviewed in EPIC today.  REVIEW OF SYSTEMS:  4 point ROS including Respiratory, CV, GI and , other than that noted in the HPI,  is negative    EXAM:  /71   Pulse 78   Temp 98.9  F  "(37.2  C)   Resp 18   Ht 1.626 m (5' 4\")   Wt 57.2 kg (126 lb 3.2 oz)   SpO2 95%   BMI 21.66 kg/m    GENERAL APPEARANCE:  Alert, in no distress  RESP:  respiratory effort and palpation of chest normal, auscultation of lungs clear , no respiratory distress  CV:  Palpation and auscultation of heart done , rate and rhythm reg, no murmur, no peripheral edema  ABDOMEN:  normal bowel sounds, soft, nontender, no hepatosplenomegaly or other masses  M/S:   Gait and station SBA, Digits and nails intact. L arm in sling  SKIN:  Inspection and Palpation of skin and subcutaneous tissue intact  NEURO: 2-12 in normal limits and at patient's baseline  PSYCH:  insight and judgement, memory intact/clear , affect and mood Pleasant     Assessment/Plan:  Pneumonia due to 2019 novel coronavirus  Much improved respiratory condition - will trial stopping Dex due to stabiltiy and s/e of DMII of dex.   F/u on Thur.   Cont off O2 as able.     Type 2 diabetes mellitus with diabetic polyneuropathy, without long-term current use of insulin (H)  Swings of hyperglycemia 2/2 Dex - will stop and f/u -  Goal to be off novolog by TCU discharge   Discussed possible time line for discharge in ~ 1 week.     Hypertension goal BP (blood pressure) < 140/90  Lower than needed - will reduce catapres patch from  0.2 to 0.1 (take one off today)      Orders:  1. Change O2 to 1-2 L NC prn to keep sats > 93% - wean to off  2. Decrease clonidine patch to 0.1/ - (remove 1 patch today)  3. accuehcks QID (noted being done, but ordered only tid in matrix)  4. discontinue dex.     Electronically signed by:  ANKITA Pike CNP            Sincerely,        ANKITA Pike CNP    "

## 2020-12-22 NOTE — PROGRESS NOTES
"[facility]    Chief Complaint   Patient presents with     Nursing Home Acute     HPI:    Eliza Dubois is a 80 year old  (1940), who is being seen today for an episodic care visit at: [facility] ([facility]) [[id number]]  Today's concern is: f/u DMII - sig hyperglycemia due to Dex. F/u on lung condition post COVID after Dex decrease.    Eliza is rehabbing s/p testing + for COVID on 12/3,   12/8 - 12/12 -  [facility] hosp stay for N/V, diarrhea and weakness , tx with IVF and Eliquis started;  + UTI - tx with ceftriaxone; HTN - amlodipine and clonidine patch started, hydrochlorothiazide stopped given N/V/D. Noted previous fall on 11/22/2020 with L shoulder and humerus x-ray =  impacted comminuted fracture of the proximal humerus fracture line at the greater tuberosity with mild displacement. Dr. Cameron consulted and recommended either a sling or shoulder immobilizer.    12/12 - [facility] TCU - O2 dats 77% and lethargic - sent back to ER.   12/12 - 12/15 -  [facility] COVID Pneumonia/hypoxia - Bilateral infiltrates on chest CT attributed to COVID-19. Treated with  dexamethasone 6 mg daily,; two doses of Remdesivir and discharged to TCU with goal to wean Dex when able.    At [facility]:  12/17: Novolg s/s changed to be less variable, orders to discontinue eliquis on 1/4/21; decreased dex to 3 mg from 6  12/21: Dr. Cameron Ortho f/u - cont sling for comfort - OK for Elbow ROM, but NWB/lift.  - f/u in 4 weeks.     F/u today and she is doing great - off O2 (only needed twice in the last 3 days), doesn't have any increase cough given Dex decrease, Worried about her hypoglycemia t/o day - noted am's stable at 90 - 100's.       PMH/PSH reviewed in [facility] today.  REVIEW OF SYSTEMS:  4 point ROS including Respiratory, CV, GI and , other than that noted in the HPI,  is negative    EXAM:  /71   Pulse 78   Temp 98.9  F (37.2  C)   Resp 18   Ht 1.626 m (5' 4\")   Wt 57.2 kg (126 lb 3.2 oz)   SpO2 95%   BMI " 21.66 kg/m    GENERAL APPEARANCE:  Alert, in no distress  RESP:  respiratory effort and palpation of chest normal, auscultation of lungs clear , no respiratory distress  CV:  Palpation and auscultation of heart done , rate and rhythm reg, no murmur, no peripheral edema  ABDOMEN:  normal bowel sounds, soft, nontender, no hepatosplenomegaly or other masses  M/S:   Gait and station SBA, Digits and nails intact. L arm in sling  SKIN:  Inspection and Palpation of skin and subcutaneous tissue intact  NEURO: 2-12 in normal limits and at patient's baseline  PSYCH:  insight and judgement, memory intact/clear , affect and mood Pleasant     Assessment/Plan:  Pneumonia due to 2019 novel coronavirus  Much improved respiratory condition - will trial stopping Dex due to stabiltiy and s/e of DMII of dex.   F/u on Thur.   Cont off O2 as able.     Type 2 diabetes mellitus with diabetic polyneuropathy, without long-term current use of insulin (H)  Swings of hyperglycemia 2/2 Dex - will stop and f/u -  Goal to be off novolog by TCU discharge   Discussed possible time line for discharge in ~ 1 week.     Hypertension goal BP (blood pressure) < 140/90  Lower than needed - will reduce catapres patch from  0.2 to 0.1 (take one off today)      Orders:  1. Change O2 to 1-2 L NC prn to keep sats > 93% - wean to off  2. Decrease clonidine patch to 0.1/ - (remove 1 patch today)  3. accuehcks QID (noted being done, but ordered only tid in matrix)  4. discontinue dex.     Electronically signed by:  ANKITA Pike CNP

## 2020-12-24 ENCOUNTER — NURSING HOME VISIT (OUTPATIENT)
Dept: GERIATRICS | Facility: CLINIC | Age: 80
End: 2020-12-24
Payer: COMMERCIAL

## 2020-12-24 VITALS
TEMPERATURE: 98.7 F | BODY MASS INDEX: 21.54 KG/M2 | OXYGEN SATURATION: 93 % | WEIGHT: 126.2 LBS | SYSTOLIC BLOOD PRESSURE: 121 MMHG | RESPIRATION RATE: 16 BRPM | DIASTOLIC BLOOD PRESSURE: 66 MMHG | HEIGHT: 64 IN | HEART RATE: 71 BPM

## 2020-12-24 DIAGNOSIS — U07.1 2019 NOVEL CORONAVIRUS DISEASE (COVID-19): ICD-10-CM

## 2020-12-24 DIAGNOSIS — I10 HYPERTENSION GOAL BP (BLOOD PRESSURE) < 140/90: ICD-10-CM

## 2020-12-24 DIAGNOSIS — J12.82 PNEUMONIA DUE TO 2019 NOVEL CORONAVIRUS: Primary | ICD-10-CM

## 2020-12-24 DIAGNOSIS — E11.42 TYPE 2 DIABETES MELLITUS WITH DIABETIC POLYNEUROPATHY, WITHOUT LONG-TERM CURRENT USE OF INSULIN (H): ICD-10-CM

## 2020-12-24 DIAGNOSIS — R19.7 DIARRHEA, UNSPECIFIED TYPE: ICD-10-CM

## 2020-12-24 DIAGNOSIS — F51.02 ADJUSTMENT INSOMNIA: ICD-10-CM

## 2020-12-24 DIAGNOSIS — I10 ESSENTIAL HYPERTENSION WITH GOAL BLOOD PRESSURE LESS THAN 140/90: ICD-10-CM

## 2020-12-24 DIAGNOSIS — U07.1 PNEUMONIA DUE TO 2019 NOVEL CORONAVIRUS: Primary | ICD-10-CM

## 2020-12-24 PROCEDURE — 99309 SBSQ NF CARE MODERATE MDM 30: CPT | Mod: GW | Performed by: NURSE PRACTITIONER

## 2020-12-24 RX ORDER — AMLODIPINE BESYLATE 10 MG/1
5 TABLET ORAL DAILY
Qty: 30 TABLET | Refills: 0 | Status: ON HOLD
Start: 2020-12-24 | End: 2021-01-29

## 2020-12-24 ASSESSMENT — MIFFLIN-ST. JEOR: SCORE: 1027.44

## 2020-12-24 NOTE — PROGRESS NOTES
"[place] [facility]    Chief Complaint   Patient presents with     Nursing Home Acute     HPI:    Eliza Dubois is a 80 year old  (1940), who is being seen today for an episodic care visit at: [facility] AND REHAB [facility] ([facility]) [[id number]]  Today's concern is: f/u respiratory status after stopping dex, DMII given Dex, HTN      Eliza is rehabbing s/p testing + for COVID on 12/3,   12/8 - 12/12 - [facility] stay for N/V, diarrhea and weakness , tx with IVF and Eliquis started;  + UTI - tx with ceftriaxone; HTN - amlodipine and clonidine patch started, hydrochlorothiazide stopped given N/V/D. Noted previous fall on 11/22/2020 with L shoulder and humerus x-ray =  impacted comminuted fracture of the proximal humerus fracture line at the greater tuberosity with mild displacement. Dr. Cameron consulted and recommended either a sling or shoulder immobilizer.     12/12 - [facility] TCU - O2 dats 77% and lethargic - sent back to ER.   12/12 - 12/15 -  NL COVID Pneumonia/hypoxia - Bilateral infiltrates on chest CT attributed to COVID-19. Treated with  dexamethasone 6 mg daily,; two doses of Remdesivir and discharged to TCU with goal to wean Dex when able.           At SNF:  12/17: Novolg s/s changed to be less variable, orders to discontinue eliquis on 1/4/21; decreased dex to 3 mg from 6  12/21: Dr. Cameron Ortho f/u - cont sling for comfort - OK for Elbow ROM, but NWB/lift.  - f/u in 4 weeks  12/22: decrease clonidine to 0.1 (from 0.2), discontinue Dex, .     PMH/PSH reviewed in [facility] today.  REVIEW OF SYSTEMS:  4 point ROS including Respiratory, CV, GI and , other than that noted in the HPI,  is negative    EXAM:  /66   Pulse 71   Temp 98.7  F (37.1  C)   Resp 16   Ht 1.626 m (5' 4\")   Wt 57.2 kg (126 lb 3.2 oz)   SpO2 93%   BMI 21.66 kg/m    GENERAL APPEARANCE:  Alert, in no distress  RESP:  respiratory effort and palpation of chest normal, auscultation of lungs clear , no respiratory distress  CV: "  Palpation and auscultation of heart done , rate and rhythm reg, no murmur, no peripheral edema  ABDOMEN:  normal bowel sounds, soft, nontender, no hepatosplenomegaly or other masses  M/S:   Gait and station SBA, Digits and nails intact  SKIN:  Inspection and Palpation of skin and subcutaneous tissue intact  NEURO: 2-12 in normal limits and at patient's baseline  PSYCH:  insight and judgement, memory intact , affect and mood Pleasant     accuchecks reviewed - am stable at 70 -80, but after noons all elevated - 200-300    Assessment/Plan:  Pneumonia due to 2019 novel coronavirus/2019 novel coronavirus disease (COVID-19)  Improved/stable status off Dex. - doing well.   Will now stop DOAC  Is still on aSA and plavix.     Type 2 diabetes mellitus with diabetic polyneuropathy, without long-term current use of insulin (H)  Postparaneal Day time hyperglycemia still present - likely still 2/2 dex and suspect now off dex it will decrease - discussed with pt and she agrees to cont accucheks and s/s with goal to discontinue next week.     Essential hypertension with goal blood pressure less than 140/90  Still low despite taking 1 clonidine patch off - will now decrease norvasc    Adjustment insomnia  Pt co - will change norvasc to scheduled from prn - off dex will also help    Diarrhea, unspecified type  nsg noted + last night and nsg concerned but pt notes no problem now and was likely just something she ate last evening.     Orders:  1. On 12/28 - discontinue apixaban  2. Change melatonin to 3 mg po q HS scheduled  3. Decrease norvasc to 5 mg po q day. Dx. HTN    Next week look to decrease accuchecks to bid and stop catapres    Electronically signed by:  Heidi Corrigan, ANKITA CNP

## 2020-12-24 NOTE — LETTER
"    12/24/2020        RE: Eliza Dubois  [address]  [facility] [address]        [place] [facility]    Chief Complaint   Patient presents with     Nursing Home Acute     HPI:    Eliza Dubois is a 80 year old  (1940), who is being seen today for an episodic care visit at: [facility] AND REHAB [facility] ([facility]) [[id number]]  Today's concern is: f/u respiratory status after stopping dex, DMII given Dex, HTN      Eliza is rehabbing s/p testing + for COVID on 12/3,   12/8 - 12/12 - [facility] for N/V, diarrhea and weakness , tx with IVF and Eliquis started;  + UTI - tx with ceftriaxone; HTN - amlodipine and clonidine patch started, hydrochlorothiazide stopped given N/V/D. Noted previous fall on 11/22/2020 with L shoulder and humerus x-ray =  impacted comminuted fracture of the proximal humerus fracture line at the greater tuberosity with mild displacement. Dr. Cameron consulted and recommended either a sling or shoulder immobilizer.     12/12 - [facility] TCU - O2 dats 77% and lethargic - sent back to ER.   12/12 - 12/15 -  NL COVID Pneumonia/hypoxia - Bilateral infiltrates on chest CT attributed to COVID-19. Treated with  dexamethasone 6 mg daily,; two doses of Remdesivir and discharged to TCU with goal to wean Dex when able.           At SNF:  12/17: Novolg s/s changed to be less variable, orders to discontinue eliquis on 1/4/21; decreased dex to 3 mg from 6  12/21: Dr. Cameron Ortho f/u - cont sling for comfort - OK for Elbow ROM, but NWB/lift.  - f/u in 4 weeks  12/22: decrease clonidine to 0.1 (from 0.2), discontinue Dex, .     PMH/PSH reviewed in [facility] today.  REVIEW OF SYSTEMS:  4 point ROS including Respiratory, CV, GI and , other than that noted in the HPI,  is negative    EXAM:  /66   Pulse 71   Temp 98.7  F (37.1  C)   Resp 16   Ht 1.626 m (5' 4\")   Wt 57.2 kg (126 lb 3.2 oz)   SpO2 93%   BMI 21.66 kg/m    GENERAL APPEARANCE:  Alert, in no distress  RESP:  respiratory " effort and palpation of chest normal, auscultation of lungs clear , no respiratory distress  CV:  Palpation and auscultation of heart done , rate and rhythm reg, no murmur, no peripheral edema  ABDOMEN:  normal bowel sounds, soft, nontender, no hepatosplenomegaly or other masses  M/S:   Gait and station SBA, Digits and nails intact  SKIN:  Inspection and Palpation of skin and subcutaneous tissue intact  NEURO: 2-12 in normal limits and at patient's baseline  PSYCH:  insight and judgement, memory intact , affect and mood Pleasant     accuchecks reviewed - am stable at 70 -80, but after noons all elevated - 200-300    Assessment/Plan:  Pneumonia due to 2019 novel coronavirus/2019 novel coronavirus disease (COVID-19)  Improved/stable status off Dex. - doing well.   Will now stop DOAC  Is still on aSA and plavix.     Type 2 diabetes mellitus with diabetic polyneuropathy, without long-term current use of insulin (H)  Postparaneal Day time hyperglycemia still present - likely still 2/2 dex and suspect now off dex it will decrease - discussed with pt and she agrees to cont accucheks and s/s with goal to discontinue next week.     Essential hypertension with goal blood pressure less than 140/90  Still low despite taking 1 clonidine patch off - will now decrease norvasc    Adjustment insomnia  Pt co - will change norvasc to scheduled from prn - off dex will also help    Diarrhea, unspecified type  nsg noted + last night and nsg concerned but pt notes no problem now and was likely just something she ate last evening.     Orders:  1. On 12/28 - discontinue apixaban  2. Change melatonin to 3 mg po q HS scheduled  3. Decrease norvasc to 5 mg po q day. Dx. HTN    Next week look to decrease accuchecks to bid and stop catapres    Electronically signed by:  ANKITA Pike CNP            Sincerely,        ANKITA Pike CNP

## 2020-12-28 ENCOUNTER — NURSING HOME VISIT (OUTPATIENT)
Dept: GERIATRICS | Facility: CLINIC | Age: 80
End: 2020-12-28
Payer: COMMERCIAL

## 2020-12-28 VITALS
DIASTOLIC BLOOD PRESSURE: 58 MMHG | RESPIRATION RATE: 16 BRPM | TEMPERATURE: 98.9 F | OXYGEN SATURATION: 91 % | HEART RATE: 73 BPM | WEIGHT: 126.2 LBS | BODY MASS INDEX: 21.54 KG/M2 | HEIGHT: 64 IN | SYSTOLIC BLOOD PRESSURE: 125 MMHG

## 2020-12-28 DIAGNOSIS — E11.42 TYPE 2 DIABETES MELLITUS WITH DIABETIC POLYNEUROPATHY, WITHOUT LONG-TERM CURRENT USE OF INSULIN (H): ICD-10-CM

## 2020-12-28 DIAGNOSIS — I10 ESSENTIAL HYPERTENSION WITH GOAL BLOOD PRESSURE LESS THAN 140/90: ICD-10-CM

## 2020-12-28 DIAGNOSIS — U07.1 PNEUMONIA DUE TO 2019 NOVEL CORONAVIRUS: Primary | ICD-10-CM

## 2020-12-28 DIAGNOSIS — J12.82 PNEUMONIA DUE TO 2019 NOVEL CORONAVIRUS: Primary | ICD-10-CM

## 2020-12-28 PROCEDURE — 99309 SBSQ NF CARE MODERATE MDM 30: CPT | Mod: GW | Performed by: NURSE PRACTITIONER

## 2020-12-28 ASSESSMENT — MIFFLIN-ST. JEOR: SCORE: 1027.44

## 2020-12-28 NOTE — PROGRESS NOTES
"[place] [facility]    Chief Complaint   Patient presents with     Nursing Home Acute     HPI:    Eliza Dubois is a 80 year old  (1940), who is being seen today for an episodic care visit at: [facility] REHAB [facility] ([facility]) [[id number]]  Today's concern is: f/u DMII and HTN  Elevation 2/2 covid and covid treatment (Dex) - now off.   Eliza is rehabbing s/p testing + for COVID on 12/3,   12/8 - 12/12 -  [facility] hosp stay for N/V, diarrhea and weakness , tx with IVF and Eliquis started;  + UTI - tx with ceftriaxone; HTN - amlodipine and clonidine patch started, hydrochlorothiazide stopped given N/V/D. Noted previous fall on 11/22/2020 with L shoulder and humerus x-ray =  impacted comminuted fracture of the proximal humerus fracture line at the greater tuberosity with mild displacement. Dr. Cameron consulted and recommended either a sling or shoulder immobilizer.     12/12 - [facility] TCU - O2 dats 77% and lethargic - sent back to ER.   12/12 - 12/15 -  NL COVID Pneumonia/hypoxia - Bilateral infiltrates on chest CT attributed to COVID-19. Treated with  dexamethasone 6 mg daily,; two doses of Remdesivir and discharged to TCU with goal to wean Dex when able.           At [facility]:  12/17: Novolg s/s changed to be less variable, orders to discontinue eliquis on 1/4/21; decreased dex to 3 mg from 6  12/21: Dr. Cameron Ortho f/u - cont sling for comfort - OK for Elbow ROM, but NWB/lift.  - f/u in 4 weeks  12/22: decrease clonidine to 0.1 (from 0.2), discontinue Dex, .   12/24: decrease norvasc to 5 mg (from 10), change melatonin from prn to scheduled. - orders to discontinue apixaban on 12/28.     Today she notes her accuehcks are almost back to normal and she feels great.     PMH/PSH reviewed in EPIC today.  REVIEW OF SYSTEMS:  4 point ROS including Respiratory, CV, GI and , other than that noted in the HPI,  is negative    EXAM:  /58   Pulse 73   Temp 98.9  F (37.2  C)   Resp 16   Ht 1.626 m (5' 4\")   " Wt 57.2 kg (126 lb 3.2 oz)   SpO2 91%   BMI 21.66 kg/m    GENERAL APPEARANCE:  Alert, in no distress  RESP:  respiratory effort and palpation of chest normal, auscultation of lungs clear , no respiratory distress  CV:  Palpation and auscultation of heart done , rate and rhythm reg, no murmur, no peripheral edema  ABDOMEN:  normal bowel sounds, soft, nontender, no hepatosplenomegaly or other masses  M/S:   Gait and station SBA, Digits and nails intact L arm in sling - AROM improving  SKIN:  Inspection and Palpation of skin and subcutaneous tissue intact  NEURO: 2-12 in normal limits and at patient's baseline  PSYCH:  insight and judgement, memory intact , affect and mood Pleasant     No new labs.     Assessment/Plan:  Type 2 diabetes mellitus with diabetic polyneuropathy, without long-term current use of insulin (H)  accuchecks improved - non over 250 now - will back down checks and s/s from qid to bid    Essential hypertension with goal blood pressure less than 140/90  Improved - stable/low   Will stop clonidine and hold noravsc if low.     Pneumonia due to 2019 novel coronavirus  Resolved!    Orders:  1. discontinue clonidine - please take off patch today  2. Hold norvasc if SBP less than 110  3. Decrease accuchecks to BID - am and noon  4. dC HS s/s  5. Decrease novolog s/s to BID - am and noon.     Electronically signed by:  ANKITA Pike CNP

## 2020-12-28 NOTE — LETTER
12/28/2020        RE: Eliza Dubois  [address]  [facility] [address]        [place] [facility]    Chief Complaint   Patient presents with     Nursing Home Acute     HPI:    Eliza Dubois is a 80 year old  (1940), who is being seen today for an episodic care visit at: [facility] AND REHAB [facility] ([facility]) [[id number]]  Today's concern is: f/u DMII and HTN  Elevation 2/2 covid and covid treatment (Dex) - now off.   Eliza is rehabbing s/p testing + for COVID on 12/3,   12/8 - 12/12 - [facility] for N/V, diarrhea and weakness , tx with IVF and Eliquis started;  + UTI - tx with ceftriaxone; HTN - amlodipine and clonidine patch started, hydrochlorothiazide stopped given N/V/D. Noted previous fall on 11/22/2020 with L shoulder and humerus x-ray =  impacted comminuted fracture of the proximal humerus fracture line at the greater tuberosity with mild displacement. Dr. Cameron consulted and recommended either a sling or shoulder immobilizer.     12/12 - [facility] TCU - O2 dats 77% and lethargic - sent back to ER.   12/12 - 12/15 -  NL COVID Pneumonia/hypoxia - Bilateral infiltrates on chest CT attributed to COVID-19. Treated with  dexamethasone 6 mg daily,; two doses of Remdesivir and discharged to TCU with goal to wean Dex when able.           At [facility]:  12/17: Novolg s/s changed to be less variable, orders to discontinue eliquis on 1/4/21; decreased dex to 3 mg from 6  12/21: Dr. Cameron Ortho f/u - cont sling for comfort - OK for Elbow ROM, but NWB/lift.  - f/u in 4 weeks  12/22: decrease clonidine to 0.1 (from 0.2), discontinue Dex, .   12/24: decrease norvasc to 5 mg (from 10), change melatonin from prn to scheduled. - orders to discontinue apixaban on 12/28.     Today she notes her accuehcks are almost back to normal and she feels great.     PMH/PSH reviewed in [facility] today.  REVIEW OF SYSTEMS:  4 point ROS including Respiratory, CV, GI and , other than that noted in the HPI,  is  "negative    EXAM:  /58   Pulse 73   Temp 98.9  F (37.2  C)   Resp 16   Ht 1.626 m (5' 4\")   Wt 57.2 kg (126 lb 3.2 oz)   SpO2 91%   BMI 21.66 kg/m    GENERAL APPEARANCE:  Alert, in no distress  RESP:  respiratory effort and palpation of chest normal, auscultation of lungs clear , no respiratory distress  CV:  Palpation and auscultation of heart done , rate and rhythm reg, no murmur, no peripheral edema  ABDOMEN:  normal bowel sounds, soft, nontender, no hepatosplenomegaly or other masses  M/S:   Gait and station SBA, Digits and nails intact L arm in sling - AROM improving  SKIN:  Inspection and Palpation of skin and subcutaneous tissue intact  NEURO: 2-12 in normal limits and at patient's baseline  PSYCH:  insight and judgement, memory intact , affect and mood Pleasant     No new labs.     Assessment/Plan:  Type 2 diabetes mellitus with diabetic polyneuropathy, without long-term current use of insulin (H)  accuchecks improved - non over 250 now - will back down checks and s/s from qid to bid    Essential hypertension with goal blood pressure less than 140/90  Improved - stable/low   Will stop clonidine and hold noravsc if low.     Pneumonia due to 2019 novel coronavirus  Resolved!    Orders:  1. discontinue clonidine - please take off patch today  2. Hold norvasc if SBP less than 110  3. Decrease accuchecks to BID - am and noon  4. dC HS s/s  5. Decrease novolog s/s to BID - am and noon.     Electronically signed by:  ANKITA Pike CNP            Sincerely,        ANKITA Pike CNP    "

## 2020-12-29 VITALS
HEART RATE: 77 BPM | TEMPERATURE: 98.9 F | SYSTOLIC BLOOD PRESSURE: 134 MMHG | HEIGHT: 64 IN | RESPIRATION RATE: 16 BRPM | OXYGEN SATURATION: 92 % | DIASTOLIC BLOOD PRESSURE: 74 MMHG | BODY MASS INDEX: 21.54 KG/M2 | WEIGHT: 126.2 LBS

## 2020-12-29 ASSESSMENT — MIFFLIN-ST. JEOR: SCORE: 1027.44

## 2020-12-29 NOTE — PROGRESS NOTES
[place] [facility]    Chief Complaint   Patient presents with     Nursing Home Acute     HPI:    Eliza Dubois is a 80 year old  (1940), who is being seen today for an episodic care visit at: [facility] REHAB [facility] ([facility]) [[id number]]  Today's concern is: f/u HTN and DMII after stopping medications last visit.       Eliza is rehabbing s/p testing + for COVID on 12/3,   12/8 - 12/12 - [facility] stay for N/V, diarrhea and weakness , tx with IVF and Eliquis started;  + UTI - tx with ceftriaxone; HTN - amlodipine and clonidine patch started, hydrochlorothiazide stopped given N/V/D. Noted previous fall on 11/22/2020 with L shoulder and humerus x-ray =  impacted comminuted fracture of the proximal humerus fracture line at the greater tuberosity with mild displacement. Dr. Cameron consulted and recommended either a sling or shoulder immobilizer.     12/12 - [facility] TCU - O2 dats 77% and lethargic - sent back to ER.   12/12 - 12/15 -  NL COVID Pneumonia/hypoxia - Bilateral infiltrates on chest CT attributed to COVID-19. Treated with  dexamethasone 6 mg daily,; two doses of Remdesivir and discharged to TCU with goal to wean Dex when able.           At SNF:  12/17: Novolg s/s changed to be less variable, orders to discontinue eliquis on 1/4/21; decreased dex to 3 mg from 6  12/21: Dr. Cameron Ortho f/u - cont sling for comfort - OK for Elbow ROM, but NWB/lift.  - f/u in 4 weeks  12/22: decrease clonidine to 0.1 (from 0.2), discontinue Dex, .   12/24: decrease norvasc to 5 mg (from 10), change melatonin from prn to scheduled. - orders to discontinue apixaban on 12/28 12/28: discontinue clonidine, hold norvasc if sbp less than 110, discontinue HS S/S, decrease accuchecks and novolog s/s to bid    Today she really wanted to go home 12/31, but now realizing she isn't strong enough to do so and will have the new goal to go home  Next week or later given continued weakness /needing help to transfer.     PMH/PSH  "reviewed in EPIC today.  REVIEW OF SYSTEMS:  4 point ROS including Respiratory, CV, GI and , other than that noted in the HPI,  is negative    EXAM:  /74   Pulse 77   Temp 98.9  F (37.2  C)   Resp 16   Ht 1.626 m (5' 4\")   Wt 57.2 kg (126 lb 3.2 oz)   SpO2 92%   BMI 21.66 kg/m    GENERAL APPEARANCE: Alert, in no distress, cooperative.  ENT: Mouth and posterior oropharynx normal, moist mucous membranes, hearing acuity at baseline functional   RESP: non labored breathing  CV: lower extremity edema none  SKIN: Inspection of skin and subcutaneous tissue baseline w/ fragility.  NEURO: 2-12 appear at baseline  PSYCH: Insight and judgement, memory baseline intact, affect and mood pleasent.    Assessment/Plan:  Type 2 diabetes mellitus with diabetic polyneuropathy, without long-term current use of insulin (H)  Stable off scheduled novolg - not needing s/s -   But will keep while at SNF and plan to discontinue at discharge     Essential hypertension with goal blood pressure less than 140/90  Stable off clonidine and 50% norvasc, - 130 - 140's   dsicussed with pt she will still stay with 5 mg norvasc for now and I will f/u next week to see if she can come off of it before discharge to home.       Orders:  Discontinue novolog s/s at sNF discontinue  F/u next week with plan to d'c to home off insulin   F/u norvasc.     Electronically signed by:  ANKITA Pike CNP      "

## 2020-12-30 ENCOUNTER — DISCHARGE SUMMARY NURSING HOME (OUTPATIENT)
Dept: GERIATRICS | Facility: CLINIC | Age: 80
End: 2020-12-30
Payer: COMMERCIAL

## 2020-12-30 DIAGNOSIS — E11.42 TYPE 2 DIABETES MELLITUS WITH DIABETIC POLYNEUROPATHY, WITHOUT LONG-TERM CURRENT USE OF INSULIN (H): Primary | ICD-10-CM

## 2020-12-30 DIAGNOSIS — I10 ESSENTIAL HYPERTENSION WITH GOAL BLOOD PRESSURE LESS THAN 140/90: ICD-10-CM

## 2020-12-30 PROCEDURE — 99309 SBSQ NF CARE MODERATE MDM 30: CPT | Mod: GW | Performed by: NURSE PRACTITIONER

## 2020-12-30 PROCEDURE — 99207 PR CDG-MDM COMPONENT: MEETS MODERATE - UP CODED: CPT | Performed by: NURSE PRACTITIONER

## 2020-12-30 NOTE — LETTER
12/30/2020        RE: Eliza Dubois  [address]  [facility] [address]        [place] [facility]    Chief Complaint   Patient presents with     Nursing Home Acute     HPI:    Eliza Dubois is a 80 year old  (1940), who is being seen today for an episodic care visit at: [facility] AND REHAB [facility] ([facility]) [[id number]]  Today's concern is: f/u HTN and DMII after stopping medications last visit.       Eliza is rehabbing s/p testing + for COVID on 12/3,   12/8 - 12/12 - [facility] for N/V, diarrhea and weakness , tx with IVF and Eliquis started;  + UTI - tx with ceftriaxone; HTN - amlodipine and clonidine patch started, hydrochlorothiazide stopped given N/V/D. Noted previous fall on 11/22/2020 with L shoulder and humerus x-ray =  impacted comminuted fracture of the proximal humerus fracture line at the greater tuberosity with mild displacement. Dr. Cameron consulted and recommended either a sling or shoulder immobilizer.     12/12 - [facility] TCU - O2 dats 77% and lethargic - sent back to ER.   12/12 - 12/15 -  NL COVID Pneumonia/hypoxia - Bilateral infiltrates on chest CT attributed to COVID-19. Treated with  dexamethasone 6 mg daily,; two doses of Remdesivir and discharged to TCU with goal to wean Dex when able.           At SNF:  12/17: Novolg s/s changed to be less variable, orders to discontinue eliquis on 1/4/21; decreased dex to 3 mg from 6  12/21: Dr. Cameron Ortho f/u - cont sling for comfort - OK for Elbow ROM, but NWB/lift.  - f/u in 4 weeks  12/22: decrease clonidine to 0.1 (from 0.2), discontinue Dex, .   12/24: decrease norvasc to 5 mg (from 10), change melatonin from prn to scheduled. - orders to discontinue apixaban on 12/28 12/28: discontinue clonidine, hold norvasc if sbp less than 110, discontinue HS S/S, decrease accuchecks and novolog s/s to bid    Today she really wanted to go home 12/31, but now realizing she isn't strong enough to do so and will have the new goal to  "go home  Next week or later given continued weakness /needing help to transfer.     PMH/PSH reviewed in EPIC today.  REVIEW OF SYSTEMS:  4 point ROS including Respiratory, CV, GI and , other than that noted in the HPI,  is negative    EXAM:  /74   Pulse 77   Temp 98.9  F (37.2  C)   Resp 16   Ht 1.626 m (5' 4\")   Wt 57.2 kg (126 lb 3.2 oz)   SpO2 92%   BMI 21.66 kg/m    GENERAL APPEARANCE: Alert, in no distress, cooperative.  ENT: Mouth and posterior oropharynx normal, moist mucous membranes, hearing acuity at baseline functional   RESP: non labored breathing  CV: lower extremity edema none  SKIN: Inspection of skin and subcutaneous tissue baseline w/ fragility.  NEURO: 2-12 appear at baseline  PSYCH: Insight and judgement, memory baseline intact, affect and mood pleasent.    Assessment/Plan:  Type 2 diabetes mellitus with diabetic polyneuropathy, without long-term current use of insulin (H)  Stable off scheduled novolg - not needing s/s -   But will keep while at SNF and plan to discontinue at discharge     Essential hypertension with goal blood pressure less than 140/90  Stable off clonidine and 50% norvasc, - 130 - 140's   dsicussed with pt she will still stay with 5 mg norvasc for now and I will f/u next week to see if she can come off of it before discharge to home.       Orders:  Discontinue novolog s/s at sNF discontinue  F/u next week with plan to d'c to home off insulin   F/u norvasc.     Electronically signed by:  ANKITA Pike CNP            Sincerely,        ANKITA Pike CNP    "

## 2021-01-05 ENCOUNTER — DISCHARGE SUMMARY NURSING HOME (OUTPATIENT)
Dept: GERIATRICS | Facility: CLINIC | Age: 81
End: 2021-01-05
Payer: COMMERCIAL

## 2021-01-05 VITALS
BODY MASS INDEX: 21.82 KG/M2 | SYSTOLIC BLOOD PRESSURE: 146 MMHG | DIASTOLIC BLOOD PRESSURE: 80 MMHG | HEART RATE: 73 BPM | WEIGHT: 127.8 LBS | OXYGEN SATURATION: 91 % | TEMPERATURE: 98.1 F | RESPIRATION RATE: 16 BRPM | HEIGHT: 64 IN

## 2021-01-05 DIAGNOSIS — E11.42 TYPE 2 DIABETES MELLITUS WITH DIABETIC POLYNEUROPATHY, WITHOUT LONG-TERM CURRENT USE OF INSULIN (H): ICD-10-CM

## 2021-01-05 DIAGNOSIS — Z86.16 HISTORY OF COVID-19: Primary | ICD-10-CM

## 2021-01-05 DIAGNOSIS — Z87.01 HISTORY OF PNEUMONIA: ICD-10-CM

## 2021-01-05 DIAGNOSIS — M62.81 MUSCLE WEAKNESS (GENERALIZED): ICD-10-CM

## 2021-01-05 DIAGNOSIS — I10 ESSENTIAL HYPERTENSION WITH GOAL BLOOD PRESSURE LESS THAN 140/90: ICD-10-CM

## 2021-01-05 PROCEDURE — 99316 NF DSCHRG MGMT 30 MIN+: CPT | Mod: GW | Performed by: NURSE PRACTITIONER

## 2021-01-05 ASSESSMENT — MIFFLIN-ST. JEOR: SCORE: 1034.7

## 2021-01-05 NOTE — LETTER
1/5/2021        RE: Eliza Dubois  [address]  [facility] [address]        [place] [facility] DISCHARGE SUMMARY  PATIENT'S NAME: Eliza Dubois  YOB: 1940  MEDICAL RECORD NUMBER:  [id number]  Place of Service where encounter took place:  [facility] [[id number]]    PRIMARY CARE PROVIDER AND CLINIC RESPONSIBLE AFTER TRANSFER:   Byron Holm DO, [address] [facility] / [place] [address]    [facility] Provider     Transferring providers: ANKITA Pike CNP, Roderick Marquis MD  Recent Hospitalization/ED:  [facility] stay 12/12/20 to 12/15/20.  Date of SNF Admission: December / 15 / 2020  Date of SNF (anticipated) Discharge: January / 08 / 2021  Discharged to: previous independent home  Cognitive Scores: BIMS: 15/15 SLUMS 23/30  Physical Function: Ambulating 200  ft with SBA - unable to do stairs independently. - using 1 pt cane  DME: toilet riser  CODE STATUS/ADVANCE DIRECTIVES DISCUSSION:  DNR / DNI   ALLERGIES: Sulfa drugs    DISCHARGE DIAGNOSIS/NURSING FACILITY COURSE:    Eliza is rehabbing s/p testing + for COVID on 12/3,   12/8 - 12/12 -  [facility] hosp stay for N/V, diarrhea and weakness , tx with IVF and Eliquis started;  + UTI - tx with ceftriaxone; HTN - amlodipine and clonidine patch started, hydrochlorothiazide stopped given N/V/D. Noted previous fall on 11/22/2020 with L shoulder and humerus x-ray =  impacted comminuted fracture of the proximal humerus fracture line at the greater tuberosity with mild displacement. Dr. Cameron consulted and recommended either a sling or shoulder immobilizer.     12/12 - [facility] TCU - O2 dats 77% and lethargic - sent back to ER.   12/12 - 12/15 -  [facility] COVID Pneumonia/hypoxia - Bilateral infiltrates on chest CT attributed to COVID-19. Treated with  dexamethasone 6 mg daily,; two doses of Remdesivir and discharged to TCU with goal to wean Dex when able.           At  SNF:  12/17: Novolg s/s changed to be less variable, orders to discontinue eliquis on 1/4/21; decreased dex to 3 mg from 6  12/21: Dr. Cameron Ortho f/u - cont sling for comfort - OK for Elbow ROM, but NWB/lift.  - f/u in 4 weeks  12/22: decrease clonidine to 0.1 (from 0.2), discontinue Dex, .   12/24: decrease norvasc to 5 mg (from 10), change melatonin from prn to scheduled. - orders to discontinue apixaban on 12/28.   12/28: discontinue clonidine, hold norvasc if sbp less than 110, change accuchecks to BID -     History of COVID-19 and History of pneumonia  Now resolved and back to baseline respiratory condition.   She did receive COVID vaccine 1st dose.     Muscle weakness (generalized)  Ongoing - unsteady balance - needing ongoing PT. Risk of falls.     Type 2 diabetes mellitus with diabetic polyneuropathy, without long-term current use of insulin (H)  Stable off insulin now. Continue to PTA glucophage, glipizide, jardiance    Essential hypertension with goal blood pressure less than 140/90 and CAD  Stable off clonidine and with reduced norvasc from hosp -   Remains on PTA ACE, Toprol, Imdur, ASA, plavix, lipitor    L Humerous fracture  Still using sling - unable to use walker due to it contributing to unsteady gait/risk for falls. No pain.        Past Medical History:  has a past medical history of Diabetic eye exam (H) (05/01/14), Heart attack (H), Pure hypercholesterolemia, Stented coronary artery, Type II or unspecified type diabetes mellitus without mention of complication, not stated as uncontrolled (2002), Unspecified disease of pericardium (12/05/08), and Unspecified essential hypertension. She also has no past medical history of Malignant hyperthermia or PONV (postoperative nausea and vomiting).    Discharge Medications:  Current Outpatient Medications   Medication Sig Dispense Refill     acetaminophen (TYLENOL) 500 MG tablet Take 1-2 tablets (500-1,000 mg) by mouth every 6 hours as needed for pain 100  tablet 0     alcohol swab prep pads Use to swab area of injection/sarah as directed. 100 each 3     amLODIPine (NORVASC) 10 MG tablet Take 0.5 tablets (5 mg) by mouth daily 30 tablet 0     aspirin (ASA) 81 MG EC tablet Take 1 tablet (81 mg) by mouth daily 30 tablet 0     atorvastatin (LIPITOR) 80 MG tablet Take 1 tablet (80 mg) by mouth daily 30 tablet 0     blood glucose (NO BRAND SPECIFIED) test strip Use to test blood sugar 2 times daily or as directed. To accompany: Blood Glucose Monitor Brands: per insurance. 100 strip 6     blood glucose calibration (NO BRAND SPECIFIED) solution To accompany: Blood Glucose Monitor Brands: per insurance. 1 Bottle 3     blood glucose monitoring (NO BRAND SPECIFIED) meter device kit Use to test blood sugar 2 times daily or as directed. Preferred blood glucose meter OR supplies to accompany: Blood Glucose Monitor Brands: per insurance. 1 kit 0     clopidogrel (PLAVIX) 75 MG tablet Take 1 tablet (75 mg) by mouth daily 30 tablet 0     empagliflozin (JARDIANCE) 25 MG TABS tablet Take 1 tablet (25 mg) by mouth daily 30 tablet 0     gabapentin (NEURONTIN) 300 MG capsule Take 1 capsule (300 mg) by mouth At Bedtime 30 capsule 0     glipiZIDE (GLUCOTROL) 10 MG tablet Take 2 tablets (20 mg) by mouth 2 times daily (before meals) 120 tablet 0     insulin aspart (NOVOLOG PEN) 100 UNIT/ML pen Inject 1-10 Units Subcutaneous 3 times daily (before meals) Correction Scale  BID - am and noon.   200 - 250 - 4 unit(s)  250 - 350 - 8 unit(s)  >350 - 10 unit(s) and recheck in 2 hrs iff still greater than 351 call provider 3 mL 0     isosorbide mononitrate (IMDUR) 30 MG 24 hr tablet Take 1 tablet (30 mg) by mouth daily 30 tablet 0     lisinopril (ZESTRIL) 40 MG tablet Take 1 tablet (40 mg) by mouth At Bedtime 30 tablet 0     MELATONIN PO Take 3 mg by mouth nightly as needed        metFORMIN (GLUCOPHAGE-XR) 500 MG 24 hr tablet Take 2 tablets (1,000 mg) by mouth 2 times daily (with meals) 120 tablet 0      "metoprolol succinate ER (TOPROL-XL) 100 MG 24 hr tablet Take 1.5 tablets (150 mg) by mouth daily 45 tablet 0     pantoprazole (PROTONIX) 40 MG EC tablet Take 1 tablet (40 mg) by mouth every morning (before breakfast) 30 tablet 0     PARoxetine (PAXIL) 40 MG tablet Take 1 tablet (40 mg) by mouth every morning 30 tablet 0     thin (NO BRAND SPECIFIED) lancets Use with lanceting device. To accompany: Blood Glucose Monitor Brands: per insurance. 100 each 6       Medication Changes/Rationale:   See above in HPI    Controlled medications sent with patient:   not applicable/none     ROS:   4 point ROS including Respiratory, CV, GI and , other than that noted in the HPI,  is negative    Physical Exam:   Vitals: BP (!) 146/80   Pulse 73   Temp 98.1  F (36.7  C)   Resp 16   Ht 1.626 m (5' 4\")   Wt 58 kg (127 lb 12.8 oz)   SpO2 91%   BMI 21.94 kg/m    BMI= Body mass index is 21.94 kg/m .  GENERAL APPEARANCE:  Alert, in no distress  RESP:  respiratory effort and palpation of chest normal, auscultation of lungs clear , no respiratory distress  CV:  Palpation and auscultation of heart done , rate and rhythm reg, no murmur, no peripheral edema  ABDOMEN:  normal bowel sounds, soft, nontender, no hepatosplenomegaly or other masses  M/S:   Gait and station cane - unsteady - , Digits and nails intact  SKIN:  Inspection and Palpation of skin and subcutaneous tissue intact  NEURO: 2-12 in normal limits and at patient's baseline  PSYCH:  insight and judgement, memory STM loss noted.  , affect and mood Pleasant      SNF labs: Recent labs in [facility] reviewed by me today.  and   Most Recent 3 CBC's:  Recent Labs   Lab Test 12/15/20  0552 12/14/20  0604 12/13/20  0603   WBC 9.3 7.7 6.7   HGB 11.4* 10.2* 10.1*   MCV 88 88 88    199 139*     Most Recent 3 BMP's:  Recent Labs   Lab Test 12/15/20  0552 12/14/20  0604 12/13/20  0603    138 137   POTASSIUM 4.2 4.0 3.8   CHLORIDE 107 106 105   CO2 27 25 25   BUN 45* 39* 31*   CR " 0.91 1.00 0.88   ANIONGAP 4 7 7   ANDREI 8.2* 8.3* 8.0*   * 200* 264*     DISCHARGE PLAN:    Follow up labs: No labs orders/due    Medical Follow Up:      Follow up with primary care provider in 1-2 weeks weeks    MTM referral needed and placed by this provider: No    Current [place] scheduled appointments:  Next 5 appointments (look out 90 days)    Jan 18, 2021 10:15 AM  Return Visit with Deon Cameron DO  [facility] ([facility] [address] [place] [id number] [phone]           Discharge Services: Home Care:  Occupational Therapy, Physical Therapy, Registered Nurse and Home Health Aide.. F2F done in [facility] as home care order.     TOTAL DISCHARGE TIME:   Greater than 30 minutes  Electronically signed by:  ANKITA Pike CNP                             Sincerely,        ANKITA Pike CNP

## 2021-01-05 NOTE — PROGRESS NOTES
[place] [facility] DISCHARGE SUMMARY  PATIENT'S NAME: Eliza Dubois  YOB: 1940  MEDICAL RECORD NUMBER:  [id number]  Place of Service where encounter took place:  [facility] [[id number]]    PRIMARY CARE PROVIDER AND CLINIC RESPONSIBLE AFTER TRANSFER:   Byron Holm DO, [address] [facility]  / [place] [address]    G Provider     Transferring providers: ANKITA Pike CNP, Roderick Marquis MD  Recent Hospitalization/ED:  [facility] stay 12/12/20 to 12/15/20.  Date of SNF Admission: December / 15 / 2020  Date of SNF (anticipated) Discharge: January / 08 / 2021  Discharged to: previous independent home  Cognitive Scores: BIMS: 15/15 SLUMS 23/30  Physical Function: Ambulating 200  ft with SBA - unable to do stairs independently. - using 1 pt cane  DME: toilet riser  CODE STATUS/ADVANCE DIRECTIVES DISCUSSION:  DNR / DNI   ALLERGIES: Sulfa drugs    DISCHARGE DIAGNOSIS/NURSING FACILITY COURSE:    Eliza is rehabbing s/p testing + for COVID on 12/3,   12/8 - 12/12 - [facility] hosp stay for N/V, diarrhea and weakness , tx with IVF and Eliquis started;  + UTI - tx with ceftriaxone; HTN - amlodipine and clonidine patch started, hydrochlorothiazide stopped given N/V/D. Noted previous fall on 11/22/2020 with L shoulder and humerus x-ray =  impacted comminuted fracture of the proximal humerus fracture line at the greater tuberosity with mild displacement. Dr. Cameron consulted and recommended either a sling or shoulder immobilizer.     12/12 - [facility] TCU - O2 dats 77% and lethargic - sent back to ER.   12/12 - 12/15 - [facility] NL COVID Pneumonia/hypoxia - Bilateral infiltrates on chest CT attributed to COVID-19. Treated with  dexamethasone 6 mg daily,; two doses of Remdesivir and discharged to TCU with goal to wean Dex when able.           At SNF:  12/17: Novolg s/s changed to be less variable, orders to discontinue eliquis on 1/4/21;  decreased dex to 3 mg from 6  12/21: Dr. Cameron Ortho f/u - cont sling for comfort - OK for Elbow ROM, but NWB/lift.  - f/u in 4 weeks  12/22: decrease clonidine to 0.1 (from 0.2), discontinue Dex, .   12/24: decrease norvasc to 5 mg (from 10), change melatonin from prn to scheduled. - orders to discontinue apixaban on 12/28.   12/28: discontinue clonidine, hold norvasc if sbp less than 110, change accuchecks to BID -     History of COVID-19 and History of pneumonia  Now resolved and back to baseline respiratory condition.   She did receive COVID vaccine 1st dose.     Muscle weakness (generalized)  Ongoing - unsteady balance - needing ongoing PT. Risk of falls.     Type 2 diabetes mellitus with diabetic polyneuropathy, without long-term current use of insulin (H)  Stable off insulin now. Continue to PTA glucophage, glipizide, jardiance    Essential hypertension with goal blood pressure less than 140/90 and CAD  Stable off clonidine and with reduced norvasc from hosp -   Remains on PTA ACE, Toprol, Imdur, ASA, plavix, lipitor    L Humerous fracture  Still using sling - unable to use walker due to it contributing to unsteady gait/risk for falls. No pain.        Past Medical History:  has a past medical history of Diabetic eye exam (H) (05/01/14), Heart attack (H), Pure hypercholesterolemia, Stented coronary artery, Type II or unspecified type diabetes mellitus without mention of complication, not stated as uncontrolled (2002), Unspecified disease of pericardium (12/05/08), and Unspecified essential hypertension. She also has no past medical history of Malignant hyperthermia or PONV (postoperative nausea and vomiting).    Discharge Medications:  Current Outpatient Medications   Medication Sig Dispense Refill     acetaminophen (TYLENOL) 500 MG tablet Take 1-2 tablets (500-1,000 mg) by mouth every 6 hours as needed for pain 100 tablet 0     alcohol swab prep pads Use to swab area of injection/sarah as directed. 100 each 3      amLODIPine (NORVASC) 10 MG tablet Take 0.5 tablets (5 mg) by mouth daily 30 tablet 0     aspirin (ASA) 81 MG EC tablet Take 1 tablet (81 mg) by mouth daily 30 tablet 0     atorvastatin (LIPITOR) 80 MG tablet Take 1 tablet (80 mg) by mouth daily 30 tablet 0     blood glucose (NO BRAND SPECIFIED) test strip Use to test blood sugar 2 times daily or as directed. To accompany: Blood Glucose Monitor Brands: per insurance. 100 strip 6     blood glucose calibration (NO BRAND SPECIFIED) solution To accompany: Blood Glucose Monitor Brands: per insurance. 1 Bottle 3     blood glucose monitoring (NO BRAND SPECIFIED) meter device kit Use to test blood sugar 2 times daily or as directed. Preferred blood glucose meter OR supplies to accompany: Blood Glucose Monitor Brands: per insurance. 1 kit 0     clopidogrel (PLAVIX) 75 MG tablet Take 1 tablet (75 mg) by mouth daily 30 tablet 0     empagliflozin (JARDIANCE) 25 MG TABS tablet Take 1 tablet (25 mg) by mouth daily 30 tablet 0     gabapentin (NEURONTIN) 300 MG capsule Take 1 capsule (300 mg) by mouth At Bedtime 30 capsule 0     glipiZIDE (GLUCOTROL) 10 MG tablet Take 2 tablets (20 mg) by mouth 2 times daily (before meals) 120 tablet 0     insulin aspart (NOVOLOG PEN) 100 UNIT/ML pen Inject 1-10 Units Subcutaneous 3 times daily (before meals) Correction Scale  BID - am and noon.   200 - 250 - 4 unit(s)  250 - 350 - 8 unit(s)  >350 - 10 unit(s) and recheck in 2 hrs iff still greater than 351 call provider 3 mL 0     isosorbide mononitrate (IMDUR) 30 MG 24 hr tablet Take 1 tablet (30 mg) by mouth daily 30 tablet 0     lisinopril (ZESTRIL) 40 MG tablet Take 1 tablet (40 mg) by mouth At Bedtime 30 tablet 0     MELATONIN PO Take 3 mg by mouth nightly as needed        metFORMIN (GLUCOPHAGE-XR) 500 MG 24 hr tablet Take 2 tablets (1,000 mg) by mouth 2 times daily (with meals) 120 tablet 0     metoprolol succinate ER (TOPROL-XL) 100 MG 24 hr tablet Take 1.5 tablets (150 mg) by mouth  "daily 45 tablet 0     pantoprazole (PROTONIX) 40 MG EC tablet Take 1 tablet (40 mg) by mouth every morning (before breakfast) 30 tablet 0     PARoxetine (PAXIL) 40 MG tablet Take 1 tablet (40 mg) by mouth every morning 30 tablet 0     thin (NO BRAND SPECIFIED) lancets Use with lanceting device. To accompany: Blood Glucose Monitor Brands: per insurance. 100 each 6       Medication Changes/Rationale:   See above in HPI    Controlled medications sent with patient:   not applicable/none     ROS:   4 point ROS including Respiratory, CV, GI and , other than that noted in the HPI,  is negative    Physical Exam:   Vitals: BP (!) 146/80   Pulse 73   Temp 98.1  F (36.7  C)   Resp 16   Ht 1.626 m (5' 4\")   Wt 58 kg (127 lb 12.8 oz)   SpO2 91%   BMI 21.94 kg/m    BMI= Body mass index is 21.94 kg/m .  GENERAL APPEARANCE:  Alert, in no distress  RESP:  respiratory effort and palpation of chest normal, auscultation of lungs clear , no respiratory distress  CV:  Palpation and auscultation of heart done , rate and rhythm reg, no murmur, no peripheral edema  ABDOMEN:  normal bowel sounds, soft, nontender, no hepatosplenomegaly or other masses  M/S:   Gait and station cane - unsteady - , Digits and nails intact  SKIN:  Inspection and Palpation of skin and subcutaneous tissue intact  NEURO: 2-12 in normal limits and at patient's baseline  PSYCH:  insight and judgement, memory STM loss noted.  , affect and mood Pleasant      SNF labs: Recent labs in [facility] reviewed by me today.  and   Most Recent 3 CBC's:  Recent Labs   Lab Test 12/15/20  0552 12/14/20  0604 12/13/20  0603   WBC 9.3 7.7 6.7   HGB 11.4* 10.2* 10.1*   MCV 88 88 88    199 139*     Most Recent 3 BMP's:  Recent Labs   Lab Test 12/15/20  0552 12/14/20  0604 12/13/20  0603    138 137   POTASSIUM 4.2 4.0 3.8   CHLORIDE 107 106 105   CO2 27 25 25   BUN 45* 39* 31*   CR 0.91 1.00 0.88   ANIONGAP 4 7 7   ANDREI 8.2* 8.3* 8.0*   * 200* 264*     DISCHARGE " PLAN:    Follow up labs: No labs orders/due    Medical Follow Up:      Follow up with primary care provider in 1-2 weeks weeks    MTM referral needed and placed by this provider: No    Current [place] scheduled appointments:  Next 5 appointments (look out 90 days)    Jan 18, 2021 10:15 AM  Return Visit with DO PRABHJOT Holt [facility] ([facility]) [address] [place] [address] [phone]           Discharge Services: Home Care:  Occupational Therapy, Physical Therapy, Registered Nurse and Home Health Aide.. F2F done in [facility] as home care order.     TOTAL DISCHARGE TIME:   Greater than 30 minutes  Electronically signed by:  ANKITA Pike CNP

## 2021-01-10 ENCOUNTER — TELEPHONE (OUTPATIENT)
Dept: INTERNAL MEDICINE | Facility: CLINIC | Age: 81
End: 2021-01-10

## 2021-01-10 ENCOUNTER — TELEPHONE (OUTPATIENT)
Dept: NURSING | Facility: CLINIC | Age: 81
End: 2021-01-10

## 2021-01-10 NOTE — TELEPHONE ENCOUNTER
Need Home Care orders:   1. Skilled nursing visits 1x/week for 3 weeks with two PRN visits.    2. PT & OT evaluation.    Thanks, [facility]

## 2021-01-10 NOTE — TELEPHONE ENCOUNTER
Medication Inquires/Concerns    Client was discharged from [facility] on 1/8 to home.    She is yet to  amlodipine from pharmacy. BP today is 140/78.  Directions are to hold amlodipine if SBP is less than 110.  She does not have a BP cuff in the home. Education provided on picking on up when at the pharmacy.     Client has been taking metoprolol succinate 100mg, however med list from [facility] states 150mg.  Please advise correct dose.     Client was sent home with NOVOLOG sliding scale, she reports that she has not been using and probably will not use.  She is taking her oral diabetic medications.  BG is unknown as client cannot find her meter.  She is interested in continuous glucose monitoring.  Can a Rx be sent to [facility] for a meter/strips/lancets/device.      Writer educated patient to set up a follow up appt with you to further discuss the continuous glucose monitoring and for post hospital follow up.      Also duplicate therapy warning present between aspirin and clopidogrel.    Gale Jenkins RN Case Manager  [phone]  [email or web address]

## 2021-01-11 DIAGNOSIS — E11.42 TYPE 2 DIABETES MELLITUS WITH DIABETIC POLYNEUROPATHY, WITHOUT LONG-TERM CURRENT USE OF INSULIN (H): ICD-10-CM

## 2021-01-11 NOTE — TELEPHONE ENCOUNTER
Attempted to reach patient, voicemail box has not been set up yet.     Also spoke to GEOVANNY King and gave message. She said did say she was going to set up an appointment, so she will make sure patient follows through.

## 2021-01-11 NOTE — TELEPHONE ENCOUNTER
Routing refill request to provider for review/approval because:  Drug not on the [facility] refill protocol     Requested Prescriptions   Pending Prescriptions Disp Refills     gabapentin (NEURONTIN) 300 MG capsule [Pharmacy Med Name: GABAPENTIN 300MG CAPS] 60 capsule 0     Sig: TAKE ONE TO TWO CAPSULES BY MOUTH EVERY EVENING       There is no refill protocol information for this order      Last Written Prescription Date:  12/11/2020  Last Fill Quantity: 30,  # refills: 0   Last office visit: 10/8/2020 with prescribing provider:     Future Office Visit:   Next 5 appointments (look out 90 days)    Jan 18, 2021 10:15 AM  Return Visit with DO PRABHJOT Holt [facility] ([facility]) [address] [place] [address] [phone]       Type 2 diabetes mellitus with diabetic polyneuropathy, without long-term current use of insulin (H) [E11.42]     Mylene Mensah RN

## 2021-01-11 NOTE — TELEPHONE ENCOUNTER
Home care is called and informed of this message.  Home care understands and agrees to this plan.  Closing this encounter.    Camelia Carlos RN

## 2021-01-11 NOTE — TELEPHONE ENCOUNTER
Seem that a lot of things have changed since I last saw the patient.  Please offer her the opportunity to set up the post discharge visit.  Everything will be sorted out at that time and all questions will be answered.    Right now, given that I have not seen her since October and she has had at least 2 hospitalizations, I cannot answer these questions.    Mihai

## 2021-01-12 RX ORDER — GABAPENTIN 300 MG/1
CAPSULE ORAL
Qty: 60 CAPSULE | Refills: 0 | Status: SHIPPED | OUTPATIENT
Start: 2021-01-12 | End: 2021-02-08

## 2021-01-14 ENCOUNTER — HOSPITAL ENCOUNTER (OUTPATIENT)
Dept: GENERAL RADIOLOGY | Facility: CLINIC | Age: 81
Discharge: HOME | End: 2021-01-14
Attending: INTERNAL MEDICINE | Admitting: INTERNAL MEDICINE
Payer: MEDICARE

## 2021-01-14 ENCOUNTER — OFFICE VISIT (OUTPATIENT)
Dept: INTERNAL MEDICINE | Facility: CLINIC | Age: 81
End: 2021-01-14
Payer: COMMERCIAL

## 2021-01-14 VITALS
DIASTOLIC BLOOD PRESSURE: 68 MMHG | HEART RATE: 100 BPM | BODY MASS INDEX: 21.17 KG/M2 | SYSTOLIC BLOOD PRESSURE: 126 MMHG | TEMPERATURE: 98.9 F | HEIGHT: 64 IN | RESPIRATION RATE: 20 BRPM | OXYGEN SATURATION: 97 % | WEIGHT: 124 LBS

## 2021-01-14 DIAGNOSIS — J12.82 PNEUMONIA DUE TO 2019 NOVEL CORONAVIRUS: ICD-10-CM

## 2021-01-14 DIAGNOSIS — N18.31 STAGE 3A CHRONIC KIDNEY DISEASE (H): ICD-10-CM

## 2021-01-14 DIAGNOSIS — E11.42 TYPE 2 DIABETES MELLITUS WITH DIABETIC POLYNEUROPATHY, WITHOUT LONG-TERM CURRENT USE OF INSULIN (H): ICD-10-CM

## 2021-01-14 DIAGNOSIS — I25.10 CORONARY ARTERY DISEASE INVOLVING NATIVE CORONARY ARTERY OF NATIVE HEART WITHOUT ANGINA PECTORIS: ICD-10-CM

## 2021-01-14 DIAGNOSIS — U07.1 PNEUMONIA DUE TO 2019 NOVEL CORONAVIRUS: ICD-10-CM

## 2021-01-14 DIAGNOSIS — I10 HYPERTENSION GOAL BP (BLOOD PRESSURE) < 140/90: Primary | ICD-10-CM

## 2021-01-14 DIAGNOSIS — F33.1 MAJOR DEPRESSIVE DISORDER, RECURRENT EPISODE, MODERATE (H): ICD-10-CM

## 2021-01-14 LAB
ANION GAP SERPL CALCULATED.3IONS-SCNC: 7 MMOL/L (ref 3–14)
BASOPHILS # BLD AUTO: 0.1 10E9/L (ref 0–0.2)
BASOPHILS NFR BLD AUTO: 0.4 %
BUN SERPL-MCNC: 37 MG/DL (ref 7–30)
CALCIUM SERPL-MCNC: 8.4 MG/DL (ref 8.5–10.1)
CHLORIDE SERPL-SCNC: 105 MMOL/L (ref 94–109)
CO2 SERPL-SCNC: 27 MMOL/L (ref 20–32)
CREAT SERPL-MCNC: 1.17 MG/DL (ref 0.52–1.04)
DIFFERENTIAL METHOD BLD: ABNORMAL
EOSINOPHIL NFR BLD AUTO: 3.8 %
ERYTHROCYTE [DISTWIDTH] IN BLOOD BY AUTOMATED COUNT: 16.2 % (ref 10–15)
GFR SERPL CREATININE-BSD FRML MDRD: 44 ML/MIN/{1.73_M2}
GLUCOSE SERPL-MCNC: 308 MG/DL (ref 70–99)
HBA1C MFR BLD: 6.7 % (ref 0–5.6)
HCT VFR BLD AUTO: 37.7 % (ref 35–47)
HGB BLD-MCNC: 11.7 G/DL (ref 11.7–15.7)
IMM GRANULOCYTES # BLD: 0.1 10E9/L (ref 0–0.4)
IMM GRANULOCYTES NFR BLD: 0.4 %
LYMPHOCYTES # BLD AUTO: 3 10E9/L (ref 0.8–5.3)
LYMPHOCYTES NFR BLD AUTO: 25 %
MCH RBC QN AUTO: 28.7 PG (ref 26.5–33)
MCHC RBC AUTO-ENTMCNC: 31 G/DL (ref 31.5–36.5)
MCV RBC AUTO: 92 FL (ref 78–100)
MONOCYTES # BLD AUTO: 1.1 10E9/L (ref 0–1.3)
MONOCYTES NFR BLD AUTO: 9.4 %
NEUTROPHILS # BLD AUTO: 7.4 10E9/L (ref 1.6–8.3)
NEUTROPHILS NFR BLD AUTO: 61 %
NRBC # BLD AUTO: 0 10*3/UL
NRBC BLD AUTO-RTO: 0 /100
PLATELET # BLD AUTO: 407 10E9/L (ref 150–450)
POTASSIUM SERPL-SCNC: 4.2 MMOL/L (ref 3.4–5.3)
RBC # BLD AUTO: 4.08 10E12/L (ref 3.8–5.2)
SODIUM SERPL-SCNC: 139 MMOL/L (ref 133–144)
WBC # BLD AUTO: 12.2 10E9/L (ref 4–11)

## 2021-01-14 PROCEDURE — 99214 OFFICE O/P EST MOD 30 MIN: CPT | Performed by: INTERNAL MEDICINE

## 2021-01-14 PROCEDURE — 71046 X-RAY EXAM CHEST 2 VIEWS: CPT

## 2021-01-14 PROCEDURE — 83036 HEMOGLOBIN GLYCOSYLATED A1C: CPT | Performed by: INTERNAL MEDICINE

## 2021-01-14 PROCEDURE — 36415 COLL VENOUS BLD VENIPUNCTURE: CPT | Performed by: INTERNAL MEDICINE

## 2021-01-14 PROCEDURE — 80048 BASIC METABOLIC PNL TOTAL CA: CPT | Performed by: INTERNAL MEDICINE

## 2021-01-14 PROCEDURE — 85025 COMPLETE CBC W/AUTO DIFF WBC: CPT | Performed by: INTERNAL MEDICINE

## 2021-01-14 RX ORDER — LISINOPRIL AND HYDROCHLOROTHIAZIDE 20; 25 MG/1; MG/1
1 TABLET ORAL DAILY
COMMUNITY
Start: 2021-01-11 | End: 2021-01-25

## 2021-01-14 RX ORDER — OMEPRAZOLE 40 MG/1
40 CAPSULE, DELAYED RELEASE ORAL DAILY
COMMUNITY
Start: 2021-01-11 | End: 2021-01-25

## 2021-01-14 ASSESSMENT — MIFFLIN-ST. JEOR: SCORE: 1017.46

## 2021-01-14 ASSESSMENT — PAIN SCALES - GENERAL: PAINLEVEL: NO PAIN (0)

## 2021-01-14 NOTE — LETTER
January 21, 2021      Elizarachel Dubois  [address] [facility] [address]        Dear ,    We are writing to inform you of your test results.           Dear Eliza, your recent test results are attached.     Chemistry panel shows an elevated blood sugar of 308 and decreased renal function.   The hemoglobin A1c is stable at 6.7.  This suggests overall good blood sugar control.   The blood cell count shows an increased white count consistent with your previous pneumonia..  No anemia is noted.     You will be contacted with any outstanding results as they are available.   Feel free to contact me via the office or My Chart if you have any questions regarding the above.          Resulted Orders   CBC with platelets and differential   Result Value Ref Range    WBC 12.2 (H) 4.0 - 11.0 10e9/L    RBC Count 4.08 3.8 - 5.2 10e12/L    Hemoglobin 11.7 11.7 - 15.7 g/dL    Hematocrit 37.7 35.0 - 47.0 %    MCV 92 78 - 100 fl    MCH 28.7 26.5 - 33.0 pg    MCHC 31.0 (L) 31.5 - 36.5 g/dL    RDW 16.2 (H) 10.0 - 15.0 %    Platelet Count 407 150 - 450 10e9/L    Diff Method Automated Method     % Neutrophils 61.0 %    % Lymphocytes 25.0 %    % Monocytes 9.4 %    % Eosinophils 3.8 %    % Basophils 0.4 %    % Immature Granulocytes 0.4 %    Nucleated RBCs 0 0 /100    Absolute Neutrophil 7.4 1.6 - 8.3 10e9/L    Absolute Lymphocytes 3.0 0.8 - 5.3 10e9/L    Absolute Monocytes 1.1 0.0 - 1.3 10e9/L    Absolute Basophils 0.1 0.0 - 0.2 10e9/L    Abs Immature Granulocytes 0.1 0 - 0.4 10e9/L    Absolute Nucleated RBC 0.0    Basic metabolic panel  (Ca, Cl, CO2, Creat, Gluc, K, Na, BUN)   Result Value Ref Range    Sodium 139 133 - 144 mmol/L    Potassium 4.2 3.4 - 5.3 mmol/L    Chloride 105 94 - 109 mmol/L    Carbon Dioxide 27 20 - 32 mmol/L    Anion Gap 7 3 - 14 mmol/L    Glucose 308 (H) 70 - 99 mg/dL    Urea Nitrogen 37 (H) 7 - 30 mg/dL    Creatinine 1.17 (H) 0.52 - 1.04 mg/dL    GFR Estimate 44 (L) >60 mL/min/[1.73_m2]       Comment:      Non  GFR Calc  Starting 12/18/2018, serum creatinine based estimated GFR (eGFR) will be   calculated using the Chronic Kidney Disease Epidemiology Collaboration   (CKD-EPI) equation.      GFR Estimate If Black 51 (L) >60 mL/min/[1.73_m2]      Comment:       GFR Calc  Starting 12/18/2018, serum creatinine based estimated GFR (eGFR) will be   calculated using the Chronic Kidney Disease Epidemiology Collaboration   (CKD-EPI) equation.      Calcium 8.4 (L) 8.5 - 10.1 mg/dL   Hemoglobin A1c   Result Value Ref Range    Hemoglobin A1C 6.7 (H) 0 - 5.6 %      Comment:      Normal <5.7% Prediabetes 5.7-6.4%  Diabetes 6.5% or higher - adopted from ADA   consensus guidelines.         If you have any questions or concerns, please call the clinic at the number listed above.       Sincerely,      Byron Holm, DO

## 2021-01-14 NOTE — LETTER
January 21, 2021      Eliza Dubois  [address]  [facility] [address]        Dear ,    We are writing to inform you of your test results.           Dear Eliza, your recent test results are attached.     Chest x-ray has improved significantly over previous.     You will be contacted with any outstanding results as they are available.   Feel free to contact me via the office or My Chart if you have any questions regarding the above.          Resulted Orders   XR Chest 2 Views    Narrative    CHEST TWO VIEWS 1/14/2021 2:55 PM     HISTORY: Pneumonia due to 2019 novel coronavirus.    COMPARISON: 12/12/2020.       Impression    IMPRESSION: Cardiac silhouette is within normal limits. No evidence  for infiltrate, effusion, or pneumothorax. Stable interstitial  prominence through both lungs. Atelectasis or mild infiltrates in both  lung bases on previous exam have improved and nearly resolved.  Degenerative changes are noted through the spine. Proximal left  humeral fracture is again noted.    CHAS ROMERO MD   CBC with platelets and differential   Result Value Ref Range    WBC 12.2 (H) 4.0 - 11.0 10e9/L    RBC Count 4.08 3.8 - 5.2 10e12/L    Hemoglobin 11.7 11.7 - 15.7 g/dL    Hematocrit 37.7 35.0 - 47.0 %    MCV 92 78 - 100 fl    MCH 28.7 26.5 - 33.0 pg    MCHC 31.0 (L) 31.5 - 36.5 g/dL    RDW 16.2 (H) 10.0 - 15.0 %    Platelet Count 407 150 - 450 10e9/L    Diff Method Automated Method     % Neutrophils 61.0 %    % Lymphocytes 25.0 %    % Monocytes 9.4 %    % Eosinophils 3.8 %    % Basophils 0.4 %    % Immature Granulocytes 0.4 %    Nucleated RBCs 0 0 /100    Absolute Neutrophil 7.4 1.6 - 8.3 10e9/L    Absolute Lymphocytes 3.0 0.8 - 5.3 10e9/L    Absolute Monocytes 1.1 0.0 - 1.3 10e9/L    Absolute Basophils 0.1 0.0 - 0.2 10e9/L    Abs Immature Granulocytes 0.1 0 - 0.4 10e9/L    Absolute Nucleated RBC 0.0    Basic metabolic panel  (Ca, Cl, CO2, Creat, Gluc, K, Na, BUN)   Result Value Ref Range     Sodium 139 133 - 144 mmol/L    Potassium 4.2 3.4 - 5.3 mmol/L    Chloride 105 94 - 109 mmol/L    Carbon Dioxide 27 20 - 32 mmol/L    Anion Gap 7 3 - 14 mmol/L    Glucose 308 (H) 70 - 99 mg/dL    Urea Nitrogen 37 (H) 7 - 30 mg/dL    Creatinine 1.17 (H) 0.52 - 1.04 mg/dL    GFR Estimate 44 (L) >60 mL/min/[1.73_m2]      Comment:      Non  GFR Calc  Starting 12/18/2018, serum creatinine based estimated GFR (eGFR) will be   calculated using the Chronic Kidney Disease Epidemiology Collaboration   (CKD-EPI) equation.      GFR Estimate If Black 51 (L) >60 mL/min/[1.73_m2]      Comment:       GFR Calc  Starting 12/18/2018, serum creatinine based estimated GFR (eGFR) will be   calculated using the Chronic Kidney Disease Epidemiology Collaboration   (CKD-EPI) equation.      Calcium 8.4 (L) 8.5 - 10.1 mg/dL   Hemoglobin A1c   Result Value Ref Range    Hemoglobin A1C 6.7 (H) 0 - 5.6 %      Comment:      Normal <5.7% Prediabetes 5.7-6.4%  Diabetes 6.5% or higher - adopted from ADA   consensus guidelines.         If you have any questions or concerns, please call the clinic at the number listed above.       Sincerely,      Byron Holm DO

## 2021-01-14 NOTE — PROGRESS NOTES
[facility] today for the following health issues     HPI       Pneumonia due to 2019 novel coronavirus    Acute respiratory failure with hypoxia (H)    Hyperlipidemia LDL goal <100    Coronary artery disease involving native coronary artery of native heart without angina pectoris    Essential hypertension with goal blood pressure less than 140/90    Type 2 diabetes mellitus with diabetic polyneuropathy, without long-term current use of insulin (H)    E. coli UTI    Closed fracture of proximal end of left humerus with routine healing    Prerenal azotemia           Follow-ups Needed After Discharge     Follow-up Appointments     Follow Up and recommended labs and tests      Follow up with Nursing home physician.       Diabetes Follow-up      How often are you checking your blood sugar? Not at all    What concerns do you have today about your diabetes? None     Do you have any of these symptoms? (Select all that apply)  Numbness in feet and Burning in feet    Have you had a diabetic eye exam in the last 12 months? No        BP Readings from Last 2 Encounters:   01/14/21 126/68   01/05/21 (!) 146/80     Hemoglobin A1C (%)   Date Value   10/08/2020 6.9 (H)   01/20/2020 7.5 (H)     LDL Cholesterol Calculated (mg/dL)   Date Value   10/08/2020 39   10/31/2019 44               COVID follow up  How are you feeling today? Better  In the past 24 hours have you had shortness of breath when speaking, walking, or climbing stairs? My breathing issues have improved  Do you have a cough? I don't have a cough  When is the last time you had a fever greater than 100? Not since hospital stay   Are you having any other symptoms? None   Do you have any other stressors you would like to discuss with your provider? No                Review of Systems   CONSTITUTIONAL: NEGATIVE for fever, chills, change in weight  INTEGUMENTARY/SKIN: NEGATIVE for  "worrisome rashes, moles or lesions  EYES: NEGATIVE for vision changes or irritation  ENT/MOUTH: NEGATIVE for ear, mouth and throat problems  RESP: Negative for further cough or severe shortness of breath.  Patient still has minor dyspnea upon exertion.  BREAST: NEGATIVE for masses, tenderness or discharge  CV: NEGATIVE for chest pain, palpitations or peripheral edema  GI: NEGATIVE for nausea, abdominal pain, heartburn, or change in bowel habits  : NEGATIVE for frequency, dysuria, or hematuria  MUSCULOSKELETAL: NEGATIVE for significant arthralgias or myalgia  NEURO: NEGATIVE for weakness, dizziness or paresthesias  ENDOCRINE: NEGATIVE for temperature intolerance, skin/hair changes  HEME: NEGATIVE for bleeding problems  PSYCHIATRIC: NEGATIVE for changes in mood or affect      Objective    /68 (BP Location: Left arm, Patient Position: Sitting, Cuff Size: Adult Regular)   Pulse 100   Temp 98.9  F (37.2  C) (Temporal)   Resp 20   Ht 1.626 m (5' 4\")   Wt 56.2 kg (124 lb)   SpO2 97%   BMI 21.28 kg/m    Body mass index is 21.28 kg/m .  Physical Exam   GENERAL: healthy, alert and no distress  EYES: Eyes grossly normal to inspection, PERRL and conjunctivae and sclerae normal  HENT: ear canals and TM's normal, nose and mouth without ulcers or lesions  NECK: no adenopathy, no asymmetry, masses, or scars and thyroid normal to palpation  RESP: lungs clear to auscultation - no rales, rhonchi or wheezes  BREAST: normal without masses, tenderness or nipple discharge and no palpable axillary masses or adenopathy  CV: regular rate and rhythm, normal S1 S2, no S3 or S4, no murmur, click or rub, no peripheral edema and peripheral pulses strong  ABDOMEN: soft, nontender, no hepatosplenomegaly, no masses and bowel sounds normal  MS: no gross musculoskeletal defects noted, no edema  SKIN: no suspicious lesions or rashes  NEURO: Normal strength and tone, mentation intact and speech normal  PSYCH: mentation appears normal, " affect normal/bright    CXR - Reviewed and interpreted by me previous pneumonia has resolved.            Assessment & Plan     Pneumonia due to 2019 novel coronavirus  Markedly improved.  - XR Chest 2 Views  - CBC with platelets and differential    Type 2 diabetes mellitus with diabetic polyneuropathy, without long-term current use of insulin (H)  Controlled.  Will check A1c.  Patient is to continue Current glipizide, Jardiance and Metformin combination.  Also to continue current ACE inhibitor, statin, aspirin combination.  - Basic metabolic panel  (Ca, Cl, CO2, Creat, Gluc, K, Na, BUN)  - Hemoglobin A1c    Stage 3a chronic kidney disease  Currently stable.  Maintain blood pressure and diabetic control.  Will check BMP and creatinine    Hypertension goal BP (blood pressure) < 140/90  Currently controlled.  Continue current medications    Coronary artery disease involving native coronary artery of native heart without angina pectoris  Stable without chest pain, continue medications, blood pressure and diabetic management.    Major depressive disorder, recurrent episode, moderate (H)  Doing well despite recent hospitalization.      Review of external notes as documented above   Independent interpretation of a test performed by another physician/other qualified health care professional (not separately reported) -yes.  Test performed during hospitalization reviewed                                    FOLLOW UP   I have asked the patient to make an appointment for followup with me in 1 month      Byron Holm DO

## 2021-01-17 PROBLEM — J96.01 ACUTE RESPIRATORY FAILURE WITH HYPOXIA (H): Status: RESOLVED | Noted: 2020-12-12 | Resolved: 2021-01-17

## 2021-01-18 ENCOUNTER — OFFICE VISIT (OUTPATIENT)
Dept: ORTHOPEDICS | Facility: CLINIC | Age: 81
End: 2021-01-18
Payer: COMMERCIAL

## 2021-01-18 ENCOUNTER — ANCILLARY PROCEDURE (OUTPATIENT)
Dept: GENERAL RADIOLOGY | Facility: CLINIC | Age: 81
End: 2021-01-18
Attending: ORTHOPAEDIC SURGERY
Payer: COMMERCIAL

## 2021-01-18 VITALS
SYSTOLIC BLOOD PRESSURE: 130 MMHG | WEIGHT: 123.6 LBS | HEIGHT: 64 IN | BODY MASS INDEX: 21.1 KG/M2 | DIASTOLIC BLOOD PRESSURE: 64 MMHG

## 2021-01-18 DIAGNOSIS — S42.292A OTHER CLOSED DISPLACED FRACTURE OF PROXIMAL END OF LEFT HUMERUS, INITIAL ENCOUNTER: ICD-10-CM

## 2021-01-18 DIAGNOSIS — S42.292A OTHER CLOSED DISPLACED FRACTURE OF PROXIMAL END OF LEFT HUMERUS, INITIAL ENCOUNTER: Primary | ICD-10-CM

## 2021-01-18 PROCEDURE — 73030 X-RAY EXAM OF SHOULDER: CPT | Mod: TC | Performed by: RADIOLOGY

## 2021-01-18 PROCEDURE — 99213 OFFICE O/P EST LOW 20 MIN: CPT | Performed by: ORTHOPAEDIC SURGERY

## 2021-01-18 ASSESSMENT — MIFFLIN-ST. JEOR: SCORE: 1015.65

## 2021-01-18 ASSESSMENT — PAIN SCALES - GENERAL: PAINLEVEL: NO PAIN (0)

## 2021-01-18 NOTE — LETTER
"    1/18/2021         RE: Eliza Dubois  [address]  [facility] [address]        Dear Colleague,    Thank you for referring your patient, Eliza Dubois, to the [facility]. Please see a copy of my visit note below.    Office Visit-Follow up    Chief Complaint: Eliza Dubois is a 80 year old female who is being seen for   Chief Complaint   Patient presents with     RECHECK     left proximal humerus fracture-date of injury November 22, 2020        History of Present Illness:   Today's visit:  Doing well.  No pain.  No issues.    December 21, 2020 visit:  Initially seen in the ER November 22, 2020.  Ground-level fall.  It was recommended a sling/shoulder immobilizer with 1 week follow-up.  Chart review shows she was subsequently admitted twice to the hospital for Covid.  Following up today for their first follow-up.  Minimal shoulder pain.  She has been using her sling.  Resting no major issues or pain.      Social History     Occupational History     Not on file   Tobacco Use     Smoking status: Never Smoker     Smokeless tobacco: Never Used   Substance and Sexual Activity     Alcohol use: No     Alcohol/week: 0.0 standard drinks     Drug use: No     Sexual activity: Yes     Partners: Male     Birth control/protection: Surgical       REVIEW OF SYSTEMS  General: negative for, night sweats, dizziness, fatigue  Resp: No shortness of breath and no cough  CV: negative for chest pain, syncope or near-syncope  GI: negative for nausea, vomiting and diarrhea  : negative for dysuria and hematuria  Musculoskeletal: as above  Neurologic: negative for syncope   Hematologic: negative for bleeding disorder    Physical Exam:  Vitals: /64   Ht 1.626 m (5' 4\")   Wt 56.1 kg (123 lb 9.6 oz)   BMI 21.22 kg/m    BMI= Body mass index is 21.22 kg/m .  Constitutional: healthy, alert and no acute distress   Psychiatric: mentation appears normal and affect normal/bright  NEURO: no focal " deficits  RESP: Normal with easy respirations and no use of accessory muscles to breathe, no audible wheezing or retractions  CV: LUE:   no edema           SKIN: No erythema, rashes, excoriation, or breakdown. No evidence of infection.   JOINT/EXTREMITIES:left shoulder: Passive motion tested to proximally 90/90.  Shoulder appears to move his unit.  GAIT: not tested             Diagnostic Modalities:  left shoulder X-ray: Proximal humerus fracture impacted some subtle callus formation.  Articular margin is well-preserved.  Independent visualization of the images was performed.      Impression: left proximal humerus fracture-date of injury November 22, 2020 and 80-year-old female    Plan:  All of the above pertinent physical exam and imaging modalities findings was reviewed with Eliza and her daughter.    I reviewed the radiographs.  She is progressing both clinically and radiographically.  Recommend starting some therapy working on both passive and active range of motion.  Avoid any resistance at this point.  But okay to use the arm for basic ADLs.      Return to clinic 8, week(s), or sooner as needed for changes.  Re-x-ray on return: Yes.  Gala Cameron D.O.            Again, thank you for allowing me to participate in the care of your patient.        Sincerely,        Deon Cameron, DO

## 2021-01-19 ENCOUNTER — PATIENT OUTREACH (OUTPATIENT)
Dept: FAMILY MEDICINE | Facility: CLINIC | Age: 81
End: 2021-01-19
Payer: COMMERCIAL

## 2021-01-19 ENCOUNTER — PATIENT OUTREACH (OUTPATIENT)
Dept: CARE COORDINATION | Facility: CLINIC | Age: 81
End: 2021-01-19

## 2021-01-19 DIAGNOSIS — Z71.89 COUNSELING AND COORDINATION OF CARE: Primary | ICD-10-CM

## 2021-01-19 NOTE — PROGRESS NOTES
Clinic Care Coordination Contact    Situation: Patient chart reviewed by care coordinator.    Background: Patient was hospitalized at [facility] from 12/12 to 12/15 with diagnosis of pneumonia, Covid.  Pt was discharged to [facility]    Assessment: Discharged home on 1/5 home care RN PT OT and home health aide home care services.    Plan/Recommendations: CHW out reach to patient to introduce care coordination for possible enrollment.        Janey CHANDLER, RN, PHN, CCM  Primary Clinic Care Coordination    [facility]  Primary Care Clinics  [email or web address]   Office: [phone]  Employed by [facility]

## 2021-01-25 ENCOUNTER — TELEPHONE (OUTPATIENT)
Dept: INTERNAL MEDICINE | Facility: CLINIC | Age: 81
End: 2021-01-25

## 2021-01-25 RX ORDER — LISINOPRIL 40 MG/1
40 TABLET ORAL DAILY
Status: CANCELLED | COMMUNITY
Start: 2021-01-25

## 2021-01-25 RX ORDER — PANTOPRAZOLE SODIUM 40 MG/1
40 TABLET, DELAYED RELEASE ORAL DAILY
Status: CANCELLED | COMMUNITY
Start: 2021-01-25

## 2021-01-25 NOTE — TELEPHONE ENCOUNTER
Medications reconciled on [facility] form, changes noted on form.  Form to PCP for signature.    Tatyana Jose RN  [facility]

## 2021-01-25 NOTE — TELEPHONE ENCOUNTER
Reason for Call:  Form, our goal is to have forms completed with 72 hours, however, some forms may require a visit or additional information.    Type of letter, form or note:  Home Health Certification    Who is the form from?: Home care    Where did the form come from: form was faxed in    What clinic location was the form placed at?: [facility]    Where the form was placed: Given to MA/RN    What number is listed as a contact on the form?:        Additional comments:     Call taken on 1/25/2021 at 12:32 PM by Caroline Shen

## 2021-01-26 DIAGNOSIS — Z53.9 DIAGNOSIS NOT YET DEFINED: Primary | ICD-10-CM

## 2021-01-26 PROCEDURE — G0180 MD CERTIFICATION HHA PATIENT: HCPCS | Performed by: INTERNAL MEDICINE

## 2021-01-28 ENCOUNTER — APPOINTMENT (OUTPATIENT)
Dept: GENERAL RADIOLOGY | Facility: CLINIC | Age: 81
End: 2021-01-28
Attending: INTERNAL MEDICINE
Payer: MEDICARE

## 2021-01-28 ENCOUNTER — HOSPITAL ENCOUNTER (OUTPATIENT)
Facility: CLINIC | Age: 81
Setting detail: OBSERVATION
Discharge: HOME | End: 2021-01-29
Attending: EMERGENCY MEDICINE | Admitting: EMERGENCY MEDICINE
Payer: MEDICARE

## 2021-01-28 DIAGNOSIS — R11.2 NAUSEA VOMITING AND DIARRHEA: ICD-10-CM

## 2021-01-28 DIAGNOSIS — T50.Z95A VACCINE REACTION, INITIAL ENCOUNTER: ICD-10-CM

## 2021-01-28 DIAGNOSIS — R53.83 FATIGUE, UNSPECIFIED TYPE: ICD-10-CM

## 2021-01-28 DIAGNOSIS — R19.7 NAUSEA VOMITING AND DIARRHEA: ICD-10-CM

## 2021-01-28 DIAGNOSIS — Z86.16 HISTORY OF COVID-19: ICD-10-CM

## 2021-01-28 DIAGNOSIS — R53.1 WEAKNESS: ICD-10-CM

## 2021-01-28 LAB
ALBUMIN SERPL-MCNC: 3.2 G/DL (ref 3.4–5)
ALP SERPL-CCNC: 106 U/L (ref 40–150)
ALT SERPL W P-5'-P-CCNC: 18 U/L (ref 0–50)
ANION GAP SERPL CALCULATED.3IONS-SCNC: 4 MMOL/L (ref 3–14)
AST SERPL W P-5'-P-CCNC: 20 U/L (ref 0–45)
BASE EXCESS BLDV CALC-SCNC: 2.5 MMOL/L
BASOPHILS # BLD AUTO: 0 10E9/L (ref 0–0.2)
BASOPHILS NFR BLD AUTO: 0.3 %
BILIRUB SERPL-MCNC: 0.5 MG/DL (ref 0.2–1.3)
BUN SERPL-MCNC: 25 MG/DL (ref 7–30)
CALCIUM SERPL-MCNC: 8.2 MG/DL (ref 8.5–10.1)
CHLORIDE SERPL-SCNC: 103 MMOL/L (ref 94–109)
CO2 SERPL-SCNC: 32 MMOL/L (ref 20–32)
CREAT SERPL-MCNC: 1.03 MG/DL (ref 0.52–1.04)
DIFFERENTIAL METHOD BLD: NORMAL
EOSINOPHIL NFR BLD AUTO: 0.4 %
ERYTHROCYTE [DISTWIDTH] IN BLOOD BY AUTOMATED COUNT: 14.8 % (ref 10–15)
GFR SERPL CREATININE-BSD FRML MDRD: 51 ML/MIN/{1.73_M2}
GLUCOSE BLDC GLUCOMTR-MCNC: 206 MG/DL (ref 70–99)
GLUCOSE SERPL-MCNC: 208 MG/DL (ref 70–99)
HCO3 BLDV-SCNC: 30 MMOL/L (ref 21–28)
HCT VFR BLD AUTO: 37.9 % (ref 35–47)
HGB BLD-MCNC: 12 G/DL (ref 11.7–15.7)
IMM GRANULOCYTES # BLD: 0 10E9/L (ref 0–0.4)
IMM GRANULOCYTES NFR BLD: 0.6 %
LACTATE BLD-SCNC: 1.5 MMOL/L (ref 0.7–2)
LYMPHOCYTES # BLD AUTO: 0.9 10E9/L (ref 0.8–5.3)
LYMPHOCYTES NFR BLD AUTO: 13.1 %
MAGNESIUM SERPL-MCNC: 1 MG/DL (ref 1.6–2.3)
MCH RBC QN AUTO: 28.4 PG (ref 26.5–33)
MCHC RBC AUTO-ENTMCNC: 31.7 G/DL (ref 31.5–36.5)
MCV RBC AUTO: 90 FL (ref 78–100)
MONOCYTES # BLD AUTO: 0.6 10E9/L (ref 0–1.3)
MONOCYTES NFR BLD AUTO: 9 %
NEUTROPHILS # BLD AUTO: 5.5 10E9/L (ref 1.6–8.3)
NEUTROPHILS NFR BLD AUTO: 76.6 %
NRBC # BLD AUTO: 0 10*3/UL
NRBC BLD AUTO-RTO: 0 /100
O2/TOTAL GAS SETTING VFR VENT: 28 %
PCO2 BLDV: 57 MM HG (ref 40–50)
PH BLDV: 7.33 PH (ref 7.32–7.43)
PHOSPHATE SERPL-MCNC: 3.5 MG/DL (ref 2.5–4.5)
PLATELET # BLD AUTO: 170 10E9/L (ref 150–450)
PO2 BLDV: 25 MM HG (ref 25–47)
POTASSIUM SERPL-SCNC: 4 MMOL/L (ref 3.4–5.3)
PROT SERPL-MCNC: 6.9 G/DL (ref 6.8–8.8)
RBC # BLD AUTO: 4.22 10E12/L (ref 3.8–5.2)
SODIUM SERPL-SCNC: 139 MMOL/L (ref 133–144)
WBC # BLD AUTO: 7.1 10E9/L (ref 4–11)

## 2021-01-28 PROCEDURE — 999N001017 HC STATISTIC GLUCOSE BY METER IP

## 2021-01-28 PROCEDURE — 99285 EMERGENCY DEPT VISIT HI MDM: CPT | Performed by: EMERGENCY MEDICINE

## 2021-01-28 PROCEDURE — G0378 HOSPITAL OBSERVATION PER HR: HCPCS

## 2021-01-28 PROCEDURE — 71045 X-RAY EXAM CHEST 1 VIEW: CPT

## 2021-01-28 PROCEDURE — 83605 ASSAY OF LACTIC ACID: CPT | Performed by: EMERGENCY MEDICINE

## 2021-01-28 PROCEDURE — 82803 BLOOD GASES ANY COMBINATION: CPT | Performed by: EMERGENCY MEDICINE

## 2021-01-28 PROCEDURE — 96366 THER/PROPH/DIAG IV INF ADDON: CPT | Performed by: EMERGENCY MEDICINE

## 2021-01-28 PROCEDURE — 85025 COMPLETE CBC W/AUTO DIFF WBC: CPT | Performed by: EMERGENCY MEDICINE

## 2021-01-28 PROCEDURE — 83735 ASSAY OF MAGNESIUM: CPT | Performed by: EMERGENCY MEDICINE

## 2021-01-28 PROCEDURE — 99207 PR CDG-CHARGE REQUIRED MANUAL ENTRY: CPT | Performed by: INTERNAL MEDICINE

## 2021-01-28 PROCEDURE — 99219 PR INITIAL OBSERVATION CARE,LEVEL II: CPT | Performed by: INTERNAL MEDICINE

## 2021-01-28 PROCEDURE — 99285 EMERGENCY DEPT VISIT HI MDM: CPT | Mod: 25 | Performed by: EMERGENCY MEDICINE

## 2021-01-28 PROCEDURE — 84100 ASSAY OF PHOSPHORUS: CPT | Performed by: EMERGENCY MEDICINE

## 2021-01-28 PROCEDURE — 96372 THER/PROPH/DIAG INJ SC/IM: CPT | Mod: XS | Performed by: INTERNAL MEDICINE

## 2021-01-28 PROCEDURE — 250N000013 HC RX MED GY IP 250 OP 250 PS 637: Mod: GY | Performed by: INTERNAL MEDICINE

## 2021-01-28 PROCEDURE — 250N000011 HC RX IP 250 OP 636: Performed by: EMERGENCY MEDICINE

## 2021-01-28 PROCEDURE — 258N000003 HC RX IP 258 OP 636: Performed by: EMERGENCY MEDICINE

## 2021-01-28 PROCEDURE — 96365 THER/PROPH/DIAG IV INF INIT: CPT | Performed by: EMERGENCY MEDICINE

## 2021-01-28 PROCEDURE — 258N000003 HC RX IP 258 OP 636: Performed by: INTERNAL MEDICINE

## 2021-01-28 PROCEDURE — 96361 HYDRATE IV INFUSION ADD-ON: CPT | Performed by: EMERGENCY MEDICINE

## 2021-01-28 PROCEDURE — 250N000011 HC RX IP 250 OP 636: Performed by: INTERNAL MEDICINE

## 2021-01-28 PROCEDURE — 96361 HYDRATE IV INFUSION ADD-ON: CPT

## 2021-01-28 PROCEDURE — 80053 COMPREHEN METABOLIC PANEL: CPT | Performed by: EMERGENCY MEDICINE

## 2021-01-28 RX ORDER — ISOSORBIDE MONONITRATE 30 MG/1
30 TABLET, EXTENDED RELEASE ORAL DAILY
Status: DISCONTINUED | OUTPATIENT
Start: 2021-01-29 | End: 2021-01-29 | Stop reason: HOSPADM

## 2021-01-28 RX ORDER — DEXTROSE MONOHYDRATE 25 G/50ML
25-50 INJECTION, SOLUTION INTRAVENOUS
Status: DISCONTINUED | OUTPATIENT
Start: 2021-01-28 | End: 2021-01-29 | Stop reason: HOSPADM

## 2021-01-28 RX ORDER — ACETAMINOPHEN 650 MG/1
650 SUPPOSITORY RECTAL EVERY 4 HOURS PRN
Status: DISCONTINUED | OUTPATIENT
Start: 2021-01-28 | End: 2021-01-29 | Stop reason: HOSPADM

## 2021-01-28 RX ORDER — ONDANSETRON 2 MG/ML
4 INJECTION INTRAMUSCULAR; INTRAVENOUS EVERY 30 MIN PRN
Status: DISCONTINUED | OUTPATIENT
Start: 2021-01-28 | End: 2021-01-28

## 2021-01-28 RX ORDER — MAGNESIUM SULFATE HEPTAHYDRATE 40 MG/ML
4 INJECTION, SOLUTION INTRAVENOUS ONCE
Status: DISCONTINUED | OUTPATIENT
Start: 2021-01-28 | End: 2021-01-28

## 2021-01-28 RX ORDER — METOPROLOL SUCCINATE 100 MG/1
100 TABLET, EXTENDED RELEASE ORAL DAILY
Status: DISCONTINUED | OUTPATIENT
Start: 2021-01-28 | End: 2021-01-29 | Stop reason: HOSPADM

## 2021-01-28 RX ORDER — GABAPENTIN 300 MG/1
300 CAPSULE ORAL EVERY EVENING
Status: DISCONTINUED | OUTPATIENT
Start: 2021-01-28 | End: 2021-01-29 | Stop reason: HOSPADM

## 2021-01-28 RX ORDER — MAGNESIUM SULFATE HEPTAHYDRATE 40 MG/ML
4 INJECTION, SOLUTION INTRAVENOUS ONCE
Status: COMPLETED | OUTPATIENT
Start: 2021-01-28 | End: 2021-01-28

## 2021-01-28 RX ORDER — PAROXETINE 20 MG/1
40 TABLET, FILM COATED ORAL EVERY MORNING
Status: DISCONTINUED | OUTPATIENT
Start: 2021-01-29 | End: 2021-01-29 | Stop reason: HOSPADM

## 2021-01-28 RX ORDER — ONDANSETRON 2 MG/ML
4 INJECTION INTRAMUSCULAR; INTRAVENOUS EVERY 6 HOURS PRN
Status: DISCONTINUED | OUTPATIENT
Start: 2021-01-28 | End: 2021-01-29 | Stop reason: HOSPADM

## 2021-01-28 RX ORDER — ATORVASTATIN CALCIUM 40 MG/1
80 TABLET, FILM COATED ORAL AT BEDTIME
Status: DISCONTINUED | OUTPATIENT
Start: 2021-01-28 | End: 2021-01-29 | Stop reason: HOSPADM

## 2021-01-28 RX ORDER — SODIUM CHLORIDE 9 MG/ML
INJECTION, SOLUTION INTRAVENOUS CONTINUOUS
Status: DISCONTINUED | OUTPATIENT
Start: 2021-01-28 | End: 2021-01-29 | Stop reason: HOSPADM

## 2021-01-28 RX ORDER — ONDANSETRON 4 MG/1
4 TABLET, ORALLY DISINTEGRATING ORAL EVERY 6 HOURS PRN
Status: DISCONTINUED | OUTPATIENT
Start: 2021-01-28 | End: 2021-01-29 | Stop reason: HOSPADM

## 2021-01-28 RX ORDER — ACETAMINOPHEN 325 MG/1
650 TABLET ORAL EVERY 4 HOURS PRN
Status: DISCONTINUED | OUTPATIENT
Start: 2021-01-28 | End: 2021-01-28

## 2021-01-28 RX ORDER — NICOTINE POLACRILEX 4 MG
15-30 LOZENGE BUCCAL
Status: DISCONTINUED | OUTPATIENT
Start: 2021-01-28 | End: 2021-01-29 | Stop reason: HOSPADM

## 2021-01-28 RX ORDER — CLOPIDOGREL BISULFATE 75 MG/1
75 TABLET ORAL DAILY
Status: DISCONTINUED | OUTPATIENT
Start: 2021-01-28 | End: 2021-01-29 | Stop reason: HOSPADM

## 2021-01-28 RX ORDER — ATORVASTATIN CALCIUM 80 MG/1
80 TABLET, FILM COATED ORAL AT BEDTIME
COMMUNITY
Start: 2021-01-12 | End: 2022-01-20

## 2021-01-28 RX ORDER — ACETAMINOPHEN 500 MG
500-1000 TABLET ORAL EVERY 6 HOURS PRN
Status: DISCONTINUED | OUTPATIENT
Start: 2021-01-28 | End: 2021-01-29 | Stop reason: HOSPADM

## 2021-01-28 RX ORDER — ASPIRIN 81 MG/1
81 TABLET ORAL AT BEDTIME
Status: DISCONTINUED | OUTPATIENT
Start: 2021-01-28 | End: 2021-01-29 | Stop reason: HOSPADM

## 2021-01-28 RX ORDER — HYDRALAZINE HYDROCHLORIDE 10 MG/1
10 TABLET, FILM COATED ORAL 4 TIMES DAILY PRN
Status: DISCONTINUED | OUTPATIENT
Start: 2021-01-28 | End: 2021-01-29 | Stop reason: HOSPADM

## 2021-01-28 RX ADMIN — GABAPENTIN 300 MG: 300 CAPSULE ORAL at 20:38

## 2021-01-28 RX ADMIN — METOPROLOL SUCCINATE 100 MG: 100 TABLET, EXTENDED RELEASE ORAL at 20:38

## 2021-01-28 RX ADMIN — CLOPIDOGREL BISULFATE 75 MG: 75 TABLET ORAL at 20:38

## 2021-01-28 RX ADMIN — MAGNESIUM SULFATE IN WATER 4 G: 40 INJECTION, SOLUTION INTRAVENOUS at 17:57

## 2021-01-28 RX ADMIN — ASPIRIN 81 MG: 81 TABLET, DELAYED RELEASE ORAL at 20:38

## 2021-01-28 RX ADMIN — SODIUM CHLORIDE: 9 INJECTION, SOLUTION INTRAVENOUS at 20:37

## 2021-01-28 RX ADMIN — SODIUM CHLORIDE 1000 ML: 9 INJECTION, SOLUTION INTRAVENOUS at 17:09

## 2021-01-28 RX ADMIN — SODIUM CHLORIDE: 9 INJECTION, SOLUTION INTRAVENOUS at 22:00

## 2021-01-28 RX ADMIN — ENOXAPARIN SODIUM 40 MG: 40 INJECTION SUBCUTANEOUS at 20:48

## 2021-01-28 RX ADMIN — ATORVASTATIN CALCIUM 80 MG: 40 TABLET, FILM COATED ORAL at 20:38

## 2021-01-28 ASSESSMENT — ACTIVITIES OF DAILY LIVING (ADL)
NUMBER_OF_TIMES_PATIENT_HAS_FALLEN_WITHIN_LAST_SIX_MONTHS: 1
CONCENTRATING,_REMEMBERING_OR_MAKING_DECISIONS_DIFFICULTY: NO
DIFFICULTY_COMMUNICATING: NO
DIFFICULTY_EATING/SWALLOWING: NO
WALKING_OR_CLIMBING_STAIRS: AMBULATION DIFFICULTY, REQUIRES EQUIPMENT
FALL_HISTORY_WITHIN_LAST_SIX_MONTHS: YES
WEAR_GLASSES_OR_BLIND: NO
WALKING_OR_CLIMBING_STAIRS_DIFFICULTY: YES
HEARING_DIFFICULTY_OR_DEAF: NO
EQUIPMENT_CURRENTLY_USED_AT_HOME: CANE, STRAIGHT
DRESSING/BATHING_DIFFICULTY: NO
DOING_ERRANDS_INDEPENDENTLY_DIFFICULTY: YES
TOILETING_ISSUES: NO

## 2021-01-28 ASSESSMENT — MIFFLIN-ST. JEOR: SCORE: 894

## 2021-01-28 NOTE — ED TRIAGE NOTES
Brought in by EMS with increase weakness, nausea, vomiting, diarrhea-post second covid vaccine yesterday.

## 2021-01-29 ENCOUNTER — APPOINTMENT (OUTPATIENT)
Dept: PHYSICAL THERAPY | Facility: CLINIC | Age: 81
End: 2021-01-29
Attending: INTERNAL MEDICINE
Payer: MEDICARE

## 2021-01-29 ENCOUNTER — HOSPITAL ENCOUNTER (OUTPATIENT)
Dept: PHYSICAL THERAPY | Facility: CLINIC | Age: 81
Setting detail: THERAPIES SERIES
End: 2021-01-29
Attending: ORTHOPAEDIC SURGERY
Payer: MEDICARE

## 2021-01-29 VITALS
OXYGEN SATURATION: 93 % | DIASTOLIC BLOOD PRESSURE: 67 MMHG | TEMPERATURE: 98.8 F | BODY MASS INDEX: 16.52 KG/M2 | HEART RATE: 76 BPM | WEIGHT: 96.78 LBS | SYSTOLIC BLOOD PRESSURE: 132 MMHG | HEIGHT: 64 IN | RESPIRATION RATE: 18 BRPM

## 2021-01-29 LAB
ANION GAP SERPL CALCULATED.3IONS-SCNC: 3 MMOL/L (ref 3–14)
BUN SERPL-MCNC: 24 MG/DL (ref 7–30)
CALCIUM SERPL-MCNC: 7.6 MG/DL (ref 8.5–10.1)
CHLORIDE SERPL-SCNC: 108 MMOL/L (ref 94–109)
CO2 SERPL-SCNC: 29 MMOL/L (ref 20–32)
CREAT SERPL-MCNC: 0.93 MG/DL (ref 0.52–1.04)
GFR SERPL CREATININE-BSD FRML MDRD: 58 ML/MIN/{1.73_M2}
GLUCOSE BLDC GLUCOMTR-MCNC: 133 MG/DL (ref 70–99)
GLUCOSE BLDC GLUCOMTR-MCNC: 155 MG/DL (ref 70–99)
GLUCOSE BLDC GLUCOMTR-MCNC: 355 MG/DL (ref 70–99)
GLUCOSE SERPL-MCNC: 138 MG/DL (ref 70–99)
MAGNESIUM SERPL-MCNC: 2.6 MG/DL (ref 1.6–2.3)
POTASSIUM SERPL-SCNC: 4 MMOL/L (ref 3.4–5.3)
SODIUM SERPL-SCNC: 140 MMOL/L (ref 133–144)

## 2021-01-29 PROCEDURE — 99217 PR OBSERVATION CARE DISCHARGE: CPT | Performed by: NURSE PRACTITIONER

## 2021-01-29 PROCEDURE — 83735 ASSAY OF MAGNESIUM: CPT | Performed by: INTERNAL MEDICINE

## 2021-01-29 PROCEDURE — 97110 THERAPEUTIC EXERCISES: CPT | Mod: GP | Performed by: PHYSICAL THERAPIST

## 2021-01-29 PROCEDURE — 80048 BASIC METABOLIC PNL TOTAL CA: CPT | Performed by: INTERNAL MEDICINE

## 2021-01-29 PROCEDURE — 97161 PT EVAL LOW COMPLEX 20 MIN: CPT | Mod: GP | Performed by: PHYSICAL THERAPIST

## 2021-01-29 PROCEDURE — 99207 PR CDG-CODE CATEGORY CHANGED: CPT | Performed by: NURSE PRACTITIONER

## 2021-01-29 PROCEDURE — 96361 HYDRATE IV INFUSION ADD-ON: CPT

## 2021-01-29 PROCEDURE — 250N000013 HC RX MED GY IP 250 OP 250 PS 637: Mod: GY | Performed by: INTERNAL MEDICINE

## 2021-01-29 PROCEDURE — 97140 MANUAL THERAPY 1/> REGIONS: CPT | Mod: GP | Performed by: PHYSICAL THERAPIST

## 2021-01-29 PROCEDURE — 258N000003 HC RX IP 258 OP 636: Performed by: INTERNAL MEDICINE

## 2021-01-29 PROCEDURE — 999N001017 HC STATISTIC GLUCOSE BY METER IP

## 2021-01-29 PROCEDURE — 87086 URINE CULTURE/COLONY COUNT: CPT | Performed by: INTERNAL MEDICINE

## 2021-01-29 PROCEDURE — G0378 HOSPITAL OBSERVATION PER HR: HCPCS

## 2021-01-29 PROCEDURE — 36415 COLL VENOUS BLD VENIPUNCTURE: CPT | Performed by: INTERNAL MEDICINE

## 2021-01-29 RX ADMIN — SODIUM CHLORIDE: 9 INJECTION, SOLUTION INTRAVENOUS at 06:31

## 2021-01-29 RX ADMIN — METOPROLOL SUCCINATE 100 MG: 100 TABLET, EXTENDED RELEASE ORAL at 08:02

## 2021-01-29 RX ADMIN — ISOSORBIDE MONONITRATE 30 MG: 30 TABLET, EXTENDED RELEASE ORAL at 08:02

## 2021-01-29 RX ADMIN — CLOPIDOGREL BISULFATE 75 MG: 75 TABLET ORAL at 08:02

## 2021-01-29 RX ADMIN — PAROXETINE HYDROCHLORIDE 40 MG: 20 TABLET, FILM COATED ORAL at 08:02

## 2021-01-29 ASSESSMENT — ACTIVITIES OF DAILY LIVING (ADL): DEPENDENT_IADLS:: SHOPPING

## 2021-01-29 NOTE — H&P
[facility]    History and Physical - Hospitalist Service       Date of Admission:  1/28/2021    Assessment & Plan        Eliza Dubois is a 80 year old female admitted on 1/28/2021. H/o DM2, HTN, CAD.  She was recently hospitalized last month with COVID pneumonia on 12/12/20 and discharged on 12/15/20.  Subsequently, she received her 1st Covid Vaccine dose on Dec 30  Following which she had some body aches that resolved. Yesterday patient got her  2nd Covid Vaccine dose at - 10:30 am following which patient started feeling sick last night. She experienced generalized body ache and weakness, dry heaves without vomiting and a episode of diarrhea. Patient subsequently came to the ER for further evaluation and management and has significant hypomagnesemia and is probably dehydrated. Will supplement magnesium and give iv hydration.      A/p :             Generalized weakness + significant  Nausea +  diarrhea ( 1 episode) - after getting 2nd dose of COVID vaccine. She is on 1 liter of oxygen and will do a CXR although pneumonia seems unlikely given no sob or cough/phlegm. ? Adverse reaction to vaccine, ? Dehydration.      Recent hospitalization with Covid infection 12/2020      HTN, Essential : on norvasc 5 mg daily, metoprolol 150 mg daily, hydralazine prn      Hypomagnesemia : supplement prn      Pyuria : send cultures, empiric antibiotic.      HLD : on statin      CAD s/p 1 stent  - 5 yrs ago, On baby aspirin,  plavix      DM2 : on jardiance 25 mg daily, glipizide 20 mg bid, metformin 1 gm bid - at home, only sliding scale insulin for now.      Neuropathy : on gabapentin 300-600 mg bid      Anxiety/Depression : on paxil 40 mg daily             Diet:   on clear liquid diet  DVT Prophylaxis: Enoxaparin (Lovenox) SQ  Uriarte Catheter: not present  Code Status:   full          Disposition Plan   Expected discharge: Tomorrow, recommended to prior living arrangement once clinically  improved.  Entered: Dhiraj Recinos MD 01/28/2021, 6:11 PM     The patient's care was discussed with the Patient.    Dhiraj Recinos MD  [facility]  Contact information available via [facility] Paging/Directory      ______________________________________________________________________    Chief Complaint   Weakness, nausea    History is obtained from the patient    History of Present Illness         Eliza Dubois is a 80 year old female admitted on 1/28/2021. H/o DM2, HTN, CAD.  She was recently hospitalized last month with COVID pneumonia on 12/12/20 and discharged on 12/15/20.  Subsequently, she received her 1st Covid Vaccine dose on Dec 30  Following which she had some body aches that resolved. Yesterday patient got her  2nd Covid Vaccine dose at - 10:30 am following which patient started feeling sick last night. She experienced generalized body ache and weakness, dry heaves without vomiting and a episode of diarrhea. Her grandson called EMS today and she was brought to the ER.      Patient subsequently came to the ER for further evaluation and management.    She was found to have significant hypomagnesemia and is probably dehydrated.     Will supplement magnesium and give iv hydration.    Patient has been admitted for further evaluation and management.          Review of Systems          No fever or chills  No cardiorespiratory symptoms  No abdominal pain  No urinary symptoms      Past Medical History    I have reviewed this patient's medical history and updated it with pertinent information if needed.   Past Medical History:   Diagnosis Date     Diabetic eye exam (H) 05/01/14     Heart attack (H)      Pure hypercholesterolemia      Stented coronary artery      Type II or unspecified type diabetes mellitus without mention of complication, not stated as uncontrolled 2002    Avandamet.     Unspecified disease of pericardium 12/05/08    Pericarditis w/mild to moderate pericardial  effusion.     Unspecified essential hypertension        Lives in [place] in a house -alone    2 children  Non smoker  No alcohol , no drugs  Not working  Uses a cane          Past Surgical History   I have reviewed this patient's surgical history and updated it with pertinent information if needed.  Past Surgical History:   Procedure Laterality Date     ABDOMEN SURGERY       COLONOSCOPY  04/15/09     COLONOSCOPY  6/18/2014    Procedure: COMBINED COLONOSCOPY, SINGLE BIOPSY/POLYPECTOMY BY BIOPSY;  Surgeon: Earle Mon MD;  Location: [facility] LAPAROSCOPY, SURGICAL; CHOLECYSTECTOMY  1997    Cholecystectomy, Laparoscopic     PHACOEMULSIFICATION CLEAR CORNEA WITH STANDARD INTRAOCULAR LENS IMPLANT Right 3/16/2016    Procedure: PHACOEMULSIFICATION CLEAR CORNEA WITH STANDARD INTRAOCULAR LENS IMPLANT;  Surgeon: George Lemus MD;  Location:  [facility]     PHACOEMULSIFICATION CLEAR CORNEA WITH STANDARD INTRAOCULAR LENS IMPLANT Left 3/30/2016    Procedure: PHACOEMULSIFICATION CLEAR CORNEA WITH STANDARD INTRAOCULAR LENS IMPLANT;  Surgeon: George Lemus MD;  Location:  [facility]     ZZC NONSPECIFIC PROCEDURE  1988    Hysterectomy       Social History   I have reviewed this patient's social history and updated it with pertinent information if needed.  Social History     Tobacco Use     Smoking status: Never Smoker     Smokeless tobacco: Never Used   Substance Use Topics     Alcohol use: No     Alcohol/week: 0.0 standard drinks     Drug use: No       Family History   I have reviewed this patient's family history and updated it with pertinent information if needed.  Family History   Problem Relation Age of Onset     Diabetes Mother      Arthritis Mother      Heart Disease Mother      Hypertension Mother      Lipids Mother      Neurologic Disorder Mother         neuropathy     Osteoporosis Mother      Obesity Mother      EYE* Mother         blind     Diabetes Father      Genitourinary Problems Father         gall stones      Heart Disease Father         MI     Cancer Maternal Grandmother      Heart Disease Maternal Grandmother      Cancer Other         Grandparents     Alcohol/Drug No family hx of      Alzheimer Disease No family hx of      Anesthesia Reaction No family hx of      Blood Disease No family hx of      Depression No family hx of      Genetic Disorder No family hx of      Gastrointestinal Disease No family hx of      Gynecology No family hx of      Psychotic Disorder No family hx of      Respiratory No family hx of      Cerebrovascular Disease No family hx of        Prior to Admission Medications   Prior to Admission Medications   Prescriptions Last Dose Informant Patient Reported? Taking?   MELATONIN PO  Daughter Yes No   Sig: Take 3 mg by mouth nightly as needed    PARoxetine (PAXIL) 40 MG tablet   No No   Sig: Take 1 tablet (40 mg) by mouth every morning   acetaminophen (TYLENOL) 500 MG tablet   No No   Sig: Take 1-2 tablets (500-1,000 mg) by mouth every 6 hours as needed for pain   alcohol swab prep pads  Daughter No No   Sig: Use to swab area of injection/sarah as directed.   amLODIPine (NORVASC) 10 MG tablet   No No   Sig: Take 0.5 tablets (5 mg) by mouth daily   aspirin (ASA) 81 MG [facility] tablet   No No   Sig: Take 1 tablet (81 mg) by mouth daily   atorvastatin (LIPITOR) 80 MG tablet   No No   Sig: Take 1 tablet (80 mg) by mouth daily   blood glucose (NO BRAND SPECIFIED) test strip  Daughter No No   Sig: Use to test blood sugar 2 times daily or as directed. To accompany: Blood Glucose Monitor Brands: per insurance.   blood glucose calibration (NO BRAND SPECIFIED) solution  Daughter No No   Sig: To accompany: Blood Glucose Monitor Brands: per insurance.   blood glucose monitoring (NO BRAND SPECIFIED) meter device kit  Daughter No No   Sig: Use to test blood sugar 2 times daily or as directed. Preferred blood glucose meter OR supplies to accompany: Blood Glucose Monitor Brands: per insurance.   clopidogrel (PLAVIX) 75  MG tablet   No No   Sig: Take 1 tablet (75 mg) by mouth daily   empagliflozin (JARDIANCE) 25 MG TABS tablet   No No   Sig: Take 1 tablet (25 mg) by mouth daily   gabapentin (NEURONTIN) 300 MG capsule   No No   Sig: TAKE ONE TO TWO CAPSULES BY MOUTH EVERY EVENING   glipiZIDE (GLUCOTROL) 10 MG tablet   No No   Sig: Take 2 tablets (20 mg) by mouth 2 times daily (before meals)   isosorbide mononitrate (IMDUR) 30 MG 24 hr tablet   No No   Sig: Take 1 tablet (30 mg) by mouth daily   metFORMIN (GLUCOPHAGE-XR) 500 MG 24 hr tablet   No No   Sig: Take 2 tablets (1,000 mg) by mouth 2 times daily (with meals)   metoprolol succinate ER (TOPROL-XL) 100 MG 24 hr tablet   No No   Sig: Take 1.5 tablets (150 mg) by mouth daily   thin (NO BRAND SPECIFIED) lancets  Daughter No No   Sig: Use with lanceting device. To accompany: Blood Glucose Monitor Brands: per insurance.      Facility-Administered Medications: None     Allergies   Allergies   Allergen Reactions     Sulfa Drugs Hives       Physical Exam   Vital Signs: Temp: 98.3  F (36.8  C) Temp src: Oral BP: (!) 178/82 Pulse: 84   Resp: 16 SpO2: 97 % O2 Device: None (Room air) Oxygen Delivery: 1 LPM  Weight: 132 lbs 0 oz       GENERAL: The patient is not in any acute distressed. Awake and alert.  HEENT: Nonicteric sclerae, PERRLA, EOMI. Oropharynx clear. Moist mucous membranes. Conjunctivae appear well perfused.  HEART: Regular rate and rhythm without murmurs.  LUNGS: Clear to auscultation bilaterally. No wheezing or crackles.  ABDOMEN: Soft, positive bowel sounds, nontender.  SKIN: No rash, no excessive bruising, petechiae, or purpura.  EXTREMITIES : no rashes, no swelling in legs.  NEUROLOGIC: AxO x 3.  Cranial nerves II-XII intact without motor/sensory deficit.      Data   Data reviewed today: I reviewed all medications, new labs and imaging results over the last 24 hours. I personally reviewed no images or EKG's today.    Recent Labs   Lab 01/28/21  1709   WBC 7.1   HGB 12.0   MCV  90         POTASSIUM 4.0   CHLORIDE 103   CO2 32   BUN 25   CR 1.03   ANIONGAP 4   ANDREI 8.2*   *   ALBUMIN 3.2*   PROTTOTAL 6.9   BILITOTAL 0.5   ALKPHOS 106   ALT 18   AST 20

## 2021-01-29 NOTE — CONSULTS
Care Management Initial Consult    General Information  Assessment completed with: Patient,    Type of CM/SW Visit: Initial Assessment    Primary Care Provider verified and updated as needed:     Readmission within the last 30 days:        Reason for Consult: discharge planning  Advance Care Planning:            Communication Assessment  Patient's communication style: spoken language (English or Bilingual)    Hearing Difficulty or Deaf: no   Wear Glasses or Blind: no    Cognitive  Cognitive/Neuro/Behavioral: WDL                      Living Environment:   People in home: grandchild(darrell)     Current living Arrangements: house      Able to return to prior arrangements: yes       Family/Social Support:  Care provided by: self, child(darrell)  Provides care for: no one  Marital Status:   Children          Description of Support System: Supportive, Involved    Support Assessment: Adequate family and caregiver support, Adequate social supports    Current Resources:   Skilled Home Care Services: Just completed home care. No longer has services.    Community Resources: Other (see comment)(outpatient PT services )  Equipment currently used at home: cane, straight, grab bar, tub/shower, tub bench, raised toilet seat, grab bar, toilet, walker, rolling  Supplies currently used at home:      Employment/Financial:  Employment Status: retired        Financial Concerns: No concerns identified           Lifestyle & Psychosocial Needs:        Socioeconomic History     Marital status:      Spouse name: Not on file     Number of children: Not on file     Years of education: Not on file     Highest education level: Not on file     Tobacco Use     Smoking status: Never Smoker     Smokeless tobacco: Never Used   Substance and Sexual Activity     Alcohol use: No     Alcohol/week: 0.0 standard drinks     Drug use: No     Sexual activity: Yes     Partners: Male     Birth control/protection: Surgical       Functional Status:  Prior  to admission patient needed assistance:   Dependent ADLs:: Independent  Dependent IADLs:: Shopping         Additional Information:  Writer met with patient regarding discharge planning.  Patient denies having any discharge needs.  She was getting ready to discharge to her outpatient PT appointment.  Patient reported that she just finished up with home care services and is now going to outpatient therapy.  Daughter and grandson are very supportive and assist with any needs that may arise.      No discharge planning needs indicated at this time.     DELORES Alvarado  [facility]   [phone]

## 2021-01-29 NOTE — PROGRESS NOTES
A&Ox4. VSS on 2L NC. Denying chest pain. Denies n/v. Very tired. Due to void- need UA. NS infusing at 125. BG at 0200 was 133. Appeared to be sleeping and resting comfortably upon rounding.

## 2021-01-29 NOTE — PROGRESS NOTES
"S-(situation): Patient registered to Observation. Patient arrived to room 252 via cart from ED    B-(background): Dehydration, N&V    A-(assessment):BP (!) 171/80   Pulse 83   Temp 98  F (36.7  C) (Oral)   Resp 18   Ht 1.626 m (5' 4\")   Wt 43.9 kg (96 lb 12.5 oz)   SpO2 97%   BMI 16.61 kg/m  .  HTn after transfer, will continue to monitor.  Denies N&V. NS at 125 ml/hr. LSC, no SOB at this time.       R-(recommendations): Orders and observation goals reviewed with pt.     Nursing Observation criteria listed below was met:     Skin issues/needs documented:No  Isolation needs addressed and Signage up: NA  Fall Prevention: Education given and documented: Yes  Education Assessment documented:Yes  Education Documented: Yes  OBS video/handout Reviewed & Documented: Yes  Allergies Reviewed: Yes  Medication Reconciliation Complete: Yes  New medication patient education completed and documented (Possible Side Effects of Common Medications handout): Yes  Home medications if not able to send immediately home with family stored here: NA  Reminder note placed in discharge instructions: NA  Patient has discharge needs (If yes, please explain): No            "

## 2021-01-29 NOTE — PROGRESS NOTES
01/29/21 1045   Quick Adds   Type of Visit Initial PT Evaluation       Present no   Living Environment   People in home alone   Current Living Arrangements house   Home Accessibility stairs to enter home   Number of Stairs, Main Entrance 8   Stair Railings, Main Entrance railing on right side (ascending)  (both sides however cannot reach from each side)   Transportation Anticipated family or friend will provide   Living Environment Comments walk in shower and step over tub/shower. Prefers the tub, working to get a shower chair. flat bed   Self-Care   Usual Activity Tolerance good   Current Activity Tolerance moderate   Regular Exercise No   Equipment Currently Used at Home cane, straight;grab bar, tub/shower;tub bench;raised toilet seat;grab bar, toilet;walker, rolling   Activity/Exercise/Self-Care Comment grandson assists with difficulty transfers from low surface, laundry, cooking. daughter assists with transportation, one meal a day. Daughter lives next door and assists with needs frequently.    Disability/Function   Wear Glasses or Blind no   Concentrating, Remembering or Making Decisions Difficulty no   Difficulty Communicating no   Difficulty Eating/Swallowing no   Walking or Climbing Stairs Difficulty yes   Walking or Climbing Stairs ambulation difficulty, requires equipment   Mobility Management cane   Dressing/Bathing Difficulty no   Toileting issues no   Doing Errands Independently Difficulty (such as shopping) yes   Fall history within last six months yes   Number of times patient has fallen within last six months 1   General Information   Onset of Illness/Injury or Date of Surgery 01/28/21   Referring Physician Dr. Recinos   Patient/Family Therapy Goals Statement (PT) home   Pertinent History of Current Problem (include personal factors and/or comorbidities that impact the POC) Patient is a 80 year old female, registered OBSERVATION status from the ED due to fatigue, nausea,  vomiting and diarrhea. Patient received her seocnd does of the COVID 19 vaccine 1/27/21 and reports sleeping all day. Patient with a previous medical history of DM 2, HTN, GERD, CKD, hyperlipidemia, CAD, COVID 19 pneumonia. left proximal humerus fracture, depression, anxiety, IBS and MI.   Existing Precautions/Restrictions fall   Weight-Bearing Status - LUE full weight-bearing   Weight-Bearing Status - RUE full weight-bearing   Weight-Bearing Status - LLE full weight-bearing   Weight-Bearing Status - RLE full weight-bearing   General Observations PT orders: eval and treat mobility for DC planning. Activity orders: up ad lexi   Cognition   Orientation Status (Cognition) oriented x 3   Affect/Mental Status (Cognition) WNL   Follows Commands (Cognition) WNL   Pain Assessment   Patient Currently in Pain No   Integumentary/Edema   Integumentary/Edema Comments no concerns   Posture    Posture Forward head position;Protracted shoulders   Range of Motion (ROM)   ROM Comment LUE limited due to historical humerus fracture   Strength   Manual Muscle Testing Quick Adds Deficits observed during functional mobility   Strength Comments generalized weakness and fatigue with activity, fair break strength   Bed Mobility   Bed Mobility supine-sit;sit-supine   Supine-Sit [place] (Bed Mobility) independent   Sit-Supine [place] (Bed Mobility) independent   Impairments Contributing to Impaired Bed Mobility decreased strength   Transfers   Transfers bed-chair transfer;sit-stand transfer   Impairments Contributing to Impaired Transfers decreased strength;impaired balance   Bed-Chair Transfer   Bed-Chair [place] (Transfers) modified independence   Assistive Device (Bed-Chair Transfers) cane, straight   Sit-Stand Transfer   Sit-Stand [place] (Transfers) modified independence   Assistive Device (Sit-Stand Transfers) cane, straight   Gait/Stairs (Locomotion)   [place] Level (Gait) supervision   Assistive Device (Gait)  cane, straight   Distance in Feet (Required for LE Total Joints) 250'   Pattern (Gait) step-through   Deviations/Abnormal Patterns (Gait) jessica decreased;festinating/shuffling;base of support, narrow;left sided deviations;steppage;stride length decreased   Left Sided Gait Deviations foot drop/toe drag;heel strike decreased;lateral trunk flexion   Negotiation (Stairs) stairs independence;stairs assistive device;handrail location;number of steps;ascending technique;descending technique   [place] Level (Stairs) supervision   Handrail Location (Stairs) right side (ascending);left side (descending)   Number of Steps (Stairs) 8   Ascending Technique (Stairs) step-to-step   Descending Technique (Stairs) step-to-step   Comment (Gait/Stairs) descending hand over hand. baseline has physical assistance for stairs 100% of time    Balance   Balance Comments no LOB with mobility, gait deviations and  reliance on cane demonstrate impaired balance. Requires AD for safe gait. IND short sitting balance.    Sensory Examination   Sensory Perception patient reports no sensory changes   Sensory Perception Comments baseline peripheral neuropathy   Clinical Impression   Criteria for Skilled Therapeutic Intervention current level of function same as previous level of function;no significant expected improvement in functional status   PT Diagnosis (PT) impaired balance, impaired gait   Influenced by the following impairments baseline deconditioning   Functional limitations due to impairments use of assistive device for mobility   Clinical Presentation Stable/Uncomplicated   Clinical Presentation Rationale no change in status with mobilization, medical status, clinical judgement   Clinical Decision Making (Complexity) low complexity   Therapy Frequency (PT) Evaluation only   Predicted Duration of Therapy Intervention (days/wks) 1   Anticipated Equipment Needs at Discharge (PT)   (none)   Risk & Benefits of therapy have been explained  evaluation/treatment results reviewed;care plan/treatment goals reviewed;risks/benefits reviewed;participants included;patient;daughter   Clinical Impression Comments Patient's chief complaint's have resolved. She completed functional mobility at baseline with supervision and use of cane. Patient and daughter without mobility concerns. Anticipate patient to do well with return to prior living environment with increased family check ins and assistance. Patient following up this date with outpatient PT for the left UE - humerus fracture.    PT Discharge Planning    PT Discharge Recommendation (DC Rec) home with assist   PT Rationale for DC Rec Baseline mobility, has assist at home from daughter and grandson. Proficient with use of cane and demonstrates good understanding of her limits.    PT Brief overview of current status  IND bed mobility. MOD IND ambulation and transfers with cane.    Total Evaluation Time   Total Evaluation Time (Minutes) 30     Thank you for your referral.  Maria Del Carmen Simpson, PT, DPT, ATC    [facility]  O: [phone]  E: [email or web address]

## 2021-01-29 NOTE — ED PROVIDER NOTES
"  History     Chief Complaint   Patient presents with     Nausea, Vomiting, & Diarrhea     Fatigue     HPI  History per patient and medical records    This is an 80-year-old female, history of type 2 diabetes, chronic kidney disease, hypertension, hyperlipidemia, GERD, coronary artery disease presenting with nausea, vomiting, diarrhea and fatigue.  Patient was hospitalized in this facility 12/8/2020 through 12/15/2020 with COVID-19 pneumonia.  She had a short rehab stay and was discharged home on 1/5/2021.  She received her first COVID-19 vaccine while in the nursing home.  She states that she had body aches for about 1 day afterwards.  She  received her second COVID-19 immunization yesterday.  She states that she went home to bed and then she \"did not really wake up\".  She slept all day today and when her son came to see her and she started to get up she noted feeling very weak.  She started having nausea, vomiting and diarrhea episodes.  Her son called 911.  Patient was brought in and has been incontinent of stool.  She notes feeling very unwell in general.  She denies current headache or body aches.  She does not feel short of breath but at one time oxygen saturations noted to be 89% on room air and she was placed on O2 per nasal cannula here in ED.  She denies any other recent illnesses or complaints.    Of note, paramedics remarked that patient's house was in a state of disarray with some concerns about hoarding.    Allergies:  Allergies   Allergen Reactions     Sulfa Drugs Hives       Problem List:    Patient Active Problem List    Diagnosis Date Noted     Weakness 01/28/2021     Priority: Medium     Vaccine reaction, initial encounter 01/28/2021     Priority: Medium     Fatigue, unspecified type 01/28/2021     Priority: Medium     History of COVID-19 01/28/2021     Priority: Medium     Bilateral pleural effusion 12/13/2020     Priority: Medium     Bilateral pneumonia 12/13/2020     Priority: Medium     " Pneumonia due to 2019 novel coronavirus 12/12/2020     Priority: Medium     Prerenal azotemia 12/12/2020     Priority: Medium     Hypophosphatemia 12/11/2020     Priority: Medium     Closed fracture of proximal end of left humerus with routine healing 12/09/2020     Priority: Medium     Nausea vomiting and diarrhea 12/09/2020     Priority: Medium     Hypomagnesemia 12/09/2020     Priority: Medium     Generalized muscle weakness 12/08/2020     Priority: Medium     E. coli UTI 12/08/2020     Priority: Medium     2019 novel coronavirus disease (COVID-19) 12/08/2020     Priority: Medium     CKD (chronic kidney disease) stage 3, GFR 30-59 ml/min 05/27/2019     Priority: Medium     Type 2 diabetes mellitus with diabetic polyneuropathy, without long-term current use of insulin (H) 10/11/2016     Priority: Medium     Essential hypertension with goal blood pressure less than 140/90 09/12/2016     Priority: Medium     Gastroesophageal reflux disease without esophagitis 05/13/2016     Priority: Medium     Coronary artery disease involving native coronary artery of native heart without angina pectoris 02/25/2016     Priority: Medium     Major depressive disorder, recurrent episode, moderate (H) 11/10/2015     Priority: Medium     Anxiety 05/08/2012     Priority: Medium     Insomnia 05/03/2012     Priority: Medium     Hypertension goal BP (blood pressure) < 140/90 01/20/2011     Priority: Medium     Hyperlipidemia LDL goal <100 10/31/2010     Priority: Medium     Esophageal reflux 08/30/2007     Priority: Medium     Irritable bowel syndrome 09/25/2006     Priority: Medium        Past Medical History:    Past Medical History:   Diagnosis Date     Diabetic eye exam (H) 05/01/14     Heart attack (H)      Pure hypercholesterolemia      Stented coronary artery      Type II or unspecified type diabetes mellitus without mention of complication, not stated as uncontrolled 2002     Unspecified disease of pericardium 12/05/08      Unspecified essential hypertension        Past Surgical History:    Past Surgical History:   Procedure Laterality Date     ABDOMEN SURGERY       COLONOSCOPY  04/15/09     COLONOSCOPY  6/18/2014    Procedure: COMBINED COLONOSCOPY, SINGLE BIOPSY/POLYPECTOMY BY BIOPSY;  Surgeon: Earle Mon MD;  Location:  [facility]      LAPAROSCOPY, SURGICAL; CHOLECYSTECTOMY  1997    Cholecystectomy, Laparoscopic     PHACOEMULSIFICATION CLEAR CORNEA WITH STANDARD INTRAOCULAR LENS IMPLANT Right 3/16/2016    Procedure: PHACOEMULSIFICATION CLEAR CORNEA WITH STANDARD INTRAOCULAR LENS IMPLANT;  Surgeon: George Lemus MD;  Location:  [facility]     PHACOEMULSIFICATION CLEAR CORNEA WITH STANDARD INTRAOCULAR LENS IMPLANT Left 3/30/2016    Procedure: PHACOEMULSIFICATION CLEAR CORNEA WITH STANDARD INTRAOCULAR LENS IMPLANT;  Surgeon: George Lemus MD;  Location:  [facility]     ZZC NONSPECIFIC PROCEDURE  1988    Hysterectomy       Family History:    Family History   Problem Relation Age of Onset     Diabetes Mother      Arthritis Mother      Heart Disease Mother      Hypertension Mother      Lipids Mother      Neurologic Disorder Mother         neuropathy     Osteoporosis Mother      Obesity Mother      EYE* Mother         blind     Diabetes Father      Genitourinary Problems Father         gall stones     Heart Disease Father         MI     Cancer Maternal Grandmother      Heart Disease Maternal Grandmother      Cancer Other         Grandparents     Alcohol/Drug No family hx of      Alzheimer Disease No family hx of      Anesthesia Reaction No family hx of      Blood Disease No family hx of      Depression No family hx of      Genetic Disorder No family hx of      Gastrointestinal Disease No family hx of      Gynecology No family hx of      Psychotic Disorder No family hx of      Respiratory No family hx of      Cerebrovascular Disease No family hx of        Social History:  Marital Status:   [5]  Social History     Tobacco Use      Smoking status: Never Smoker     Smokeless tobacco: Never Used   Substance Use Topics     Alcohol use: No     Alcohol/week: 0.0 standard drinks     Drug use: No        Medications:    No current facility-administered medications on file prior to encounter.        acetaminophen (TYLENOL) 500 MG tablet, Take 1-2 tablets (500-1,000 mg) by mouth every 6 hours as needed for pain       alcohol swab prep pads, Use to swab area of injection/sarah as directed.       aspirin (ASA) 81 MG EC tablet, Take 1 tablet (81 mg) by mouth daily       atorvastatin (LIPITOR) 80 MG tablet, Take 80 mg by mouth At Bedtime       blood glucose (NO BRAND SPECIFIED) test strip, Use to test blood sugar 2 times daily or as directed. To accompany: Blood Glucose Monitor Brands: per insurance. (Patient taking differently: 1 strip by In Vitro route every other day To accompany: Blood Glucose Monitor Brands: per insurance.)       blood glucose calibration (NO BRAND SPECIFIED) solution, To accompany: Blood Glucose Monitor Brands: per insurance.       blood glucose monitoring (NO BRAND SPECIFIED) meter device kit, Use to test blood sugar 2 times daily or as directed. Preferred blood glucose meter OR supplies to accompany: Blood Glucose Monitor Brands: per insurance. (Patient taking differently: 1 kit by In Vitro route every other day Preferred blood glucose meter OR supplies to accompany: Blood Glucose Monitor Brands: per insurance.)       clopidogrel (PLAVIX) 75 MG tablet, Take 1 tablet (75 mg) by mouth daily       empagliflozin (JARDIANCE) 25 MG TABS tablet, Take 1 tablet (25 mg) by mouth daily       gabapentin (NEURONTIN) 300 MG capsule, TAKE ONE TO TWO CAPSULES BY MOUTH EVERY EVENING (Patient taking differently: Take 300 mg by mouth every evening )       glipiZIDE (GLUCOTROL) 10 MG tablet, Take 2 tablets (20 mg) by mouth 2 times daily (before meals)       MELATONIN PO, Take 3 mg by mouth nightly as needed        metFORMIN (GLUCOPHAGE-XR) 500 MG 24 hr  "tablet, Take 2 tablets (1,000 mg) by mouth 2 times daily (with meals)       metoprolol succinate ER (TOPROL-XL) 100 MG 24 hr tablet, Take 1.5 tablets (150 mg) by mouth daily (Patient taking differently: Take 100 mg by mouth daily )       PARoxetine (PAXIL) 40 MG tablet, Take 1 tablet (40 mg) by mouth every morning       thin (NO BRAND SPECIFIED) lancets, Use with lanceting device. To accompany: Blood Glucose Monitor Brands: per insurance.       amLODIPine (NORVASC) 10 MG tablet, Take 0.5 tablets (5 mg) by mouth daily       isosorbide mononitrate (IMDUR) 30 MG 24 hr tablet, Take 1 tablet (30 mg) by mouth daily           Review of Systems   All other ROS reviewed and are negative or non-contributory except as stated in HPI.     Physical Exam   BP: (!) 189/84  Pulse: 84  Temp: 98.3  F (36.8  C)  Resp: 16  Height: 162.6 cm (5' 4\")  Weight: 59.9 kg (132 lb)  SpO2: 95 %      Physical Exam  Vitals signs and nursing note reviewed.   Constitutional:       Comments: Pale, ill-appearing female lying in the bed.  Initially incontinent of stool.   HENT:      Mouth/Throat:      Mouth: Mucous membranes are dry.   Eyes:      General: No scleral icterus.     Extraocular Movements: Extraocular movements intact.      Conjunctiva/sclera: Conjunctivae normal.   Neck:      Musculoskeletal: Normal range of motion and neck supple.   Cardiovascular:      Rate and Rhythm: Normal rate and regular rhythm.      Pulses: Normal pulses.      Heart sounds: Normal heart sounds.   Pulmonary:      Effort: Pulmonary effort is normal.      Breath sounds: Normal breath sounds.   Abdominal:      Palpations: Abdomen is soft. There is no mass.      Tenderness: There is no rebound.      Comments: Mild diffuse tenderness   Musculoskeletal: Normal range of motion.      Right lower leg: No edema.      Left lower leg: No edema.   Skin:     General: Skin is warm and dry.   Neurological:      General: No focal deficit present.      Mental Status: She is alert. "   Psychiatric:         Mood and Affect: Mood normal.         Behavior: Behavior normal.         ED Course (with Medical Decision Making)    Pt seen and examined by me.  RN and EPIC notes reviewed.       Patient presenting with nausea, vomiting, diarrhea, severe fatigue.  She did receive her second COVID-19 vaccine yesterday.  She received her vaccines very close to her illness and may be mounting a large response, therefore having larger side effects.  However, could be due to other reason.  Plan IV, labs, nausea medications.    Patient's CBC, lactate normal.  Chemistry panel shows elevated glucose at 209.  Magnesium is very low at 1.  IV magnesium ordered.  Chest x-ray shows no obvious acute infiltrates.    I spoke with hospitalist.  My plan is to have the patient admitted observation status for further fluid and electrolyte management and monitoring.  She is currently stable.        Procedures    Results for orders placed or performed during the hospital encounter of 01/28/21 (from the past 24 hour(s))   Blood gas venous   Result Value Ref Range    Ph Venous 7.33 7.32 - 7.43 pH    PCO2 Venous 57 (H) 40 - 50 mm Hg    PO2 Venous 25 25 - 47 mm Hg    Bicarbonate Venous 30 (H) 21 - 28 mmol/L    Base Excess Venous 2.5 mmol/L    FIO2 28    Lactic acid whole blood   Result Value Ref Range    Lactic Acid 1.5 0.7 - 2.0 mmol/L   CBC with platelets differential   Result Value Ref Range    WBC 7.1 4.0 - 11.0 10e9/L    RBC Count 4.22 3.8 - 5.2 10e12/L    Hemoglobin 12.0 11.7 - 15.7 g/dL    Hematocrit 37.9 35.0 - 47.0 %    MCV 90 78 - 100 fl    MCH 28.4 26.5 - 33.0 pg    MCHC 31.7 31.5 - 36.5 g/dL    RDW 14.8 10.0 - 15.0 %    Platelet Count 170 150 - 450 10e9/L    Diff Method Automated Method     % Neutrophils 76.6 %    % Lymphocytes 13.1 %    % Monocytes 9.0 %    % Eosinophils 0.4 %    % Basophils 0.3 %    % Immature Granulocytes 0.6 %    Nucleated RBCs 0 0 /100    Absolute Neutrophil 5.5 1.6 - 8.3 10e9/L    Absolute  Lymphocytes 0.9 0.8 - 5.3 10e9/L    Absolute Monocytes 0.6 0.0 - 1.3 10e9/L    Absolute Basophils 0.0 0.0 - 0.2 10e9/L    Abs Immature Granulocytes 0.0 0 - 0.4 10e9/L    Absolute Nucleated RBC 0.0    Comprehensive metabolic panel   Result Value Ref Range    Sodium 139 133 - 144 mmol/L    Potassium 4.0 3.4 - 5.3 mmol/L    Chloride 103 94 - 109 mmol/L    Carbon Dioxide 32 20 - 32 mmol/L    Anion Gap 4 3 - 14 mmol/L    Glucose 208 (H) 70 - 99 mg/dL    Urea Nitrogen 25 7 - 30 mg/dL    Creatinine 1.03 0.52 - 1.04 mg/dL    GFR Estimate 51 (L) >60 mL/min/[1.73_m2]    GFR Estimate If Black 59 (L) >60 mL/min/[1.73_m2]    Calcium 8.2 (L) 8.5 - 10.1 mg/dL    Bilirubin Total 0.5 0.2 - 1.3 mg/dL    Albumin 3.2 (L) 3.4 - 5.0 g/dL    Protein Total 6.9 6.8 - 8.8 g/dL    Alkaline Phosphatase 106 40 - 150 U/L    ALT 18 0 - 50 U/L    AST 20 0 - 45 U/L   Magnesium   Result Value Ref Range    Magnesium 1.0 (L) 1.6 - 2.3 mg/dL   Phosphorus   Result Value Ref Range    Phosphorus 3.5 2.5 - 4.5 mg/dL   XR Chest Port 1 View    Narrative    XR PORTABLE CHEST ONE VIEW   1/28/2021 8:31 PM     HISTORY: Shortness of breath.    COMPARISON: Chest x-ray 1/14/2021.      Impression    IMPRESSION: Portable chest. Linear scarring lateral right mid lung and  lateral left lower lung. Lungs are otherwise clear. Heart is normal in  size. No pneumothorax. No definite pleural effusions.    GUSTAVO NARAYANAN MD   Glucose by meter   Result Value Ref Range    Glucose 206 (H) 70 - 99 mg/dL       Medications   0.9% sodium chloride BOLUS (1,000 mLs Intravenous New Bag 1/28/21 1709)     Followed by   sodium chloride 0.9% infusion ( Intravenous New Bag 1/28/21 2200)   hydrALAZINE (APRESOLINE) tablet 10 mg (has no administration in time range)   glucose gel 15-30 g (has no administration in time range)     Or   dextrose 50 % injection 25-50 mL (has no administration in time range)     Or   glucagon injection 1 mg (has no administration in time range)   insulin aspart  (NovoLOG) injection (RAPID ACTING) (has no administration in time range)   insulin aspart (NovoLOG) injection (RAPID ACTING) (1 Units Subcutaneous Not Given 1/28/21 2119)   aspirin EC tablet 81 mg (81 mg Oral Given 1/28/21 2038)   acetaminophen (TYLENOL) tablet 500-1,000 mg (has no administration in time range)   atorvastatin (LIPITOR) tablet 80 mg (80 mg Oral Given 1/28/21 2038)   clopidogrel (PLAVIX) tablet 75 mg (75 mg Oral Given 1/28/21 2038)   gabapentin (NEURONTIN) capsule 300 mg (300 mg Oral Given 1/28/21 2038)   isosorbide mononitrate (IMDUR) 24 hr tablet 30 mg (has no administration in time range)   metoprolol succinate ER (TOPROL-XL) 24 hr tablet 100 mg (100 mg Oral Given 1/28/21 2038)   PARoxetine (PAXIL) tablet 40 mg (has no administration in time range)   acetaminophen (TYLENOL) Suppository 650 mg (has no administration in time range)   melatonin tablet 1 mg (has no administration in time range)   ondansetron (ZOFRAN-ODT) ODT tab 4 mg (has no administration in time range)     Or   ondansetron (ZOFRAN) injection 4 mg (has no administration in time range)   enoxaparin ANTICOAGULANT (LOVENOX) injection 40 mg (40 mg Subcutaneous Given 1/28/21 2048)   magnesium sulfate 4 g in 100 mL sterile water (premade) (4 g Intravenous New Bag 1/28/21 1757)       Assessments & Plan     I have reviewed the findings, diagnosis, plan with the patient.    Current Discharge Medication List          Final diagnoses:   Nausea vomiting and diarrhea   Fatigue, unspecified type   Weakness   History of COVID-19   Vaccine reaction, initial encounter - concern for post COVID-19 vaccine reaction     Disposition: Patient admitted observation status in stable condition.    Note: Chart documentation done in part with Dragon Voice Recognition software. Although reviewed after completion, some word and grammatical errors may remain.       1/28/2021   [address] MEDICAL SURGICAL     Dayna Simmons,  MD  01/29/21 0017

## 2021-01-29 NOTE — DISCHARGE SUMMARY
[facility]  Hospitalist Discharge Summary      Date of Admission:  1/28/2021  Date of Discharge:  1/29/2021  Discharging Provider: Markus Magdaleno NP      Discharge Diagnoses   Active Problems:    Nausea vomiting and diarrhea    Weakness    Vaccine reaction, initial encounter    Fatigue, unspecified type    History of COVID-19      Follow-ups Needed After Discharge   Follow-up Appointments     Follow-up and recommended labs and tests       Follow up with primary care provider, Byron Holm, within 7   days for hospital follow- up.  No follow up labs or test are needed.             Unresulted Labs Ordered in the Past 30 Days of this Admission     Date and Time Order Name Status Description    1/28/2021 2009 Urine Culture Preliminary       These results will be followed up by PCP    Discharge Disposition   Discharged to home  Condition at discharge: Stable    Hospital Course      Eliza Dubois is a 80 year old female with a history of type II diabetes mellitus, CKD, hypertension, and coronary artery disease who presented to the ED on 1/28/2021 with nausea, vomiting, diarrhea, and fatigue. Patient was recently hospitalized in December 2020 with COVID pneumonia. She subsequently received her 1st Covid Vaccine dose on Dec 30 after which she had some body aches that resolved. The day prior to current admission, patient received her second Covid Vaccine dose at around 10:30 am. Patient began to feel ill later that evening with generalized body aches, weakness, dry heaves without vomiting and an episode of diarrhea. Patient presented to the ER for further evaluation and management and was found to have hypomagnesemia and dehydration. Patient admitted to observation status for magnesium replacement and IV rehydration. By the next morning, patient with improvement in symptoms. She denies pain. Denies shortness of breath and patient is maintaining oxygen saturations above 90% on  room air.  Denies nausea and is tolerating oral intake. Feels less fatigued. Patient was seen by physical therapy who recommended patient return home with family assist. She is afebrile and hemodynamically stable. Magnesium was replaced and was 2.6 at the time of discharge. Patient medically stable for hospital discharge with symptoms likely secondary to dehydration following poor oral intake and nausea due to second COVID vaccine. Recommended patient see her PCP within one week for hospital follow up. Patient's other chronic medical problems remained stable throughout hospitalization and prior to admission medications were continued after discharge.     Consultations This Hospital Stay   PHYSICAL THERAPY ADULT IP CONSULT  CARE MANAGEMENT / SOCIAL WORK IP CONSULT    Code Status   Full Code    Time Spent on this Encounter   I, Markus Magdaleno NP, personally saw the patient today and spent greater than 30 minutes discharging this patient.       Markus Magdaleno NP  [address] SURGICAL  [address] [facility] [place] [address] [place] [address]  Phone: [phone]  ______________________________________________________________________    Physical Exam   Vital Signs: Temp: 98.8  F (37.1  C) Temp src: Oral BP: 132/67 Pulse: 76   Resp: 18 SpO2: 93 % O2 Device: None (Room air) Oxygen Delivery: 1 LPM  Weight: 96 lbs 12.51 oz  Constitutional: awake, alert, cooperative, no apparent distress, and appears stated age  Respiratory: No increased work of breathing, good air exchange, clear to auscultation bilaterally, no crackles or wheezing  Cardiovascular: regular rate and rhythm and normal S1 and S2  GI: normal bowel sounds, non-distended and non-tender  Skin: normal skin color, texture, turgor  Musculoskeletal: no lower extremity pitting edema present; limited ROM in left upper extremity due to previous left shoulder injury  Neurologic: Awake, alert, oriented to name, place and time.  Cranial nerves II-XII are grossly  intact.  Cerebellar finger to nose, heel to shin intact.  Sensory is intact.   Primary Care Physician   Byron Holm    Discharge Orders      Reason for your hospital stay    You were in the hospital for weakness and nausea possibly due to dehydration and recent COVID vaccination and improved     Follow-up and recommended labs and tests     Follow up with primary care provider, Byron Holm, within 7 days for hospital follow- up.  No follow up labs or test are needed.     Activity    Your activity upon discharge: activity as tolerated     Diet    Follow this diet upon discharge: Orders Placed This Encounter         Advance Diet as Tolerated: Regular Diet Adult       Significant Results and Procedures   Most Recent 3 CBC's:  Recent Labs   Lab Test 01/28/21  1709 01/14/21  1441 12/15/20  0552   WBC 7.1 12.2* 9.3   HGB 12.0 11.7 11.4*   MCV 90 92 88    407 292     Most Recent 3 BMP's:  Recent Labs   Lab Test 01/29/21  0536 01/28/21  1709 01/14/21  1441    139 139   POTASSIUM 4.0 4.0 4.2   CHLORIDE 108 103 105   CO2 29 32 27   BUN 24 25 37*   CR 0.93 1.03 1.17*   ANIONGAP 3 4 7   ANDREI 7.6* 8.2* 8.4*   * 208* 308*   ,   Results for orders placed or performed during the hospital encounter of 01/28/21   XR Chest Port 1 View    Narrative    XR PORTABLE CHEST ONE VIEW   1/28/2021 8:31 PM     HISTORY: Shortness of breath.    COMPARISON: Chest x-ray 1/14/2021.      Impression    IMPRESSION: Portable chest. Linear scarring lateral right mid lung and  lateral left lower lung. Lungs are otherwise clear. Heart is normal in  size. No pneumothorax. No definite pleural effusions.    GUSTAVO NARAYANAN MD       Discharge Medications   Current Discharge Medication List      CONTINUE these medications which have NOT CHANGED    Details   acetaminophen (TYLENOL) 500 MG tablet Take 1-2 tablets (500-1,000 mg) by mouth every 6 hours as needed for pain  Qty: 100 tablet, Refills: 0    Associated Diagnoses:  Closed fracture of proximal end of left humerus with routine healing, unspecified fracture morphology, subsequent encounter      alcohol swab prep pads Use to swab area of injection/sarah as directed.  Qty: 100 each, Refills: 3    Associated Diagnoses: Type 2 diabetes mellitus with diabetic polyneuropathy, without long-term current use of insulin (H)      aspirin (ASA) 81 MG EC tablet Take 1 tablet (81 mg) by mouth daily  Qty: 30 tablet, Refills: 0    Associated Diagnoses: Coronary artery disease involving native coronary artery of native heart without angina pectoris      atorvastatin (LIPITOR) 80 MG tablet Take 80 mg by mouth At Bedtime      blood glucose (NO BRAND SPECIFIED) test strip Use to test blood sugar 2 times daily or as directed. To accompany: Blood Glucose Monitor Brands: per insurance.  Qty: 100 strip, Refills: 6    Associated Diagnoses: Type 2 diabetes mellitus with diabetic polyneuropathy, without long-term current use of insulin (H)      blood glucose calibration (NO BRAND SPECIFIED) solution To accompany: Blood Glucose Monitor Brands: per insurance.  Qty: 1 Bottle, Refills: 3    Associated Diagnoses: Type 2 diabetes mellitus with diabetic polyneuropathy, without long-term current use of insulin (H)      blood glucose monitoring (NO BRAND SPECIFIED) meter device kit Use to test blood sugar 2 times daily or as directed. Preferred blood glucose meter OR supplies to accompany: Blood Glucose Monitor Brands: per insurance.  Qty: 1 kit, Refills: 0    Associated Diagnoses: Type 2 diabetes mellitus with diabetic polyneuropathy, without long-term current use of insulin (H)      clopidogrel (PLAVIX) 75 MG tablet Take 1 tablet (75 mg) by mouth daily  Qty: 30 tablet, Refills: 0    Associated Diagnoses: Coronary artery disease involving native coronary artery of native heart without angina pectoris      empagliflozin (JARDIANCE) 25 MG TABS tablet Take 1 tablet (25 mg) by mouth daily  Qty: 30 tablet, Refills: 0     Associated Diagnoses: Type 2 diabetes mellitus with diabetic polyneuropathy, without long-term current use of insulin (H)      gabapentin (NEURONTIN) 300 MG capsule TAKE ONE TO TWO CAPSULES BY MOUTH EVERY EVENING  Qty: 60 capsule, Refills: 0    Associated Diagnoses: Type 2 diabetes mellitus with diabetic polyneuropathy, without long-term current use of insulin (H)      glipiZIDE (GLUCOTROL) 10 MG tablet Take 2 tablets (20 mg) by mouth 2 times daily (before meals)  Qty: 120 tablet, Refills: 0    Associated Diagnoses: Type 2 diabetes mellitus with diabetic polyneuropathy, without long-term current use of insulin (H)      MELATONIN PO Take 3 mg by mouth nightly as needed       metFORMIN (GLUCOPHAGE-XR) 500 MG 24 hr tablet Take 2 tablets (1,000 mg) by mouth 2 times daily (with meals)  Qty: 120 tablet, Refills: 0    Associated Diagnoses: Type 2 diabetes mellitus with diabetic polyneuropathy, without long-term current use of insulin (H)      metoprolol succinate ER (TOPROL-XL) 100 MG 24 hr tablet Take 1.5 tablets (150 mg) by mouth daily  Qty: 45 tablet, Refills: 0    Associated Diagnoses: Hypertension goal BP (blood pressure) < 140/90      PARoxetine (PAXIL) 40 MG tablet Take 1 tablet (40 mg) by mouth every morning  Qty: 30 tablet, Refills: 0    Associated Diagnoses: Major depressive disorder, recurrent episode, moderate (H)      thin (NO BRAND SPECIFIED) lancets Use with lanceting device. To accompany: Blood Glucose Monitor Brands: per insurance.  Qty: 100 each, Refills: 6    Associated Diagnoses: Type 2 diabetes mellitus with diabetic polyneuropathy, without long-term current use of insulin (H)      isosorbide mononitrate (IMDUR) 30 MG 24 hr tablet Take 1 tablet (30 mg) by mouth daily  Qty: 30 tablet, Refills: 0    Associated Diagnoses: Coronary artery disease involving native coronary artery of native heart without angina pectoris           Allergies   Allergies   Allergen Reactions     Sulfa Drugs Hives

## 2021-01-29 NOTE — PROGRESS NOTES
Care Management Discharge Note    Discharge Date:  1/29/21       Discharge Disposition:  Home     Discharge Services:  Patient attends outpatient Physical Therapy     Discharge Transportation: family or friend will provide    Persons Notified of Discharge Plans: Patient and daughter    Patient/Family in Agreement with the Plan:  Yes     Handoff Referral Completed: No      DELORES Alvarado  [facility]   [phone]

## 2021-01-30 LAB
BACTERIA SPEC CULT: NORMAL
Lab: NORMAL
SPECIMEN SOURCE: NORMAL

## 2021-02-01 NOTE — PROGRESS NOTES
[facility]          OUTPATIENT PHYSICAL THERAPY ORTHOPEDIC EVALUATION  PLAN OF TREATMENT FOR OUTPATIENT REHABILITATION  (COMPLETE FOR INITIAL CLAIMS ONLY)  Patient's Last Name, First Name, M.I.  YOB: 1940  Eliza Dubois    Provider s Name:  [facility]   Medical Record No.  [id number]   Start of Care Date:  01/29/21   Onset Date:  11/22/20   Type:     _X__PT   ___OT   ___SLP Medical Diagnosis:  (P) L humeral fracture.     PT Diagnosis:  L shoulder pain.   Visits from SOC:  1      _________________________________________________________________________________  Plan of Treatment/Functional Goals:  joint mobilization, manual therapy, neuromuscular re-education, ROM, strengthening, stretching     Cryotherapy, Electrical stimulation, Hot packs, TENS     Goals  Goal Identifier: (P) HEP  Goal Description: (P) Pt will be independent with HEP in order to improve L shoulder ROM and strength.  Target Date: (P) 03/26/21    Goal Identifier: (P) ROM  Goal Description: (P) Pt will demonstrate L shoulder flexion to 140 degrees or better in order to improve tolerance to reaching into cupboard.  Target Date: (P) 03/26/21                                                                      Therapy Frequency:  2 times/day  Predicted Duration of Therapy Intervention:  6 weeks.    Guero Butler, PT                 I CERTIFY THE NEED FOR THESE SERVICES FURNISHED UNDER        THIS PLAN OF TREATMENT AND WHILE UNDER MY CARE     (Physician co-signature of this document indicates review and certification of the therapy plan).                       Certification Date From:  (P) 01/29/21   Certification Date To:  (P) 03/26/21    Referring Provider:  Dr. Cameron    Initial Assessment        See Epic Evaluation Start of Care Date: 01/29/21

## 2021-02-01 NOTE — PROGRESS NOTES
01/29/21 1300   General Information   Type of Visit Initial OP Ortho PT Evaluation   Start of Care Date 01/29/21   Referring Physician Dr. Cameron   Orders Evaluate and Treat   Date of Order 01/20/21   Certification Required? Yes   Medical Diagnosis L humeral fracture   Surgical/Medical history reviewed Yes   Weight-Bearing Status - LUE weight-bearing as tolerated   Weight-Bearing Status - RUE full weight-bearing   Weight-Bearing Status - LLE full weight-bearing   Weight-Bearing Status - RLE full weight-bearing   Body Part(s)   Body Part(s) Shoulder   Presentation and Etiology   Pertinent history of current problem (include personal factors and/or comorbidities that impact the POC) Pt injured L arm when she fell and landed on L arm sustaining L humeral fracture.  Pt notes she has been protective of L arm with not raising it above chest height.  Pt was recently hospitalzed after receiving second Covid-19 vaccine with complications of fatigue, fever, and nausea. PMHx Patient with a previous medical history of DM 2, HTN, GERD, CKD, hyperlipidemia, CAD, COVID 19 pneumonia. left proximal humerus fracture, depression, anxiety, IBS and MI.     Impairments A. Pain;D. Decreased ROM;E. Decreased flexibility;F. Decreased strength and endurance;G. Impaired balance   Functional Limitations perform activities of daily living   Symptom Location L lateral and anterior shoulder.   How/Where did it occur With a fall   Onset date of current episode/exacerbation 11/22/20   Chronicity New   Pain rating (0-10 point scale) Best (/10);Worst (/10)   Best (/10) 0/10   Worst (/10) 2/10   Pain quality A. Sharp;B. Dull;C. Aching;E. Shooting   Frequency of pain/symptoms C. With activity   Pain/symptoms are: Worse during the day   Pain/symptoms exacerbated by C. Lifting;D. Carrying;G. Certain positions;H. Overhead reach;I. Bending   Pain/symptoms eased by C. Rest;E. Changing positions;F. Certain positions;I. OTC medication(s)   Progression of  symptoms since onset: Improved   Current / Previous Interventions   Diagnostic Tests: X-ray   Current Level of Function   Current Community Support Family/friend caregiver   Patient role/employment history F. Retired   Living environment House/townhome   Current equipment-Gait/Locomotion Standard cane   Current equipment-ADL Shower/tub chair;Shower/tub grab bar;Wall grab bar   Fall Risk Screen   Fall screen completed by PT   Have you fallen 2 or more times in the past year? Yes   Have you fallen and had an injury in the past year? Yes   Timed Up and Go score (seconds) Perform at next session.   Is patient a fall risk? Yes   Fall screen comments Held on TUG d/t patient fatigue.   Abuse Screen (yes response referral indicated)   Feels Unsafe at Home or Work/School no   Feels Threatened by Someone no   Does Anyone Try to Keep You From Having Contact with Others or Doing Things Outside Your Home? no   Physical Signs of Abuse Present no   System Outcome Measures   Outcome Measures   (SPADI)   Shoulder Objective Findings   Side (if bilateral, select both right and left) Left   Observation Guarded shoulder elevated.   Posture Forward shoulder posture, elevated scapula.   Cervical Screen (ROM, quadrant) Unremarkable.   Palpation R upper trap and infraspinatus.   Left Shoulder Flexion AROM 65   Left Shoulder Flexion PROM 90   Left Shoulder Abduction AROM 70   Left Shoulder Abduction PROM 95   Left Shoulder ER PROM 40   Left Shoulder IR AROM T12   Left Shoulder IR PROM 70   Left Shoulder Flexion Strength 3-/5   Left Shoulder Abduction Strength 3-/5   Left Shoulder ER Strength 3-/5   Left Shoulder IR Strength 3-/5   Left Shoulder Extension Strength 3-/5   Left Mid Trapezius Strength 3-/5   Left Lower Trapezius Strength 3-/5   Planned Therapy Interventions   Planned Therapy Interventions joint mobilization;manual therapy;neuromuscular re-education;ROM;strengthening;stretching   Planned Modality Interventions   Planned Modality  Interventions Cryotherapy;Electrical stimulation;Hot packs;TENS   Clinical Impression   Criteria for Skilled Therapeutic Interventions Met yes, treatment indicated   PT Diagnosis L shoulder pain.   Influenced by the following impairments Pain, decreased ROM, weakness.   Functional limitations due to impairments Lifting, carrying, reaching.   Clinical Presentation Stable/Uncomplicated   Clinical Presentation Rationale Clinical judgement.   Clinical Decision Making (Complexity) Low complexity   Therapy Frequency 2 times/day   Predicted Duration of Therapy Intervention (days/wks) 6 weeks.   Risk & Benefits of therapy have been explained Yes   Patient, Family & other staff in agreement with plan of care Yes   Clinical Impression Comments Pt is an 80 y.o. female who presented to PT with L shoulder pain after humeral fracture on 11/22/20.  Pt will benefit from skilled PT to improve L shoulder ROM, strength and proprioception.   Education Assessment   Preferred Learning Style Listening;Demonstration;Pictures/video   Barriers to Learning No barriers   ORTHO GOALS   PT Ortho Eval Goals 1;2   Ortho Goal 1   Goal Identifier HEP   Goal Description Pt will be independent with HEP in order to improve L shoulder ROM and strength.   Target Date 03/26/21   Ortho Goal 2   Goal Identifier ROM   Goal Description Pt will demonstrate L shoulder flexion to 140 degrees or better in order to improve tolerance to reaching into cupboard.   Target Date 03/26/21   Total Evaluation Time   PT Eval, Low Complexity Minutes (48485) 15   Therapy Certification   Certification date from 01/29/21   Certification date to 03/26/21   Medical Diagnosis L humeral fracture.

## 2021-02-02 ENCOUNTER — HOSPITAL ENCOUNTER (OUTPATIENT)
Dept: PHYSICAL THERAPY | Facility: CLINIC | Age: 81
Setting detail: THERAPIES SERIES
End: 2021-02-02
Attending: ORTHOPAEDIC SURGERY
Payer: MEDICARE

## 2021-02-02 PROCEDURE — 97140 MANUAL THERAPY 1/> REGIONS: CPT | Mod: GP | Performed by: PHYSICAL THERAPIST

## 2021-02-02 PROCEDURE — 97110 THERAPEUTIC EXERCISES: CPT | Mod: GP | Performed by: PHYSICAL THERAPIST

## 2021-02-07 DIAGNOSIS — E11.42 TYPE 2 DIABETES MELLITUS WITH DIABETIC POLYNEUROPATHY, WITHOUT LONG-TERM CURRENT USE OF INSULIN (H): ICD-10-CM

## 2021-02-08 ENCOUNTER — HOSPITAL ENCOUNTER (OUTPATIENT)
Dept: PHYSICAL THERAPY | Facility: CLINIC | Age: 81
Setting detail: THERAPIES SERIES
End: 2021-02-08
Attending: ORTHOPAEDIC SURGERY
Payer: MEDICARE

## 2021-02-08 PROCEDURE — 97140 MANUAL THERAPY 1/> REGIONS: CPT | Mod: GP | Performed by: PHYSICAL THERAPIST

## 2021-02-08 PROCEDURE — 97110 THERAPEUTIC EXERCISES: CPT | Mod: GP | Performed by: PHYSICAL THERAPIST

## 2021-02-08 RX ORDER — GABAPENTIN 300 MG/1
CAPSULE ORAL
Qty: 60 CAPSULE | Refills: 0 | Status: SHIPPED | OUTPATIENT
Start: 2021-02-08 | End: 2021-03-11

## 2021-02-08 NOTE — TELEPHONE ENCOUNTER
Requested Prescriptions   Pending Prescriptions Disp Refills     gabapentin (NEURONTIN) 300 MG capsule [Pharmacy Med Name: GABAPENTIN 300MG CAPS] 60 capsule 0     Sig: TAKE ONE TO TWO CAPSULES BY MOUTH EVERY EVENING     Last Written Prescription Date:  01/12/2021  Last Fill Quantity: 60,   # refills: 0  Last Office Visit: 01/14/2021  Future Office visit:    Next 5 appointments (look out 90 days)    Mar 15, 2021 10:00 AM  Return Visit with Deon Cameron DO  [facility] [address] [place] [address] [phone]           Routing refill request to provider for review/approval because:  Drug not on the [facility], [facility] or [facility] refill protocol or controlled substance    Jaqueline Musa MA

## 2021-02-15 ENCOUNTER — HOSPITAL ENCOUNTER (OUTPATIENT)
Dept: PHYSICAL THERAPY | Facility: CLINIC | Age: 81
Setting detail: THERAPIES SERIES
End: 2021-02-15
Attending: ORTHOPAEDIC SURGERY
Payer: MEDICARE

## 2021-02-15 PROCEDURE — 97110 THERAPEUTIC EXERCISES: CPT | Mod: GP | Performed by: PHYSICAL THERAPIST

## 2021-02-18 ENCOUNTER — HOSPITAL ENCOUNTER (OUTPATIENT)
Dept: PHYSICAL THERAPY | Facility: CLINIC | Age: 81
Setting detail: THERAPIES SERIES
End: 2021-02-18
Attending: ORTHOPAEDIC SURGERY
Payer: MEDICARE

## 2021-02-18 PROCEDURE — 97110 THERAPEUTIC EXERCISES: CPT | Mod: GP | Performed by: PHYSICAL THERAPIST

## 2021-02-25 ENCOUNTER — HOSPITAL ENCOUNTER (OUTPATIENT)
Dept: PHYSICAL THERAPY | Facility: CLINIC | Age: 81
Setting detail: THERAPIES SERIES
End: 2021-02-25
Attending: ORTHOPAEDIC SURGERY
Payer: MEDICARE

## 2021-02-25 PROCEDURE — 97140 MANUAL THERAPY 1/> REGIONS: CPT | Mod: GP | Performed by: PHYSICAL THERAPIST

## 2021-02-25 PROCEDURE — 97110 THERAPEUTIC EXERCISES: CPT | Mod: GP | Performed by: PHYSICAL THERAPIST

## 2021-03-04 ENCOUNTER — HOSPITAL ENCOUNTER (OUTPATIENT)
Dept: PHYSICAL THERAPY | Facility: CLINIC | Age: 81
Setting detail: THERAPIES SERIES
End: 2021-03-04
Attending: ORTHOPAEDIC SURGERY
Payer: MEDICARE

## 2021-03-04 PROCEDURE — 97110 THERAPEUTIC EXERCISES: CPT | Mod: GP | Performed by: PHYSICAL THERAPIST

## 2021-03-11 DIAGNOSIS — E11.42 TYPE 2 DIABETES MELLITUS WITH DIABETIC POLYNEUROPATHY, WITHOUT LONG-TERM CURRENT USE OF INSULIN (H): ICD-10-CM

## 2021-03-11 RX ORDER — GABAPENTIN 300 MG/1
CAPSULE ORAL
Qty: 60 CAPSULE | Refills: 0 | Status: SHIPPED | OUTPATIENT
Start: 2021-03-11 | End: 2021-04-12

## 2021-03-11 NOTE — TELEPHONE ENCOUNTER
Gabapentin      Last Written Prescription Date:  02/08/2021  Last Fill Quantity: 60,   # refills: 0  Last Office Visit: 01/14/2021  Future Office visit:    Next 5 appointments (look out 90 days)    Mar 15, 2021 10:00 AM  Return Visit with Deon Cameron DO  [facility] [address] [place] [address]  [phone]       Routing refill request to provider for review/approval because:  Drug not on the [facility], [facility] or [facility] refill protocol or controlled substance    Paulina Garrett RN

## 2021-03-13 NOTE — NURSING NOTE
"Chief Complaint   Patient presents with     Diabetes       Initial /70 (BP Location: Right arm, Patient Position: Chair, Cuff Size: Adult Regular)  Pulse 80  Temp 96.5  F (35.8  C) (Temporal)  Wt 154 lb (69.9 kg)  SpO2 96%  BMI 26.43 kg/m2 Estimated body mass index is 26.43 kg/(m^2) as calculated from the following:    Height as of 4/6/17: 5' 4\" (1.626 m).    Weight as of this encounter: 154 lb (69.9 kg).  Medication Reconciliation: complete  Makenzie ANDERSONA    " - - -

## 2021-03-15 ENCOUNTER — OFFICE VISIT (OUTPATIENT)
Dept: ORTHOPEDICS | Facility: CLINIC | Age: 81
End: 2021-03-15
Payer: COMMERCIAL

## 2021-03-15 ENCOUNTER — ANCILLARY PROCEDURE (OUTPATIENT)
Dept: GENERAL RADIOLOGY | Facility: CLINIC | Age: 81
End: 2021-03-15
Attending: ORTHOPAEDIC SURGERY
Payer: COMMERCIAL

## 2021-03-15 VITALS
SYSTOLIC BLOOD PRESSURE: 126 MMHG | BODY MASS INDEX: 22.16 KG/M2 | HEIGHT: 64 IN | DIASTOLIC BLOOD PRESSURE: 74 MMHG | WEIGHT: 129.8 LBS

## 2021-03-15 DIAGNOSIS — S42.202D CLOSED FRACTURE OF PROXIMAL END OF LEFT HUMERUS WITH ROUTINE HEALING, UNSPECIFIED FRACTURE MORPHOLOGY, SUBSEQUENT ENCOUNTER: ICD-10-CM

## 2021-03-15 DIAGNOSIS — S42.292A OTHER CLOSED DISPLACED FRACTURE OF PROXIMAL END OF LEFT HUMERUS, INITIAL ENCOUNTER: ICD-10-CM

## 2021-03-15 DIAGNOSIS — S42.292A OTHER CLOSED DISPLACED FRACTURE OF PROXIMAL END OF LEFT HUMERUS, INITIAL ENCOUNTER: Primary | ICD-10-CM

## 2021-03-15 PROCEDURE — 73030 X-RAY EXAM OF SHOULDER: CPT | Mod: TC | Performed by: RADIOLOGY

## 2021-03-15 PROCEDURE — 99213 OFFICE O/P EST LOW 20 MIN: CPT | Mod: GW | Performed by: ORTHOPAEDIC SURGERY

## 2021-03-15 ASSESSMENT — PAIN SCALES - GENERAL: PAINLEVEL: NO PAIN (0)

## 2021-03-15 ASSESSMENT — MIFFLIN-ST. JEOR: SCORE: 1043.77

## 2021-03-15 NOTE — PROGRESS NOTES
Appropriate assistive devices provided during their visit. yes (Yes, No, N/A) cane (list device)    Exam table and/or cart  placed in the lowest position. yes (Yes, No, N/A)    Brakes on tables/carts/wheelchairs used at all times. yes (Yes, No, N/A)    Non slip footwear applied. na (Yes, No, NA)    Patient was accompanied by staff throughout visit. yes (Yes, No, N/A)    Equipment safety straps used. na (Yes, No, N/A)    Assist with toileting. na (Yes, No, N/A)

## 2021-03-15 NOTE — LETTER
"    3/15/2021         RE: Eliza Dubois  [address]  [facility] [address]        Dear Colleague,    Thank you for referring your patient, Eliza Dubois, to the [facility]. Please see a copy of my visit note below.    Office Visit-Follow up    Chief Complaint: Eliza Dubois is a 80 year old female who is being seen for   Chief Complaint   Patient presents with     RECHECK     left proximal humerus fracture-date of injury November 22, 2020        History of Present Illness:   Today's visit:  Doing well.  No significant pain.  She does complain of some achiness.  Overall very content.    January 18, 2021 visit:  Doing well.  No pain.  No issues.     December 21, 2020 visit:  Initially seen in the ER November 22, 2020.  Ground-level fall.  It was recommended a sling/shoulder immobilizer with 1 week follow-up.  Chart review shows she was subsequently admitted twice to the hospital for Covid.  Following up today for their first follow-up.  Minimal shoulder pain.  She has been using her sling.  Resting no major issues or pain.      Social History     Occupational History     Not on file   Tobacco Use     Smoking status: Never Smoker     Smokeless tobacco: Never Used   Substance and Sexual Activity     Alcohol use: No     Alcohol/week: 0.0 standard drinks     Drug use: No     Sexual activity: Yes     Partners: Male     Birth control/protection: Surgical       REVIEW OF SYSTEMS  General: negative for, night sweats, dizziness, fatigue  Resp: No shortness of breath and no cough  CV: negative for chest pain, syncope or near-syncope  GI: negative for nausea, vomiting and diarrhea  : negative for dysuria and hematuria  Musculoskeletal: as above  Neurologic: negative for syncope   Hematologic: negative for bleeding disorder    Physical Exam:  Vitals: /74   Ht 1.626 m (5' 4\")   Wt 58.9 kg (129 lb 12.8 oz)   BMI 22.28 kg/m    BMI= Body mass index is 22.28 kg/m .  Constitutional: " healthy, alert and no acute distress   Psychiatric: mentation appears normal and affect normal/bright  NEURO: no focal deficits  RESP: Normal with easy respirations and no use of accessory muscles to breathe, no audible wheezing or retractions  CV: LUE:   no edema           SKIN: No erythema, rashes, excoriation, or breakdown. No evidence of infection.   JOINT/EXTREMITIES:left shoulder: Moves as a unit.  Active motion approximate 90/90 passively approximately 110.  No areas of tenderness.  No gross deformity.  GAIT: not tested             Diagnostic Modalities:  left shoulder X-ray: Proximal humerus surgical neck fracture with decreased lucency increased callus formation.  Glenohumeral joint is well-preserved with no subluxation or dislocation.  Varus alignment to the fracture.  Independent visualization of the images was performed.      Impression: left proximal humerus fracture-treated nonoperatively date of injury November 22, 2020    Plan:  All of the above pertinent physical exam and imaging modalities findings was reviewed with Eliza.    Overall making good radiographic and clinical healing.  She is content with her progress.  She reports 1 more physical therapy session left.  Recommend she transition to home exercise based program.  Continue progressing her activities as tolerated.  Plan to see her as needed in the future.    Return to clinic PRN, or sooner as needed for changes.  Re-x-ray on return: No    Rajesh Cameron D.O.            Again, thank you for allowing me to participate in the care of your patient.        Sincerely,        Deon Cameron, DO

## 2021-03-15 NOTE — PROGRESS NOTES
"Office Visit-Follow up    Chief Complaint: Eliza Dubois is a 80 year old female who is being seen for   Chief Complaint   Patient presents with     RECHECK     left proximal humerus fracture-date of injury November 22, 2020        History of Present Illness:   Today's visit:  Doing well.  No significant pain.  She does complain of some achiness.  Overall very content.    January 18, 2021 visit:  Doing well.  No pain.  No issues.     December 21, 2020 visit:  Initially seen in the ER November 22, 2020.  Ground-level fall.  It was recommended a sling/shoulder immobilizer with 1 week follow-up.  Chart review shows she was subsequently admitted twice to the hospital for Covid.  Following up today for their first follow-up.  Minimal shoulder pain.  She has been using her sling.  Resting no major issues or pain.      Social History     Occupational History     Not on file   Tobacco Use     Smoking status: Never Smoker     Smokeless tobacco: Never Used   Substance and Sexual Activity     Alcohol use: No     Alcohol/week: 0.0 standard drinks     Drug use: No     Sexual activity: Yes     Partners: Male     Birth control/protection: Surgical       REVIEW OF SYSTEMS  General: negative for, night sweats, dizziness, fatigue  Resp: No shortness of breath and no cough  CV: negative for chest pain, syncope or near-syncope  GI: negative for nausea, vomiting and diarrhea  : negative for dysuria and hematuria  Musculoskeletal: as above  Neurologic: negative for syncope   Hematologic: negative for bleeding disorder    Physical Exam:  Vitals: /74   Ht 1.626 m (5' 4\")   Wt 58.9 kg (129 lb 12.8 oz)   BMI 22.28 kg/m    BMI= Body mass index is 22.28 kg/m .  Constitutional: healthy, alert and no acute distress   Psychiatric: mentation appears normal and affect normal/bright  NEURO: no focal deficits  RESP: Normal with easy respirations and no use of accessory muscles to breathe, no audible wheezing or retractions  CV: LUE:   " no edema           SKIN: No erythema, rashes, excoriation, or breakdown. No evidence of infection.   JOINT/EXTREMITIES:left shoulder: Moves as a unit.  Active motion approximate 90/90 passively approximately 110.  No areas of tenderness.  No gross deformity.  GAIT: not tested             Diagnostic Modalities:  left shoulder X-ray: Proximal humerus surgical neck fracture with decreased lucency increased callus formation.  Glenohumeral joint is well-preserved with no subluxation or dislocation.  Varus alignment to the fracture.  Independent visualization of the images was performed.      Impression: left proximal humerus fracture-treated nonoperatively date of injury November 22, 2020    Plan:  All of the above pertinent physical exam and imaging modalities findings was reviewed with Eliza.    Overall making good radiographic and clinical healing.  She is content with her progress.  She reports 1 more physical therapy session left.  Recommend she transition to home exercise based program.  Continue progressing her activities as tolerated.  Plan to see her as needed in the future.    Return to clinic PRN, or sooner as needed for changes.  Re-x-ray on return: No    Rajesh Cameron D.O.

## 2021-03-17 ENCOUNTER — HOSPITAL ENCOUNTER (OUTPATIENT)
Dept: PHYSICAL THERAPY | Facility: CLINIC | Age: 81
Setting detail: THERAPIES SERIES
End: 2021-03-17
Attending: ORTHOPAEDIC SURGERY
Payer: MEDICARE

## 2021-03-17 PROCEDURE — 97110 THERAPEUTIC EXERCISES: CPT | Mod: GP | Performed by: PHYSICAL THERAPIST

## 2021-03-19 NOTE — PROGRESS NOTES
Care Management Discharge Note    Discharge Date: 12/15/20     Discharge Disposition: Transitional Care    Discharge Services:      Discharge DME: Oxygen    Discharge Transportation: family or friend will provide    Private pay costs discussed: Not applicable    PAS Confirmation Code:    Patient/family educated on Medicare website which has current facility and service quality ratings:      Education Provided on the Discharge Plan:    Persons Notified of Discharge Plans: Neyda liu  Patient/Family in Agreement with the Plan: yes    Handoff Referral Completed: Yes    Additional Information:  Patient discharging back to [facility] (Admissions: [phone] Main Phone: [phone] Fax: [phone]).  Neyda Liu, transport at 1030.  [facility] aware of discharge time.    DELORES [place]  [facility] [phone]/ [facility] [phone]  Care Management     Pt is on my calendar to refill Revlimid. Per last office note-pt is to continue 10 mg 3 out of 4 weeks. Her last delivery was on 3/2/2021.    I have routed the rx to Dr Garcia for signature. Once signed it will be escribed to [facility]

## 2021-03-30 DIAGNOSIS — I10 HYPERTENSION GOAL BP (BLOOD PRESSURE) < 140/90: ICD-10-CM

## 2021-04-02 RX ORDER — METOPROLOL SUCCINATE 100 MG/1
TABLET, EXTENDED RELEASE ORAL
Qty: 30 TABLET | Refills: 4 | Status: SHIPPED | OUTPATIENT
Start: 2021-04-02 | End: 2021-10-07

## 2021-04-10 DIAGNOSIS — E11.42 TYPE 2 DIABETES MELLITUS WITH DIABETIC POLYNEUROPATHY, WITHOUT LONG-TERM CURRENT USE OF INSULIN (H): ICD-10-CM

## 2021-04-12 RX ORDER — GABAPENTIN 300 MG/1
CAPSULE ORAL
Qty: 60 CAPSULE | Refills: 0 | Status: SHIPPED | OUTPATIENT
Start: 2021-04-12 | End: 2021-09-14

## 2021-04-12 NOTE — TELEPHONE ENCOUNTER
Gabapentin        Last Written Prescription Date:  3/11/2021  Last Fill Quantity: 60,   # refills: 0  Last Office Visit: 1/14/2021  Future Office visit:       Routing refill request to provider for review/approval because:  Drug not on the [facility], P or [facility] refill protocol or controlled substance

## 2021-04-20 DIAGNOSIS — F33.1 MAJOR DEPRESSIVE DISORDER, RECURRENT EPISODE, MODERATE (H): ICD-10-CM

## 2021-04-21 RX ORDER — PAROXETINE 40 MG/1
TABLET, FILM COATED ORAL
Qty: 90 TABLET | Refills: 1 | Status: SHIPPED | OUTPATIENT
Start: 2021-04-21 | End: 2021-11-01

## 2021-04-21 NOTE — TELEPHONE ENCOUNTER
Routing refill request to provider for review/approval because:  PHQ-9 overdue    CHARLES CrossN, RN  [facility]

## 2021-05-10 DIAGNOSIS — E11.42 TYPE 2 DIABETES MELLITUS WITH DIABETIC POLYNEUROPATHY, WITHOUT LONG-TERM CURRENT USE OF INSULIN (H): ICD-10-CM

## 2021-05-11 RX ORDER — GLIPIZIDE 10 MG/1
TABLET ORAL
Qty: 360 TABLET | Refills: 0 | Status: SHIPPED | OUTPATIENT
Start: 2021-05-11 | End: 2021-12-24

## 2021-05-11 RX ORDER — METFORMIN HCL 500 MG
TABLET, EXTENDED RELEASE 24 HR ORAL
Qty: 360 TABLET | Refills: 0 | Status: SHIPPED | OUTPATIENT
Start: 2021-05-11 | End: 2021-11-10

## 2021-05-11 NOTE — TELEPHONE ENCOUNTER
Prescription approved per [facility] Refill Protocol.    CHARLES CrossN, RN  [facility]

## 2021-06-22 NOTE — PROGRESS NOTES
Outpatient Physical Therapy Discharge Note     Patient: Eliza Dubois  : 1940    Beginning/End Dates of Reporting Period:  21 to 2021 (pt seen for 8 visits total from  to 3/17/21, cancel on  3/31/21)    Referring Provider: Dr. Cameron    Therapy Diagnosis: L shoulder pain.     Client Self Report: Saw Dr Cameron and pleased with how shoulder is healing. Pt has been doing some of the exercises. Getting stronger, using L shoulder more.     Objective Measurements:  Objective Measure: L shoulder flex/abd/ER/IR with reach behind  Details: AROM 85 degrees,85 degrees ,60 degrees, T10 with reach behind standing, supine AAROM elevation 155 degrees after warmed up     Goals:  Goal Identifier HEP   Goal Description Pt will be independent with HEP in order to improve L shoulder ROM and strength.   Target Date 21   Date Met      Progress:     Goal Identifier ROM   Goal Description Pt will demonstrate L shoulder flexion to 140 degrees or better in order to improve tolerance to reaching into cupboard.   Target Date 21   Date Met      Progress:       Progress Toward Goals:   Progress this reporting period: pt did progress well with rehab but cancelled last visit on 3/31/21 so did not get final numbers/data.    Plan:  Discharge from therapy.    Discharge:    Reason for Discharge: Patient has failed to schedule further appointments. PT has not been notified of any problems or questions since last visit.    Equipment Issued: theraband    Discharge Plan: Patient to continue home program.

## 2021-09-13 DIAGNOSIS — E11.42 TYPE 2 DIABETES MELLITUS WITH DIABETIC POLYNEUROPATHY, WITHOUT LONG-TERM CURRENT USE OF INSULIN (H): ICD-10-CM

## 2021-09-14 RX ORDER — GABAPENTIN 300 MG/1
CAPSULE ORAL
Qty: 60 CAPSULE | Refills: 0 | Status: SHIPPED | OUTPATIENT
Start: 2021-09-14 | End: 2021-10-06

## 2021-09-14 NOTE — TELEPHONE ENCOUNTER
Pending Prescriptions:                       Disp   Refills    gabapentin (NEURONTIN) 300 MG capsule [Pha*60 cap*0        Sig: TAKE 1 TO 2 CAPSULES BY MOUTH IN THE EVENING    Routing refill request to provider for review/approval because:  Drug not on the [facility] refill protocol

## 2021-09-28 ENCOUNTER — OFFICE VISIT (OUTPATIENT)
Dept: INTERNAL MEDICINE | Facility: CLINIC | Age: 81
End: 2021-09-28
Payer: COMMERCIAL

## 2021-09-28 VITALS
TEMPERATURE: 97.3 F | SYSTOLIC BLOOD PRESSURE: 124 MMHG | RESPIRATION RATE: 16 BRPM | BODY MASS INDEX: 22.83 KG/M2 | OXYGEN SATURATION: 90 % | DIASTOLIC BLOOD PRESSURE: 70 MMHG | WEIGHT: 133 LBS | HEART RATE: 78 BPM

## 2021-09-28 DIAGNOSIS — E78.5 HYPERLIPIDEMIA LDL GOAL <100: ICD-10-CM

## 2021-09-28 DIAGNOSIS — Z12.31 ENCOUNTER FOR SCREENING MAMMOGRAM FOR BREAST CANCER: ICD-10-CM

## 2021-09-28 DIAGNOSIS — I25.10 CORONARY ARTERY DISEASE INVOLVING NATIVE CORONARY ARTERY OF NATIVE HEART WITHOUT ANGINA PECTORIS: ICD-10-CM

## 2021-09-28 DIAGNOSIS — E11.42 TYPE 2 DIABETES MELLITUS WITH DIABETIC POLYNEUROPATHY, WITHOUT LONG-TERM CURRENT USE OF INSULIN (H): Primary | ICD-10-CM

## 2021-09-28 DIAGNOSIS — Z00.00 MEDICARE ANNUAL WELLNESS VISIT, SUBSEQUENT: ICD-10-CM

## 2021-09-28 DIAGNOSIS — F33.1 MAJOR DEPRESSIVE DISORDER, RECURRENT EPISODE, MODERATE (H): ICD-10-CM

## 2021-09-28 DIAGNOSIS — I10 HYPERTENSION GOAL BP (BLOOD PRESSURE) < 140/90: ICD-10-CM

## 2021-09-28 LAB
ANION GAP SERPL CALCULATED.3IONS-SCNC: 8 MMOL/L (ref 3–14)
BUN SERPL-MCNC: 27 MG/DL (ref 7–30)
CALCIUM SERPL-MCNC: 7.8 MG/DL (ref 8.5–10.1)
CHLORIDE BLD-SCNC: 107 MMOL/L (ref 94–109)
CHOLEST SERPL-MCNC: 167 MG/DL
CO2 SERPL-SCNC: 26 MMOL/L (ref 20–32)
CREAT SERPL-MCNC: 1.38 MG/DL (ref 0.52–1.04)
CREAT UR-MCNC: 55 MG/DL
FASTING STATUS PATIENT QL REPORTED: YES
GFR SERPL CREATININE-BSD FRML MDRD: 36 ML/MIN/1.73M2
GLUCOSE BLD-MCNC: 243 MG/DL (ref 70–99)
HBA1C MFR BLD: 9 % (ref 0–5.6)
HDLC SERPL-MCNC: 51 MG/DL
LDLC SERPL CALC-MCNC: 55 MG/DL
MICROALBUMIN UR-MCNC: 285 MG/L
MICROALBUMIN/CREAT UR: 518.18 MG/G CR (ref 0–25)
NONHDLC SERPL-MCNC: 116 MG/DL
POTASSIUM BLD-SCNC: 4 MMOL/L (ref 3.4–5.3)
SODIUM SERPL-SCNC: 141 MMOL/L (ref 133–144)
TRIGL SERPL-MCNC: 305 MG/DL

## 2021-09-28 PROCEDURE — 80061 LIPID PANEL: CPT | Performed by: INTERNAL MEDICINE

## 2021-09-28 PROCEDURE — 90662 IIV NO PRSV INCREASED AG IM: CPT | Performed by: INTERNAL MEDICINE

## 2021-09-28 PROCEDURE — 80048 BASIC METABOLIC PNL TOTAL CA: CPT | Performed by: INTERNAL MEDICINE

## 2021-09-28 PROCEDURE — 99213 OFFICE O/P EST LOW 20 MIN: CPT | Mod: 25 | Performed by: INTERNAL MEDICINE

## 2021-09-28 PROCEDURE — 82043 UR ALBUMIN QUANTITATIVE: CPT | Performed by: INTERNAL MEDICINE

## 2021-09-28 PROCEDURE — G0008 ADMIN INFLUENZA VIRUS VAC: HCPCS | Performed by: INTERNAL MEDICINE

## 2021-09-28 PROCEDURE — 36415 COLL VENOUS BLD VENIPUNCTURE: CPT | Performed by: INTERNAL MEDICINE

## 2021-09-28 PROCEDURE — 99397 PER PM REEVAL EST PAT 65+ YR: CPT | Mod: 25 | Performed by: INTERNAL MEDICINE

## 2021-09-28 PROCEDURE — 83036 HEMOGLOBIN GLYCOSYLATED A1C: CPT | Performed by: INTERNAL MEDICINE

## 2021-09-28 PROCEDURE — 99207 PR FOOT EXAM NO CHARGE: CPT | Mod: 25 | Performed by: INTERNAL MEDICINE

## 2021-09-28 RX ORDER — CLOPIDOGREL BISULFATE 75 MG/1
75 TABLET ORAL DAILY
Qty: 90 TABLET | Refills: 3 | Status: SHIPPED | OUTPATIENT
Start: 2021-09-28 | End: 2022-10-10

## 2021-09-28 ASSESSMENT — PAIN SCALES - GENERAL: PAINLEVEL: NO PAIN (0)

## 2021-09-28 ASSESSMENT — PATIENT HEALTH QUESTIONNAIRE - PHQ9: SUM OF ALL RESPONSES TO PHQ QUESTIONS 1-9: 0

## 2021-09-28 NOTE — PROGRESS NOTES
"    Daisy Kay is a 81 year old who presents for the following health issues     HPI     Diabetes Follow-up      How often are you checking your blood sugar? Not at all    What concerns do you have today about your diabetes? Other: wants to discuss getting a [place] freestyle kit     Do you have any of these symptoms? (Select all that apply)  Numbness in feet and Burning in feet    Have you had a diabetic eye exam in the last 12 months? No        BP Readings from Last 2 Encounters:   09/28/21 124/70   03/15/21 126/74     Hemoglobin A1C (%)   Date Value   01/14/2021 6.7 (H)   10/08/2020 6.9 (H)     LDL Cholesterol Calculated (mg/dL)   Date Value   10/08/2020 39   10/31/2019 44            Annual Wellness Visit    Patient has been advised of split billing requirements and indicates understanding: Yes     Are you in the first 12 months of your Medicare Part B coverage?  No    Physical Health:    In general, how would you rate your overall physical health? good    Outside of work, how many days during the week do you exercise?2-3 days/week    Outside of work, approximately how many minutes a day do you exercise?less than 15 minutes    If you drink alcohol do you typically have >3 drinks per day or >7 drinks per week? No    Do you usually eat at least 4 servings of fruit and vegetables a day, include whole grains & fiber and avoid regularly eating high fat or \"junk\" foods? Yes    Do you have any problems taking medications regularly? No    Do you have any side effects from medications? none    Needs assistance for the following daily activities: no assistance needed    Which of the following safety concerns are present in your home?  none identified     Hearing impairment: No    In the past 6 months, have you been bothered by leaking of urine? no    Mental Health:    In general, how would you rate your overall mental or emotional health? good  PHQ-2 Score:      Do you feel safe in your environment? Yes    Have you " ever done Advance Care Planning? (For example, a Health Directive, POLST, or a discussion with a medical provider or your loved ones about your wishes)? Yes, advance care planning is on file.    Fall risk:     Cognitive Screenin) Repeat 3 items (Leader, Season, Table)    2) Clock draw: NORMAL  3) 3 item recall: Recalls 3 objects  Results: 3 items recalled: COGNITIVE IMPAIRMENT LESS LIKELY    Mini-CogTM Copyright SHELBY Tanner. Licensed by the author for use in [facility]; reprinted with permission (cabrera@[facility]). All rights reserved.      Do you have sleep apnea, excessive snoring or daytime drowsiness?: no    Current providers sharing in care for this patient include:  Patient Care Team:  Byron Holm DO as PCP - General (Internal Medicine)  Deon Cameron DO as Assigned Musculoskeletal Provider  Byron Holm DO as Assigned PCP    Patient has been advised of split billing requirements and indicates understanding: Yes       EMR reviewed including:             Complaint, History of Chief Complaint, Corresponding Review of Systems, and Complaint Specific Physical Examination.    #1   Type 2 diabetes  Previous A1c was 6.7 approximately a month ago.  Patient does not report adherence to blood glucose monitoring at home as her A1c and sugars have remained under control for quite some time.  Patient does not report any acute changes such as neuropathy, skin changes, changes in energy.  Updated labs ordered on today's visit.  Patient reports adherence to all diabetic medications as prescribed.        Exam:   HEART:  regular without rubs, clicks, gallops, or murmurs. PMI is nondisplaced. Upstrokes are brisk. S1,S2 are heard.   LUNGS: clear bilaterally, airflow is brisk, no intercostal retraction or stridor is noted. No coughing is noted during visit.   SKIN:  warm and dry. No erythema, or rashes are noted. No specific lesions of concern are noted.    Feet: no evidence  of skin breakdown or ulceration is noted.  Sensation is intact to monofilament and vibration.  Pulses are strong, capillary refill is brisk.    #2   Hypertension  Blood pressure on today's visit 124/70.  Hypertension managed with metoprolol; patient reports adherence to medication.  Patient does not report any new onset dizziness, palpitations, chest pain.        Exam:  See above    #3   Coronary artery disease  Coronary artery disease managed with statin therapy; patient reports adherence to medication.  Patient's last lipid panel was in October 2020 for triglycerides were slightly elevated to 255 and HDL was decreased to 41.  Ordered lipid panel to assess the changes in cholesterol over the last year.        Exam:  See above    #4   Major depression disorder  No depressed mood noted on today's visit.  No thoughts of suicide, harming self, or harming others.  Patient reports adherence to paroxetine to improve on major depression disorder.        Exam:   PSYCH: The patient appears grossly appropriate. Maintains good eye contact, does not have any jittery or atypical motion. Displays appropriate affect.    #5   Hyperlipidemia  Hyperlipidemia managed with statin therapy; patient reports adherence to medication.  Previous lipid panel results stated above.        Exam:  See above    #6   Mammogram screening  Patient reports not having mammogram in years.  Left the decision up to the patient with an open order.          Vital Signs:   /70 (BP Location: Right arm, Patient Position: Sitting, Cuff Size: Adult Regular)   Pulse 78   Temp 97.3  F (36.3  C) (Temporal)   Resp 16   Wt 60.3 kg (133 lb)   SpO2 90%   BMI 22.83 kg/m               Decision Making    Problem and Complexity     1. Type 2 diabetes mellitus with diabetic polyneuropathy, without long-term current use of insulin (H)  No changes in medications at this time as patient reports adherence to all prescribed meds.  Standard labs ordered to assess type  "2 diabetes progression.  Also educated patient on importance of eye exam as she has not had one in \"some time\"  - Hemoglobin A1c; Future  - Albumin Random Urine Quantitative with Creat Ratio; Future  - Basic metabolic panel  (Ca, Cl, CO2, Creat, Gluc, K, Na, BUN); Future  - FOOT EXAM    2. Hypertension goal BP (blood pressure) < 140/90  Blood pressure on today's visit is 124/70.  No changes in medications at this time as blood pressure is properly being managed.    3. Coronary artery disease involving native coronary artery of native heart without angina pectoris  Refill needed on clopidogrel; patient educated to continue to take clopidogrel as previously prescribed.  Lipid panel ordered to assess cholesterol.  - Lipid panel reflex to direct LDL Fasting; Future  - clopidogrel (PLAVIX) 75 MG tablet; Take 1 tablet (75 mg) by mouth daily  Dispense: 90 tablet; Refill: 3    4. Major depressive disorder, recurrent episode, moderate (H)  No acute changes in mood; patient reports adherence and positive results from paroxetine.  I have refilled her medications needed at this time    5. Hyperlipidemia LDL goal <100  Managed with atorvastatin 80 mg; no refills or changes in med management needed at this time.    6. Encounter for screening mammogram for breast cancer  Educated patient that mammogram screening is at her discretion.  Order was input and patient has the option to follow through or pass on the exam.  - *MA Screening Digital Bilateral; Future                                    FOLLOW UP   I have asked the patient to make an appointment for followup with me in 2 months unless otherwise needed pending lab results.          I have carefully explained the diagnosis and treatment options to the patient.  The patient has displayed an understanding of the above, and all subsequent questions were answered.      DO GEMA Campos    Portions of this note were produced using [employer]  Although every " attempt at real-time proof reading has been made, occasional grammar/syntax errors may have been missed.

## 2021-09-28 NOTE — LETTER
October 12, 2021      Eliza Dubois  [address] [facility] [id number]        Dear ,    We are writing to inform you of your test results.    Microalbumin is elevated suggesting increased protein loss through the kidneys.   Hemoglobin A1c has gone up from 6.7 on 9 months ago to 9.0 suggesting significant worsening of your blood sugar control.   Cholesterol is well controlled with an LDL of 55 though the triglycerides are still elevated at 305.   Chemistry panel demonstrates a blood sugar of 243 and decreased kidney function.     Please set up a follow-up appointment so we may discuss improved blood sugar management and techniques as well as alternate medications.     You will be contacted with any outstanding results as they are available.   Feel free to contact me via the office or My Chart if you have any questions regarding the above.       Resulted Orders   Hemoglobin A1c   Result Value Ref Range    Hemoglobin A1C 9.0 (H) 0.0 - 5.6 %      Comment:      Normal <5.7%   Prediabetes 5.7-6.4%    Diabetes 6.5% or higher     Note: Adopted from ADA consensus guidelines.   Albumin Random Urine Quantitative with Creat Ratio   Result Value Ref Range    Creatinine Urine mg/dL 55 mg/dL    Albumin Urine mg/L 285 mg/L    Albumin Urine mg/g Cr 518.18 (H) 0.00 - 25.00 mg/g Cr   Basic metabolic panel  (Ca, Cl, CO2, Creat, Gluc, K, Na, BUN)   Result Value Ref Range    Sodium 141 133 - 144 mmol/L    Potassium 4.0 3.4 - 5.3 mmol/L    Chloride 107 94 - 109 mmol/L    Carbon Dioxide (CO2) 26 20 - 32 mmol/L    Anion Gap 8 3 - 14 mmol/L    Urea Nitrogen 27 7 - 30 mg/dL    Creatinine 1.38 (H) 0.52 - 1.04 mg/dL    Calcium 7.8 (L) 8.5 - 10.1 mg/dL    Glucose 243 (H) 70 - 99 mg/dL    GFR Estimate 36 (L) >60 mL/min/1.73m2      Comment:      As of July 11, 2021, eGFR is calculated by the CKD-EPI creatinine equation, without race adjustment. eGFR can be influenced by muscle mass, exercise, and diet. The reported eGFR is an  estimation only and is only applicable if the renal function is stable.   Lipid panel reflex to direct LDL Fasting   Result Value Ref Range    Cholesterol 167 <200 mg/dL    Triglycerides 305 (H) <150 mg/dL    Direct Measure HDL 51 >=50 mg/dL    LDL Cholesterol Calculated 55 <=100 mg/dL    Non HDL Cholesterol 116 <130 mg/dL    Patient Fasting > 8hrs? Yes     Narrative    Cholesterol  Desirable:  <200 mg/dL    Triglycerides  Normal:  Less than 150 mg/dL  Borderline High:  150-199 mg/dL  High:  200-499 mg/dL  Very High:  Greater than or equal to 500 mg/dL    Direct Measure HDL  Female:  Greater than or equal to 50 mg/dL   Male:  Greater than or equal to 40 mg/dL    LDL Cholesterol  Desirable:  <100mg/dL  Above Desirable:  100-129 mg/dL   Borderline High:  130-159 mg/dL   High:  160-189 mg/dL   Very High:  >= 190 mg/dL    Non HDL Cholesterol  Desirable:  130 mg/dL  Above Desirable:  130-159 mg/dL  Borderline High:  160-189 mg/dL  High:  190-219 mg/dL  Very High:  Greater than or equal to 220 mg/dL       If you have any questions or concerns, please call the clinic at the number listed above.       Sincerely,      Byron Holm, DO

## 2021-10-06 DIAGNOSIS — E11.42 TYPE 2 DIABETES MELLITUS WITH DIABETIC POLYNEUROPATHY, WITHOUT LONG-TERM CURRENT USE OF INSULIN (H): ICD-10-CM

## 2021-10-06 RX ORDER — GABAPENTIN 300 MG/1
CAPSULE ORAL
Qty: 180 CAPSULE | Refills: 0 | Status: SHIPPED | OUTPATIENT
Start: 2021-10-06 | End: 2022-05-17

## 2021-10-06 NOTE — TELEPHONE ENCOUNTER
Gabapentin pt wants a 90 day supply       Last Written Prescription Date:  9/14/2021  Last Fill Quantity: 60,   # refills: 0  Last Office Visit: 9/28  Future Office visit:       Routing refill request to provider for review/approval because:  Drug not on the [facility], [facility] or [facility] refill protocol or controlled substance

## 2021-10-07 ENCOUNTER — TELEPHONE (OUTPATIENT)
Dept: INTERNAL MEDICINE | Facility: CLINIC | Age: 81
End: 2021-10-07

## 2021-10-07 DIAGNOSIS — I10 HYPERTENSION GOAL BP (BLOOD PRESSURE) < 140/90: ICD-10-CM

## 2021-10-07 RX ORDER — METOPROLOL SUCCINATE 100 MG/1
100 TABLET, EXTENDED RELEASE ORAL DAILY
Qty: 30 TABLET | Refills: 4 | Status: SHIPPED | OUTPATIENT
Start: 2021-10-07 | End: 2022-10-13

## 2021-10-07 NOTE — TELEPHONE ENCOUNTER
Pharmacy is wondering if patient should still be on metoprolol. If so how much?    Patient has not been taking this.    Makenzie Barraza RN on 10/7/2021 at 2:55 PM

## 2021-11-01 DIAGNOSIS — F33.1 MAJOR DEPRESSIVE DISORDER, RECURRENT EPISODE, MODERATE (H): ICD-10-CM

## 2021-11-01 RX ORDER — PAROXETINE 40 MG/1
TABLET, FILM COATED ORAL
Qty: 90 TABLET | Refills: 1 | Status: SHIPPED | OUTPATIENT
Start: 2021-11-01 | End: 2022-05-10

## 2021-11-01 NOTE — TELEPHONE ENCOUNTER
Prescription approved per [facility] Refill Protocol.  Makenzie Barraza RN on 11/1/2021 at 2:58 PM

## 2021-11-08 DIAGNOSIS — E11.42 TYPE 2 DIABETES MELLITUS WITH DIABETIC POLYNEUROPATHY, WITHOUT LONG-TERM CURRENT USE OF INSULIN (H): ICD-10-CM

## 2021-11-10 RX ORDER — METFORMIN HCL 500 MG
TABLET, EXTENDED RELEASE 24 HR ORAL
Qty: 360 TABLET | Refills: 0 | Status: SHIPPED | OUTPATIENT
Start: 2021-11-10 | End: 2022-05-17

## 2021-11-10 NOTE — TELEPHONE ENCOUNTER
Metformin  Routing refill request to provider for review/approval because:  A break in medication    Mylene Mensah RN

## 2021-11-26 DIAGNOSIS — E11.42 TYPE 2 DIABETES MELLITUS WITH DIABETIC POLYNEUROPATHY, WITHOUT LONG-TERM CURRENT USE OF INSULIN (H): ICD-10-CM

## 2021-11-29 NOTE — TELEPHONE ENCOUNTER
Pending Prescriptions:                       Disp   Refills    empagliflozin (JARDIANCE) 25 MG TABS [id number] tab*0        Sig: Take 1 tablet (25 mg) by mouth daily      Routing refill request to provider for review/approval because:  A break in medication- last filled 12/2020    Makenzie Barraza RN on 11/29/2021 at 10:41 AM

## 2021-12-13 ENCOUNTER — HOSPITAL ENCOUNTER (OUTPATIENT)
Dept: MAMMOGRAPHY | Facility: CLINIC | Age: 81
Discharge: HOME | End: 2021-12-13
Attending: INTERNAL MEDICINE | Admitting: INTERNAL MEDICINE
Payer: MEDICARE

## 2021-12-13 DIAGNOSIS — Z12.31 ENCOUNTER FOR SCREENING MAMMOGRAM FOR BREAST CANCER: ICD-10-CM

## 2021-12-13 PROCEDURE — 77063 BREAST TOMOSYNTHESIS BI: CPT

## 2021-12-17 ENCOUNTER — OFFICE VISIT (OUTPATIENT)
Dept: INTERNAL MEDICINE | Facility: CLINIC | Age: 81
End: 2021-12-17
Payer: COMMERCIAL

## 2021-12-17 VITALS
SYSTOLIC BLOOD PRESSURE: 144 MMHG | OXYGEN SATURATION: 98 % | BODY MASS INDEX: 21.63 KG/M2 | HEART RATE: 64 BPM | RESPIRATION RATE: 18 BRPM | WEIGHT: 126 LBS | DIASTOLIC BLOOD PRESSURE: 82 MMHG | TEMPERATURE: 98 F

## 2021-12-17 DIAGNOSIS — E11.42 TYPE 2 DIABETES MELLITUS WITH DIABETIC POLYNEUROPATHY, WITHOUT LONG-TERM CURRENT USE OF INSULIN (H): ICD-10-CM

## 2021-12-17 DIAGNOSIS — M70.22 OLECRANON BURSITIS OF LEFT ELBOW: ICD-10-CM

## 2021-12-17 DIAGNOSIS — Z23 HIGH PRIORITY FOR 2019-NCOV VACCINE: Primary | ICD-10-CM

## 2021-12-17 PROCEDURE — 99213 OFFICE O/P EST LOW 20 MIN: CPT | Mod: 25 | Performed by: INTERNAL MEDICINE

## 2021-12-17 PROCEDURE — 0064A COVID-19,PF,MODERNA (18+ YRS BOOSTER .25ML): CPT | Performed by: INTERNAL MEDICINE

## 2021-12-17 PROCEDURE — 91306 COVID-19,PF,MODERNA (18+ YRS BOOSTER .25ML): CPT | Performed by: INTERNAL MEDICINE

## 2021-12-17 ASSESSMENT — PAIN SCALES - GENERAL: PAINLEVEL: NO PAIN (0)

## 2021-12-17 NOTE — PROGRESS NOTES
Daisy Kay is a 81 year old who presents for the following health issues     HPI     Chief Complaint   Patient presents with     Elbow Problem     left elbow, large soft lump for 2 months. No pain        EMR reviewed including:             Complaint, History of Chief Complaint, Corresponding Review of Systems, and Complaint Specific Physical Examination.    #1   Painless water-filled lump on left elbow  Not tense.  No history of injury or trauma.  Present for 2 months.        Exam:  Serous bursitis of the olecranon bursa  No signs of infection.    #2   Follow-up on type 2 diabetes  Continues Jardiance, glipizide, Metformin.  Last A1c was 9.0 in September.  Patient working more aggressively on control.  Denies polyuria or polydipsia.  Denies symptoms of hypoglycemia.        Exam:   LUNGS: clear bilaterally, airflow is brisk, no intercostal retraction or stridor is noted. No coughing is noted during visit.   HEART:  regular without rubs, clicks, gallops, or murmurs. PMI is nondisplaced. Upstrokes are brisk. S1,S2 are heard.   NEURO: Pt is alert and appropriate. No neurologic lateralization is noted. Cranial nerves 2-12 are intact. Peripheral sensory and motor function are grossly normal.         Patient has been interviewed, applicable history and applied review of systems have been performed.    Vital Signs:   BP (!) 144/82 (BP Location: Right arm, Patient Position: Sitting, Cuff Size: Adult Large)   Pulse 64   Temp 98  F (36.7  C) (Temporal)   Resp 18   Wt 57.2 kg (126 lb)   SpO2 98%   BMI 21.63 kg/m        Decision Making    Problem and Complexity     1. Olecranon bursitis of left elbow  Recommended observation.  If does not resolve in the next month, would recommend bursa drainage and injection.    2. High priority for 2019-nCoV vaccine  Vaccination given  - COVID-19,PF,MODERNA (18+ Yrs BOOSTER .25mL)    3. Type 2 diabetes mellitus with diabetic polyneuropathy, without long-term current use of  insulin (H)  Continue current medication.  Patient no longer in extended-care facility, recommend repeat A1c at next visit.                                FOLLOW UP   I have asked the patient to make an appointment for followup with me in 2 months        I have carefully explained the diagnosis and treatment options to the patient.  The patient has displayed an understanding of the above, and all subsequent questions were answered.      DO GEMA Campos    Portions of this note were produced using [employer]  Although every attempt at real-time proof reading has been made, occasional grammar/syntax errors may have been missed.

## 2021-12-23 DIAGNOSIS — K21.9 GASTROESOPHAGEAL REFLUX DISEASE WITHOUT ESOPHAGITIS: Primary | ICD-10-CM

## 2021-12-23 DIAGNOSIS — E11.42 TYPE 2 DIABETES MELLITUS WITH DIABETIC POLYNEUROPATHY, WITHOUT LONG-TERM CURRENT USE OF INSULIN (H): ICD-10-CM

## 2021-12-24 RX ORDER — OMEPRAZOLE 40 MG/1
CAPSULE, DELAYED RELEASE ORAL
Qty: 180 CAPSULE | Refills: 3 | Status: SHIPPED | OUTPATIENT
Start: 2021-12-24 | End: 2022-11-01

## 2021-12-24 RX ORDER — GLIPIZIDE 10 MG/1
TABLET ORAL
Qty: 360 TABLET | Refills: 0 | Status: SHIPPED | OUTPATIENT
Start: 2021-12-24 | End: 2022-06-27

## 2021-12-24 NOTE — TELEPHONE ENCOUNTER
Glipizide  Routing refill request to provider for review/approval because:  Drug not active on patient's medication list  Patient is on Plavix    Prilosec  Routing refill request to provider for review/approval because:  Drug not active on patient's medication list      Srinath Stewart RN

## 2022-01-20 DIAGNOSIS — E78.5 HYPERLIPIDEMIA LDL GOAL <100: Primary | ICD-10-CM

## 2022-01-20 RX ORDER — ATORVASTATIN CALCIUM 80 MG/1
80 TABLET, FILM COATED ORAL AT BEDTIME
Qty: 90 TABLET | Refills: 1 | Status: SHIPPED | OUTPATIENT
Start: 2022-01-20 | End: 2022-07-26

## 2022-01-20 NOTE — TELEPHONE ENCOUNTER
Pending Prescriptions:                       Disp   Refills    atorvastatin (LIPITOR) 80 MG tablet                        Sig: Take 1 tablet (80 mg) by mouth At Bedtime    Routing refill request to provider for review/approval because:  Medication is reported/historical

## 2022-02-19 DIAGNOSIS — I10 HYPERTENSION GOAL BP (BLOOD PRESSURE) < 140/90: Primary | ICD-10-CM

## 2022-02-22 RX ORDER — LISINOPRIL AND HYDROCHLOROTHIAZIDE 20; 25 MG/1; MG/1
TABLET ORAL
Qty: 90 TABLET | Refills: 3 | Status: SHIPPED | OUTPATIENT
Start: 2022-02-22 | End: 2022-11-01

## 2022-02-22 NOTE — TELEPHONE ENCOUNTER
Routing refill request to provider for review/approval because:  Drug not active on patient's medication list    RAMESH Cross, RN  [facility]

## 2022-03-08 DIAGNOSIS — E11.42 TYPE 2 DIABETES MELLITUS WITH DIABETIC POLYNEUROPATHY, WITHOUT LONG-TERM CURRENT USE OF INSULIN (H): ICD-10-CM

## 2022-03-09 NOTE — TELEPHONE ENCOUNTER
Routing refill request to provider for review/approval because:  Labs not current:  A1C    RAMESH Cross, RN  [facility]

## 2022-04-01 NOTE — TELEPHONE ENCOUNTER
Routing refill request to provider for review/approval because:  Labs out of range:  Creatinine    CHARLES CrossN, RN  [facility]       Statement Selected

## 2022-05-07 DIAGNOSIS — F33.1 MAJOR DEPRESSIVE DISORDER, RECURRENT EPISODE, MODERATE (H): ICD-10-CM

## 2022-05-09 NOTE — TELEPHONE ENCOUNTER
Pending Prescriptions:                       Disp   Refills    PARoxetine (PAXIL) 40 MG tablet [Pharmacy *90 tab*1        Sig: TAKE ONE TABLET BY MOUTH EVERY MORNING    Routing refill request to provider for review/approval because:  PHQ9 failed RN protocol    Mylene Mensah RN

## 2022-05-10 RX ORDER — PAROXETINE 40 MG/1
TABLET, FILM COATED ORAL
Qty: 90 TABLET | Refills: 1 | Status: SHIPPED | OUTPATIENT
Start: 2022-05-10 | End: 2022-11-01

## 2022-05-15 DIAGNOSIS — E11.42 TYPE 2 DIABETES MELLITUS WITH DIABETIC POLYNEUROPATHY, WITHOUT LONG-TERM CURRENT USE OF INSULIN (H): ICD-10-CM

## 2022-05-17 RX ORDER — METFORMIN HCL 500 MG
TABLET, EXTENDED RELEASE 24 HR ORAL
Qty: 360 TABLET | Refills: 0 | Status: SHIPPED | OUTPATIENT
Start: 2022-05-17 | End: 2022-08-30

## 2022-05-17 RX ORDER — GABAPENTIN 300 MG/1
CAPSULE ORAL
Qty: 180 CAPSULE | Refills: 0 | Status: SHIPPED | OUTPATIENT
Start: 2022-05-17 | End: 2022-09-02

## 2022-05-17 NOTE — TELEPHONE ENCOUNTER
Metformin  Routing refill request to provider for review/approval because:  Labs not current:  A1C      Gabapentin      Last Written Prescription Date:  12/17/21  Last Fill Quantity: 180,   # refills: 0  Last Office Visit: 10/21/21  Future Office visit:       Routing refill request to provider for review/approval because:  Drug not on the [facility], [facility] or [facility] refill protocol or controlled substance    Srinath Stewart RN

## 2022-06-19 DIAGNOSIS — E11.42 TYPE 2 DIABETES MELLITUS WITH DIABETIC POLYNEUROPATHY, WITHOUT LONG-TERM CURRENT USE OF INSULIN (H): ICD-10-CM

## 2022-06-21 RX ORDER — EMPAGLIFLOZIN 25 MG/1
TABLET, FILM COATED ORAL
Qty: 90 TABLET | Refills: 0 | Status: SHIPPED | OUTPATIENT
Start: 2022-06-21 | End: 2022-10-13

## 2022-06-21 NOTE — TELEPHONE ENCOUNTER
Pending Prescriptions:                       Disp   Refills    JARDIANCE 25 MG TABS tablet [Pharmacy Med *90 tab*0        Sig: TAKE ONE TABLET BY MOUTH ONCE DAILY      Routing refill request to provider for review/approval because:  Labs not current:  DM check  Patient needs to be seen because:    Lab Results   Component Value Date    A1C 9.0 09/28/2021    A1C 6.7 01/14/2021    A1C 6.9 10/08/2020    A1C 7.5 01/20/2020    A1C 7.9 10/31/2019    A1C 7.6 05/15/2019         Zulma Willams RN

## 2022-06-27 DIAGNOSIS — E11.42 TYPE 2 DIABETES MELLITUS WITH DIABETIC POLYNEUROPATHY, WITHOUT LONG-TERM CURRENT USE OF INSULIN (H): ICD-10-CM

## 2022-06-29 NOTE — TELEPHONE ENCOUNTER
Routing refill request to provider for review/approval because:  Labs not current:  A1C, CRE  Patient needs to be seen because:  6 month visit due      Srinath Stewart RN

## 2022-06-30 RX ORDER — GLIPIZIDE 10 MG/1
TABLET ORAL
Qty: 360 TABLET | Refills: 0 | Status: SHIPPED | OUTPATIENT
Start: 2022-06-30 | End: 2022-10-11

## 2022-07-25 DIAGNOSIS — E78.5 HYPERLIPIDEMIA LDL GOAL <100: ICD-10-CM

## 2022-07-26 RX ORDER — ATORVASTATIN CALCIUM 80 MG/1
80 TABLET, FILM COATED ORAL AT BEDTIME
Qty: 90 TABLET | Refills: 1 | Status: SHIPPED | OUTPATIENT
Start: 2022-07-26 | End: 2022-11-01

## 2022-08-01 DIAGNOSIS — I25.10 CORONARY ARTERY DISEASE INVOLVING NATIVE CORONARY ARTERY OF NATIVE HEART WITHOUT ANGINA PECTORIS: ICD-10-CM

## 2022-08-02 RX ORDER — ISOSORBIDE MONONITRATE 30 MG/1
30 TABLET, EXTENDED RELEASE ORAL DAILY
Qty: 30 TABLET | Refills: 0 | Status: SHIPPED | OUTPATIENT
Start: 2022-08-02 | End: 2022-09-02

## 2022-08-02 NOTE — TELEPHONE ENCOUNTER
Pending Prescriptions:                       Disp   Refills    isosorbide mononitrate (IMDUR) 30 MG 24 hr*30 tab*0        Sig: Take 1 tablet (30 mg) by mouth daily      Routing refill request to provider for review/approval because:  Labs out of range:    BP Readings from Last 3 Encounters:   12/17/21 (!) 144/82   09/28/21 124/70   03/15/21 126/74       Per chart last prescribes in 2020?    Zulma Willams RN

## 2022-08-30 DIAGNOSIS — E11.42 TYPE 2 DIABETES MELLITUS WITH DIABETIC POLYNEUROPATHY, WITHOUT LONG-TERM CURRENT USE OF INSULIN (H): ICD-10-CM

## 2022-08-30 DIAGNOSIS — I25.10 CORONARY ARTERY DISEASE INVOLVING NATIVE CORONARY ARTERY OF NATIVE HEART WITHOUT ANGINA PECTORIS: ICD-10-CM

## 2022-09-02 RX ORDER — METFORMIN HCL 500 MG
TABLET, EXTENDED RELEASE 24 HR ORAL
Qty: 360 TABLET | Refills: 0 | Status: SHIPPED | OUTPATIENT
Start: 2022-09-02 | End: 2022-11-01

## 2022-09-02 RX ORDER — ISOSORBIDE MONONITRATE 30 MG/1
30 TABLET, EXTENDED RELEASE ORAL DAILY
Qty: 30 TABLET | Refills: 0 | Status: SHIPPED | OUTPATIENT
Start: 2022-09-02 | End: 2022-10-13

## 2022-09-02 RX ORDER — GABAPENTIN 300 MG/1
CAPSULE ORAL
Qty: 180 CAPSULE | Refills: 0 | Status: SHIPPED | OUTPATIENT
Start: 2022-09-02 | End: 2022-11-01

## 2022-09-02 NOTE — TELEPHONE ENCOUNTER
"Requested Prescriptions   Pending Prescriptions Disp Refills    metFORMIN (GLUCOPHAGE XR) 500 MG 24 hr tablet 360 tablet 0        Biguanide Agents Failed - 8/30/2022  4:02 PM        Failed - Patient has documented A1c within the specified period of time.     If HgbA1C is 8 or greater, it needs to be on file within the past 3 months.  If less than 8, must be on file within the past 6 months.     Recent Labs   Lab Test 09/28/21 0914   A1C 9.0*             Failed - Recent (6 mo) or future (30 days) visit within the authorizing provider's specialty     Patient had office visit in the last 6 months or has a visit in the next 30 days with authorizing provider or within the authorizing provider's specialty.  See \"Patient Info\" tab in inbasket, or \"Choose Columns\" in Meds & Orders section of the refill encounter.            Passed - Patient is age 10 or older        Passed - Patient's CR is NOT>1.4 OR Patient's EGFR is NOT<45 within past 12 mos.       Recent Labs   Lab Test 09/28/21 0914 01/29/21  0536   GFRESTIMATED 36* 58*   GFRESTBLACK  --  67       Recent Labs   Lab Test 09/28/21 0914   CR 1.38*             Passed - Patient does NOT have a diagnosis of CHF.        Passed - Medication is active on med list        Passed - Patient is not pregnant        Passed - Patient has not had a positive pregnancy test within the past 12 mos.            gabapentin (NEURONTIN) 300 MG capsule 180 capsule 0     Sig: TAKE ONE TO TWO CAPSULES BY MOUTH EVERY EVENING        There is no refill protocol information for this order        isosorbide mononitrate (IMDUR) 30 MG 24 hr tablet 30 tablet 0     Sig: Take 1 tablet (30 mg) by mouth daily        There is no refill protocol information for this order           Dia Louis, CHARLESN, RN     "

## 2022-10-06 DIAGNOSIS — I25.10 CORONARY ARTERY DISEASE INVOLVING NATIVE CORONARY ARTERY OF NATIVE HEART WITHOUT ANGINA PECTORIS: ICD-10-CM

## 2022-10-09 NOTE — TELEPHONE ENCOUNTER
Routing refill request to provider for review/approval because:  Labs not current:  platelets, Hgb  PPI     Getachew Stokes RN

## 2022-10-10 DIAGNOSIS — E11.42 TYPE 2 DIABETES MELLITUS WITH DIABETIC POLYNEUROPATHY, WITHOUT LONG-TERM CURRENT USE OF INSULIN (H): ICD-10-CM

## 2022-10-10 RX ORDER — CLOPIDOGREL BISULFATE 75 MG/1
75 TABLET ORAL DAILY
Qty: 90 TABLET | Refills: 3 | Status: SHIPPED | OUTPATIENT
Start: 2022-10-10 | End: 2022-11-01

## 2022-10-11 DIAGNOSIS — I25.10 CORONARY ARTERY DISEASE INVOLVING NATIVE CORONARY ARTERY OF NATIVE HEART WITHOUT ANGINA PECTORIS: ICD-10-CM

## 2022-10-11 DIAGNOSIS — I10 HYPERTENSION GOAL BP (BLOOD PRESSURE) < 140/90: ICD-10-CM

## 2022-10-11 RX ORDER — GLIPIZIDE 10 MG/1
TABLET ORAL
Qty: 180 TABLET | Refills: 0 | Status: SHIPPED | OUTPATIENT
Start: 2022-10-11 | End: 2022-11-01

## 2022-10-11 NOTE — TELEPHONE ENCOUNTER
Pending Prescriptions:                       Disp   Refills    glipiZIDE (GLUCOTROL) 10 MG tablet        180 ta*0            Sig: TAKE TWO TABLETS BY MOUTH TWICE A DAY BEFORE           MEALS    Medication is being filled for 1 time adrienne refill only due to:  Patient is due for DM check  Next 5 appointments (look out 90 days)    Nov 01, 2022  2:20 PM  (Arrive by 2:00 PM)  Provider Visit with Byron Holm DO  [facility] ([facility] ) [address] [place] [id number] [phone]            Please call and help schedule.  Thank you!

## 2022-10-13 DIAGNOSIS — E11.42 TYPE 2 DIABETES MELLITUS WITH DIABETIC POLYNEUROPATHY, WITHOUT LONG-TERM CURRENT USE OF INSULIN (H): ICD-10-CM

## 2022-10-13 RX ORDER — METOPROLOL SUCCINATE 100 MG/1
100 TABLET, EXTENDED RELEASE ORAL DAILY
Qty: 30 TABLET | Refills: 4 | Status: SHIPPED | OUTPATIENT
Start: 2022-10-13 | End: 2022-11-01

## 2022-10-13 RX ORDER — ISOSORBIDE MONONITRATE 30 MG/1
30 TABLET, EXTENDED RELEASE ORAL DAILY
Qty: 30 TABLET | Refills: 0 | Status: SHIPPED | OUTPATIENT
Start: 2022-10-13 | End: 2022-11-01

## 2022-10-13 NOTE — TELEPHONE ENCOUNTER
"Requested Prescriptions   Pending Prescriptions Disp Refills    metoprolol succinate ER (TOPROL XL) 100 MG 24 hr tablet 30 tablet 4     Sig: Take 1 tablet (100 mg) by mouth daily       Beta-Blockers Protocol Failed - 10/11/2022 10:53 AM        Failed - Blood pressure under 140/90 in past 12 months     BP Readings from Last 3 Encounters:   12/17/21 (!) 144/82   09/28/21 124/70   03/15/21 126/74                 Passed - Patient is age 6 or older        Passed - Recent (12 mo) or future (30 days) visit within the authorizing provider's specialty     Patient has had an office visit with the authorizing provider or a provider within the authorizing providers department within the previous 12 mos or has a future within next 30 days. See \"Patient Info\" tab in inbasket, or \"Choose Columns\" in Meds & Orders section of the refill encounter.              Passed - Medication is active on med list          isosorbide mononitrate (IMDUR) 30 MG 24 hr tablet 30 tablet 0     Sig: Take 1 tablet (30 mg) by mouth daily       Nitrates Failed - 10/11/2022 10:53 AM        Failed - Blood pressure under 140/90 in past 12 months     BP Readings from Last 3 Encounters:   12/17/21 (!) 144/82   09/28/21 124/70   03/15/21 126/74                 Passed - Pt is not on erectile dysfunction medications        Passed - Recent (12 mo) or future (30 days) visit within the authorizing provider's specialty     Patient has had an office visit with the authorizing provider or a provider within the authorizing providers department within the previous 12 mos or has a future within next 30 days. See \"Patient Info\" tab in inbasket, or \"Choose Columns\" in Meds & Orders section of the refill encounter.              Passed - Medication is active on med list        Passed - Patient is age 18 or older               CHARLES GreenN, RN          "

## 2022-10-16 NOTE — TELEPHONE ENCOUNTER
Jardiance  Routing refill request to provider for review/approval because:  Labs not current:  A1c, CREAT or GFR, K+     Getachew Stokes RN

## 2022-10-17 ENCOUNTER — MEDICAL CORRESPONDENCE (OUTPATIENT)
Dept: HEALTH INFORMATION MANAGEMENT | Facility: CLINIC | Age: 82
End: 2022-10-17

## 2022-10-17 ENCOUNTER — NURSE TRIAGE (OUTPATIENT)
Dept: NURSING | Facility: CLINIC | Age: 82
End: 2022-10-17

## 2022-10-17 NOTE — TELEPHONE ENCOUNTER
Phoned patient and informed her of information a stated below per Dr. Mihai MD.  Advised patient to seek urgent/ emergent care for worsening symptoms.  Patient stated understanding.    Mylene Mensah RN

## 2022-10-17 NOTE — TELEPHONE ENCOUNTER
"Pt reports visible  Hematuria.  Onset 24 hours ago.    No pain with urination.  However pt states \"I'm diabetic, therefore difficult to feel any sensation with urinating.\"  Denies urgency/frequency.    \"Urine smelled strong for a few days before the visible blood.\"  Feeling \"tired.\"    Call drops while connecting with  ([place] leong on [place]'s side).  Will call back to pt after conducting a system re-start ....      [facility] Nurse Advisor     Reason for Disposition    Blood in the urine is main symptom    All other patients with blood in urine  (Exception: Could be normal menstrual bleeding.)    Additional Information    Negative: Shock suspected (e.g., cold/pale/clammy skin, too weak to stand, low BP, rapid pulse)    Negative: Sounds like a life-threatening emergency to the triager    Negative: Followed a female genital area injury (e.g., vagina, vulva)    Negative: Followed a male genital area injury (penis, scrotum)    Negative: Vaginal discharge    Negative: Pus (white, yellow) or bloody discharge from end of penis    Negative: Pain or burning with passing urine (urination) and pregnant    Negative: Pain or burning with passing urine (urination) and female    Negative: Pain or burning with passing urine (urination) and male    Negative: Pain or itching in the vulvar area    Negative: Pain in scrotum is main symptom    Negative: Shock suspected (e.g., cold/pale/clammy skin, too weak to stand, low BP, rapid pulse)    Negative: Sounds like a life-threatening emergency to the triager    Negative: Urinary catheter, questions about    Negative: Recent back or abdominal injury    Negative: Recent genital injury    Negative: Unable to urinate (or only a few drops) > 4 hours and bladder feels very full (e.g., palpable bladder or strong urge to urinate)    Negative: Passing pure blood or large blood clots (i.e., larger than a dime or grape)    Negative: Fever > 100.4 F (38.0 C)    Negative: " Patient sounds very sick or weak to the triager    Negative: Known sickle cell disease    Negative: Taking Coumadin (warfarin) or other strong blood thinner, or known bleeding disorder (e.g., thrombocytopenia)    Negative: Side (flank) or back pain present    Negative: Pain or burning with passing urine (urination)    Negative: Patient wants to be seen    Negative: Pink or red-colored urine and likely from food (beets, rhubarb, red food dye) and lasts > 24 hours after stopping food    Protocols used: URINARY SYMPTOMS-A-OH, URINE - BLOOD IN-A-OH

## 2022-10-17 NOTE — TELEPHONE ENCOUNTER
"Upon careful review of the schedule, I do not see a open \"same-day\" spot on Tuesday.    If the patient must be seen within 24 hours, I would recommend another provider.    Otherwise, there are multiple openings on Wednesday.    Mihai  "

## 2022-10-17 NOTE — TELEPHONE ENCOUNTER
Pt returning call, per previous [facility]NA note pt needs to be seen within 24 hours due to hematuria.    Pt scheduled tomorrow for a telephone visit.    Sending message to PCP Dr. Holm, can pt take a same day spot?   Pt does not have MyChart so e-visit option was not offered.    To care team: Kindly call pt at [phone]    Alina Acosta RN/[employer] Nurse Advisor

## 2022-10-18 ENCOUNTER — TELEPHONE (OUTPATIENT)
Dept: UROLOGY | Facility: CLINIC | Age: 82
End: 2022-10-18

## 2022-10-18 NOTE — TELEPHONE ENCOUNTER
Reason for Call:  Other appointment    Detailed comments: Order from [facility] for hematuria. Tried to call to schedule an appointment but voicemail not available     Phone Number Patient can be reached at: Home number on file [phone] (home)    Best Time: any    Can we leave a detailed message on this number? YES    Call taken on 10/18/2022 at 1:36 PM by Camelia Che

## 2022-11-01 ENCOUNTER — OFFICE VISIT (OUTPATIENT)
Dept: INTERNAL MEDICINE | Facility: CLINIC | Age: 82
End: 2022-11-01
Payer: COMMERCIAL

## 2022-11-01 VITALS
SYSTOLIC BLOOD PRESSURE: 132 MMHG | OXYGEN SATURATION: 98 % | BODY MASS INDEX: 22.66 KG/M2 | TEMPERATURE: 98.2 F | WEIGHT: 132 LBS | DIASTOLIC BLOOD PRESSURE: 74 MMHG | HEART RATE: 70 BPM

## 2022-11-01 DIAGNOSIS — K21.9 GASTROESOPHAGEAL REFLUX DISEASE WITHOUT ESOPHAGITIS: ICD-10-CM

## 2022-11-01 DIAGNOSIS — Z13.220 SCREENING FOR HYPERLIPIDEMIA: ICD-10-CM

## 2022-11-01 DIAGNOSIS — N18.31 STAGE 3A CHRONIC KIDNEY DISEASE (H): ICD-10-CM

## 2022-11-01 DIAGNOSIS — E78.5 HYPERLIPIDEMIA LDL GOAL <100: ICD-10-CM

## 2022-11-01 DIAGNOSIS — F33.1 MAJOR DEPRESSIVE DISORDER, RECURRENT EPISODE, MODERATE (H): ICD-10-CM

## 2022-11-01 DIAGNOSIS — E11.42 TYPE 2 DIABETES MELLITUS WITH DIABETIC POLYNEUROPATHY, WITHOUT LONG-TERM CURRENT USE OF INSULIN (H): ICD-10-CM

## 2022-11-01 DIAGNOSIS — I10 HYPERTENSION GOAL BP (BLOOD PRESSURE) < 140/90: ICD-10-CM

## 2022-11-01 DIAGNOSIS — H81.13 BENIGN PAROXYSMAL POSITIONAL VERTIGO DUE TO BILATERAL VESTIBULAR DISORDER: ICD-10-CM

## 2022-11-01 DIAGNOSIS — I25.10 CORONARY ARTERY DISEASE INVOLVING NATIVE CORONARY ARTERY OF NATIVE HEART WITHOUT ANGINA PECTORIS: ICD-10-CM

## 2022-11-01 DIAGNOSIS — Z23 NEED FOR PROPHYLACTIC VACCINATION AND INOCULATION AGAINST INFLUENZA: ICD-10-CM

## 2022-11-01 DIAGNOSIS — Z00.00 ANNUAL PHYSICAL EXAM: Primary | ICD-10-CM

## 2022-11-01 DIAGNOSIS — R31.0 GROSS HEMATURIA: ICD-10-CM

## 2022-11-01 LAB
ALBUMIN SERPL-MCNC: 3.6 G/DL (ref 3.4–5)
ALBUMIN UR-MCNC: 30 MG/DL
ALP SERPL-CCNC: 127 U/L (ref 40–150)
ALT SERPL W P-5'-P-CCNC: 21 U/L (ref 0–50)
ANION GAP SERPL CALCULATED.3IONS-SCNC: 7 MMOL/L (ref 3–14)
APPEARANCE UR: CLEAR
AST SERPL W P-5'-P-CCNC: 20 U/L (ref 0–45)
BILIRUB DIRECT SERPL-MCNC: 0.1 MG/DL (ref 0–0.2)
BILIRUB SERPL-MCNC: 0.4 MG/DL (ref 0.2–1.3)
BILIRUB UR QL STRIP: NEGATIVE
BUN SERPL-MCNC: 43 MG/DL (ref 7–30)
CALCIUM SERPL-MCNC: 6.8 MG/DL (ref 8.5–10.1)
CHLORIDE BLD-SCNC: 105 MMOL/L (ref 94–109)
CHOLEST SERPL-MCNC: 136 MG/DL
CO2 SERPL-SCNC: 28 MMOL/L (ref 20–32)
COLOR UR AUTO: YELLOW
CREAT SERPL-MCNC: 1.49 MG/DL (ref 0.52–1.04)
CREAT UR-MCNC: 41 MG/DL
ERYTHROCYTE [DISTWIDTH] IN BLOOD BY AUTOMATED COUNT: 15.6 % (ref 10–15)
FASTING STATUS PATIENT QL REPORTED: NO
GFR SERPL CREATININE-BSD FRML MDRD: 35 ML/MIN/1.73M2
GLUCOSE BLD-MCNC: 408 MG/DL (ref 70–99)
GLUCOSE UR STRIP-MCNC: 1000 MG/DL
HBA1C MFR BLD: 9.5 % (ref 0–5.6)
HCT VFR BLD AUTO: 35.1 % (ref 35–47)
HDLC SERPL-MCNC: 38 MG/DL
HGB BLD-MCNC: 10.6 G/DL (ref 11.7–15.7)
HGB UR QL STRIP: ABNORMAL
KETONES UR STRIP-MCNC: NEGATIVE MG/DL
LDLC SERPL CALC-MCNC: 68 MG/DL
LDLC SERPL CALC-MCNC: ABNORMAL MG/DL
LEUKOCYTE ESTERASE UR QL STRIP: NEGATIVE
MCH RBC QN AUTO: 25.3 PG (ref 26.5–33)
MCHC RBC AUTO-ENTMCNC: 30.2 G/DL (ref 31.5–36.5)
MCV RBC AUTO: 84 FL (ref 78–100)
MICROALBUMIN UR-MCNC: 286 MG/L
MICROALBUMIN/CREAT UR: 697.56 MG/G CR (ref 0–25)
NITRATE UR QL: NEGATIVE
NONHDLC SERPL-MCNC: 98 MG/DL
PH UR STRIP: 6 [PH] (ref 5–7)
PLATELET # BLD AUTO: 225 10E3/UL (ref 150–450)
POTASSIUM BLD-SCNC: 4.2 MMOL/L (ref 3.4–5.3)
PROT SERPL-MCNC: 7.2 G/DL (ref 6.8–8.8)
RBC # BLD AUTO: 4.19 10E6/UL (ref 3.8–5.2)
RBC URINE: 2 /HPF
SODIUM SERPL-SCNC: 140 MMOL/L (ref 133–144)
SP GR UR STRIP: 1.01 (ref 1–1.03)
SQUAMOUS EPITHELIAL: <1 /HPF
TRIGL SERPL-MCNC: 422 MG/DL
UROBILINOGEN UR STRIP-MCNC: NORMAL MG/DL
WBC # BLD AUTO: 8.9 10E3/UL (ref 4–11)
WBC URINE: 1 /HPF

## 2022-11-01 PROCEDURE — 36415 COLL VENOUS BLD VENIPUNCTURE: CPT | Performed by: INTERNAL MEDICINE

## 2022-11-01 PROCEDURE — 83721 ASSAY OF BLOOD LIPOPROTEIN: CPT | Mod: 59 | Performed by: INTERNAL MEDICINE

## 2022-11-01 PROCEDURE — 80061 LIPID PANEL: CPT | Performed by: INTERNAL MEDICINE

## 2022-11-01 PROCEDURE — 83036 HEMOGLOBIN GLYCOSYLATED A1C: CPT | Performed by: INTERNAL MEDICINE

## 2022-11-01 PROCEDURE — 90662 IIV NO PRSV INCREASED AG IM: CPT | Performed by: INTERNAL MEDICINE

## 2022-11-01 PROCEDURE — 80053 COMPREHEN METABOLIC PANEL: CPT | Performed by: INTERNAL MEDICINE

## 2022-11-01 PROCEDURE — 99214 OFFICE O/P EST MOD 30 MIN: CPT | Mod: 25 | Performed by: INTERNAL MEDICINE

## 2022-11-01 PROCEDURE — 81001 URINALYSIS AUTO W/SCOPE: CPT | Performed by: INTERNAL MEDICINE

## 2022-11-01 PROCEDURE — G0008 ADMIN INFLUENZA VIRUS VAC: HCPCS | Performed by: INTERNAL MEDICINE

## 2022-11-01 PROCEDURE — 82248 BILIRUBIN DIRECT: CPT | Performed by: INTERNAL MEDICINE

## 2022-11-01 PROCEDURE — 82043 UR ALBUMIN QUANTITATIVE: CPT | Performed by: INTERNAL MEDICINE

## 2022-11-01 PROCEDURE — 85027 COMPLETE CBC AUTOMATED: CPT | Performed by: INTERNAL MEDICINE

## 2022-11-01 RX ORDER — PAROXETINE 40 MG/1
40 TABLET, FILM COATED ORAL EVERY MORNING
Qty: 90 TABLET | Refills: 3 | Status: SHIPPED | OUTPATIENT
Start: 2022-11-01 | End: 2023-11-02

## 2022-11-01 RX ORDER — METOPROLOL SUCCINATE 100 MG/1
100 TABLET, EXTENDED RELEASE ORAL DAILY
Qty: 30 TABLET | Refills: 4 | Status: SHIPPED | OUTPATIENT
Start: 2022-11-01 | End: 2023-11-02

## 2022-11-01 RX ORDER — ISOSORBIDE MONONITRATE 30 MG/1
30 TABLET, EXTENDED RELEASE ORAL DAILY
Qty: 90 TABLET | Refills: 3 | Status: SHIPPED | OUTPATIENT
Start: 2022-11-01 | End: 2023-11-02

## 2022-11-01 RX ORDER — CLOPIDOGREL BISULFATE 75 MG/1
75 TABLET ORAL DAILY
Qty: 90 TABLET | Refills: 3 | Status: SHIPPED | OUTPATIENT
Start: 2022-11-01 | End: 2023-11-02

## 2022-11-01 RX ORDER — LISINOPRIL AND HYDROCHLOROTHIAZIDE 20; 25 MG/1; MG/1
1 TABLET ORAL DAILY
Qty: 90 TABLET | Refills: 3 | Status: SHIPPED | OUTPATIENT
Start: 2022-11-01 | End: 2023-11-02

## 2022-11-01 RX ORDER — ATORVASTATIN CALCIUM 80 MG/1
80 TABLET, FILM COATED ORAL AT BEDTIME
Qty: 90 TABLET | Refills: 3 | Status: SHIPPED | OUTPATIENT
Start: 2022-11-01 | End: 2023-11-02

## 2022-11-01 RX ORDER — METFORMIN HCL 500 MG
TABLET, EXTENDED RELEASE 24 HR ORAL
Qty: 360 TABLET | Refills: 3 | Status: SHIPPED | OUTPATIENT
Start: 2022-11-01 | End: 2023-11-02

## 2022-11-01 RX ORDER — OMEPRAZOLE 40 MG/1
CAPSULE, DELAYED RELEASE ORAL
Qty: 180 CAPSULE | Refills: 3 | Status: SHIPPED | OUTPATIENT
Start: 2022-11-01 | End: 2023-11-02

## 2022-11-01 RX ORDER — GABAPENTIN 300 MG/1
CAPSULE ORAL
Qty: 180 CAPSULE | Refills: 3 | Status: SHIPPED | OUTPATIENT
Start: 2022-11-01 | End: 2023-11-02

## 2022-11-01 RX ORDER — GLIPIZIDE 10 MG/1
TABLET ORAL
Qty: 180 TABLET | Refills: 3 | Status: SHIPPED | OUTPATIENT
Start: 2022-11-01 | End: 2023-02-08

## 2022-11-01 ASSESSMENT — PAIN SCALES - GENERAL: PAINLEVEL: NO PAIN (0)

## 2022-11-01 ASSESSMENT — PATIENT HEALTH QUESTIONNAIRE - PHQ9
SUM OF ALL RESPONSES TO PHQ QUESTIONS 1-9: 5
10. IF YOU CHECKED OFF ANY PROBLEMS, HOW DIFFICULT HAVE THESE PROBLEMS MADE IT FOR YOU TO DO YOUR WORK, TAKE CARE OF THINGS AT HOME, OR GET ALONG WITH OTHER PEOPLE: SOMEWHAT DIFFICULT
SUM OF ALL RESPONSES TO PHQ QUESTIONS 1-9: 5

## 2022-11-01 NOTE — PROGRESS NOTES
Subjective   Eliza is a 82 year old, presenting for the following health issues:  Recheck Medication          Objective    /74   Pulse 70   Temp 98.2  F (36.8  C) (Temporal)   Wt 59.9 kg (132 lb)   SpO2 98%   BMI 22.66 kg/m    There is no height or weight on file to calculate BMI.    EMR reviewed including:               Complaint, History of Chief Complaint, Corresponding Review of Systems, and Complaint Specific Physical Examination.      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  Does not check her blood sugars as she is adverse to needle pokes.  States her glucose monitor broke.  Admits mild episodes of polydipsia, especially in the morning and occasionally during the day.  Denies polyuria and symptoms of hypoglycemia.  Admits neuropathy in her feet; denies skin changes.        Exam  LUNGS: clear bilaterally, airflow is brisk, no intercostal retraction or stridor is noted. No coughing is noted during visit.  HEART:  regular without rubs, clicks, gallops, or murmurs. PMI is nondisplaced. Upstrokes are brisk. S1,S2 are heard.  DIABETIC FOOT EXAM: Normal pedal pulses bilaterally. Skin in tact. Normal joint mobility and gait balance.  Negative for lesions between adjacent toes. Negative for bone deformity.       #2 Chronic kidney disease, stage III  Denies hematuria or edema.  Urine albumin significantly elevated in September 2021; electrolytes within normal limits.   Denies edema, metallic taste, insomnia, generalized pruritus.       EXAM as above.      #3 Coronary artery disease involving native coronary artery of native heart without angina pectoralis  Denies chest pain, arrhythmia, congestive heart failure symptoms, lightheadedness, or syncope.  Continues atorvastatin, ASA.    Triglycerides significantly elevated in September 2021.       EXAM as  above.       #4 Essential hypertension  Denies neurologic or cardiac symptoms.  Does not check blood pressures at home.  Continues Zestoretic, metoprolol.  Denies episodes of syncope; positive for dizziness.       EXAM as above.      #5 Dizziness    Symptom onset 6 months ago.  Patient lives at home, however feels unstable on her feet; considering a walker.  Does not attend [employer] or running several errands independently due to fear of falling.  Denies syncope, associated hearing loss or tinnitus, nausea or vomiting.       EXAM  NEURO: Pt is alert and appropriate. No neurologic lateralization is noted. Cranial nerves 2-12 are intact.  Positive Romberg test.  EYES: Visual acuity is intact.  Conjunctivae are clear without exudates or hemorrhage.  Sclera is nonicteric.  EOM are intact, PERRLA.  Mild nystagmus.  EARS: Pinna and ear canal are nontender and without swelling.  Minor amount of cerumen present in the ear canal, without discharge bilaterally.  Tympanic membrane is normal in appearance with a good cone of light bilaterally.  Hearing is intact with good acuity.  Patient does have some nystagmus and has difficulty standing with her eyes closed.  Becomes extremely anxious and has disequilibrium immediately.  OROPHARYNX: Oral mucosa is pink with good dentition.  No buccal nodules or lesions are noted.  Pharynx is normal in appearance with 2+ tonsils without exudates.      #6 Major depressive disorder    Condition is stable. Continues paroxetine.  Denies suicidal ideation, anxiety.       EXAM  PSYCH: The patient appears grossly appropriate. Maintains good eye contact, does not have any jittery or atypical motion. Displays appropriate affect.      #7 Gastroesophageal reflux disease without esophagitis    Continues omeprazole with benefit.  Denies significant episodes of epigastric pain.  Denies changes in bowel movements or blood in the stool.       EXAM  GI: Abdomen is soft, without rebound, guarding or tenderness.  Bowel sounds are appropriate. No renal bruits are heard.      #8 Hematuria    Admits to 1 episode of red streaked urine.  Denies burning, urgency or odor.   Presented to urgent care who recommended she follow-up with urology.      Pelvic exam not performed.         Patient has been interviewed, applicable history and applied review of systems have been performed.    Vital Signs:   /74   Pulse 70   Temp 98.2  F (36.8  C) (Temporal)   Wt 59.9 kg (132 lb)   SpO2 98%   BMI 22.66 kg/m        Decision Making    Problem and Complexity     1. Annual physical exam  - BASIC METABOLIC PANEL; Future  - Hepatic panel (Albumin, ALT, AST, Bili, Alk Phos, TP); Future    2. Type 2 diabetes mellitus with diabetic polyneuropathy, without long-term current use of insulin (H)  Discussed importance of monitoring blood sugars with patient.  Based upon A1C result, will re-visit discussion of home-glucose monitoring.  Continue medications as prescribed, plan to change based on test results.    - HEMOGLOBIN A1C  - blood glucose (NO BRAND SPECIFIED) test strip; 1 strip by In Vitro route every other day To accompany: Blood Glucose Monitor Brands: per insurance.  Dispense: 100 strip; Refill: 3  - blood glucose calibration (NO BRAND SPECIFIED) solution; To accompany: Blood Glucose Monitor Brands: per insurance.  Dispense: 1 each; Refill: 3  - blood glucose monitoring (NO BRAND SPECIFIED) meter device kit; 1 kit by In Vitro route every other day Preferred blood glucose meter OR supplies to accompany: Blood Glucose Monitor Brands: per insurance.  Dispense: 1 kit; Refill: 0  - gabapentin (NEURONTIN) 300 MG capsule; TAKE ONE TO TWO CAPSULES BY MOUTH EVERY EVENING  Dispense: 180 capsule; Refill: 3  - glipiZIDE (GLUCOTROL) 10 MG tablet; TAKE TWO TABLETS BY MOUTH TWICE A DAY BEFORE MEALS  Dispense: 180 tablet; Refill: 3  - metFORMIN (GLUCOPHAGE XR) 500 MG 24 hr tablet; TAKE TWO TABLETS BY MOUTH TWICE A DAY WITH MEALS  Dispense: 360 tablet;  Refill: 3    3. Stage 3a chronic kidney disease (H)  - BASIC METABOLIC PANEL; Future  - Albumin Random Urine Quantitative with Creat Ratio; Future    4. Coronary artery disease involving native coronary artery of native heart without angina pectoris  - Lipid panel reflex to direct LDL Non-fasting; Future  - CBC with platelets; Future  - clopidogrel (PLAVIX) 75 MG tablet; Take 1 tablet (75 mg) by mouth daily  Dispense: 90 tablet; Refill: 3  - isosorbide mononitrate (IMDUR) 30 MG 24 hr tablet; Take 1 tablet (30 mg) by mouth daily  Dispense: 90 tablet; Refill: 3    5. Hyperlipidemia LDL goal <100  - atorvastatin (LIPITOR) 80 MG tablet; Take 1 tablet (80 mg) by mouth At Bedtime  Dispense: 90 tablet; Refill: 3    6. Hypertension goal BP (blood pressure) < 140/90  - lisinopril-hydrochlorothiazide (ZESTORETIC) 20-25 MG tablet; Take 1 tablet by mouth daily  Dispense: 90 tablet; Refill: 3  - metoprolol succinate ER (TOPROL XL) 100 MG 24 hr tablet; Take 1 tablet (100 mg) by mouth daily  Dispense: 30 tablet; Refill: 4    7. Screening for hyperlipidemia  - Lipid panel reflex to direct LDL Non-fasting; Future    8. Major depressive disorder, recurrent episode, moderate (H)  - PARoxetine (PAXIL) 40 MG tablet; Take 1 tablet (40 mg) by mouth every morning  Dispense: 90 tablet; Refill: 3    9. 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  If this fails to improve symptoms, will consider medical management.  - Physical Therapy Referral; Future    10. Gastroesophageal reflux disease without esophagitis  - omeprazole (PRILOSEC) 40 MG DR capsule; TAKE ONE CAPSULE BY MOUTH TWICE A DAY AS NEEDED Strength: 40 mg  Dispense: 180 capsule; Refill: 3    11. Gross hematuria  If blood is present in the ER today, without evidence of infection, will refer patient to urology.  - UA Macro with Reflex to Micro and Culture - lab collect; Future    12. Need for prophylactic  vaccination and inoculation against influenza  - INFLUENZA, QUAD, HIGH DOSE, PF, 65YR + (FLUZONE HD)                            FOLLOW UP    I have asked the patient to make an appointment for followup with either:  1.  Me,  2.  The patient's preferred provider,  3.  Any available provider  In  6 months    Regarding routine vaccinations:  I have reviewed the patient's vaccination schedule and discussed the benefits of prophylactic vaccination in detail.  I recommend the patient contact their pharmacist (not the pharmacy within the clinic) for vaccinations.  Discussed that most insurance companies now favor reimbursement to the pharmacies and it will financially behoove the patient to have vaccinations performed at their pharmacy.    Received flu shot today in clinic.  Patient is undecided about receiving COVID both booster as the shot makes her very ill.  Recommended waiting on COVID today.    I have carefully explained the diagnosis and treatment options to the patient.  The patient has displayed an understanding of the above, and all subsequent questions were answered.    Byron Holm DO    Portions of this note were produced using [employer]  Although every attempt at real-time proof reading has been made, occasional grammar/syntax errors may have been missed.    Scribed by Naseem Boston      This patient has been interviewed, examined, diagnosed, and informed of the above by me personally.  Medical records and available pertinent information has been reviewed by me personally.  All decisions and discussion have been between myself and the patient/family.  This was done in the presence of Naseem Boston who acted as a medical scribe and recorded the events above.  No diagnosis or decision making was made by the above-mentioned scribe.  The patient, and or his/her ensurors will not be billed for the presence or actions of this scribe.  The information recorded by the scribe has been reviewed by me and  found to be accurate.

## 2022-11-01 NOTE — PROGRESS NOTES
Prior to immunization administration, verified patients identity using patient s name and date of birth. Please see Immunization Activity for additional information.     Screening Questionnaire for Adult Immunization    Are you sick today?   No   Do you have allergies to medications, food, a vaccine component or latex?   Yes   Have you ever had a serious reaction after receiving a vaccination?   No   Do you have a long-term health problem with heart, lung, kidney, or metabolic disease (e.g., diabetes), asthma, a blood disorder, no spleen, complement component deficiency, a cochlear implant, or a spinal fluid leak?  Are you on long-term aspirin therapy?   Yes   Do you have cancer, leukemia, HIV/AIDS, or any other immune system problem?   No   Do you have a parent, brother, or sister with an immune system problem?   No   In the past 3 months, have you taken medications that affect  your immune system, such as prednisone, other steroids, or anticancer drugs; drugs for the treatment of rheumatoid arthritis, Crohn s disease, or psoriasis; or have you had radiation treatments?   No   Have you had a seizure, or a brain or other nervous system problem?   No   During the past year, have you received a transfusion of blood or blood    products, or been given immune (gamma) globulin or antiviral drug?   No   For women: Are you pregnant or is there a chance you could become       pregnant during the next month?   No   Have you received any vaccinations in the past 4 weeks?   No     Immunization questionnaire was positive for at least one answer.  Notified Yes.        Per orders of Dr. Holm, injection of HD influenza given by Savanah Butler CMA. Patient instructed to remain in clinic for 15 minutes afterwards, and to report any adverse reaction to me immediately.       Screening performed by Savanah Butler CMA on 11/1/2022 at 3:25 PM.

## 2022-11-14 ENCOUNTER — TELEPHONE (OUTPATIENT)
Dept: INTERNAL MEDICINE | Facility: CLINIC | Age: 82
End: 2022-11-14

## 2022-11-14 NOTE — LETTER
November 16, 2022      Eliza Dubois  [address]  [facility] [address]        Dear ,    We are writing to inform you of your test results.    Microalbumin is elevated suggesting protein loss through the kidneys.  Urinary analysis demonstrates presence of glucose.  The liver test is normal.  The cholesterol is within acceptable limits.  But triglycerides are markedly elevated at 422.  This is most likely secondary to the grossly elevated blood sugar.  Chemistry panel shows a blood sugar of 408!.  Calcium is also markedly decreased at 6.8 and kidney function is stable but markedly decreased.     Please set up a follow-up appointment at your earliest convenience to discuss improved blood sugar management and alternate medication options.          Resulted Orders   HEMOGLOBIN A1C   Result Value Ref Range    Hemoglobin A1C 9.5 (H) 0.0 - 5.6 %      Comment:      Normal <5.7%   Prediabetes 5.7-6.4%    Diabetes 6.5% or higher     Note: Adopted from ADA consensus guidelines.   BASIC METABOLIC PANEL   Result Value Ref Range    Sodium 140 133 - 144 mmol/L    Potassium 4.2 3.4 - 5.3 mmol/L    Chloride 105 94 - 109 mmol/L    Carbon Dioxide (CO2) 28 20 - 32 mmol/L    Anion Gap 7 3 - 14 mmol/L    Urea Nitrogen 43 (H) 7 - 30 mg/dL    Creatinine 1.49 (H) 0.52 - 1.04 mg/dL    Calcium 6.8 (L) 8.5 - 10.1 mg/dL    Glucose 408 (H) 70 - 99 mg/dL    GFR Estimate 35 (L) >60 mL/min/1.73m2      Comment:      Effective December 21, 2021 eGFRcr in adults is calculated using the 2021 CKD-EPI creatinine equation which includes age and gender (Carlotta et al., NEJM, DOI: 10.1056/FDMMdd2400091)   Lipid panel reflex to direct LDL Non-fasting   Result Value Ref Range    Cholesterol 136 <200 mg/dL    Triglycerides 422 (H) <150 mg/dL    Direct Measure HDL 38 (L) >=50 mg/dL    LDL Cholesterol Calculated        Comment:      Cannot estimate LDL when triglyceride exceeds 400 mg/dL    Non HDL Cholesterol 98 <130 mg/dL    Patient Fasting  > 8hrs? No     Narrative    Cholesterol  Desirable:  <200 mg/dL    Triglycerides  Normal:  Less than 150 mg/dL  Borderline High:  150-199 mg/dL  High:  200-499 mg/dL  Very High:  Greater than or equal to 500 mg/dL    Direct Measure HDL  Female:  Greater than or equal to 50 mg/dL   Male:  Greater than or equal to 40 mg/dL    LDL Cholesterol  Desirable:  <100mg/dL  Above Desirable:  100-129 mg/dL   Borderline High:  130-159 mg/dL   High:  160-189 mg/dL   Very High:  >= 190 mg/dL    Non HDL Cholesterol  Desirable:  130 mg/dL  Above Desirable:  130-159 mg/dL  Borderline High:  160-189 mg/dL  High:  190-219 mg/dL  Very High:  Greater than or equal to 220 mg/dL   Albumin Random Urine Quantitative with Creat Ratio   Result Value Ref Range    Creatinine Urine mg/dL 41 mg/dL    Albumin Urine mg/L 286 mg/L    Albumin Urine mg/g Cr 697.56 (H) 0.00 - 25.00 mg/g Cr   Hepatic panel (Albumin, ALT, AST, Bili, Alk Phos, TP)   Result Value Ref Range    Bilirubin Total 0.4 0.2 - 1.3 mg/dL    Bilirubin Direct 0.1 0.0 - 0.2 mg/dL    Protein Total 7.2 6.8 - 8.8 g/dL    Albumin 3.6 3.4 - 5.0 g/dL    Alkaline Phosphatase 127 40 - 150 U/L    AST 20 0 - 45 U/L    ALT 21 0 - 50 U/L   CBC with platelets   Result Value Ref Range    WBC Count 8.9 4.0 - 11.0 10e3/uL    RBC Count 4.19 3.80 - 5.20 10e6/uL    Hemoglobin 10.6 (L) 11.7 - 15.7 g/dL    Hematocrit 35.1 35.0 - 47.0 %    MCV 84 78 - 100 fL    MCH 25.3 (L) 26.5 - 33.0 pg    MCHC 30.2 (L) 31.5 - 36.5 g/dL    RDW 15.6 (H) 10.0 - 15.0 %    Platelet Count 225 150 - 450 10e3/uL   UA Macro with Reflex to Micro and Culture - lab collect   Result Value Ref Range    Color Urine Yellow Colorless, Straw, Light Yellow, Yellow    Appearance Urine Clear Clear    Glucose Urine 1000 (A) Negative mg/dL    Bilirubin Urine Negative Negative    Ketones Urine Negative Negative mg/dL    Specific Gravity Urine 1.015 1.003 - 1.035    Blood Urine Trace (A) Negative    pH Urine 6.0 5.0 - 7.0    Protein Albumin Urine  30 (A) Negative mg/dL    Urobilinogen Urine Normal Normal, 2.0 mg/dL    Nitrite Urine Negative Negative    Leukocyte Esterase Urine Negative Negative    RBC Urine 2 <=2 /HPF    WBC Urine 1 <=5 /HPF    Squamous Epithelials Urine <1 <=1 /HPF    Narrative    Urine Culture not indicated   LDL cholesterol direct   Result Value Ref Range    LDL Cholesterol Direct 68 <100 mg/dL      Comment:      Age 0-19 years:  Desirable: 0-110 mg/dL   Borderline high: 110-129 mg/dL   High: >= 130 mg/dL    Age 20 years and older:  Desirable: <100mg/dL  Above desirable: 100-129 mg/dL   Borderline high: 130-159 mg/dL   High: 160-189 mg/dL   Very high: >= 190 mg/dL       If you have any questions or concerns, please call the clinic at the number listed above.       Sincerely,

## 2022-11-14 NOTE — TELEPHONE ENCOUNTER
----- Message from Byron Holm DO sent at 11/7/2022 11:07 AM CST -----  Please call            Dear Eliza, your recent test results are attached.    Microalbumin is elevated suggesting protein loss through the kidneys.  Urinary analysis demonstrates presence of glucose.  The liver test is normal.  The cholesterol is within acceptable limits.  But triglycerides are markedly elevated at 422.  This is most likely secondary to the grossly elevated blood sugar.  Chemistry panel shows a blood sugar of 408!.  Calcium is also markedly decreased at 6.8 and kidney function is stable but markedly decreased.    Please set up a follow-up appointment at your earliest convenience to discuss improved blood sugar management and alternate medication options.        You will be contacted with any outstanding results as they are available.  Feel free to contact me via the office or My Chart if you have any questions regarding the above.

## 2022-11-28 ENCOUNTER — HOSPITAL ENCOUNTER (OUTPATIENT)
Dept: PHYSICAL THERAPY | Facility: CLINIC | Age: 82
Setting detail: THERAPIES SERIES
Discharge: HOME | End: 2022-11-28
Attending: INTERNAL MEDICINE
Payer: MEDICARE

## 2022-11-28 DIAGNOSIS — H81.13 BENIGN PAROXYSMAL POSITIONAL VERTIGO DUE TO BILATERAL VESTIBULAR DISORDER: ICD-10-CM

## 2022-11-28 PROCEDURE — 97161 PT EVAL LOW COMPLEX 20 MIN: CPT | Mod: GP | Performed by: PHYSICAL THERAPIST

## 2022-11-28 PROCEDURE — 97110 THERAPEUTIC EXERCISES: CPT | Mod: GP | Performed by: PHYSICAL THERAPIST

## 2022-11-28 NOTE — PROGRESS NOTES
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 B feet to mid shin and her hands are also really bad . had left shoulder Fx and then got covid and then in nusing home x 1 month . currently lives alone in her home with 6 steps with a railling . she lives on 1 level , her grandson also lives with her . has heart issues has stents . normally walks with a cane .when she falls its normally that she trips over something or her own feet . 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  has a wheeed walker with a seat and feels she could use this outside her home . PMH DM   Current Community Support Family/friend caregiver   Patient/Family Goals Statement better balance and less fall   Fall Risk Screen   Fall screen completed by PT   Have you fallen 2 or more times in the past year? Yes   Have you fallen and had an injury in the past year? No   Timed Up and Go score (seconds) 21   Is patient a fall risk? Yes   Cognitive Status Examination   Orientation orientation to person, place and time   Palpation   Palpation decrease sensation in B hands and feet to mid shin   Range of Motion (ROM)   ROM  Quick Adds no deficits were identified   Strength   Strength Comments patient had fair strength in B LE   Bed Mobility   Bed Mobility Comments independent   Transfer Skills   Transfer Comments SBA   Gait   Gait Comments patient is slow and guarded with her gait , wide base of support and use of single point cane   Coordination   Coordination Comments poor in her feet and hands   Modality Interventions   Planned Modality Interventions Comments modalities as needed   Planned Therapy Interventions   Planned Therapy Interventions ADL retraining;balance training;ROM;strengthening;stretching   Clinical Impression   Criteria for Skilled Therapeutic Interventions Met yes, treatment indicated   PT Diagnosis decrease balance   Functional limitations due to impairments tug 21 seconds , falls 1x week   Clinical Presentation Stable/Uncomplicated   Clinical Presentation Rationale patient seen due to progressive decline in strength , balance and function , Rx is skilled PT instruction in exercises and HEP   Clinical Decision Making (Complexity) Moderate complexity   Predicted Duration of Therapy Intervention (days/wks) every other  week x 1-3 months at patient request   Risk & Benefits of therapy have been explained Yes   Patient, Family & other staff in agreement with plan of care Yes   Education Assessment   Preferred Learning Style Listening;Reading;Demonstration   Barriers to Learning No barriers   GOALS   PT Eval Goals 1;2;3   Goal 1   Goal Identifier 1   Goal Description instruction in HEP and compliant with it 5 of 7 days to improve quality of life   Target Date 02/28/23   Goal 2   Goal Identifier 2   Goal Description patient to have increase strength and be able to do sit to stand 5x in less time   Target Date 02/28/23   Goal 3   Goal Identifier 3   Goal Description patient to have improved balance and gait tug currently 21 goal is less than 15   Target Date 02/28/23   Total Evaluation Time   PT Eval, Low Complexity  Minutes (05980) 15   Therapy Certification   Certification date from 11/28/22   Certification date to 02/28/23   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  (Physician co-signature of this document indicates review and certification of the therapy plan).

## 2022-11-28 NOTE — PROGRESS NOTES
[facility]                                                                                   OUTPATIENT PHYSICAL THERAPY FUNCTIONAL EVALUATION  PLAN OF TREATMENT FOR OUTPATIENT REHABILITATION  (COMPLETE FOR INITIAL CLAIMS ONLY)  Patient's Last Name, First Name, M.I.  YOB: 1940  Eliza Dubois     Provider's Name   [facility]   Medical Record No.  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     PT Diagnosis:  decrease balance Visits from SOC:  1                              __________________________________________________________________________________  Plan of Treatment/Functional Goals:  ADL retraining, balance training, ROM, strengthening, stretching           GOALS  1  instruction in HEP and compliant with it 5 of 7 days to improve quality of life  02/28/23    2  patient to have increase strength and be able to do sit to stand 5x in less time  02/28/23    3  patient to have improved balance and gait tug currently 21 goal is less than 15  02/28/23                                                           Therapy Frequency:      Predicted Duration of Therapy Intervention:  every other  week x 1-3 months at patient request    Fatmata Vázquez, PT                                    I CERTIFY THE NEED FOR THESE SERVICES FURNISHED UNDER        THIS PLAN OF TREATMENT AND WHILE UNDER MY CARE     (Physician co-signature of this document indicates review and certification of the therapy plan).                Certification Date From:  11/28/22   Certification Date To:  02/28/23    Referring Provider:  alexandria gupta DO    Initial Assessment  See Epic Evaluation- Start of Care Date: 11/28/22

## 2022-12-12 ENCOUNTER — HOSPITAL ENCOUNTER (OUTPATIENT)
Facility: CLINIC | Age: 82
Setting detail: OBSERVATION
Discharge: OTHER INSTITUTION | End: 2022-12-15
Attending: EMERGENCY MEDICINE | Admitting: EMERGENCY MEDICINE
Payer: MEDICARE

## 2022-12-12 DIAGNOSIS — N39.0 URINARY TRACT INFECTION IN FEMALE: ICD-10-CM

## 2022-12-12 DIAGNOSIS — E83.42 HYPOMAGNESEMIA: ICD-10-CM

## 2022-12-12 DIAGNOSIS — Z11.52 ENCOUNTER FOR SCREENING LABORATORY TESTING FOR SEVERE ACUTE RESPIRATORY SYNDROME CORONAVIRUS 2 (SARS-COV-2): ICD-10-CM

## 2022-12-12 DIAGNOSIS — E86.0 DEHYDRATION: ICD-10-CM

## 2022-12-12 DIAGNOSIS — E88.09 ARYLSULFATASE DEFICIENCY WITHOUT MLD: ICD-10-CM

## 2022-12-12 DIAGNOSIS — E83.51 HYPOCALCEMIA: ICD-10-CM

## 2022-12-12 DIAGNOSIS — E88.09 HYPOALBUMINEMIA: ICD-10-CM

## 2022-12-12 LAB
ALBUMIN SERPL-MCNC: 3 G/DL (ref 3.4–5)
ALBUMIN UR-MCNC: 100 MG/DL
ALP SERPL-CCNC: 93 U/L (ref 40–150)
ALT SERPL W P-5'-P-CCNC: 14 U/L (ref 0–50)
ANION GAP SERPL CALCULATED.3IONS-SCNC: 12 MMOL/L (ref 3–14)
APPEARANCE UR: CLEAR
AST SERPL W P-5'-P-CCNC: 23 U/L (ref 0–45)
BACTERIA #/AREA URNS HPF: ABNORMAL /HPF
BASOPHILS # BLD AUTO: 0 10E3/UL (ref 0–0.2)
BASOPHILS NFR BLD AUTO: 0 %
BILIRUB SERPL-MCNC: 0.6 MG/DL (ref 0.2–1.3)
BILIRUB UR QL STRIP: NEGATIVE
BUN SERPL-MCNC: 24 MG/DL (ref 7–30)
CA-I BLD-MCNC: 3.3 MG/DL (ref 4.4–5.2)
CA-I BLD-MCNC: 3.7 MG/DL (ref 4.4–5.2)
CALCIUM SERPL-MCNC: 6 MG/DL (ref 8.5–10.1)
CALCIUM SERPL-MCNC: 6 MG/DL (ref 8.5–10.1)
CHLORIDE BLD-SCNC: 108 MMOL/L (ref 94–109)
CO2 SERPL-SCNC: 22 MMOL/L (ref 20–32)
COLOR UR AUTO: YELLOW
CREAT SERPL-MCNC: 1 MG/DL (ref 0.52–1.04)
EOSINOPHIL # BLD AUTO: 0 10E3/UL (ref 0–0.7)
EOSINOPHIL NFR BLD AUTO: 0 %
ERYTHROCYTE [DISTWIDTH] IN BLOOD BY AUTOMATED COUNT: 16.8 % (ref 10–15)
GFR SERPL CREATININE-BSD FRML MDRD: 56 ML/MIN/1.73M2
GLUCOSE BLD-MCNC: 202 MG/DL (ref 70–99)
GLUCOSE UR STRIP-MCNC: 1000 MG/DL
HCT VFR BLD AUTO: 34.6 % (ref 35–47)
HGB BLD-MCNC: 10.2 G/DL (ref 11.7–15.7)
HGB UR QL STRIP: ABNORMAL
HOLD SPECIMEN: NORMAL
HOLD SPECIMEN: NORMAL
IMM GRANULOCYTES # BLD: 0 10E3/UL
IMM GRANULOCYTES NFR BLD: 0 %
KETONES UR STRIP-MCNC: 80 MG/DL
LACTATE SERPL-SCNC: 0.7 MMOL/L (ref 0.7–2)
LEUKOCYTE ESTERASE UR QL STRIP: ABNORMAL
LYMPHOCYTES # BLD AUTO: 0.9 10E3/UL (ref 0.8–5.3)
LYMPHOCYTES NFR BLD AUTO: 9 %
MAGNESIUM SERPL-MCNC: 0.9 MG/DL (ref 1.6–2.3)
MAGNESIUM SERPL-MCNC: 0.9 MG/DL (ref 1.6–2.3)
MAGNESIUM SERPL-MCNC: 1.3 MG/DL (ref 1.6–2.3)
MAGNESIUM SERPL-MCNC: 1.7 MG/DL (ref 1.6–2.3)
MCH RBC QN AUTO: 25.1 PG (ref 26.5–33)
MCHC RBC AUTO-ENTMCNC: 29.5 G/DL (ref 31.5–36.5)
MCV RBC AUTO: 85 FL (ref 78–100)
MONOCYTES # BLD AUTO: 0.4 10E3/UL (ref 0–1.3)
MONOCYTES NFR BLD AUTO: 4 %
NEUTROPHILS # BLD AUTO: 8.4 10E3/UL (ref 1.6–8.3)
NEUTROPHILS NFR BLD AUTO: 87 %
NITRATE UR QL: POSITIVE
NRBC # BLD AUTO: 0 10E3/UL
NRBC BLD AUTO-RTO: 0 /100
PH UR STRIP: 5 [PH] (ref 5–7)
PLATELET # BLD AUTO: 156 10E3/UL (ref 150–450)
POTASSIUM BLD-SCNC: 3.9 MMOL/L (ref 3.4–5.3)
PROT SERPL-MCNC: 6.7 G/DL (ref 6.8–8.8)
RBC # BLD AUTO: 4.07 10E6/UL (ref 3.8–5.2)
RBC URINE: 5 /HPF
SARS-COV-2 RNA RESP QL NAA+PROBE: NEGATIVE
SODIUM SERPL-SCNC: 142 MMOL/L (ref 133–144)
SP GR UR STRIP: 1.02 (ref 1–1.03)
SQUAMOUS EPITHELIAL: 5 /HPF
UROBILINOGEN UR STRIP-MCNC: NORMAL MG/DL
WBC # BLD AUTO: 9.7 10E3/UL (ref 4–11)
WBC URINE: 10 /HPF

## 2022-12-12 PROCEDURE — 85025 COMPLETE CBC W/AUTO DIFF WBC: CPT | Performed by: EMERGENCY MEDICINE

## 2022-12-12 PROCEDURE — 83605 ASSAY OF LACTIC ACID: CPT | Performed by: EMERGENCY MEDICINE

## 2022-12-12 PROCEDURE — 82330 ASSAY OF CALCIUM: CPT | Performed by: INTERNAL MEDICINE

## 2022-12-12 PROCEDURE — 93005 ELECTROCARDIOGRAM TRACING: CPT | Performed by: EMERGENCY MEDICINE

## 2022-12-12 PROCEDURE — 96375 TX/PRO/DX INJ NEW DRUG ADDON: CPT

## 2022-12-12 PROCEDURE — 250N000011 HC RX IP 250 OP 636: Performed by: EMERGENCY MEDICINE

## 2022-12-12 PROCEDURE — 82310 ASSAY OF CALCIUM: CPT | Performed by: EMERGENCY MEDICINE

## 2022-12-12 PROCEDURE — 250N000013 HC RX MED GY IP 250 OP 250 PS 637: Performed by: EMERGENCY MEDICINE

## 2022-12-12 PROCEDURE — 250N000013 HC RX MED GY IP 250 OP 250 PS 637: Performed by: INTERNAL MEDICINE

## 2022-12-12 PROCEDURE — 83970 ASSAY OF PARATHORMONE: CPT | Performed by: INTERNAL MEDICINE

## 2022-12-12 PROCEDURE — 96361 HYDRATE IV INFUSION ADD-ON: CPT

## 2022-12-12 PROCEDURE — 99285 EMERGENCY DEPT VISIT HI MDM: CPT | Mod: 25 | Performed by: EMERGENCY MEDICINE

## 2022-12-12 PROCEDURE — 81001 URINALYSIS AUTO W/SCOPE: CPT | Performed by: EMERGENCY MEDICINE

## 2022-12-12 PROCEDURE — 82330 ASSAY OF CALCIUM: CPT | Performed by: EMERGENCY MEDICINE

## 2022-12-12 PROCEDURE — 87086 URINE CULTURE/COLONY COUNT: CPT | Performed by: EMERGENCY MEDICINE

## 2022-12-12 PROCEDURE — 99218 PR INITIAL OBSERVATION CARE,LEVEL I: CPT | Mod: GT | Performed by: INTERNAL MEDICINE

## 2022-12-12 PROCEDURE — 96365 THER/PROPH/DIAG IV INF INIT: CPT | Performed by: EMERGENCY MEDICINE

## 2022-12-12 PROCEDURE — U0005 INFEC AGEN DETEC AMPLI PROBE: HCPCS | Performed by: INTERNAL MEDICINE

## 2022-12-12 PROCEDURE — 93010 ELECTROCARDIOGRAM REPORT: CPT | Performed by: EMERGENCY MEDICINE

## 2022-12-12 PROCEDURE — 36415 COLL VENOUS BLD VENIPUNCTURE: CPT | Performed by: EMERGENCY MEDICINE

## 2022-12-12 PROCEDURE — 96375 TX/PRO/DX INJ NEW DRUG ADDON: CPT | Performed by: EMERGENCY MEDICINE

## 2022-12-12 PROCEDURE — C9803 HOPD COVID-19 SPEC COLLECT: HCPCS | Performed by: EMERGENCY MEDICINE

## 2022-12-12 PROCEDURE — 82542 COL CHROMOTOGRAPHY QUAL/QUAN: CPT | Performed by: EMERGENCY MEDICINE

## 2022-12-12 PROCEDURE — 250N000011 HC RX IP 250 OP 636: Performed by: INTERNAL MEDICINE

## 2022-12-12 PROCEDURE — 36415 COLL VENOUS BLD VENIPUNCTURE: CPT | Performed by: INTERNAL MEDICINE

## 2022-12-12 PROCEDURE — 258N000003 HC RX IP 258 OP 636: Performed by: INTERNAL MEDICINE

## 2022-12-12 PROCEDURE — 96376 TX/PRO/DX INJ SAME DRUG ADON: CPT | Performed by: EMERGENCY MEDICINE

## 2022-12-12 PROCEDURE — 82306 VITAMIN D 25 HYDROXY: CPT | Performed by: EMERGENCY MEDICINE

## 2022-12-12 PROCEDURE — 83735 ASSAY OF MAGNESIUM: CPT | Performed by: EMERGENCY MEDICINE

## 2022-12-12 PROCEDURE — 258N000003 HC RX IP 258 OP 636: Performed by: EMERGENCY MEDICINE

## 2022-12-12 PROCEDURE — 80053 COMPREHEN METABOLIC PANEL: CPT | Performed by: EMERGENCY MEDICINE

## 2022-12-12 PROCEDURE — 96366 THER/PROPH/DIAG IV INF ADDON: CPT | Performed by: EMERGENCY MEDICINE

## 2022-12-12 PROCEDURE — G0378 HOSPITAL OBSERVATION PER HR: HCPCS | Mod: CS

## 2022-12-12 PROCEDURE — G0378 HOSPITAL OBSERVATION PER HR: HCPCS

## 2022-12-12 PROCEDURE — 96361 HYDRATE IV INFUSION ADD-ON: CPT | Performed by: EMERGENCY MEDICINE

## 2022-12-12 RX ORDER — ONDANSETRON 4 MG/1
4 TABLET, ORALLY DISINTEGRATING ORAL EVERY 6 HOURS PRN
Status: DISCONTINUED | OUTPATIENT
Start: 2022-12-12 | End: 2022-12-15 | Stop reason: HOSPADM

## 2022-12-12 RX ORDER — ONDANSETRON 2 MG/ML
4 INJECTION INTRAMUSCULAR; INTRAVENOUS EVERY 30 MIN PRN
Status: DISCONTINUED | OUTPATIENT
Start: 2022-12-12 | End: 2022-12-15 | Stop reason: HOSPADM

## 2022-12-12 RX ORDER — CALCIUM GLUCONATE 20 MG/ML
1 INJECTION, SOLUTION INTRAVENOUS ONCE
Status: COMPLETED | OUTPATIENT
Start: 2022-12-12 | End: 2022-12-12

## 2022-12-12 RX ORDER — DEXTROSE MONOHYDRATE 25 G/50ML
25-50 INJECTION, SOLUTION INTRAVENOUS
Status: DISCONTINUED | OUTPATIENT
Start: 2022-12-12 | End: 2022-12-15 | Stop reason: HOSPADM

## 2022-12-12 RX ORDER — ONDANSETRON 2 MG/ML
4 INJECTION INTRAMUSCULAR; INTRAVENOUS ONCE
Status: COMPLETED | OUTPATIENT
Start: 2022-12-12 | End: 2022-12-12

## 2022-12-12 RX ORDER — ONDANSETRON 2 MG/ML
4 INJECTION INTRAMUSCULAR; INTRAVENOUS EVERY 6 HOURS PRN
Status: DISCONTINUED | OUTPATIENT
Start: 2022-12-12 | End: 2022-12-15 | Stop reason: HOSPADM

## 2022-12-12 RX ORDER — ACETAMINOPHEN 325 MG/1
650 TABLET ORAL EVERY 6 HOURS PRN
Status: DISCONTINUED | OUTPATIENT
Start: 2022-12-12 | End: 2022-12-15 | Stop reason: HOSPADM

## 2022-12-12 RX ORDER — GABAPENTIN 300 MG/1
300 CAPSULE ORAL AT BEDTIME
Status: DISCONTINUED | OUTPATIENT
Start: 2022-12-12 | End: 2022-12-15 | Stop reason: HOSPADM

## 2022-12-12 RX ORDER — ACETAMINOPHEN 650 MG/1
650 SUPPOSITORY RECTAL EVERY 6 HOURS PRN
Status: DISCONTINUED | OUTPATIENT
Start: 2022-12-12 | End: 2022-12-15 | Stop reason: HOSPADM

## 2022-12-12 RX ORDER — LIDOCAINE 40 MG/G
CREAM TOPICAL
Status: DISCONTINUED | OUTPATIENT
Start: 2022-12-12 | End: 2022-12-15 | Stop reason: HOSPADM

## 2022-12-12 RX ORDER — ASPIRIN 81 MG/1
81 TABLET ORAL DAILY
Status: DISCONTINUED | OUTPATIENT
Start: 2022-12-13 | End: 2022-12-15 | Stop reason: HOSPADM

## 2022-12-12 RX ORDER — SODIUM CHLORIDE 9 MG/ML
INJECTION, SOLUTION INTRAVENOUS CONTINUOUS
Status: DISCONTINUED | OUTPATIENT
Start: 2022-12-12 | End: 2022-12-12

## 2022-12-12 RX ORDER — LISINOPRIL 10 MG/1
20 TABLET ORAL DAILY
Status: DISCONTINUED | OUTPATIENT
Start: 2022-12-13 | End: 2022-12-15 | Stop reason: HOSPADM

## 2022-12-12 RX ORDER — PAROXETINE 20 MG/1
40 TABLET, FILM COATED ORAL EVERY MORNING
Status: DISCONTINUED | OUTPATIENT
Start: 2022-12-13 | End: 2022-12-15 | Stop reason: HOSPADM

## 2022-12-12 RX ORDER — PANTOPRAZOLE SODIUM 40 MG/1
40 TABLET, DELAYED RELEASE ORAL
Status: DISCONTINUED | OUTPATIENT
Start: 2022-12-13 | End: 2022-12-15 | Stop reason: HOSPADM

## 2022-12-12 RX ORDER — MAGNESIUM SULFATE 1 G/100ML
1 INJECTION INTRAVENOUS ONCE
Status: COMPLETED | OUTPATIENT
Start: 2022-12-12 | End: 2022-12-12

## 2022-12-12 RX ORDER — LISINOPRIL 10 MG/1
20 TABLET ORAL ONCE
Status: COMPLETED | OUTPATIENT
Start: 2022-12-12 | End: 2022-12-12

## 2022-12-12 RX ORDER — CEFUROXIME AXETIL 250 MG/1
250 TABLET ORAL EVERY 12 HOURS SCHEDULED
Status: DISCONTINUED | OUTPATIENT
Start: 2022-12-12 | End: 2022-12-12 | Stop reason: CLARIF

## 2022-12-12 RX ORDER — ISOSORBIDE MONONITRATE 30 MG/1
30 TABLET, EXTENDED RELEASE ORAL DAILY
Status: DISCONTINUED | OUTPATIENT
Start: 2022-12-13 | End: 2022-12-15 | Stop reason: HOSPADM

## 2022-12-12 RX ORDER — ENOXAPARIN SODIUM 100 MG/ML
40 INJECTION SUBCUTANEOUS EVERY 24 HOURS
Status: DISCONTINUED | OUTPATIENT
Start: 2022-12-13 | End: 2022-12-15 | Stop reason: HOSPADM

## 2022-12-12 RX ORDER — CLOPIDOGREL BISULFATE 75 MG/1
75 TABLET ORAL DAILY
Status: DISCONTINUED | OUTPATIENT
Start: 2022-12-13 | End: 2022-12-15 | Stop reason: HOSPADM

## 2022-12-12 RX ORDER — HYDROCHLOROTHIAZIDE 25 MG/1
25 TABLET ORAL ONCE
Status: COMPLETED | OUTPATIENT
Start: 2022-12-12 | End: 2022-12-12

## 2022-12-12 RX ORDER — METOPROLOL SUCCINATE 100 MG/1
100 TABLET, EXTENDED RELEASE ORAL DAILY
Status: DISCONTINUED | OUTPATIENT
Start: 2022-12-13 | End: 2022-12-15 | Stop reason: HOSPADM

## 2022-12-12 RX ORDER — NICOTINE POLACRILEX 4 MG
15-30 LOZENGE BUCCAL
Status: DISCONTINUED | OUTPATIENT
Start: 2022-12-12 | End: 2022-12-15 | Stop reason: HOSPADM

## 2022-12-12 RX ORDER — LISINOPRIL AND HYDROCHLOROTHIAZIDE 20; 25 MG/1; MG/1
1 TABLET ORAL ONCE
Status: DISCONTINUED | OUTPATIENT
Start: 2022-12-12 | End: 2022-12-12

## 2022-12-12 RX ORDER — CALCIUM GLUCONATE 94 MG/ML
1 INJECTION, SOLUTION INTRAVENOUS ONCE
Status: DISCONTINUED | OUTPATIENT
Start: 2022-12-12 | End: 2022-12-12 | Stop reason: CLARIF

## 2022-12-12 RX ORDER — ATORVASTATIN CALCIUM 40 MG/1
80 TABLET, FILM COATED ORAL AT BEDTIME
Status: DISCONTINUED | OUTPATIENT
Start: 2022-12-12 | End: 2022-12-15 | Stop reason: HOSPADM

## 2022-12-12 RX ORDER — METOPROLOL SUCCINATE 100 MG/1
100 TABLET, EXTENDED RELEASE ORAL ONCE
Status: COMPLETED | OUTPATIENT
Start: 2022-12-12 | End: 2022-12-12

## 2022-12-12 RX ORDER — CEFUROXIME AXETIL 250 MG/1
250 TABLET ORAL EVERY 12 HOURS SCHEDULED
Status: DISCONTINUED | OUTPATIENT
Start: 2022-12-12 | End: 2022-12-15 | Stop reason: HOSPADM

## 2022-12-12 RX ORDER — SODIUM CHLORIDE, SODIUM LACTATE, POTASSIUM CHLORIDE, CALCIUM CHLORIDE 600; 310; 30; 20 MG/100ML; MG/100ML; MG/100ML; MG/100ML
INJECTION, SOLUTION INTRAVENOUS CONTINUOUS
Status: DISCONTINUED | OUTPATIENT
Start: 2022-12-12 | End: 2022-12-15 | Stop reason: HOSPADM

## 2022-12-12 RX ORDER — HYDRALAZINE HYDROCHLORIDE 20 MG/ML
10 INJECTION INTRAMUSCULAR; INTRAVENOUS EVERY 6 HOURS PRN
Status: DISCONTINUED | OUTPATIENT
Start: 2022-12-12 | End: 2022-12-15 | Stop reason: HOSPADM

## 2022-12-12 RX ADMIN — LISINOPRIL 20 MG: 10 TABLET ORAL at 13:38

## 2022-12-12 RX ADMIN — SODIUM CHLORIDE 1000 ML: 9 INJECTION, SOLUTION INTRAVENOUS at 11:42

## 2022-12-12 RX ADMIN — HYDRALAZINE HYDROCHLORIDE 10 MG: 20 INJECTION INTRAMUSCULAR; INTRAVENOUS at 22:18

## 2022-12-12 RX ADMIN — CALCIUM GLUCONATE 1 G: 20 INJECTION, SOLUTION INTRAVENOUS at 16:14

## 2022-12-12 RX ADMIN — ATORVASTATIN CALCIUM 80 MG: 40 TABLET, FILM COATED ORAL at 22:57

## 2022-12-12 RX ADMIN — SODIUM CHLORIDE 500 ML: 9 INJECTION, SOLUTION INTRAVENOUS at 18:29

## 2022-12-12 RX ADMIN — SODIUM CHLORIDE, POTASSIUM CHLORIDE, SODIUM LACTATE AND CALCIUM CHLORIDE: 600; 310; 30; 20 INJECTION, SOLUTION INTRAVENOUS at 22:12

## 2022-12-12 RX ADMIN — MAGNESIUM SULFATE HEPTAHYDRATE 1 G: 1 INJECTION, SOLUTION INTRAVENOUS at 12:56

## 2022-12-12 RX ADMIN — ONDANSETRON 4 MG: 2 INJECTION INTRAMUSCULAR; INTRAVENOUS at 12:29

## 2022-12-12 RX ADMIN — HYDROCHLOROTHIAZIDE 25 MG: 25 TABLET ORAL at 13:27

## 2022-12-12 RX ADMIN — METOPROLOL SUCCINATE 100 MG: 100 TABLET, EXTENDED RELEASE ORAL at 13:26

## 2022-12-12 RX ADMIN — MAGNESIUM SULFATE HEPTAHYDRATE 1 G: 1 INJECTION, SOLUTION INTRAVENOUS at 16:01

## 2022-12-12 RX ADMIN — CEFUROXIME AXETIL 250 MG: 250 TABLET, FILM COATED ORAL at 19:43

## 2022-12-12 RX ADMIN — ONDANSETRON 4 MG: 2 INJECTION INTRAMUSCULAR; INTRAVENOUS at 18:27

## 2022-12-12 RX ADMIN — SODIUM CHLORIDE: 9 INJECTION, SOLUTION INTRAVENOUS at 13:38

## 2022-12-12 RX ADMIN — GABAPENTIN 300 MG: 300 CAPSULE ORAL at 22:57

## 2022-12-12 ASSESSMENT — ACTIVITIES OF DAILY LIVING (ADL)
ADLS_ACUITY_SCORE: 35
ADLS_ACUITY_SCORE: 31
ADLS_ACUITY_SCORE: 35

## 2022-12-12 NOTE — ED PROVIDER NOTES
History     Chief Complaint   Patient presents with     Dizziness     HPI     Eliza Dubois is a 82 year old female who presents with dizziness and generalized weakness.  Is been feeling lightheaded.  Has noted positional change.  No vertigo.  Has had some recent vomiting and 3 loose stools this morning.  Family is worried about poor nutritional intake primarily due to food selection night due to vomiting.  No palpitations, dyspnea or chest discomfort.  She has had previous spells of dizziness and weakness in the past.  Her balance has been off and the family caught her from falling this morning.  Did not sustain any injury.  Family has noted elevated blood pressure.    Allergies:  Allergies   Allergen Reactions     Sulfa Drugs Hives       Problem List:    Patient Active Problem List    Diagnosis Date Noted     Weakness 01/28/2021     Priority: Medium     Vaccine reaction, initial encounter 01/28/2021     Priority: Medium     Fatigue, unspecified type 01/28/2021     Priority: Medium     History of COVID-19 01/28/2021     Priority: Medium     Bilateral pleural effusion 12/13/2020     Priority: Medium     Bilateral pneumonia 12/13/2020     Priority: Medium     Pneumonia due to 2019 novel coronavirus 12/12/2020     Priority: Medium     Prerenal azotemia 12/12/2020     Priority: Medium     Hypophosphatemia 12/11/2020     Priority: Medium     Closed fracture of proximal end of left humerus with routine healing 12/09/2020     Priority: Medium     Nausea vomiting and diarrhea 12/09/2020     Priority: Medium     Hypomagnesemia 12/09/2020     Priority: Medium     Generalized muscle weakness 12/08/2020     Priority: Medium     E. coli UTI 12/08/2020     Priority: Medium     2019 novel coronavirus disease (COVID-19) 12/08/2020     Priority: Medium     CKD (chronic kidney disease) stage 3, GFR 30-59 ml/min 05/27/2019     Priority: Medium     Type 2 diabetes mellitus with diabetic polyneuropathy, without long-term current  use of insulin (H) 10/11/2016     Priority: Medium     Essential hypertension with goal blood pressure less than 140/90 09/12/2016     Priority: Medium     Gastroesophageal reflux disease without esophagitis 05/13/2016     Priority: Medium     Coronary artery disease involving native coronary artery of native heart without angina pectoris 02/25/2016     Priority: Medium     Major depressive disorder, recurrent episode, moderate (H) 11/10/2015     Priority: Medium     Anxiety 05/08/2012     Priority: Medium     Insomnia 05/03/2012     Priority: Medium     Hypertension goal BP (blood pressure) < 140/90 01/20/2011     Priority: Medium     Hyperlipidemia LDL goal <100 10/31/2010     Priority: Medium     Esophageal reflux 08/30/2007     Priority: Medium     Irritable bowel syndrome 09/25/2006     Priority: Medium        Past Medical History:    Past Medical History:   Diagnosis Date     Diabetic eye exam (H) 05/01/14     Heart attack (H)      Pure hypercholesterolemia      Stented coronary artery      Type II or unspecified type diabetes mellitus without mention of complication, not stated as uncontrolled 2002     Unspecified disease of pericardium 12/05/08     Unspecified essential hypertension        Past Surgical History:    Past Surgical History:   Procedure Laterality Date     ABDOMEN SURGERY       COLONOSCOPY  04/15/09     COLONOSCOPY  6/18/2014    Procedure: COMBINED COLONOSCOPY, SINGLE BIOPSY/POLYPECTOMY BY BIOPSY;  Surgeon: Earle Mon MD;  Location: [facility] LAPAROSCOPY, SURGICAL; CHOLECYSTECTOMY  1997    Cholecystectomy, Laparoscopic     PHACOEMULSIFICATION CLEAR CORNEA WITH STANDARD INTRAOCULAR LENS IMPLANT Right 3/16/2016    Procedure: PHACOEMULSIFICATION CLEAR CORNEA WITH STANDARD INTRAOCULAR LENS IMPLANT;  Surgeon: George Lemus MD;  Location: [facility]     PHACOEMULSIFICATION CLEAR CORNEA WITH STANDARD INTRAOCULAR LENS IMPLANT Left 3/30/2016    Procedure: PHACOEMULSIFICATION CLEAR CORNEA WITH  STANDARD INTRAOCULAR LENS IMPLANT;  Surgeon: George Lemus MD;  Location: [facility]     Z NONSPECIFIC PROCEDURE  1988    Hysterectomy       Family History:    Family History   Problem Relation Age of Onset     Diabetes Mother      Arthritis Mother      Heart Disease Mother      Hypertension Mother      Lipids Mother      Neurologic Disorder Mother         neuropathy     Osteoporosis Mother      Obesity Mother      EYE* Mother         blind     Diabetes Father      Genitourinary Problems Father         gall stones     Heart Disease Father         MI     Cancer Maternal Grandmother      Heart Disease Maternal Grandmother      Cancer Other         Grandparents     Alcohol/Drug No family hx of      Alzheimer Disease No family hx of      Anesthesia Reaction No family hx of      Blood Disease No family hx of      Depression No family hx of      Genetic Disorder No family hx of      Gastrointestinal Disease No family hx of      Gynecology No family hx of      Psychotic Disorder No family hx of      Respiratory No family hx of      Cerebrovascular Disease No family hx of        Social History:  Marital Status:   [5]  Social History     Tobacco Use     Smoking status: Never     Smokeless tobacco: Never   Vaping Use     Vaping Use: Never used   Substance Use Topics     Alcohol use: No     Alcohol/week: 0.0 standard drinks     Drug use: No        Medications:    acetaminophen (TYLENOL) 500 MG tablet  alcohol swab prep pads  aspirin (ASA) 81 MG EC tablet  atorvastatin (LIPITOR) 80 MG tablet  blood glucose (NO BRAND SPECIFIED) test strip  blood glucose calibration (NO BRAND SPECIFIED) solution  blood glucose monitoring (NO BRAND SPECIFIED) meter device kit  clopidogrel (PLAVIX) 75 MG tablet  empagliflozin (JARDIANCE) 25 MG TABS tablet  gabapentin (NEURONTIN) 300 MG capsule  glipiZIDE (GLUCOTROL) 10 MG tablet  isosorbide mononitrate (IMDUR) 30 MG 24 hr tablet  lisinopril-hydrochlorothiazide (ZESTORETIC) 20-25 MG  "tablet  MELATONIN PO  metFORMIN (GLUCOPHAGE XR) 500 MG 24 hr tablet  metoprolol succinate ER (TOPROL XL) 100 MG 24 hr tablet  omeprazole (PRILOSEC) 40 MG DR capsule  PARoxetine (PAXIL) 40 MG tablet  thin (NO BRAND SPECIFIED) lancets          Review of Systems   All other systems reviewed and are negative.      Physical Exam   BP: (!) 208/100  Pulse: 76  Temp: 98.6  F (37  C)  Resp: 14  Height: 162.6 cm (5' 4\")  Weight: 55.3 kg (122 lb)  SpO2: 97 %      Physical Exam  Vitals and nursing note reviewed.   Constitutional:       Appearance: She is ill-appearing.   HENT:      Head: Normocephalic.      Right Ear: Tympanic membrane normal.      Left Ear: Tympanic membrane normal.      Nose: Nose normal.      Mouth/Throat:      Mouth: Mucous membranes are dry.   Eyes:      General: No scleral icterus.     Conjunctiva/sclera: Conjunctivae normal.   Cardiovascular:      Rate and Rhythm: Normal rate and regular rhythm.      Pulses: Normal pulses.      Heart sounds: No murmur heard.  Pulmonary:      Effort: Pulmonary effort is normal.   Abdominal:      General: Abdomen is flat. Bowel sounds are normal. There is no distension.      Tenderness: There is no abdominal tenderness.   Musculoskeletal:      Cervical back: Normal range of motion and neck supple. No rigidity.   Skin:     General: Skin is warm.      Capillary Refill: Capillary refill takes less than 2 seconds.      Findings: No rash.   Neurological:      General: No focal deficit present.      Mental Status: She is alert and oriented to person, place, and time.      Cranial Nerves: Cranial nerves 2-12 are intact.      Motor: Motor function is intact.      Coordination: Coordination is intact.      Deep Tendon Reflexes: Reflexes are normal and symmetric.   Psychiatric:         Mood and Affect: Mood normal.         Behavior: Behavior normal.         ED Course                 Procedures         EKG:  Interpretation by Erick Butler DO.   Indication: Dizziness  Sinus rhythm.  " Rate 70 bpm.  Normal axis.  Delayed R wave progression across the early precordial leads.  No acute ST-T repolarization changes.  Impression- borderline EKG               Results for orders placed or performed during the hospital encounter of 12/12/22 (from the past 24 hour(s))   [facility] Draw    Narrative    The following orders were created for panel order [facility].  Procedure                               Abnormality         Status                     ---------                               -----------         ------                     Extra Green Top (Lithium...[[id number]]                      Final result               Extra Purple Top Tube[[id number]]                            Final result                 Please view results for these tests on the individual orders.   Extra Green Top (Lithium Heparin) Tube   Result Value Ref Range    Hold Specimen JIC    Extra Purple Top Tube   Result Value Ref Range    Hold Specimen JIC    CBC with platelets differential    Narrative    The following orders were created for panel order CBC with platelets differential.  Procedure                               Abnormality         Status                     ---------                               -----------         ------                     CBC with platelets and d...[[id number]]  Abnormal            Final result                 Please view results for these tests on the individual orders.   Comprehensive metabolic panel   Result Value Ref Range    Sodium 142 133 - 144 mmol/L    Potassium 3.9 3.4 - 5.3 mmol/L    Chloride 108 94 - 109 mmol/L    Carbon Dioxide (CO2) 22 20 - 32 mmol/L    Anion Gap 12 3 - 14 mmol/L    Urea Nitrogen 24 7 - 30 mg/dL    Creatinine 1.00 0.52 - 1.04 mg/dL    Calcium 6.0 (L) 8.5 - 10.1 mg/dL    Glucose 202 (H) 70 - 99 mg/dL    Alkaline Phosphatase 93 40 - 150 U/L    AST 23 0 - 45 U/L    ALT 14 0 - 50 U/L    Protein Total 6.7 (L) 6.8 - 8.8 g/dL    Albumin 3.0 (L) 3.4 - 5.0 g/dL    Bilirubin Total 0.6  0.2 - 1.3 mg/dL    GFR Estimate 56 (L) >60 mL/min/1.73m2   Magnesium   Result Value Ref Range    Magnesium 0.9 (LL) 1.6 - 2.3 mg/dL   CBC with platelets and differential   Result Value Ref Range    WBC Count 9.7 4.0 - 11.0 10e3/uL    RBC Count 4.07 3.80 - 5.20 10e6/uL    Hemoglobin 10.2 (L) 11.7 - 15.7 g/dL    Hematocrit 34.6 (L) 35.0 - 47.0 %    MCV 85 78 - 100 fL    MCH 25.1 (L) 26.5 - 33.0 pg    MCHC 29.5 (L) 31.5 - 36.5 g/dL    RDW 16.8 (H) 10.0 - 15.0 %    Platelet Count 156 150 - 450 10e3/uL    % Neutrophils 87 %    % Lymphocytes 9 %    % Monocytes 4 %    % Eosinophils 0 %    % Basophils 0 %    % Immature Granulocytes 0 %    NRBCs per 100 WBC 0 <1 /100    Absolute Neutrophils 8.4 (H) 1.6 - 8.3 10e3/uL    Absolute Lymphocytes 0.9 0.8 - 5.3 10e3/uL    Absolute Monocytes 0.4 0.0 - 1.3 10e3/uL    Absolute Eosinophils 0.0 0.0 - 0.7 10e3/uL    Absolute Basophils 0.0 0.0 - 0.2 10e3/uL    Absolute Immature Granulocytes 0.0 <=0.4 10e3/uL    Absolute NRBCs 0.0 10e3/uL   Lactic acid whole blood   Result Value Ref Range    Lactic Acid 0.7 0.7 - 2.0 mmol/L   Magnesium   Result Value Ref Range    Magnesium 0.9 (LL) 1.6 - 2.3 mg/dL   Magnesium   Result Value Ref Range    Magnesium 1.3 (L) 1.6 - 2.3 mg/dL   UA with Microscopic reflex to Culture    Specimen: Urine, Uriarte Catheter   Result Value Ref Range    Color Urine Yellow Colorless, Straw, Light Yellow, Yellow    Appearance Urine Clear Clear    Glucose Urine 1000 (A) Negative mg/dL    Bilirubin Urine Negative Negative    Ketones Urine 80 (A) Negative mg/dL    Specific Gravity Urine 1.025 1.003 - 1.035    Blood Urine Small (A) Negative    pH Urine 5.0 5.0 - 7.0    Protein Albumin Urine 100 (A) Negative mg/dL    Urobilinogen Urine Normal Normal, 2.0 mg/dL    Nitrite Urine Positive (A) Negative    Leukocyte Esterase Urine Trace (A) Negative    Bacteria Urine Many (A) None Seen /HPF    RBC Urine 5 (H) <=2 /HPF    WBC Urine 10 (H) <=5 /HPF    Squamous Epithelials Urine 5 (H)  <=1 /HPF    Narrative    Urine Culture ordered based on laboratory criteria   Magnesium   Result Value Ref Range    Magnesium 1.7 1.6 - 2.3 mg/dL   Calcium   Result Value Ref Range    Calcium 6.0 (L) 8.5 - 10.1 mg/dL   Ionized Calcium   Result Value Ref Range    Calcium Ionized 3.7 (L) 4.4 - 5.2 mg/dL       Medications   0.9% sodium chloride BOLUS (has no administration in time range)     Followed by   sodium chloride 0.9% infusion (has no administration in time range)   ondansetron (ZOFRAN) injection 4 mg (has no administration in time range)       Assessments & Plan (with Medical Decision Making) I received 2 years of age.  Presents with generalized weakness and nonvertiginous dizziness.  Clinically concern for dehydration.  Has had some vomiting and some loose stools today.  Family expressed concerns about poor nutritional intake.  There has been no documented weight loss though  Identified hypomagnesium and hypocalcium(could not complete ionized calcium in the ED.  Initial calcium = 6.0 but corrected for hypoalbuminemia = 6.8.)  Low albumin/low total protein  Discussion: Patient received 1 L normal saline for hydration.  She felt better after Zofran.  Corrected her magnesium = 0.9 with 2 g of magnesium sulfate IV.  Initiated IV calcium with 1 g of calcium gluconate IV.  Goal is to get calcium above 7.5.  Low calcium could be related to low magnesium adversely affecting PTH production.  PTH level pending  Vitamin D deficiency screening pending  I recommended follow-up with  Primary care provider in 7 to 10 days to recheck lab values and to go over test results.  Offer nutritional consultation and patient family declined.  Recommended daily multivitamin.  5:47 PM  Recheck calcium after 1 g calcium gluconate = 6.0.  Was able to obtain an ionized calcium = 3.7.  Not having tetany, seizures, carpopedal spasms.  EKG shows no prolonged QT.  CKD stage III.-Need to replace calcium carefully.  Ionized calcium level not  greater than 3 L/[place].  Patient able to take and absorb oral calcium supplements with vitamin D.  Appears to be more of a chronic hypocalcemia than acute therefore should do a repeat serum calcium level in 1 week.  Start with calcium supplement 1 g - 2 g daily in divided doses.  Magnesium level has corrected which could help with PTH production  6:25 PM  Reassessed patient with RN just prior to discharge.  She had eaten some soup and became nauseous.  No additional emesis.  Her last dose of Zofran was administered per EMS before ED arrival.  Noted that she is only had approximately 200-300 cc of urine output after 1 L of normal saline.  Plan: Hold on discharge.  Additional IV fluids 500 cc with maintenance 125 an hour.  Repeat dose of Zofran 4 mg IV then reassess.Signed out to Dr. Montenegro.      I have reviewed the nursing notes.    I have reviewed the findings, diagnosis, plan and need for follow up with the patient.      New Prescriptions    No medications on file       Final diagnoses:   Urinary tract infection in female   Hypomagnesemia   Hypocalcemia   Dehydration   Hypoalbuminemia       12/12/2022   [facility] EMERGENCY DEPT     Erick Butler, DO  12/12/22 1752       Erick Butler, DO  12/12/22 [id number]

## 2022-12-12 NOTE — ED TRIAGE NOTES
Pt has history of dizziness, more severe over last few weeks, multiple episodes of vomiting since yesterday and continuous nausea, unable to keep fluids or solids down-last intake unknown. Hasn't taken cardiac medications for two days due to vomiting and ran out of lisinopril. Fell but was caught by family member, no LOC or fall injuries.      Triage Assessment     Row Name 12/12/22 0978       Triage Assessment (Adult)    Airway WDL WDL       Respiratory WDL    Respiratory WDL WDL       Skin Circulation/Temperature WDL    Skin Circulation/Temperature WDL WDL       Cardiac WDL    Cardiac WDL WDL       Peripheral/Neurovascular WDL    Peripheral Neurovascular WDL WDL       Cognitive/Neuro/Behavioral WDL    Cognitive/Neuro/Behavioral WDL WDL

## 2022-12-12 NOTE — DISCHARGE INSTRUCTIONS
-Balanced diet   -Daily multivitamin  -Clinic follow-up with primary care provider in 7 days.  Important to recheck electrolyte/nutritional values.  I left a message with your primary care provider.  -Calcium supplement.  Please  at pharmacy and take as directed.

## 2022-12-13 ENCOUNTER — APPOINTMENT (OUTPATIENT)
Dept: PHYSICAL THERAPY | Facility: CLINIC | Age: 82
End: 2022-12-13
Attending: NURSE PRACTITIONER
Payer: MEDICARE

## 2022-12-13 LAB
ANION GAP SERPL CALCULATED.3IONS-SCNC: 12 MMOL/L (ref 3–14)
BUN SERPL-MCNC: 20 MG/DL (ref 7–30)
CA-I BLD-MCNC: 3.6 MG/DL (ref 4.4–5.2)
CALCIUM SERPL-MCNC: 6.6 MG/DL (ref 8.5–10.1)
CHLORIDE BLD-SCNC: 112 MMOL/L (ref 94–109)
CO2 SERPL-SCNC: 18 MMOL/L (ref 20–32)
CREAT SERPL-MCNC: 0.94 MG/DL (ref 0.52–1.04)
DEPRECATED CALCIDIOL+CALCIFEROL SERPL-MC: 5 UG/L (ref 20–75)
GFR SERPL CREATININE-BSD FRML MDRD: 60 ML/MIN/1.73M2
GLUCOSE BLD-MCNC: 109 MG/DL (ref 70–99)
GLUCOSE BLDC GLUCOMTR-MCNC: 121 MG/DL (ref 70–99)
GLUCOSE BLDC GLUCOMTR-MCNC: 253 MG/DL (ref 70–99)
GLUCOSE BLDC GLUCOMTR-MCNC: 257 MG/DL (ref 70–99)
GLUCOSE BLDC GLUCOMTR-MCNC: 289 MG/DL (ref 70–99)
HOLD SPECIMEN: NORMAL
MAGNESIUM SERPL-MCNC: 1.4 MG/DL (ref 1.6–2.3)
MAGNESIUM SERPL-MCNC: 2.8 MG/DL (ref 1.6–2.3)
PHOSPHATE SERPL-MCNC: 3.9 MG/DL (ref 2.5–4.5)
POTASSIUM BLD-SCNC: 3.5 MMOL/L (ref 3.4–5.3)
PTH-INTACT SERPL-MCNC: 164 PG/ML (ref 15–65)
SODIUM SERPL-SCNC: 142 MMOL/L (ref 133–144)

## 2022-12-13 PROCEDURE — 80048 BASIC METABOLIC PNL TOTAL CA: CPT | Performed by: INTERNAL MEDICINE

## 2022-12-13 PROCEDURE — 36415 COLL VENOUS BLD VENIPUNCTURE: CPT | Performed by: NURSE PRACTITIONER

## 2022-12-13 PROCEDURE — 82962 GLUCOSE BLOOD TEST: CPT

## 2022-12-13 PROCEDURE — 250N000013 HC RX MED GY IP 250 OP 250 PS 637: Performed by: EMERGENCY MEDICINE

## 2022-12-13 PROCEDURE — 250N000011 HC RX IP 250 OP 636: Performed by: INTERNAL MEDICINE

## 2022-12-13 PROCEDURE — 83735 ASSAY OF MAGNESIUM: CPT | Mod: 91 | Performed by: NURSE PRACTITIONER

## 2022-12-13 PROCEDURE — G0378 HOSPITAL OBSERVATION PER HR: HCPCS

## 2022-12-13 PROCEDURE — 36415 COLL VENOUS BLD VENIPUNCTURE: CPT | Performed by: INTERNAL MEDICINE

## 2022-12-13 PROCEDURE — 99225 PR SUBSEQUENT OBSERVATION CARE,LEVEL II: CPT | Performed by: NURSE PRACTITIONER

## 2022-12-13 PROCEDURE — 96372 THER/PROPH/DIAG INJ SC/IM: CPT | Performed by: INTERNAL MEDICINE

## 2022-12-13 PROCEDURE — 82330 ASSAY OF CALCIUM: CPT | Performed by: INTERNAL MEDICINE

## 2022-12-13 PROCEDURE — 84100 ASSAY OF PHOSPHORUS: CPT | Performed by: INTERNAL MEDICINE

## 2022-12-13 PROCEDURE — 83735 ASSAY OF MAGNESIUM: CPT | Performed by: INTERNAL MEDICINE

## 2022-12-13 PROCEDURE — G0378 HOSPITAL OBSERVATION PER HR: HCPCS | Mod: CS

## 2022-12-13 PROCEDURE — 258N000003 HC RX IP 258 OP 636: Performed by: INTERNAL MEDICINE

## 2022-12-13 PROCEDURE — 250N000012 HC RX MED GY IP 250 OP 636 PS 637: Performed by: INTERNAL MEDICINE

## 2022-12-13 PROCEDURE — 250N000011 HC RX IP 250 OP 636: Performed by: NURSE PRACTITIONER

## 2022-12-13 PROCEDURE — 96376 TX/PRO/DX INJ SAME DRUG ADON: CPT

## 2022-12-13 PROCEDURE — 250N000013 HC RX MED GY IP 250 OP 250 PS 637: Performed by: INTERNAL MEDICINE

## 2022-12-13 PROCEDURE — 250N000013 HC RX MED GY IP 250 OP 250 PS 637: Performed by: NURSE PRACTITIONER

## 2022-12-13 PROCEDURE — 97161 PT EVAL LOW COMPLEX 20 MIN: CPT | Mod: GP | Performed by: PHYSICAL THERAPIST

## 2022-12-13 RX ORDER — CALCIUM GLUCONATE 20 MG/ML
1 INJECTION, SOLUTION INTRAVENOUS ONCE
Status: COMPLETED | OUTPATIENT
Start: 2022-12-13 | End: 2022-12-13

## 2022-12-13 RX ORDER — METFORMIN HCL 500 MG
1000 TABLET, EXTENDED RELEASE 24 HR ORAL 2 TIMES DAILY WITH MEALS
Status: DISCONTINUED | OUTPATIENT
Start: 2022-12-13 | End: 2022-12-15 | Stop reason: HOSPADM

## 2022-12-13 RX ORDER — GLIPIZIDE 5 MG/1
15 TABLET ORAL
Status: DISCONTINUED | OUTPATIENT
Start: 2022-12-13 | End: 2022-12-15 | Stop reason: HOSPADM

## 2022-12-13 RX ORDER — MAGNESIUM SULFATE HEPTAHYDRATE 40 MG/ML
4 INJECTION, SOLUTION INTRAVENOUS ONCE
Status: COMPLETED | OUTPATIENT
Start: 2022-12-13 | End: 2022-12-13

## 2022-12-13 RX ADMIN — ASPIRIN 81 MG: 81 TABLET, COATED ORAL at 09:56

## 2022-12-13 RX ADMIN — CEFUROXIME AXETIL 250 MG: 250 TABLET, FILM COATED ORAL at 21:18

## 2022-12-13 RX ADMIN — METFORMIN HYDROCHLORIDE 1000 MG: 500 TABLET, EXTENDED RELEASE ORAL at 17:17

## 2022-12-13 RX ADMIN — HYDRALAZINE HYDROCHLORIDE 10 MG: 20 INJECTION INTRAMUSCULAR; INTRAVENOUS at 05:09

## 2022-12-13 RX ADMIN — CEFUROXIME AXETIL 250 MG: 250 TABLET, FILM COATED ORAL at 10:03

## 2022-12-13 RX ADMIN — GABAPENTIN 300 MG: 300 CAPSULE ORAL at 21:18

## 2022-12-13 RX ADMIN — CLOPIDOGREL BISULFATE 75 MG: 75 TABLET ORAL at 09:56

## 2022-12-13 RX ADMIN — GLIPIZIDE 15 MG: 5 TABLET ORAL at 17:17

## 2022-12-13 RX ADMIN — MAGNESIUM SULFATE HEPTAHYDRATE 4 G: 40 INJECTION, SOLUTION INTRAVENOUS at 14:27

## 2022-12-13 RX ADMIN — ISOSORBIDE MONONITRATE 30 MG: 30 TABLET, EXTENDED RELEASE ORAL at 09:56

## 2022-12-13 RX ADMIN — SODIUM CHLORIDE, POTASSIUM CHLORIDE, SODIUM LACTATE AND CALCIUM CHLORIDE: 600; 310; 30; 20 INJECTION, SOLUTION INTRAVENOUS at 23:44

## 2022-12-13 RX ADMIN — INSULIN ASPART 2 UNITS: 100 INJECTION, SOLUTION INTRAVENOUS; SUBCUTANEOUS at 17:18

## 2022-12-13 RX ADMIN — ATORVASTATIN CALCIUM 80 MG: 40 TABLET, FILM COATED ORAL at 21:18

## 2022-12-13 RX ADMIN — METOPROLOL SUCCINATE 100 MG: 100 TABLET, EXTENDED RELEASE ORAL at 09:56

## 2022-12-13 RX ADMIN — PANTOPRAZOLE SODIUM 40 MG: 40 TABLET, DELAYED RELEASE ORAL at 07:59

## 2022-12-13 RX ADMIN — CALCIUM GLUCONATE 1 G: 20 INJECTION, SOLUTION INTRAVENOUS at 02:01

## 2022-12-13 RX ADMIN — SODIUM CHLORIDE, POTASSIUM CHLORIDE, SODIUM LACTATE AND CALCIUM CHLORIDE: 600; 310; 30; 20 INJECTION, SOLUTION INTRAVENOUS at 14:28

## 2022-12-13 RX ADMIN — PAROXETINE 40 MG: 20 TABLET, FILM COATED ORAL at 09:56

## 2022-12-13 RX ADMIN — INSULIN ASPART 2 UNITS: 100 INJECTION, SOLUTION INTRAVENOUS; SUBCUTANEOUS at 12:48

## 2022-12-13 RX ADMIN — EMPAGLIFLOZIN 25 MG: 25 TABLET, FILM COATED ORAL at 12:48

## 2022-12-13 RX ADMIN — LISINOPRIL 20 MG: 10 TABLET ORAL at 09:56

## 2022-12-13 RX ADMIN — ENOXAPARIN SODIUM 40 MG: 40 INJECTION SUBCUTANEOUS at 09:58

## 2022-12-13 ASSESSMENT — ACTIVITIES OF DAILY LIVING (ADL)
ADLS_ACUITY_SCORE: 31
ADLS_ACUITY_SCORE: 34
ADLS_ACUITY_SCORE: 31
ADLS_ACUITY_SCORE: 34
ADLS_ACUITY_SCORE: 31
ADLS_ACUITY_SCORE: 34

## 2022-12-13 NOTE — PLAN OF CARE
Goal Outcome Evaluation:      Plan of Care Reviewed With: patient    Overall Patient Progress: no changeOverall Patient Progress: no change    Outcome Evaluation: Patient is alert and oriented. Denies dizziness, SOB, nausea or vomiting. No pain present. Tolerating oral intake. Hydralizine given for elevated BPs.

## 2022-12-13 NOTE — CONSULTS
Care Management Initial Consult    General Information  Assessment completed with: Patient,    Type of CM/SW Visit: Initial Assessment    Primary Care Provider verified and updated as needed:     Readmission within the last 30 days:        Reason for Consult: discharge planning  Advance Care Planning: Advance Care Planning Reviewed: no concerns identified        Communication Assessment  Patient's communication style: spoken language (English or Bilingual)    Hearing Difficulty or Deaf: no      Cognitive  Cognitive/Neuro/Behavioral: WDL                    Living Environment:   People in home: grandchild(darrell)     Current living Arrangements: house      Able to return to prior arrangements:    Living Arrangement Comments: TCU vs Home    Family/Social Support:  Care provided by: self, other (see comments)  Provides care for: grandchild(darrell)  Marital Status:   Children          Description of Support System: Supportive, Involved    Support Assessment: Adequate family and caregiver support    Current Resources:   Patient receiving home care services: No     Community Resources:    Equipment currently used at home: cane, straight  Supplies currently used at home: None    Employment/Financial:  Employment Status: retired        Financial Concerns:       Lifestyle & Psychosocial Needs:  Social Determinants of Health     Tobacco Use: Low Risk      Smoking Tobacco Use: Never     Smokeless Tobacco Use: Never     Passive Exposure: Not on file   Alcohol Use: Not on file   Financial Resource Strain: Not on file   Food Insecurity: Not on file   Transportation Needs: Not on file   Physical Activity: Not on file   Stress: Not on file   Social Connections: Not on file   Intimate Partner Violence: Not on file   Depression: Not at risk     PHQ-2 Score: 2   Housing Stability: Not on file       Functional Status:  Prior to admission patient needed assistance:   Dependent ADLs:: Ambulation-cane        Mental Health Status:       "    Chemical Dependency Status:              Values/Beliefs:  Spiritual, Cultural Beliefs, Mu-ism Practices, Values that affect care: no               Additional Information:  Referral received for discharge planning.  Initial assessment completed with patient.  Patient is currently OBS.    Patient lives in her own home.  Her grandson, has mild autism, lives with her.      Patient ambulates with a straight cane at baseline.  Patient does not receive any in-home services.    Patient states her daughter, Neyda, is available to assist with any needs.    Currently, patient states she is \"tired and weak\".  Patient had physical therapy evaluation this morning, recommending TCU.  Patient is current with outpatient P/T here at   [facility].  Patient is not at functional baseline.      Patient hoping to feel better and go home tomorrow, however, discussed back up plan if she remains weak.  Patient in agreement with TCU referral to [facility] (Main Phone: [phone] Admissions Phone: [phone] Fax: [phone]).    Referral sent.  Will re-evaluate functional status tomorrow.    Family transport.    DELORES [place]  [facility] [phone]/ [facility] [phone]  Care Management        "

## 2022-12-13 NOTE — PROGRESS NOTES
S-(situation): Patient registered to Observation. Patient arrived to room 253 via cart from ED    B-(background): Weakness    A-(assessment): Patient is alert and oriented. Denies pain, sob. Has had diarrhea x1. Purewick in place.     R-(recommendations): Orders and observation goals reviewed with Patient    Nursing Observation criteria listed below was met:    Skin issues/needs documented:NA  Isolation needs addressed and Signage up: NA  Fall Prevention: Education given and documented: NA  Education Assessment documented:Yes  Admission Education Documented: Yes  New medication patient education completed and documented (Possible Side Effects of Common Medications handout): Yes  OBS video/handout Reviewed & Documented: Yes  Allergies Reviewed: Yes  Medication Reconciliation Complete: Yes  Home medications if not able to send immediately home with family stored here: NA  Reminder note placed in discharge instructions of home meds: NA  Patient has discharge needs (If yes, please explain): Yes  Patient discharge preferences addressed and charted on white board:  Yes  Provider notified that patient has arrived to the unit: Yes

## 2022-12-13 NOTE — PROGRESS NOTES
[facility]    Medicine Progress Note - Hospitalist Service    Date of Admission:  12/12/2022    Assessment & Plan      Eliza Dubois is a 82 year old female who presents with dizziness and generalized weakness.  Is been feeling lightheaded.    Gastroenteritis    cont IVF    enteric panel ordered    Zofran prn    Generalized weakness    multifactorial-dehydration, electrolyte abnormalities    Physical therapy recommends TCU if continued weakness.    Will reevaluate in a.m.    Hypomagnesemia, hypocalcemia    repleted; repeat Mag 1.7    repeat calcium after mag corrected    tele    check iPTH; Vit D panel pending; check phos    UTI    culture IN progress    Will continue Ceftin BID for 7 days with last day being 12/19    DM with neuropathy    last A1C 9.5    Restarted home glipizide, Jardiance, metformin    ISS    cont gabapentin    CAD s/p stent    cont ASA, Plavix, statin, BB, Imdur    HTN    cont lisinopril and metoprolol; hydralazine IV prn    hold hydrochlorothiazide for now    MDD    cont paroxetine    GERD    cont PPI    Anemia, normocytic    baseline 10-12       Diet: Full Liquid Diet    DVT Prophylaxis: Low Risk/Ambulatory with no VTE prophylaxis indicated  Uriarte Catheter: Not present  Central Lines: None  Cardiac Monitoring: ACTIVE order. Indication: Electrolyte Imbalance (24 hours)- Magnesium <1.3 mg/ml; Potassium < =2.8 or > 5.5 mg/ml  Code Status:  Partial code, okay with intubation no chest compressions    Disposition Plan      Expected Discharge Date: 12/13/2022                The patient's care was discussed with the Attending Physician, Dr. Dalton, Bedside Nurse and Care Coordinator/.    Guilherme Larsen NP  Hospitalist Service  [facility]  Securely message with the Vocera Web Console (learn more here)  Text page via [facility] Paging/Directory         Clinically Significant Risk Factors Present on Admission          #  Hypocalcemia: Lowest iCa = 3.3 mg/dL in last 2 days, will monitor and replace as appropriate   # Hypomagnesemia: Lowest Mg = 0.9 mg/dL in last 2 days, will replace as needed   # Hypoalbuminemia: Lowest albumin = 3 g/dL at 12/12/2022 10:56 AM, will monitor as appropriate   # Drug Induced Platelet Defect: home medication list includes an antiplatelet medication   # Hypertension: home medication list includes antihypertensive(s)     # DMII: A1C = 9.5 % (Ref range: 0.0 - 5.6 %) within past 3 months            ______________________________________________________________________    Interval History   No acute events overnight.  Patient states that she is continuing to feel weak but her gastric discomfort has improved and she is no longer vomiting or having diarrhea.  It is anticipated the patient will continue to improve overnight and possibly discharge home in the morning.    Data reviewed today: I reviewed all medications, new labs and imaging results over the last 24 hours. I personally reviewed no images or EKG's today.    Physical Exam   Vital Signs: Temp: 98.2  F (36.8  C) Temp src: Oral BP: (!) 172/67 Pulse: 71   Resp: 18 SpO2: 94 % O2 Device: None (Room air)    Weight: 130 lbs 8.2 oz  Constitutional: awake, alert, cooperative, no apparent distress, and appears stated age  ENT: normocepalic, without obvious abnormality, atramatic  Respiratory: no increased work of breathing, no retractions and clear to auscultation  Cardiovascular: normal apical pulses  and normal S1 and S2  GI: normal bowel sounds, non-distended and non-tender  Musculoskeletal: there is no redness, warmth, or swelling of the joints  full range of motion noted  Neurologic: Awake, alert, oriented to name, place and time.  Cranial nerves II-XII are grossly intact.  Motor is 5 out of 5 bilaterally.      Data   Recent Labs   Lab 12/13/22  1149 12/13/22  0758 12/13/22  0548 12/12/22  1715 12/12/22  1056   WBC  --   --   --   --  9.7   HGB  --   --    --   --  10.2*   MCV  --   --   --   --  85   PLT  --   --   --   --  156   NA  --   --  142  --  142   POTASSIUM  --   --  3.5  --  3.9   CHLORIDE  --   --  112*  --  108   CO2  --   --  18*  --  22   BUN  --   --  20  --  24   CR  --   --  0.94  --  1.00   ANIONGAP  --   --  12  --  12   ANDREI  --   --  6.6* 6.0* 6.0*   * 121* 109*  --  202*   ALBUMIN  --   --   --   --  3.0*   PROTTOTAL  --   --   --   --  6.7*   BILITOTAL  --   --   --   --  0.6   ALKPHOS  --   --   --   --  93   ALT  --   --   --   --  14   AST  --   --   --   --  23     No results found for this or any previous visit (from the past 24 hour(s)).  Medications     lactated ringers 100 mL/hr at 12/12/22 2212       aspirin  81 mg Oral Daily     atorvastatin  80 mg Oral At Bedtime     cefuroxime  250 mg Oral Q12H [facility] ([facility])     clopidogrel  75 mg Oral Daily     empagliflozin  25 mg Oral Daily     enoxaparin ANTICOAGULANT  40 mg Subcutaneous Q24H     gabapentin  300 mg Oral At Bedtime     glipiZIDE  15 mg Oral BID AC     insulin aspart  1-3 Units Subcutaneous TID AC     isosorbide mononitrate  30 mg Oral Daily     lisinopril  20 mg Oral Daily     metFORMIN  1,000 mg Oral BID w/meals     metoprolol succinate ER  100 mg Oral Daily     pantoprazole  40 mg Oral QAM AC     PARoxetine  40 mg Oral QAM     sodium chloride (PF)  3 mL Intracatheter Q8H

## 2022-12-13 NOTE — PROGRESS NOTES
[facility]      OUTPATIENT PHYSICAL THERAPY EVALUATION  PLAN OF TREATMENT FOR OUTPATIENT REHABILITATION  (COMPLETE FOR INITIAL CLAIMS ONLY)  Patient's Last Name, First Name, M.I.  YOB: 1940  SilvinoEliza CIFUENTES                        Provider's Name  [facility] Medical Record No.  [id number]                               Onset Date:  12/12/22   Start of Care Date:  12/13/22      Type:     _X_PT   ___OT   ___SLP Medical Diagnosis:  UTI and weakness , off balance                        PT Diagnosis:  UTI and weakness   Visits from SOC:  1   _________________________________________________________________________________  Plan of Treatment/Functional Goals    Planned Interventions: home exercise program, balance training, stair training, gait training     Goals: See Physical Therapy Goals on Care Plan in [employer] electronic health record.    Therapy Frequency: Daily  Predicted Duration of Therapy Intervention: 12/15/22  _________________________________________________________________________________    I CERTIFY THE NEED FOR THESE SERVICES FURNISHED UNDER        THIS PLAN OF TREATMENT AND WHILE UNDER MY CARE     (Physician co-signature of this document indicates review and certification of the therapy plan).              Certification date from: 12/13/22, Certification date to: 12/16/22     Referring provider   Guilherme Larsen NP             Initial Assessment        See Physical Therapy evaluation dated 12/13/22 in Epic electronic health record.

## 2022-12-13 NOTE — ED PROVIDER NOTES
Patient signed out to me by Dr. Jagdish Butler.  After 10 hours of continued nausea, vomiting and weakness.  Decision was made for admission.  Please see his notes for details as outlined with regards to magnesium and calcium as well.  She is excepted for admission by Dr. Villatoro.     Rodney Montenegro MD  12/12/22 1821

## 2022-12-13 NOTE — PROGRESS NOTES
PRIMARY DIAGNOSIS: GENERALIZED WEAKNESS    OUTPATIENT/OBSERVATION GOALS TO BE MET BEFORE DISCHARGE  1. Orthostatic performed: No    2. Tolerating PO medications: Yes    3. Return to near baseline physical activity: No    4. Cleared for discharge by consultants (if involved): No    Discharge Planner Nurse   Safe discharge environment identified: Yes  Barriers to discharge: No       Entered by: Roseann Silverman RN 12/13/2022 5:40 PM     Please review provider order for any additional goals.   Nurse to notify provider when observation goals have been met and patient is ready for discharge.

## 2022-12-13 NOTE — ED NOTES
ED Nursing criteria listed below was addressed during verbal handoff:     Abnormal vitals: Yes  Abnormal results: Yes  Med Reconciliation completed: Yes  Meds given in ED: Yes  Any Overdue Meds: N/A  Core Measures: N/A  Isolation: No  Special needs: No  Skin assessment: Yes    Observation Patient  Education provided: Yes

## 2022-12-13 NOTE — H&P
[facility]    History and Physical - Hospitalist Service       Date of Admission:  12/12/2022    Assessment & Plan    Gastroenteritis  -cont IVF  -enteric panel ordered  -Zofran prn    Generalized weakness  -multifactorial-dehydration, electrolyte abnormalities    Hypomagnesemia, hypocalcemia  -repleted; repeat Mag 1.7  -repeat calcium after mag corrected  -tele  -check iPTH; Vit D panel pending; check phos    UTI  -culture pending  -cont Ceftin BID started in ED    DM with neuropathy  -last A1C 9.5  -hold oral meds for now  -ISS  -cont gabapentin    CAD s/p stent  -cont ASA, Plavix, statin, BB, Imdur    HTN  -cont lisinopril and metoprolol; hydralazine IV prn  -hold hydrochlorothiazide for now    MDD  -cont paroxetine    GERD  -cont PPI    Anemia, normocytic  -baseline 10-12     Diet: Advance Diet as Tolerated: Clear Liquid Diet    DVT Prophylaxis: Lovenox  Uriarte Catheter: Not present  Central Lines: None  Code Status:  DNR, intubation OK    Clinically Significant Risk Factors Present on Admission      # Drug Induced Platelet Defect: home medication list includes an antiplatelet medication   # Hypertension: home medication list includes antihypertensive(s)     # DMII: A1C = 9.5 % (Ref range: 0.0 - 5.6 %) within past 3 months            Disposition Plan      Expected Discharge Date: 12/13/2022                The patient's care was discussed with the patient.        MARY BAILON MD  [facility]  Securely message with the Vocera Web Console (learn more here)  Text page via [employer] Paging/Directory      Visit/Communication Style   Virtual (Video) communication was used to evaluate Eliza.  Eliza consented to the use of video communication:yes  Video START time: 2220, 12/12/2022  Video STOP time: 2230 , 12/12/2022   Patient's location: [facility]   Provider's location during the visit: [facility] Tele-medicine site         ______________________________________________________________________    Chief Complaint   weakness    History of Present Illness    82yoF with DM, CAD, and  HTN who presented to the ED with two days of decreased appetite, vomiting x1, and diarrhea.  She feels weak.  She has had minimal oral intake.     She denies fever, chest pain, abdominal pain, cough, SOB, burning with urination, sick contacts.    Review of Systems    General: negative for fever, sweats  Eyes: negative for blurred vision, loss of vision  Ear Nose and Throat: negative for pharyngitis, speech or swallowing difficulties  Respiratory:  negative for sputum production, wheezing, HEART, pleuritic pain, sob or cough  Cardiology:  negative for chest pain, palpitations, orthopnea, PND, edema, syncope   Gastrointestinal: negative for abdominal pain, nausea, constipation, hematemesis, melena or hematochezia  Genitourinary: negative for frequency, urgency, dysuria, hematuria   Neurological: negative for focal weakness, paresthesia    Past Medical History    I have reviewed this patient's medical history and updated it with pertinent information if needed.   Past Medical History:   Diagnosis Date     Diabetic eye exam (H) 05/01/14     Heart attack (H)      Pure hypercholesterolemia      Stented coronary artery      Type II or unspecified type diabetes mellitus without mention of complication, not stated as uncontrolled 2002    Avandamet.     Unspecified disease of pericardium 12/05/08    Pericarditis w/mild to moderate pericardial effusion.     Unspecified essential hypertension        Past Surgical History   I have reviewed this patient's surgical history and updated it with pertinent information if needed.  Past Surgical History:   Procedure Laterality Date     ABDOMEN SURGERY       COLONOSCOPY  04/15/09     COLONOSCOPY  6/18/2014    Procedure: COMBINED COLONOSCOPY, SINGLE BIOPSY/POLYPECTOMY BY BIOPSY;  Surgeon: Earle Mon MD;  Location:   GI     HC LAPAROSCOPY, SURGICAL; CHOLECYSTECTOMY  1997    Cholecystectomy, Laparoscopic     PHACOEMULSIFICATION CLEAR CORNEA WITH STANDARD INTRAOCULAR LENS IMPLANT Right 3/16/2016    Procedure: PHACOEMULSIFICATION CLEAR CORNEA WITH STANDARD INTRAOCULAR LENS IMPLANT;  Surgeon: George Lemus MD;  Location: [facility]     PHACOEMULSIFICATION CLEAR CORNEA WITH STANDARD INTRAOCULAR LENS IMPLANT Left 3/30/2016    Procedure: PHACOEMULSIFICATION CLEAR CORNEA WITH STANDARD INTRAOCULAR LENS IMPLANT;  Surgeon: George Lemus MD;  Location: [facility]     ZZC NONSPECIFIC PROCEDURE  1988    Hysterectomy       Social History   I have reviewed this patient's social history and updated it with pertinent information if needed.  Social History     Tobacco Use     Smoking status: Never     Smokeless tobacco: Never   Vaping Use     Vaping Use: Never used   Substance Use Topics     Alcohol use: No     Alcohol/week: 0.0 standard drinks     Drug use: No       Family History   I have reviewed this patient's family history and updated it with pertinent information if needed.  Family History   Problem Relation Age of Onset     Diabetes Mother      Arthritis Mother      Heart Disease Mother      Hypertension Mother      Lipids Mother      Neurologic Disorder Mother         neuropathy     Osteoporosis Mother      Obesity Mother      EYE* Mother         blind     Diabetes Father      Genitourinary Problems Father         gall stones     Heart Disease Father         MI     Cancer Maternal Grandmother      Heart Disease Maternal Grandmother      Cancer Other         Grandparents     Alcohol/Drug No family hx of      Alzheimer Disease No family hx of      Anesthesia Reaction No family hx of      Blood Disease No family hx of      Depression No family hx of      Genetic Disorder No family hx of      Gastrointestinal Disease No family hx of      Gynecology No family hx of      Psychotic Disorder No family hx of      Respiratory No family hx of       Cerebrovascular Disease No family hx of        Prior to Admission Medications   Prior to Admission Medications   Prescriptions Last Dose Informant Patient Reported? Taking?   PARoxetine (PAXIL) 40 MG tablet Past Week at am Self No Yes   Sig: Take 1 tablet (40 mg) by mouth every morning   acetaminophen (TYLENOL) 500 MG tablet Past Week Self No Yes   Sig: Take 1-2 tablets (500-1,000 mg) by mouth every 6 hours as needed for pain   alcohol swab prep pads  Self No No   Sig: Use to swab area of injection/sarah as directed.   aspirin (ASA) 81 MG EC tablet Past Week at am Self No Yes   Sig: Take 1 tablet (81 mg) by mouth daily   atorvastatin (LIPITOR) 80 MG tablet Past Week at hs Self No Yes   Sig: Take 1 tablet (80 mg) by mouth At Bedtime   blood glucose (NO BRAND SPECIFIED) test strip Unknown Self No Yes   Si strip by In Vitro route every other day To accompany: Blood Glucose Monitor Brands: per insurance.   blood glucose calibration (NO BRAND SPECIFIED) solution  Self No No   Sig: To accompany: Blood Glucose Monitor Brands: per insurance.   blood glucose monitoring (NO BRAND SPECIFIED) meter device kit  Self No No   Si kit by In Vitro route every other day Preferred blood glucose meter OR supplies to accompany: Blood Glucose Monitor Brands: per insurance.   clopidogrel (PLAVIX) 75 MG tablet Past Week at am Self No Yes   Sig: Take 1 tablet (75 mg) by mouth daily   empagliflozin (JARDIANCE) 25 MG TABS tablet Past Week at am Self No Yes   Sig: Take 1 tablet (25 mg) by mouth daily   gabapentin (NEURONTIN) 300 MG capsule Past Week at hs Self No Yes   Sig: TAKE ONE TO TWO CAPSULES BY MOUTH EVERY EVENING   Patient taking differently: Take 300 mg by mouth At Bedtime   glipiZIDE (GLUCOTROL) 10 MG tablet Past Week at hs Self No Yes   Sig: TAKE TWO TABLETS BY MOUTH TWICE A DAY BEFORE MEALS   Patient taking differently: Take 20 mg by mouth 2 times daily (before meals)   isosorbide mononitrate (IMDUR) 30 MG 24 hr tablet Past  Week at am Self No Yes   Sig: Take 1 tablet (30 mg) by mouth daily   lisinopril-hydrochlorothiazide (ZESTORETIC) 20-25 MG tablet Past Week at hs Self No Yes   Sig: Take 1 tablet by mouth daily   Patient taking differently: Take 1 tablet by mouth At Bedtime   metFORMIN (GLUCOPHAGE XR) 500 MG 24 hr tablet Past Week at supper Self No Yes   Sig: TAKE TWO TABLETS BY MOUTH TWICE A DAY WITH MEALS   Patient taking differently: Take 1,000 mg by mouth 2 times daily (with meals)   metoprolol succinate ER (TOPROL XL) 100 MG 24 hr tablet Past Week at am Self No Yes   Sig: Take 1 tablet (100 mg) by mouth daily   omeprazole (PRILOSEC) 40 MG DR capsule Past Week at am Self No Yes   Sig: TAKE ONE CAPSULE BY MOUTH TWICE A DAY AS NEEDED Strength: 40 mg   Patient taking differently: Take 40 mg by mouth daily as needed   thin (NO BRAND SPECIFIED) lancets  Self No No   Sig: Use with lanceting device. To accompany: Blood Glucose Monitor Brands: per insurance.      Facility-Administered Medications: None     Allergies   Allergies   Allergen Reactions     Sulfa Drugs Hives       Physical Exam   Vital Signs: Temp: 96.8  F (36  C) Temp src: Oral BP: (!) 208/81 Pulse: 70   Resp: 20 SpO2: 96 % O2 Device: None (Room air)    Weight: 122 lbs 0 oz    Gen:  Well-developed, well-nourished, in no acute distress, lying semi-supine in hospital stretcher  HEENT:  Anicteric sclera, PER, hearing intact to voice  Resp:  No accessory muscle use, breath sounds clear; no wheezes no rales no rhonchi  Card:  No murmur, normal S1, S2   Abd:  Soft per RN exam, no TTP, non-distended, normoactive bowel sounds are present  Musc:  Normal strength and movement of the major muscle groups without obvious deformity  Psych:  Good insight, oriented to person, place and time, not anxious, not agitated    Data     Recent Labs   Lab 12/12/22  1715 12/12/22  1056   WBC  --  9.7   HGB  --  10.2*   MCV  --  85   PLT  --  156   NA  --  142   POTASSIUM  --  3.9   CHLORIDE  --  108    CO2  --  22   BUN  --  24   CR  --  1.00   ANIONGAP  --  12   ANDREI 6.0* 6.0*   GLC  --  202*   ALBUMIN  --  3.0*   PROTTOTAL  --  6.7*   BILITOTAL  --  0.6   ALKPHOS  --  93   ALT  --  14   AST  --  23         No results found for this or any previous visit (from the past 24 hour(s)).

## 2022-12-13 NOTE — PROGRESS NOTES
12/13/22 1100   Appointment Info   Signing Clinician's Name / Credentials (PT) felix infante PT   Living Environment   People in Home grandchild(darrell)   Current Living Arrangements house   Living Environment Comments 6 stairs to enter   Self-Care   Fall history within last six months yes   Number of times patient has fallen within last six months 2   General Information   Onset of Illness/Injury or Date of Surgery 12/12/22   Patient/Family Therapy Goals Statement (PT) get feeling better and stronger so she can return home   Pertinent History of Current Problem (include personal factors and/or comorbidities that impact the POC) patient seen in hospital due to admission thru ED 12/12/22 due to being sick and unable to eat for 2 days , found to have UTI , generally she is not at her baseline due to weakness and more unbalanced , patient seen as an outpatient on 11/28/2022 and at that time reported patient reports that she has had trouble with her balance and dizzy feeling for years and this past year has been progressively worse , she reports that she falls or slipps atleast 1x week , 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 B feet to mid shin and her hands are also really bad . had left shoulder Fx and then got covid and then in nusing home x 1 month . currently lives alone in her home with 6 steps with a railling . she lives on 1 level , her grandson also lives with her . has heart issues has stents . normally walks with a cane .when she falls its normally that she trips over something or her own feet . 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  has a wheeed walker with a seat and feels she could use this outside her home . PMH DM   Existing Precautions/Restrictions fall   Cognition   Affect/Mental Status (Cognition) WFL   Pain Assessment   Patient Currently in Pain No   Range of Motion (ROM)   Range of Motion ROM is WFL    Strength (Manual Muscle Testing)   Strength Comments strength is grossly 3+to4-/5 , unable to complete sit to stand due to not feeling well , she is generally weak and tired this am   Bed Mobility   Comment, (Bed Mobility) indepedent   Transfers   Comment, (Transfers) able to move sit to stand and reverse using B UE and CGA   Gait/Stairs (Locomotion)   Comment, (Gait/Stairs) able to ambulate 100 feet x 1 with CGA and w/walker , patient was very slow with gait and needed to stop and rest during gait   Balance   Balance Comments fair standing balance   Sensory Examination   Sensory Perception Comments neuropathy in B LE mid shin to feet and B hands   Clinical Impression   Criteria for Skilled Therapeutic Intervention Yes, treatment indicated   PT Diagnosis (PT) UTI and weakness   Functional limitations due to impairments patient is not safe and independent with her gait or transfers at this time and is not at her normal baseline function   Clinical Presentation (PT Evaluation Complexity) Evolving/Changing   Clinical Presentation Rationale patient seen due to recent admission to hospital due to increase in her weakness and off balance feeling , Dx with UTI , at this time she is not safe to return home independently and not at her baseline function , would recommend that she discharge to short term rehab or if she would remain in hospital another day maybe she will be feeling better and stronger to be able to return home   Clinical Decision Making (Complexity) low complexity   Planned Therapy Interventions (PT) home exercise program;balance training;stair training;gait training   Risk & Benefits of therapy have been explained evaluation/treatment results reviewed;care plan/treatment goals reviewed;risks/benefits reviewed;current/potential barriers reviewed;participants voiced agreement with care plan;participants included;patient   PT Total Evaluation Time   PT Hilda Low Complexity Minutes (25791) 20   Physical  Therapy Goals   PT Frequency Daily   PT Predicted Duration/Target Date for Goal Attainment 12/15/22   PT Goals Bed Mobility;Transfers;Gait;Stairs   PT: Bed Mobility Independent   PT: Transfers Independent   PT: Gait Independent;Rolling walker;Greater than 200 feet   PT: Stairs 6 stairs;Independent   PT Discharge Planning   PT Plan continue to see daily for instruction in strengthening , transfers , gait and stairs   PT Discharge Recommendation (DC Rec) Transitional Care Facility   PT Rationale for DC Rec patient is not back to her baseline function and is unsafe with her mobility at this time would recommend short term rehab,if she is not discharged today and in 1-2 days would expect her to feel stronger and less weak and unbalanced and would maybe safe to return home with either home therapy or outpatient PT   PT Brief overview of current status currently not at baseline function , CGA for transfers and gait with w/walker   Total Session Time   Total Session Time (sum of timed and untimed services) 20

## 2022-12-13 NOTE — PROGRESS NOTES
PRIMARY DIAGNOSIS: GENERALIZED WEAKNESS    OUTPATIENT/OBSERVATION GOALS TO BE MET BEFORE DISCHARGE  1. Orthostatic performed: N/A    2. Tolerating PO medications: Yes    3. Return to near baseline physical activity: No    4. Cleared for discharge by consultants (if involved): No    Discharge Planner Nurse   Safe discharge environment identified: Yes  Barriers to discharge: Yes       Entered by: Elisabeth Chaudhry RN 12/13/2022 12:52 AM     Please review provider order for any additional goals.   Nurse to notify provider when observation goals have been met and patient is ready for discharge.

## 2022-12-13 NOTE — PROGRESS NOTES
PRIMARY DIAGNOSIS: GENERALIZED WEAKNESS    OUTPATIENT/OBSERVATION GOALS TO BE MET BEFORE DISCHARGE  1. Orthostatic performed: No    2. Tolerating PO medications: Yes    3. Return to near baseline physical activity: No    4. Cleared for discharge by consultants (if involved): No    Discharge Planner Nurse   Safe discharge environment identified: Yes  Barriers to discharge: Yes. PT eval.       Entered by: Roseann Silverman RN 12/13/2022 5:38 PM     Please review provider order for any additional goals.   Nurse to notify provider when observation goals have been met and patient is ready for discharge.

## 2022-12-14 ENCOUNTER — APPOINTMENT (OUTPATIENT)
Dept: PHYSICAL THERAPY | Facility: CLINIC | Age: 82
End: 2022-12-14
Payer: MEDICARE

## 2022-12-14 LAB
BACTERIA UR CULT: NORMAL
GLUCOSE BLDC GLUCOMTR-MCNC: 153 MG/DL (ref 70–99)
GLUCOSE BLDC GLUCOMTR-MCNC: 181 MG/DL (ref 70–99)
GLUCOSE BLDC GLUCOMTR-MCNC: 221 MG/DL (ref 70–99)
GLUCOSE BLDC GLUCOMTR-MCNC: 221 MG/DL (ref 70–99)
GLUCOSE BLDC GLUCOMTR-MCNC: 231 MG/DL (ref 70–99)
HOLD SPECIMEN: NORMAL
MAGNESIUM SERPL-MCNC: 2.3 MG/DL (ref 1.6–2.3)

## 2022-12-14 PROCEDURE — 97530 THERAPEUTIC ACTIVITIES: CPT | Mod: GP | Performed by: PHYSICAL THERAPIST

## 2022-12-14 PROCEDURE — 83735 ASSAY OF MAGNESIUM: CPT | Performed by: NURSE PRACTITIONER

## 2022-12-14 PROCEDURE — 250N000013 HC RX MED GY IP 250 OP 250 PS 637: Performed by: INTERNAL MEDICINE

## 2022-12-14 PROCEDURE — 82962 GLUCOSE BLOOD TEST: CPT | Mod: 91

## 2022-12-14 PROCEDURE — 99225 PR SUBSEQUENT OBSERVATION CARE,LEVEL II: CPT | Performed by: NURSE PRACTITIONER

## 2022-12-14 PROCEDURE — G0378 HOSPITAL OBSERVATION PER HR: HCPCS

## 2022-12-14 PROCEDURE — 250N000013 HC RX MED GY IP 250 OP 250 PS 637: Performed by: NURSE PRACTITIONER

## 2022-12-14 PROCEDURE — 250N000013 HC RX MED GY IP 250 OP 250 PS 637: Performed by: EMERGENCY MEDICINE

## 2022-12-14 PROCEDURE — 250N000011 HC RX IP 250 OP 636: Performed by: INTERNAL MEDICINE

## 2022-12-14 PROCEDURE — 36415 COLL VENOUS BLD VENIPUNCTURE: CPT | Performed by: NURSE PRACTITIONER

## 2022-12-14 PROCEDURE — 82565 ASSAY OF CREATININE: CPT | Performed by: INTERNAL MEDICINE

## 2022-12-14 PROCEDURE — 96372 THER/PROPH/DIAG INJ SC/IM: CPT | Performed by: INTERNAL MEDICINE

## 2022-12-14 RX ORDER — CEFUROXIME AXETIL 250 MG/1
250 TABLET ORAL EVERY 12 HOURS
Qty: 10 TABLET | Refills: 0 | Status: SHIPPED | OUTPATIENT
Start: 2022-12-14 | End: 2022-12-19

## 2022-12-14 RX ADMIN — PANTOPRAZOLE SODIUM 40 MG: 40 TABLET, DELAYED RELEASE ORAL at 06:56

## 2022-12-14 RX ADMIN — ATORVASTATIN CALCIUM 80 MG: 40 TABLET, FILM COATED ORAL at 20:59

## 2022-12-14 RX ADMIN — INSULIN ASPART 1 UNITS: 100 INJECTION, SOLUTION INTRAVENOUS; SUBCUTANEOUS at 08:36

## 2022-12-14 RX ADMIN — METFORMIN HYDROCHLORIDE 1000 MG: 500 TABLET, EXTENDED RELEASE ORAL at 08:44

## 2022-12-14 RX ADMIN — PAROXETINE 40 MG: 20 TABLET, FILM COATED ORAL at 08:44

## 2022-12-14 RX ADMIN — GLIPIZIDE 15 MG: 5 TABLET ORAL at 17:06

## 2022-12-14 RX ADMIN — CLOPIDOGREL BISULFATE 75 MG: 75 TABLET ORAL at 08:44

## 2022-12-14 RX ADMIN — GABAPENTIN 300 MG: 300 CAPSULE ORAL at 20:59

## 2022-12-14 RX ADMIN — METFORMIN HYDROCHLORIDE 1000 MG: 500 TABLET, EXTENDED RELEASE ORAL at 17:22

## 2022-12-14 RX ADMIN — CEFUROXIME AXETIL 250 MG: 250 TABLET, FILM COATED ORAL at 20:59

## 2022-12-14 RX ADMIN — INSULIN ASPART 1 UNITS: 100 INJECTION, SOLUTION INTRAVENOUS; SUBCUTANEOUS at 17:09

## 2022-12-14 RX ADMIN — LISINOPRIL 20 MG: 10 TABLET ORAL at 08:43

## 2022-12-14 RX ADMIN — ASPIRIN 81 MG: 81 TABLET, COATED ORAL at 08:43

## 2022-12-14 RX ADMIN — CEFUROXIME AXETIL 250 MG: 250 TABLET, FILM COATED ORAL at 08:36

## 2022-12-14 RX ADMIN — ISOSORBIDE MONONITRATE 30 MG: 30 TABLET, EXTENDED RELEASE ORAL at 08:43

## 2022-12-14 RX ADMIN — INSULIN ASPART 1 UNITS: 100 INJECTION, SOLUTION INTRAVENOUS; SUBCUTANEOUS at 12:08

## 2022-12-14 RX ADMIN — ENOXAPARIN SODIUM 40 MG: 40 INJECTION SUBCUTANEOUS at 08:44

## 2022-12-14 RX ADMIN — METOPROLOL SUCCINATE 100 MG: 100 TABLET, EXTENDED RELEASE ORAL at 08:44

## 2022-12-14 RX ADMIN — GLIPIZIDE 15 MG: 5 TABLET ORAL at 07:58

## 2022-12-14 RX ADMIN — EMPAGLIFLOZIN 25 MG: 25 TABLET, FILM COATED ORAL at 08:37

## 2022-12-14 ASSESSMENT — ACTIVITIES OF DAILY LIVING (ADL)
ADLS_ACUITY_SCORE: 34
ADLS_ACUITY_SCORE: 34
ADLS_ACUITY_SCORE: 31
ADLS_ACUITY_SCORE: 31
ADLS_ACUITY_SCORE: 34
ADLS_ACUITY_SCORE: 31
ADLS_ACUITY_SCORE: 34
ADLS_ACUITY_SCORE: 31
ADLS_ACUITY_SCORE: 34
ADLS_ACUITY_SCORE: 31

## 2022-12-14 NOTE — PROGRESS NOTES
[facility], located within [facility]    Medicine Progress Note - Hospitalist Service    Date of Admission:  12/12/2022    Assessment & Plan      Eliza Dubois is a 82 year old female who presents with dizziness and generalized weakness.  Is been feeling lightheaded.    Generalized weakness    multifactorial-dehydration, electrolyte abnormalities    Physical therapy recommends TCU if continued weakness.    Was reevaluated in a.m. and continues to be below her functional baseline.  They recommend TCU for which she has been accepted at [facility] but cannot be placed until tomorrow on 12/15    Gastroenteritis    cont IVF    enteric panel ordered    Zofran prn    Hypomagnesemia, hypocalcemia    repleted; repeat Mag 1.7    repeat calcium after mag corrected    tele    check iPTH; Vit D panel pending; check phos    UTI    culture IN progress    Will continue Ceftin BID for 7 days with last day being 12/19    DM with neuropathy    last A1C 9.5    Restarted home glipizide, Jardiance, metformin    ISS    cont gabapentin    CAD s/p stent    cont ASA, Plavix, statin, BB, Imdur    HTN    cont lisinopril and metoprolol; hydralazine IV prn    hold hydrochlorothiazide for now    MDD    cont paroxetine    GERD    cont PPI    Anemia, normocytic    baseline 10-12       Diet: Moderate Consistent Carb (60 g CHO per Meal) Diet  Diet    DVT Prophylaxis: Low Risk/Ambulatory with no VTE prophylaxis indicated  Uriarte Catheter: Not present  Central Lines: None  Cardiac Monitoring: None  Code Status:  Partial code, okay with intubation no chest compressions    Disposition Plan      Expected Discharge Date: 12/14/2022      Destination: home with family;inpatient rehabilitation facility          The patient's care was discussed with the Attending Physician, Dr. Dalton, Bedside Nurse and Care Coordinator/.    Guilherme Larsen NP  Hospitalist Service  [facility]  Securely message with the Vocera Web  Console (learn more here)  Text page via [facility] Paging/Directory         Clinically Significant Risk Factors Present on Admission          # Hypocalcemia: Lowest iCa = 3.3 mg/dL in last 2 days, will monitor and replace as appropriate   # Hypomagnesemia: Lowest Mg = 1.4 mg/dL in last 2 days, will replace as needed   # Hypoalbuminemia: Lowest albumin = 3 g/dL at 12/12/2022 10:56 AM, will monitor as appropriate   # Drug Induced Platelet Defect: home medication list includes an antiplatelet medication   # Hypertension: home medication list includes antihypertensive(s)     # DMII: A1C = 9.5 % (Ref range: 0.0 - 5.6 %) within past 3 months            ______________________________________________________________________    Interval History    12/14 no acute events overnight.  Patient is mildly stronger than yesterday per PT and it is recommended that she continue strength training at [facility].  She has been accepted at [facility] but with facility is unable to accommodate until tomorrow morning.      12/13 no acute events overnight.  Patient states that she is continuing to feel weak but her gastric discomfort has improved and she is no longer vomiting or having diarrhea.  It is anticipated the patient will continue to improve overnight and possibly discharge home in the morning.    Data reviewed today: I reviewed all medications, new labs and imaging results over the last 24 hours. I personally reviewed no images or EKG's today.    Physical Exam   Vital Signs: Temp: 98.4  F (36.9  C) Temp src: Oral BP: (!) 164/71 Pulse: 82   Resp: 18 SpO2: 96 % O2 Device: None (Room air)    Weight: 130 lbs 8.2 oz  Constitutional: awake, alert, cooperative, no apparent distress, and appears stated age  ENT: normocepalic, without obvious abnormality, atramatic  Respiratory: no increased work of breathing, no retractions and clear to auscultation  Cardiovascular: normal apical pulses  and normal S1 and S2  GI: normal bowel sounds, non-distended  and non-tender  Musculoskeletal: there is no redness, warmth, or swelling of the joints  full range of motion noted  Neurologic: Awake, alert, oriented to name, place and time.  Cranial nerves II-XII are grossly intact.  Motor is 5 out of 5 bilaterally.      Data   Recent Labs   Lab 12/14/22  1138 12/14/22  0800 12/14/22  0236 12/13/22  0758 12/13/22  0548 12/12/22  1715 12/12/22  1056   WBC  --   --   --   --   --   --  9.7   HGB  --   --   --   --   --   --  10.2*   MCV  --   --   --   --   --   --  85   PLT  --   --   --   --   --   --  156   NA  --   --   --   --  142  --  142   POTASSIUM  --   --   --   --  3.5  --  3.9   CHLORIDE  --   --   --   --  112*  --  108   CO2  --   --   --   --  18*  --  22   BUN  --   --   --   --  20  --  24   CR  --   --   --   --  0.94  --  1.00   ANIONGAP  --   --   --   --  12  --  12   ANDREI  --   --   --   --  6.6* 6.0* 6.0*   * 153* 181*   < > 109*  --  202*   ALBUMIN  --   --   --   --   --   --  3.0*   PROTTOTAL  --   --   --   --   --   --  6.7*   BILITOTAL  --   --   --   --   --   --  0.6   ALKPHOS  --   --   --   --   --   --  93   ALT  --   --   --   --   --   --  14   AST  --   --   --   --   --   --  23    < > = values in this interval not displayed.     No results found for this or any previous visit (from the past 24 hour(s)).  Medications     lactated ringers 100 mL/hr at 12/13/22 0824       aspirin  81 mg Oral Daily     atorvastatin  80 mg Oral At Bedtime     cefuroxime  250 mg Oral Q12H [facility] (08/20)     clopidogrel  75 mg Oral Daily     empagliflozin  25 mg Oral Daily     enoxaparin ANTICOAGULANT  40 mg Subcutaneous Q24H     gabapentin  300 mg Oral At Bedtime     glipiZIDE  15 mg Oral BID AC     insulin aspart  1-3 Units Subcutaneous TID AC     isosorbide mononitrate  30 mg Oral Daily     lisinopril  20 mg Oral Daily     metFORMIN  1,000 mg Oral BID w/meals     metoprolol succinate ER  100 mg Oral Daily     pantoprazole  40 mg Oral QAM AC     PARoxetine  40  mg Oral QAM     sodium chloride (PF)  3 mL Intracatheter Q8H

## 2022-12-14 NOTE — DISCHARGE SUMMARY
[facility]  Hospitalist Discharge Summary      Date of Admission:  12/12/2022  Date of Discharge:  12/15/2022  Discharging Provider: Guilherme Larsen NP  Discharge Service: Hospitalist Service    Discharge Diagnoses   Generalized weakness secondary to gastroenteritis    Follow-ups Needed After Discharge   Follow-up Appointments     Follow Up and recommended labs and tests      Follow up with Nursing home physician.  No follow up labs or test are   needed.             Unresulted Labs Ordered in the Past 30 Days of this Admission     Date and Time Order Name Status Description    12/12/2022  3:50 PM PTH Related Peptide Test In process     12/12/2022  3:34 PM Urine Culture Preliminary       These results will be followed up by PCP    Discharge Disposition   Discharged to rehabilitation facility  Condition at discharge: Stable    Hospital Course   Eliza Dubois is a 82 year old female who presents with dizziness and generalized weakness.  Within the hospital she was working with physical therapy and was noted to be at roughly 50% of her normal strength.  Physical therapy also noted that this was improving daily and that she would benefit from TCU at discharge.  Her gastroenteritis resolved rather quickly and she did not have any emesis or diarrhea prior to discharge.  She is medically stable and ready for discharge to TCU.  Hospital course laid out below:    Generalized weakness    multifactorial-dehydration, electrolyte abnormalities    Fluids and electrolytes replaced    Physical therapy recommends TCU if continued weakness.    Was reevaluated in a.m. and continues to be below her functional baseline.  They recommend TCU for which she has been accepted at [place] but cannot be placed until tomorrow on 12/15    Gastroenteritis    cont IVF    Zofran prn    Hypomagnesemia, hypocalcemia    repleted; repeat Mag 1.7    repeat calcium after mag corrected    check iPTH;    vitamin D is low      UTI    culture positive for E. coli    Will continue Ceftin BID for 7 days with last day being 12/19    DM with neuropathy    last A1C 9.5    Restarted home glipizide, Jardiance, metformin    ISS    cont gabapentin    CAD s/p stent    cont ASA, Plavix, statin, BB, Imdur    HTN    cont lisinopril and metoprolol; hydralazine IV prn    Held hydrochlorothiazide while inpatient restart on discharge    MDD    cont paroxetine    GERD    cont PPI    Anemia, normocytic    baseline 10-12      Consultations This Hospital Stay   PHYSICAL THERAPY ADULT IP CONSULT  CARE MANAGEMENT / SOCIAL WORK IP CONSULT  PHYSICAL THERAPY ADULT IP CONSULT    Code Status   Special Code    Time Spent on this Encounter   I, Guilherme Larsen NP, personally saw the patient today and spent greater than 30 minutes discharging this patient.       Guilherme Larsen NP  [address] SURGICAL  [address] [place] [address]  Phone: [phone]  ______________________________________________________________________    Physical Exam   Vital Signs: Temp: 99.2  F (37.3  C) Temp src: Oral BP: (!) 160/65 Pulse: 75   Resp: 18 SpO2: 94 % O2 Device: None (Room air)    Weight: 130 lbs 8.2 oz  Constitutional: awake, alert, cooperative, no apparent distress, and appears stated age  ENT: normocepalic, without obvious abnormality, atramatic  Respiratory: no increased work of breathing, no retractions and clear to auscultation  Cardiovascular: normal apical pulses  and normal S1 and S2  GI: normal bowel sounds, non-distended and non-tender  Musculoskeletal: full range of motion noted  motor strength is 4 out of 5 all extremities bilaterally  Neurologic: Awake, alert, oriented to name, place and time.  Cranial nerves II-XII are grossly intact.  Motor is 5 out of 5 bilaterally.         Primary Care Physician   Byron Holm    Discharge Orders      General info for SNF    Length of Stay Estimate: Short Term Care: Estimated #  of Days <30  Condition at Discharge: Improving  Level of care:skilled   Rehabilitation Potential: Excellent  Admission H&P remains valid and up-to-date: Yes  Recent Chemotherapy: N/A  Use Nursing Home Standing Orders: Yes     Mantoux instructions    Give two-step Mantoux (PPD) Per Facility Policy Yes     Follow Up and recommended labs and tests    Follow up with Nursing home physician.  No follow up labs or test are needed.     Reason for your hospital stay    Generalized weakness secondary to gastroenteritis     Activity - Up with assistive device     Physical Therapy Adult Consult    Evaluate and treat as clinically indicated.    Reason: Generalized weakness     Fall precautions     Diet    Follow this diet upon discharge: Orders Placed This Encounter      Moderate Consistent Carb (60 g CHO per Meal) Diet       Significant Results and Procedures   Results for orders placed or performed during the hospital encounter of 12/13/21   MA Screen Bilateral w/Nasim    Narrative    BILATERAL FULL FIELD DIGITAL SCREENING MAMMOGRAM WITH TOMOSYNTHESIS    Performed on: 12/13/21    Compared to: 03/16/2015, 06/06/2014, 11/12/2013, and 04/25/2013    Technique:  This study was evaluated with the assistance of Computer-Aided   Detection.  Breast Tomosynthesis was used in interpretation.    Findings: The breasts have scattered areas of fibroglandular density.    There is no radiographic evidence of malignancy.     IMPRESSION: ACR BI-RADS Category 1: Negative    RECOMMENDED FOLLOW-UP: Annual routine screening mammogram    The results and recommendations of this examination will be communicated   to the patient.             Discharge Medications   Current Discharge Medication List      START taking these medications    Details   calcium carbonate (OS-ANDREI) 1500 (600 Ca) MG tablet Take 1 tablet (600 mg) by mouth 2 times daily (with meals)  Qty: 60 tablet, Refills: 0      cefuroxime (CEFTIN) 250 MG tablet Take 1 tablet (250 mg) by mouth  every 12 hours for 5 days  Qty: 10 tablet, Refills: 0    Associated Diagnoses: Urinary tract infection in female         CONTINUE these medications which have NOT CHANGED    Details   acetaminophen (TYLENOL) 500 MG tablet Take 1-2 tablets (500-1,000 mg) by mouth every 6 hours as needed for pain  Qty: 100 tablet, Refills: 0    Associated Diagnoses: Closed fracture of proximal end of left humerus with routine healing, unspecified fracture morphology, subsequent encounter      aspirin (ASA) 81 MG EC tablet Take 1 tablet (81 mg) by mouth daily  Qty: 30 tablet, Refills: 0    Associated Diagnoses: Coronary artery disease involving native coronary artery of native heart without angina pectoris      atorvastatin (LIPITOR) 80 MG tablet Take 1 tablet (80 mg) by mouth At Bedtime  Qty: 90 tablet, Refills: 3    Associated Diagnoses: Hyperlipidemia LDL goal <100      blood glucose (NO BRAND SPECIFIED) test strip 1 strip by In Vitro route every other day To accompany: Blood Glucose Monitor Brands: per insurance.  Qty: 100 strip, Refills: 3    Associated Diagnoses: Type 2 diabetes mellitus with diabetic polyneuropathy, without long-term current use of insulin (H)      clopidogrel (PLAVIX) 75 MG tablet Take 1 tablet (75 mg) by mouth daily  Qty: 90 tablet, Refills: 3    Associated Diagnoses: Coronary artery disease involving native coronary artery of native heart without angina pectoris      empagliflozin (JARDIANCE) 25 MG TABS tablet Take 1 tablet (25 mg) by mouth daily  Qty: 90 tablet, Refills: 3    Associated Diagnoses: Type 2 diabetes mellitus with diabetic polyneuropathy, without long-term current use of insulin (H)      gabapentin (NEURONTIN) 300 MG capsule TAKE ONE TO TWO CAPSULES BY MOUTH EVERY EVENING  Qty: 180 capsule, Refills: 3    Associated Diagnoses: Type 2 diabetes mellitus with diabetic polyneuropathy, without long-term current use of insulin (H)      glipiZIDE (GLUCOTROL) 10 MG tablet TAKE TWO TABLETS BY MOUTH TWICE  A DAY BEFORE MEALS  Qty: 180 tablet, Refills: 3    Associated Diagnoses: Type 2 diabetes mellitus with diabetic polyneuropathy, without long-term current use of insulin (H)      isosorbide mononitrate (IMDUR) 30 MG 24 hr tablet Take 1 tablet (30 mg) by mouth daily  Qty: 90 tablet, Refills: 3    Associated Diagnoses: Coronary artery disease involving native coronary artery of native heart without angina pectoris      lisinopril-hydrochlorothiazide (ZESTORETIC) 20-25 MG tablet Take 1 tablet by mouth daily  Qty: 90 tablet, Refills: 3    Associated Diagnoses: Hypertension goal BP (blood pressure) < 140/90      metFORMIN (GLUCOPHAGE XR) 500 MG 24 hr tablet TAKE TWO TABLETS BY MOUTH TWICE A DAY WITH MEALS  Qty: 360 tablet, Refills: 3    Associated Diagnoses: Type 2 diabetes mellitus with diabetic polyneuropathy, without long-term current use of insulin (H)      metoprolol succinate ER (TOPROL XL) 100 MG 24 hr tablet Take 1 tablet (100 mg) by mouth daily  Qty: 30 tablet, Refills: 4    Associated Diagnoses: Hypertension goal BP (blood pressure) < 140/90      omeprazole (PRILOSEC) 40 MG DR capsule TAKE ONE CAPSULE BY MOUTH TWICE A DAY AS NEEDED Strength: 40 mg  Qty: 180 capsule, Refills: 3    Associated Diagnoses: Gastroesophageal reflux disease without esophagitis      PARoxetine (PAXIL) 40 MG tablet Take 1 tablet (40 mg) by mouth every morning  Qty: 90 tablet, Refills: 3    Associated Diagnoses: Major depressive disorder, recurrent episode, moderate (H)      alcohol swab prep pads Use to swab area of injection/sarah as directed.  Qty: 100 each, Refills: 3    Associated Diagnoses: Type 2 diabetes mellitus with diabetic polyneuropathy, without long-term current use of insulin (H)      blood glucose calibration (NO BRAND SPECIFIED) solution To accompany: Blood Glucose Monitor Brands: per insurance.  Qty: 1 each, Refills: 3    Associated Diagnoses: Type 2 diabetes mellitus with diabetic polyneuropathy, without long-term  current use of insulin (H)      blood glucose monitoring (NO BRAND SPECIFIED) meter device kit 1 kit by In Vitro route every other day Preferred blood glucose meter OR supplies to accompany: Blood Glucose Monitor Brands: per insurance.  Qty: 1 kit, Refills: 0    Associated Diagnoses: Type 2 diabetes mellitus with diabetic polyneuropathy, without long-term current use of insulin (H)      thin (NO BRAND SPECIFIED) lancets Use with lanceting device. To accompany: Blood Glucose Monitor Brands: per insurance.  Qty: 100 each, Refills: 6    Associated Diagnoses: Type 2 diabetes mellitus with diabetic polyneuropathy, without long-term current use of insulin (H)           Allergies   Allergies   Allergen Reactions     Sulfa Drugs Hives

## 2022-12-14 NOTE — PROGRESS NOTES
PRIMARY DIAGNOSIS: GENERALIZED WEAKNESS    OUTPATIENT/OBSERVATION GOALS TO BE MET BEFORE DISCHARGE  1. Orthostatic performed: No    2. Tolerating PO medications: Yes    3. Return to near baseline physical activity: Yes    4. Cleared for discharge by consultants (if involved): Yes    Discharge Planner Nurse   Safe discharge environment identified: Yes Will discharge to [facility] tomorrow at 0900H  Barriers to discharge: No       Entered by: Cornelius Tamayo RN 12/14/2022 3:43 PM     Please review provider order for any additional goals.   Nurse to notify provider when observation goals have been met and patient is ready for discharge.

## 2022-12-14 NOTE — PROGRESS NOTES
PRIMARY DIAGNOSIS: GENERALIZED WEAKNESS    OUTPATIENT/OBSERVATION GOALS TO BE MET BEFORE DISCHARGE  1. Orthostatic performed: No    2. Tolerating PO medications: Yes    3. Return to near baseline physical activity: No    4. Cleared for discharge by consultants (if involved): No    Discharge Planner Nurse   Safe discharge environment identified: Yes  Barriers to discharge: No       Entered by: Dia Dalton RN 12/13/2022 10:58 PM     Please review provider order for any additional goals.   Nurse to notify provider when observation goals have been met and patient is ready for discharge.

## 2022-12-14 NOTE — PLAN OF CARE
"PRIMARY DIAGNOSIS: \"GENERIC\" NURSING  OUTPATIENT/OBSERVATION GOALS TO BE MET BEFORE DISCHARGE:  ADLs back to baseline: Yes    Activity and level of assistance: Up with standby assistance.    Pain status: Pain free.    Return to near baseline physical activity: Yes     Discharge Planner Nurse   Safe discharge environment identified: Yes  Barriers to discharge: Yes- daughter expresses concerns yesterday of discharge to home.   Pt ambulated to the bathroom with standby assist and walker, gait belt. Denies pain. Vss. Telemetry is NSR. FT=[id number].       Entered by: Freda Angeles RN 12/14/2022 5:08 AM     Please review provider order for any additional goals.   Nurse to notify provider when observation goals have been met and patient is ready for discharge.Goal Outcome Evaluation:                        "

## 2022-12-14 NOTE — PROGRESS NOTES
Care Management Follow Up    Length of Stay (days): 0    Expected Discharge Date: 12/15/2022     Concerns to be Addressed:       Patient plan of care discussed at interdisciplinary rounds: Yes    Anticipated Discharge Disposition: Home, Transitional Care     Anticipated Discharge Services:    Anticipated Discharge DME:      Patient/family educated on Medicare website which has current facility and service quality ratings: yes  Education Provided on the Discharge Plan:    Patient/Family in Agreement with the Plan: yes    Referrals Placed by CM/SW: Internal Clinic Care Coordination, Post Acute Facilities  Private pay costs discussed: Not applicable    Additional Information:  Patient was planned to discharge today to [facility] (Admissions: [phone] Main Phone: [phone] Fax: [phone]), however, last minute cancelled discharge due to urgent staffing shortage at [facility].      Plan is for patient to discharge tomorrow morning at 0900.  Updated patient's daughter, Neyda, with plan.  Neyda will transport tomorrow at 0900.    DELORES [place]  [facility] [phone]/ [facility] [phone]  Care Management

## 2022-12-14 NOTE — PROGRESS NOTES
PRIMARY DIAGNOSIS: GENERALIZED WEAKNESS    OUTPATIENT/OBSERVATION GOALS TO BE MET BEFORE DISCHARGE  1. Orthostatic performed: No    2. Tolerating PO medications: Yes    3. Return to near baseline physical activity: Yes    4. Cleared for discharge by consultants (if involved): N/A    Discharge Planner Nurse   Safe discharge environment identified: No  Barriers to discharge: Yes Patient was seen by PT, does need Ax1 while ambulating with walker.       Entered by: Cornelius Tamayo RN 12/14/2022 12:32 PM     Please review provider order for any additional goals.   Nurse to notify provider when observation goals have been met and patient is ready for discharge.

## 2022-12-15 ENCOUNTER — APPOINTMENT (OUTPATIENT)
Dept: PHYSICAL THERAPY | Facility: CLINIC | Age: 82
End: 2022-12-15
Payer: MEDICARE

## 2022-12-15 VITALS
HEIGHT: 64 IN | SYSTOLIC BLOOD PRESSURE: 169 MMHG | BODY MASS INDEX: 22.28 KG/M2 | HEART RATE: 85 BPM | TEMPERATURE: 97.2 F | WEIGHT: 130.51 LBS | RESPIRATION RATE: 16 BRPM | DIASTOLIC BLOOD PRESSURE: 74 MMHG | OXYGEN SATURATION: 94 %

## 2022-12-15 LAB
BACTERIA UR CULT: ABNORMAL
BACTERIA UR CULT: ABNORMAL
CREAT SERPL-MCNC: 1.21 MG/DL (ref 0.52–1.04)
GFR SERPL CREATININE-BSD FRML MDRD: 45 ML/MIN/1.73M2
GLUCOSE BLDC GLUCOMTR-MCNC: 104 MG/DL (ref 70–99)
GLUCOSE BLDC GLUCOMTR-MCNC: 135 MG/DL (ref 70–99)
GLUCOSE BLDC GLUCOMTR-MCNC: 71 MG/DL (ref 70–99)
MAGNESIUM SERPL-MCNC: 1.6 MG/DL (ref 1.6–2.3)
PLATELET # BLD AUTO: 214 10E3/UL (ref 150–450)

## 2022-12-15 PROCEDURE — 99217 PR OBSERVATION CARE DISCHARGE: CPT | Performed by: NURSE PRACTITIONER

## 2022-12-15 PROCEDURE — 250N000011 HC RX IP 250 OP 636: Performed by: INTERNAL MEDICINE

## 2022-12-15 PROCEDURE — 36415 COLL VENOUS BLD VENIPUNCTURE: CPT | Performed by: INTERNAL MEDICINE

## 2022-12-15 PROCEDURE — 83735 ASSAY OF MAGNESIUM: CPT | Performed by: NURSE PRACTITIONER

## 2022-12-15 PROCEDURE — 250N000013 HC RX MED GY IP 250 OP 250 PS 637: Performed by: EMERGENCY MEDICINE

## 2022-12-15 PROCEDURE — 250N000013 HC RX MED GY IP 250 OP 250 PS 637: Performed by: NURSE PRACTITIONER

## 2022-12-15 PROCEDURE — G0378 HOSPITAL OBSERVATION PER HR: HCPCS | Mod: CS

## 2022-12-15 PROCEDURE — 97110 THERAPEUTIC EXERCISES: CPT | Mod: GP | Performed by: PHYSICAL THERAPIST

## 2022-12-15 PROCEDURE — 250N000013 HC RX MED GY IP 250 OP 250 PS 637: Performed by: INTERNAL MEDICINE

## 2022-12-15 PROCEDURE — 85049 AUTOMATED PLATELET COUNT: CPT | Performed by: INTERNAL MEDICINE

## 2022-12-15 PROCEDURE — 96372 THER/PROPH/DIAG INJ SC/IM: CPT | Performed by: INTERNAL MEDICINE

## 2022-12-15 PROCEDURE — 97530 THERAPEUTIC ACTIVITIES: CPT | Mod: GP | Performed by: PHYSICAL THERAPIST

## 2022-12-15 PROCEDURE — 82962 GLUCOSE BLOOD TEST: CPT

## 2022-12-15 RX ADMIN — PANTOPRAZOLE SODIUM 40 MG: 40 TABLET, DELAYED RELEASE ORAL at 06:34

## 2022-12-15 RX ADMIN — CEFUROXIME AXETIL 250 MG: 250 TABLET, FILM COATED ORAL at 08:59

## 2022-12-15 RX ADMIN — CLOPIDOGREL BISULFATE 75 MG: 75 TABLET ORAL at 08:59

## 2022-12-15 RX ADMIN — LISINOPRIL 20 MG: 10 TABLET ORAL at 08:59

## 2022-12-15 RX ADMIN — ENOXAPARIN SODIUM 40 MG: 40 INJECTION SUBCUTANEOUS at 09:00

## 2022-12-15 RX ADMIN — PAROXETINE 40 MG: 20 TABLET, FILM COATED ORAL at 08:58

## 2022-12-15 RX ADMIN — ISOSORBIDE MONONITRATE 30 MG: 30 TABLET, EXTENDED RELEASE ORAL at 08:59

## 2022-12-15 RX ADMIN — EMPAGLIFLOZIN 25 MG: 25 TABLET, FILM COATED ORAL at 08:59

## 2022-12-15 RX ADMIN — ASPIRIN 81 MG: 81 TABLET, COATED ORAL at 08:59

## 2022-12-15 RX ADMIN — GLIPIZIDE 15 MG: 5 TABLET ORAL at 07:33

## 2022-12-15 RX ADMIN — METOPROLOL SUCCINATE 100 MG: 100 TABLET, EXTENDED RELEASE ORAL at 08:59

## 2022-12-15 RX ADMIN — METFORMIN HYDROCHLORIDE 1000 MG: 500 TABLET, EXTENDED RELEASE ORAL at 07:33

## 2022-12-15 ASSESSMENT — ACTIVITIES OF DAILY LIVING (ADL)
ADLS_ACUITY_SCORE: 32

## 2022-12-15 NOTE — PLAN OF CARE
Physical Therapy Discharge Summary    Reason for therapy discharge:    Discharged to transitional care facility.    Progress towards therapy goal(s). See goals on Care Plan in [facility] electronic health record for goal details.  Goals partially met.  Barriers to achieving goals:   discharge from facility and nearing baseline however continues to require CGA due to fatigue and use of walker which is below baseline.    Therapy recommendation(s):    Continued therapy is recommended.  Rationale/Recommendations:  to address acute on chronic weakness and functional mobility deficits in order to facilitate a safe return home.      Thank you for your referral.  Maria Del Carmen Simpson, PT, DPT, ATC, [facility]  O: [phone]  E: [email or web address]

## 2022-12-15 NOTE — PROGRESS NOTES
Care Management Discharge Note    Discharge Date: 12/15/2022     Discharge Disposition: Transitional Care - [facility] (Admissions: [phone] Main Phone: [phone] Fax: [phone])    Discharge Services:      Discharge DME:      Discharge Transportation: family or friend will provide    Private pay costs discussed: Not applicable    PAS Confirmation Code: [id number]  Patient/family educated on Medicare website which has current facility and service quality ratings: yes    Education Provided on the Discharge Plan:      Persons Notified of Discharge Plans: DaughterNeyda    Patient/Family in Agreement with the Plan: yes    Handoff Referral Completed: Yes    Additional Information:  Patient discharging to [facility] (Admissions: [phone] Main Phone: [phone] Fax: [phone]) today.      Neyda is delayed due to the weather, but will pick patient up at 1300.  Nursing staff notified.  Migdalia at [facility], notified.    DELORES [place]  [facility] [phone]/ Jamie [phone]  Care Management

## 2022-12-15 NOTE — PROGRESS NOTES
S-(situation): Patient discharged to [facility] TCU via wheelchair with transportation service.     B-(background): dehydration, hypocalcemia, hypomagnesemia, hypoalbuminemia, UTI    A-(assessment): vss, room air    R-(recommendations): Discharge instructions reviewed with patient/sent to TCU. Listed belongings gathered and returned to patient.          Discharge Nursing Criteria:     Care Plan and Patient education resolved: Yes    New Medications- pt has been educated about purpose and side effects: Yes    Vaccines  Influenza status verified at discharge:  Yes      MISC  Prescriptions if needed, hard copies sent with patient  NA  Home medications returned to patient: NA  Medication Bin checked and emptied on discharge Yes  Patient reports post-discharge pain management plan is effective: Yes

## 2022-12-15 NOTE — PROGRESS NOTES
"PRIMARY DIAGNOSIS: \"GENERIC\" NURSING  OUTPATIENT/OBSERVATION GOALS TO BE MET BEFORE DISCHARGE:  1. ADLs back to baseline: Yes    2. Activity and level of assistance: Up with standby assistance.    3. Pain status: Pain free.    4. Return to near baseline physical activity: Yes     Discharge Planner Nurse   Safe discharge environment identified: Yes  Barriers to discharge: No       Entered by: ABIOLA NORTON RN 12/15/2022 2:56 AM     Please review provider order for any additional goals.   Nurse to notify provider when observation goals have been met and patient is ready for discharge.  "

## 2022-12-15 NOTE — PROGRESS NOTES
.oPRIMARY DIAGNOSIS: GENERALIZED WEAKNESS  OUTPATIENT/OBSERVATION GOALS TO BE MET BEFORE DISCHARGE:  1. ADLs back to baseline: Yes    2. Activity and level of assistance: Up with standby assistance.    3. Pain status: Pain free.    4. Return to near baseline physical activity: Yes- still weak but attending [facility] on discharge for TCU     Discharge Planner Nurse   Safe discharge environment identified: Yes  Barriers to discharge: No       Entered by: Madhavi Lamas RN 12/14/2022 9:53 PM     Please review provider order for any additional goals.   Nurse to notify provider when observation goals have been met and patient is ready for discharge.

## 2022-12-15 NOTE — PROGRESS NOTES
Eliza Leslieson  Gender: female  : 1940  [address]  [place] [facility] [address]  [phone] (home)     Medical Record: [id number]  Pharmacy:    [id number] - [place], [place] - [address] [facility] - [place], [place] - [address]  [facility] - [place], [place] - [address]  Primary Care Provider: Byron Holm    Parent's names are: Data Unavailable (mother) and Data Unavailable (father).      [facility]  December 15, 2022     Discharge Phone Call:  Key Words/Key Times    Discharge to Mickey Hooks RN

## 2022-12-16 ENCOUNTER — PATIENT OUTREACH (OUTPATIENT)
Dept: CARE COORDINATION | Facility: CLINIC | Age: 82
End: 2022-12-16

## 2022-12-16 NOTE — PROGRESS NOTES
[facility] Care [facility]    Background: Transitional Care Management program identified per system criteria and reviewed by [facility] Resource [place] team for possible outreach.    Assessment: Upon chart review, CCR Team member will not proceed with patient outreach related to this episode of Transitional Care Management program due to reason below:    [facility] TCU: Patient discharged to TCU/ARU/LTACH and is established within [facility]. Referral created for Primary Care-Care Coordination program.    Plan: Transitional Care Management episode addressed appropriately per reason noted above.      Neyda Simon RN  [facility], [facility]    *Connected Care Resource Team does NOT follow patient ongoing. Referrals are identified based on internal discharge reports and the outreach is to ensure patient has an understanding of their discharge instructions.

## 2022-12-16 NOTE — PROGRESS NOTES
Clinic Care Coordination Contact  Care Coordination Transition Communication    Referral Source: IP Handoff    Clinical Data: Patient was hospitalized at [facility] from 12/12/22 to 12/15/22 with diagnosis of UTI.     Transition to Facility:              [facility]               Contact name and phone number/fax: [phone]    Plan: RN/SW Care Coordinator will await notification from facility staff informing RN/SW Care Coordinator of patient's discharge plans/needs. RN/SW Care Coordinator will review chart and outreach to facility staff every 4 weeks and as needed.     DELORES Rayo   [facility] - Care Coordination  [facility]   [phone]

## 2022-12-16 NOTE — LETTER
Hand-off  for Care Coordination  What is Care Coordination?  [facility] Care Coordination Services are available to people in complex situations,   for example medical, social or financial. The Care Coordinator, a SW or an RN, works with the   patient and their doctor to determine health goals, obtain resources, achieve outcomes,   and develop plans to coordinate care across settings.      o Patient Name:   Eliza Dubois  o Patient :     1940  o Patient PCP:     Byron Holm DO    o Patient Primary Clinic:   [address] [place] [id number]  o D/C Facility: [facility] Contact Info for questions: ___________________________  o D/C Date:  ______________________________________________  o Follow-up Apt with PCP after TCU D/C:   ____________________  o Other Follow-Up Apt s: ____________________________________  Additional information (concerns, and Home Care, ect ):   __________________________________________________________________  __________________________________________________________________        __________________________________________________________________    Care Coordinator to Contact  **Please fax this sheet back to me when pt is ready to discharge**  Sheri Francois RN  Clinic Care Coordinator  Fax: [phone]  Phone: [phone]

## 2022-12-19 ENCOUNTER — DOCUMENTATION ONLY (OUTPATIENT)
Dept: INTERNAL MEDICINE | Facility: CLINIC | Age: 82
End: 2022-12-19

## 2022-12-19 NOTE — PROGRESS NOTES
It has been requested that we asked the patient to set up a clinic visit for ER follow-up.    Please contact the patient and help her set up a follow-up visit.    If she is feeling stable, and having no symptoms at this time, can be at a time of her convenience.    Mihai

## 2022-12-23 ENCOUNTER — PATIENT OUTREACH (OUTPATIENT)
Dept: CARE COORDINATION | Facility: CLINIC | Age: 82
End: 2022-12-23

## 2022-12-23 NOTE — LETTER
[facility] CARE COORDINATION  [address] [facility]   [place] [address]    December 27, 2022    Eliza Dubois  [address]  [place] [address]      Dear Eliza,      I am a clinic care coordinator who works with Byron Holm DO with the [facility]. I have been trying to reach you recently to introduce Clinic Care Coordination. Below is a description of clinic care coordination and how I can further assist you.       The clinic care coordination team is made up of a registered nurse, , financial resource worker and community health worker who understand the health care system. The goal of clinic care coordination is to help you manage your health and improve access to the health care system. Our team works alongside your provider to assist you in determining your health and social needs. We can help you obtain health care and community resources, providing you with necessary information and education. We can work with you through any barriers and develop a care plan that helps coordinate and strengthen the communication between you and your care team.    Please feel free to contact me with any questions or concerns regarding care coordination and what we can offer.      We are focused on providing you with the highest-quality healthcare experience possible.    Sincerely,     Sheri Francois RN Care Coordination   [facility]-  [place], [place], [place]  Phone: [phone]

## 2022-12-23 NOTE — PROGRESS NOTES
Clinic Care Coordination Contact  [facility]/Voicemail       Clinical Data: Care Coordinator Outreach  Outreach attempted x 1. No answer and no voicemail.  Plan: Care Coordinator will try to reach patient again in 1-2 business days.    Sheri Francois RN Care Coordination   [facility]  Email: [email or web address]  Phone: [phone]

## 2022-12-26 NOTE — PROGRESS NOTES
Clinic Care Coordination Contact  [facility]/Voicemail       Clinical Data: Care Coordinator Outreach  Outreach attempted x 2.  Unable to leave message, no voicemail picked up.   Plan: Care Coordinator will send care coordination introduction letter with care coordinator contact information and explanation of care coordination services via mail. Care Coordinator will do no further outreaches at this time.    Sheri Francois RN Care Coordination   [facility] Jas Pacsal  Email: [email or web address]  Phone: [phone]

## 2023-01-01 LAB — PTH RELATED PROT SERPL-SCNC: 4.8 PMOL/L

## 2023-01-05 ENCOUNTER — OFFICE VISIT (OUTPATIENT)
Dept: INTERNAL MEDICINE | Facility: CLINIC | Age: 83
End: 2023-01-05
Payer: COMMERCIAL

## 2023-01-05 VITALS
BODY MASS INDEX: 21.07 KG/M2 | HEIGHT: 64 IN | TEMPERATURE: 96.6 F | DIASTOLIC BLOOD PRESSURE: 80 MMHG | SYSTOLIC BLOOD PRESSURE: 138 MMHG | OXYGEN SATURATION: 99 % | RESPIRATION RATE: 18 BRPM | HEART RATE: 64 BPM | WEIGHT: 123.4 LBS

## 2023-01-05 DIAGNOSIS — E83.51 HYPOCALCEMIA: ICD-10-CM

## 2023-01-05 DIAGNOSIS — I25.10 CORONARY ARTERY DISEASE INVOLVING NATIVE CORONARY ARTERY OF NATIVE HEART WITHOUT ANGINA PECTORIS: ICD-10-CM

## 2023-01-05 DIAGNOSIS — I10 HYPERTENSION GOAL BP (BLOOD PRESSURE) < 140/90: ICD-10-CM

## 2023-01-05 DIAGNOSIS — E83.42 HYPOMAGNESEMIA: ICD-10-CM

## 2023-01-05 DIAGNOSIS — E11.42 TYPE 2 DIABETES MELLITUS WITH DIABETIC POLYNEUROPATHY, WITHOUT LONG-TERM CURRENT USE OF INSULIN (H): Primary | ICD-10-CM

## 2023-01-05 DIAGNOSIS — M62.81 GENERALIZED MUSCLE WEAKNESS: ICD-10-CM

## 2023-01-05 DIAGNOSIS — N18.32 STAGE 3B CHRONIC KIDNEY DISEASE (H): ICD-10-CM

## 2023-01-05 DIAGNOSIS — E86.0 DEHYDRATION: ICD-10-CM

## 2023-01-05 DIAGNOSIS — F33.1 MAJOR DEPRESSIVE DISORDER, RECURRENT EPISODE, MODERATE (H): ICD-10-CM

## 2023-01-05 LAB
ANION GAP SERPL CALCULATED.3IONS-SCNC: 6 MMOL/L (ref 3–14)
BUN SERPL-MCNC: 43 MG/DL (ref 7–30)
CA-I BLD-MCNC: 3.9 MG/DL (ref 4.4–5.2)
CALCIUM SERPL-MCNC: 7 MG/DL (ref 8.5–10.1)
CHLORIDE BLD-SCNC: 112 MMOL/L (ref 94–109)
CO2 SERPL-SCNC: 26 MMOL/L (ref 20–32)
CREAT SERPL-MCNC: 1.39 MG/DL (ref 0.52–1.04)
GFR SERPL CREATININE-BSD FRML MDRD: 38 ML/MIN/1.73M2
GLUCOSE BLD-MCNC: 248 MG/DL (ref 70–99)
MAGNESIUM SERPL-MCNC: 1.2 MG/DL (ref 1.6–2.3)
POTASSIUM BLD-SCNC: 4.3 MMOL/L (ref 3.4–5.3)
SODIUM SERPL-SCNC: 144 MMOL/L (ref 133–144)

## 2023-01-05 PROCEDURE — 36415 COLL VENOUS BLD VENIPUNCTURE: CPT | Performed by: INTERNAL MEDICINE

## 2023-01-05 PROCEDURE — 99207 PR FOOT EXAM NO CHARGE: CPT | Performed by: INTERNAL MEDICINE

## 2023-01-05 PROCEDURE — 82330 ASSAY OF CALCIUM: CPT | Performed by: INTERNAL MEDICINE

## 2023-01-05 PROCEDURE — 80048 BASIC METABOLIC PNL TOTAL CA: CPT | Performed by: INTERNAL MEDICINE

## 2023-01-05 PROCEDURE — 99495 TRANSJ CARE MGMT MOD F2F 14D: CPT | Performed by: INTERNAL MEDICINE

## 2023-01-05 PROCEDURE — 83735 ASSAY OF MAGNESIUM: CPT | Performed by: INTERNAL MEDICINE

## 2023-01-05 ASSESSMENT — PAIN SCALES - GENERAL: PAINLEVEL: NO PAIN (0)

## 2023-01-05 NOTE — PROGRESS NOTES
Assessment & Plan     Dehydration  Resolved.  Check electrolytes  - Basic metabolic panel  (Ca, Cl, CO2, Creat, Gluc, K, Na, BUN); Future    Generalized muscle weakness  Markedly improved.  Continue with physical therapy    Type 2 diabetes mellitus with diabetic polyneuropathy, without long-term current use of insulin (H)  Stable.  Continue current medication  - Basic metabolic panel  (Ca, Cl, CO2, Creat, Gluc, K, Na, BUN); Future  - FOOT EXAM    Hypertension goal BP (blood pressure) < 140/90  Controlled.  Continue current medication  - Basic metabolic panel  (Ca, Cl, CO2, Creat, Gluc, K, Na, BUN); Future    Coronary artery disease involving native coronary artery of native heart without angina pectoris  Stable.  No chest pain.    Hypocalcemia  Recheck calcium level  - Ionized Calcium; Future    Hypomagnesemia  Recheck magnesium level  - Magnesium; Future      . Major depressive disorder, recurrent episode, moderate (H)  Stable.    Stage 3a chronic kidney disease (H)  Stable.  We will follow         MED REC REQUIRED  Post Medication Reconciliation Status: discharge medications reconciled, continue medications without change                           FOLLOW UP   I have asked the patient to make an appointment for followup with:  1) Me  2) the patient's preferred provider  3) Any available provider  In in 1 month    No follow-ups on file.    Byron Holm DO  [facility] LOUANN Kay is a 82 year old accompanied by her Self, presenting for the following health issues:  Hospital F/U      Hospitals in [place]       Hospital Follow-up Visit:    Hospital/Nursing Home/IP Rehab Facility: [facility]  Date of Admission: 12/12/2022  Date of Discharge: 12/15/2022  Reason(s) for Admission: dehydrated, UTI    Was your hospitalization related to COVID-19? No   Problems taking medications regularly:  None  Medication changes since discharge: None  Problems adhering to  "non-medication therapy:  None    Summary of hospitalization:  [facility] discharge summary reviewed  Diagnostic Tests/Treatments reviewed.  Follow up needed: none  Other Healthcare Providers Involved in Patient s Care:         Homecare and Physical Therapy  Update since discharge: improved.   Plan of care communicated with patient           Review of Systems   CONSTITUTIONAL: NEGATIVE for fever, chills, change in weight  INTEGUMENTARY/SKIN: NEGATIVE for worrisome rashes, moles or lesions  EYES: NEGATIVE for vision changes or irritation  ENT/MOUTH: NEGATIVE for ear, mouth and throat problems  RESP: NEGATIVE for significant cough or SOB  CV: NEGATIVE for chest pain, palpitations or peripheral edema  GI: NEGATIVE for nausea, abdominal pain, heartburn, or change in bowel habits  : NEGATIVE for frequency, dysuria, or hematuria  MUSCULOSKELETAL: NEGATIVE for significant arthralgias or myalgia  NEURO: NEGATIVE for weakness, dizziness or paresthesias  ENDOCRINE: NEGATIVE for temperature intolerance, skin/hair changes  HEME: NEGATIVE for bleeding problems  PSYCHIATRIC: NEGATIVE for changes in mood or affect      Objective    /80 (BP Location: Right arm, Patient Position: Chair, Cuff Size: Adult Regular)   Pulse 64   Temp (!) 96.6  F (35.9  C) (Temporal)   Resp 18   Ht 1.626 m (5' 4\")   Wt 56 kg (123 lb 6.4 oz)   SpO2 99%   BMI 21.18 kg/m    Body mass index is 21.18 kg/m .  Physical Exam   GENERAL: healthy, alert and no distress  EYES: Eyes grossly normal to inspection, PERRL and conjunctivae and sclerae normal  HENT: ear canals and TM's normal, nose and mouth without ulcers or lesions  NECK: no adenopathy, no asymmetry, masses, or scars and thyroid normal to palpation  RESP: lungs clear to auscultation - no rales, rhonchi or wheezes  CV: regular rate and rhythm, normal S1 S2, no S3 or S4, no murmur, click or rub, no peripheral edema and peripheral pulses strong  ABDOMEN: soft, nontender, no " hepatosplenomegaly, no masses and bowel sounds normal  MS: Lower extremities are still somewhat weak.  Patient does have minimal difficulty getting out of the chair.  SKIN: no suspicious lesions or rashes  NEURO: Normal strength and tone, mentation intact and speech normal  PSYCH: mentation appears normal, affect normal/bright    Lab work and radiographs performed in the hospital are discussed with the patient                   I have carefully explained the diagnosis and treatment options to the patient.  The patient has displayed an understanding of the above, and all subsequent questions were answered.      DO GEMA Campos    Portions of this note were produced using [employer]  Although every attempt at real-time proof reading has been made, occasional grammar/syntax errors may have been missed.

## 2023-01-12 ENCOUNTER — HOSPITAL ENCOUNTER (OUTPATIENT)
Dept: PHYSICAL THERAPY | Facility: CLINIC | Age: 83
Setting detail: THERAPIES SERIES
Discharge: HOME | End: 2023-01-12
Attending: INTERNAL MEDICINE
Payer: MEDICARE

## 2023-01-12 PROCEDURE — 97110 THERAPEUTIC EXERCISES: CPT | Mod: GP | Performed by: PHYSICAL THERAPIST

## 2023-01-16 ENCOUNTER — TELEPHONE (OUTPATIENT)
Dept: INTERNAL MEDICINE | Facility: CLINIC | Age: 83
End: 2023-01-16
Payer: COMMERCIAL

## 2023-01-16 NOTE — TELEPHONE ENCOUNTER
----- Message from Byron Holm DO sent at 1/16/2023  1:33 PM CST -----  Please call          Dear Eliza, your recent test results are attached.    Chemistry panel demonstrates a significant decrease in kidney function as well as an elevated blood sugar of 248.  Calcium is also low at 7.0.  Would recommend discontinuing the lisinopril for the time being, watch blood pressure closely, and recheck lab work in 2 weeks.  Would also recommend establishing with a nephrologist.  I have placed a referral, you will be contacted.    You will be contacted with any outstanding results as they are available.  Feel free to contact me via the office or My Chart if you have any questions regarding the above.

## 2023-01-16 NOTE — TELEPHONE ENCOUNTER
Left message for patient to call. Please relay message below.   Jacinta Bailey MA on 1/16/2023 at 4:25 PM

## 2023-01-16 NOTE — LETTER
January 20, 2023      Eliza Dubois  [address]  [place] [address]      Dear ,  Test results indicate you may require additional follow up, see comment below.    Resulted Orders   Basic metabolic panel  (Ca, Cl, CO2, Creat, Gluc, K, Na, BUN)   Result Value Ref Range    Sodium 144 133 - 144 mmol/L    Potassium 4.3 3.4 - 5.3 mmol/L    Chloride 112 (H) 94 - 109 mmol/L    Carbon Dioxide (CO2) 26 20 - 32 mmol/L    Anion Gap 6 3 - 14 mmol/L    Urea Nitrogen 43 (H) 7 - 30 mg/dL    Creatinine 1.39 (H) 0.52 - 1.04 mg/dL    Calcium 7.0 (L) 8.5 - 10.1 mg/dL    Glucose 248 (H) 70 - 99 mg/dL    GFR Estimate 38 (L) >60 mL/min/1.73m2      Comment:      Effective December 21, 2021 eGFRcr in adults is calculated using the 2021 CKD-EPI creatinine equation which includes age and gender (Carlotta et al., NE, DOI: 10.1056/JOZSzg3428148)   Magnesium   Result Value Ref Range    Magnesium 1.2 (L) 1.6 - 2.3 mg/dL   Ionized Calcium   Result Value Ref Range    Calcium Ionized 3.9 (L) 4.4 - 5.2 mg/dL   Chemistry panel demonstrates a significant decrease in kidney function as well as an elevated blood sugar of 248.  Calcium is also low at 7.0.  Would recommend discontinuing the lisinopril for the time being, watch blood pressure closely, and recheck lab work in 2 weeks.  Would also recommend establishing with a nephrologist.  I have placed a referral, you will be contacted.  Phone number for scheduling with Nephrology if you have not been contacted is: [phone]    If you have any questions or concerns, please call the clinic at the number listed above.     Sincerely,  Dr Holm

## 2023-01-17 ENCOUNTER — HOSPITAL ENCOUNTER (OUTPATIENT)
Dept: PHYSICAL THERAPY | Facility: CLINIC | Age: 83
Setting detail: THERAPIES SERIES
Discharge: HOME | End: 2023-01-17
Attending: INTERNAL MEDICINE
Payer: MEDICARE

## 2023-01-17 PROCEDURE — 97110 THERAPEUTIC EXERCISES: CPT | Mod: GP | Performed by: PHYSICAL THERAPIST

## 2023-01-18 ENCOUNTER — TELEPHONE (OUTPATIENT)
Dept: PHARMACY | Facility: CLINIC | Age: 83
End: 2023-01-18
Payer: COMMERCIAL

## 2023-01-18 NOTE — TELEPHONE ENCOUNTER
Patient was referred by her Part D insurance ([facility]).  Called patient to schedule appointment. Left voicemail.    Derek Carrizales, LitoD, [facility]  Medication Therapy Management Pharmacist  Pager: [phone]

## 2023-01-18 NOTE — LETTER
[address] [id number] [phone]      January 18, 2023    Eliza Dubois                                                                                                                [address]  [facility] [address]      Dear Eliza,    My name is Derek Carrizales, I'm the pharmacist who recently left you a voicemail. I'm reaching out because [facility] works with your Part D insurance to provide a program called Medication Therapy Management (MTM).  During an MTM visit, a specially trained pharmacist will review all of your medicines, both prescription and over-the-counter. We will make sure your medicines are the best choice for you and are safe and convenient for you.  MTM pharmacists work together with you and your providers to help you understand your medicines, solve any problems related to your medicines and help you get the best results from taking your medicines.     MTM visits can be completed by phone, video or in-person and are provided at no cost to you. I'd love to meet with you and help with any minor or major concerns you have about your medications.     To schedule an appointment, please feel free to reply to this message or call [phone] and our pharmacy office will get you set up (no phone tree!). Thanks for considering and I hope to speak with you soon!    Derek Carrizales, PharmD, [facility]  Medication Therapy Management Pharmacist  Pager: [phone]

## 2023-01-19 DIAGNOSIS — E83.51 HYPOCALCEMIA: Primary | ICD-10-CM

## 2023-01-23 NOTE — TELEPHONE ENCOUNTER
Prescription approved per [facility] Refill Protocol.  Makenzie Barraza RN on 1/23/2023 at 2:38 PM

## 2023-02-07 ENCOUNTER — HOSPITAL ENCOUNTER (OUTPATIENT)
Dept: PHYSICAL THERAPY | Facility: CLINIC | Age: 83
Setting detail: THERAPIES SERIES
Discharge: HOME | End: 2023-02-07
Attending: INTERNAL MEDICINE
Payer: MEDICARE

## 2023-02-07 PROCEDURE — 97110 THERAPEUTIC EXERCISES: CPT | Mod: GP | Performed by: PHYSICAL THERAPIST

## 2023-02-08 DIAGNOSIS — E11.42 TYPE 2 DIABETES MELLITUS WITH DIABETIC POLYNEUROPATHY, WITHOUT LONG-TERM CURRENT USE OF INSULIN (H): ICD-10-CM

## 2023-02-08 RX ORDER — GLIPIZIDE 10 MG/1
20 TABLET ORAL
Qty: 360 TABLET | Refills: 3 | Status: SHIPPED | OUTPATIENT
Start: 2023-02-08 | End: 2023-11-02

## 2023-02-08 NOTE — TELEPHONE ENCOUNTER
"Requested Prescriptions   Pending Prescriptions Disp Refills    glipiZIDE (GLUCOTROL) 10 MG tablet 180 tablet 3     Sig: TAKE TWO TABLETS BY MOUTH TWICE A DAY BEFORE MEALS       Sulfonylurea Agents Failed - 2/8/2023 10:52 AM        Failed - Patient has documented A1c within the specified period of time.     If HgbA1C is 8 or greater, it needs to be on file within the past 3 months.  If less than 8, must be on file within the past 6 months.     Recent Labs   Lab Test 11/01/22  1530   A1C 9.5*             Failed - Patient has a recent creatinine (normal) within the past 12 mos.     Recent Labs   Lab Test 01/05/23  1012   CR 1.39*       Ok to refill medication if creatinine is low          Passed - Medication is active on med list        Passed - Patient is age 18 or older        Passed - No active pregnancy on record        Passed - Patient has not had a positive pregnancy test within the past 12 mos.        Passed - Recent (6 mo) or future (30 days) visit within the authorizing provider's specialty     Patient had office visit in the last 6 months or has a visit in the next 30 days with authorizing provider or within the authorizing provider's specialty.  See \"Patient Info\" tab in inbasket, or \"Choose Columns\" in Meds & Orders section of the refill encounter.                 "

## 2023-02-14 ENCOUNTER — HOSPITAL ENCOUNTER (OUTPATIENT)
Dept: PHYSICAL THERAPY | Facility: CLINIC | Age: 83
Setting detail: THERAPIES SERIES
Discharge: HOME | End: 2023-02-14
Attending: INTERNAL MEDICINE
Payer: MEDICARE

## 2023-02-14 PROCEDURE — 97110 THERAPEUTIC EXERCISES: CPT | Mod: GP | Performed by: PHYSICAL THERAPIST

## 2023-03-13 NOTE — PROGRESS NOTES
[facility]    Outpatient Physical Therapy Discharge Note  Patient: Eliza Dubois  : 1940    Beginning/End Dates of Reporting Period:  22 to 3/13/23    Referring Provider: alexandria gupta DO     Therapy Diagnosis: off balance      Client Self Report: walker is still in the box , grandson unable to help her get it out , no falls    Objective Measurements:      patient seen for 5 Rx sessions for instruction in exercises in clinic and HEP at last Rx she was completing the following   reviewed bridges , SLR , hip abduction 10x she does these every other day in the morning , also does sitting in chair marching , hip abd/adduction ,knee flexion / extension , heelraise does these daily , completed nustep level 1 x 15, walking in hallway forward big marching and laterial    Patient has cancelled or no showed 7 Rx   And her last 2 Rx she missed and as of 3/13/23 she has made no further contact with PT            Goals:  Goal Identifier 1   Goal Description instruction in HEP and compliant with it 5 of 7 days to improve quality of life   Target Date 23   Date Met      Progress (detail required for progress note):       Goal Identifier 2   Goal Description patient to have increase strength and be able to do sit to stand 5x in less time   Target Date 23   Date Met      Progress (detail required for progress note):       Goal Identifier 3   Goal Description patient to have improved balance and gait tug currently 21 goal is less than 15   Target Date 23   Date Met      Progress (detail required for progress note):         Plan:  Discharge from therapy.    Discharge:    Reason for Discharge: Patient chooses to discontinue therapy.  Patient has failed to schedule further appointments.    Equipment Issued: none    Discharge Plan: Patient to continue home program.

## 2023-04-04 ENCOUNTER — HOSPITAL ENCOUNTER (EMERGENCY)
Facility: CLINIC | Age: 83
Discharge: HOME | End: 2023-04-04
Attending: EMERGENCY MEDICINE | Admitting: EMERGENCY MEDICINE
Payer: MEDICARE

## 2023-04-04 ENCOUNTER — APPOINTMENT (OUTPATIENT)
Dept: CT IMAGING | Facility: CLINIC | Age: 83
End: 2023-04-04
Attending: EMERGENCY MEDICINE
Payer: MEDICARE

## 2023-04-04 VITALS
TEMPERATURE: 97.9 F | OXYGEN SATURATION: 98 % | RESPIRATION RATE: 20 BRPM | DIASTOLIC BLOOD PRESSURE: 80 MMHG | HEART RATE: 62 BPM | SYSTOLIC BLOOD PRESSURE: 171 MMHG

## 2023-04-04 DIAGNOSIS — N17.9 AKI (ACUTE KIDNEY INJURY) (H): ICD-10-CM

## 2023-04-04 DIAGNOSIS — R31.0 GROSS HEMATURIA: ICD-10-CM

## 2023-04-04 LAB
ALBUMIN UR-MCNC: 100 MG/DL
ANION GAP SERPL CALCULATED.3IONS-SCNC: 17 MMOL/L (ref 7–15)
APPEARANCE UR: ABNORMAL
BACTERIA #/AREA URNS HPF: ABNORMAL /HPF
BASOPHILS # BLD AUTO: 0 10E3/UL (ref 0–0.2)
BASOPHILS NFR BLD AUTO: 0 %
BILIRUB UR QL STRIP: NEGATIVE
BUN SERPL-MCNC: 43.4 MG/DL (ref 8–23)
CALCIUM SERPL-MCNC: 7.4 MG/DL (ref 8.8–10.2)
CHLORIDE SERPL-SCNC: 99 MMOL/L (ref 98–107)
COLOR UR AUTO: ABNORMAL
CREAT SERPL-MCNC: 1.58 MG/DL (ref 0.51–0.95)
DEPRECATED HCO3 PLAS-SCNC: 22 MMOL/L (ref 22–29)
EOSINOPHIL # BLD AUTO: 0.3 10E3/UL (ref 0–0.7)
EOSINOPHIL NFR BLD AUTO: 2 %
ERYTHROCYTE [DISTWIDTH] IN BLOOD BY AUTOMATED COUNT: 16.2 % (ref 10–15)
GFR SERPL CREATININE-BSD FRML MDRD: 32 ML/MIN/1.73M2
GLUCOSE SERPL-MCNC: 256 MG/DL (ref 70–99)
GLUCOSE UR STRIP-MCNC: >499 MG/DL
HCT VFR BLD AUTO: 34.6 % (ref 35–47)
HGB BLD-MCNC: 10.1 G/DL (ref 11.7–15.7)
HGB UR QL STRIP: ABNORMAL
IMM GRANULOCYTES # BLD: 0 10E3/UL
IMM GRANULOCYTES NFR BLD: 0 %
KETONES UR STRIP-MCNC: NEGATIVE MG/DL
LEUKOCYTE ESTERASE UR QL STRIP: NEGATIVE
LYMPHOCYTES # BLD AUTO: 2 10E3/UL (ref 0.8–5.3)
LYMPHOCYTES NFR BLD AUTO: 18 %
MCH RBC QN AUTO: 23.8 PG (ref 26.5–33)
MCHC RBC AUTO-ENTMCNC: 29.2 G/DL (ref 31.5–36.5)
MCV RBC AUTO: 82 FL (ref 78–100)
MONOCYTES # BLD AUTO: 0.7 10E3/UL (ref 0–1.3)
MONOCYTES NFR BLD AUTO: 6 %
NEUTROPHILS # BLD AUTO: 8.4 10E3/UL (ref 1.6–8.3)
NEUTROPHILS NFR BLD AUTO: 74 %
NITRATE UR QL: NEGATIVE
NRBC # BLD AUTO: 0 10E3/UL
NRBC BLD AUTO-RTO: 0 /100
PH UR STRIP: 5 [PH] (ref 5–7)
PLATELET # BLD AUTO: 309 10E3/UL (ref 150–450)
POTASSIUM SERPL-SCNC: 4.2 MMOL/L (ref 3.4–5.3)
RBC # BLD AUTO: 4.24 10E6/UL (ref 3.8–5.2)
RBC URINE: >182 /HPF
SODIUM SERPL-SCNC: 138 MMOL/L (ref 136–145)
SP GR UR STRIP: 1.02 (ref 1–1.03)
UROBILINOGEN UR STRIP-MCNC: NORMAL MG/DL
WBC # BLD AUTO: 11.4 10E3/UL (ref 4–11)
WBC URINE: >182 /HPF

## 2023-04-04 PROCEDURE — 85014 HEMATOCRIT: CPT | Performed by: EMERGENCY MEDICINE

## 2023-04-04 PROCEDURE — 99284 EMERGENCY DEPT VISIT MOD MDM: CPT | Performed by: EMERGENCY MEDICINE

## 2023-04-04 PROCEDURE — 96360 HYDRATION IV INFUSION INIT: CPT | Performed by: EMERGENCY MEDICINE

## 2023-04-04 PROCEDURE — 82310 ASSAY OF CALCIUM: CPT | Performed by: EMERGENCY MEDICINE

## 2023-04-04 PROCEDURE — 87086 URINE CULTURE/COLONY COUNT: CPT | Performed by: EMERGENCY MEDICINE

## 2023-04-04 PROCEDURE — 36415 COLL VENOUS BLD VENIPUNCTURE: CPT | Performed by: EMERGENCY MEDICINE

## 2023-04-04 PROCEDURE — 258N000003 HC RX IP 258 OP 636: Performed by: EMERGENCY MEDICINE

## 2023-04-04 PROCEDURE — 250N000013 HC RX MED GY IP 250 OP 250 PS 637: Performed by: EMERGENCY MEDICINE

## 2023-04-04 PROCEDURE — 74176 CT ABD & PELVIS W/O CONTRAST: CPT | Mod: MG

## 2023-04-04 PROCEDURE — 99284 EMERGENCY DEPT VISIT MOD MDM: CPT | Mod: 25 | Performed by: EMERGENCY MEDICINE

## 2023-04-04 PROCEDURE — 81001 URINALYSIS AUTO W/SCOPE: CPT | Performed by: EMERGENCY MEDICINE

## 2023-04-04 RX ORDER — CEFUROXIME AXETIL 250 MG/1
500 TABLET ORAL ONCE
Status: COMPLETED | OUTPATIENT
Start: 2023-04-04 | End: 2023-04-04

## 2023-04-04 RX ORDER — CEFUROXIME AXETIL 500 MG/1
500 TABLET ORAL 2 TIMES DAILY
Qty: 14 TABLET | Refills: 0 | Status: SHIPPED | OUTPATIENT
Start: 2023-04-04 | End: 2023-04-11

## 2023-04-04 RX ORDER — SODIUM CHLORIDE 9 MG/ML
INJECTION, SOLUTION INTRAVENOUS CONTINUOUS
Status: DISCONTINUED | OUTPATIENT
Start: 2023-04-04 | End: 2023-04-05 | Stop reason: HOSPADM

## 2023-04-04 RX ADMIN — SODIUM CHLORIDE 1000 ML: 9 INJECTION, SOLUTION INTRAVENOUS at 21:32

## 2023-04-04 RX ADMIN — CEFUROXIME AXETIL 500 MG: 250 TABLET, FILM COATED ORAL at 21:43

## 2023-04-04 ASSESSMENT — ACTIVITIES OF DAILY LIVING (ADL): ADLS_ACUITY_SCORE: 35

## 2023-04-05 NOTE — ED TRIAGE NOTES
PT come in with hematuria. Had same thing happen this past fall and was seen by Dr. Holm, but the symptoms resolved.       
Defer medication changes to inpatient team; continue Zyprexa 10 mg po QHS and Ativan 2 mg po BID

## 2023-04-05 NOTE — DISCHARGE INSTRUCTIONS
The blood in your urine may be due to urinary tract infection, but the culture will tell us for sure in several days.  Given that this continues to be recurrent, I am more suspicious that this is not caused by a urinary tract infection.  I have placed a consult to the urologist to help with this further.  Return here if your symptoms worsen despite treatment or if you develop new symptoms you find concerning.

## 2023-04-05 NOTE — ED PROVIDER NOTES
History     chief complaint  HPI  Patient is an 82-year-old female with a history of hypertension, hyperlipidemia, diabetes presenting with hematuria.  She states this happened earlier in the week but resolved.  Happened again today and was more profuse.  She feels slightly nauseous with it but denies michelle pain.  No vomiting, fevers, chills, back pain, diarrhea.  She does have an upcoming appointment with nephrology which is new for her for her chronic kidney disease.      Review of Systems:  All organ systems below were reviewed and are negative unless indicated in the HPI.    Constitutional  Eyes  ENT  Respiratory  Cardiovascular  Gastrointestinal  Genitourinary  Musculoskeletal  Skin  Neuro    Allergies:  Allergies   Allergen Reactions     Sulfa Drugs Hives       Problem List:    Patient Active Problem List    Diagnosis Date Noted     Weakness 01/28/2021     Priority: Medium     Vaccine reaction, initial encounter 01/28/2021     Priority: Medium     Fatigue, unspecified type 01/28/2021     Priority: Medium     History of COVID-19 01/28/2021     Priority: Medium     Bilateral pleural effusion 12/13/2020     Priority: Medium     Bilateral pneumonia 12/13/2020     Priority: Medium     Pneumonia due to 2019 novel coronavirus 12/12/2020     Priority: Medium     Prerenal azotemia 12/12/2020     Priority: Medium     Hypophosphatemia 12/11/2020     Priority: Medium     Closed fracture of proximal end of left humerus with routine healing 12/09/2020     Priority: Medium     Nausea vomiting and diarrhea 12/09/2020     Priority: Medium     Hypomagnesemia 12/09/2020     Priority: Medium     Generalized muscle weakness 12/08/2020     Priority: Medium     E. coli UTI 12/08/2020     Priority: Medium     2019 novel coronavirus disease (COVID-19) 12/08/2020     Priority: Medium     CKD (chronic kidney disease) stage 3, GFR 30-59 ml/min 05/27/2019     Priority: Medium     Type 2 diabetes mellitus with diabetic polyneuropathy,  without long-term current use of insulin (H) 10/11/2016     Priority: Medium     Essential hypertension with goal blood pressure less than 140/90 09/12/2016     Priority: Medium     Gastroesophageal reflux disease without esophagitis 05/13/2016     Priority: Medium     Coronary artery disease involving native coronary artery of native heart without angina pectoris 02/25/2016     Priority: Medium     Major depressive disorder, recurrent episode, moderate (H) 11/10/2015     Priority: Medium     Anxiety 05/08/2012     Priority: Medium     Insomnia 05/03/2012     Priority: Medium     Hypertension goal BP (blood pressure) < 140/90 01/20/2011     Priority: Medium     Hyperlipidemia LDL goal <100 10/31/2010     Priority: Medium     Esophageal reflux 08/30/2007     Priority: Medium     Irritable bowel syndrome 09/25/2006     Priority: Medium        Past Medical History:    Past Medical History:   Diagnosis Date     Diabetic eye exam (H) 05/01/14     Heart attack (H)      Pure hypercholesterolemia      Stented coronary artery      Type II or unspecified type diabetes mellitus without mention of complication, not stated as uncontrolled 2002     Unspecified disease of pericardium 12/05/08     Unspecified essential hypertension        Past Surgical History:    Past Surgical History:   Procedure Laterality Date     ABDOMEN SURGERY       COLONOSCOPY  04/15/09     COLONOSCOPY  6/18/2014    Procedure: COMBINED COLONOSCOPY, SINGLE BIOPSY/POLYPECTOMY BY BIOPSY;  Surgeon: Earle Mon MD;  Location:  GI      LAPAROSCOPY, SURGICAL; CHOLECYSTECTOMY  1997    Cholecystectomy, Laparoscopic     PHACOEMULSIFICATION CLEAR CORNEA WITH STANDARD INTRAOCULAR LENS IMPLANT Right 3/16/2016    Procedure: PHACOEMULSIFICATION CLEAR CORNEA WITH STANDARD INTRAOCULAR LENS IMPLANT;  Surgeon: George Lemus MD;  Location: [facility]     PHACOEMULSIFICATION CLEAR CORNEA WITH STANDARD INTRAOCULAR LENS IMPLANT Left 3/30/2016    Procedure:  PHACOEMULSIFICATION CLEAR CORNEA WITH STANDARD INTRAOCULAR LENS IMPLANT;  Surgeon: George Lemus MD;  Location: [facility] NONSPECIFIC PROCEDURE  1988    Hysterectomy       Family History:    Family History   Problem Relation Age of Onset     Diabetes Mother      Arthritis Mother      Heart Disease Mother      Hypertension Mother      Lipids Mother      Neurologic Disorder Mother         neuropathy     Osteoporosis Mother      Obesity Mother      EYE* Mother         blind     Diabetes Father      Genitourinary Problems Father         gall stones     Heart Disease Father         MI     Cancer Maternal Grandmother      Heart Disease Maternal Grandmother      Cancer Other         Grandparents     Alcohol/Drug No family hx of      Alzheimer Disease No family hx of      Anesthesia Reaction No family hx of      Blood Disease No family hx of      Depression No family hx of      Genetic Disorder No family hx of      Gastrointestinal Disease No family hx of      Gynecology No family hx of      Psychotic Disorder No family hx of      Respiratory No family hx of      Cerebrovascular Disease No family hx of        Medications:    cefuroxime (CEFTIN) 500 MG tablet  glipiZIDE (GLUCOTROL) 10 MG tablet  acetaminophen (TYLENOL) 500 MG tablet  alcohol swab prep pads  aspirin (ASA) 81 MG EC tablet  atorvastatin (LIPITOR) 80 MG tablet  blood glucose (NO BRAND SPECIFIED) test strip  blood glucose calibration (NO BRAND SPECIFIED) solution  blood glucose monitoring (NO BRAND SPECIFIED) meter device kit  calcium carbonate (OS-ADNREI) 1500 (600 Ca) MG tablet  clopidogrel (PLAVIX) 75 MG tablet  empagliflozin (JARDIANCE) 25 MG TABS tablet  gabapentin (NEURONTIN) 300 MG capsule  isosorbide mononitrate (IMDUR) 30 MG 24 hr tablet  lisinopril-hydrochlorothiazide (ZESTORETIC) 20-25 MG tablet  metFORMIN (GLUCOPHAGE XR) 500 MG 24 hr tablet  metoprolol succinate ER (TOPROL XL) 100 MG 24 hr tablet  omeprazole (PRILOSEC) 40 MG DR capsule  PARoxetine  (PAXIL) 40 MG tablet  thin (NO BRAND SPECIFIED) lancets          Physical Exam   BP: (!) 202/130  Pulse: 55  Temp: 97.9  F (36.6  C)  Resp: 18  SpO2: 98 %    Gen: Vital signs reviewed  Eyes: Sclera white, pupils round  ENT: External ears and nares normal  Card: Regular rate and rhythm  Resp: No respiratory distress. Lungs clear to auscultation bilaterally  Abd: Soft, non-distended, non-tender  Extremities: Warm, no edema  Skin: No rashes  Neuro: Alert, speech normal.     ED Course        Procedures        Results for orders placed or performed during the hospital encounter of 04/04/23 (from the past 24 hour(s))   UA with Microscopic reflex to Culture    Specimen: Urine, Midstream   Result Value Ref Range    Color Urine Red (A) Colorless, Straw, Light Yellow, Yellow    Appearance Urine Cloudy (A) Clear    Glucose Urine >499 (A) Negative mg/dL    Bilirubin Urine Negative Negative    Ketones Urine Negative Negative mg/dL    Specific Gravity Urine 1.020 1.003 - 1.035    Blood Urine Moderate (A) Negative    pH Urine 5.0 5.0 - 7.0    Protein Albumin Urine 100 (A) Negative mg/dL    Urobilinogen Urine Normal Normal, 2.0 mg/dL    Nitrite Urine Negative Negative    Leukocyte Esterase Urine Negative Negative    Bacteria Urine Moderate (A) None Seen /HPF    RBC Urine >182 (H) <=2 /HPF    WBC Urine >182 (H) <=5 /HPF    Narrative    Urine Culture ordered based on laboratory criteria   CBC with platelets differential    Narrative    The following orders were created for panel order CBC with platelets differential.  Procedure                               Abnormality         Status                     ---------                               -----------         ------                     CBC with platelets and d...[[id number]]  Abnormal            Final result                 Please view results for these tests on the individual orders.   Basic metabolic panel   Result Value Ref Range    Sodium 138 136 - 145 mmol/L    Potassium 4.2  3.4 - 5.3 mmol/L    Chloride 99 98 - 107 mmol/L    Carbon Dioxide (CO2) 22 22 - 29 mmol/L    Anion Gap 17 (H) 7 - 15 mmol/L    Urea Nitrogen 43.4 (H) 8.0 - 23.0 mg/dL    Creatinine 1.58 (H) 0.51 - 0.95 mg/dL    Calcium 7.4 (L) 8.8 - 10.2 mg/dL    Glucose 256 (H) 70 - 99 mg/dL    GFR Estimate 32 (L) >60 mL/min/1.73m2   CBC with platelets and differential   Result Value Ref Range    WBC Count 11.4 (H) 4.0 - 11.0 10e3/uL    RBC Count 4.24 3.80 - 5.20 10e6/uL    Hemoglobin 10.1 (L) 11.7 - 15.7 g/dL    Hematocrit 34.6 (L) 35.0 - 47.0 %    MCV 82 78 - 100 fL    MCH 23.8 (L) 26.5 - 33.0 pg    MCHC 29.2 (L) 31.5 - 36.5 g/dL    RDW 16.2 (H) 10.0 - 15.0 %    Platelet Count 309 150 - 450 10e3/uL    % Neutrophils 74 %    % Lymphocytes 18 %    % Monocytes 6 %    % Eosinophils 2 %    % Basophils 0 %    % Immature Granulocytes 0 %    NRBCs per 100 WBC 0 <1 /100    Absolute Neutrophils 8.4 (H) 1.6 - 8.3 10e3/uL    Absolute Lymphocytes 2.0 0.8 - 5.3 10e3/uL    Absolute Monocytes 0.7 0.0 - 1.3 10e3/uL    Absolute Eosinophils 0.3 0.0 - 0.7 10e3/uL    Absolute Basophils 0.0 0.0 - 0.2 10e3/uL    Absolute Immature Granulocytes 0.0 <=0.4 10e3/uL    Absolute NRBCs 0.0 10e3/uL   CT Abdomen Pelvis w/o Contrast    Narrative    EXAM: CT ABDOMEN PELVIS W/O CONTRAST  LOCATION: [facility]  DATE/TIME: 4/4/2023 9:25 PM    INDICATION: hematuria, mild abdominal pain  COMPARISON: CT abdomen pelvis 06/12/2011.  TECHNIQUE: CT scan of the abdomen and pelvis was performed without IV contrast. Multiplanar reformats were obtained. Dose reduction techniques were used.  CONTRAST: None.    FINDINGS:   LOWER CHEST: Mitral annular and coronary calcifications.    HEPATOBILIARY: Cholecystectomy. Metallic density posterior to the right hepatic lobe is unchanged.    PANCREAS: Normal.    SPLEEN: Normal.    ADRENAL GLANDS: Stable 2.2 cm right adrenal nodule, consistent with a benign adenoma. Negative left adrenal.    KIDNEYS/BLADDER: 5,  small right renal calculi. The dominant calculus within the interpolar region measures 3 mm. 2, small punctiform, approximately 1 mm calculi within the upper pole the left kidney. No hydronephrosis. Vascular calcifications within the   left renal hilum. Normal caliber bilateral ureters. No ureteral calculi.    BOWEL: Normal caliber small bowel. Diverticulosis of the sigmoid colon. No evidence for acute diverticulitis.    LYMPH NODES: Normal.    VASCULATURE: Moderate calcified atherosclerotic changes. No abdominal aortic aneurysm.    PELVIC ORGANS: Hysterectomy. Calcified pelvic phleboliths.    MUSCULOSKELETAL: Degenerative disc disease at the L5 interspace with near complete height loss of the disc.      Impression    IMPRESSION:   1.  Small bilateral nonobstructing renal calculi. No hydronephrosis.           Medications   0.9% sodium chloride BOLUS (0 mLs Intravenous Stopped 4/4/23 2211)   cefuroxime (CEFTIN) tablet 500 mg (500 mg Oral $Given 4/4/23 1311)         Consultations:  None    Social Determinants of Health:  Manages ADLs, grandson does live other    Assessments & Plan (with Medical Decision Making)       I have reviewed the nursing notes.    I have reviewed the findings, diagnosis, plan and need for follow up with the patient.      Medical Decision Making  On arrival, patient is hypertensive.  This came down without other intervention with time.  Differential for presentation includes cystitis, ureterolithiasis, malignancy.  Laboratory work-up ordered along with a urine and a CT scan of her abdomen pelvis.  She does have a mild acute kidney injury.  This does appear prerenal.  Given a dose of IV fluids.  CT scan does not reveal ureterolithiasis, rather nephrolithiasis that would not necessarily cause her symptoms.  Her urine does not have leukocyte esterase or nitrites, but there is bacteria.  We will treat empirically until the culture results.  However I do suspect that she does need to see urology for  this.  Discussed that if culture does not grow anything she needs to see urology for further work-up.  If the culture grows out a uropathogen and she can continue the antibiotics follow-up with PCP.  Discharged home in stable condition with appropriate return precautions.    Final diagnoses:   Gross hematuria   JIM (acute kidney injury) (H)         Russel Da Silva M.D.   [facility] Emergency Department     Russel Da Silva MD  04/05/23 0032

## 2023-04-08 ENCOUNTER — TELEPHONE (OUTPATIENT)
Dept: EMERGENCY MEDICINE | Facility: CLINIC | Age: 83
End: 2023-04-08
Payer: COMMERCIAL

## 2023-04-08 LAB
BACTERIA UR CULT: ABNORMAL
BACTERIA UR CULT: ABNORMAL

## 2023-04-08 NOTE — TELEPHONE ENCOUNTER
[facility] Emergency Department/Urgent Care Lab result notification  [Note:  ED Lab Results RN will reference the [facility] Emergency Dept visit note prior to contacting patient AND/OR prior to consulting Emergency Dept Provider.  Highlights of Emergency Dept visit in information summary at the bottom of this telephone note]    1. Reason for call    Notify of lab results    Assess patient symptoms [if necessary]    Review ED Providers recommendations/discharge instructions (if necessary)    Advise per [facility] ED lab result protocol    2. Lab Result (including Rx patient on, if applicable).  If culture, copy of lab report at bottom.  Final Urine Culture Report on 4/8/23  [facility] Emergency Dept discharge antibiotic prescribed:  Cefuroxime (Ceftin) 500 mg PO tablet, 1 tablet (500 mg) by mouth 2 times daily for 7 days  Bacteria #1: Enterobacter cloacae complex is [RESISTANT] to antibiotic  Bacteria #2: Klebsiella oxytoca is [SUSCEPTIBLE] to antibiotic  Recommendations in treatment per [facility] ED Lab result protocol.    3. RN Assessment (Patient's current Symptoms):    Time of call: 10:41    Assessment: Await call back    4. RN Recommendations/Instructions per [place] ED lab result protocol    [facility] ED lab result protocol used: Urine Culture     notified of lab result and treatment recommendations (Yes/No):     Advised to discontinue current antibiotic     Start or switch to Rx for  sent to [Pharmacy - ].      RN reviewed information about     RN will consult with [facility] Emergency Dept Provider and then call patient back with recommendations (Yes/No/NA):     RN consultation note with [facility] Emergency Dept Provider   Why consultation necessary, See SBAR below:    S (situation, reason for consult):     B (background):     A (assessment): See RN assessment    R (RN's recommendations):       [facility] Emergency Department Provider:      Consultation Time:     Provider Recommendation:      Patient/parent notified of Providers recommendations (YES/NO):        5. Please Contact your PCP clinic or return to the Emergency department if your:    Symptoms return.    Symptoms do not improve after 3 days on antibiotic.    Symptoms do not resolve after completing antibiotic.    Symptoms worsen or other concerning symptoms.    Information summary from Emergency Dept/Urgent Care visit on 4/4/23  Symptoms reported at ED visit (Chief complaint, HPI) chief complaint  HPI  Patient is an 82-year-old female with a history of hypertension, hyperlipidemia, diabetes presenting with hematuria.  She states this happened earlier in the week but resolved.  Happened again today and was more profuse.  She feels slightly nauseous with it but denies michelle pain.  No vomiting, fevers, chills, back pain, diarrhea.  She does have an upcoming appointment with nephrology which is new for her for her chronic kidney disease.   Significant Medical hx, if applicable (i.e. CKD, diabetes) CKD 3 diabetes   Allergies Allergies   Allergen Reactions     Sulfa Drugs Hives      Weight, if applicable Wt Readings from Last 2 Encounters:   01/05/23 56 kg (123 lb 6.4 oz)   12/12/22 59.2 kg (130 lb 8.2 oz)      Coumadin/Warfarin [Yes /No] No   Creatinine Level (mg/dl) Creatinine   Date Value Ref Range Status   04/04/2023 1.58 (H) 0.51 - 0.95 mg/dL Final   01/29/2021 0.93 0.52 - 1.04 mg/dL Final      Creatinine clearance (ml/min), if applicable Serum creatinine: 1.58 mg/dL (H) 04/04/23 2051  Estimated creatinine clearance: 24.3 mL/min (A)   Pregnant (Yes/No/NA) NA   Breastfeeding (Yes/No/NA) NA   ED providers Impression and Plan (applicable information) On arrival, patient is hypertensive.  This came down without other intervention with time.  Differential for presentation includes cystitis, ureterolithiasis, malignancy.  Laboratory work-up ordered along with a urine and a CT scan of her abdomen  pelvis.  She does have a mild acute kidney injury.  This does appear prerenal.  Given a dose of IV fluids.  CT scan does not reveal ureterolithiasis, rather nephrolithiasis that would not necessarily cause her symptoms.  Her urine does not have leukocyte esterase or nitrites, but there is bacteria.  We will treat empirically until the culture results.  However I do suspect that she does need to see urology for this.  Discussed that if culture does not grow anything she needs to see urology for further work-up.  If the culture grows out a uropathogen and she can continue the antibiotics follow-up with PCP.  Discharged home in stable condition with appropriate return precautions.   ED diagnosis Gross hematuria   JIM (acute kidney injury) (H)        ED provider Russel Da Silva MD        Culture report (if applicable)  ains abnormal data Urine Culture  Order: [id number]   Collected 4/4/2023  8:30 PM      Status: Final result      Visible to patient: No (inaccessible in MyChart)     Specimen Information: Urine, Midstream    3 Result Notes  Culture >100,000 CFU/mL Enterobacter cloacae complex Abnormal        10,000-50,000 CFU/mL Klebsiella oxytoca Abnormal             Resulting Agency: [facility]     Susceptibility     Enterobacter cloacae complex Klebsiella oxytoca     LACEY LACEY     Ampicillin  Resistant 1 >=32 ug/mL Resistant 1     Ampicillin/ Sulbactam  Resistant 1 <=2 ug/mL Susceptible     Cefazolin  Resistant 2 16 ug/mL Susceptible 3     Cefepime <=1 ug/mL Susceptible <=1 ug/mL Susceptible     Cefoxitin  Resistant 1 <=4 ug/mL Susceptible     Ceftazidime <=1 ug/mL Susceptible <=1 ug/mL Susceptible     Ceftriaxone <=1 ug/mL Susceptible <=1 ug/mL Susceptible     Ciprofloxacin <=0.25 ug/mL Susceptible <=0.25 ug/mL Susceptible     Gentamicin <=1 ug/mL Susceptible <=1 ug/mL Susceptible     Levofloxacin <=0.12 ug/mL Susceptible <=0.12 ug/mL Susceptible     Nitrofurantoin <=16 ug/mL Susceptible <=16 ug/mL Susceptible      Piperacillin/Tazobactam <=4 ug/mL Susceptible <=4 ug/mL Susceptible     Tobramycin <=1 ug/mL Susceptible <=1 ug/mL Susceptible     Trimethoprim/Sulfamethoxazole <=1/19 ug/mL Susceptible <=1/19 ug/mL Susceptible                1 Intrinsically Resistant   2 Cefazolin LACEY breakpoints are for the treatment of uncomplicated urinary tract infections. For the treatment of systemic infections, please contact the laboratory for additional testing.   Intrinsically Resistant   3 Cefazolin LACEY breakpoints are for the treatment of uncomplicated urinary tract infections. For the treatment of systemic infections, please contact the laboratory for additional testing.            Specimen Collected: 04/04/23  8:30 PM Last Resulted: 04/08/23  7:49 AM               Cecile Shafer RN  [facility]  Emergency Dept Lab Result RN  Ph# [phone]

## 2023-04-09 RX ORDER — CEFPODOXIME PROXETIL 200 MG/1
200 TABLET, FILM COATED ORAL DAILY
Qty: 9 TABLET | Refills: 0 | Status: SHIPPED | OUTPATIENT
Start: 2023-04-09 | End: 2023-04-18

## 2023-05-03 ENCOUNTER — NURSE TRIAGE (OUTPATIENT)
Dept: INTERNAL MEDICINE | Facility: CLINIC | Age: 83
End: 2023-05-03
Payer: COMMERCIAL

## 2023-05-03 NOTE — TELEPHONE ENCOUNTER
Given the extent of the patient's symptoms with dehydration and initiation, I would recommend urgent care or ER visit today.  Concerns arising around the possibility of pyelonephritis exist.    Please let the patient and her daughter know this if you have not already.    Mihai

## 2023-05-03 NOTE — TELEPHONE ENCOUNTER
Neyda ([facility]) is calling because Eliza is urinating blood and it started today.  She states that it is a moderate amount.  Denies clots in urine.    Denies having any burning, frequency or pain with urination.    Denies any injury to back or abdomen.  Is currently on ASA 81 mg.      Neyda states her mom is emaciated.  Looking very thin and frail.  She also says she is dehydrated and is having some dizzy spells.    Neyda says her mom doesn't have the appetite and nothing taste good to her.      SEE IN OFFICE TODAY OR TOMORROW:   * You need to be examined. Let me give you an appointment.  * IF NO AVAILABLE OFFICE APPOINTMENTS: You need to be seen in an Urgent Care Center. Go to the one at nearest you. Go there today. A nearby Urgent Care Center is often a good source of care.    Are you able to work patient in today?  She states she doesn't want to see another provider on Dr Holm.    Did advise if unable to be worked in today to be seen in UC or ED for evaluation.    Erika Goins RN  [facility] ~ Registered Nurse  Clinic Triage ~ [facility]  May 3, 2023        Reason for Disposition    All other patients with blood in urine  (Exception: Could be normal menstrual bleeding.)    Additional Information    Negative: Fever > 100.4 F (38.0 C)    Negative: Patient sounds very sick or weak to the triager    Negative: Recent back or abdominal injury    Negative: Recent genital injury    Negative: Unable to urinate (or only a few drops) > 4 hours and bladder feels very full (e.g., palpable bladder or strong urge to urinate)    Negative: Passing pure blood or large blood clots (i.e., larger than a dime or grape)    Negative: Urinary catheter, questions about    Negative: Shock suspected (e.g., cold/pale/clammy skin, too weak to stand, low BP, rapid pulse)    Negative: Sounds like a life-threatening emergency to the triager    Negative: Known sickle cell disease    Negative: Taking Coumadin (warfarin) or  other strong blood thinner, or known bleeding disorder (e.g., thrombocytopenia)    Negative: Side (flank) or back pain present    Negative: Pain or burning with passing urine (urination)    Negative: Patient wants to be seen    Protocols used: URINE - BLOOD IN-A-OH

## 2023-05-04 NOTE — TELEPHONE ENCOUNTER
RN did attempt to reach patient's daughter. No answer. Message left for patient's daughter to call the clinic back and ask to speak to a triage nurse. Wanting to review below message from provider and advise she bring patient to UC/ED today if she has not already.

## 2023-09-06 ENCOUNTER — TRANSFERRED RECORDS (OUTPATIENT)
Dept: HEALTH INFORMATION MANAGEMENT | Facility: CLINIC | Age: 83
End: 2023-09-06
Payer: COMMERCIAL

## 2023-09-06 LAB — RETINOPATHY: POSITIVE

## 2023-11-02 ENCOUNTER — OFFICE VISIT (OUTPATIENT)
Dept: INTERNAL MEDICINE | Facility: CLINIC | Age: 83
End: 2023-11-02
Payer: COMMERCIAL

## 2023-11-02 VITALS
SYSTOLIC BLOOD PRESSURE: 134 MMHG | OXYGEN SATURATION: 98 % | BODY MASS INDEX: 21.57 KG/M2 | WEIGHT: 126.38 LBS | DIASTOLIC BLOOD PRESSURE: 72 MMHG | TEMPERATURE: 97.6 F | HEIGHT: 64 IN | HEART RATE: 96 BPM | RESPIRATION RATE: 18 BRPM

## 2023-11-02 DIAGNOSIS — E78.5 HYPERLIPIDEMIA LDL GOAL <100: ICD-10-CM

## 2023-11-02 DIAGNOSIS — Z00.00 MEDICARE ANNUAL WELLNESS VISIT, SUBSEQUENT: Primary | ICD-10-CM

## 2023-11-02 DIAGNOSIS — I10 HYPERTENSION GOAL BP (BLOOD PRESSURE) < 140/90: ICD-10-CM

## 2023-11-02 DIAGNOSIS — F33.1 MAJOR DEPRESSIVE DISORDER, RECURRENT EPISODE, MODERATE (H): ICD-10-CM

## 2023-11-02 DIAGNOSIS — I25.10 CORONARY ARTERY DISEASE INVOLVING NATIVE CORONARY ARTERY OF NATIVE HEART WITHOUT ANGINA PECTORIS: ICD-10-CM

## 2023-11-02 DIAGNOSIS — K21.9 GASTROESOPHAGEAL REFLUX DISEASE WITHOUT ESOPHAGITIS: ICD-10-CM

## 2023-11-02 DIAGNOSIS — E11.42 TYPE 2 DIABETES MELLITUS WITH DIABETIC POLYNEUROPATHY, WITHOUT LONG-TERM CURRENT USE OF INSULIN (H): ICD-10-CM

## 2023-11-02 LAB
ALBUMIN UR-MCNC: 100 MG/DL
ANION GAP SERPL CALCULATED.3IONS-SCNC: 15 MMOL/L (ref 7–15)
APPEARANCE UR: ABNORMAL
BACTERIA #/AREA URNS HPF: ABNORMAL /HPF
BILIRUB UR QL STRIP: NEGATIVE
BUN SERPL-MCNC: 44.9 MG/DL (ref 8–23)
CALCIUM SERPL-MCNC: 7.6 MG/DL (ref 8.8–10.2)
CHLORIDE SERPL-SCNC: 101 MMOL/L (ref 98–107)
CHOLEST SERPL-MCNC: 134 MG/DL
COLOR UR AUTO: YELLOW
CREAT SERPL-MCNC: 2.06 MG/DL (ref 0.51–0.95)
CREAT UR-MCNC: 41.3 MG/DL
DEPRECATED HCO3 PLAS-SCNC: 24 MMOL/L (ref 22–29)
EGFRCR SERPLBLD CKD-EPI 2021: 23 ML/MIN/1.73M2
ERYTHROCYTE [DISTWIDTH] IN BLOOD BY AUTOMATED COUNT: 16.6 % (ref 10–15)
GLUCOSE SERPL-MCNC: 448 MG/DL (ref 70–99)
GLUCOSE UR STRIP-MCNC: >499 MG/DL
HBA1C MFR BLD: 11.5 %
HCT VFR BLD AUTO: 32.1 % (ref 35–47)
HDLC SERPL-MCNC: 47 MG/DL
HGB BLD-MCNC: 9.2 G/DL (ref 11.7–15.7)
HGB UR QL STRIP: ABNORMAL
KETONES UR STRIP-MCNC: NEGATIVE MG/DL
LDLC SERPL CALC-MCNC: 47 MG/DL
LEUKOCYTE ESTERASE UR QL STRIP: ABNORMAL
MCH RBC QN AUTO: 22.5 PG (ref 26.5–33)
MCHC RBC AUTO-ENTMCNC: 28.7 G/DL (ref 31.5–36.5)
MCV RBC AUTO: 79 FL (ref 78–100)
MICROALBUMIN UR-MCNC: 293 MG/L
MICROALBUMIN/CREAT UR: 709.44 MG/G CR (ref 0–25)
NITRATE UR QL: POSITIVE
NONHDLC SERPL-MCNC: 87 MG/DL
PH UR STRIP: 6 [PH] (ref 5–7)
PLATELET # BLD AUTO: 196 10E3/UL (ref 150–450)
POTASSIUM SERPL-SCNC: 4.7 MMOL/L (ref 3.4–5.3)
RBC # BLD AUTO: 4.08 10E6/UL (ref 3.8–5.2)
RBC URINE: 99 /HPF
SODIUM SERPL-SCNC: 140 MMOL/L (ref 135–145)
SP GR UR STRIP: 1.02 (ref 1–1.03)
SQUAMOUS EPITHELIAL: 2 /HPF
TRANSITIONAL EPI: 1 /HPF
TRIGL SERPL-MCNC: 199 MG/DL
UROBILINOGEN UR STRIP-MCNC: NORMAL MG/DL
WBC # BLD AUTO: 5.5 10E3/UL (ref 4–11)
WBC URINE: 39 /HPF

## 2023-11-02 PROCEDURE — 90662 IIV NO PRSV INCREASED AG IM: CPT | Performed by: INTERNAL MEDICINE

## 2023-11-02 PROCEDURE — G0008 ADMIN INFLUENZA VIRUS VAC: HCPCS | Performed by: INTERNAL MEDICINE

## 2023-11-02 PROCEDURE — 83036 HEMOGLOBIN GLYCOSYLATED A1C: CPT | Performed by: INTERNAL MEDICINE

## 2023-11-02 PROCEDURE — 85027 COMPLETE CBC AUTOMATED: CPT | Performed by: INTERNAL MEDICINE

## 2023-11-02 PROCEDURE — 82570 ASSAY OF URINE CREATININE: CPT | Performed by: INTERNAL MEDICINE

## 2023-11-02 PROCEDURE — 80061 LIPID PANEL: CPT | Performed by: INTERNAL MEDICINE

## 2023-11-02 PROCEDURE — 82043 UR ALBUMIN QUANTITATIVE: CPT | Performed by: INTERNAL MEDICINE

## 2023-11-02 PROCEDURE — 99214 OFFICE O/P EST MOD 30 MIN: CPT | Mod: 25 | Performed by: INTERNAL MEDICINE

## 2023-11-02 PROCEDURE — G0439 PPPS, SUBSEQ VISIT: HCPCS | Performed by: INTERNAL MEDICINE

## 2023-11-02 PROCEDURE — 36415 COLL VENOUS BLD VENIPUNCTURE: CPT | Performed by: INTERNAL MEDICINE

## 2023-11-02 PROCEDURE — 81001 URINALYSIS AUTO W/SCOPE: CPT | Performed by: INTERNAL MEDICINE

## 2023-11-02 PROCEDURE — 80048 BASIC METABOLIC PNL TOTAL CA: CPT | Performed by: INTERNAL MEDICINE

## 2023-11-02 PROCEDURE — 87186 SC STD MICRODIL/AGAR DIL: CPT | Performed by: INTERNAL MEDICINE

## 2023-11-02 RX ORDER — LISINOPRIL AND HYDROCHLOROTHIAZIDE 20; 25 MG/1; MG/1
1 TABLET ORAL DAILY
Qty: 90 TABLET | Refills: 3 | Status: SHIPPED | OUTPATIENT
Start: 2023-11-02 | End: 2024-04-09

## 2023-11-02 RX ORDER — ISOSORBIDE MONONITRATE 30 MG/1
30 TABLET, EXTENDED RELEASE ORAL DAILY
Qty: 90 TABLET | Refills: 3 | Status: SHIPPED | OUTPATIENT
Start: 2023-11-02 | End: 2023-12-12

## 2023-11-02 RX ORDER — GABAPENTIN 300 MG/1
CAPSULE ORAL
Qty: 180 CAPSULE | Refills: 3 | Status: SHIPPED | OUTPATIENT
Start: 2023-11-02 | End: 2024-05-01

## 2023-11-02 RX ORDER — METFORMIN HCL 500 MG
TABLET, EXTENDED RELEASE 24 HR ORAL
Qty: 360 TABLET | Refills: 3 | Status: SHIPPED | OUTPATIENT
Start: 2023-11-02 | End: 2024-05-01

## 2023-11-02 RX ORDER — PAROXETINE 40 MG/1
40 TABLET, FILM COATED ORAL EVERY MORNING
Qty: 90 TABLET | Refills: 3 | Status: SHIPPED | OUTPATIENT
Start: 2023-11-02 | End: 2023-12-12

## 2023-11-02 RX ORDER — GLIPIZIDE 10 MG/1
20 TABLET ORAL
Qty: 360 TABLET | Refills: 3 | Status: SHIPPED | OUTPATIENT
Start: 2023-11-02 | End: 2024-05-01

## 2023-11-02 RX ORDER — ATORVASTATIN CALCIUM 80 MG/1
80 TABLET, FILM COATED ORAL AT BEDTIME
Qty: 90 TABLET | Refills: 3 | Status: SHIPPED | OUTPATIENT
Start: 2023-11-02 | End: 2024-01-12

## 2023-11-02 RX ORDER — OMEPRAZOLE 40 MG/1
40 CAPSULE, DELAYED RELEASE ORAL DAILY
Qty: 90 CAPSULE | Refills: 3 | Status: SHIPPED | OUTPATIENT
Start: 2023-11-02 | End: 2024-05-01

## 2023-11-02 RX ORDER — CLOPIDOGREL BISULFATE 75 MG/1
75 TABLET ORAL DAILY
Qty: 90 TABLET | Refills: 3 | Status: SHIPPED | OUTPATIENT
Start: 2023-11-02 | End: 2024-05-01

## 2023-11-02 RX ORDER — METOPROLOL SUCCINATE 100 MG/1
100 TABLET, EXTENDED RELEASE ORAL DAILY
Qty: 90 TABLET | Refills: 3 | Status: SHIPPED | OUTPATIENT
Start: 2023-11-02 | End: 2024-05-01

## 2023-11-02 ASSESSMENT — PATIENT HEALTH QUESTIONNAIRE - PHQ9
SUM OF ALL RESPONSES TO PHQ QUESTIONS 1-9: 3
10. IF YOU CHECKED OFF ANY PROBLEMS, HOW DIFFICULT HAVE THESE PROBLEMS MADE IT FOR YOU TO DO YOUR WORK, TAKE CARE OF THINGS AT HOME, OR GET ALONG WITH OTHER PEOPLE: NOT DIFFICULT AT ALL
SUM OF ALL RESPONSES TO PHQ QUESTIONS 1-9: 3

## 2023-11-02 NOTE — PROGRESS NOTES
Assessment & Plan     Medicare annual wellness visit, subsequent      Hypertension goal BP (blood pressure) < 140/90  Continue current medication.  Check renal function and electrolytes.  - Lipid panel reflex to direct LDL Fasting; Future  - lisinopril-hydrochlorothiazide (ZESTORETIC) 20-25 MG tablet; Take 1 tablet by mouth daily  - metoprolol succinate ER (TOPROL XL) 100 MG 24 hr tablet; Take 1 tablet (100 mg) by mouth daily    Hyperlipidemia LDL goal <100  Check lipid levels, adjust statin accordingly  - atorvastatin (LIPITOR) 80 MG tablet; Take 1 tablet (80 mg) by mouth at bedtime    Type 2 diabetes mellitus with diabetic polyneuropathy, without long-term current use of insulin (H)  Check A1c.  Patient notes that she is not able to afford the Jardiance.  We will have to make adjustments in her medication once the A1c is available  - Hemoglobin A1c; Future  - Basic metabolic panel  (Ca, Cl, CO2, Creat, Gluc, K, Na, BUN); Future  - UA Macroscopic with reflex to Microscopic and Culture - Lab Collect; Future  - metFORMIN (GLUCOPHAGE XR) 500 MG 24 hr tablet; TAKE TWO TABLETS BY MOUTH TWICE A DAY WITH MEALS  - glipiZIDE (GLUCOTROL) 10 MG tablet; Take 2 tablets (20 mg) by mouth 2 times daily (before meals)  - gabapentin (NEURONTIN) 300 MG capsule; TAKE ONE TO TWO CAPSULES BY MOUTH EVERY EVENING  - OFFICE/OUTPT VISIT,ESTLEVL III  - Albumin Random Urine Quantitative with Creat Ratio; Future    Coronary artery disease involving native coronary artery of native heart without angina pectoris  Currently stable.  No chest pain  - clopidogrel (PLAVIX) 75 MG tablet; Take 1 tablet (75 mg) by mouth daily  - isosorbide mononitrate (IMDUR) 30 MG 24 hr tablet; Take 1 tablet (30 mg) by mouth daily    Major depressive disorder, recurrent episode, moderate (H)  Continue medication.  Patient has agreed deal stress as she recently moved into an apartment  - PARoxetine (PAXIL) 40 MG tablet; Take 1 tablet (40 mg) by mouth every  morning    Gastroesophageal reflux disease without esophagitis  Check hemoglobin to rule out occult bleed, continue PPI  - CBC with platelets; Future  - omeprazole (PRILOSEC) 40 MG DR capsule; Take 1 capsule (40 mg) by mouth daily TAKE ONE CAPSULE BY MOUTH TWICE A DAY AS NEEDED Strength: 40 mg  - OFFICE/OUTPT VISIT,JOSEFINA HORVATH III                 I have reviewed the patient's vaccination schedule and discussed the benefits of prophylactic vaccination in detail.  I recommend the patient contact their pharmacist for vaccinations.  Discussed that most insurance companies now favor reimbursement to the pharmacies and it will financially behoove the patient to have vaccinations performed at their pharmacy.        Byron Holm, [facility] LOUANN Kay is a 83 year old, presenting for the following health issues:  Diabetes      11/2/2023    10:07 AM   Additional Questions   Roomed by Delicia MEDINA MA       History of Present Illness       Diabetes:   She presents for follow up of diabetes.    She is not checking blood glucose.         She has no concerns regarding her diabetes at this time.  She is having numbness in feet, burning in feet and excessive thirst.            She eats 0-1 servings of fruits and vegetables daily.She consumes 0 sweetened beverage(s) daily.She exercises with enough effort to increase her heart rate 9 or less minutes per day.  She exercises with enough effort to increase her heart rate 3 or less days per week.   She is taking medications regularly.         Annual Wellness Visit    Patient has been advised of split billing requirements and indicates understanding: Yes     Are you in the first 12 months of your Medicare Part B coverage?  No    Physical Health:  In general, how would you rate your overall physical health? good  Outside of work, how many days during the week do you exercise?none  Outside of work, approximately how many minutes a day do you  "exercise?not applicable  If you drink alcohol do you typically have >3 drinks per day or >7 drinks per week? No  Do you usually eat at least 4 servings of fruit and vegetables a day, include whole grains & fiber and avoid regularly eating high fat or \"junk\" foods? Yes  Do you have any problems taking medications regularly? No  Do you have any side effects from medications? light headedness  Needs assistance for the following daily activities: no assistance needed  Which of the following safety concerns are present in your home?  none identified   Hearing impairment: No  In the past 6 months, have you been bothered by leaking of urine? no    Mental Health:  In general, how would you rate your overall mental or emotional health? good    Today's PHQ-9 Score:       2023    10:10 AM   PHQ-9 SCORE   PHQ-9 Total Score MyChart 3 (Minimal depression)   PHQ-9 Total Score 3         Do you feel safe in your environment? Yes    Have you ever done Advance Care Planning? (For example, a Health Directive, POLST, or a discussion with a medical provider or your loved ones about your wishes)? Yes, advance care planning is on file.    Fall risk:       Cognitive Screenin) Repeat 3 items (Leader, Season, Table)    2) Clock draw: NORMAL  3) 3 item recall: Recalls 3 objects  Results: 3 items recalled: COGNITIVE IMPAIRMENT LESS LIKELY    Mini-CogTM Copyright SHELBY Tanner. Licensed by the author for use in [facility]; reprinted with permission (cabrera@.[email or web address]). All rights reserved.      Do you have sleep apnea, excessive snoring or daytime drowsiness? : no    Social History     Tobacco Use    Smoking status: Never    Smokeless tobacco: Never   Substance Use Topics    Alcohol use: No     Alcohol/week: 0.0 standard drinks of alcohol              No data to display              Do you have a current opioid prescription? No  Do you use any other controlled substances or medications that are not prescribed by a provider? " None    Current providers sharing in care for this patient include:   Patient Care Team:  Byron Holm DO as PCP - General (Internal Medicine)  Byron Holm DO as Assigned PCP  Kevin Carrizales formerly [facility] as Pharmacist (Pharmacist Clinician- Clinical Pharmacy Specialist)  Ольга Gonzalez MD as MD (Internal Medicine)  Marvin Marcos MD as MD (Urology)  Marvin Marcos MD as MD (Urology)    The following health maintenance items are reviewed in Epic and correct as of today:  Health Maintenance   Topic Date Due    ZOSTER IMMUNIZATION (1 of 2) Never done    HEPATITIS B IMMUNIZATION (1 of 3 - Risk 3-dose series) Never done    RSV VACCINE 60+ (1 - 1-dose 60+ series) Never done    DTAP/TDAP/TD IMMUNIZATION (1 - Tdap) 09/24/2009    A1C  05/01/2023    INFLUENZA VACCINE (1) 09/01/2023    COVID-19 Vaccine (4 - 2023-24 season) 09/01/2023    LIPID  11/01/2023    MICROALBUMIN  11/01/2023    MEDICARE ANNUAL WELLNESS VISIT  11/01/2023    DIABETIC FOOT EXAM  01/05/2024    ANNUAL REVIEW OF HM ORDERS  01/05/2024    FALL RISK ASSESSMENT  01/05/2024    BMP  04/04/2024    HEMOGLOBIN  04/04/2024    PHQ-9  05/02/2024    EYE EXAM  09/06/2024    ADVANCE CARE PLANNING  09/28/2026    DEXA  03/16/2030    DEPRESSION ACTION PLAN  Completed    Pneumococcal Vaccine: 65+ Years  Completed    URINALYSIS  Completed    IPV IMMUNIZATION  Aged Out    HPV IMMUNIZATION  Aged Out    MENINGITIS IMMUNIZATION  Aged Out    RSV MONOCLONAL ANTIBODY  Aged Out    MAMMO SCREENING  Discontinued       Patient has been advised of split billing requirements and indicates understanding: Yes    Appropriate preventive services were discussed with this patient, including applicable screening as appropriate for fall prevention, nutrition, physical activity, Tobacco-use cessation, weight loss and cognition.  Checklist reviewing preventive services available has been given to the patient.        Review of Systems   CONSTITUTIONAL: Patient  "question some mild nausea and lightheadedness associated with taking her metformin.  She has discontinued it intermittently to determine if this was causative.  INTEGUMENTARY/SKIN: NEGATIVE for worrisome rashes, moles or lesions  EYES: NEGATIVE for vision changes or irritation  ENT/MOUTH: NEGATIVE for ear, mouth and throat problems  RESP: NEGATIVE for significant cough or SOB  BREAST: NEGATIVE for masses, tenderness or discharge  CV: NEGATIVE for chest pain, palpitations or peripheral edema  GI: NEGATIVE for nausea, abdominal pain, heartburn, or change in bowel habits  : NEGATIVE for frequency, dysuria, or hematuria  MUSCULOSKELETAL: NEGATIVE for significant arthralgias or myalgia  NEURO: NEGATIVE for weakness, dizziness or paresthesias  ENDOCRINE: NEGATIVE for temperature intolerance, skin/hair changes  HEME: NEGATIVE for bleeding problems  PSYCHIATRIC: NEGATIVE for changes in mood or affect      Objective    /72   Pulse 96   Temp 97.6  F (36.4  C) (Temporal)   Resp 18   Ht 1.626 m (5' 4\")   Wt 57.3 kg (126 lb 6 oz)   SpO2 98%   BMI 21.69 kg/m    Body mass index is 21.69 kg/m .  Physical Exam   GENERAL: healthy, alert and no distress  EYES: Eyes grossly normal to inspection, PERRL and conjunctivae and sclerae normal  HENT: ear canals and TM's normal, nose and mouth without ulcers or lesions  NECK: no adenopathy, no asymmetry, masses, or scars and thyroid normal to palpation  RESP: lungs clear to auscultation - no rales, rhonchi or wheezes  CV: regular rate and rhythm, normal S1 S2, no S3 or S4, no murmur, click or rub, no peripheral edema and peripheral pulses strong  ABDOMEN: soft, nontender, no hepatosplenomegaly, no masses and bowel sounds normal  MS: no gross musculoskeletal defects noted, no edema  SKIN: no suspicious lesions or rashes  NEURO: Normal strength and tone, mentation intact and speech normal  PSYCH: mentation appears normal, affect normal/bright  Diabetic foot exam: normal DP and PT " pulses, no trophic changes or ulcerative lesions, and normal sensory exam                I have carefully explained the diagnosis and treatment options to the patient.  The patient has displayed an understanding of the above, and all subsequent questions were answered.      DO GEMA Campos    Portions of this note were produced using [employer]  Although every attempt at real-time proof reading has been made, occasional grammar/syntax errors may have been missed.

## 2023-11-03 LAB — BACTERIA UR CULT: ABNORMAL

## 2023-11-17 ENCOUNTER — DOCUMENTATION ONLY (OUTPATIENT)
Dept: INTERNAL MEDICINE | Facility: CLINIC | Age: 83
End: 2023-11-17
Payer: COMMERCIAL

## 2023-11-17 DIAGNOSIS — N39.0 URINARY TRACT INFECTION WITHOUT HEMATURIA, SITE UNSPECIFIED: Primary | ICD-10-CM

## 2023-11-17 RX ORDER — CIPROFLOXACIN 250 MG/1
250 TABLET, FILM COATED ORAL 2 TIMES DAILY
Qty: 10 TABLET | Refills: 0 | Status: SHIPPED | OUTPATIENT
Start: 2023-11-17 | End: 2023-11-22

## 2023-11-17 NOTE — PROGRESS NOTES
FYI-    Patient was in to see [facility] today and had a fall while walking in the hallway. When this RN arrived, she was already lying on the floor face down. She was alert and denied any pain at that time. She was rolled to her side and then assisted to a wheelchair. She stated at that time that she had no pain while standing or sitting. /52, pulse 77, O2 sats 96%. She states she became very dizzy while she was standing and thought it was pass. Some staff did ease her to the ground. She reports she did not eat this morning and reports she is a diabetic. She was given some crackers and juice. Her daughter was called and her grandson was called to come and pick her up.    CHARLES MontoyaN, RN

## 2023-11-27 ENCOUNTER — LAB (OUTPATIENT)
Dept: LAB | Facility: CLINIC | Age: 83
End: 2023-11-27
Payer: COMMERCIAL

## 2023-11-27 ENCOUNTER — TELEPHONE (OUTPATIENT)
Dept: INTERNAL MEDICINE | Facility: CLINIC | Age: 83
End: 2023-11-27

## 2023-11-27 DIAGNOSIS — E11.42 TYPE 2 DIABETES MELLITUS WITH DIABETIC POLYNEUROPATHY, WITHOUT LONG-TERM CURRENT USE OF INSULIN (H): Primary | ICD-10-CM

## 2023-11-27 DIAGNOSIS — N30.00 ACUTE CYSTITIS WITHOUT HEMATURIA: ICD-10-CM

## 2023-11-27 DIAGNOSIS — N30.00 ACUTE CYSTITIS WITHOUT HEMATURIA: Primary | ICD-10-CM

## 2023-11-27 LAB
ALBUMIN UR-MCNC: 30 MG/DL
APPEARANCE UR: CLEAR
BACTERIA #/AREA URNS HPF: ABNORMAL /HPF
BILIRUB UR QL STRIP: NEGATIVE
COLOR UR AUTO: YELLOW
GLUCOSE UR STRIP-MCNC: >499 MG/DL
HGB UR QL STRIP: ABNORMAL
HOLD SPECIMEN: NORMAL
HYALINE CASTS: 1 /LPF
KETONES UR STRIP-MCNC: NEGATIVE MG/DL
LEUKOCYTE ESTERASE UR QL STRIP: ABNORMAL
MUCOUS THREADS #/AREA URNS LPF: PRESENT /LPF
NITRATE UR QL: NEGATIVE
PH UR STRIP: 5 [PH] (ref 5–7)
RBC URINE: 1 /HPF
SP GR UR STRIP: 1.02 (ref 1–1.03)
SQUAMOUS EPITHELIAL: 1 /HPF
UROBILINOGEN UR STRIP-MCNC: NORMAL MG/DL
WBC URINE: 2 /HPF

## 2023-11-27 PROCEDURE — 81001 URINALYSIS AUTO W/SCOPE: CPT

## 2023-11-27 NOTE — TELEPHONE ENCOUNTER
Patient instructed to leave urine sample 10 days after her recent round of antibiotics completed.    Lab needs ORDERS    Mimi Gutiérrez XRO/

## 2023-12-12 ENCOUNTER — OFFICE VISIT (OUTPATIENT)
Dept: INTERNAL MEDICINE | Facility: CLINIC | Age: 83
End: 2023-12-12
Payer: COMMERCIAL

## 2023-12-12 VITALS
WEIGHT: 121.5 LBS | BODY MASS INDEX: 20.74 KG/M2 | OXYGEN SATURATION: 99 % | RESPIRATION RATE: 12 BRPM | HEART RATE: 68 BPM | HEIGHT: 64 IN | SYSTOLIC BLOOD PRESSURE: 136 MMHG | DIASTOLIC BLOOD PRESSURE: 70 MMHG | TEMPERATURE: 98.4 F

## 2023-12-12 DIAGNOSIS — I25.10 CORONARY ARTERY DISEASE INVOLVING NATIVE CORONARY ARTERY OF NATIVE HEART WITHOUT ANGINA PECTORIS: ICD-10-CM

## 2023-12-12 DIAGNOSIS — R53.1 WEAKNESS: ICD-10-CM

## 2023-12-12 DIAGNOSIS — I10 HYPERTENSION GOAL BP (BLOOD PRESSURE) < 140/90: ICD-10-CM

## 2023-12-12 DIAGNOSIS — E11.42 TYPE 2 DIABETES MELLITUS WITH DIABETIC POLYNEUROPATHY, WITHOUT LONG-TERM CURRENT USE OF INSULIN (H): Primary | ICD-10-CM

## 2023-12-12 DIAGNOSIS — N18.32 STAGE 3B CHRONIC KIDNEY DISEASE (H): ICD-10-CM

## 2023-12-12 DIAGNOSIS — F33.1 MAJOR DEPRESSIVE DISORDER, RECURRENT EPISODE, MODERATE (H): ICD-10-CM

## 2023-12-12 DIAGNOSIS — E78.5 HYPERLIPIDEMIA LDL GOAL <100: ICD-10-CM

## 2023-12-12 PROCEDURE — 99214 OFFICE O/P EST MOD 30 MIN: CPT | Performed by: INTERNAL MEDICINE

## 2023-12-12 RX ORDER — PAROXETINE 40 MG/1
40 TABLET, FILM COATED ORAL EVERY MORNING
Qty: 90 TABLET | Refills: 3 | Status: SHIPPED | OUTPATIENT
Start: 2023-12-12 | End: 2024-01-12

## 2023-12-12 RX ORDER — LANCETS
EACH MISCELLANEOUS
Qty: 100 EACH | Refills: 6 | Status: SHIPPED | OUTPATIENT
Start: 2023-12-12 | End: 2024-01-12

## 2023-12-12 RX ORDER — ISOSORBIDE MONONITRATE 30 MG/1
30 TABLET, EXTENDED RELEASE ORAL DAILY
Qty: 90 TABLET | Refills: 3 | Status: SHIPPED | OUTPATIENT
Start: 2023-12-12 | End: 2024-05-01

## 2023-12-12 ASSESSMENT — PAIN SCALES - GENERAL: PAINLEVEL: NO PAIN (0)

## 2023-12-12 NOTE — PROGRESS NOTES
Daisy Kay is a 83 year old, presenting for the following health issues:  RECHECK (Go over labs)      12/12/2023     3:07 PM   Additional Questions   Roomed by [place] CMA       History of Present Illness       CKD: She is not using over the counter pain medicine.     Mental Health Follow-up:  Patient presents to follow-up on Depression.Patient's depression since last visit has been:  Good  The patient is not having other symptoms associated with depression.      Any significant life events: relationship concerns  Patient is not feeling anxious or having panic attacks.  Patient has no concerns about alcohol or drug use.    Diabetes:   She presents for follow up of diabetes.    She is not checking blood glucose.        She is concerned about frequent infections and blood sugar frequently over 200.   She is having numbness in feet and blurry vision.            Heart Failure:  She presents for follow up of heart failure. She is not experiencing shortness of breath at night, with rest or with activity  She is experiencing lower extremity edema which is same as usual.   She denies orthopenea and is not coughing at night. Patient is not checking weight daily. She has recently had a None.  She has side effects from medications including dizziness.  She has had no other medical visits for heart failure since the last visit.    Hyperlipidemia:  She presents for follow up of hyperlipidemia.   She is taking medication to lower cholesterol. She is not having myalgia or other side effects to statin medications.    Hypertension: She presents for follow up of hypertension.  She does not check blood pressure  regularly outside of the clinic. Outpatient blood pressures have not been over 140/90. She follows a low salt diet.     Vascular Disease:  She presents for follow up of vascular disease.     She never takes nitroglycerin. She takes daily aspirin.    Reason for visit:  Follow up   diabetes    She eats 2-3  servings of fruits and vegetables daily.She consumes 0 sweetened beverage(s) daily.She exercises with enough effort to increase her heart rate 10 to 19 minutes per day.  She exercises with enough effort to increase her heart rate 5 days per week.   She is taking medications regularly.          EMR reviewed including:             Complaint, History of Chief Complaint, Corresponding Review of Systems, and Complaint Specific Physical Examination.    #1   Follow-up on type 2 diabetes  Patient notes that she is only taking her metformin Glucotrol once daily because she only eats once daily.  Patient notes that she is not taking the previously prescribed Jardiance due to expense.  Most recent hemoglobin A1c was markedly elevated at 11.5.  Patient denies polyuria or polydipsia.  Denies any nonhealing lesions or skin changes.  Denies vision changes.        Exam:   LUNGS: clear bilaterally, airflow is brisk, no intercostal retraction or stridor is noted. No coughing is noted during visit.   HEART:  regular without rubs, clicks, gallops, or murmurs. PMI is nondisplaced. Upstrokes are brisk. S1,S2 are heard.   GI: Abdomen is soft, without rebound, guarding or tenderness. Bowel sounds are appropriate. No renal bruits are heard.   NEURO: Pt is alert and appropriate. No neurologic lateralization is noted. Cranial nerves 2-12 are intact. Peripheral sensory and motor function are grossly normal.       #2   Follow-up on depression.  Patient notes that she has had many changes in her life but is doing well.  Continues to take Paxil without side effects.  Denies any suicidal intent or ideation.  Does participate in some activities of enjoyment.  Denies significant weight change or sleeping concerns.        Exam:   PSYCH: The patient appears grossly appropriate. Maintains good eye contact, does not have any jittery or atypical motion. Displays appropriate affect.   Constitutional: The patient appears to be in no acute distress. The  "patient appears to be adequately hydrated. No acute respiratory or hemodynamic distress is noted at this time.          #3   Follow-up on coronary artery disease with mild congestive heart failure.  Continues aspirin, statin, nitrate, ACE inhibitor, beta-blocker.  Denies any chest pain, arrhythmia or near syncope.  Denies significant edema or orthopnea.  Tolerating medications without difficulty.        Exam:  As above        Patient has been interviewed, applicable history and applied review of systems have been performed.    Vital Signs:   /70 (BP Location: Right arm, Patient Position: Chair, Cuff Size: Adult Regular)   Pulse 68   Temp 98.4  F (36.9  C) (Temporal)   Resp 12   Ht 1.626 m (5' 4\")   Wt 55.1 kg (121 lb 8 oz)   SpO2 99%   BMI 20.86 kg/m        Decision Making    Problem and Complexity     1. Type 2 diabetes mellitus with diabetic polyneuropathy, without long-term current use of insulin (H)  Patient is given a prescription for glucose monitoring equipment.  Had previously but \"it did not work\".  Discussed the need for small meals throughout the day as opposed to 1 large meal daily.  Discussed the need to take the diabetic medication twice daily even if she is not eating a full meal.  Discussed conversion to long-acting insulin as an option.    - blood glucose monitoring (NO BRAND SPECIFIED) meter device kit; Use to test blood sugar 1 times daily or as directed. Preferred blood glucose meter OR supplies to accompany: Blood Glucose Monitor Brands: per insurance.  Dispense: 1 kit; Refill: 0  - blood glucose (NO BRAND SPECIFIED) test strip; Use to test blood sugar 1 times daily or as directed. To accompany: Blood Glucose Monitor Brands: per insurance.  Dispense: 100 strip; Refill: 6  - thin (NO BRAND SPECIFIED) lancets; Use with lanceting device. To accompany: Blood Glucose Monitor Brands: per insurance.  Dispense: 100 each; Refill: 6  - Basic metabolic panel  (Ca, Cl, CO2, Creat, Gluc, K, Na, " BUN); Future    2. Major depressive disorder, recurrent episode, moderate (H)  Patient stable.  Continue current medication  - PARoxetine (PAXIL) 40 MG tablet; Take 1 tablet (40 mg) by mouth every morning  Dispense: 90 tablet; Refill: 3    3. Hypertension goal BP (blood pressure) < 140/90  Controlled.  Continue current medication    4. Hyperlipidemia LDL goal <100  It is statin.  Recent lab work reviewed.    5. Coronary artery disease involving native coronary artery of native heart without angina pectoris  Continue current medications.  - isosorbide mononitrate (IMDUR) 30 MG 24 hr tablet; Take 1 tablet (30 mg) by mouth daily  Dispense: 90 tablet; Refill: 3    6. Stage 3b chronic kidney disease (H)  Follow renal function closely    7. Weakness  Mild and nonprogressive.  Patient does require a cane for walking assistance                                FOLLOW UP   I have asked the patient to make an appointment for followup with me in 2 weeks with blood sugar log    Regarding routine vaccinations:  I have reviewed the patient's vaccination schedule and discussed the benefits of prophylactic vaccination in detail.  I recommend the patient contact their pharmacist for vaccinations.  Discussed that most insurance companies now favor reimbursement to the pharmacies and it will financially behoove the patient to have vaccinations performed at their pharmacy.        I have carefully explained the diagnosis and treatment options to the patient.  The patient has displayed an understanding of the above, and all subsequent questions were answered.      DO GEMA Campos    Portions of this note were produced using Sajan  Although every attempt at real-time proof reading has been made, occasional grammar/syntax errors may have been missed.

## 2023-12-12 NOTE — COMMUNITY RESOURCES LIST (ENGLISH)
12/12/2023   [facility] [place]  N/A  For questions about this resource list or additional care needs, please contact your primary care clinic or care manager.  Phone: [phone]   Email: N/A   Address: [address] [place] [id number]   Hours: N/A        Food and Nutrition       Food pantry  1  [facility] Distance: 0.98 miles      Pickup   [address] [place] [address]  Language: English  Hours: Mon 1:00 PM - 3:00 PM , Wed 1:00 PM - 3:00 PM , Fri 9:00 AM - 11:00 AM  Fees: Free   Phone: (593) 432-1934 Email: info@[employer] Website: http://sites.[email or web address]/[email or web address]/home?authuser=0     2  [place] [facility] Distance: 13.06 miles      Pickup   [address] [place] [address]  Language: English  Hours: Thu 12:00 PM - 4:00 PM  Fees: Free   Phone: (435) 518-8968 Email: lucho@[facility] Website: https://www.Aptera.com/mAPPn/          Important Numbers & Websites       Emergency Services   911  City Services   311  Poison Control   (484) 381-7036  Suicide Prevention Lifeline   (980) 265-6492 (TALK)  Child Abuse Hotline   (282) 639-8953 (4-A-Child)  Sexual Assault Hotline   (721) 538-9134 (HOPE)  National Runaway Safeline   (406) 216-5393 (RUNAWAY)  All-Options Talkline   (845) 915-4616  Substance Abuse Referral   (122) 404-4146 (HELP)

## 2023-12-27 ENCOUNTER — OFFICE VISIT (OUTPATIENT)
Dept: INTERNAL MEDICINE | Facility: CLINIC | Age: 83
End: 2023-12-27
Payer: COMMERCIAL

## 2023-12-27 VITALS
HEART RATE: 65 BPM | OXYGEN SATURATION: 96 % | RESPIRATION RATE: 10 BRPM | SYSTOLIC BLOOD PRESSURE: 130 MMHG | BODY MASS INDEX: 21.28 KG/M2 | WEIGHT: 124 LBS | DIASTOLIC BLOOD PRESSURE: 80 MMHG | TEMPERATURE: 97.9 F

## 2023-12-27 DIAGNOSIS — E11.42 TYPE 2 DIABETES MELLITUS WITH DIABETIC POLYNEUROPATHY, WITHOUT LONG-TERM CURRENT USE OF INSULIN (H): Primary | ICD-10-CM

## 2023-12-27 LAB
ANION GAP SERPL CALCULATED.3IONS-SCNC: 12 MMOL/L (ref 7–15)
BUN SERPL-MCNC: 37.6 MG/DL (ref 8–23)
CALCIUM SERPL-MCNC: 7.9 MG/DL (ref 8.8–10.2)
CHLORIDE SERPL-SCNC: 110 MMOL/L (ref 98–107)
CREAT SERPL-MCNC: 1.45 MG/DL (ref 0.51–0.95)
DEPRECATED HCO3 PLAS-SCNC: 24 MMOL/L (ref 22–29)
EGFRCR SERPLBLD CKD-EPI 2021: 36 ML/MIN/1.73M2
GLUCOSE SERPL-MCNC: 127 MG/DL (ref 70–99)
POTASSIUM SERPL-SCNC: 4.8 MMOL/L (ref 3.4–5.3)
SODIUM SERPL-SCNC: 146 MMOL/L (ref 135–145)

## 2023-12-27 PROCEDURE — 80048 BASIC METABOLIC PNL TOTAL CA: CPT | Performed by: INTERNAL MEDICINE

## 2023-12-27 PROCEDURE — 99213 OFFICE O/P EST LOW 20 MIN: CPT | Performed by: INTERNAL MEDICINE

## 2023-12-27 PROCEDURE — 36415 COLL VENOUS BLD VENIPUNCTURE: CPT | Performed by: INTERNAL MEDICINE

## 2023-12-27 RX ORDER — LIRAGLUTIDE 6 MG/ML
0.6 INJECTION SUBCUTANEOUS DAILY
Qty: 3 ML | Refills: 0 | Status: SHIPPED | OUTPATIENT
Start: 2023-12-27 | End: 2024-04-24

## 2023-12-27 RX ORDER — LANCING DEVICE/LANCETS
KIT MISCELLANEOUS
Qty: 1 EACH | Refills: 0 | Status: SHIPPED | OUTPATIENT
Start: 2023-12-27 | End: 2024-01-12

## 2023-12-27 RX ORDER — LANCETS
EACH MISCELLANEOUS
Qty: 102 EACH | Refills: 6 | Status: SHIPPED | OUTPATIENT
Start: 2023-12-27 | End: 2024-04-08

## 2023-12-27 RX ORDER — RESPIRATORY SYNCYTIAL VIRUS VACCINE 120MCG/0.5
0.5 KIT INTRAMUSCULAR ONCE
Qty: 1 EACH | Refills: 0 | Status: CANCELLED | OUTPATIENT
Start: 2023-12-27 | End: 2023-12-27

## 2023-12-27 RX ORDER — GLUCOSAMINE HCL/CHONDROITIN SU 500-400 MG
CAPSULE ORAL
Qty: 100 EACH | Refills: 6 | Status: SHIPPED | OUTPATIENT
Start: 2023-12-27 | End: 2024-04-08

## 2023-12-27 NOTE — PROGRESS NOTES
Subjective     Eliza Dubois is a 83 year old female who presents to clinic today for the following health issues accompanied by her  pancreas :    History of Present Illness       Diabetes:   She presents for follow up of diabetes.    She is not checking blood glucose.        She is concerned about frequent infections.   She is having excessive thirst.            She eats 2-3 servings of fruits and vegetables daily.She consumes 0 sweetened beverage(s) daily.She exercises with enough effort to increase her heart rate 20 to 29 minutes per day.  She exercises with enough effort to increase her heart rate 3 or less days per week.   She is taking medications regularly.          EMR reviewed including:             Complaint, History of Chief Complaint, Corresponding Review of Systems, and Complaint Specific Physical Examination.    #1   Uncontrolled diabetes  Recent glycosylated hemoglobin markedly elevated  Patient maintains normal low carbohydrate diet.  Has had some polyuria.  Denies symptoms of recent infection.  Currently taking metformin and glipizide.  Is on ACE inhibitor, Plavix, statin.        Exam:   LUNGS: clear bilaterally, airflow is brisk, no intercostal retraction or stridor is noted. No coughing is noted during visit.   HEART:  regular without rubs, clicks, gallops, or murmurs. PMI is nondisplaced. Upstrokes are brisk. S1,S2 are heard.   GI: Abdomen is soft, without rebound, guarding or tenderness. Bowel sounds are appropriate. No renal bruits are heard.        Patient has been interviewed, applicable history and applied review of systems have been performed.    Vital Signs:   /80   Pulse 65   Temp 97.9  F (36.6  C)   Resp 10   Wt 56.2 kg (124 lb)   SpO2 96%   BMI 21.28 kg/m        Decision Making    Problem and Complexity     1. Type 2 diabetes mellitus with diabetic polyneuropathy, without long-term current use of insulin (H)  Will start Victoza.  Recheck blood sugar.  Patient notes  that her lancet device has not been functioning therefore she has not been checking her blood sugars.  Request that she checks them twice daily and records.    - blood glucose (ACCU-CHEK FASTCLIX) lancing device; Lancing device to be used with lancets.  Dispense: 1 each; Refill: 0  - blood glucose monitoring (ACCU-CHEK FASTCLIX) lancets; Use to test blood sugar 1 times daily.  Dispense: 102 each; Refill: 6  - alcohol swab prep pads; Use to swab area of injection/sarah as directed.  Dispense: 100 each; Refill: 6  - Basic metabolic panel  (Ca, Cl, CO2, Creat, Gluc, K, Na, BUN); Future  - liraglutide (VICTOZA) 18 MG/3ML solution; Inject 0.6 mg Subcutaneous daily  Dispense: 3 mL; Refill: 0                                FOLLOW UP   I have asked the patient to make an appointment for followup with in 2 weeks with blood sugar log    Regarding routine vaccinations:  I have reviewed the patient's vaccination schedule and discussed the benefits of prophylactic vaccination in detail.  I recommend the patient contact their pharmacist for vaccinations.  Discussed that most insurance companies now favor reimbursement to the pharmacies and it will financially behoove the patient to have vaccinations performed at their pharmacy.        I have carefully explained the diagnosis and treatment options to the patient.  The patient has displayed an understanding of the above, and all subsequent questions were answered.      DO GEMA Campos    Portions of this note were produced using [employer]  Although every attempt at real-time proof reading has been made, occasional grammar/syntax errors may have been missed.

## 2023-12-27 NOTE — LETTER
January 15, 2024      Eliza Dubois  [address]   [facility] [address]        Dear ,    We are writing to inform you of your test results.    {results letter list:[id number]}    Resulted Orders   Basic metabolic panel  (Ca, Cl, CO2, Creat, Gluc, K, Na, BUN)   Result Value Ref Range    Sodium 146 (H) 135 - 145 mmol/L      Comment:      Reference intervals for this test were updated on 09/26/2023 to more accurately reflect our healthy population. There may be differences in the flagging of prior results with similar values performed with this method. Interpretation of those prior results can be made in the context of the updated reference intervals.     Potassium 4.8 3.4 - 5.3 mmol/L    Chloride 110 (H) 98 - 107 mmol/L    Carbon Dioxide (CO2) 24 22 - 29 mmol/L    Anion Gap 12 7 - 15 mmol/L    Urea Nitrogen 37.6 (H) 8.0 - 23.0 mg/dL    Creatinine 1.45 (H) 0.51 - 0.95 mg/dL    GFR Estimate 36 (L) >60 mL/min/1.73m2    Calcium 7.9 (L) 8.8 - 10.2 mg/dL    Glucose 127 (H) 70 - 99 mg/dL       If you have any questions or concerns, please call the clinic at the number listed above.       Sincerely,      Byron Holm, DO

## 2024-01-12 ENCOUNTER — OFFICE VISIT (OUTPATIENT)
Dept: INTERNAL MEDICINE | Facility: CLINIC | Age: 84
End: 2024-01-12
Payer: COMMERCIAL

## 2024-01-12 VITALS
TEMPERATURE: 97 F | SYSTOLIC BLOOD PRESSURE: 128 MMHG | HEART RATE: 67 BPM | DIASTOLIC BLOOD PRESSURE: 76 MMHG | HEIGHT: 64 IN | BODY MASS INDEX: 21 KG/M2 | RESPIRATION RATE: 14 BRPM | OXYGEN SATURATION: 95 % | WEIGHT: 123 LBS

## 2024-01-12 DIAGNOSIS — E11.42 TYPE 2 DIABETES MELLITUS WITH DIABETIC POLYNEUROPATHY, WITHOUT LONG-TERM CURRENT USE OF INSULIN (H): ICD-10-CM

## 2024-01-12 DIAGNOSIS — N18.32 STAGE 3B CHRONIC KIDNEY DISEASE (H): ICD-10-CM

## 2024-01-12 DIAGNOSIS — E78.5 HYPERLIPIDEMIA LDL GOAL <100: ICD-10-CM

## 2024-01-12 DIAGNOSIS — F33.1 MAJOR DEPRESSIVE DISORDER, RECURRENT EPISODE, MODERATE (H): ICD-10-CM

## 2024-01-12 LAB
ANION GAP SERPL CALCULATED.3IONS-SCNC: 14 MMOL/L (ref 7–15)
BUN SERPL-MCNC: 37 MG/DL (ref 8–23)
CALCIUM SERPL-MCNC: 8.2 MG/DL (ref 8.8–10.2)
CHLORIDE SERPL-SCNC: 108 MMOL/L (ref 98–107)
CREAT SERPL-MCNC: 1.57 MG/DL (ref 0.51–0.95)
DEPRECATED HCO3 PLAS-SCNC: 21 MMOL/L (ref 22–29)
EGFRCR SERPLBLD CKD-EPI 2021: 32 ML/MIN/1.73M2
GLUCOSE SERPL-MCNC: 115 MG/DL (ref 70–99)
HBA1C MFR BLD: 8.6 %
POTASSIUM SERPL-SCNC: 4.7 MMOL/L (ref 3.4–5.3)
SODIUM SERPL-SCNC: 143 MMOL/L (ref 135–145)

## 2024-01-12 PROCEDURE — 80048 BASIC METABOLIC PNL TOTAL CA: CPT | Performed by: INTERNAL MEDICINE

## 2024-01-12 PROCEDURE — 99214 OFFICE O/P EST MOD 30 MIN: CPT | Performed by: INTERNAL MEDICINE

## 2024-01-12 PROCEDURE — 83036 HEMOGLOBIN GLYCOSYLATED A1C: CPT | Performed by: INTERNAL MEDICINE

## 2024-01-12 PROCEDURE — 36415 COLL VENOUS BLD VENIPUNCTURE: CPT | Performed by: INTERNAL MEDICINE

## 2024-01-12 RX ORDER — RESPIRATORY SYNCYTIAL VIRUS VACCINE 120MCG/0.5
0.5 KIT INTRAMUSCULAR ONCE
Qty: 1 EACH | Refills: 0 | Status: CANCELLED | OUTPATIENT
Start: 2024-01-12 | End: 2024-01-12

## 2024-01-12 RX ORDER — ATORVASTATIN CALCIUM 80 MG/1
80 TABLET, FILM COATED ORAL AT BEDTIME
Qty: 90 TABLET | Refills: 3 | Status: SHIPPED | OUTPATIENT
Start: 2024-01-12 | End: 2024-05-01

## 2024-01-12 ASSESSMENT — PAIN SCALES - GENERAL: PAINLEVEL: NO PAIN (0)

## 2024-01-12 NOTE — PROGRESS NOTES
"      Daisy Kay is a 83 year old, presenting for the following health issues:  Diabetes      1/12/2024     9:55 AM   Additional Questions   Roomed by Crystal PAREKH       History of Present Illness       Diabetes:   She presents for follow up of diabetes.    She is not checking blood glucose.        She is concerned about blood sugar frequently over 200.   She is having numbness in feet.            She eats 2-3 servings of fruits and vegetables daily.She consumes 0 sweetened beverage(s) daily.She exercises with enough effort to increase her heart rate 10 to 19 minutes per day.  She exercises with enough effort to increase her heart rate 4 days per week. She is missing 7 dose(s) of medications per week.     EMR reviewed including:             Complaint, History of Chief Complaint, Corresponding Review of Systems, and Complaint Specific Physical Examination.    #1   Patient presents today for follow-up of diabetes.  Last A1c was over 11.  Has been much more compliant with her medications.  Currently taking metformin and Glucotrol.  Also taking Victoza.  Denies any hypoglycemic episodes.  Denies polyuria or polydipsia.  He is on statin, ACE inhibitor, aspirin.        Exam:   HEART:  regular without rubs, clicks, gallops, or murmurs. PMI is nondisplaced. Upstrokes are brisk. S1,S2 are heard.   LUNGS: clear bilaterally, airflow is brisk, no intercostal retraction or stridor is noted. No coughing is noted during visit.   GI: Abdomen is soft, without rebound, guarding or tenderness. Bowel sounds are appropriate. No renal bruits are heard.   NEURO: Pt is alert and appropriate. No neurologic lateralization is noted. Cranial nerves 2-12 are intact. Peripheral sensory and motor function are grossly normal.       #2   Follow-up on depression.  Notes that she is feeling much better.  Currently not taking the Paxil any longer.  Discontinued it because she \"did not need it\".  Denies any suicidal intent or ideation.        " "Exam:   PSYCH: The patient appears grossly appropriate. Maintains good eye contact, does not have any jittery or atypical motion. Displays appropriate affect.        Patient has been interviewed, applicable history and applied review of systems have been performed.    Vital Signs:   /76   Pulse 67   Temp 97  F (36.1  C) (Temporal)   Resp 14   Ht 1.626 m (5' 4\")   Wt 55.8 kg (123 lb)   SpO2 95%   BMI 21.11 kg/m        Decision Making    Problem and Complexity     1. Type 2 diabetes mellitus with diabetic polyneuropathy, without long-term current use of insulin (H)  Check A1c for interim progress.  Explained to patient that this is not the definitive results other is used to confirm compliance.  - Hemoglobin A1c; Future  - blood glucose (NO BRAND SPECIFIED) lancets standard; Use to test blood sugar 1 times daily or as directed.  Dispense: 100 each; Refill: 0  - Basic metabolic panel  (Ca, Cl, CO2, Creat, Gluc, K, Na, BUN)  - Hemoglobin A1c    2. Stage 3b chronic kidney disease (H)  Check renal function.  Continue current medications    3. Major depressive disorder, recurrent episode, moderate (H)  Improved.  Patient has discontinued medication.    4. Hyperlipidemia LDL goal <100  Continue statin therapy.  Refill medication per patient request  - atorvastatin (LIPITOR) 80 MG tablet; Take 1 tablet (80 mg) by mouth at bedtime  Dispense: 90 tablet; Refill: 3                                FOLLOW UP   I have asked the patient to make an appointment for followup with me virtually in 2 or 3 weeks to discuss results make further recommendations    Regarding routine vaccinations:  I have reviewed the patient's vaccination schedule and discussed the benefits of prophylactic vaccination in detail.  I recommend the patient contact their pharmacist for vaccinations.  Discussed that most insurance companies now favor reimbursement to the pharmacies and it will financially behoove the patient to have vaccinations " performed at their pharmacy.        I have carefully explained the diagnosis and treatment options to the patient.  The patient has displayed an understanding of the above, and all subsequent questions were answered.      DO GEMA Campos    Portions of this note were produced using [employer]  Although every attempt at real-time proof reading has been made, occasional grammar/syntax errors may have been missed.

## 2024-01-12 NOTE — LETTER
January 15, 2024      Eliza Dubois  [address]   [facility] [address]        Dear ,    We are writing to inform you of your test results.    Dear Eliza, your recent test results are attached.     The blood sugar is 115, potassium is normal, kidney function is essentially unchanged.  Kidney function is decreased but stable.     You will be contacted with any outstanding results as they are available.   Feel free to contact me via the office or My Chart if you have any questions regarding the above.     Resulted Orders   Basic metabolic panel  (Ca, Cl, CO2, Creat, Gluc, K, Na, BUN)   Result Value Ref Range    Sodium 143 135 - 145 mmol/L      Comment:      Reference intervals for this test were updated on 09/26/2023 to more accurately reflect our healthy population. There may be differences in the flagging of prior results with similar values performed with this method. Interpretation of those prior results can be made in the context of the updated reference intervals.     Potassium 4.7 3.4 - 5.3 mmol/L    Chloride 108 (H) 98 - 107 mmol/L    Carbon Dioxide (CO2) 21 (L) 22 - 29 mmol/L    Anion Gap 14 7 - 15 mmol/L    Urea Nitrogen 37.0 (H) 8.0 - 23.0 mg/dL    Creatinine 1.57 (H) 0.51 - 0.95 mg/dL    GFR Estimate 32 (L) >60 mL/min/1.73m2    Calcium 8.2 (L) 8.8 - 10.2 mg/dL    Glucose 115 (H) 70 - 99 mg/dL   Hemoglobin A1c   Result Value Ref Range    Hemoglobin A1C 8.6 (H) <5.7 %      Comment:      Normal <5.7%   Prediabetes 5.7-6.4%    Diabetes 6.5% or higher     Note: Adopted from ADA consensus guidelines.       If you have any questions or concerns, please call the clinic at the number listed above.       Sincerely,      Byron Holm, DO

## 2024-03-01 ENCOUNTER — TELEPHONE (OUTPATIENT)
Dept: INTERNAL MEDICINE | Facility: CLINIC | Age: 84
End: 2024-03-01
Payer: COMMERCIAL

## 2024-03-01 NOTE — TELEPHONE ENCOUNTER
Prior Authorization Retail Medication Request    Medication/Dose: liraglutide (VICTOZA) 18 MG/3ML solution  Diagnosis and ICD code (if different than what is on RX):  Type 2 diabetes mellitus with diabetic polyneuropathy, without long-term current use of insulin (H) [E11.42]  - Primary   New/renewal/insurance change PA/secondary ins. PA:  Previously Tried and Failed:  na  Rationale:  na    Insurance   Primary: Medicare  Insurance ID:  [id number]     Secondary (if applicable):[employer]  Insurance ID:  [id number]     Pharmacy Information (if different than what is on RX)  [facility]  Phone:  [phone]  Fax:[phone]

## 2024-03-14 NOTE — TELEPHONE ENCOUNTER
Retail Pharmacy Prior Authorization Team   Phone: [phone]    PA Initiation    Medication: VICTOZA 18 MG/3ML SC SOPN  Insurance Company: [employer] - Phone [phone] Fax [phone]  Pharmacy Filling the Rx: [facility] - [place], [place] - [address]  Filling Pharmacy Phone: [phone]  Filling Pharmacy Fax:    Start Date: 3/14/2024

## 2024-03-15 NOTE — TELEPHONE ENCOUNTER
Prior Authorization Approval    Authorization Effective Date: 2/29/2024  Authorization Expiration Date: 12/31/2099  Medication: liraglutide (VICTOZA) 18 MG/3ML solution-APPROVED  Reference #:     Insurance Company: [employer] - Phone [phone] Fax [phone]  Which Pharmacy is filling the prescription (Not needed for infusion/clinic administered): [facility] - [place], [place] - [address]  Pharmacy Notified: Yes  Patient Notified: Instructed pharmacy to notify patient when script is ready to /ship.

## 2024-04-08 ENCOUNTER — HOSPITAL ENCOUNTER (EMERGENCY)
Facility: CLINIC | Age: 84
Discharge: HOME | End: 2024-04-09
Attending: NURSE PRACTITIONER | Admitting: EMERGENCY MEDICINE
Payer: MEDICARE

## 2024-04-08 ENCOUNTER — APPOINTMENT (OUTPATIENT)
Dept: GENERAL RADIOLOGY | Facility: CLINIC | Age: 84
End: 2024-04-08
Attending: NURSE PRACTITIONER
Payer: MEDICARE

## 2024-04-08 DIAGNOSIS — I50.9 ACUTE CONGESTIVE HEART FAILURE, UNSPECIFIED HEART FAILURE TYPE (H): ICD-10-CM

## 2024-04-08 DIAGNOSIS — I16.0 HYPERTENSIVE URGENCY: ICD-10-CM

## 2024-04-08 DIAGNOSIS — R53.1 GENERALIZED WEAKNESS: ICD-10-CM

## 2024-04-08 DIAGNOSIS — K21.9 GASTROESOPHAGEAL REFLUX DISEASE WITHOUT ESOPHAGITIS: ICD-10-CM

## 2024-04-08 DIAGNOSIS — R19.7 DIARRHEA, UNSPECIFIED TYPE: ICD-10-CM

## 2024-04-08 LAB
ALBUMIN SERPL BCG-MCNC: 3.5 G/DL (ref 3.5–5.2)
ALBUMIN UR-MCNC: >499 MG/DL
ALP SERPL-CCNC: 118 U/L (ref 40–150)
ALT SERPL W P-5'-P-CCNC: 15 U/L (ref 0–50)
ANION GAP SERPL CALCULATED.3IONS-SCNC: 12 MMOL/L (ref 7–15)
APPEARANCE UR: CLEAR
AST SERPL W P-5'-P-CCNC: 21 U/L (ref 0–45)
BASOPHILS # BLD AUTO: 0 10E3/UL (ref 0–0.2)
BASOPHILS NFR BLD AUTO: 0 %
BILIRUB SERPL-MCNC: 0.3 MG/DL
BILIRUB UR QL STRIP: NEGATIVE
BUN SERPL-MCNC: 31.8 MG/DL (ref 8–23)
CALCIUM SERPL-MCNC: 7.5 MG/DL (ref 8.8–10.2)
CHLORIDE SERPL-SCNC: 106 MMOL/L (ref 98–107)
COLOR UR AUTO: ABNORMAL
CREAT SERPL-MCNC: 1.89 MG/DL (ref 0.51–0.95)
DEPRECATED HCO3 PLAS-SCNC: 24 MMOL/L (ref 22–29)
EGFRCR SERPLBLD CKD-EPI 2021: 26 ML/MIN/1.73M2
EOSINOPHIL # BLD AUTO: 0.1 10E3/UL (ref 0–0.7)
EOSINOPHIL NFR BLD AUTO: 1 %
ERYTHROCYTE [DISTWIDTH] IN BLOOD BY AUTOMATED COUNT: 17.1 % (ref 10–15)
GLUCOSE SERPL-MCNC: 282 MG/DL (ref 70–99)
GLUCOSE UR STRIP-MCNC: >499 MG/DL
HCT VFR BLD AUTO: 26.8 % (ref 35–47)
HGB BLD-MCNC: 8 G/DL (ref 11.7–15.7)
HGB UR QL STRIP: ABNORMAL
IMM GRANULOCYTES # BLD: 0 10E3/UL
IMM GRANULOCYTES NFR BLD: 1 %
KETONES UR STRIP-MCNC: NEGATIVE MG/DL
LEUKOCYTE ESTERASE UR QL STRIP: NEGATIVE
LIPASE SERPL-CCNC: 29 U/L (ref 13–60)
LYMPHOCYTES # BLD AUTO: 1.1 10E3/UL (ref 0.8–5.3)
LYMPHOCYTES NFR BLD AUTO: 18 %
MCH RBC QN AUTO: 23.3 PG (ref 26.5–33)
MCHC RBC AUTO-ENTMCNC: 29.9 G/DL (ref 31.5–36.5)
MCV RBC AUTO: 78 FL (ref 78–100)
MONOCYTES # BLD AUTO: 0.5 10E3/UL (ref 0–1.3)
MONOCYTES NFR BLD AUTO: 7 %
MUCOUS THREADS #/AREA URNS LPF: PRESENT /LPF
NEUTROPHILS # BLD AUTO: 4.5 10E3/UL (ref 1.6–8.3)
NEUTROPHILS NFR BLD AUTO: 72 %
NITRATE UR QL: NEGATIVE
NRBC # BLD AUTO: 0 10E3/UL
NRBC BLD AUTO-RTO: 0 /100
NT-PROBNP SERPL-MCNC: ABNORMAL PG/ML (ref 0–1800)
PH UR STRIP: 7 [PH] (ref 5–7)
PLATELET # BLD AUTO: 153 10E3/UL (ref 150–450)
POTASSIUM SERPL-SCNC: 4 MMOL/L (ref 3.4–5.3)
PROT SERPL-MCNC: 6.6 G/DL (ref 6.4–8.3)
RBC # BLD AUTO: 3.43 10E6/UL (ref 3.8–5.2)
RBC URINE: <1 /HPF
SODIUM SERPL-SCNC: 142 MMOL/L (ref 135–145)
SP GR UR STRIP: 1.01 (ref 1–1.03)
SQUAMOUS EPITHELIAL: <1 /HPF
TROPONIN T SERPL HS-MCNC: 97 NG/L
TROPONIN T SERPL HS-MCNC: 99 NG/L
UROBILINOGEN UR STRIP-MCNC: NORMAL MG/DL
WBC # BLD AUTO: 6.2 10E3/UL (ref 4–11)
WBC URINE: 0 /HPF

## 2024-04-08 PROCEDURE — 36415 COLL VENOUS BLD VENIPUNCTURE: CPT | Performed by: NURSE PRACTITIONER

## 2024-04-08 PROCEDURE — 93005 ELECTROCARDIOGRAM TRACING: CPT | Performed by: NURSE PRACTITIONER

## 2024-04-08 PROCEDURE — 258N000003 HC RX IP 258 OP 636: Performed by: NURSE PRACTITIONER

## 2024-04-08 PROCEDURE — 80053 COMPREHEN METABOLIC PANEL: CPT | Performed by: NURSE PRACTITIONER

## 2024-04-08 PROCEDURE — 250N000011 HC RX IP 250 OP 636: Performed by: NURSE PRACTITIONER

## 2024-04-08 PROCEDURE — 96375 TX/PRO/DX INJ NEW DRUG ADDON: CPT | Performed by: EMERGENCY MEDICINE

## 2024-04-08 PROCEDURE — 93010 ELECTROCARDIOGRAM REPORT: CPT | Performed by: NURSE PRACTITIONER

## 2024-04-08 PROCEDURE — 83690 ASSAY OF LIPASE: CPT | Performed by: NURSE PRACTITIONER

## 2024-04-08 PROCEDURE — 250N000013 HC RX MED GY IP 250 OP 250 PS 637: Performed by: NURSE PRACTITIONER

## 2024-04-08 PROCEDURE — 84484 ASSAY OF TROPONIN QUANT: CPT | Performed by: NURSE PRACTITIONER

## 2024-04-08 PROCEDURE — 96374 THER/PROPH/DIAG INJ IV PUSH: CPT | Performed by: EMERGENCY MEDICINE

## 2024-04-08 PROCEDURE — 71046 X-RAY EXAM CHEST 2 VIEWS: CPT

## 2024-04-08 PROCEDURE — 85004 AUTOMATED DIFF WBC COUNT: CPT | Performed by: NURSE PRACTITIONER

## 2024-04-08 PROCEDURE — 99285 EMERGENCY DEPT VISIT HI MDM: CPT | Mod: 25 | Performed by: EMERGENCY MEDICINE

## 2024-04-08 PROCEDURE — 81001 URINALYSIS AUTO W/SCOPE: CPT | Performed by: NURSE PRACTITIONER

## 2024-04-08 PROCEDURE — 83880 ASSAY OF NATRIURETIC PEPTIDE: CPT | Performed by: NURSE PRACTITIONER

## 2024-04-08 RX ORDER — HYDROCHLOROTHIAZIDE 25 MG/1
25 TABLET ORAL ONCE
Status: COMPLETED | OUTPATIENT
Start: 2024-04-08 | End: 2024-04-08

## 2024-04-08 RX ORDER — LISINOPRIL 10 MG/1
20 TABLET ORAL ONCE
Status: COMPLETED | OUTPATIENT
Start: 2024-04-08 | End: 2024-04-08

## 2024-04-08 RX ORDER — FUROSEMIDE 10 MG/ML
60 INJECTION INTRAMUSCULAR; INTRAVENOUS ONCE
Status: COMPLETED | OUTPATIENT
Start: 2024-04-08 | End: 2024-04-08

## 2024-04-08 RX ORDER — ISOSORBIDE MONONITRATE 30 MG/1
30 TABLET, EXTENDED RELEASE ORAL ONCE
Status: COMPLETED | OUTPATIENT
Start: 2024-04-08 | End: 2024-04-08

## 2024-04-08 RX ORDER — LABETALOL HYDROCHLORIDE 5 MG/ML
20 INJECTION, SOLUTION INTRAVENOUS ONCE
Status: COMPLETED | OUTPATIENT
Start: 2024-04-08 | End: 2024-04-09

## 2024-04-08 RX ORDER — LABETALOL HYDROCHLORIDE 5 MG/ML
20 INJECTION, SOLUTION INTRAVENOUS ONCE
Status: COMPLETED | OUTPATIENT
Start: 2024-04-08 | End: 2024-04-08

## 2024-04-08 RX ORDER — METOPROLOL SUCCINATE 100 MG/1
100 TABLET, EXTENDED RELEASE ORAL ONCE
Status: COMPLETED | OUTPATIENT
Start: 2024-04-08 | End: 2024-04-08

## 2024-04-08 RX ORDER — LISINOPRIL AND HYDROCHLOROTHIAZIDE 20; 25 MG/1; MG/1
1 TABLET ORAL ONCE
Status: DISCONTINUED | OUTPATIENT
Start: 2024-04-08 | End: 2024-04-08

## 2024-04-08 RX ADMIN — FUROSEMIDE 60 MG: 10 INJECTION, SOLUTION INTRAVENOUS at 20:34

## 2024-04-08 RX ADMIN — SODIUM CHLORIDE 500 ML: 9 INJECTION, SOLUTION INTRAVENOUS at 19:55

## 2024-04-08 RX ADMIN — LABETALOL HYDROCHLORIDE 20 MG: 5 INJECTION INTRAVENOUS at 22:56

## 2024-04-08 RX ADMIN — LISINOPRIL 20 MG: 10 TABLET ORAL at 19:24

## 2024-04-08 RX ADMIN — HYDROCHLOROTHIAZIDE 25 MG: 25 TABLET ORAL at 19:12

## 2024-04-08 RX ADMIN — ISOSORBIDE MONONITRATE 30 MG: 30 TABLET, EXTENDED RELEASE ORAL at 19:12

## 2024-04-08 RX ADMIN — METOPROLOL SUCCINATE 100 MG: 100 TABLET, EXTENDED RELEASE ORAL at 19:12

## 2024-04-08 ASSESSMENT — COLUMBIA-SUICIDE SEVERITY RATING SCALE - C-SSRS
2. HAVE YOU ACTUALLY HAD ANY THOUGHTS OF KILLING YOURSELF IN THE PAST MONTH?: NO
1. IN THE PAST MONTH, HAVE YOU WISHED YOU WERE DEAD OR WISHED YOU COULD GO TO SLEEP AND NOT WAKE UP?: NO
6. HAVE YOU EVER DONE ANYTHING, STARTED TO DO ANYTHING, OR PREPARED TO DO ANYTHING TO END YOUR LIFE?: NO

## 2024-04-08 ASSESSMENT — ACTIVITIES OF DAILY LIVING (ADL)
ADLS_ACUITY_SCORE: 38

## 2024-04-08 NOTE — ED TRIAGE NOTES
Pt presents with concern of bilateral leg swelling. She reports this usually resolves overnight and it hasn't been. Pt also concerned for diarrhea for last week

## 2024-04-08 NOTE — ED PROVIDER NOTES
History     Chief Complaint   Patient presents with    Leg Swelling    Diarrhea     HPI  Eliza Dubois is a 83 year old female with history of hypertension, hyperlipidemia, esophageal reflux, irritable bowel syndrome, T2DM, stage III kidney disease, CAD (s/p cardiac stent x 1 to the right coronary artery in 2016), and GERD who presents for evaluation of bilateral lower extremity swelling, diarrhea, epigastric discomfort/frequent burping, nausea, and headache.  Symptoms started 2 days ago.  Chills, but no objective fevers.  She is drinking and eating normally.  She states she is burping a lot and having intermittent upper abdominal discomfort (none now).  Intermittent nausea but no vomiting.  Today she had 3-4 watery stools.  Feeling weak. Denies black or bloody stool.  Denies urinary symptoms.  Denies chest pain or shortness of breath.   Patient has a very elevated blood pressure here on arrival 236/103.   She is not currently having chest pain.  She believes she took all of her blood pressure medications last evening, but she does seem a bit vague about this.  She does indicate that her burping and upper abdominal symptoms feel similar to when she had heart stent placed several years ago.    She had a JUNE in 2016 when she had her heart catheterization.  At that time it showed mild left atrial enlargement, left ventricular hypertrophy, EF 60%, and diastolic abnormality, normal left ventricular systolic function, and trace mitral regurgitation.    Allergies:  Allergies   Allergen Reactions    Sulfa Antibiotics Hives       Problem List:    Patient Active Problem List    Diagnosis Date Noted    Weakness 01/28/2021     Priority: Medium    Vaccine reaction, initial encounter 01/28/2021     Priority: Medium    Fatigue, unspecified type 01/28/2021     Priority: Medium    History of COVID-19 01/28/2021     Priority: Medium    Bilateral pleural effusion 12/13/2020     Priority: Medium    Bilateral pneumonia 12/13/2020      Priority: Medium    Pneumonia due to 2019 novel coronavirus 12/12/2020     Priority: Medium    Prerenal azotemia 12/12/2020     Priority: Medium    Hypophosphatemia 12/11/2020     Priority: Medium    Closed fracture of proximal end of left humerus with routine healing 12/09/2020     Priority: Medium    Nausea vomiting and diarrhea 12/09/2020     Priority: Medium    Hypomagnesemia 12/09/2020     Priority: Medium    Generalized muscle weakness 12/08/2020     Priority: Medium    E. coli UTI 12/08/2020     Priority: Medium    2019 novel coronavirus disease (COVID-19) 12/08/2020     Priority: Medium    Stage 3b chronic kidney disease (H) 05/27/2019     Priority: Medium    Type 2 diabetes mellitus with diabetic polyneuropathy, without long-term current use of insulin (H) 10/11/2016     Priority: Medium    Essential hypertension with goal blood pressure less than 140/90 09/12/2016     Priority: Medium    Gastroesophageal reflux disease without esophagitis 05/13/2016     Priority: Medium    Coronary artery disease involving native coronary artery of native heart without angina pectoris 02/25/2016     Priority: Medium    Major depressive disorder, recurrent episode, moderate (H) 11/10/2015     Priority: Medium    Anxiety 05/08/2012     Priority: Medium    Insomnia 05/03/2012     Priority: Medium    Hypertension goal BP (blood pressure) < 140/90 01/20/2011     Priority: Medium    Hyperlipidemia LDL goal <100 10/31/2010     Priority: Medium    Esophageal reflux 08/30/2007     Priority: Medium    Irritable bowel syndrome 09/25/2006     Priority: Medium        Past Medical History:    Past Medical History:   Diagnosis Date    Diabetic eye exam (H) 05/01/14    Heart attack (H)     Pure hypercholesterolemia     Stented coronary artery     Type II or unspecified type diabetes mellitus without mention of complication, not stated as uncontrolled 2002    Unspecified disease of pericardium 12/05/08    Unspecified essential  hypertension        Past Surgical History:    Past Surgical History:   Procedure Laterality Date    ABDOMEN SURGERY      COLONOSCOPY  04/15/09    COLONOSCOPY  6/18/2014    Procedure: COMBINED COLONOSCOPY, SINGLE BIOPSY/POLYPECTOMY BY BIOPSY;  Surgeon: Earle Mon MD;  Location:  GI     LAPAROSCOPY, SURGICAL; CHOLECYSTECTOMY  1997    Cholecystectomy, Laparoscopic    PHACOEMULSIFICATION CLEAR CORNEA WITH STANDARD INTRAOCULAR LENS IMPLANT Right 3/16/2016    Procedure: PHACOEMULSIFICATION CLEAR CORNEA WITH STANDARD INTRAOCULAR LENS IMPLANT;  Surgeon: George Lemus MD;  Location:  [facility]    PHACOEMULSIFICATION CLEAR CORNEA WITH STANDARD INTRAOCULAR LENS IMPLANT Left 3/30/2016    Procedure: PHACOEMULSIFICATION CLEAR CORNEA WITH STANDARD INTRAOCULAR LENS IMPLANT;  Surgeon: George Lemus MD;  Location:  [facility]    ZZC NONSPECIFIC PROCEDURE  1988    Hysterectomy       Family History:    Family History   Problem Relation Age of Onset    Diabetes Mother     Arthritis Mother     Heart Disease Mother     Hypertension Mother     Lipids Mother     Neurologic Disorder Mother         neuropathy    Osteoporosis Mother     Obesity Mother     EYE* Mother         blind    Diabetes Father     Genitourinary Problems Father         gall stones    Heart Disease Father         MI    Cancer Maternal Grandmother     Heart Disease Maternal Grandmother     Cancer Other         Grandparents    Alcohol/Drug No family hx of     Alzheimer Disease No family hx of     Anesthesia Reaction No family hx of     Blood Disease No family hx of     Depression No family hx of     Genetic Disorder No family hx of     Gastrointestinal Disease No family hx of     Gynecology No family hx of     Psychotic Disorder No family hx of     Respiratory No family hx of     Cerebrovascular Disease No family hx of        Social History:  Marital Status:   [5]  Social History     Tobacco Use    Smoking status: Never    Smokeless tobacco: Never   Vaping Use     Vaping Use: Never used   Substance Use Topics    Alcohol use: No     Alcohol/week: 0.0 standard drinks of alcohol    Drug use: No        Medications:    aspirin (ASA) 81 MG EC tablet  atorvastatin (LIPITOR) 80 MG tablet  clopidogrel (PLAVIX) 75 MG tablet  gabapentin (NEURONTIN) 300 MG capsule  glipiZIDE (GLUCOTROL) 10 MG tablet  isosorbide mononitrate (IMDUR) 30 MG 24 hr tablet  lisinopril-hydrochlorothiazide (ZESTORETIC) 20-25 MG tablet  metFORMIN (GLUCOPHAGE XR) 500 MG 24 hr tablet  metoprolol succinate ER (TOPROL XL) 100 MG 24 hr tablet  omeprazole (PRILOSEC) 40 MG DR capsule  liraglutide (VICTOZA) 18 MG/3ML solution          Review of Systems  As mentioned above in the history present illness. All other systems were reviewed and are negative.    Physical Exam   BP: (!) 236/103  Pulse: 84  Temp: 98  F (36.7  C)  Resp: 18  SpO2: 96 %      Physical Exam  Constitutional:       General: She is not in acute distress.     Appearance: Normal appearance. She is well-developed. She is not ill-appearing.   HENT:      Head: Normocephalic and atraumatic.      Right Ear: External ear normal.      Left Ear: External ear normal.      Nose: Nose normal.      Mouth/Throat:      Mouth: Mucous membranes are moist.   Eyes:      Conjunctiva/sclera: Conjunctivae normal.   Cardiovascular:      Rate and Rhythm: Normal rate and regular rhythm.      Heart sounds: Normal heart sounds. No murmur heard.  Pulmonary:      Effort: Pulmonary effort is normal. No respiratory distress.      Breath sounds: Normal breath sounds.   Abdominal:      General: Bowel sounds are normal. There is no distension.      Palpations: Abdomen is soft.      Tenderness: There is no abdominal tenderness.   Musculoskeletal:         General: Normal range of motion.      Right lower leg: Edema present.      Left lower leg: Edema present.   Skin:     General: Skin is warm and dry.      Findings: No rash.   Neurological:      General: No focal deficit present.       Mental Status: She is alert and oriented to person, place, and time.         ED Course     ED Course as of 04/08/24 2348   Mon Apr 08, 2024 1931 Creatinine(!): 1.89  Evidence of JIM, could be related to recent diarrhea/dehydration.   1932 Troponin T, High Sensitivity(!): 97  Will repeat in 2 hours.   2007 Hemoglobin(!): 8.0  Trending down, was 9.2 last year.   2316 I consulted with cardiology at [facility], Dr. Ashby.  States he has low concern for acute coronary syndrome and patient does not need heart catheterization based on the findings thus far.  The mild elevation in troponin is likely demand ischemia related to her hypertensive urgency and CHF.  He recommends diuresis and slow-modest-cautious decrease in her BP. Does not recommend being too aggressive getting the BP down. He states a goal of 160-170 sytolic overnight, and stresses going slow.     Procedures              EKG Interpretation:      Interpreted by ANKITA Molina CNP  Time reviewed: 6:56 pm   Symptoms at time of EKG: None   Rhythm: Normal sinus, low voltage   Rate: Normal  Axis: Right Axis Deviation  Ectopy: None  Conduction: Normal  ST Segments/ T Waves: No ST-T wave changes and No acute ischemic changes  Q Waves: None  Comparison to prior: Unchanged compared to 12/12/2022 and 12/12/2020    Clinical Impression: NSR with no acute ischemic changes.         Results for orders placed or performed during the hospital encounter of 04/08/24 (from the past 24 hour(s))   CBC with platelets differential    Narrative    The following orders were created for panel order CBC with platelets differential.  Procedure                               Abnormality         Status                     ---------                               -----------         ------                     CBC with platelets and d...[[id number]]  Abnormal            Final result                 Please view results for these tests on the individual orders.    Comprehensive metabolic panel   Result Value Ref Range    Sodium 142 135 - 145 mmol/L    Potassium 4.0 3.4 - 5.3 mmol/L    Carbon Dioxide (CO2) 24 22 - 29 mmol/L    Anion Gap 12 7 - 15 mmol/L    Urea Nitrogen 31.8 (H) 8.0 - 23.0 mg/dL    Creatinine 1.89 (H) 0.51 - 0.95 mg/dL    GFR Estimate 26 (L) >60 mL/min/1.73m2    Calcium 7.5 (L) 8.8 - 10.2 mg/dL    Chloride 106 98 - 107 mmol/L    Glucose 282 (H) 70 - 99 mg/dL    Alkaline Phosphatase 118 40 - 150 U/L    AST 21 0 - 45 U/L    ALT 15 0 - 50 U/L    Protein Total 6.6 6.4 - 8.3 g/dL    Albumin 3.5 3.5 - 5.2 g/dL    Bilirubin Total 0.3 <=1.2 mg/dL   Troponin T, High Sensitivity   Result Value Ref Range    Troponin T, High Sensitivity 97 (H) <=14 ng/L   Lipase   Result Value Ref Range    Lipase 29 13 - 60 U/L   CBC with platelets and differential   Result Value Ref Range    WBC Count 6.2 4.0 - 11.0 10e3/uL    RBC Count 3.43 (L) 3.80 - 5.20 10e6/uL    Hemoglobin 8.0 (L) 11.7 - 15.7 g/dL    Hematocrit 26.8 (L) 35.0 - 47.0 %    MCV 78 78 - 100 fL    MCH 23.3 (L) 26.5 - 33.0 pg    MCHC 29.9 (L) 31.5 - 36.5 g/dL    RDW 17.1 (H) 10.0 - 15.0 %    Platelet Count 153 150 - 450 10e3/uL    % Neutrophils 72 %    % Lymphocytes 18 %    % Monocytes 7 %    % Eosinophils 1 %    % Basophils 0 %    % Immature Granulocytes 1 %    NRBCs per 100 WBC 0 <1 /100    Absolute Neutrophils 4.5 1.6 - 8.3 10e3/uL    Absolute Lymphocytes 1.1 0.8 - 5.3 10e3/uL    Absolute Monocytes 0.5 0.0 - 1.3 10e3/uL    Absolute Eosinophils 0.1 0.0 - 0.7 10e3/uL    Absolute Basophils 0.0 0.0 - 0.2 10e3/uL    Absolute Immature Granulocytes 0.0 <=0.4 10e3/uL    Absolute NRBCs 0.0 10e3/uL   Nt probnp inpatient (BNP)   Result Value Ref Range    N terminal Pro BNP Inpatient 22,716 (H) 0 - 1,800 pg/mL   UA with Microscopic reflex to Culture    Specimen: Urine, Midstream   Result Value Ref Range    Color Urine Straw Colorless, Straw, Light Yellow, Yellow    Appearance Urine Clear Clear    Glucose Urine >499 (A) Negative  mg/dL    Bilirubin Urine Negative Negative    Ketones Urine Negative Negative mg/dL    Specific Gravity Urine 1.011 1.003 - 1.035    Blood Urine Small (A) Negative    pH Urine 7.0 5.0 - 7.0    Protein Albumin Urine >499 (A) Negative mg/dL    Urobilinogen Urine Normal Normal, 2.0 mg/dL    Nitrite Urine Negative Negative    Leukocyte Esterase Urine Negative Negative    Mucus Urine Present (A) None Seen /LPF    RBC Urine <1 <=2 /HPF    WBC Urine 0 <=5 /HPF    Squamous Epithelials Urine <1 <=1 /HPF    Narrative    Urine Culture not indicated   XR Chest 2 Views    Narrative    EXAM: XR CHEST 2 VIEWS  LOCATION: [facility]  DATE: 4/8/2024    INDICATION: epigastric pain, nausea  COMPARISON: 01/28/2021      Impression    IMPRESSION: Stable slight fibrosis in the left lung base. Shallow inspiration. No acute new findings.   Troponin T, High Sensitivity   Result Value Ref Range    Troponin T, High Sensitivity 99 (H) <=14 ng/L       Medications   labetalol (NORMODYNE/TRANDATE) injection 20 mg (has no administration in time range)   isosorbide mononitrate (IMDUR) 24 hr tablet 30 mg (30 mg Oral $Given 4/8/24 1912)   metoprolol succinate ER (TOPROL XL) 24 hr tablet 100 mg (100 mg Oral $Given 4/8/24 1912)   lisinopril (ZESTRIL) tablet 20 mg (20 mg Oral $Given 4/8/24 1924)     And   hydrochlorothiazide (HYDRODIURIL) tablet 25 mg (25 mg Oral $Given 4/8/24 1912)   sodium chloride 0.9% BOLUS 500 mL (0 mLs Intravenous Stopped 4/8/24 2015)   furosemide (LASIX) injection 60 mg (60 mg Intravenous $Given 4/8/24 2034)   labetalol (NORMODYNE/TRANDATE) injection 20 mg (20 mg Intravenous $Given 4/8/24 2256)       Assessments & Plan (with Medical Decision Making)     83 year old female with history of hypertension, hyperlipidemia, esophageal reflux, irritable bowel syndrome, T2DM, stage III kidney disease, CAD (s/p cardiac stent x 1 to the right coronary artery in 2016), and GERD who presents for evaluation of  bilateral lower extremity swelling, diarrhea, epigastric discomfort/frequent burping, nausea, and headache.  Symptoms started 2 days ago.   Work-up thus far concerning for acute CHF and hypertensive urgency and JIM.  She arrives here alert and oriented. Denies current chest pain, shortness of breath or abdominal pain.  No abdominal tenderness. She has bilateral LE edema.  Lung sounds CTA. No hypoxia.     Hgb 8.0 which is trended down over the last year, was 9.2 last year. This could also cause some of her generalized weakness. She denies any black or bloody stools.  BUN 31.8 and creatinine 1.89 (prior creat 1.57 a couple months ago). Could be related to her diarrhea.  Normal LFTs, normal Lipase.  Nt-Bnp 22,716 and Serial Troponin 97 and 99.   CXR reveals stable fibrosis in the left lung base, no acute findings.  EKG NSR, low voltage, no acute ischemic changes.    BP on arrival is 236/103. She reports a mild headache but this resolved without intervention.  She denies chest pain or shortness of breath.  Her troponin elevation may be related to demand ischemia, but also concern for hypertensive urgency.  It is also concerning that she tells me her intermittent epigastric discomfort and burping feels similar to when she needed cardiac stent in 2016.      Patient given  ml bolus (given her diarrhea), needing to use caution with concern for heart failure.She was given IV lasix 60 mg. She was also given her evening dose of her blood pressure medications (isosorbide, metoprolol, hydrochlorothiazide, and lisinopril).  Her blood pressure is trending down, but still elevated. She has remained asymptomatic here at this time.  She is no longer having headache.  No dizziness.  No chest pain.  I did speak with on-call hospitalist about admission here, but given we do not have cardiology here he is concerned about hypertensive urgency, female, and her symptoms feeling similar to when she had cardiac stent in 2016.       Unfortunately, there are no beds at [facility] or at [facility], however, I did consult cardiology at [facility] and spoke with Dr. Ashby. He states patient does not need transfer where there is cardiology and could be admitted here.  He has low suspicion for acute coronary syndrome causing patient's symptoms.  Based on her workup and labs he does not feel she will need intervention or angiogram.  He does indicate that her troponin elevation is likely related to demand ischemia due to her hypertensive urgency and possibly CHF.  He recommends diuresis and very modest treatment for her hypertension.  He recommends going very slow with a goal systolic -170 overnight.  Strongly advises to not be too aggressive in getting the blood pressure down too fast.     I did [place] back with the on-call hospitalist here, Dr. Frazier who still recommends transfer to where there is cardiology.  Patient remains on the wait list in the [facility] system for a available bed.  She will be kept in the emergency department as a border.  Will recheck another troponin in the morning.  I have signed patient out to Dr. Zheng, emergency physician.    Temp: 98  F (36.7  C) Temp src: Oral BP: (!) 211/96 Pulse: 79   Resp: (!) 7 SpO2: 95 %          New Prescriptions    No medications on file       Final diagnoses:   Acute congestive heart failure, unspecified heart failure type (H)   Diarrhea, unspecified type   Generalized weakness   Hypertensive urgency       4/8/2024   [facility] EMERGENCY DEPT       Bailey Pryor, ANKITA ROJO  04/08/24 0785

## 2024-04-09 ENCOUNTER — APPOINTMENT (OUTPATIENT)
Dept: CARDIOLOGY | Facility: CLINIC | Age: 84
End: 2024-04-09
Attending: FAMILY MEDICINE
Payer: MEDICARE

## 2024-04-09 ENCOUNTER — TELEPHONE (OUTPATIENT)
Dept: EMERGENCY MEDICINE | Facility: CLINIC | Age: 84
End: 2024-04-09

## 2024-04-09 VITALS
HEART RATE: 73 BPM | OXYGEN SATURATION: 98 % | DIASTOLIC BLOOD PRESSURE: 92 MMHG | SYSTOLIC BLOOD PRESSURE: 187 MMHG | TEMPERATURE: 98 F | RESPIRATION RATE: 10 BRPM

## 2024-04-09 LAB
HOLD SPECIMEN: NORMAL
LVEF ECHO: NORMAL
TROPONIN T SERPL HS-MCNC: 107 NG/L

## 2024-04-09 PROCEDURE — 93306 TTE W/DOPPLER COMPLETE: CPT | Mod: 26 | Performed by: INTERNAL MEDICINE

## 2024-04-09 PROCEDURE — 93306 TTE W/DOPPLER COMPLETE: CPT

## 2024-04-09 PROCEDURE — 84484 ASSAY OF TROPONIN QUANT: CPT | Performed by: NURSE PRACTITIONER

## 2024-04-09 PROCEDURE — 250N000011 HC RX IP 250 OP 636: Performed by: NURSE PRACTITIONER

## 2024-04-09 PROCEDURE — 36415 COLL VENOUS BLD VENIPUNCTURE: CPT | Performed by: NURSE PRACTITIONER

## 2024-04-09 PROCEDURE — 82962 GLUCOSE BLOOD TEST: CPT

## 2024-04-09 PROCEDURE — 250N000013 HC RX MED GY IP 250 OP 250 PS 637: Performed by: NURSE PRACTITIONER

## 2024-04-09 PROCEDURE — 96376 TX/PRO/DX INJ SAME DRUG ADON: CPT | Mod: 59 | Performed by: EMERGENCY MEDICINE

## 2024-04-09 RX ORDER — FUROSEMIDE 20 MG
20 TABLET ORAL DAILY
Qty: 14 TABLET | Refills: 0 | Status: SHIPPED | OUTPATIENT
Start: 2024-04-09 | End: 2024-06-20

## 2024-04-09 RX ORDER — METFORMIN HCL 500 MG
1000 TABLET, EXTENDED RELEASE 24 HR ORAL 2 TIMES DAILY WITH MEALS
Status: DISCONTINUED | OUTPATIENT
Start: 2024-04-09 | End: 2024-04-09 | Stop reason: HOSPADM

## 2024-04-09 RX ORDER — FUROSEMIDE 40 MG
40 TABLET ORAL DAILY
Qty: 21 TABLET | Refills: 0 | Status: SHIPPED | OUTPATIENT
Start: 2024-04-09 | End: 2024-04-09

## 2024-04-09 RX ORDER — DEXTROSE MONOHYDRATE 25 G/50ML
25-50 INJECTION, SOLUTION INTRAVENOUS
Status: DISCONTINUED | OUTPATIENT
Start: 2024-04-09 | End: 2024-04-09 | Stop reason: HOSPADM

## 2024-04-09 RX ORDER — NICOTINE POLACRILEX 4 MG
15-30 LOZENGE BUCCAL
Status: DISCONTINUED | OUTPATIENT
Start: 2024-04-09 | End: 2024-04-09 | Stop reason: HOSPADM

## 2024-04-09 RX ORDER — CLOPIDOGREL BISULFATE 75 MG/1
75 TABLET ORAL AT BEDTIME
Status: DISCONTINUED | OUTPATIENT
Start: 2024-04-09 | End: 2024-04-09 | Stop reason: HOSPADM

## 2024-04-09 RX ORDER — LISINOPRIL AND HYDROCHLOROTHIAZIDE 20; 25 MG/1; MG/1
2 TABLET ORAL DAILY
Qty: 28 TABLET | Refills: 0 | Status: SHIPPED | OUTPATIENT
Start: 2024-04-09 | End: 2024-05-01

## 2024-04-09 RX ORDER — GABAPENTIN 300 MG/1
300 CAPSULE ORAL AT BEDTIME
Status: DISCONTINUED | OUTPATIENT
Start: 2024-04-09 | End: 2024-04-09 | Stop reason: HOSPADM

## 2024-04-09 RX ORDER — GLIPIZIDE 5 MG/1
20 TABLET ORAL
Status: DISCONTINUED | OUTPATIENT
Start: 2024-04-09 | End: 2024-04-09 | Stop reason: HOSPADM

## 2024-04-09 RX ORDER — PANTOPRAZOLE SODIUM 40 MG/1
40 TABLET, DELAYED RELEASE ORAL 2 TIMES DAILY
Status: DISCONTINUED | OUTPATIENT
Start: 2024-04-09 | End: 2024-04-09 | Stop reason: HOSPADM

## 2024-04-09 RX ADMIN — PANTOPRAZOLE SODIUM 40 MG: 40 TABLET, DELAYED RELEASE ORAL at 00:27

## 2024-04-09 RX ADMIN — GLIPIZIDE 20 MG: 5 TABLET ORAL at 07:36

## 2024-04-09 RX ADMIN — CLOPIDOGREL BISULFATE 75 MG: 75 TABLET ORAL at 00:27

## 2024-04-09 RX ADMIN — GABAPENTIN 300 MG: 300 CAPSULE ORAL at 00:27

## 2024-04-09 RX ADMIN — PANTOPRAZOLE SODIUM 40 MG: 40 TABLET, DELAYED RELEASE ORAL at 09:25

## 2024-04-09 RX ADMIN — LABETALOL HYDROCHLORIDE 20 MG: 5 INJECTION INTRAVENOUS at 00:27

## 2024-04-09 ASSESSMENT — ACTIVITIES OF DAILY LIVING (ADL)
ADLS_ACUITY_SCORE: 38

## 2024-04-09 NOTE — TELEPHONE ENCOUNTER
Left message for patient with detailed appointment information, requested that patient call back to reschedule if appointment does not work as well.    Mimi Gutiérrez XRO/

## 2024-04-09 NOTE — MEDICATION SCRIBE - ADMISSION MEDICATION HISTORY
Medication Scribe Admission Medication History    Admission medication history is complete. The information provided in this note is only as accurate as the sources available at the time of the update.    Information Source(s): Patient and CareEverywhere/SureScripts via in-person    Pertinent Information: patient takes all scheduled meds at bedtime except glipizide and metformin at mealtime but skips those if she doesn't eat. Patient does not test blood sugars.    Changes made to PTA medication list:  Added: None  Deleted: BG testing supplies  Changed: aspirin & plavix & imdur & zestoretic & metoprolol & prilosec all to bedtime    Allergies reviewed with patient and updates made in EHR: yes    Medication History Completed By: JOHN PEREZ 4/8/2024 7:29 PM    PTA Med List   Medication Sig Last Dose    aspirin (ASA) 81 MG EC tablet Take 1 tablet (81 mg) by mouth daily (Patient taking differently: Take 81 mg by mouth at bedtime) 4/7/2024 at hs    atorvastatin (LIPITOR) 80 MG tablet Take 1 tablet (80 mg) by mouth at bedtime 4/7/2024 at hs    clopidogrel (PLAVIX) 75 MG tablet Take 1 tablet (75 mg) by mouth daily (Patient taking differently: Take 75 mg by mouth at bedtime) 4/7/2024 at hs    gabapentin (NEURONTIN) 300 MG capsule TAKE ONE TO TWO CAPSULES BY MOUTH EVERY EVENING (Patient taking differently: Take 300 mg by mouth at bedtime) 4/7/2024 at hs    glipiZIDE (GLUCOTROL) 10 MG tablet Take 2 tablets (20 mg) by mouth 2 times daily (before meals) (Patient taking differently: Take 20 mg by mouth 2 times daily (before meals) Skips dose if fasting) 4/7/2024 at [facility]    isosorbide mononitrate (IMDUR) 30 MG 24 hr tablet Take 1 tablet (30 mg) by mouth daily (Patient taking differently: Take 30 mg by mouth at bedtime) 4/7/2024 at hs    lisinopril-hydrochlorothiazide (ZESTORETIC) 20-25 MG tablet Take 1 tablet by mouth daily (Patient taking differently: Take 1 tablet by mouth at bedtime) 4/7/2024 at hs    metFORMIN (GLUCOPHAGE  XR) 500 MG 24 hr tablet TAKE TWO TABLETS BY MOUTH TWICE A DAY WITH MEALS (Patient taking differently: Take 1,000 mg by mouth 2 times daily (with meals) Skips dose if fasting) 4/7/2024 at [facility]    metoprolol succinate ER (TOPROL XL) 100 MG 24 hr tablet Take 1 tablet (100 mg) by mouth daily (Patient taking differently: Take 100 mg by mouth at bedtime) 4/7/2024 at     omeprazole (PRILOSEC) 40 MG DR capsule Take 1 capsule (40 mg) by mouth daily TAKE ONE CAPSULE BY MOUTH TWICE A DAY AS NEEDED Strength: 40 mg (Patient taking differently: Take 40 mg by mouth at bedtime) 4/7/2024 at hs

## 2024-04-09 NOTE — ED PROVIDER NOTES
CHANGE OF SHIFT SIGN OVER NOTE    Patient was signed over to me at change of shift from Dr. Colby.  Please see his note for further details but briefly she is a 83 year old female who presented to the ER approximately 14 hours ago secondary to both pain, GI discomfort, generally not feeling well with generalized weakness.  She did not have any chest discomfort or shortness of breath.  She also complained of some bilateral lower extremity edema.  She had slept in the recliner.  She does have a history of coronary artery disease with stent placement 2016.  She has not recently had new onset PND or HEART.    Pertinent history physical and laboratory findings included elevated BNP of 22,000, troponin elevated in the 90s followed by a third troponin of 107.  Blood pressure was markedly elevated on arrival, chronic anemia is slightly worse with hemoglobin of 8, chronic renal insufficiency is near baseline.    There was concern for congestive heart failure and therefore hospitalist was consulted and they did not feel comfortable keeping the patient here last night due to the elevated troponin.  Cardiology was consulted who felt that the patient could be taken care of here and recommended echocardiogram.  Patient was held in the emergency department overnight.  Echocardiogram was performed which showed no new acute findings.  Chest x-ray is clear of congestive heart failure signs.  No ischemic findings on ECG.  There is no new diastolic dysfunction severity or new depression of ejection fraction.  Patient feels well enough to go home.    She lives alone but her grandson can come help.  She now feels better.    Diagnosis: GI upset, anemia, chronic renal insufficiency, hypertensive urgency, generalized weakness.    Work up pending to follow up on: Formal echocardiogram results    Plan/anticipated disposition: Discharge home with outpatient follow-up with cardiology.  I discussed this case with cardiology.  I also discussed  that with the provider who saw the patient initially, Vivi Pryor.  Patient did come in with bilateral lower extremity edema.  She states that she feels markedly better and her skin does not feel tight over her legs and arms anymore.  Her BNP was markedly elevated at 22,000.  Her blood pressure remained fairly high here despite giving her another dose of lisinopril 40 mg.  She never did have any dyspnea or chest discomfort.  I discussed the case with on-call cardiology and given that she does not have any regional wall motion abnormalities and no ongoing shortness of breath and has had improvement with diuresis it was felt that she was stable for discharge home.  I recommended that she increase her Zestoretic and start a small dose of Lasix.  Those prescriptions were sent to her pharmacy.  Follow-up with primary care and cardiology recommended.  All questions answered prior to discharge.  I discussed these recommendations with the patient's daughter as well.  I also advised her to monitor her blood pressure possible and follow-up with primary care.  Given her new hemoglobin of 8 and ongoing intestinal discomfort with a history of irritable bowel I put in referral for gastroenterology.       Erick Correa MD  04/09/24 2008

## 2024-04-09 NOTE — ED PROVIDER NOTES
I assumed patient's care at change of shift from Tod Pryor CNP,   Patient is an 83-year-old female with a history of hypertension, stage III kidney disease, type 2 diabetes mellitus, coronary artery disease status post stent back in 2016 along with irritable bowel syndrome who presented tonight with bilateral lower extremity swelling, diarrhea some epigastric discomfort, nausea and headache.  Blood pressure is noted to be elevated.  Symptoms have been going on for 2 days.  Also had some diarrhea x 3-4 today.    EKG showed no acute ischemic changes.  Troponin was elevated but remained stable on recheck.  Tele-hospitalist was not comfortable keeping her here and requested that she be transferred somewhere that cardiology could care for her.  Cardiology was consulted by phone and was supportive of keeping her here.  Would not be doing an angiogram.  They recommended gently treating her blood pressure but not overdoing it and 160 systolic would certainly be quite reasonable.  She was given gentle normal saline bolus because of her diarrhea and acute kidney injury.  BNP elevated at 22,000.    After cardiology was consulted by Tod Pryor, they felt she could be kept h ere as there was low suspicion for ACS.  He recommended diuresis and modest treatment of her hypertension.  She has received some IV labetalol.    Tod spoke again with Dr. Frazier, the telemetry hospitalist on-call who still did not feel comfortable keeping her here.  We will board her in the ED [facility] to see if they telemetry bed opens up at another facility although I suspect that our hospitalists in house will be able to take her in the morning.  We will recheck her troponin at 0600 to trend.    Third troponin at 6 AM essentially stable at 107.  Echocardiogram ordered for this morning.  Dr. Correa assumed her care at change of shift.  See his note for final diagnosis and disposition.     Bernardo Hopper MD  04/09/24 0800

## 2024-04-09 NOTE — DISCHARGE INSTRUCTIONS
I sent in prescriptions for your Zestoretic and a prescription for Lasix.  He will need laboratory follow-up regarding your low hemoglobin, chronic renal insufficiency etc.  Lasix can also cause decreased potassium.  Please make an appointment with your primary care provider for follow-up.  Please consider getting a blood pressure cuff or getting it measured regularly so you can keep track of what your blood pressure is running.  Your echocardiogram looked good here today.  I am glad your legs are less swollen.  I hope this helps.  It was a pleasure to meet you.

## 2024-04-10 LAB — GLUCOSE BLDC GLUCOMTR-MCNC: 187 MG/DL (ref 70–99)

## 2024-04-12 ENCOUNTER — TRANSFERRED RECORDS (OUTPATIENT)
Dept: HEALTH INFORMATION MANAGEMENT | Facility: CLINIC | Age: 84
End: 2024-04-12
Payer: COMMERCIAL

## 2024-04-12 LAB — RETINOPATHY: POSITIVE

## 2024-04-16 ENCOUNTER — OFFICE VISIT (OUTPATIENT)
Dept: INTERNAL MEDICINE | Facility: CLINIC | Age: 84
End: 2024-04-16
Payer: COMMERCIAL

## 2024-04-16 VITALS
HEART RATE: 92 BPM | BODY MASS INDEX: 21.11 KG/M2 | DIASTOLIC BLOOD PRESSURE: 74 MMHG | SYSTOLIC BLOOD PRESSURE: 140 MMHG | WEIGHT: 123 LBS | OXYGEN SATURATION: 96 % | TEMPERATURE: 97.6 F | RESPIRATION RATE: 16 BRPM

## 2024-04-16 DIAGNOSIS — I10 HYPERTENSION GOAL BP (BLOOD PRESSURE) < 140/90: ICD-10-CM

## 2024-04-16 DIAGNOSIS — N18.32 STAGE 3B CHRONIC KIDNEY DISEASE (H): ICD-10-CM

## 2024-04-16 DIAGNOSIS — N30.00 ACUTE CYSTITIS WITHOUT HEMATURIA: ICD-10-CM

## 2024-04-16 DIAGNOSIS — I50.30 HEART FAILURE WITH PRESERVED EJECTION FRACTION, NYHA CLASS I (H): Primary | ICD-10-CM

## 2024-04-16 DIAGNOSIS — E11.42 TYPE 2 DIABETES MELLITUS WITH DIABETIC POLYNEUROPATHY, WITHOUT LONG-TERM CURRENT USE OF INSULIN (H): ICD-10-CM

## 2024-04-16 DIAGNOSIS — R19.7 DIARRHEA OF PRESUMED INFECTIOUS ORIGIN: ICD-10-CM

## 2024-04-16 DIAGNOSIS — R41.0 CONFUSION: ICD-10-CM

## 2024-04-16 LAB
ALBUMIN SERPL BCG-MCNC: 3.5 G/DL (ref 3.5–5.2)
ALP SERPL-CCNC: 125 U/L (ref 40–150)
ALT SERPL W P-5'-P-CCNC: 10 U/L (ref 0–50)
ANION GAP SERPL CALCULATED.3IONS-SCNC: 21 MMOL/L (ref 7–15)
AST SERPL W P-5'-P-CCNC: 27 U/L (ref 0–45)
BASOPHILS # BLD MANUAL: 0 10E3/UL (ref 0–0.2)
BASOPHILS NFR BLD MANUAL: 0 %
BILIRUB SERPL-MCNC: 0.8 MG/DL
BUN SERPL-MCNC: 47 MG/DL (ref 8–23)
CALCIUM SERPL-MCNC: 6.7 MG/DL (ref 8.8–10.2)
CHLORIDE SERPL-SCNC: 99 MMOL/L (ref 98–107)
CREAT SERPL-MCNC: 2.38 MG/DL (ref 0.51–0.95)
DEPRECATED HCO3 PLAS-SCNC: 19 MMOL/L (ref 22–29)
EGFRCR SERPLBLD CKD-EPI 2021: 20 ML/MIN/1.73M2
EOSINOPHIL # BLD MANUAL: 0 10E3/UL (ref 0–0.7)
EOSINOPHIL NFR BLD MANUAL: 0 %
ERYTHROCYTE [DISTWIDTH] IN BLOOD BY AUTOMATED COUNT: 17.8 % (ref 10–15)
FOLATE SERPL-MCNC: 17.3 NG/ML (ref 4.6–34.8)
GLUCOSE SERPL-MCNC: 263 MG/DL (ref 70–99)
HCT VFR BLD AUTO: 34.1 % (ref 35–47)
HGB BLD-MCNC: 9.9 G/DL (ref 11.7–15.7)
LYMPHOCYTES # BLD MANUAL: 1.8 10E3/UL (ref 0.8–5.3)
LYMPHOCYTES NFR BLD MANUAL: 10 %
MCH RBC QN AUTO: 23.5 PG (ref 26.5–33)
MCHC RBC AUTO-ENTMCNC: 29 G/DL (ref 31.5–36.5)
MCV RBC AUTO: 81 FL (ref 78–100)
METAMYELOCYTES # BLD MANUAL: 0.5 10E3/UL
METAMYELOCYTES NFR BLD MANUAL: 3 %
MONOCYTES # BLD MANUAL: 0.4 10E3/UL (ref 0–1.3)
MONOCYTES NFR BLD MANUAL: 2 %
NEUTROPHILS # BLD MANUAL: 15.4 10E3/UL (ref 1.6–8.3)
NEUTROPHILS NFR BLD MANUAL: 85 %
NRBC # BLD AUTO: 0 10E3/UL
NRBC BLD AUTO-RTO: 0 /100
PLAT MORPH BLD: ABNORMAL
PLATELET # BLD AUTO: 362 10E3/UL (ref 150–450)
POTASSIUM SERPL-SCNC: 4.4 MMOL/L (ref 3.4–5.3)
PROT SERPL-MCNC: 7.7 G/DL (ref 6.4–8.3)
RBC # BLD AUTO: 4.22 10E6/UL (ref 3.8–5.2)
RBC MORPH BLD: ABNORMAL
SODIUM SERPL-SCNC: 139 MMOL/L (ref 135–145)
WBC # BLD AUTO: 18.1 10E3/UL (ref 4–11)

## 2024-04-16 PROCEDURE — 85025 COMPLETE CBC W/AUTO DIFF WBC: CPT | Performed by: INTERNAL MEDICINE

## 2024-04-16 PROCEDURE — 80053 COMPREHEN METABOLIC PANEL: CPT | Performed by: INTERNAL MEDICINE

## 2024-04-16 PROCEDURE — 99214 OFFICE O/P EST MOD 30 MIN: CPT | Performed by: INTERNAL MEDICINE

## 2024-04-16 PROCEDURE — 36415 COLL VENOUS BLD VENIPUNCTURE: CPT | Performed by: INTERNAL MEDICINE

## 2024-04-16 PROCEDURE — 82746 ASSAY OF FOLIC ACID SERUM: CPT | Performed by: INTERNAL MEDICINE

## 2024-04-16 PROCEDURE — 82607 VITAMIN B-12: CPT | Performed by: INTERNAL MEDICINE

## 2024-04-16 RX ORDER — CHOLESTYRAMINE LIGHT 4 G/5.7G
4 POWDER, FOR SUSPENSION ORAL 2 TIMES DAILY
Qty: 42 PACKET | Refills: 0 | Status: SHIPPED | OUTPATIENT
Start: 2024-04-16 | End: 2024-05-01

## 2024-04-16 RX ORDER — RESPIRATORY SYNCYTIAL VIRUS VACCINE 120MCG/0.5
0.5 KIT INTRAMUSCULAR ONCE
Qty: 1 EACH | Refills: 0 | Status: CANCELLED | OUTPATIENT
Start: 2024-04-16 | End: 2024-04-16

## 2024-04-16 ASSESSMENT — PATIENT HEALTH QUESTIONNAIRE - PHQ9
SUM OF ALL RESPONSES TO PHQ QUESTIONS 1-9: 5
SUM OF ALL RESPONSES TO PHQ QUESTIONS 1-9: 5
10. IF YOU CHECKED OFF ANY PROBLEMS, HOW DIFFICULT HAVE THESE PROBLEMS MADE IT FOR YOU TO DO YOUR WORK, TAKE CARE OF THINGS AT HOME, OR GET ALONG WITH OTHER PEOPLE: NOT DIFFICULT AT ALL

## 2024-04-16 NOTE — PROGRESS NOTES
Assessment & Plan     Heart failure with preserved ejection fraction, NYHA class I (H)  Patient presents to the office today for follow-up visit for acute congestive heart failure that was evaluated in the emergency department on 4/08/2024.  Patient has since been improving and is currently taking lisinopril hydrochlorothiazide furosemide.  No changes recommended at this time.    Diarrhea of presumed infectious origin  Patient has complaints of chronic diarrhea.  Recommended that patient takes Questran to help prevent fluid loss and limit her symptoms.  Labs as listed below ordered at this time to assess fluid loss, electrolyte levels, and a potential cause for her chronic diarrhea.  - Comprehensive metabolic panel (BMP + Alb, Alk Phos, ALT, AST, Total. Bili, TP)  - Enteric Bacteria and Virus Panel by SHAY Stool  - cholestyramine light (QUESTRAN) 4 GM packet  Dispense: 42 packet; Refill: 0  - C. difficile Toxin B PCR with reflex to C. difficile Antigen and Toxins A/B EIA  - Comprehensive metabolic panel (BMP + Alb, Alk Phos, ALT, AST, Total. Bili, TP)  - Enteric Bacteria and Virus Panel by SHAY Stool  - C. difficile Toxin B PCR with reflex to C. difficile Antigen and Toxins A/B EIA    Confusion  Patient presents today mildly confused and lethargic.  Patient reports a recent fall in which she did not hit her head.  She currently lives alone in an apartment in [place], and is currently not driving.  She seems self-aware of her confusion.  Labs and imaging as listed below ordered at this time.  - CBC with platelets and differential  - Comprehensive metabolic panel (BMP + Alb, Alk Phos, ALT, AST, Total. Bili, TP)  - UA Macroscopic with reflex to Microscopic and Culture - Lab Collect  - Vitamin B12  - CT Head w/o Contrast  - Folate  - CBC with platelets and differential  - Comprehensive metabolic panel (BMP + Alb, Alk Phos, ALT, AST, Total. Bili, TP)  - UA Macroscopic with reflex to Microscopic and Culture - Lab  Collect  - Vitamin B12  - Folate    Stage 3b chronic kidney disease (H)  Patient has a history of CKD that is controlled on lisinopril hydrochlorothiazide and furosemide.  CMP was done in the ER.  GFR was 26 creatinine was 1.89 and urea nitrogen was 31.8.  These results have worsened since she was assessed in January.  Labs repeated at this time to reassess kidney function.  - Albumin Random Urine Quantitative with Creat Ratio  - Albumin Random Urine Quantitative with Creat Ratio  - Comprehensive metabolic panel (BMP + Alb, Alk Phos, ALT, AST, Total. Bili, TP)    Type 2 diabetes mellitus with diabetic polyneuropathy, without long-term current use of insulin (H)  Patient is a type II diabetic and is currently controlled using glipizide, Victoza, and metformin.  Last A1c was 8.6. Recent UA in ER showed glucose urine of >499.     Hypertension goal BP (blood pressure) < 140/90  Patient is currently controlled on lisinopril hydrochlorothiazide.  Blood pressure today in office was 140/74.  Reassess hypertension in a different time.  Some underlying etiologies could be the cause of this elevation.          MED REC REQUIRED  Post Medication Reconciliation Status:       MEDICATIONS:  Continue current medications without change  Regular exercise    Subjective   Eliza is a 83 year old, presenting for the following health issues:  ER F/U        4/16/2024    10:18 AM   Additional Questions   Roomed by Delicia MEDINA MA     [facility]       ED/UC Followup:    Facility:  [facility]  Date of visit: 4/8/24  Reason for visit: Leg swelling and Diarrhea  Current Status: diarrhea is back and the swelling has stayed away    Patient states she also just recently it felt like her bones were twisted and she couldn't get up        Review of Systems  CONSTITUTIONAL: NEGATIVE for fever, chills, change in weight  INTEGUMENTARY/SKIN: NEGATIVE for worrisome rashes, moles or lesions  EYES: NEGATIVE for vision changes or  irritation  ENT/MOUTH: NEGATIVE for ear, mouth and throat problems  RESP: NEGATIVE for significant cough or SOB  CV: NEGATIVE for chest pain, palpitations or peripheral edema  GI: NEGATIVE for nausea, abdominal pain, heartburn. Positive for diarrhea  : NEGATIVE for frequency, dysuria, or hematuria  MUSCULOSKELETAL: NEGATIVE for significant arthralgias or myalgia  NEURO: NEGATIVE for weakness, dizziness or paresthesias  ENDOCRINE: NEGATIVE for temperature intolerance, skin/hair changes  HEME: NEGATIVE for bleeding problems  PSYCHIATRIC: NEGATIVE for changes in mood or affect. Positive for increased confusion       Objective    BP (!) 140/74   Pulse 92   Temp 97.6  F (36.4  C) (Temporal)   Resp 16   Wt 55.8 kg (123 lb)   SpO2 96%   BMI 21.11 kg/m    Body mass index is 21.11 kg/m .  Physical Exam   GENERAL: alert and no distress  RESP: lungs clear to auscultation - no rales, rhonchi or wheezes  CV: regular rate and rhythm, normal S1 S2, no S3 or S4, no murmur, click or rub, no peripheral edema  MS: no gross musculoskeletal defects noted, no edema  SKIN: no suspicious lesions or rashes  NEURO: Normal strength and tone, mentation intact and speech normal  PSYCH: mentation appears normal but delayed, short term Memory seems to be intact, gait is unsteady. No deficits in gross and fine motor function.         This patient has been interviewed, examined, diagnosed, and informed of the above by me personally.  Medical records and available pertinent information has been reviewed by me personally.  All decisions and discussion have been between myself and the patient/family.  This was done in the presence of Guero Wright , who acted as a medical scribe and recorded the events above.  No diagnosis or decision making was made by the above-mentioned scribe.  The patient, and or his/her ensurors will not be billed for the presence or actions of this scribe.  The information recorded by the scribe has been reviewed by me  and found to be accurate.      Signed Electronically by: Byron Holm,

## 2024-04-18 LAB — VIT B12 SERPL-MCNC: 223 PG/ML (ref 232–1245)

## 2024-04-19 LAB
ADV 40+41 DNA STL QL NAA+NON-PROBE: NEGATIVE
ALBUMIN UR-MCNC: >499 MG/DL
APPEARANCE UR: ABNORMAL
ASTRO TYP 1-8 RNA STL QL NAA+NON-PROBE: NEGATIVE
BACTERIA #/AREA URNS HPF: ABNORMAL /HPF
BILIRUB UR QL STRIP: NEGATIVE
C CAYETANENSIS DNA STL QL NAA+NON-PROBE: NEGATIVE
C DIFF TOX B STL QL: NEGATIVE
CAMPYLOBACTER DNA SPEC NAA+PROBE: NEGATIVE
COLOR UR AUTO: YELLOW
CREAT UR-MCNC: 74.2 MG/DL
CRYPTOSP DNA STL QL NAA+NON-PROBE: NEGATIVE
E COLI O157 DNA STL QL NAA+NON-PROBE: NORMAL
E HISTOLYT DNA STL QL NAA+NON-PROBE: NEGATIVE
EAEC ASTA GENE ISLT QL NAA+PROBE: NEGATIVE
EC STX1+STX2 GENES STL QL NAA+NON-PROBE: NEGATIVE
EPEC EAE GENE STL QL NAA+NON-PROBE: NEGATIVE
ETEC LTA+ST1A+ST1B TOX ST NAA+NON-PROBE: NEGATIVE
G LAMBLIA DNA STL QL NAA+NON-PROBE: NEGATIVE
GLUCOSE UR STRIP-MCNC: NEGATIVE MG/DL
HGB UR QL STRIP: ABNORMAL
HYALINE CASTS: 11 /LPF
KETONES UR STRIP-MCNC: NEGATIVE MG/DL
LEUKOCYTE ESTERASE UR QL STRIP: ABNORMAL
MICROALBUMIN UR-MCNC: 765.5 MG/L
MICROALBUMIN/CREAT UR: 1031.67 MG/G CR (ref 0–25)
MUCOUS THREADS #/AREA URNS LPF: PRESENT /LPF
NITRATE UR QL: NEGATIVE
NOROVIRUS GI+II RNA STL QL NAA+NON-PROBE: NEGATIVE
P SHIGELLOIDES DNA STL QL NAA+NON-PROBE: NEGATIVE
PH UR STRIP: 5 [PH] (ref 5–7)
RBC URINE: 3 /HPF
RVA RNA STL QL NAA+NON-PROBE: NEGATIVE
SALMONELLA SP RPOD STL QL NAA+PROBE: NEGATIVE
SAPO I+II+IV+V RNA STL QL NAA+NON-PROBE: NEGATIVE
SHIGELLA SP+EIEC IPAH ST NAA+NON-PROBE: NEGATIVE
SP GR UR STRIP: 1.01 (ref 1–1.03)
SQUAMOUS EPITHELIAL: 8 /HPF
TRANSITIONAL EPI: 4 /HPF
UROBILINOGEN UR STRIP-MCNC: NORMAL MG/DL
V CHOLERAE DNA SPEC QL NAA+PROBE: NEGATIVE
VIBRIO DNA SPEC NAA+PROBE: NEGATIVE
WBC CLUMPS #/AREA URNS HPF: PRESENT /HPF
WBC URINE: 107 /HPF
Y ENTEROCOL DNA STL QL NAA+PROBE: NEGATIVE

## 2024-04-19 PROCEDURE — 87493 C DIFF AMPLIFIED PROBE: CPT | Mod: 59 | Performed by: INTERNAL MEDICINE

## 2024-04-19 PROCEDURE — 87086 URINE CULTURE/COLONY COUNT: CPT | Performed by: INTERNAL MEDICINE

## 2024-04-19 PROCEDURE — 87507 IADNA-DNA/RNA PROBE TQ 12-25: CPT | Mod: GZ | Performed by: INTERNAL MEDICINE

## 2024-04-19 PROCEDURE — 82043 UR ALBUMIN QUANTITATIVE: CPT | Performed by: INTERNAL MEDICINE

## 2024-04-19 PROCEDURE — 81001 URINALYSIS AUTO W/SCOPE: CPT | Performed by: INTERNAL MEDICINE

## 2024-04-19 PROCEDURE — 87088 URINE BACTERIA CULTURE: CPT | Performed by: INTERNAL MEDICINE

## 2024-04-19 PROCEDURE — 82570 ASSAY OF URINE CREATININE: CPT | Performed by: INTERNAL MEDICINE

## 2024-04-19 PROCEDURE — 87186 SC STD MICRODIL/AGAR DIL: CPT | Performed by: INTERNAL MEDICINE

## 2024-04-21 LAB
BACTERIA UR CULT: ABNORMAL
BACTERIA UR CULT: ABNORMAL

## 2024-04-22 ENCOUNTER — TELEPHONE (OUTPATIENT)
Dept: INTERNAL MEDICINE | Facility: CLINIC | Age: 84
End: 2024-04-22
Payer: COMMERCIAL

## 2024-04-22 RX ORDER — CEPHALEXIN 500 MG/1
500 CAPSULE ORAL 3 TIMES DAILY
Qty: 21 CAPSULE | Refills: 0 | Status: SHIPPED | OUTPATIENT
Start: 2024-04-22 | End: 2024-06-20

## 2024-04-22 NOTE — TELEPHONE ENCOUNTER
----- Message from Byron Holm,  sent at 4/22/2024 12:11 PM CDT -----  Please call the patient and inform her of the infection and the awaiting prescriptions.    Thank you.  Mihai              Dear Eliza, your recent test results are attached.    Your recent urinary analysis confirms the presence of urinary tract infection.  Antibiotics have been sent to your pharmacy.  We will need to recheck the urine upon completion of the antibiotics.  Significant protein loss through the kidneys is noted.  Vitamin B12 level is slightly decreased at 223.  Blood cell count shows an elevated white blood count of 18.1.  This is consistent with infection.  Additionally, hemoglobin is decreased at 9.9 consistent with anemia.  This is essentially unchanged from previous values.  Chemistry panel shows an elevated blood sugar of 263 with chronically decreased but stable kidney function.  Liver tests are normal.  Stool bacteria is negative.  C. difficile testing is negative.  Folic acid is within normal limits.      At this time we need to treat the urinary tract infection and this may improve some of your confusion    You will be contacted with any outstanding results as they are available.  Feel free to contact me via the office or My Chart if you have any questions regarding the above.

## 2024-04-23 ENCOUNTER — HOSPITAL ENCOUNTER (OUTPATIENT)
Dept: CT IMAGING | Facility: CLINIC | Age: 84
Discharge: HOME | End: 2024-04-23
Attending: INTERNAL MEDICINE | Admitting: INTERNAL MEDICINE
Payer: MEDICARE

## 2024-04-23 DIAGNOSIS — R41.0 CONFUSION: ICD-10-CM

## 2024-04-23 PROCEDURE — 70450 CT HEAD/BRAIN W/O DYE: CPT | Mod: MG

## 2024-04-24 ENCOUNTER — OFFICE VISIT (OUTPATIENT)
Dept: INTERNAL MEDICINE | Facility: CLINIC | Age: 84
End: 2024-04-24
Payer: COMMERCIAL

## 2024-04-24 VITALS
BODY MASS INDEX: 21.9 KG/M2 | WEIGHT: 127.6 LBS | DIASTOLIC BLOOD PRESSURE: 80 MMHG | TEMPERATURE: 96.9 F | HEART RATE: 76 BPM | RESPIRATION RATE: 16 BRPM | OXYGEN SATURATION: 98 % | SYSTOLIC BLOOD PRESSURE: 156 MMHG

## 2024-04-24 DIAGNOSIS — R41.0 CONFUSION: Primary | ICD-10-CM

## 2024-04-24 DIAGNOSIS — E11.42 TYPE 2 DIABETES MELLITUS WITH DIABETIC POLYNEUROPATHY, WITHOUT LONG-TERM CURRENT USE OF INSULIN (H): ICD-10-CM

## 2024-04-24 DIAGNOSIS — N30.00 ACUTE CYSTITIS WITHOUT HEMATURIA: ICD-10-CM

## 2024-04-24 DIAGNOSIS — I10 HYPERTENSION GOAL BP (BLOOD PRESSURE) < 140/90: ICD-10-CM

## 2024-04-24 DIAGNOSIS — I50.30 HEART FAILURE WITH PRESERVED EJECTION FRACTION, NYHA CLASS I (H): ICD-10-CM

## 2024-04-24 DIAGNOSIS — N18.32 STAGE 3B CHRONIC KIDNEY DISEASE (H): ICD-10-CM

## 2024-04-24 PROCEDURE — 99214 OFFICE O/P EST MOD 30 MIN: CPT | Performed by: INTERNAL MEDICINE

## 2024-04-24 RX ORDER — HYDRALAZINE HYDROCHLORIDE 25 MG/1
25 TABLET, FILM COATED ORAL 3 TIMES DAILY
Qty: 30 TABLET | Refills: 0 | Status: SHIPPED | OUTPATIENT
Start: 2024-04-24 | End: 2024-05-01

## 2024-04-24 RX ORDER — RESPIRATORY SYNCYTIAL VIRUS VACCINE 120MCG/0.5
0.5 KIT INTRAMUSCULAR ONCE
Qty: 1 EACH | Refills: 0 | Status: CANCELLED | OUTPATIENT
Start: 2024-04-24 | End: 2024-04-24

## 2024-04-24 ASSESSMENT — PAIN SCALES - GENERAL: PAINLEVEL: NO PAIN (0)

## 2024-04-24 NOTE — PROGRESS NOTES
Assessment & Plan     Confusion  Patient presents today to discuss acute confusion/malaise and recent results of CT scan.  CT scan results indicated no acute pathology.  Patient does have an acute UTI which may be the cause of her acute confusion.    Acute cystitis without hematuria  Patient had a positive culture of Klebsiella and has been prescribed cephalexin.  Advised patient to start this medication as soon as possible.  Repeat ultrasound in a week as listed below.  - UA Macroscopic with reflex to Microscopic and Culture - Lab Collect    Hypertension goal BP (blood pressure) < 140/90  Patient's kidney function has been decreasing and as a result lisinopril hydrochlorothiazide has been reduced to 1 tablet daily.  Hydralazine has been added at this time as listed below.  Blood pressure today is 156/80.  - hydrALAZINE (APRESOLINE) 25 MG tablet  Dispense: 30 tablet; Refill: 0    Stage 3b chronic kidney disease (H)  Patient's kidney function has been slowly decreasing as of late with a GFR of 20 on 4/16/2024.  As a result lisinopril hydrochlorothiazide has been decreased to see if this improves kidney function.  Labs as listed below ordered at this time.  - Basic metabolic panel  (Ca, Cl, CO2, Creat, Gluc, K, Na, BUN)    Type 2 diabetes mellitus with diabetic polyneuropathy, without long-term current use of insulin (H)  Patient was recently prescribed Victoza which was uncovered by insurance company and was on affordable.  Ozempic ordered at this time to see if this medication is covered. Her diabetes is not controled on any other medication.   - semaglutide (OZEMPIC) 2 MG/3ML pen  Dispense: 3 mL; Refill: 0      MEDICATIONS:  Continue current medications without change  FUTURE LABS:       - UA in 1 week labs before appointment  FUTURE APPOINTMENTS:       - Follow-up visit in 1 week   Regular exercise    Daisy Kay is a 83 year old, presenting for the following health issues:  Hypertension and RECHECK  (Follow up ct scan)        4/24/2024     1:49 PM   Additional Questions   Roomed by Serenity COTTON   Accompanied by Lucy, daughter     History of Present Illness       CKD: She is not using over the counter pain medicine.     Heart Failure:  She presents for follow up of heart failure. She is not experiencing shortness of breath at night, with rest or with activity  She is experiencing lower extremity edema which is same as usual.   She denies orthopenea and is not coughing at night. Patient is not checking weight daily. She has recently had a None.  She has side effects from medications including dizziness, fatigue, cough and swelling.  She has had no other medical visits for heart failure since the last visit.    Hypertension: She presents for follow up of hypertension.  She does not check blood pressure  regularly outside of the clinic. Outside blood pressures have been over 140/90. She does not follow a low salt diet.     She eats 0-1 servings of fruits and vegetables daily.She consumes 2 sweetened beverage(s) daily.She exercises with enough effort to increase her heart rate 9 or less minutes per day.  She exercises with enough effort to increase her heart rate 3 or less days per week. She is missing 2 dose(s) of medications per week.     Last Echo:   Echo result w/o MOPS: Interpretation Summary 1. The left ventricle is normal in size. The visual ejection fraction is 50-55%.2. Grade I or early diastolic dysfunction.3. The right ventricle is normal in structure, function and size.4. No valve disease. No changes from echo in 2009.            Review of Systems  CONSTITUTIONAL: NEGATIVE for fever, chills, change in weight  INTEGUMENTARY/SKIN: NEGATIVE for worrisome rashes, moles or lesions  EYES: NEGATIVE for vision changes or irritation  ENT/MOUTH: NEGATIVE for ear, mouth and throat problems  RESP: NEGATIVE for significant cough or SOB  CV: NEGATIVE for chest pain, palpitations or peripheral edema  GI: NEGATIVE for  nausea, abdominal pain, heartburn, or change in bowel habits  : NEGATIVE for frequency, dysuria, or hematuria  MUSCULOSKELETAL: NEGATIVE for significant arthralgias or myalgia  NEURO: NEGATIVE for weakness, dizziness or paresthesias  ENDOCRINE: NEGATIVE for temperature intolerance, skin/hair changes  HEME: NEGATIVE for bleeding problems  PSYCHIATRIC: Positive for confusion       Objective    BP (!) 156/80 (BP Location: Right arm, Patient Position: Chair)   Pulse 76   Temp 96.9  F (36.1  C) (Temporal)   Resp 16   Wt 57.9 kg (127 lb 9.6 oz)   SpO2 98%   BMI 21.90 kg/m    Body mass index is 21.9 kg/m .  Physical Exam   GENERAL: alert and no distress  RESP: lungs clear to auscultation - no rales, rhonchi or wheezes  CV: regular rate and rhythm, normal S1 S2, no S3 or S4, no murmur, click or rub, no peripheral edema  MS: no gross musculoskeletal defects noted, no edema  SKIN: no suspicious lesions or rashes  NEURO: Normal strength and tone, mentation intact and speech normal  PSYCH: mentation appears normal, affect normal/bright       This patient has been interviewed, examined, diagnosed, and informed of the above by me personally.  Medical records and available pertinent information has been reviewed by me personally.  All decisions and discussion have been between myself and the patient/family.  This was done in the presence of Guero Wright , who acted as a medical scribe and recorded the events above.  No diagnosis or decision making was made by the above-mentioned scribe.  The patient, and or his/her ensurors will not be billed for the presence or actions of this scribe.  The information recorded by the scribe has been reviewed by me and found to be accurate.      Signed Electronically by: Byron Holm DO

## 2024-04-26 ENCOUNTER — TELEPHONE (OUTPATIENT)
Dept: GASTROENTEROLOGY | Facility: CLINIC | Age: 84
End: 2024-04-26
Payer: COMMERCIAL

## 2024-04-26 NOTE — TELEPHONE ENCOUNTER
Patient was called to remind of upcoming appointment scheduled on Tuesday April 30, 2024 at 10:30 AM with Williams Burton PA-C in the Gastroenterology clinic located in [place]. Instructed patient to arrive 15 minutes early for check in and informed patient that we're located on the second floor. Patient will follow up as scheduled.  Provided contact information in case rescheduling is necessary.      Lyssa Gabriel, RADHA

## 2024-04-30 ENCOUNTER — OFFICE VISIT (OUTPATIENT)
Dept: GASTROENTEROLOGY | Facility: CLINIC | Age: 84
End: 2024-04-30
Attending: EMERGENCY MEDICINE
Payer: COMMERCIAL

## 2024-04-30 VITALS
HEIGHT: 64 IN | BODY MASS INDEX: 22.47 KG/M2 | DIASTOLIC BLOOD PRESSURE: 78 MMHG | OXYGEN SATURATION: 97 % | WEIGHT: 131.6 LBS | HEART RATE: 87 BPM | SYSTOLIC BLOOD PRESSURE: 188 MMHG

## 2024-04-30 DIAGNOSIS — K52.9 CHRONIC DIARRHEA: ICD-10-CM

## 2024-04-30 DIAGNOSIS — K21.9 GASTROESOPHAGEAL REFLUX DISEASE WITHOUT ESOPHAGITIS: ICD-10-CM

## 2024-04-30 LAB — CRP SERPL-MCNC: <3 MG/L

## 2024-04-30 PROCEDURE — 86364 TISS TRNSGLTMNASE EA IG CLAS: CPT | Performed by: PHYSICIAN ASSISTANT

## 2024-04-30 PROCEDURE — 99204 OFFICE O/P NEW MOD 45 MIN: CPT | Performed by: PHYSICIAN ASSISTANT

## 2024-04-30 PROCEDURE — 36415 COLL VENOUS BLD VENIPUNCTURE: CPT | Performed by: PHYSICIAN ASSISTANT

## 2024-04-30 PROCEDURE — 86140 C-REACTIVE PROTEIN: CPT | Performed by: PHYSICIAN ASSISTANT

## 2024-04-30 PROCEDURE — 82784 ASSAY IGA/IGD/IGG/IGM EACH: CPT | Performed by: PHYSICIAN ASSISTANT

## 2024-04-30 RX ORDER — LOPERAMIDE HYDROCHLORIDE 2 MG/1
2 TABLET ORAL DAILY
Qty: 30 TABLET | Refills: 2 | Status: SHIPPED | OUTPATIENT
Start: 2024-04-30

## 2024-04-30 ASSESSMENT — PAIN SCALES - GENERAL: PAINLEVEL: MILD PAIN (3)

## 2024-04-30 NOTE — LETTER
4/30/2024         RE: Eliza Dubois  [address]  [place] [address]        Dear Colleague,    Thank you for referring your patient, Eliza Dubois, to the [facility]. Please see a copy of my visit note below.    NEW PATIENT GI CLINIC VISIT    CC/REFERRING MD:  Erick Correa  REASON FOR CONSULTATION:   Erick Correa for   Chief Complaint   Patient presents with     New Patient     New consult for GERD, abdominal bloating, abdominal pain, and diarrhea.  ED visit on 04/08/2024.       ASSESSMENT/PLAN: Patient here for evaluation of a few concerns.  Regarding her upper GI symptoms, these appear to be largely well-controlled on omeprazole 40 mg daily.  She will continue on this, reviewed antireflux precautions regarding diet and lifestyle, also reviewed red flag symptoms that would prompt EGD.  She will monitor closely.  She also has been dealing with chronic diarrhea.  Recent negative infectious testing.  Reviewed other possibilities, including malabsorptive and inflammatory causes.  IBS is also a possibility.  Plan for labs as detailed below.  If she does have an elevated fecal calprotectin, would consider colonoscopy to rule out microscopic colitis.  We can otherwise try to manage this with antimotility agent.  She will trial loperamide and titrate to effective dose.  Patient and daughter stated understanding of plan and I will reach out to soon as labs are completed.    1. Chronic diarrhea  - Adult GI  Referral - Consult Only  - CRP, inflammation; Future  - Calprotectin Feces; Future  - Tissue transglutaminase angelita IgA and IgG; Future  - IgA; Future  - Elastase Fecal; Future  - loperamide (IMODIUM A-D) 2 MG tablet; Take 1 tablet (2 mg) by mouth daily  Dispense: 30 tablet; Refill: 2  - CRP, inflammation  - Calprotectin Feces  - Tissue transglutaminase angelita IgA and IgG  - IgA  - Elastase Fecal    2. Gastroesophageal reflux disease without  esophagitis  - Adult GI  Referral - Consult Only      Thank you for this consultation.  It was a pleasure to participate in the care of this patient; please contact us with any further questions.      30 minutes spent on the date of the encounter doing chart review, patient visit, and documentation    This note was created with voice recognition software, and while reviewed for accuracy, typos may remain.     Williams Burton PA-C  Division of Gastroenterology, Hepatology and Nutrition  [facility]  Eliza Dubois is a 83 year old female with a past medical history significant for type II DM, hypertension with stage IIIb CKD, CAD status post stent in 2016, hyperlipidemia, recurrent UTI, anxiety, and HFpEF that is seen as a new patient in the GI clinic today for evaluation of chronic GERD/dyspepsia and chronic diarrhea.  Patient is accompanied by her daughter, Neyda, who helps provide history.  To review, the patient was admitted to the hospital briefly about 3 weeks ago with heart failure exacerbation and acute on chronic GI distress symptoms as detailed above.  She did undergo stool testing to rule out C. difficile and enteric pathogens.  She previously has been diagnosed with IBS.  She notes that since that hospital stay, she has continued to have occasional GERD symptoms.  She takes omeprazole 40 mg daily and this largely controls it.  She is not having any current heartburn, reflux, chest pain, dysphagia, odynophagia, vomiting, or regurgitation.  She does tend to eat smaller meals throughout the day.  She does not have any clear dietary triggers for upper GI distress.  Regarding her diarrhea, this has been chronic.  She wears depends because she typically gets urgency and can have fecal incontinence.  She can go upwards of 3-4 times per day.  No nighttime stools.  Has not seen any blood or mucus in the stool.  She can have some associated abdominal  cramping and bloating when passing stool.  Again, no clear dietary triggers for this.  Has not used any medications recently for this, I do see a previous prescription for cholestyramine, though she does not recall taking it.  Not using any fiber supplement or antidiarrheal.  Most recent colonoscopy was 2014, notable for diverticulosis, diminutive tubular adenomas.  Surgical history pertinent for cholecystectomy and hysterectomy.  No pertinent family medical history of GI disease.  No tobacco, alcohol, or drug use.  ROS:    10 point ROS neg other than the symptoms noted above in the HPI.    PREVIOUS ENDOSCOPY:  Reviewed, see HPI    PERTINENT RELEVANT IMAGING OR LABS:  Reviewed    ALLERGIES:     Allergies   Allergen Reactions     Sulfa Antibiotics Hives       PERTINENT MEDICATIONS:    Current Outpatient Medications:      aspirin (ASA) 81 MG EC tablet, Take 1 tablet (81 mg) by mouth daily (Patient taking differently: Take 81 mg by mouth at bedtime), Disp: 30 tablet, Rfl: 0     atorvastatin (LIPITOR) 80 MG tablet, Take 1 tablet (80 mg) by mouth at bedtime, Disp: 90 tablet, Rfl: 3     clopidogrel (PLAVIX) 75 MG tablet, Take 1 tablet (75 mg) by mouth daily (Patient taking differently: Take 75 mg by mouth at bedtime), Disp: 90 tablet, Rfl: 3     gabapentin (NEURONTIN) 300 MG capsule, TAKE ONE TO TWO CAPSULES BY MOUTH EVERY EVENING, Disp: 180 capsule, Rfl: 3     glipiZIDE (GLUCOTROL) 10 MG tablet, Take 2 tablets (20 mg) by mouth 2 times daily (before meals) (Patient taking differently: Take 20 mg by mouth 2 times daily (before meals) Skips dose if fasting), Disp: 360 tablet, Rfl: 3     hydrALAZINE (APRESOLINE) 25 MG tablet, Take 1 tablet (25 mg) by mouth 3 times daily, Disp: 30 tablet, Rfl: 0     isosorbide mononitrate (IMDUR) 30 MG 24 hr tablet, Take 1 tablet (30 mg) by mouth daily (Patient taking differently: Take 30 mg by mouth at bedtime), Disp: 90 tablet, Rfl: 3     loperamide (IMODIUM A-D) 2 MG tablet, Take 1 tablet  (2 mg) by mouth daily, Disp: 30 tablet, Rfl: 2     metoprolol succinate ER (TOPROL XL) 100 MG 24 hr tablet, Take 1 tablet (100 mg) by mouth daily (Patient taking differently: Take 100 mg by mouth at bedtime), Disp: 90 tablet, Rfl: 3     omeprazole (PRILOSEC) 40 MG DR capsule, Take 1 capsule (40 mg) by mouth daily TAKE ONE CAPSULE BY MOUTH TWICE A DAY AS NEEDED Strength: 40 mg (Patient taking differently: Take 40 mg by mouth at bedtime), Disp: 90 capsule, Rfl: 3     semaglutide (OZEMPIC) 2 MG/3ML pen, Inject 0.25 mg Subcutaneous every 7 days, Disp: 3 mL, Rfl: 0     cephALEXin (KEFLEX) 500 MG capsule, Take 1 capsule (500 mg) by mouth 3 times daily (Patient not taking: Reported on 4/24/2024), Disp: 21 capsule, Rfl: 0     cholestyramine light (QUESTRAN) 4 GM packet, Take 1 packet (4 g) by mouth 2 times daily (Patient not taking: Reported on 4/24/2024), Disp: 42 packet, Rfl: 0     furosemide (LASIX) 20 MG tablet, Take 1 tablet (20 mg) by mouth daily for 14 days, Disp: 14 tablet, Rfl: 0     lisinopril-hydrochlorothiazide (ZESTORETIC) 20-25 MG tablet, Take 2 tablets by mouth daily for 14 days, Disp: 28 tablet, Rfl: 0     metFORMIN (GLUCOPHAGE XR) 500 MG 24 hr tablet, TAKE TWO TABLETS BY MOUTH TWICE A DAY WITH MEALS (Patient not taking: Reported on 4/16/2024), Disp: 360 tablet, Rfl: 3    PROBLEM LIST  Patient Active Problem List    Diagnosis Date Noted     Heart failure with preserved ejection fraction, NYHA class I (H) 04/24/2024     Priority: Medium     Weakness 01/28/2021     Priority: Medium     Vaccine reaction, initial encounter 01/28/2021     Priority: Medium     Fatigue, unspecified type 01/28/2021     Priority: Medium     History of COVID-19 01/28/2021     Priority: Medium     Bilateral pleural effusion 12/13/2020     Priority: Medium     Bilateral pneumonia 12/13/2020     Priority: Medium     Pneumonia due to 2019 novel coronavirus 12/12/2020     Priority: Medium     Prerenal azotemia 12/12/2020     Priority:  Medium     Hypophosphatemia 12/11/2020     Priority: Medium     Closed fracture of proximal end of left humerus with routine healing 12/09/2020     Priority: Medium     Nausea vomiting and diarrhea 12/09/2020     Priority: Medium     Hypomagnesemia 12/09/2020     Priority: Medium     Generalized muscle weakness 12/08/2020     Priority: Medium     E. coli UTI 12/08/2020     Priority: Medium     2019 novel coronavirus disease (COVID-19) 12/08/2020     Priority: Medium     Stage 3b chronic kidney disease (H) 05/27/2019     Priority: Medium     Type 2 diabetes mellitus with diabetic polyneuropathy, without long-term current use of insulin (H) 10/11/2016     Priority: Medium     Essential hypertension with goal blood pressure less than 140/90 09/12/2016     Priority: Medium     Gastroesophageal reflux disease without esophagitis 05/13/2016     Priority: Medium     Coronary artery disease involving native coronary artery of native heart without angina pectoris 02/25/2016     Priority: Medium     Major depressive disorder, recurrent episode, moderate (H) 11/10/2015     Priority: Medium     Anxiety 05/08/2012     Priority: Medium     Insomnia 05/03/2012     Priority: Medium     Hypertension goal BP (blood pressure) < 140/90 01/20/2011     Priority: Medium     Hyperlipidemia LDL goal <100 10/31/2010     Priority: Medium     Esophageal reflux 08/30/2007     Priority: Medium     Irritable bowel syndrome 09/25/2006     Priority: Medium       PERTINENT PAST MEDICAL HISTORY:  Past Medical History:   Diagnosis Date     Diabetic eye exam (H) 05/01/14     Heart attack (H)      Pure hypercholesterolemia      Stented coronary artery      Type II or unspecified type diabetes mellitus without mention of complication, not stated as uncontrolled 2002    [place]and[place]et.     Unspecified disease of pericardium 12/05/08    Pericarditis w/mild to moderate pericardial effusion.     Unspecified essential hypertension        PREVIOUS SURGERIES:  Past  Surgical History:   Procedure Laterality Date     ABDOMEN SURGERY       COLONOSCOPY  04/15/09     COLONOSCOPY  6/18/2014    Procedure: COMBINED COLONOSCOPY, SINGLE BIOPSY/POLYPECTOMY BY BIOPSY;  Surgeon: Earle Mon MD;  Location: [facility] LAPAROSCOPY, SURGICAL; CHOLECYSTECTOMY  1997    Cholecystectomy, Laparoscopic     PHACOEMULSIFICATION CLEAR CORNEA WITH STANDARD INTRAOCULAR LENS IMPLANT Right 3/16/2016    Procedure: PHACOEMULSIFICATION CLEAR CORNEA WITH STANDARD INTRAOCULAR LENS IMPLANT;  Surgeon: George Lemus MD;  Location:  [facility]     PHACOEMULSIFICATION CLEAR CORNEA WITH STANDARD INTRAOCULAR LENS IMPLANT Left 3/30/2016    Procedure: PHACOEMULSIFICATION CLEAR CORNEA WITH STANDARD INTRAOCULAR LENS IMPLANT;  Surgeon: George Lemus MD;  Location:  [facility]     ZZC NONSPECIFIC PROCEDURE  1988    Hysterectomy       SOCIAL HISTORY:  Social History     Socioeconomic History     Marital status:      Spouse name: Not on file     Number of children: Not on file     Years of education: Not on file     Highest education level: Not on file   Occupational History     Not on file   Tobacco Use     Smoking status: Never     Smokeless tobacco: Never   Vaping Use     Vaping status: Never Used   Substance and Sexual Activity     Alcohol use: No     Alcohol/week: 0.0 standard drinks of alcohol     Drug use: No     Sexual activity: Yes     Partners: Male     Birth control/protection: Surgical   Other Topics Concern     Parent/sibling w/ CABG, MI or angioplasty before 65F 55M? Not Asked   Social History Narrative     Not on file     Social Determinants of Health     Financial Resource Strain: Low Risk  (12/27/2023)    Financial Resource Strain      Within the past 12 months, have you or your family members you live with been unable to get utilities (heat, electricity) when it was really needed?: No   Food Insecurity: Low Risk  (12/27/2023)    Food Insecurity      Within the past 12 months, did you worry that  your food would run out before you got money to buy more?: No      Within the past 12 months, did the food you bought just not last and you didn t have money to get more?: No   Recent Concern: Food Insecurity - High Risk (12/12/2023)    Food Insecurity      Within the past 12 months, did you worry that your food would run out before you got money to buy more?: Yes      Within the past 12 months, did the food you bought just not last and you didn t have money to get more?: No   Transportation Needs: Low Risk  (12/27/2023)    Transportation Needs      Within the past 12 months, has lack of transportation kept you from medical appointments, getting your medicines, non-medical meetings or appointments, work, or from getting things that you need?: No   Physical Activity: Not on file   Stress: Not on file   Social Connections: Not on file   Interpersonal Safety: Low Risk  (12/27/2023)    Interpersonal Safety      Do you feel physically and emotionally safe where you currently live?: Yes      Within the past 12 months, have you been hit, slapped, kicked or otherwise physically hurt by someone?: No      Within the past 12 months, have you been humiliated or emotionally abused in other ways by your partner or ex-partner?: No   Housing Stability: Low Risk  (12/27/2023)    Housing Stability      Do you have housing? : Yes      Are you worried about losing your housing?: No       FAMILY HISTORY:  Family History   Problem Relation Age of Onset     Diabetes Mother      Arthritis Mother      Heart Disease Mother      Hypertension Mother      Lipids Mother      Neurologic Disorder Mother         neuropathy     Osteoporosis Mother      Obesity Mother      EYE* Mother         blind     Diabetes Father      Genitourinary Problems Father         gall stones     Heart Disease Father         MI     Cancer Maternal Grandmother      Heart Disease Maternal Grandmother      Cancer Other         Grandparents     Alcohol/Drug No family hx of   "    Alzheimer Disease No family hx of      Anesthesia Reaction No family hx of      Blood Disease No family hx of      Depression No family hx of      Genetic Disorder No family hx of      Gastrointestinal Disease No family hx of      Gynecology No family hx of      Psychotic Disorder No family hx of      Respiratory No family hx of      Cerebrovascular Disease No family hx of        Past/family/social history reviewed and no changes    PHYSICAL EXAMINATION:  Constitutional: aaox3, cooperative, pleasant, not dyspneic/diaphoretic, no acute distress  Vitals reviewed: BP (!) 188/78 (BP Location: Left arm, Patient Position: Sitting, Cuff Size: Adult Regular)   Pulse 87   Ht 1.626 m (5' 4\")   Wt 59.7 kg (131 lb 9.6 oz)   SpO2 97%   BMI 22.59 kg/m    Wt:   Wt Readings from Last 2 Encounters:   04/30/24 59.7 kg (131 lb 9.6 oz)   04/24/24 57.9 kg (127 lb 9.6 oz)      Eyes: Sclera anicteric/injected  CV: No edema  Respiratory: Unlabored breathing  Skin: warm, perfused, no jaundice  Psych: Normal affect  MSK: Normal gait                      Again, thank you for allowing me to participate in the care of your patient.        Sincerely,        Williams Burton PA-C  "

## 2024-04-30 NOTE — PROGRESS NOTES
NEW PATIENT GI CLINIC VISIT    CC/REFERRING MD:  Erick Correa  REASON FOR CONSULTATION:   Erick Correa for   Chief Complaint   Patient presents with    New Patient     New consult for GERD, abdominal bloating, abdominal pain, and diarrhea.  ED visit on 04/08/2024.       ASSESSMENT/PLAN: Patient here for evaluation of a few concerns.  Regarding her upper GI symptoms, these appear to be largely well-controlled on omeprazole 40 mg daily.  She will continue on this, reviewed antireflux precautions regarding diet and lifestyle, also reviewed red flag symptoms that would prompt EGD.  She will monitor closely.  She also has been dealing with chronic diarrhea.  Recent negative infectious testing.  Reviewed other possibilities, including malabsorptive and inflammatory causes.  IBS is also a possibility.  Plan for labs as detailed below.  If she does have an elevated fecal calprotectin, would consider colonoscopy to rule out microscopic colitis.  We can otherwise try to manage this with antimotility agent.  She will trial loperamide and titrate to effective dose.  Patient and daughter stated understanding of plan and I will reach out to soon as labs are completed.    1. Chronic diarrhea  - Adult GI  Referral - Consult Only  - CRP, inflammation; Future  - Calprotectin Feces; Future  - Tissue transglutaminase angelita IgA and IgG; Future  - IgA; Future  - Elastase Fecal; Future  - loperamide (IMODIUM A-D) 2 MG tablet; Take 1 tablet (2 mg) by mouth daily  Dispense: 30 tablet; Refill: 2  - CRP, inflammation  - Calprotectin Feces  - Tissue transglutaminase angelita IgA and IgG  - IgA  - Elastase Fecal    2. Gastroesophageal reflux disease without esophagitis  - Adult GI  Referral - Consult Only      Thank you for this consultation.  It was a pleasure to participate in the care of this patient; please contact us with any further questions.      30 minutes spent on the date of the encounter doing chart  review, patient visit, and documentation    This note was created with voice recognition software, and while reviewed for accuracy, typos may remain.     Williams Burton PA-C  Division of Gastroenterology, Hepatology and Nutrition  [facility]  Eliza Dubois is a 83 year old female with a past medical history significant for type II DM, hypertension with stage IIIb CKD, CAD status post stent in 2016, hyperlipidemia, recurrent UTI, anxiety, and HFpEF that is seen as a new patient in the GI clinic today for evaluation of chronic GERD/dyspepsia and chronic diarrhea.  Patient is accompanied by her daughter, Neyda, who helps provide history.  To review, the patient was admitted to the hospital briefly about 3 weeks ago with heart failure exacerbation and acute on chronic GI distress symptoms as detailed above.  She did undergo stool testing to rule out C. difficile and enteric pathogens.  She previously has been diagnosed with IBS.  She notes that since that hospital stay, she has continued to have occasional GERD symptoms.  She takes omeprazole 40 mg daily and this largely controls it.  She is not having any current heartburn, reflux, chest pain, dysphagia, odynophagia, vomiting, or regurgitation.  She does tend to eat smaller meals throughout the day.  She does not have any clear dietary triggers for upper GI distress.  Regarding her diarrhea, this has been chronic.  She wears depends because she typically gets urgency and can have fecal incontinence.  She can go upwards of 3-4 times per day.  No nighttime stools.  Has not seen any blood or mucus in the stool.  She can have some associated abdominal cramping and bloating when passing stool.  Again, no clear dietary triggers for this.  Has not used any medications recently for this, I do see a previous prescription for cholestyramine, though she does not recall taking it.  Not using any fiber supplement or  antidiarrheal.  Most recent colonoscopy was 2014, notable for diverticulosis, diminutive tubular adenomas.  Surgical history pertinent for cholecystectomy and hysterectomy.  No pertinent family medical history of GI disease.  No tobacco, alcohol, or drug use.  ROS:    10 point ROS neg other than the symptoms noted above in the HPI.    PREVIOUS ENDOSCOPY:  Reviewed, see HPI    PERTINENT RELEVANT IMAGING OR LABS:  Reviewed    ALLERGIES:     Allergies   Allergen Reactions    Sulfa Antibiotics Hives       PERTINENT MEDICATIONS:    Current Outpatient Medications:     aspirin (ASA) 81 MG EC tablet, Take 1 tablet (81 mg) by mouth daily (Patient taking differently: Take 81 mg by mouth at bedtime), Disp: 30 tablet, Rfl: 0    atorvastatin (LIPITOR) 80 MG tablet, Take 1 tablet (80 mg) by mouth at bedtime, Disp: 90 tablet, Rfl: 3    clopidogrel (PLAVIX) 75 MG tablet, Take 1 tablet (75 mg) by mouth daily (Patient taking differently: Take 75 mg by mouth at bedtime), Disp: 90 tablet, Rfl: 3    gabapentin (NEURONTIN) 300 MG capsule, TAKE ONE TO TWO CAPSULES BY MOUTH EVERY EVENING, Disp: 180 capsule, Rfl: 3    glipiZIDE (GLUCOTROL) 10 MG tablet, Take 2 tablets (20 mg) by mouth 2 times daily (before meals) (Patient taking differently: Take 20 mg by mouth 2 times daily (before meals) Skips dose if fasting), Disp: 360 tablet, Rfl: 3    hydrALAZINE (APRESOLINE) 25 MG tablet, Take 1 tablet (25 mg) by mouth 3 times daily, Disp: 30 tablet, Rfl: 0    isosorbide mononitrate (IMDUR) 30 MG 24 hr tablet, Take 1 tablet (30 mg) by mouth daily (Patient taking differently: Take 30 mg by mouth at bedtime), Disp: 90 tablet, Rfl: 3    loperamide (IMODIUM A-D) 2 MG tablet, Take 1 tablet (2 mg) by mouth daily, Disp: 30 tablet, Rfl: 2    metoprolol succinate ER (TOPROL XL) 100 MG 24 hr tablet, Take 1 tablet (100 mg) by mouth daily (Patient taking differently: Take 100 mg by mouth at bedtime), Disp: 90 tablet, Rfl: 3    omeprazole (PRILOSEC) 40 MG DR  capsule, Take 1 capsule (40 mg) by mouth daily TAKE ONE CAPSULE BY MOUTH TWICE A DAY AS NEEDED Strength: 40 mg (Patient taking differently: Take 40 mg by mouth at bedtime), Disp: 90 capsule, Rfl: 3    semaglutide (OZEMPIC) 2 MG/3ML pen, Inject 0.25 mg Subcutaneous every 7 days, Disp: 3 mL, Rfl: 0    cephALEXin (KEFLEX) 500 MG capsule, Take 1 capsule (500 mg) by mouth 3 times daily (Patient not taking: Reported on 4/24/2024), Disp: 21 capsule, Rfl: 0    cholestyramine light (QUESTRAN) 4 GM packet, Take 1 packet (4 g) by mouth 2 times daily (Patient not taking: Reported on 4/24/2024), Disp: 42 packet, Rfl: 0    furosemide (LASIX) 20 MG tablet, Take 1 tablet (20 mg) by mouth daily for 14 days, Disp: 14 tablet, Rfl: 0    lisinopril-hydrochlorothiazide (ZESTORETIC) 20-25 MG tablet, Take 2 tablets by mouth daily for 14 days, Disp: 28 tablet, Rfl: 0    metFORMIN (GLUCOPHAGE XR) 500 MG 24 hr tablet, TAKE TWO TABLETS BY MOUTH TWICE A DAY WITH MEALS (Patient not taking: Reported on 4/16/2024), Disp: 360 tablet, Rfl: 3    PROBLEM LIST  Patient Active Problem List    Diagnosis Date Noted    Heart failure with preserved ejection fraction, NYHA class I (H) 04/24/2024     Priority: Medium    Weakness 01/28/2021     Priority: Medium    Vaccine reaction, initial encounter 01/28/2021     Priority: Medium    Fatigue, unspecified type 01/28/2021     Priority: Medium    History of COVID-19 01/28/2021     Priority: Medium    Bilateral pleural effusion 12/13/2020     Priority: Medium    Bilateral pneumonia 12/13/2020     Priority: Medium    Pneumonia due to 2019 novel coronavirus 12/12/2020     Priority: Medium    Prerenal azotemia 12/12/2020     Priority: Medium    Hypophosphatemia 12/11/2020     Priority: Medium    Closed fracture of proximal end of left humerus with routine healing 12/09/2020     Priority: Medium    Nausea vomiting and diarrhea 12/09/2020     Priority: Medium    Hypomagnesemia 12/09/2020     Priority: Medium     Generalized muscle weakness 12/08/2020     Priority: Medium    E. coli UTI 12/08/2020     Priority: Medium    2019 novel coronavirus disease (COVID-19) 12/08/2020     Priority: Medium    Stage 3b chronic kidney disease (H) 05/27/2019     Priority: Medium    Type 2 diabetes mellitus with diabetic polyneuropathy, without long-term current use of insulin (H) 10/11/2016     Priority: Medium    Essential hypertension with goal blood pressure less than 140/90 09/12/2016     Priority: Medium    Gastroesophageal reflux disease without esophagitis 05/13/2016     Priority: Medium    Coronary artery disease involving native coronary artery of native heart without angina pectoris 02/25/2016     Priority: Medium    Major depressive disorder, recurrent episode, moderate (H) 11/10/2015     Priority: Medium    Anxiety 05/08/2012     Priority: Medium    Insomnia 05/03/2012     Priority: Medium    Hypertension goal BP (blood pressure) < 140/90 01/20/2011     Priority: Medium    Hyperlipidemia LDL goal <100 10/31/2010     Priority: Medium    Esophageal reflux 08/30/2007     Priority: Medium    Irritable bowel syndrome 09/25/2006     Priority: Medium       PERTINENT PAST MEDICAL HISTORY:  Past Medical History:   Diagnosis Date    Diabetic eye exam (H) 05/01/14    Heart attack (H)     Pure hypercholesterolemia     Stented coronary artery     Type II or unspecified type diabetes mellitus without mention of complication, not stated as uncontrolled 2002    [place]and[place]et.    Unspecified disease of pericardium 12/05/08    Pericarditis w/mild to moderate pericardial effusion.    Unspecified essential hypertension        PREVIOUS SURGERIES:  Past Surgical History:   Procedure Laterality Date    ABDOMEN SURGERY      COLONOSCOPY  04/15/09    COLONOSCOPY  6/18/2014    Procedure: COMBINED COLONOSCOPY, SINGLE BIOPSY/POLYPECTOMY BY BIOPSY;  Surgeon: Earle Mon MD;  Location:  GI     LAPAROSCOPY, SURGICAL; CHOLECYSTECTOMY  1997     Cholecystectomy, Laparoscopic    PHACOEMULSIFICATION CLEAR CORNEA WITH STANDARD INTRAOCULAR LENS IMPLANT Right 3/16/2016    Procedure: PHACOEMULSIFICATION CLEAR CORNEA WITH STANDARD INTRAOCULAR LENS IMPLANT;  Surgeon: George Lemus MD;  Location:  [facility]    PHACOEMULSIFICATION CLEAR CORNEA WITH STANDARD INTRAOCULAR LENS IMPLANT Left 3/30/2016    Procedure: PHACOEMULSIFICATION CLEAR CORNEA WITH STANDARD INTRAOCULAR LENS IMPLANT;  Surgeon: George Lemus MD;  Location:  [facility]    ZZC NONSPECIFIC PROCEDURE  1988    Hysterectomy       SOCIAL HISTORY:  Social History     Socioeconomic History    Marital status:      Spouse name: Not on file    Number of children: Not on file    Years of education: Not on file    Highest education level: Not on file   Occupational History    Not on file   Tobacco Use    Smoking status: Never    Smokeless tobacco: Never   Vaping Use    Vaping status: Never Used   Substance and Sexual Activity    Alcohol use: No     Alcohol/week: 0.0 standard drinks of alcohol    Drug use: No    Sexual activity: Yes     Partners: Male     Birth control/protection: Surgical   Other Topics Concern    Parent/sibling w/ CABG, MI or angioplasty before 65F 55M? Not Asked   Social History Narrative    Not on file     Social Determinants of Health     Financial Resource Strain: Low Risk  (12/27/2023)    Financial Resource Strain     Within the past 12 months, have you or your family members you live with been unable to get utilities (heat, electricity) when it was really needed?: No   Food Insecurity: Low Risk  (12/27/2023)    Food Insecurity     Within the past 12 months, did you worry that your food would run out before you got money to buy more?: No     Within the past 12 months, did the food you bought just not last and you didn t have money to get more?: No   Recent Concern: Food Insecurity - High Risk (12/12/2023)    Food Insecurity     Within the past 12 months, did you worry that your food would run  out before you got money to buy more?: Yes     Within the past 12 months, did the food you bought just not last and you didn t have money to get more?: No   Transportation Needs: Low Risk  (12/27/2023)    Transportation Needs     Within the past 12 months, has lack of transportation kept you from medical appointments, getting your medicines, non-medical meetings or appointments, work, or from getting things that you need?: No   Physical Activity: Not on file   Stress: Not on file   Social Connections: Not on file   Interpersonal Safety: Low Risk  (12/27/2023)    Interpersonal Safety     Do you feel physically and emotionally safe where you currently live?: Yes     Within the past 12 months, have you been hit, slapped, kicked or otherwise physically hurt by someone?: No     Within the past 12 months, have you been humiliated or emotionally abused in other ways by your partner or ex-partner?: No   Housing Stability: Low Risk  (12/27/2023)    Housing Stability     Do you have housing? : Yes     Are you worried about losing your housing?: No       FAMILY HISTORY:  Family History   Problem Relation Age of Onset    Diabetes Mother     Arthritis Mother     Heart Disease Mother     Hypertension Mother     Lipids Mother     Neurologic Disorder Mother         neuropathy    Osteoporosis Mother     Obesity Mother     EYE* Mother         blind    Diabetes Father     Genitourinary Problems Father         gall stones    Heart Disease Father         MI    Cancer Maternal Grandmother     Heart Disease Maternal Grandmother     Cancer Other         Grandparents    Alcohol/Drug No family hx of     Alzheimer Disease No family hx of     Anesthesia Reaction No family hx of     Blood Disease No family hx of     Depression No family hx of     Genetic Disorder No family hx of     Gastrointestinal Disease No family hx of     Gynecology No family hx of     Psychotic Disorder No family hx of     Respiratory No family hx of     Cerebrovascular  "Disease No family hx of        Past/family/social history reviewed and no changes    PHYSICAL EXAMINATION:  Constitutional: aaox3, cooperative, pleasant, not dyspneic/diaphoretic, no acute distress  Vitals reviewed: BP (!) 188/78 (BP Location: Left arm, Patient Position: Sitting, Cuff Size: Adult Regular)   Pulse 87   Ht 1.626 m (5' 4\")   Wt 59.7 kg (131 lb 9.6 oz)   SpO2 97%   BMI 22.59 kg/m    Wt:   Wt Readings from Last 2 Encounters:   04/30/24 59.7 kg (131 lb 9.6 oz)   04/24/24 57.9 kg (127 lb 9.6 oz)      Eyes: Sclera anicteric/injected  CV: No edema  Respiratory: Unlabored breathing  Skin: warm, perfused, no jaundice  Psych: Normal affect  MSK: Normal gait                    "

## 2024-04-30 NOTE — PATIENT INSTRUCTIONS
It was a pleasure meeting you today.  To review, we talked about the following things:    1) gastroesophageal reflux, dyspepsia -these are common symptoms that can occur as we age.  You had mentioned that continued use of omeprazole has been helpful, so I would recommend simply continuing that.  Eating smaller meals more frequently, taking small bites and chewing thoroughly can be helpful.  Avoiding trigger foods such as spicy foods, citrus foods, chocolate, alcohol, caffeine may help as well.  If you develop any symptoms like difficulty swallowing, painful swallowing, vomiting, or regurgitation, please let me know.    2) chronic diarrhea -your recent testing in the hospital showed that this was not due to any infection.  Your labs today will help assess if the diarrhea is due to inflammation within the digestive tract or if there is a malabsorptive process occurring.  This will help direct if any additional testing, like colonoscopy, as needed.  In the meantime, I have sent a prescription for loperamide to your pharmacy.  This is a potent antidiarrheal.  Take 1 tablet daily.  If you are still having loose stools despite this, increase to 2 tablets daily.  If you find that 1 tablet daily is causing constipation, you can move it to every other day or every third day.  I will be in touch when I see the results of your labs from today.

## 2024-04-30 NOTE — NURSING NOTE
"Chief Complaint   Patient presents with    New Patient     New consult for GERD, abdominal bloating, abdominal pain, and diarrhea.  ED visit on 04/08/2024.     She requests these members of her care team be copied on today's visit information:  PCP: Byron Holm    Referring Provider:  Erick Correa MD  [address] [place] [address]    Vitals:    04/30/24 1031   BP: (!) 189/75   BP Location: Left arm   Patient Position: Sitting   Cuff Size: Adult Regular   Pulse: 87   SpO2: 97%   Weight: 59.7 kg (131 lb 9.6 oz)   Height: 1.626 m (5' 4\")     Body mass index is 22.59 kg/m .    Medications were reconciled.        Lyssa Gabriel CMA    "

## 2024-05-01 ENCOUNTER — OFFICE VISIT (OUTPATIENT)
Dept: INTERNAL MEDICINE | Facility: CLINIC | Age: 84
End: 2024-05-01
Payer: COMMERCIAL

## 2024-05-01 VITALS
OXYGEN SATURATION: 98 % | DIASTOLIC BLOOD PRESSURE: 80 MMHG | RESPIRATION RATE: 20 BRPM | BODY MASS INDEX: 22.31 KG/M2 | TEMPERATURE: 96.9 F | SYSTOLIC BLOOD PRESSURE: 130 MMHG | HEART RATE: 80 BPM | WEIGHT: 130 LBS

## 2024-05-01 DIAGNOSIS — I10 HYPERTENSION GOAL BP (BLOOD PRESSURE) < 140/90: ICD-10-CM

## 2024-05-01 DIAGNOSIS — E11.42 TYPE 2 DIABETES MELLITUS WITH DIABETIC POLYNEUROPATHY, WITHOUT LONG-TERM CURRENT USE OF INSULIN (H): ICD-10-CM

## 2024-05-01 DIAGNOSIS — I25.10 CORONARY ARTERY DISEASE INVOLVING NATIVE CORONARY ARTERY OF NATIVE HEART WITHOUT ANGINA PECTORIS: ICD-10-CM

## 2024-05-01 DIAGNOSIS — E78.5 HYPERLIPIDEMIA LDL GOAL <100: ICD-10-CM

## 2024-05-01 DIAGNOSIS — N30.00 ACUTE CYSTITIS WITHOUT HEMATURIA: ICD-10-CM

## 2024-05-01 DIAGNOSIS — K21.9 GASTROESOPHAGEAL REFLUX DISEASE WITHOUT ESOPHAGITIS: ICD-10-CM

## 2024-05-01 DIAGNOSIS — R19.7 DIARRHEA OF PRESUMED INFECTIOUS ORIGIN: ICD-10-CM

## 2024-05-01 LAB — IGA SERPL-MCNC: 134 MG/DL (ref 84–499)

## 2024-05-01 PROCEDURE — 99214 OFFICE O/P EST MOD 30 MIN: CPT | Performed by: INTERNAL MEDICINE

## 2024-05-01 RX ORDER — CHOLESTYRAMINE LIGHT 4 G/5.7G
4 POWDER, FOR SUSPENSION ORAL 2 TIMES DAILY
Qty: 42 PACKET | Refills: 3 | Status: SHIPPED | OUTPATIENT
Start: 2024-05-01 | End: 2024-06-20

## 2024-05-01 RX ORDER — ATORVASTATIN CALCIUM 80 MG/1
80 TABLET, FILM COATED ORAL AT BEDTIME
Qty: 90 TABLET | Refills: 3 | Status: SHIPPED | OUTPATIENT
Start: 2024-05-01

## 2024-05-01 RX ORDER — ISOSORBIDE MONONITRATE 30 MG/1
30 TABLET, EXTENDED RELEASE ORAL DAILY
Qty: 90 TABLET | Refills: 3 | Status: SHIPPED | OUTPATIENT
Start: 2024-05-01

## 2024-05-01 RX ORDER — METOPROLOL SUCCINATE 100 MG/1
100 TABLET, EXTENDED RELEASE ORAL DAILY
Qty: 90 TABLET | Refills: 3 | Status: SHIPPED | OUTPATIENT
Start: 2024-05-01

## 2024-05-01 RX ORDER — METFORMIN HCL 500 MG
TABLET, EXTENDED RELEASE 24 HR ORAL
Qty: 360 TABLET | Refills: 3 | Status: SHIPPED | OUTPATIENT
Start: 2024-05-01 | End: 2024-06-20

## 2024-05-01 RX ORDER — GABAPENTIN 300 MG/1
CAPSULE ORAL
Qty: 180 CAPSULE | Refills: 3 | Status: SHIPPED | OUTPATIENT
Start: 2024-05-01 | End: 2024-07-22

## 2024-05-01 RX ORDER — LISINOPRIL AND HYDROCHLOROTHIAZIDE 20; 25 MG/1; MG/1
2 TABLET ORAL DAILY
Qty: 90 TABLET | Refills: 3 | Status: SHIPPED | OUTPATIENT
Start: 2024-05-01 | End: 2024-06-20

## 2024-05-01 RX ORDER — OMEPRAZOLE 40 MG/1
40 CAPSULE, DELAYED RELEASE ORAL DAILY
Qty: 90 CAPSULE | Refills: 3 | Status: ON HOLD | OUTPATIENT
Start: 2024-05-01 | End: 2024-06-24

## 2024-05-01 RX ORDER — RESPIRATORY SYNCYTIAL VIRUS VACCINE 120MCG/0.5
0.5 KIT INTRAMUSCULAR ONCE
Qty: 1 EACH | Refills: 0 | Status: CANCELLED | OUTPATIENT
Start: 2024-05-01 | End: 2024-05-01

## 2024-05-01 RX ORDER — ASPIRIN 81 MG/1
81 TABLET ORAL DAILY
Qty: 90 TABLET | Refills: 3 | Status: SHIPPED | OUTPATIENT
Start: 2024-05-01

## 2024-05-01 RX ORDER — GLIPIZIDE 10 MG/1
20 TABLET ORAL
Qty: 360 TABLET | Refills: 3 | Status: SHIPPED | OUTPATIENT
Start: 2024-05-01

## 2024-05-01 RX ORDER — CLOPIDOGREL BISULFATE 75 MG/1
75 TABLET ORAL DAILY
Qty: 90 TABLET | Refills: 3 | Status: SHIPPED | OUTPATIENT
Start: 2024-05-01

## 2024-05-01 RX ORDER — HYDRALAZINE HYDROCHLORIDE 25 MG/1
25 TABLET, FILM COATED ORAL 3 TIMES DAILY
Qty: 270 TABLET | Refills: 3 | Status: SHIPPED | OUTPATIENT
Start: 2024-05-01

## 2024-05-01 ASSESSMENT — PAIN SCALES - GENERAL: PAINLEVEL: NO PAIN (0)

## 2024-05-01 NOTE — PROGRESS NOTES
Subjective   Eliza is a 83 year old, presenting for the following health issues:  UTI (Recheck/)      5/1/2024     1:01 PM   Additional Questions   Roomed by Serenity COTTON   Accompanied by Lucy, daughter     History of Present Illness       CKD: She is not using over the counter pain medicine.     Heart Failure:  She presents for follow up of heart failure. She is not experiencing shortness of breath at night, with rest or with activity  She is experiencing lower extremity edema which is same as usual.   She denies orthopenea and is not coughing at night. Patient is not checking weight daily. She has recently had a None.  She has side effects from medications including dizziness, fatigue, cough and swelling.  She has had no other medical visits for heart failure since the last visit.    Hypertension: She presents for follow up of hypertension.  She does not check blood pressure  regularly outside of the clinic. Outside blood pressures have been over 140/90. She does not follow a low salt diet.     She eats 0-1 servings of fruits and vegetables daily.She consumes 2 sweetened beverage(s) daily.She exercises with enough effort to increase her heart rate 9 or less minutes per day.  She exercises with enough effort to increase her heart rate 3 or less days per week. She is missing 2 dose(s) of medications per week.     Last Echo:   Echo result w/o MOPS: Interpretation Summary 1. The left ventricle is normal in size. The visual ejection fraction is 50-55%.2. Grade I or early diastolic dysfunction.3. The right ventricle is normal in structure, function and size.4. No valve disease. No changes from echo in 2009.               EMR reviewed including:             Complaint, History of Chief Complaint, Corresponding Review of Systems, and Complaint Specific Physical Examination.    #1   Follow-up urinary tract infection  Patient notes symptoms have improved.  Confusion has improved somewhat.  Patient unable to give me  urine sample prior to visit.  Will obtain after visit.  Denies frequency, urgency or hesitancy.  Denies fevers, chills.          Exam:  URINARY: Zafar's punch is negative. No suprapubic tenderness is noted with palpation.      #2   Ongoing diarrhea  Workup underway with gastroenterologist  Started on Imodium.  Symptoms slowly improving.  Notes 1 or 2 bowel movements per day.  Notes no blood.          Exam:  GI: Abdomen is soft, without rebound, guarding or tenderness. Bowel sounds are appropriate. No renal bruits are heard.      #3   Follow-up on type 2 diabetes  Patient is blood sugars controlled.  Recently started Ozempic without difficulty.  Denies hypoglycemic episodes.          Exam:  HEART:  regular without rubs, clicks, gallops, or murmurs. PMI is nondisplaced. Upstrokes are brisk. S1,S2 are heard.  LUNGS: clear bilaterally, airflow is brisk, no intercostal retraction or stridor is noted. No coughing is noted during visit.        Patient has been interviewed, applicable history and applied review of systems have been performed.    Vital Signs:   /80   Pulse 80   Temp 96.9  F (36.1  C) (Temporal)   Resp 20   Wt 59 kg (130 lb)   SpO2 98%   BMI 22.31 kg/m        Decision Making    Problem and Complexity     1. Acute cystitis without hematuria  Recheck urinary analysis  - UA Macroscopic with reflex to Microscopic and Culture - Lab Collect    2. Hyperlipidemia LDL goal <100  Continue current medication  - atorvastatin (LIPITOR) 80 MG tablet; Take 1 tablet (80 mg) by mouth at bedtime  Dispense: 90 tablet; Refill: 3    3. Diarrhea of presumed infectious origin  Continue current medication  - cholestyramine light (QUESTRAN) 4 GM packet; Take 1 packet (4 g) by mouth 2 times daily  Dispense: 42 packet; Refill: 3    4. Coronary artery disease involving native coronary artery of native heart without angina pectoris  Stable.  Continue current medication  - clopidogrel (PLAVIX) 75 MG tablet; Take 1 tablet (75  mg) by mouth daily  Dispense: 90 tablet; Refill: 3  - isosorbide mononitrate (IMDUR) 30 MG 24 hr tablet; Take 1 tablet (30 mg) by mouth daily  Dispense: 90 tablet; Refill: 3  - aspirin 81 MG EC tablet; Take 1 tablet (81 mg) by mouth daily  Dispense: 90 tablet; Refill: 3    5. Type 2 diabetes mellitus with diabetic polyneuropathy, without long-term current use of insulin (H)  Stable.  Continue current medication  - gabapentin (NEURONTIN) 300 MG capsule; TAKE ONE TO TWO CAPSULES BY MOUTH EVERY EVENING  Dispense: 180 capsule; Refill: 3  - glipiZIDE (GLUCOTROL) 10 MG tablet; Take 2 tablets (20 mg) by mouth 2 times daily (before meals)  Dispense: 360 tablet; Refill: 3  - metFORMIN (GLUCOPHAGE XR) 500 MG 24 hr tablet; TAKE TWO TABLETS BY MOUTH TWICE A DAY WITH MEALS  Dispense: 360 tablet; Refill: 3  - semaglutide (OZEMPIC) 2 MG/3ML pen; Inject 0.25 mg Subcutaneous every 7 days  Dispense: 3 mL; Refill: 3    6. Hypertension goal BP (blood pressure) < 140/90  Recheck blood pressure well-controlled.  Continue medication  - hydrALAZINE (APRESOLINE) 25 MG tablet; Take 1 tablet (25 mg) by mouth 3 times daily  Dispense: 270 tablet; Refill: 3  - lisinopril-hydrochlorothiazide (ZESTORETIC) 20-25 MG tablet; Take 2 tablets by mouth daily  Dispense: 90 tablet; Refill: 3  - metoprolol succinate ER (TOPROL XL) 100 MG 24 hr tablet; Take 1 tablet (100 mg) by mouth daily  Dispense: 90 tablet; Refill: 3    7. Gastroesophageal reflux disease without esophagitis  Continue current medication  - omeprazole (PRILOSEC) 40 MG DR capsule; Take 1 capsule (40 mg) by mouth daily TAKE ONE CAPSULE BY MOUTH TWICE A DAY AS NEEDED Strength: 40 mg  Dispense: 90 capsule; Refill: 3                                FOLLOW UP   I have asked the patient to make an appointment for followup with me or her preferred provider.  Patient will be moving into an assisted living in [place] and is considering transferring to the clinic right across the street.    Regarding  routine vaccinations:  I have reviewed the patient's vaccination schedule and discussed the benefits of prophylactic vaccination in detail.  I recommend the patient contact their pharmacist for vaccinations.  Discussed that most insurance companies now favor reimbursement to the pharmacies and it will financially behoove the patient to have vaccinations performed at their pharmacy.        I have carefully explained the diagnosis and treatment options to the patient.  The patient has displayed an understanding of the above, and all subsequent questions were answered.      DO GEMA Campos    Portions of this note were produced using [facility]  Although every attempt at real-time proof reading has been made, occasional grammar/syntax errors may have been missed.

## 2024-05-03 LAB
ALBUMIN UR-MCNC: >499 MG/DL
APPEARANCE UR: CLEAR
BILIRUB UR QL STRIP: NEGATIVE
COLOR UR AUTO: YELLOW
GLUCOSE UR STRIP-MCNC: 50 MG/DL
HGB UR QL STRIP: NEGATIVE
KETONES UR STRIP-MCNC: NEGATIVE MG/DL
LEUKOCYTE ESTERASE UR QL STRIP: NEGATIVE
MUCOUS THREADS #/AREA URNS LPF: PRESENT /LPF
NITRATE UR QL: NEGATIVE
PH UR STRIP: 5 [PH] (ref 5–7)
RBC URINE: 0 /HPF
SP GR UR STRIP: 1.02 (ref 1–1.03)
SQUAMOUS EPITHELIAL: <1 /HPF
UROBILINOGEN UR STRIP-MCNC: NORMAL MG/DL
WBC URINE: 0 /HPF

## 2024-05-03 PROCEDURE — 81001 URINALYSIS AUTO W/SCOPE: CPT | Performed by: INTERNAL MEDICINE

## 2024-05-05 LAB
TTG IGA SER-ACNC: 0.3 U/ML
TTG IGG SER-ACNC: <0.6 U/ML

## 2024-05-06 PROCEDURE — 82653 EL-1 FECAL QUANTITATIVE: CPT | Performed by: PHYSICIAN ASSISTANT

## 2024-05-06 PROCEDURE — 83993 ASSAY FOR CALPROTECTIN FECAL: CPT | Performed by: PHYSICIAN ASSISTANT

## 2024-05-07 NOTE — RESULT ENCOUNTER NOTE
Please notify patient that blood tests have come back normal.  The stool tests are still pending, will reach out as soon as those come in.    Thanks,    Williams Burton PA-C

## 2024-05-08 LAB
CALPROTECTIN STL-MCNT: 320 MG/KG (ref 0–49.9)
ELASTASE PANC STL-MCNT: 591 UG/G

## 2024-05-08 NOTE — RESULT ENCOUNTER NOTE
Please notify patient that her stool test screening for inflammation has come back mildly elevated.  At her visit with me, I had recommended regular use of Imodium to improve stools.  If this is helping things, then she can continue on it.  If it is not helping, then I would recommend proceeding with colonoscopy to evaluate for microscopic colitis/inflammation.  I can place an order if needed.    Thanks,    Williams Burton PA-C

## 2024-05-13 ENCOUNTER — TELEPHONE (OUTPATIENT)
Dept: INTERNAL MEDICINE | Facility: CLINIC | Age: 84
End: 2024-05-13
Payer: COMMERCIAL

## 2024-05-13 DIAGNOSIS — R80.1 PERSISTENT PROTEINURIA: ICD-10-CM

## 2024-05-13 DIAGNOSIS — N18.32 STAGE 3B CHRONIC KIDNEY DISEASE (H): Primary | ICD-10-CM

## 2024-05-13 NOTE — TELEPHONE ENCOUNTER
----- Message from Byron Holm, DO sent at 5/13/2024  1:53 PM CDT -----        Please contact patient and confirm receipt of this message and see if she wants to see a nephrologist.  Thank you.  Mihai              Dear Eliza, your recent test results are attached.    Urinary analysis demonstrates increased amount of protein loss.  No infection is noted.  Given the decreased kidney function and persistence of increased protein loss, I would recommend consultation with a nephrologist.  If you are in agreement with this, please let me know and I will place a referral.    Mihai    You will be contacted with any outstanding results as they are available.  Feel free to contact me via the office or My Chart if you have any questions regarding the above.

## 2024-05-13 NOTE — TELEPHONE ENCOUNTER
Patient called back and was given message about results and she would like the referral for the nephrologist.

## 2024-05-13 NOTE — TELEPHONE ENCOUNTER
Patient calling regarding hydrALAZINE (APRESOLINE) 25 MG tablet. She is currently out of the medication.   Informed patient this was sent on 5/1/24.   Called pharmacy to confirm script was received, pharmacy states this is on profile. They will get ready for .     Katelynn SOTON, RN

## 2024-05-14 ENCOUNTER — TELEPHONE (OUTPATIENT)
Dept: GASTROENTEROLOGY | Facility: CLINIC | Age: 84
End: 2024-05-14
Payer: COMMERCIAL

## 2024-05-14 NOTE — TELEPHONE ENCOUNTER
LPN spoke with patient and informed her of results. See stool study results for documentation.     Dorinda Love, YURY

## 2024-05-14 NOTE — TELEPHONE ENCOUNTER
[facility] Call Center    Phone Message    May a detailed message be left on voicemail: yes     Reason for Call: Other: Eliza is calling in returning a call that she had missed from the clinic. Writer could not find information on this call. Please call Pt back to discuss.     Action Taken: Message routed to:  [facility] ([facility] GI    Travel Screening: Not Applicable

## 2024-05-16 ENCOUNTER — TELEPHONE (OUTPATIENT)
Dept: INTERNAL MEDICINE | Facility: CLINIC | Age: 84
End: 2024-05-16
Payer: COMMERCIAL

## 2024-05-16 NOTE — TELEPHONE ENCOUNTER
Medication Question or Refill    Contacts         Type Contact Phone/Fax    05/16/2024 06:57 AM CDT Phone (Incoming) Eliza Dubois (Self) [phone] ()            What medication are you calling about (include dose and sig)?:   ozempenic    Preferred Pharmacy  [facility] - [place], [place] - [address]  [address]  [facility] [address]  Phone: [phone] Fax: [phone]      Controlled Substance Agreement on file:   CSA -- Patient Level:    CSA: None found at the patient level.       Who prescribed the medication?: matushin    Do you need a refill? No    When did you use the medication last? 5/9/2024    Patient offered an appointment? No    Do you have any questions or concerns?  Yes: went to pharmacy to  injections and pharmacy stated that the insurance doesn't pay for injections. Redirected Eliza to pcp. Needs injection today      Okay to leave a detailed message?: no at Home number on file [phone] (home)

## 2024-05-16 NOTE — TELEPHONE ENCOUNTER
Retail Pharmacy Prior Authorization Team   Phone: [phone]    PA Initiation    Medication: OZEMPIC (0.25 OR 0.5 MG/DOSE) 2 MG/3ML SC SOPN  Insurance Company: [employer] - Phone [phone] Fax [phone]  Pharmacy Filling the Rx: [facility] - [place], [place] - [address]  Filling Pharmacy Phone: [phone]  Filling Pharmacy Fax:    Start Date: 5/16/2024    Started PA on CMM and a response of A pending Prior Authorization request for the Patient and Medication exist.  Called insurance, spoke with Adrian, the patient started her own PA.  Completed PA via phone.  Case [id number]

## 2024-05-16 NOTE — TELEPHONE ENCOUNTER
Prior Authorization Approval    Authorization Effective Date: 5/1/2024  Authorization Expiration Date: 12/31/2024  Medication: semaglutide (OZEMPIC) 2 MG/3ML pen-APPROVED  Reference #:     Insurance Company: Other (see comments)Comment:  [facility] Part D [phone]  Which Pharmacy is filling the prescription (Not needed for infusion/clinic administered): [facility] - [place], [place] - [address]  Pharmacy Notified: Yes  Patient Notified: Instructed pharmacy to notify patient when script is ready to /ship.

## 2024-05-16 NOTE — TELEPHONE ENCOUNTER
Prior Authorization Retail Medication Request    Medication/Dose: semaglutide (OZEMPIC) 2 MG/3ML pen  Diagnosis and ICD code (if different than what is on RX):  Type 2 diabetes mellitus with diabetic polyneuropathy, without long-term current use of insulin (H) [E11.42]   New/renewal/insurance change PA/secondary ins. PA:  Previously Tried and Failed:  na  Rationale:  na    Insurance   Primary: Medicare  Insurance ID:  [id number]     Secondary (if applicable):na  Insurance ID:  anthony    Pharmacy Information (if different than what is on RX)  Name:  [facility]  Phone:  [phone]  Fax:[phone]

## 2024-05-22 ENCOUNTER — TRANSFERRED RECORDS (OUTPATIENT)
Dept: HEALTH INFORMATION MANAGEMENT | Facility: CLINIC | Age: 84
End: 2024-05-22
Payer: COMMERCIAL

## 2024-05-22 LAB — RETINOPATHY: POSITIVE

## 2024-06-20 ENCOUNTER — APPOINTMENT (OUTPATIENT)
Dept: CT IMAGING | Facility: CLINIC | Age: 84
DRG: 640 | End: 2024-06-20
Attending: NURSE PRACTITIONER
Payer: COMMERCIAL

## 2024-06-20 ENCOUNTER — APPOINTMENT (OUTPATIENT)
Dept: GENERAL RADIOLOGY | Facility: CLINIC | Age: 84
DRG: 640 | End: 2024-06-20
Attending: NURSE PRACTITIONER
Payer: COMMERCIAL

## 2024-06-20 ENCOUNTER — HOSPITAL ENCOUNTER (INPATIENT)
Facility: CLINIC | Age: 84
LOS: 4 days | Discharge: HOME HEALTH | DRG: 640 | End: 2024-06-24
Attending: NURSE PRACTITIONER | Admitting: PEDIATRICS
Payer: COMMERCIAL

## 2024-06-20 DIAGNOSIS — E83.42 HYPOMAGNESEMIA: ICD-10-CM

## 2024-06-20 DIAGNOSIS — I50.9 ACUTE ON CHRONIC CONGESTIVE HEART FAILURE, UNSPECIFIED HEART FAILURE TYPE (H): ICD-10-CM

## 2024-06-20 DIAGNOSIS — N18.32 STAGE 3B CHRONIC KIDNEY DISEASE (H): ICD-10-CM

## 2024-06-20 DIAGNOSIS — I10 HYPERTENSION GOAL BP (BLOOD PRESSURE) < 140/90: ICD-10-CM

## 2024-06-20 DIAGNOSIS — R53.1 GENERALIZED WEAKNESS: ICD-10-CM

## 2024-06-20 DIAGNOSIS — E83.51 HYPOCALCEMIA: ICD-10-CM

## 2024-06-20 DIAGNOSIS — K21.9 GASTROESOPHAGEAL REFLUX DISEASE WITHOUT ESOPHAGITIS: Primary | ICD-10-CM

## 2024-06-20 PROBLEM — E83.39 HYPERPHOSPHATEMIA: Status: ACTIVE | Noted: 2020-12-11

## 2024-06-20 PROBLEM — D64.9 CHRONIC ANEMIA: Status: ACTIVE | Noted: 2024-06-20

## 2024-06-20 PROBLEM — E88.09 HYPOALBUMINEMIA: Status: ACTIVE | Noted: 2024-06-20

## 2024-06-20 PROBLEM — G93.40 ACUTE ENCEPHALOPATHY: Status: ACTIVE | Noted: 2024-06-20

## 2024-06-20 PROBLEM — D69.1 PLATELET FUNCTION DEFECT (H): Status: ACTIVE | Noted: 2024-06-20

## 2024-06-20 PROBLEM — R60.0 LOWER EXTREMITY EDEMA: Status: ACTIVE | Noted: 2024-06-20

## 2024-06-20 LAB
ALBUMIN SERPL BCG-MCNC: 3.2 G/DL (ref 3.5–5.2)
ALBUMIN UR-MCNC: >499 MG/DL
ALP SERPL-CCNC: 96 U/L (ref 40–150)
ALT SERPL W P-5'-P-CCNC: 10 U/L (ref 0–50)
ANION GAP SERPL CALCULATED.3IONS-SCNC: 13 MMOL/L (ref 7–15)
APPEARANCE UR: ABNORMAL
AST SERPL W P-5'-P-CCNC: 28 U/L (ref 0–45)
BACTERIA #/AREA URNS HPF: ABNORMAL /HPF
BASOPHILS # BLD AUTO: 0 10E3/UL (ref 0–0.2)
BASOPHILS NFR BLD AUTO: 0 %
BILIRUB SERPL-MCNC: 0.4 MG/DL
BILIRUB UR QL STRIP: NEGATIVE
BUN SERPL-MCNC: 27.5 MG/DL (ref 8–23)
CA-I BLD-MCNC: 3.1 MG/DL (ref 4.4–5.2)
CA-I BLD-MCNC: 3.5 MG/DL (ref 4.4–5.2)
CALCIUM SERPL-MCNC: 5.6 MG/DL (ref 8.8–10.2)
CALCIUM SERPL-MCNC: 5.8 MG/DL (ref 8.8–10.2)
CHLORIDE SERPL-SCNC: 107 MMOL/L (ref 98–107)
CK SERPL-CCNC: 398 U/L (ref 26–192)
COLOR UR AUTO: YELLOW
CREAT SERPL-MCNC: 2.18 MG/DL (ref 0.51–0.95)
CRP SERPL-MCNC: 7.78 MG/L
DEPRECATED HCO3 PLAS-SCNC: 23 MMOL/L (ref 22–29)
EGFRCR SERPLBLD CKD-EPI 2021: 22 ML/MIN/1.73M2
EOSINOPHIL # BLD AUTO: 0.1 10E3/UL (ref 0–0.7)
EOSINOPHIL NFR BLD AUTO: 1 %
ERYTHROCYTE [DISTWIDTH] IN BLOOD BY AUTOMATED COUNT: 16.8 % (ref 10–15)
FLUAV RNA SPEC QL NAA+PROBE: NEGATIVE
FLUBV RNA RESP QL NAA+PROBE: NEGATIVE
GLUCOSE BLDC GLUCOMTR-MCNC: 192 MG/DL (ref 70–99)
GLUCOSE BLDC GLUCOMTR-MCNC: 209 MG/DL (ref 70–99)
GLUCOSE BLDC GLUCOMTR-MCNC: 77 MG/DL (ref 70–99)
GLUCOSE SERPL-MCNC: 82 MG/DL (ref 70–99)
GLUCOSE UR STRIP-MCNC: NEGATIVE MG/DL
HBA1C MFR BLD: 7.4 %
HCT VFR BLD AUTO: 26.7 % (ref 35–47)
HGB BLD-MCNC: 8.1 G/DL (ref 11.7–15.7)
HGB UR QL STRIP: NEGATIVE
HOLD SPECIMEN: NORMAL
IMM GRANULOCYTES # BLD: 0 10E3/UL
IMM GRANULOCYTES NFR BLD: 0 %
KETONES UR STRIP-MCNC: NEGATIVE MG/DL
LACTATE SERPL-SCNC: 0.9 MMOL/L (ref 0.7–2)
LEUKOCYTE ESTERASE UR QL STRIP: ABNORMAL
LYMPHOCYTES # BLD AUTO: 1.2 10E3/UL (ref 0.8–5.3)
LYMPHOCYTES NFR BLD AUTO: 16 %
MAGNESIUM SERPL-MCNC: 0.7 MG/DL (ref 1.7–2.3)
MAGNESIUM SERPL-MCNC: 1.8 MG/DL (ref 1.7–2.3)
MCH RBC QN AUTO: 24 PG (ref 26.5–33)
MCHC RBC AUTO-ENTMCNC: 30.3 G/DL (ref 31.5–36.5)
MCV RBC AUTO: 79 FL (ref 78–100)
MONOCYTES # BLD AUTO: 0.6 10E3/UL (ref 0–1.3)
MONOCYTES NFR BLD AUTO: 8 %
NEUTROPHILS # BLD AUTO: 5.2 10E3/UL (ref 1.6–8.3)
NEUTROPHILS NFR BLD AUTO: 74 %
NITRATE UR QL: NEGATIVE
NRBC # BLD AUTO: 0 10E3/UL
NRBC BLD AUTO-RTO: 0 /100
NT-PROBNP SERPL-MCNC: ABNORMAL PG/ML (ref 0–1800)
PH UR STRIP: 5 [PH] (ref 5–7)
PHOSPHATE SERPL-MCNC: 4.6 MG/DL (ref 2.5–4.5)
PHOSPHATE SERPL-MCNC: 4.7 MG/DL (ref 2.5–4.5)
PLATELET # BLD AUTO: 213 10E3/UL (ref 150–450)
POTASSIUM SERPL-SCNC: 4.3 MMOL/L (ref 3.4–5.3)
PROCALCITONIN SERPL IA-MCNC: 0.11 NG/ML
PROT SERPL-MCNC: 6.3 G/DL (ref 6.4–8.3)
RBC # BLD AUTO: 3.37 10E6/UL (ref 3.8–5.2)
RBC URINE: 1 /HPF
RSV RNA SPEC NAA+PROBE: NEGATIVE
SARS-COV-2 RNA RESP QL NAA+PROBE: NEGATIVE
SODIUM SERPL-SCNC: 143 MMOL/L (ref 135–145)
SP GR UR STRIP: 1.01 (ref 1–1.03)
SQUAMOUS EPITHELIAL: 4 /HPF
TROPONIN T SERPL HS-MCNC: 134 NG/L
TROPONIN T SERPL HS-MCNC: 135 NG/L
TSH SERPL DL<=0.005 MIU/L-ACNC: 1.88 UIU/ML (ref 0.3–4.2)
UROBILINOGEN UR STRIP-MCNC: NORMAL MG/DL
WBC # BLD AUTO: 7 10E3/UL (ref 4–11)
WBC URINE: 9 /HPF

## 2024-06-20 PROCEDURE — 80053 COMPREHEN METABOLIC PANEL: CPT | Performed by: NURSE PRACTITIONER

## 2024-06-20 PROCEDURE — 83735 ASSAY OF MAGNESIUM: CPT | Performed by: PEDIATRICS

## 2024-06-20 PROCEDURE — 84100 ASSAY OF PHOSPHORUS: CPT | Performed by: PEDIATRICS

## 2024-06-20 PROCEDURE — 93005 ELECTROCARDIOGRAM TRACING: CPT

## 2024-06-20 PROCEDURE — 99223 1ST HOSP IP/OBS HIGH 75: CPT | Performed by: PEDIATRICS

## 2024-06-20 PROCEDURE — 84100 ASSAY OF PHOSPHORUS: CPT | Performed by: NURSE PRACTITIONER

## 2024-06-20 PROCEDURE — 83036 HEMOGLOBIN GLYCOSYLATED A1C: CPT | Performed by: PEDIATRICS

## 2024-06-20 PROCEDURE — 36415 COLL VENOUS BLD VENIPUNCTURE: CPT | Performed by: PEDIATRICS

## 2024-06-20 PROCEDURE — 93010 ELECTROCARDIOGRAM REPORT: CPT | Performed by: STUDENT IN AN ORGANIZED HEALTH CARE EDUCATION/TRAINING PROGRAM

## 2024-06-20 PROCEDURE — 96365 THER/PROPH/DIAG IV INF INIT: CPT

## 2024-06-20 PROCEDURE — 84443 ASSAY THYROID STIM HORMONE: CPT | Performed by: NURSE PRACTITIONER

## 2024-06-20 PROCEDURE — 82550 ASSAY OF CK (CPK): CPT | Performed by: PEDIATRICS

## 2024-06-20 PROCEDURE — 120N000001 HC R&B MED SURG/OB

## 2024-06-20 PROCEDURE — 96375 TX/PRO/DX INJ NEW DRUG ADDON: CPT

## 2024-06-20 PROCEDURE — 99285 EMERGENCY DEPT VISIT HI MDM: CPT | Mod: 25 | Performed by: STUDENT IN AN ORGANIZED HEALTH CARE EDUCATION/TRAINING PROGRAM

## 2024-06-20 PROCEDURE — 83880 ASSAY OF NATRIURETIC PEPTIDE: CPT | Performed by: NURSE PRACTITIONER

## 2024-06-20 PROCEDURE — 86140 C-REACTIVE PROTEIN: CPT | Performed by: PEDIATRICS

## 2024-06-20 PROCEDURE — 250N000013 HC RX MED GY IP 250 OP 250 PS 637: Performed by: NURSE PRACTITIONER

## 2024-06-20 PROCEDURE — 82330 ASSAY OF CALCIUM: CPT | Performed by: NURSE PRACTITIONER

## 2024-06-20 PROCEDURE — 84145 PROCALCITONIN (PCT): CPT | Performed by: NURSE PRACTITIONER

## 2024-06-20 PROCEDURE — 83605 ASSAY OF LACTIC ACID: CPT | Performed by: NURSE PRACTITIONER

## 2024-06-20 PROCEDURE — 82310 ASSAY OF CALCIUM: CPT | Performed by: NURSE PRACTITIONER

## 2024-06-20 PROCEDURE — 99418 PROLNG IP/OBS E/M EA 15 MIN: CPT | Performed by: PEDIATRICS

## 2024-06-20 PROCEDURE — 70450 CT HEAD/BRAIN W/O DYE: CPT

## 2024-06-20 PROCEDURE — 82330 ASSAY OF CALCIUM: CPT | Performed by: PEDIATRICS

## 2024-06-20 PROCEDURE — 84484 ASSAY OF TROPONIN QUANT: CPT | Performed by: NURSE PRACTITIONER

## 2024-06-20 PROCEDURE — 71045 X-RAY EXAM CHEST 1 VIEW: CPT

## 2024-06-20 PROCEDURE — 87637 SARSCOV2&INF A&B&RSV AMP PRB: CPT | Performed by: NURSE PRACTITIONER

## 2024-06-20 PROCEDURE — 250N000011 HC RX IP 250 OP 636: Mod: JZ | Performed by: PEDIATRICS

## 2024-06-20 PROCEDURE — 250N000011 HC RX IP 250 OP 636: Mod: JZ | Performed by: NURSE PRACTITIONER

## 2024-06-20 PROCEDURE — 83735 ASSAY OF MAGNESIUM: CPT | Performed by: NURSE PRACTITIONER

## 2024-06-20 PROCEDURE — 250N000013 HC RX MED GY IP 250 OP 250 PS 637: Performed by: PEDIATRICS

## 2024-06-20 PROCEDURE — 36415 COLL VENOUS BLD VENIPUNCTURE: CPT | Performed by: NURSE PRACTITIONER

## 2024-06-20 PROCEDURE — 85025 COMPLETE CBC W/AUTO DIFF WBC: CPT | Performed by: NURSE PRACTITIONER

## 2024-06-20 PROCEDURE — 99285 EMERGENCY DEPT VISIT HI MDM: CPT | Mod: 25

## 2024-06-20 PROCEDURE — 82962 GLUCOSE BLOOD TEST: CPT

## 2024-06-20 PROCEDURE — 81001 URINALYSIS AUTO W/SCOPE: CPT | Performed by: NURSE PRACTITIONER

## 2024-06-20 RX ORDER — ISOSORBIDE MONONITRATE 30 MG/1
30 TABLET, EXTENDED RELEASE ORAL DAILY
Status: DISCONTINUED | OUTPATIENT
Start: 2024-06-20 | End: 2024-06-24 | Stop reason: HOSPADM

## 2024-06-20 RX ORDER — METOPROLOL SUCCINATE 100 MG/1
100 TABLET, EXTENDED RELEASE ORAL ONCE
Status: COMPLETED | OUTPATIENT
Start: 2024-06-20 | End: 2024-06-20

## 2024-06-20 RX ORDER — ONDANSETRON 4 MG/1
4 TABLET, ORALLY DISINTEGRATING ORAL EVERY 6 HOURS PRN
Status: DISCONTINUED | OUTPATIENT
Start: 2024-06-20 | End: 2024-06-24 | Stop reason: HOSPADM

## 2024-06-20 RX ORDER — FUROSEMIDE 10 MG/ML
40 INJECTION INTRAMUSCULAR; INTRAVENOUS ONCE
Status: COMPLETED | OUTPATIENT
Start: 2024-06-20 | End: 2024-06-20

## 2024-06-20 RX ORDER — ONDANSETRON 2 MG/ML
4 INJECTION INTRAMUSCULAR; INTRAVENOUS EVERY 6 HOURS PRN
Status: DISCONTINUED | OUTPATIENT
Start: 2024-06-20 | End: 2024-06-24 | Stop reason: HOSPADM

## 2024-06-20 RX ORDER — HYDRALAZINE HYDROCHLORIDE 25 MG/1
25 TABLET, FILM COATED ORAL 3 TIMES DAILY
Status: DISCONTINUED | OUTPATIENT
Start: 2024-06-20 | End: 2024-06-24 | Stop reason: HOSPADM

## 2024-06-20 RX ORDER — MAGNESIUM SULFATE HEPTAHYDRATE 40 MG/ML
4 INJECTION, SOLUTION INTRAVENOUS ONCE
Status: COMPLETED | OUTPATIENT
Start: 2024-06-20 | End: 2024-06-20

## 2024-06-20 RX ORDER — FUROSEMIDE 40 MG
80 TABLET ORAL DAILY
Status: DISCONTINUED | OUTPATIENT
Start: 2024-06-21 | End: 2024-06-22

## 2024-06-20 RX ORDER — CALCIUM GLUCONATE 20 MG/ML
1 INJECTION, SOLUTION INTRAVENOUS ONCE
Status: COMPLETED | OUTPATIENT
Start: 2024-06-20 | End: 2024-06-20

## 2024-06-20 RX ORDER — AMOXICILLIN 250 MG
2 CAPSULE ORAL 2 TIMES DAILY PRN
Status: DISCONTINUED | OUTPATIENT
Start: 2024-06-20 | End: 2024-06-24 | Stop reason: HOSPADM

## 2024-06-20 RX ORDER — MAGNESIUM OXIDE 400 MG/1
400 TABLET ORAL EVERY 4 HOURS
Status: COMPLETED | OUTPATIENT
Start: 2024-06-20 | End: 2024-06-21

## 2024-06-20 RX ORDER — ACETAMINOPHEN 500 MG
1000 TABLET ORAL ONCE
Status: COMPLETED | OUTPATIENT
Start: 2024-06-20 | End: 2024-06-20

## 2024-06-20 RX ORDER — HYDRALAZINE HYDROCHLORIDE 25 MG/1
25 TABLET, FILM COATED ORAL ONCE
Status: COMPLETED | OUTPATIENT
Start: 2024-06-20 | End: 2024-06-20

## 2024-06-20 RX ORDER — HYDROCHLOROTHIAZIDE 25 MG/1
25 TABLET ORAL ONCE
Status: COMPLETED | OUTPATIENT
Start: 2024-06-20 | End: 2024-06-20

## 2024-06-20 RX ORDER — HEPARIN SODIUM 5000 [USP'U]/.5ML
5000 INJECTION, SOLUTION INTRAVENOUS; SUBCUTANEOUS EVERY 8 HOURS
Status: DISCONTINUED | OUTPATIENT
Start: 2024-06-20 | End: 2024-06-22

## 2024-06-20 RX ORDER — LIDOCAINE 40 MG/G
CREAM TOPICAL
Status: DISCONTINUED | OUTPATIENT
Start: 2024-06-20 | End: 2024-06-24 | Stop reason: HOSPADM

## 2024-06-20 RX ORDER — LABETALOL HYDROCHLORIDE 5 MG/ML
10 INJECTION, SOLUTION INTRAVENOUS EVERY 4 HOURS PRN
Status: DISCONTINUED | OUTPATIENT
Start: 2024-06-20 | End: 2024-06-24 | Stop reason: HOSPADM

## 2024-06-20 RX ORDER — ACETAMINOPHEN 325 MG/1
975 TABLET ORAL EVERY 6 HOURS PRN
Status: DISCONTINUED | OUTPATIENT
Start: 2024-06-20 | End: 2024-06-24 | Stop reason: HOSPADM

## 2024-06-20 RX ORDER — METOPROLOL SUCCINATE 100 MG/1
100 TABLET, EXTENDED RELEASE ORAL DAILY
Status: DISCONTINUED | OUTPATIENT
Start: 2024-06-21 | End: 2024-06-24 | Stop reason: HOSPADM

## 2024-06-20 RX ORDER — ACETAMINOPHEN 650 MG/1
650 SUPPOSITORY RECTAL EVERY 4 HOURS PRN
Status: DISCONTINUED | OUTPATIENT
Start: 2024-06-20 | End: 2024-06-24 | Stop reason: HOSPADM

## 2024-06-20 RX ORDER — NICOTINE POLACRILEX 4 MG
15-30 LOZENGE BUCCAL
Status: DISCONTINUED | OUTPATIENT
Start: 2024-06-20 | End: 2024-06-24 | Stop reason: HOSPADM

## 2024-06-20 RX ORDER — ASPIRIN 81 MG/1
81 TABLET ORAL AT BEDTIME
Status: DISCONTINUED | OUTPATIENT
Start: 2024-06-20 | End: 2024-06-24 | Stop reason: HOSPADM

## 2024-06-20 RX ORDER — CALCIUM GLUCONATE 94 MG/ML
2 INJECTION, SOLUTION INTRAVENOUS ONCE
Status: DISCONTINUED | OUTPATIENT
Start: 2024-06-20 | End: 2024-06-20

## 2024-06-20 RX ORDER — DEXTROSE MONOHYDRATE 25 G/50ML
25-50 INJECTION, SOLUTION INTRAVENOUS
Status: DISCONTINUED | OUTPATIENT
Start: 2024-06-20 | End: 2024-06-24 | Stop reason: HOSPADM

## 2024-06-20 RX ORDER — AMOXICILLIN 250 MG
1 CAPSULE ORAL 2 TIMES DAILY PRN
Status: DISCONTINUED | OUTPATIENT
Start: 2024-06-20 | End: 2024-06-24 | Stop reason: HOSPADM

## 2024-06-20 RX ORDER — LISINOPRIL 10 MG/1
20 TABLET ORAL ONCE
Status: COMPLETED | OUTPATIENT
Start: 2024-06-20 | End: 2024-06-20

## 2024-06-20 RX ORDER — CLOPIDOGREL BISULFATE 75 MG/1
75 TABLET ORAL DAILY
Status: DISCONTINUED | OUTPATIENT
Start: 2024-06-20 | End: 2024-06-24 | Stop reason: HOSPADM

## 2024-06-20 RX ORDER — LISINOPRIL AND HYDROCHLOROTHIAZIDE 20; 25 MG/1; MG/1
1 TABLET ORAL ONCE
Status: DISCONTINUED | OUTPATIENT
Start: 2024-06-20 | End: 2024-06-20

## 2024-06-20 RX ADMIN — HYDRALAZINE HYDROCHLORIDE 25 MG: 25 TABLET ORAL at 12:57

## 2024-06-20 RX ADMIN — CALCIUM GLUCONATE 1 G: 20 INJECTION, SOLUTION INTRAVENOUS at 14:40

## 2024-06-20 RX ADMIN — MAGNESIUM SULFATE HEPTAHYDRATE 4 G: 40 INJECTION, SOLUTION INTRAVENOUS at 14:43

## 2024-06-20 RX ADMIN — HYDROCHLOROTHIAZIDE 25 MG: 25 TABLET ORAL at 12:57

## 2024-06-20 RX ADMIN — FUROSEMIDE 40 MG: 10 INJECTION, SOLUTION INTRAMUSCULAR; INTRAVENOUS at 15:32

## 2024-06-20 RX ADMIN — HYDRALAZINE HYDROCHLORIDE 25 MG: 25 TABLET ORAL at 21:05

## 2024-06-20 RX ADMIN — CLOPIDOGREL 75 MG: 75 TABLET ORAL at 18:56

## 2024-06-20 RX ADMIN — Medication 400 MG: at 22:02

## 2024-06-20 RX ADMIN — ISOSORBIDE MONONITRATE 30 MG: 30 TABLET, EXTENDED RELEASE ORAL at 18:55

## 2024-06-20 RX ADMIN — CALCIUM GLUCONATE 1 G: 20 INJECTION, SOLUTION INTRAVENOUS at 15:11

## 2024-06-20 RX ADMIN — HEPARIN SODIUM 5000 UNITS: 10000 INJECTION, SOLUTION INTRAVENOUS; SUBCUTANEOUS at 21:07

## 2024-06-20 RX ADMIN — ASPIRIN 81 MG: 81 TABLET, COATED ORAL at 21:05

## 2024-06-20 RX ADMIN — METOPROLOL SUCCINATE 100 MG: 100 TABLET, EXTENDED RELEASE ORAL at 12:57

## 2024-06-20 RX ADMIN — ACETAMINOPHEN 1000 MG: 500 TABLET ORAL at 12:56

## 2024-06-20 RX ADMIN — LISINOPRIL 20 MG: 10 TABLET ORAL at 12:57

## 2024-06-20 ASSESSMENT — ACTIVITIES OF DAILY LIVING (ADL)
ADLS_ACUITY_SCORE: 38
ADLS_ACUITY_SCORE: 31
ADLS_ACUITY_SCORE: 31
ADLS_ACUITY_SCORE: 38
ADLS_ACUITY_SCORE: 29
ADLS_ACUITY_SCORE: 38
ADLS_ACUITY_SCORE: 29
ADLS_ACUITY_SCORE: 38
ADLS_ACUITY_SCORE: 31
ADLS_ACUITY_SCORE: 38

## 2024-06-20 ASSESSMENT — ENCOUNTER SYMPTOMS
NAUSEA: 0
APPETITE CHANGE: 1
NUMBNESS: 0
FATIGUE: 1
DIZZINESS: 0
CONSTIPATION: 0
VOMITING: 0
SHORTNESS OF BREATH: 0
COUGH: 1
ABDOMINAL PAIN: 0
HEADACHES: 1
FEVER: 0
SPEECH DIFFICULTY: 0
TREMORS: 0
DIARRHEA: 0
CHILLS: 0
MUSCULOSKELETAL NEGATIVE: 1

## 2024-06-20 ASSESSMENT — COLUMBIA-SUICIDE SEVERITY RATING SCALE - C-SSRS
6. HAVE YOU EVER DONE ANYTHING, STARTED TO DO ANYTHING, OR PREPARED TO DO ANYTHING TO END YOUR LIFE?: NO
1. IN THE PAST MONTH, HAVE YOU WISHED YOU WERE DEAD OR WISHED YOU COULD GO TO SLEEP AND NOT WAKE UP?: NO
2. HAVE YOU ACTUALLY HAD ANY THOUGHTS OF KILLING YOURSELF IN THE PAST MONTH?: NO

## 2024-06-20 NOTE — ED NOTES
DATE/TIME OF CALL RECEIVED FROM LAB:  06/20/24 at 2:04 PM   LAB TEST:  calcium   LAB VALUE:  5.6  PROVIDER NOTIFIED?: Yes  PROVIDER NAME: Roya  DATE/TIME LAB VALUE REPORTED TO PROVIDER: 1405  MECHANISM OF PROVIDER NOTIFICATION: Face-To-Face  PROVIDER RESPONSE: got it

## 2024-06-20 NOTE — ED NOTES
ED Nursing criteria listed below was addressed during verbal handoff:     Abnormal vitals: Yes  Abnormal results: Yes see [facility]  Med Reconciliation completed: Yes  Meds given in ED: Yes  Any Overdue Meds: No  Core Measures: N/A  Isolation: No  Special needs: Yes Keep on purwick pt does ambulate and drives  Skin assessment: Yes    Observation Patient  Education provided: No    Report called to [facility] for room 251, all medication given, nothing is running upon transfer

## 2024-06-20 NOTE — MEDICATION SCRIBE - ADMISSION MEDICATION HISTORY
Medication Scribe Admission Medication History    Admission medication history is complete. The information provided in this note is only as accurate as the sources available at the time of the update.    Information Source(s): Patient and CareEverywhere/SureScripts via in-person    Pertinent Information: daughter Neyda ortiz, received this report solely from patient Eliza.    Changes made to PTA medication list:  Added: None  Deleted: keflex, questran, lasix, lisinopril-hydrochlorothiazide, metformin XR 500mg  Changed: omeprazole once daily, aspirin at bedtime    Allergies reviewed with patient and updates made in EHR: yes    Medication History Completed By: JOHN PEREZ 6/20/2024 2:02 PM    PTA Med List   Medication Sig Last Dose    aspirin 81 MG EC tablet Take 1 tablet (81 mg) by mouth daily (Patient taking differently: Take 81 mg by mouth at bedtime) 6/19/2024 at hs    atorvastatin (LIPITOR) 80 MG tablet Take 1 tablet (80 mg) by mouth at bedtime 6/19/2024 at hs    clopidogrel (PLAVIX) 75 MG tablet Take 1 tablet (75 mg) by mouth daily 6/19/2024 at am    gabapentin (NEURONTIN) 300 MG capsule TAKE ONE TO TWO CAPSULES BY MOUTH EVERY EVENING (Patient taking differently: Take 300 mg by mouth at bedtime) 6/19/2024 at hs    glipiZIDE (GLUCOTROL) 10 MG tablet Take 2 tablets (20 mg) by mouth 2 times daily (before meals) 6/19/2024 at supper    hydrALAZINE (APRESOLINE) 25 MG tablet Take 1 tablet (25 mg) by mouth 3 times daily (Patient taking differently: Take 25 mg by mouth 3 times daily Morning, supper and bedtime) 6/19/2024 at hs    isosorbide mononitrate (IMDUR) 30 MG 24 hr tablet Take 1 tablet (30 mg) by mouth daily 6/19/2024 at am    loperamide (IMODIUM A-D) 2 MG tablet Take 1 tablet (2 mg) by mouth daily (Patient taking differently: Take 2 mg by mouth daily as needed for diarrhea (averages three times weekly)) Past Week at am    metoprolol succinate ER (TOPROL XL) 100 MG 24 hr tablet Take 1 tablet (100 mg) by  mouth daily 6/19/2024 at am    omeprazole (PRILOSEC) 40 MG DR capsule Take 1 capsule (40 mg) by mouth daily TAKE ONE CAPSULE BY MOUTH TWICE A DAY AS NEEDED Strength: 40 mg (Patient taking differently: Take 40 mg by mouth daily) 6/19/2024 at am    semaglutide (OZEMPIC) 2 MG/3ML pen Inject 0.25 mg Subcutaneous every 7 days (Patient taking differently: Inject 0.25 mg Subcutaneous every 7 days Sundays) 6/16/2024 at am

## 2024-06-20 NOTE — ED TRIAGE NOTES
Pt presents with headache for many days. Pt BP elevated. Pt on 3 different BP meds. Pt did not take today. Pt alert and orientated. Pt feeling generally weak. Pt lives alone will be moving to [facility] next week in [place]. Smile intact.      Triage Assessment (Adult)       Row Name 06/20/24 1204          Triage Assessment    Airway WDL WDL        Respiratory WDL    Respiratory WDL WDL        Skin Circulation/Temperature WDL    Skin Circulation/Temperature WDL WDL        Cardiac WDL    Cardiac WDL WDL     Cardiac Rhythm NSR        Peripheral/Neurovascular WDL    Peripheral Neurovascular WDL WDL        Cognitive/Neuro/Behavioral WDL    Cognitive/Neuro/Behavioral WDL WDL

## 2024-06-20 NOTE — H&P
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  Upon initial evaluation in the emergency room, she is found to have severe hypomagnesemia and hypocalcemia.  She is at high risk for an adverse outcome including worsening electrolyte disturbances and/or consequences of those disturbances such as rhabdomyolysis or cardiac arrhythmia, so hospitalization is considered medically necessary.  Based on the presently available information, hospitalization for about 24 hours is anticipated.    Principal Problem:    Hypomagnesemia  Active Problems:    Weakness    Hypocalcemia    Hypertension goal BP (blood pressure) < 140/90    Coronary artery disease involving native coronary artery of native heart without angina pectoris    Type 2 diabetes mellitus with diabetic polyneuropathy, without long-term current use of insulin (H)    Stage 3b chronic kidney disease (H)    Hyperphosphatemia    Heart failure with preserved ejection fraction, NYHA class I (H)    Chronic anemia    Lower extremity edema    Acute encephalopathy    Hypoalbuminemia    Platelet function defect (H)    Severe hypomagnesemia  Severe hypocalcemia  Mild hyperphosphatemia  Acute encephalopathy  Generalized muscle weakness  Presented with magnesium 0.7, calcium 5.6-5.8, and ionized calcium 3.1 and clinical symptoms likely related to the severe electrolyte disturbances including confusion concerning for acute encephalopathy and generalized muscle weakness with risk for rhabdomyolysis.  Suspect electrolyte disturbances are due to recent poor oral nutrient intake, but hypomagnesemia may be exacerbated by  chronic PPI treatment.  Renal magnesium wasting is possible although not strongly suspected.  Diarrhea can lead to hypomagnesemia, but she has not had worsening diarrhea recently and diarrheal symptoms are usually adequately controlled with use of Imodium about 3 days/week.  Hypocalcemia may be exacerbated by hypomagnesemia (due to impaired parathyroid hormone release and action in a patient who likely has secondary hyperparathyroidism from stage IIIb chronic kidney disease).  She is at high risk for cardiac arrhythmia from electrolyte disturbances, particularly hypomagnesemia.  Higher troponin level today compared with previously may be due to severe hypomagnesemia which can cause worsening myocardial ischemia.  Treatment in the emergency room today included administration of 2 g IV calcium gluconate and 4 g IV magnesium.  She also presents with mild hyperphosphatemia also likely related to hypomagnesemia and hypocalcemia although chronic kidney disease may contribute as well.  Hyperphosphatemia combined with excessive calcium supplementation can precipitate calcium phosphate crystal formation.  -[place] to observation  -Continue cardiac monitoring  -Ordered recheck magnesium, calcium, ionized calcium, and phosphorus levels this evening  -Continue magnesium supplementation using the high replacement protocol with ongoing magnesium monitoring as indicated  -Not recommending additional calcium supplementation currently due to risk of calcium phosphate crystal formation and because treatment of severe hypomagnesemia alone may lead to improvement in serum calcium levels  -Encourage adequate oral intake  -Ordered CK level and monitor serial CK levels if significantly elevated  -Hold chronic statin for now although could resume if she does not have rhabdomyolysis  -Ordered urinary magnesium (could calculate fractional excretion of magnesium to evaluate for the possibility of renal magnesium wasting)  -Stop chronic PPI  because PPI therapy can cause hypomagnesemia, switch to alternative antacid such as H2 blocker if needed  -Monitor clinically for recurrent diarrhea and could resume use of Imodium if needed  -Advance activity as tolerated, could reconsider PT evaluation if significant weakness persists but patient currently is planning to discharge from the hospital to [facility]    Possible demand ischemia  CAD  Presented with higher troponin level 134 today compared with 107 on April 9 and has known CAD.  She does not have angina or acute ischemic EKG changes.  Severe hypomagnesemia can precipitate myocardial ischemia, so demand ischemia is possible.  Troponin is stable today so far.  -Not anticipating recheck of troponin unless her clinical status changes    Heart failure with preserved EF  Bilateral lower extremity edema  Known chronic heart failure with preserved EF but grade 1 diastolic dysfunction per most recent echocardiogram on April 8.  Presenting with weight gain 10 to 15 pounds and bilateral lower extremity edema, but BNP today is decreased compared with April 8.  She also denies other symptoms of decompensated heart failure, and radiographic findings are not supportive of pulmonary edema.  It is possible that worsening bilateral lower extremity edema with weight gain are due to severe lower extremity weakness associated with severe electrolyte disturbances and/or exacerbated by hypoalbuminemia.  Additionally, it appears as though previous chronic thiazide diuretic therapy was discontinued about a month ago and that she has not been taking an alternate diuretic since then and this may also be contributing to current signs of volume overload.  She did receive 1 dose IV Lasix in the ER.  -Monitor clinically for worsening symptoms or signs of decompensated heart failure but not pursuing additional diagnostic evaluation for acute heart failure at this time unless her clinical status  deteriorates  -Consider ultrasound of lower extremities to evaluate for occult venous thrombosis if edema persists although DVT is not strongly suspected clinically  -Recommend initiation of loop diuretic therapy on a regular basis because of comorbidities of heart failure with preserved EF, stage IIIb CKD, and hypertension, ordered oral Lasix 80 mg daily to start tomorrow    Severe hypertension  Headache  Presented with headache and was severely hypertensive in the ER with blood pressure as high as 208/80.  Severe hypertension was treated in the ER with a combination of oral lisinopril, oral hydrochlorothiazide, oral Toprol-XL, and oral hydralazine as well as a dose of IV Lasix.  Headache resolved as blood pressure improved, so suspect headache was due to severe hypertension.  Patient reporting that previous chronic lisinopril hydrochlorothiazide was discontinued and that she has been taking Toprol-XL and hydralazine to treat hypertension.  Suspect severe hypertension today was exacerbated by her not taking her usual medications prior to her ER visit today.  -Continue Toprol-XL and hydralazine at her chronic doses  -Start oral Lasix (see above) which is new for her  -For now, not continuing lisinopril and/or hydrochlorothiazide because the patient reports the medication was previously discontinued but can reassess whether to restart an ACE inhibitor or ARB depending upon clinical course    Type 2 diabetes with retinopathy  Type 2 diabetes with known mild nonproliferative retinopathy bilaterally recently treated with glipizide and Ozempic.  A1c in January was 8.6.  At presentation today, blood sugar 82 despite not taking glipizide dose this morning and in context of poor oral intake.  -Hold chronic dose of glipizide  -Ozempic is usually administered on Sundays so not ordered at this time  -Monitor blood sugars 4 times daily  -Ordered hypoglycemia treatment protocol  -Add sliding scale NovoLog during  hospitalization  -Recheck A1c    Stage IIIb CKD  Proteinuria  Known stage IIIb CKD with heavy proteinuria on recent urine testing in April 2024.  Patient had previously been treated with ACE inhibitor, but appears as though it may have been discontinued in the last month for reasons that are not clear.  Renal function today is currently stable compared with baseline.  -Ordered recheck of renal function  -Monitor urine output  -Avoid nephrotoxic medication as able  -Adjust medication dosing accordingly for changing renal function  -If hypertension persists, consider whether to resume an ACE inhibitor or start an ARB    Hypoalbuminemia  Serum albumin low at 3.2.  This could be due to inadequate oral nutritional protein intake, but heavy proteinuria could also contribute.  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  Presenting today with hemoglobin 8.1, similar to previously.  No recent bleeding noted.  Patient is taking both aspirin and Plavix chronically that causes platelet function defect and increase her risk for bleeding.  -Continue aspirin and Plavix for now but hold if there is increasing concern for bleeding  -Ordered recheck hemoglobin          Diet:  Moderate carbohydrate, 2 g sodium  DVT Prophylaxis: Heparin SQ  Uriarte Catheter: Not present  Lines: None     Cardiac Monitoring: None  Code Status:  DNR/DNI per her expressed preferences    Clinically Significant Risk Factors Present on Admission          # Hypocalcemia: Lowest Ca = 5.6 mg/dL in last 2 days, will monitor and replace as appropriate   # Hypomagnesemia: Lowest Mg = 0.7 mg/dL in last 2 days, will replace as needed   # Hypoalbuminemia: Lowest albumin = 3.2 g/dL at 6/20/2024 12:40 PM, will monitor as appropriate   # Drug Induced Platelet Defect: home medication list includes an antiplatelet medication   #  Hypertension: Noted on problem list    # Anemia: based on hgb <11          # DMII: A1C = N/A within past 6 months               Disposition Plan     Medically Ready for Discharge: Anticipated Tomorrow with plan to discharge to [facility] living facility which is new for the patient           Deon Pierre MD  Hospitalist Service  [facility]  Securely message with Lat49 (more info)  Text page via [employer] Paging/Directory     ______________________________________________________________________    Chief Complaint   Confusion and weakness    History is obtained from the patient, electronic health record, emergency department physician, and patient's daughter    History of Present Illness   Eliza Dubois is a 83 year old female who says she has not been feeling well for some time and cannot pinpoint exactly how long.  She has been feeling weak.  Police were called 3 days ago to her home because she was on the floor and could not get up.  They helped her up and offered to bring her in for medical attention at which time the patient declined and after which she was able to mobilize independently.  Family says that 2 days ago, the patient was able to ambulate using her walker outside of her apartment to visit her daughter for her daughter's birthday and ate well on that day.  Yesterday the patient started to feel weaker again today.  This morning, the patient not only noted persistent weakness but also noted confusion which frightened her.  She found she had to sit on an extra pillow or 2 on her chair in order to be able to stand up because if she sat regularly in the chair she was so weak that she would not be able to stand up.  When she noted the confusion today, she became frightened but accidentally dropped her phone on the floor.  She had enough presence of mind to lower herself to the floor and crawled to her phone and subsequently called for help.  She was  transported to the ER by EMS.  ER provider Tod Pryor reports that the patient was severely hypertensive on arrival and complained of headache but had not taken her doses of medications today including her antihypertensive medications.  Patient was given a dose of Tylenol.  Due to severe hypertension, patient was given oral doses of hydralazine 25 mg, lisinopril 20 mg, Toprol- mg, and hydrochlorothiazide 25 mg in the ER after which her blood pressure improved.  Tod Pryor reports that the patient's headache resolved as her blood pressure improved.  Upon medical evaluation in the ER patient was found to have severe hypocalcemia and was given 2 g IV calcium gluconate.  She was also found to have severe hypomagnesemia and was given 4 g IV magnesium sulfate.  Subsequently, due to leg edema, patient was given 40 mg IV Lasix in the ER.  Patient is now seen in the ER for evaluation.  She reports she is starting to feel better.  Her confusion has resolved and she actually already is starting to feel somewhat stronger.    Additional history is obtained from the patient.  She reports that at her normal baseline she only eats 1 meal a day with snacks.  She does not cook for herself.  She may have been eating less recently.  She and family have plan for the patient to move into assisted living at [facility] tomorrow.      Past Medical History    Past Medical History:   Diagnosis Date    Diabetic eye exam (H) 05/01/14    Heart attack (H)     Pure hypercholesterolemia     Stented coronary artery     Type II or unspecified type diabetes mellitus without mention of complication, not stated as uncontrolled 2002    [place].    Unspecified disease of pericardium 12/05/08    Pericarditis w/mild to moderate pericardial effusion.    Unspecified essential hypertension        Past Surgical History   Past Surgical History:   Procedure Laterality Date    ABDOMEN SURGERY      COLONOSCOPY  04/15/09    COLONOSCOPY  6/18/2014     Procedure: COMBINED COLONOSCOPY, SINGLE BIOPSY/POLYPECTOMY BY BIOPSY;  Surgeon: Earle Mon MD;  Location: [facility] LAPAROSCOPY, SURGICAL; CHOLECYSTECTOMY  1997    Cholecystectomy, Laparoscopic    PHACOEMULSIFICATION CLEAR CORNEA WITH STANDARD INTRAOCULAR LENS IMPLANT Right 3/16/2016    Procedure: PHACOEMULSIFICATION CLEAR CORNEA WITH STANDARD INTRAOCULAR LENS IMPLANT;  Surgeon: George Lemus MD;  Location: [facility]    PHACOEMULSIFICATION CLEAR CORNEA WITH STANDARD INTRAOCULAR LENS IMPLANT Left 3/30/2016    Procedure: PHACOEMULSIFICATION CLEAR CORNEA WITH STANDARD INTRAOCULAR LENS IMPLANT;  Surgeon: George Lemus MD;  Location: [facility]    ZZC NONSPECIFIC PROCEDURE  1988    Hysterectomy       Prior to Admission Medications   Prior to Admission Medications   Prescriptions Last Dose Informant Patient Reported? Taking?   aspirin 81 MG EC tablet 6/19/2024 at hs  No Yes   Sig: Take 1 tablet (81 mg) by mouth daily   Patient taking differently: Take 81 mg by mouth at bedtime   atorvastatin (LIPITOR) 80 MG tablet 6/19/2024 at hs  No Yes   Sig: Take 1 tablet (80 mg) by mouth at bedtime   clopidogrel (PLAVIX) 75 MG tablet 6/19/2024 at am  No Yes   Sig: Take 1 tablet (75 mg) by mouth daily   gabapentin (NEURONTIN) 300 MG capsule 6/19/2024 at hs  No Yes   Sig: TAKE ONE TO TWO CAPSULES BY MOUTH EVERY EVENING   Patient taking differently: Take 300 mg by mouth at bedtime   glipiZIDE (GLUCOTROL) 10 MG tablet 6/19/2024 at supper  No Yes   Sig: Take 2 tablets (20 mg) by mouth 2 times daily (before meals)   hydrALAZINE (APRESOLINE) 25 MG tablet 6/19/2024 at hs  No Yes   Sig: Take 1 tablet (25 mg) by mouth 3 times daily   Patient taking differently: Take 25 mg by mouth 3 times daily Morning, supper and bedtime   isosorbide mononitrate (IMDUR) 30 MG 24 hr tablet 6/19/2024 at am  No Yes   Sig: Take 1 tablet (30 mg) by mouth daily   loperamide (IMODIUM A-D) 2 MG tablet Past Week at am  No Yes   Sig: Take 1 tablet (2 mg) by  mouth daily   Patient taking differently: Take 2 mg by mouth daily as needed for diarrhea (averages three times weekly)   metoprolol succinate ER (TOPROL XL) 100 MG 24 hr tablet 6/19/2024 at am  No Yes   Sig: Take 1 tablet (100 mg) by mouth daily   omeprazole (PRILOSEC) 40 MG DR capsule 6/19/2024 at am  No Yes   Sig: Take 1 capsule (40 mg) by mouth daily TAKE ONE CAPSULE BY MOUTH TWICE A DAY AS NEEDED Strength: 40 mg   Patient taking differently: Take 40 mg by mouth daily   semaglutide (OZEMPIC) 2 MG/3ML pen 6/16/2024 at am  No Yes   Sig: Inject 0.25 mg Subcutaneous every 7 days   Patient taking differently: Inject 0.25 mg Subcutaneous every 7 days Sundays      Facility-Administered Medications: None        Review of Systems    The 10 point Review of Systems is negative other than noted in the HPI or here.     Social History   I have reviewed this patient's social history and updated it with pertinent information if needed.  Social History     Tobacco Use    Smoking status: Never    Smokeless tobacco: Never   Vaping Use    Vaping status: Never Used   Substance Use Topics    Alcohol use: No     Alcohol/week: 0.0 standard drinks of alcohol    Drug use: No         Family History   I have reviewed this patient's family history and updated it with pertinent information if needed.  Family History   Problem Relation Age of Onset    Diabetes Mother     Arthritis Mother     Heart Disease Mother     Hypertension Mother     Lipids Mother     Neurologic Disorder Mother         neuropathy    Osteoporosis Mother     Obesity Mother     EYE* Mother         blind    Diabetes Father     Genitourinary Problems Father         gall stones    Heart Disease Father         MI    Cancer Maternal Grandmother     Heart Disease Maternal Grandmother     Cancer Other         Grandparents    Alcohol/Drug No family hx of     Alzheimer Disease No family hx of     Anesthesia Reaction No family hx of     Blood Disease No family hx of     Depression  No family hx of     Genetic Disorder No family hx of     Gastrointestinal Disease No family hx of     Gynecology No family hx of     Psychotic Disorder No family hx of     Respiratory No family hx of     Cerebrovascular Disease No family hx of          Allergies   Allergies   Allergen Reactions    Sulfa Antibiotics Hives        Physical Exam   Vital Signs: Temp: 97.9  F (36.6  C) Temp src: Oral BP: (!) 162/73 Pulse: 81   Resp: 17 SpO2: 97 %      Patient Vitals for the past 24 hrs:   BP Temp Temp src Pulse Resp SpO2 Weight   06/20/24 1556 (!) 162/73 -- -- 81 17 97 % --   06/20/24 1526 (!) 155/79 -- -- 78 20 97 % --   06/20/24 1515 130/79 -- -- 86 23 95 % --   06/20/24 1500 (!) 159/79 -- -- 80 14 96 % --   06/20/24 1446 (!) 171/84 -- -- 75 17 95 % --   06/20/24 1413 -- -- -- -- -- -- 65.2 kg (143 lb 11.2 oz)   06/20/24 1409 (!) 168/85 -- -- 80 22 97 % --   06/20/24 1405 -- -- -- 84 17 -- --   06/20/24 1307 (!) 165/91 97.9  F (36.6  C) Oral 85 18 97 % --   06/20/24 1257 (!) 208/80 -- -- 88 -- -- --   06/20/24 1209 (!) (P) 195/97 -- -- (P) 88 (P) 18 (P) 96 % --   06/20/24 1204 (!) 195/97 -- -- 87 27 97 % --     Weight: 143 lbs 11.2 oz  Wt Readings from Last 4 Encounters:   06/20/24 65.2 kg (143 lb 11.2 oz)   05/01/24 59 kg (130 lb)   04/30/24 59.7 kg (131 lb 9.6 oz)   04/24/24 57.9 kg (127 lb 9.6 oz)     Constitutional: Chronically ill-appearing elderly woman without signs of acute distress sitting up in bed in the ER  Eyes: Anicteric sclerae, clear conjunctivae, symmetric pupils, no gross signs of papilledema or retinal hemorrhages on limited nondilated funduscopic exam  ENT: No signs of recent head injury, clear ear canals and nares, normal oropharynx with moist mucous membranes  Neck: Trachea midline, no stridor, normal thyroid, symmetric, nontender  Hematologic / Lymphatic: no cervical lymphadenopathy and no supraclavicular lymphadenopathy  Respiratory: Normal respiratory effort, diminished breath sounds throughout,  clear lung fields  Cardiovascular: Regular rate and rhythm, no loud murmur, good radial pulse, brisk capillary refill, moderately severe peripheral edema in the lower extremities bilaterally, no JVD  GI: Normal bowel sounds, nondistended abdomen, soft, no abdominal tenderness  Skin: Somewhat pale color, no rash  Musculoskeletal: No cords or tenderness in the lower extremities  Neurologic: Alert and maintains wakefulness and attention, no obvious confusion or disorientation currently, able to follow simple instructions, able to purposefully and symmetrically moves limbs, no definite focal neurologic deficits, no involuntary movements, negative Chvostek and Trousseau signs,, no clonus at the ankles, unable to elicit DTRs  Neuropsychiatric: Normal mood and affect for the circumstances    Medical Decision Making       99 MINUTES SPENT BY ME on the date of service doing chart review, history, exam, documentation & further activities per the note.      Data     I have personally reviewed the following data over the past 24 hrs:    7.0  \   8.1 (L)   / 213     143 107 27.5 (H) /  82   4.3 23 2.18 (H) \     ALT: 10 AST: 28 AP: 96 TBILI: 0.4   ALB: 3.2 (L) TOT PROTEIN: 6.3 (L) LIPASE: N/A     Trop: 135 (HH) BNP: 17,663 (H)     TSH: 1.88 T4: N/A A1C: N/A     Procal: 0.11 CRP: N/A Lactic Acid: 0.9         EKG reviewed today demonstrating normal sinus rhythm without acute ischemic changes but poor R wave progression anteriorly which appears worsened as compared with EKG from April 8    Previous laboratory studies reviewed for comparison:  April 16 B12 223 with 232 the lower limit of normal, creatinine 2.38 with estimated GFR 20 April 12 note from visit with retinal specialist reviewed notable for bilateral mild diabetic nonproliferative retinopathy  April 9 troponin 107  April 8 BNP 22,716  January 12 hemoglobin A1c 8.6    Imaging results reviewed over the past 24 hrs:   Recent Results (from the past 24 hour(s))   CT Head w/o  Contrast    Narrative    EXAM: CT HEAD W/O CONTRAST  6/20/2024 1:41 PM     HISTORY:  headache       COMPARISON:  Head CT 4/22/2024    TECHNIQUE: Using multidetector thin collimation helical acquisition  technique, axial, coronal and sagittal CT images from the skull base  to the vertex were obtained without intravenous contrast.   (topogram) image(s) also obtained and reviewed. Dose reduction  techniques were performed.    FINDINGS:  No intracranial hemorrhage, mass effect, or midline shift. No acute  loss of gray-white matter differentiation in the cerebral hemispheres.  Ventricles are proportionate to the cerebral sulci. Clear basal  cisterns. Mild diffuse cerebral volume loss. Moderate leukoaraiosis.  Chronic lacunar infarct in the left corona radiata.     The bony calvaria and the bones of the skull base are normal. The  visualized portions of the paranasal sinuses and mastoid air cells are  clear. Grossly normal orbits. Unchanged indeterminant calcified skull  lesions.      Impression    IMPRESSION: No acute intracranial pathology.     ROSIE PEÑA MD         SYSTEM ID:  [id number]   XR Chest Port 1 View    Narrative    CHEST ONE VIEW  6/20/2024 2:06 PM     HISTORY: cough and congestion    COMPARISON: 4/8/2024.      Impression    IMPRESSION: Cardiac silhouette appears mildly enlarged. Aortic arch  calcification. Increased retrocardiac opacity of the medial left lung  base, suspicious for developing pneumonia. Similar additional mild  linear atelectasis of the left lung base. No pleural effusion or  pneumothorax.    ROSLYN MONTANEZ MD         SYSTEM ID:  [id number]     Recent Labs   Lab 06/20/24  1348 06/20/24  1240 06/20/24  1214   WBC  --  7.0  --    HGB  --  8.1*  --    MCV  --  79  --    PLT  --  213  --    NA  --  143  --    POTASSIUM  --  4.3  --    CHLORIDE  --  107  --    CO2  --  23  --    BUN  --  27.5*  --    CR  --  2.18*  --    ANIONGAP  --  13  --    ANDREI 5.6* 5.8*  --    GLC  --  82 77    ALBUMIN  --  3.2*  --    PROTTOTAL  --  6.3*  --    BILITOTAL  --  0.4  --    ALKPHOS  --  96  --    ALT  --  10  --    AST  --  28  --

## 2024-06-20 NOTE — ED PROVIDER NOTES
"  History     Chief Complaint   Patient presents with    Migraine    Hypertension     HPI  Eliza Dubois is a 83 year old female with history of hypertension, stage III CKD, CHFpEF, CAD/MI (stent placement in 2016), T2DM, GERD, and hyperlipidemia who presents for evaluation of generalized weakness, headache, and hypertension.  Patient reports she has had a headache for the last week.  Over the last few days she has felt \"confused\" and weak.  Decreased appetite, has not had anything to eat other than a \"ding-dong\" in the last couple days.  She states she has not been drinking much fluids.  Reports of feeling cold all the time, but no objective fevers.  She has been congested and slight cough for the last few days.  Denies chest pain or shortness of breath.  Denies abdominal pain, diarrhea, or constipation.  She is incontinent of urine at baseline and wears a pad.  Today she was laying in bed and felt very weak. She went to use her phone to call 911 but it fell on the floor. She states she got up and laid on the floor to get her phone. Seh did  not fall. She did not hit her head.  She was able to get to the phone.  When EMS got there she was laying on the floor.  She denies any fall or hitting her head.  She did not take any of her medications today.  Patient lives alone.  She is currently in the process of moving to [facility] next week.    Review of her records from her clinic visit on 5/1/2024 shows her htn is being treated with hydralazine 25 mg 3 times a day, lisinopril/hydrochlorothiazide (20-25 mg) 2 tablets daily, and metoprolol 100 mg daily. However, patient tells me she stopped taking the lisinopril/hydrochlorothiazide when she started hydralazine.   Patient also stopped her metformin.    Patient had similar presentation here in early April and was hospitalized overnight here in the emergency department 4/8-4/9/2024 for CHF.  Cardiology was consulted and felt patient could be taken care of here and " recommended getting echocardiogram during her hospitalization.  She was held in the emergency department overnight.  She had echocardiogram showing no new acute findings.  She had a chest x-ray that was clear of CHF signs.  She had no ischemic findings on ECG.  She was given furosemide 60 mg and had some improvement and discharged home.    Echocardiogram 4/9/2024:  Interpretation Summary     1. The left ventricle is normal in size. The visual ejection fraction is 50-  55%.  2. Grade I or early diastolic dysfunction.  3. The right ventricle is normal in structure, function and size.  4. No valve disease.     No changes from echo in 2009.    Allergies:  Allergies   Allergen Reactions    Sulfa Antibiotics Hives       Problem List:    Patient Active Problem List    Diagnosis Date Noted    Hypocalcemia 06/20/2024     Priority: Medium    Chronic anemia 06/20/2024     Priority: Medium    Hypoalbuminemia 06/20/2024     Priority: Medium    Platelet function defect (H) 06/20/2024     Priority: Medium    Heart failure with preserved ejection fraction, NYHA class I (H) 04/24/2024     Priority: Medium    Weakness 01/28/2021     Priority: Medium    Vaccine reaction, initial encounter 01/28/2021     Priority: Medium    Fatigue, unspecified type 01/28/2021     Priority: Medium    History of COVID-19 01/28/2021     Priority: Medium    Bilateral pleural effusion 12/13/2020     Priority: Medium    Bilateral pneumonia 12/13/2020     Priority: Medium    Pneumonia due to 2019 novel coronavirus 12/12/2020     Priority: Medium    Prerenal azotemia 12/12/2020     Priority: Medium    Hyperphosphatemia 12/11/2020     Priority: Medium    Closed fracture of proximal end of left humerus with routine healing 12/09/2020     Priority: Medium    Nausea vomiting and diarrhea 12/09/2020     Priority: Medium    Hypomagnesemia 12/09/2020     Priority: Medium    Generalized muscle weakness 12/08/2020     Priority: Medium    E. coli UTI 12/08/2020      Priority: Medium    2019 novel coronavirus disease (COVID-19) 12/08/2020     Priority: Medium    Stage 3b chronic kidney disease (H) 05/27/2019     Priority: Medium    Type 2 diabetes mellitus with diabetic polyneuropathy, without long-term current use of insulin (H) 10/11/2016     Priority: Medium    Essential hypertension with goal blood pressure less than 140/90 09/12/2016     Priority: Medium    Gastroesophageal reflux disease without esophagitis 05/13/2016     Priority: Medium    Coronary artery disease involving native coronary artery of native heart without angina pectoris 02/25/2016     Priority: Medium    Major depressive disorder, recurrent episode, moderate (H) 11/10/2015     Priority: Medium    Anxiety 05/08/2012     Priority: Medium    Insomnia 05/03/2012     Priority: Medium    Hypertension goal BP (blood pressure) < 140/90 01/20/2011     Priority: Medium    Hyperlipidemia LDL goal <100 10/31/2010     Priority: Medium    Esophageal reflux 08/30/2007     Priority: Medium    Irritable bowel syndrome 09/25/2006     Priority: Medium        Past Medical History:    Past Medical History:   Diagnosis Date    Diabetic eye exam (H) 05/01/14    Heart attack (H)     Pure hypercholesterolemia     Stented coronary artery     Type II or unspecified type diabetes mellitus without mention of complication, not stated as uncontrolled 2002    Unspecified disease of pericardium 12/05/08    Unspecified essential hypertension        Past Surgical History:    Past Surgical History:   Procedure Laterality Date    ABDOMEN SURGERY      COLONOSCOPY  04/15/09    COLONOSCOPY  6/18/2014    Procedure: COMBINED COLONOSCOPY, SINGLE BIOPSY/POLYPECTOMY BY BIOPSY;  Surgeon: Earle Mon MD;  Location: [facility] LAPAROSCOPY, SURGICAL; CHOLECYSTECTOMY  1997    Cholecystectomy, Laparoscopic    PHACOEMULSIFICATION CLEAR CORNEA WITH STANDARD INTRAOCULAR LENS IMPLANT Right 3/16/2016    Procedure: PHACOEMULSIFICATION CLEAR CORNEA  WITH STANDARD INTRAOCULAR LENS IMPLANT;  Surgeon: George Lemus MD;  Location: [facility]    PHACOEMULSIFICATION CLEAR CORNEA WITH STANDARD INTRAOCULAR LENS IMPLANT Left 3/30/2016    Procedure: PHACOEMULSIFICATION CLEAR CORNEA WITH STANDARD INTRAOCULAR LENS IMPLANT;  Surgeon: George Lemus MD;  Location: [facility]    ZZC NONSPECIFIC PROCEDURE  1988    Hysterectomy       Family History:    Family History   Problem Relation Age of Onset    Diabetes Mother     Arthritis Mother     Heart Disease Mother     Hypertension Mother     Lipids Mother     Neurologic Disorder Mother         neuropathy    Osteoporosis Mother     Obesity Mother     EYE* Mother         blind    Diabetes Father     Genitourinary Problems Father         gall stones    Heart Disease Father         MI    Cancer Maternal Grandmother     Heart Disease Maternal Grandmother     Cancer Other         Grandparents    Alcohol/Drug No family hx of     Alzheimer Disease No family hx of     Anesthesia Reaction No family hx of     Blood Disease No family hx of     Depression No family hx of     Genetic Disorder No family hx of     Gastrointestinal Disease No family hx of     Gynecology No family hx of     Psychotic Disorder No family hx of     Respiratory No family hx of     Cerebrovascular Disease No family hx of        Social History:  Marital Status:   [5]  Social History     Tobacco Use    Smoking status: Never    Smokeless tobacco: Never   Vaping Use    Vaping status: Never Used   Substance Use Topics    Alcohol use: No     Alcohol/week: 0.0 standard drinks of alcohol    Drug use: No        Medications:    aspirin 81 MG EC tablet  atorvastatin (LIPITOR) 80 MG tablet  clopidogrel (PLAVIX) 75 MG tablet  gabapentin (NEURONTIN) 300 MG capsule  glipiZIDE (GLUCOTROL) 10 MG tablet  hydrALAZINE (APRESOLINE) 25 MG tablet  isosorbide mononitrate (IMDUR) 30 MG 24 hr tablet  loperamide (IMODIUM A-D) 2 MG tablet  metoprolol succinate ER (TOPROL XL) 100 MG 24 hr  tablet  omeprazole (PRILOSEC) 40 MG DR capsule  semaglutide (OZEMPIC) 2 MG/3ML pen          Review of Systems   Constitutional:  Positive for appetite change and fatigue. Negative for chills and fever.   HENT:  Positive for congestion.    Respiratory:  Positive for cough. Negative for shortness of breath.    Cardiovascular:  Negative for chest pain.   Gastrointestinal:  Negative for abdominal pain, constipation, diarrhea, nausea and vomiting.   Genitourinary: Negative.         Incontinent, wears pad   Musculoskeletal: Negative.    Skin: Negative.    Neurological:  Positive for headaches. Negative for dizziness, tremors, speech difficulty and numbness.   All other systems reviewed and are negative.      Physical Exam   BP: (!) 195/97  Pulse: 87  Temp: 97.9  F (36.6  C)  Resp: 27  Weight: 65.2 kg (143 lb 11.2 oz)  SpO2: 97 %      Physical Exam  Constitutional:       General: She is not in acute distress.     Appearance: Normal appearance. She is well-developed. She is not ill-appearing.   HENT:      Head: Normocephalic and atraumatic.      Right Ear: External ear normal.      Left Ear: External ear normal.      Nose: Nose normal.      Mouth/Throat:      Mouth: Mucous membranes are dry.   Eyes:      Conjunctiva/sclera: Conjunctivae normal.   Cardiovascular:      Rate and Rhythm: Normal rate and regular rhythm.      Heart sounds: Normal heart sounds. No murmur heard.  Pulmonary:      Effort: Pulmonary effort is normal. No respiratory distress.      Breath sounds: Examination of the right-lower field reveals rales. Examination of the left-lower field reveals rales. Rales present.   Abdominal:      General: Bowel sounds are normal. There is no distension.      Palpations: Abdomen is soft.      Tenderness: There is no abdominal tenderness.   Musculoskeletal:         General: Normal range of motion.      Right lower leg: Edema (mild) present.      Left lower leg: Edema (mild) present.   Skin:     General: Skin is warm and  dry.      Findings: No rash.   Neurological:      General: No focal deficit present.      Mental Status: She is alert and oriented to person, place, and time.         ED Course     ED Course as of 06/20/24 1559   Thu Jun 20, 2024   1323 N-Terminal Pro BNP Inpatient(!): 17,663  Notified of critical Troponin 134 with her elevated Nt-Pro BNP of >17,000. Patient has no chest pain.    1418 Magnesium(!!): 0.7  Critical low Magnesium. Magnesium replacement along with Calcium replacement (calcium gluconate) ordered.   1454 Calcium gluconate and magnesium infusing.  Serial Troponin pending.   1527 At bedside.  /79.  Patient reports headache is resolved.  She has been eating and drinking.  Waiting callback from hospitalist for admission.     Procedures              EKG Interpretation:      Interpreted by ANKITA Molina CNP  Time reviewed:12:39 pm   Symptoms at time of EKG: weakness, Htn.   Rhythm: Normal sinus, low voltage limb leads   Rate: Normal  Axis: Normal  Ectopy: None  Conduction: Normal  ST Segments/ T Waves: No ST-T wave changes and No acute ischemic changes  Q Waves: None  Comparison to prior: Unchanged    Clinical Impression: NSR with no acute ischemic changes.         Results for orders placed or performed during the hospital encounter of 06/20/24 (from the past 24 hour(s))   Glucose by meter   Result Value Ref Range    GLUCOSE BY METER POCT 77 70 - 99 mg/dL   CBC with platelets differential    Narrative    The following orders were created for panel order CBC with platelets differential.  Procedure                               Abnormality         Status                     ---------                               -----------         ------                     CBC with platelets and d...[[id number]]  Abnormal            Final result                 Please view results for these tests on the individual orders.   Comprehensive metabolic panel   Result Value Ref Range    Sodium 143 135 - 145 mmol/L     Potassium 4.3 3.4 - 5.3 mmol/L    Carbon Dioxide (CO2) 23 22 - 29 mmol/L    Anion Gap 13 7 - 15 mmol/L    Urea Nitrogen 27.5 (H) 8.0 - 23.0 mg/dL    Creatinine 2.18 (H) 0.51 - 0.95 mg/dL    GFR Estimate 22 (L) >60 mL/min/1.73m2    Calcium 5.8 (LL) 8.8 - 10.2 mg/dL    Chloride 107 98 - 107 mmol/L    Glucose 82 70 - 99 mg/dL    Alkaline Phosphatase 96 40 - 150 U/L    AST 28 0 - 45 U/L    ALT 10 0 - 50 U/L    Protein Total 6.3 (L) 6.4 - 8.3 g/dL    Albumin 3.2 (L) 3.5 - 5.2 g/dL    Bilirubin Total 0.4 <=1.2 mg/dL   Lactic acid whole blood with 1x repeat in 2 hr when >2   Result Value Ref Range    Lactic Acid, Initial 0.9 0.7 - 2.0 mmol/L   Troponin T, High Sensitivity   Result Value Ref Range    Troponin T, High Sensitivity 134 (HH) <=14 ng/L   Nt probnp inpatient (BNP)   Result Value Ref Range    N terminal Pro BNP Inpatient 17,663 (H) 0 - 1,800 pg/mL   Symptomatic Influenza A/B, RSV, & SARS-CoV2 PCR (COVID-19) Nose    Specimen: Nose; Swab   Result Value Ref Range    Influenza A PCR Negative Negative    Influenza B PCR Negative Negative    RSV PCR Negative Negative    SARS CoV2 PCR Negative Negative    Narrative    Testing was performed using the Xpert Xpress CoV2/Flu/RSV Assay on the [employer] GeneXpert Instrument. This test should be ordered for the detection of SARS-CoV-2, influenza, and RSV viruses in individuals who meet clinical and/or epidemiological criteria. Test performance is unknown in asymptomatic patients. This test is for in vitro diagnostic use under the FDA EUA for laboratories certified under CLIA to perform high or moderate complexity testing. This test has not been FDA cleared or approved. A negative result does not rule out the presence of PCR inhibitors in the specimen or target RNA in concentration below the limit of detection for the assay. If only one viral target is positive but coinfection with multiple targets is suspected, the sample should be re-tested with another FDA cleared, approved,  or authorized test, if coinfection would change clinical management. This test was validated by the [facility]. These laboratories are certified under the Clinical Laboratory Improvement Amendments of 1988 (CLIA-88) as qualified to perform high complexity laboratory testing.   CBC with platelets and differential   Result Value Ref Range    WBC Count 7.0 4.0 - 11.0 10e3/uL    RBC Count 3.37 (L) 3.80 - 5.20 10e6/uL    Hemoglobin 8.1 (L) 11.7 - 15.7 g/dL    Hematocrit 26.7 (L) 35.0 - 47.0 %    MCV 79 78 - 100 fL    MCH 24.0 (L) 26.5 - 33.0 pg    MCHC 30.3 (L) 31.5 - 36.5 g/dL    RDW 16.8 (H) 10.0 - 15.0 %    Platelet Count 213 150 - 450 10e3/uL    % Neutrophils 74 %    % Lymphocytes 16 %    % Monocytes 8 %    % Eosinophils 1 %    % Basophils 0 %    % Immature Granulocytes 0 %    NRBCs per 100 WBC 0 <1 /100    Absolute Neutrophils 5.2 1.6 - 8.3 10e3/uL    Absolute Lymphocytes 1.2 0.8 - 5.3 10e3/uL    Absolute Monocytes 0.6 0.0 - 1.3 10e3/uL    Absolute Eosinophils 0.1 0.0 - 0.7 10e3/uL    Absolute Basophils 0.0 0.0 - 0.2 10e3/uL    Absolute Immature Granulocytes 0.0 <=0.4 10e3/uL    Absolute NRBCs 0.0 10e3/uL   Magnesium   Result Value Ref Range    Magnesium 0.7 (LL) 1.7 - 2.3 mg/dL   Phosphorus   Result Value Ref Range    Phosphorus 4.7 (H) 2.5 - 4.5 mg/dL   TSH with free T4 reflex   Result Value Ref Range    TSH 1.88 0.30 - 4.20 uIU/mL   Procalcitonin   Result Value Ref Range    Procalcitonin 0.11 <0.50 ng/mL   CT Head w/o Contrast    Narrative    EXAM: CT HEAD W/O CONTRAST  6/20/2024 1:41 PM     HISTORY:  headache       COMPARISON:  Head CT 4/22/2024    TECHNIQUE: Using multidetector thin collimation helical acquisition  technique, axial, coronal and sagittal CT images from the skull base  to the vertex were obtained without intravenous contrast.   (topogram) image(s) also obtained and reviewed. Dose reduction  techniques were performed.    FINDINGS:  No intracranial hemorrhage, mass effect,  or midline shift. No acute  loss of gray-white matter differentiation in the cerebral hemispheres.  Ventricles are proportionate to the cerebral sulci. Clear basal  cisterns. Mild diffuse cerebral volume loss. Moderate leukoaraiosis.  Chronic lacunar infarct in the left corona radiata.     The bony calvaria and the bones of the skull base are normal. The  visualized portions of the paranasal sinuses and mastoid air cells are  clear. Grossly normal orbits. Unchanged indeterminant calcified skull  lesions.      Impression    IMPRESSION: No acute intracranial pathology.     ROSIE PEÑA MD         SYSTEM ID:  [id number]   Ionized Calcium   Result Value Ref Range    Calcium Ionized Whole Blood 3.1 (L) 4.4 - 5.2 mg/dL   Calcium   Result Value Ref Range    Calcium 5.6 (LL) 8.8 - 10.2 mg/dL   XR Chest Port 1 View    Narrative    CHEST ONE VIEW  6/20/2024 2:06 PM     HISTORY: cough and congestion    COMPARISON: 4/8/2024.      Impression    IMPRESSION: Cardiac silhouette appears mildly enlarged. Aortic arch  calcification. Increased retrocardiac opacity of the medial left lung  base, suspicious for developing pneumonia. Similar additional mild  linear atelectasis of the left lung base. No pleural effusion or  pneumothorax.    ROSLYN MONTANEZ MD         SYSTEM ID:  [id number]   Troponin T, High Sensitivity   Result Value Ref Range    Troponin T, High Sensitivity 135 (HH) <=14 ng/L   UA with Microscopic reflex to Culture    Specimen: Urine, Midstream   Result Value Ref Range    Color Urine Yellow Colorless, Straw, Light Yellow, Yellow    Appearance Urine Slightly Cloudy (A) Clear    Glucose Urine Negative Negative mg/dL    Bilirubin Urine Negative Negative    Ketones Urine Negative Negative mg/dL    Specific Gravity Urine 1.015 1.003 - 1.035    Blood Urine Negative Negative    pH Urine 5.0 5.0 - 7.0    Protein Albumin Urine >499 (A) Negative mg/dL    Urobilinogen Urine Normal Normal, 2.0 mg/dL    Nitrite Urine Negative  Negative    Leukocyte Esterase Urine Trace (A) Negative    Bacteria Urine Many (A) None Seen /HPF    RBC Urine 1 <=2 /HPF    WBC Urine 9 (H) <=5 /HPF    Squamous Epithelials Urine 4 (H) <=1 /HPF    Narrative    Urine Culture not indicated       Medications   magnesium sulfate 4 g in 100 mL sterile water intermittent infusion (4 g Intravenous $New Bag 6/20/24 1443)   hydrALAZINE (APRESOLINE) tablet 25 mg (25 mg Oral $Given 6/20/24 1257)   metoprolol succinate ER (TOPROL XL) 24 hr tablet 100 mg (100 mg Oral $Given 6/20/24 1257)   acetaminophen (TYLENOL) tablet 1,000 mg (1,000 mg Oral $Given 6/20/24 1256)   lisinopril (ZESTRIL) tablet 20 mg (20 mg Oral $Given 6/20/24 1257)     And   hydrochlorothiazide (HYDRODIURIL) tablet 25 mg (25 mg Oral $Given 6/20/24 1257)   calcium gluconate 1 g in 50 mL in sodium chloride intermittent infusion (1 g Intravenous $Given 6/20/24 1440)     Followed by   calcium gluconate 1 g in 50 mL in sodium chloride intermittent infusion (1 g Intravenous $Given 6/20/24 1511)   furosemide (LASIX) injection 40 mg (40 mg Intravenous $Given 6/20/24 1532)       Assessments & Plan (with Medical Decision Making)     83 year old female with history of hypertension, stage III CKD, CHFpEF, CAD/MI (stent placement in 2016), T2DM, GERD, and hyperlipidemia who presents via EMS for generalized weakness, headache, and elevated blood pressure.    Symptoms started 3 to 4 days ago.  Decreased appetite and has not been eating or drinking much.  Denies chest pain or shortness of breath.  She has noticed increased swelling in her legs.  She did not take any of her home medications today.  Review of her last clinic visit on 5/1/2024 she was prescribed hydralazine, lisinopril/hydrochlorothiazide, and metoprolol for her blood pressure.  However she stopped taking the lisinopril/hydrochlorothiazide when she started the hydralazine.  She also stopped taking metformin.    On exam she is alert and oriented.  Dry mucous  membranes.  Lung sounds with rales in bilateral bases.  No tachypnea.  No hypoxia.  No tachycardia.  Her blood pressure is quite elevated at 195/97.  She has mild bilateral lower extremity edema.  Given her headache and elevated blood pressure I did obtain a head CT which was negative for cute pathology, specifically no evidence of intracranial hemorrhage.    Chest x-ray reveals mild cardial megaly, increased retrocardiac opacity of the medial left lung base.  No pleural effusion.  (See full radiology report as noted above).  EKG is normal sinus rhythm with no acute ischemic changes.  She has significant abnormal lab findings including:  Low calcium 5.6 and ionized calcium 3.1, Low magnesium 0.7  Hemoglobin 8.1 (appears to have chronic anemia prior levels 2 months ago 9.9 and 8.0)  No leukocytosis.  Normal lactic acid.  Procalcitonin 0.11  BUN 27.5 and creatinine 2.18 (which appears to be her baseline, and actually improved from a couple months ago)  NT proBNP >17,000 and Serial Trop 134 and 135  UA with protein >499, trace leuk est, many bacteria, 9 WBC, and 4 squam epith (slightly contaminated specimen), culture pending.      I suspect her symptoms of weakness and headache are probably related to her metabolic abnormalities with hypocalcemia and hypomagnesemia.  In addition, she did not take her medications today which probably contributed to her elevated blood pressure.  I also suspect mild CHF exacerbation contributing to her weakness given her elevated Nt pro BNP and evidence of demand ischemia. She has no chest pain or shortness of breath.    Patient was given the above medications. She has been eating and drinking.  Reports headache is resolved.  BP trended down to 130/79.    I spoke with the on-call hospitalist, Dr. Pierre, who accepts patient for admission.    New Prescriptions    No medications on file       Final diagnoses:   Hypomagnesemia   Hypocalcemia   Generalized weakness   Acute on chronic  congestive heart failure, unspecified heart failure type (H)       6/20/2024   [facility] EMERGENCY DEPT       Bailey Pryor, APRN CNP  06/20/24 1600

## 2024-06-21 ENCOUNTER — APPOINTMENT (OUTPATIENT)
Dept: GENERAL RADIOLOGY | Facility: CLINIC | Age: 84
DRG: 640 | End: 2024-06-21
Attending: NURSE PRACTITIONER
Payer: COMMERCIAL

## 2024-06-21 ENCOUNTER — APPOINTMENT (OUTPATIENT)
Dept: PHYSICAL THERAPY | Facility: CLINIC | Age: 84
DRG: 640 | End: 2024-06-21
Attending: FAMILY MEDICINE
Payer: COMMERCIAL

## 2024-06-21 LAB
ANION GAP SERPL CALCULATED.3IONS-SCNC: 15 MMOL/L (ref 7–15)
BUN SERPL-MCNC: 29.1 MG/DL (ref 8–23)
CA-I BLD-MCNC: 3.6 MG/DL (ref 4.4–5.2)
CALCIUM SERPL-MCNC: 6.4 MG/DL (ref 8.8–10.2)
CHLORIDE SERPL-SCNC: 107 MMOL/L (ref 98–107)
CK SERPL-CCNC: 308 U/L (ref 26–192)
CREAT SERPL-MCNC: 2.22 MG/DL (ref 0.51–0.95)
CREAT UR-MCNC: 111 MG/DL
CRP SERPL-MCNC: 5.64 MG/L
DEPRECATED HCO3 PLAS-SCNC: 22 MMOL/L (ref 22–29)
EGFRCR SERPLBLD CKD-EPI 2021: 21 ML/MIN/1.73M2
GLUCOSE BLDC GLUCOMTR-MCNC: 115 MG/DL (ref 70–99)
GLUCOSE BLDC GLUCOMTR-MCNC: 177 MG/DL (ref 70–99)
GLUCOSE BLDC GLUCOMTR-MCNC: 215 MG/DL (ref 70–99)
GLUCOSE BLDC GLUCOMTR-MCNC: 222 MG/DL (ref 70–99)
GLUCOSE BLDC GLUCOMTR-MCNC: 233 MG/DL (ref 70–99)
GLUCOSE SERPL-MCNC: 158 MG/DL (ref 70–99)
HGB BLD-MCNC: 7.2 G/DL (ref 11.7–15.7)
INR PPP: 1.27 (ref 0.85–1.15)
MAGNESIUM SERPL-MCNC: 1.7 MG/DL (ref 1.7–2.3)
MAGNESIUM UR-MCNC: <1.4 MG/DL
MAGNESIUM URINE MG/MG CR: NORMAL
PHOSPHATE SERPL-MCNC: 4.6 MG/DL (ref 2.5–4.5)
POTASSIUM SERPL-SCNC: 4.3 MMOL/L (ref 3.4–5.3)
SODIUM SERPL-SCNC: 144 MMOL/L (ref 135–145)

## 2024-06-21 PROCEDURE — 250N000011 HC RX IP 250 OP 636: Mod: JZ | Performed by: NURSE PRACTITIONER

## 2024-06-21 PROCEDURE — 82550 ASSAY OF CK (CPK): CPT | Performed by: PEDIATRICS

## 2024-06-21 PROCEDURE — 74018 RADEX ABDOMEN 1 VIEW: CPT

## 2024-06-21 PROCEDURE — 82330 ASSAY OF CALCIUM: CPT | Performed by: PEDIATRICS

## 2024-06-21 PROCEDURE — 120N000001 HC R&B MED SURG/OB

## 2024-06-21 PROCEDURE — 99233 SBSQ HOSP IP/OBS HIGH 50: CPT | Performed by: NURSE PRACTITIONER

## 2024-06-21 PROCEDURE — 250N000013 HC RX MED GY IP 250 OP 250 PS 637: Performed by: PEDIATRICS

## 2024-06-21 PROCEDURE — 80048 BASIC METABOLIC PNL TOTAL CA: CPT | Performed by: PEDIATRICS

## 2024-06-21 PROCEDURE — 97161 PT EVAL LOW COMPLEX 20 MIN: CPT | Mod: GP | Performed by: PHYSICAL THERAPIST

## 2024-06-21 PROCEDURE — 36415 COLL VENOUS BLD VENIPUNCTURE: CPT | Performed by: PEDIATRICS

## 2024-06-21 PROCEDURE — 86140 C-REACTIVE PROTEIN: CPT | Performed by: PEDIATRICS

## 2024-06-21 PROCEDURE — 85610 PROTHROMBIN TIME: CPT | Performed by: PEDIATRICS

## 2024-06-21 PROCEDURE — 250N000012 HC RX MED GY IP 250 OP 636 PS 637: Performed by: PEDIATRICS

## 2024-06-21 PROCEDURE — 85018 HEMOGLOBIN: CPT | Performed by: PEDIATRICS

## 2024-06-21 PROCEDURE — 250N000011 HC RX IP 250 OP 636: Mod: JZ | Performed by: PEDIATRICS

## 2024-06-21 PROCEDURE — 97530 THERAPEUTIC ACTIVITIES: CPT | Mod: GP | Performed by: PHYSICAL THERAPIST

## 2024-06-21 PROCEDURE — 84100 ASSAY OF PHOSPHORUS: CPT | Performed by: PEDIATRICS

## 2024-06-21 PROCEDURE — 85045 AUTOMATED RETICULOCYTE COUNT: CPT | Performed by: NURSE PRACTITIONER

## 2024-06-21 PROCEDURE — 258N000003 HC RX IP 258 OP 636: Mod: JZ | Performed by: NURSE PRACTITIONER

## 2024-06-21 PROCEDURE — 83735 ASSAY OF MAGNESIUM: CPT | Performed by: PEDIATRICS

## 2024-06-21 RX ORDER — MAGNESIUM SULFATE HEPTAHYDRATE 40 MG/ML
2 INJECTION, SOLUTION INTRAVENOUS ONCE
Status: COMPLETED | OUTPATIENT
Start: 2024-06-21 | End: 2024-06-21

## 2024-06-21 RX ADMIN — CLOPIDOGREL 75 MG: 75 TABLET ORAL at 08:02

## 2024-06-21 RX ADMIN — CALCIUM GLUCONATE 2 G: 98 INJECTION, SOLUTION INTRAVENOUS at 08:49

## 2024-06-21 RX ADMIN — INSULIN ASPART 2 UNITS: 100 INJECTION, SOLUTION INTRAVENOUS; SUBCUTANEOUS at 17:09

## 2024-06-21 RX ADMIN — HEPARIN SODIUM 5000 UNITS: 10000 INJECTION, SOLUTION INTRAVENOUS; SUBCUTANEOUS at 20:21

## 2024-06-21 RX ADMIN — ISOSORBIDE MONONITRATE 30 MG: 30 TABLET, EXTENDED RELEASE ORAL at 08:03

## 2024-06-21 RX ADMIN — FUROSEMIDE 80 MG: 40 TABLET ORAL at 08:02

## 2024-06-21 RX ADMIN — HEPARIN SODIUM 5000 UNITS: 10000 INJECTION, SOLUTION INTRAVENOUS; SUBCUTANEOUS at 12:30

## 2024-06-21 RX ADMIN — HYDRALAZINE HYDROCHLORIDE 25 MG: 25 TABLET ORAL at 14:14

## 2024-06-21 RX ADMIN — MAGNESIUM SULFATE HEPTAHYDRATE 2 G: 40 INJECTION, SOLUTION INTRAVENOUS at 06:35

## 2024-06-21 RX ADMIN — HYDRALAZINE HYDROCHLORIDE 25 MG: 25 TABLET ORAL at 08:02

## 2024-06-21 RX ADMIN — HEPARIN SODIUM 5000 UNITS: 10000 INJECTION, SOLUTION INTRAVENOUS; SUBCUTANEOUS at 05:48

## 2024-06-21 RX ADMIN — ASPIRIN 81 MG: 81 TABLET, COATED ORAL at 20:21

## 2024-06-21 RX ADMIN — HYDRALAZINE HYDROCHLORIDE 25 MG: 25 TABLET ORAL at 20:21

## 2024-06-21 RX ADMIN — INSULIN ASPART 2 UNITS: 100 INJECTION, SOLUTION INTRAVENOUS; SUBCUTANEOUS at 12:32

## 2024-06-21 RX ADMIN — METOPROLOL SUCCINATE 100 MG: 100 TABLET, EXTENDED RELEASE ORAL at 08:02

## 2024-06-21 RX ADMIN — Medication 400 MG: at 00:52

## 2024-06-21 ASSESSMENT — ACTIVITIES OF DAILY LIVING (ADL)
ADLS_ACUITY_SCORE: 31
DEPENDENT_IADLS:: CLEANING;COOKING
ADLS_ACUITY_SCORE: 31

## 2024-06-21 NOTE — PROGRESS NOTES
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  Upon initial evaluation in the emergency room, she is found to have severe hypomagnesemia and hypocalcemia.  She is at high risk for an adverse outcome including worsening electrolyte disturbances and/or consequences of those disturbances such as rhabdomyolysis or cardiac arrhythmia, so hospitalization is considered medically necessary.       Principal Problem:    Hypomagnesemia  Active Problems:    Weakness    Hypocalcemia    Hypertension goal BP (blood pressure) < 140/90    Coronary artery disease involving native coronary artery of native heart without angina pectoris    Type 2 diabetes mellitus with diabetic polyneuropathy, without long-term current use of insulin (H)    Stage 3b chronic kidney disease (H)    Hyperphosphatemia    Heart failure with preserved ejection fraction, NYHA class I (H)    Chronic anemia    Lower extremity edema    Acute encephalopathy    Hypoalbuminemia    Platelet function defect (H)     Severe hypomagnesemia  Severe hypocalcemia  Mild hyperphosphatemia  Acute encephalopathy  Generalized muscle weakness  Presented tto the ED with magnesium 0.7, calcium 5.6-5.8, and ionized calcium 3.1 and clinical symptoms likely related to the severe electrolyte disturbances including confusion concerning for acute encephalopathy and generalized muscle weakness with risk for rhabdomyolysis.  Suspect electrolyte disturbances are due to recent poor oral nutrient intake, but hypomagnesemia may be exacerbated by chronic PPI treatment.    Renal magnesium wasting is possible although not strongly  suspected.  She has not had worsening diarrhea recently and diarrheal symptoms are usually adequately controlled with use of Imodium about 3 days/week.      Higher troponin level on admission compared with previously may be due to severe hypomagnesemia which can cause worsening myocardial ischemia.      She was given IV magnesium and calcium in the ED. She also presented with mild hyperphosphatemia also likely related to hypomagnesemia and hypocalcemia although chronic kidney disease may contribute as well.     Mag 1.7 on 6/21, ionized Ca 3.6.  Receiving an additional 2 g calcium gluconate this am   CK total on down trend. Fractional excretion of mag 2.3%    She is alert and appropriate on exam today does not feel as weak.  PT evaluated today and recommend home PT at [facility] as they are possibly accepting her there this weekend.    -Continue cardiac monitoring  -recheck mag, ca, k, phos in am  -Continue magnesium supplementation using the high replacement protocol with ongoing magnesium monitoring as indicated  -Encourage adequate oral intake (she will be moving to assisted living where she will have meals more readily available)  -Hold statin and continue to trend ck total  -PPI discontinued       Possible demand ischemia  CAD  Presented with higher troponin level 134 compared with 107 on April 9 and has known CAD.  She does not have angina or acute ischemic EKG changes.  Severe hypomagnesemia can precipitate myocardial ischemia, so demand ischemia is possible.  Troponin is stable.  -No recheck of troponin unless her clinical status changes     Heart failure with preserved EF  Bilateral lower extremity edema  Known chronic heart failure with preserved EF but grade 1 diastolic dysfunction per most recent echocardiogram on April 8.  Presented with weight gain 10 to 15 pounds and bilateral lower extremity edema, but BNP was  decreased compared with April 8.  She denied other symptoms of decompensated heart  failure, and radiographic findings are not supportive of pulmonary edema.  It is possible that worsening bilateral lower extremity edema with weight gain are due to severe lower extremity weakness associated with severe electrolyte disturbances and/or exacerbated by hypoalbuminemia. The previous chronic thiazide diuretic therapy was reportedly discontinued about a month ago and and not been taking an alternate diuretic since then and this may also be contributing to current signs of volume overload.  She did receive 1 dose IV Lasix in the ER.  Weight is down 2 pounds.  No signs of decompensated heart failure.  Lower extremity edema improving.    -She was placed on 80 mg oral furosemide on daily on admission yesterday as she was not placed on a loop directed after having thiazide diuretic discontinued  Consider changing this to torsemide 40 mg daily if she does not have continued response with furosemide  -daily weights   -I/O's    Abdominal Discomfort/distention.  Patient complaining of abdominal distention this am.  She has had a soft BM and active BS.    -Will add KUB.       Severe hypertension  Headache  Presented with headache and was severely hypertensive in the ER with blood pressure as high as 208/80.  Severe hypertension was treated in the ER with a combination of oral lisinopril, oral hydrochlorothiazide, oral Toprol-XL, and oral hydralazine as well as a dose of IV Lasix.  Headache resolved as blood pressure improved, so suspect headache was due to severe hypertension.  Patient reported that previous chronic lisinopril hydrochlorothiazide was discontinued and that she has been taking Toprol-XL and hydralazine to treat hypertension.    Continue to hold arb/ace for now ('s-160's systolic, will monitor trend from here with added loop diuretic and possible restart ace tomorrow).  -Continue Toprol-XL and hydralazine at her chronic doses  -continue fursemide 80 mg daily.  Consider changing this to torsemide  40 mg daily if she does not have continued response with furosemide      Type 2 diabetes with retinopathy  Type 2 diabetes with known mild nonproliferative retinopathy bilaterally recently treated with glipizide and Ozempic.  A1c in January was 8.6.  At presentation today, blood sugar 82 despite not taking glipizide dose this morning and in context of poor oral intake.  -Hold chronic dose of glipizide  -Ozempic is usually administered on Sundays so not ordered at this time  -Monitor blood sugars 4 times daily  -Ordered hypoglycemia treatment protocol  -Add sliding scale NovoLog during hospitalization  -Recheck A1c     Stage IIIb CKD  Proteinuria  Known stage IIIb CKD with heavy proteinuria on recent urine testing in April 2024.  Patient had previously been treated with ACE inhibitor, but appears as though it may have been discontinued in the last month for reasons that are not clear.  Renal function still at baseline.  -trend renal function  -Monitor I/O  -Avoid nephrotoxic medication as able  -Adjust medication dosing accordingly for changing renal function  -If hypertension persists, consider whether to resume an ACE inhibitor or start an ARB (see above)     Hypoalbuminemia  Serum albumin low at 3.2.  This could be due to inadequate oral nutritional protein intake, but heavy proteinuria could also contribute.  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  Presenting today with hemoglobin 8.1, similar to previously.  No recent bleeding noted.  Patient is taking both aspirin and Plavix chronically that causes platelet function defect and increase her risk for bleeding.  -Continue aspirin and Plavix for now but hold if there is increasing concern for bleeding  -Ordered recheck hemoglobin          Diet: Combination Diet Moderate Consistent Carb (60 g CHO per Meal) Diet; 2 gm NA Diet     DVT Prophylaxis: Heparin SQ  Uriarte Catheter: Not present  Lines: None     Cardiac Monitoring: ACTIVE order. Indication: Electrolyte Imbalance (24 hours)- Magnesium <1.3 mg/ml; Potassium < =2.8 or > 5.5 mg/ml  Code Status: No CPR- Do NOT Intubate      Clinically Significant Risk Factors Present on Admission          # Hypocalcemia: Lowest Ca = 5.6 mg/dL in last 2 days, will monitor and replace as appropriate   # Hypomagnesemia: Lowest Mg = 0.7 mg/dL in last 2 days, will replace as needed   # Hypoalbuminemia: Lowest albumin = 3.2 g/dL at 6/20/2024 12:40 PM, will monitor as appropriate  # Coagulation Defect: INR = 1.27 (Ref range: 0.85 - 1.15) and/or PTT = N/A, will monitor for bleeding  # Drug Induced Platelet Defect: home medication list includes an antiplatelet medication   # Hypertension: Noted on problem list    # Anemia: based on hgb <11          # DMII: A1C = 7.4 % (Ref range: <5.7 %) within past 6 months        # Financial/Environmental Concerns: none         Disposition Plan     Medically Ready for Discharge: Anticipated in 2-4 Days    The patient's care was discussed with the  entire care team .    Pily Villar CNP  Hospitalist Service  [facility]  Securely message with [employer] (more info)  Text page via [employer] Paging/Directory   ______________________________________________________________________    Interval History   Events overnight reviewed    Physical Exam   Vital Signs: Temp: 98.2  F (36.8  C) Temp src: Oral BP: (!) 168/67 Pulse: 77   Resp: 18 SpO2: 96 % O2 Device: None (Room air)    Weight: 135 lbs 4.8 oz    Gen:  Lying in bed no acute distress  HEENT:  normocephalic, atraumatic, oropharynx clear  Resp:  CTA posteriorly   Card:  S1,S2, RRR no murmur, rub or gallop  Abd:  Soft per RN exam, non tender,  normoactive bowel sounds   Neuro:  Neuro exam non focal      Medical Decision Making       45 MINUTES SPENT BY ME on the date of service doing chart review, history, exam,  documentation & further activities per the note.        Data     I have personally reviewed the following data over the past 24 hrs:    N/A  \   7.2 (L)   / N/A     144 107 29.1 (H) /  215 (H)   4.3 22 2.22 (H) \     TSH: N/A T4: N/A A1C: N/A     Procal: N/A CRP: 5.64 (H) Lactic Acid: N/A       INR:  1.27 (H) PTT:  N/A   D-dimer:  N/A Fibrinogen:  N/A       Imaging results reviewed over the past 24 hrs:   No results found for this or any previous visit (from the past 24 hour(s)).

## 2024-06-21 NOTE — CONSULTS
Care Management Initial Consult    General Information  Assessment completed with: Patient,    Type of CM/SW Visit: Initial Assessment    Primary Care Provider verified and updated as needed: Yes   Readmission within the last 30 days: no previous admission in last 30 days      Reason for Consult: discharge planning  Advance Care Planning: Advance Care Planning Reviewed: no concerns identified  has a POLST       Communication Assessment  Patient's communication style: spoken language (English or Bilingual)    Hearing Difficulty or Deaf: no   Wear Glasses or Blind: no    Cognitive  Cognitive/Neuro/Behavioral: WDL  Level of Consciousness: alert  Arousal Level: opens eyes spontaneously     Mood/Behavior: calm, cooperative  Best Language: 0 - No aphasia  Speech: clear    Living Environment:   People in home: alone     Current living Arrangements: apartment      Able to return to prior arrangements: no  Living Arrangement Comments: Patient is moving into [facility] after the hospital stay    Family/Social Support:  Care provided by: self  Provides care for: no one  Marital Status:   Children          Description of Support System: Supportive, Involved    Support Assessment: Adequate family and caregiver support, Adequate social supports    Current Resources:   Patient receiving home care services: No     Community Resources: None  Equipment currently used at home: cane, straight, walker, rolling, raised toilet seat  Supplies currently used at home: None    Employment/Financial:  Employment Status: retired        Financial Concerns: none           Does the patient's insurance plan have a 3 day qualifying hospital stay waiver?  No    Lifestyle & Psychosocial Needs:  Social Determinants of Health     Food Insecurity: Low Risk  (12/27/2023)    Food Insecurity     Within the past 12 months, did you worry that your food would run out before you got money to buy more?: No     Within the past 12 months, did the  food you bought just not last and you didn t have money to get more?: No   Recent Concern: Food Insecurity - High Risk (12/12/2023)    Food Insecurity     Within the past 12 months, did you worry that your food would run out before you got money to buy more?: Yes     Within the past 12 months, did the food you bought just not last and you didn t have money to get more?: No   Depression: Not at risk (4/16/2024)    PHQ-2     PHQ-2 Score: 2   Housing Stability: Low Risk  (12/27/2023)    Housing Stability     Do you have housing? : Yes     Are you worried about losing your housing?: No   Tobacco Use: Low Risk  (6/20/2024)    Patient History     Smoking Tobacco Use: Never     Smokeless Tobacco Use: Never     Passive Exposure: Not on file   Financial Resource Strain: Low Risk  (12/27/2023)    Financial Resource Strain     Within the past 12 months, have you or your family members you live with been unable to get utilities (heat, electricity) when it was really needed?: No   Alcohol Use: Not on file   Transportation Needs: Low Risk  (12/27/2023)    Transportation Needs     Within the past 12 months, has lack of transportation kept you from medical appointments, getting your medicines, non-medical meetings or appointments, work, or from getting things that you need?: No   Physical Activity: Not on file   Interpersonal Safety: Low Risk  (12/27/2023)    Interpersonal Safety     Do you feel physically and emotionally safe where you currently live?: Yes     Within the past 12 months, have you been hit, slapped, kicked or otherwise physically hurt by someone?: No     Within the past 12 months, have you been humiliated or emotionally abused in other ways by your partner or ex-partner?: No   Stress: Not on file   Social Connections: Not on file   Health Literacy: Not on file       Functional Status:  Prior to admission patient needed assistance:   Dependent ADLs:: Ambulation-walker  Dependent IADLs:: Cleaning, Cooking  Assesssment  "of Functional Status: Not at baseline with mobility    Mental Health Status:  Mental Health Status: No Current Concerns       Chemical Dependency Status:  Chemical Dependency Status: No Current Concerns             Values/Beliefs:  Spiritual, Cultural Beliefs, Methodist Practices, Values that affect care: no               Additional Information:  Care Management consulted for discharge planning and elevated risk for hospital discharge.      Writer visited with patient.  Introduced self and role of Care Management.  Reviewed the information above.      Patient verified that she is planning to move into [facility] (piilw-[phone]/fax- [phone]) after the hospital stay.  This had been planned.  Patient is planning for her son, Terrell, to transport her on day of discharge.      Writer received call from Quentin #[phone], RN in admissions at [facility].  Quentin stated that the previous move-in plan was for June 30th, but now he is willing to accept patient for move in right after the hospital stay.  Quentin reported that he is \"willing to make the weekend work, but we have limited pharmacy hours at [facility] in [place] on the weekends. Their hours on Saturday are listed as 9 a.m. to 4 p.m.  They are closed on Sundays.  Writer discussed that more should be known about hospital length of stay after 2 p.m. today.  Writer has faxed Quentin needed assessment information including the H&P, nursing notes, PT evaluation and current MAR.  Discussed that PT is recommending home PT for further balance training with use of walker.  Quentin reported that they typically use [facility]  (Xhaiz-[phone]/Fax [phone]).    Writer spoke with patient about the current PT recommendation for home PT services.  Provided list of Medicare certified home care agencies.  Patient does not have preference.  Discussed that [facility] often uses [facility].  Patient ok with this.  Writer has " "sent a referral to the home care hub and requested referral to [facility].      Writer will contact Quentin at [facility] this afternoon, after potential discharge date is determined.      1429-  has called and spoken with Quentin at [facility].  Discussed that at 2 p.m. rounds, the provided stated that she is anticipating patient will be ready for discharge tomorrow and that the patient's medications could be ordered to [facility] in [place] in the morning.  ( called [facility] pharmacy in [place] and verified their pharmacy hours on Saturday are 9-4pm and they are closed Sunday.)  Quentin stated that he can accommodate an admission on Saturday, but not Sunday due to the pharmacy being closed and not able to get medications.  Care Management to call Quentin #[phone] tomorrow morning to discuss admission further.      1522- Quentin from admissions at [facility] called  and stated the he just spoke with his  and they \"have a policy that they do not admit any new residents on weekends.\"  They can accept patient for placement to the [facility] on Monday.      [facility] has updated the provider and discussed that it would be up to the provider regarding this information.     Sabine Ma, YIFAN  [facility]   [phone]      "

## 2024-06-21 NOTE — PLAN OF CARE
"Goal Outcome Evaluation:      Plan of Care Reviewed With: patient    Overall Patient Progress: no changeOverall Patient Progress: no change    Outcome Evaluation: 4 hour note- Alert and orinetd x 4. Vitals stable on RA. Denies pain, nausea and vomiting. No shortness of breath reported. States she has some abdominal discomfort after meals. Blood sugars elevated but stable, SS insulin administered per MAR. Purewick in place.    BP (!) 168/67   Pulse 77   Temp 98.2  F (36.8  C) (Oral)   Resp 18   Ht 1.626 m (5' 4\")   Wt 61.4 kg (135 lb 4.8 oz)   SpO2 96%   BMI 23.22 kg/m      "

## 2024-06-21 NOTE — PROGRESS NOTES
"Patient c/o abdominal distention and shortness of breath after walking due to \"stomach pushing up on my lungs\".   Abdomen appears distended, soft, active bowel sounds, BM this am. MD updated.      "

## 2024-06-21 NOTE — PROGRESS NOTES
S-(situation): Patient arrives to room 251 via cart from ED    B-(background): Comes from home alone. Planning to move to [facility] where a room/bed is ready for her upon discharge per ED report.    A-(assessment): A&O x4. VSS on room air. Up with SBA, walker and belt. Incontinent of urine, purwick in place. Skin intact. IVSL x2. Denies pain. Lungs clear. BLE edema. Bed alarm on and call light within reach.     R-(recommendations): Orders reviewed with patient. Will monitor patient per MD orders.     Inpatient nursing criteria listed below were met:    Health care directives status obtained and documented: Yes  VTE ordered/documented: No  Skin issues/needs documented:NA  Isolation addressed and Signage used: NA  Fall Prevention: Care plan updated Yes Education given and documented Yes  Care Plan initiated and Co-Morbidities added: Yes  Education Assessment documented:Yes  Admission Education Documented: Yes  If present CAUTI/CLABI Education done: NA  New medication patient education completed and documented (Possible Side Effects of Common Medications handout): Yes  Allergies Reviewed: Yes  Admission Medication Reconciliation completed: Yes  Home medications if not able to send immediately home with family stored here: NA  Reminder note placed in discharge instructions regarding home meds: NA  Individualized care needs/preferences addressed and charted: Yes  Provider Notified that patient has arrived to the unit: Yes

## 2024-06-21 NOTE — PLAN OF CARE
Goal Outcome Evaluation:      Plan of Care Reviewed With: patient    Overall Patient Progress: improvingOverall Patient Progress: improving       Hypertensive, SBP in 160s. A&O x4. Incontinent of urine, purewick in place and with good urine output. BLE edema to legs +2. Mg-1.8 at 2049, replacement given. BS-192 and 177. Pt up 1A GBW.

## 2024-06-21 NOTE — PROGRESS NOTES
06/21/24 0900   Appointment Info   Signing Clinician's Name / Credentials (PT) Maria Del Carmen Simpson PT, DPT, ATC, LAT   Rehab Comments (PT) PT eval and treat discharge planning, weakness. Activity order: fall precautions, up with assist       Present no   Living Environment   People in Home alone   Current Living Arrangements apartment   Home Accessibility no concerns  (elevator access to 3rd floor, recently this was out and patient stayed isolated.)   Transportation Anticipated family or friend will provide   Living Environment Comments 3rd floor senior living of building. notes the move to the apartment has been very isolating. walk in shower- fear of falling due to no bars. flat bed.   Self-Care   Usual Activity Tolerance moderate   Current Activity Tolerance fair   Regular Exercise No   Equipment Currently Used at Home cane, straight;walker, rolling;raised toilet seat   Fall history within last six months yes   Number of times patient has fallen within last six months   (it hasn't been many, but describes 3)   Activity/Exercise/Self-Care Comment walker used outside the apartment. cane or surfaces in the apartment. 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 decreased [employer] attendance due to environment set up and fear of falling- history of 1 fall. unable to attend activites at the facility as hoped. grandson, daughter and son assist with transportation, patient occasionally drives self. Patient drives self for errands, notes recently not getting to the grocery store and living out of her pantry. notes not cooking and global decline in appetite   General Information   Onset of Illness/Injury or Date of Surgery 06/20/24   Referring Physician Shreya Dalton MD   Patient/Family Therapy Goals Statement (PT) [facility] from the hospital- bed is currently available   Pertinent History of Current Problem (include personal factors and/or comorbidities that impact the POC) Patient  is a 83 year old female, admitted following weakness, headache and HTN. Patient found to have electrolyte imbalance, acute encephalopathy. Patient with a previous medical history of HTN, CKD, CHF, CAD, MI with stent, DM type 2, GERD, HLD, anxiety, depression, humerus fracture 2020.   Existing Precautions/Restrictions fall   Weight-Bearing Status - LUE full weight-bearing   Weight-Bearing Status - RUE full weight-bearing   Weight-Bearing Status - LLE full weight-bearing   Weight-Bearing Status - RLE full weight-bearing   Cognition   Affect/Mental Status (Cognition) WFL   Orientation Status (Cognition) oriented to;person;place;situation;time   Follows Commands (Cognition) WFL   Cognitive Status Comments confusion- conflicting reports of falls and recent of function. Patient reports heaing a baby cry over night and this morning keeping her awake   Pain Assessment   Patient Currently in Pain No   Integumentary/Edema   Integumentary/Edema Comments iv site with bloody drainage otherwise no concerns   Posture    Posture Not impaired   Range of Motion (ROM)   Range of Motion ROM is WFL   Strength (Manual Muscle Testing)   Strength (Manual Muscle Testing) Deficits observed during functional mobility   Strength Comments SLR LLE hip flexion 3/5, RLE 3+/5, hip extension bilat 3+/5 and fair bridge   Bed Mobility   Bed Mobility supine-sit;sit-supine;rolling right;rolling left   Rolling Left Josephine (Bed Mobility) independent   Rolling Right Josephine (Bed Mobility) independent   Supine-Sit Josephine (Bed Mobility) independent   Sit-Supine Josephine (Bed Mobility) independent   Comment, (Bed Mobility) seeking support surface to pull on but able to complete without   Transfers   Transfers sit-stand transfer   Sit-Stand Transfer   Sit-Stand Josephine (Transfers) supervision   Assistive Device (Sit-Stand Transfers) walker, front-wheeled   Gait/Stairs (Locomotion)   Josephine Level (Gait) supervision   Assistive  Device (Gait) walker, front-wheeled   Distance in Feet (Gait) 150   Pattern (Gait) step-through   Deviations/Abnormal Patterns (Gait) base of support, narrow;gait speed decreased;stride length decreased   Comment, (Gait/Stairs) 1 LOB with ambulation due to head turning, able to correct without assistance.   Balance   Balance Comments poor dynamic mobility with LOB with head turns. standing balance without UE support is poor with surface seeking   Sensory Examination   Sensory Perception patient reports no sensory changes   Coordination   Coordination no deficits were identified   Muscle Tone   Muscle Tone no deficits were identified   Clinical Impression   Criteria for Skilled Therapeutic Intervention Yes, treatment indicated   PT Diagnosis (PT) deconditioned, muscular weakness, impaired balance   Influenced by the following impairments chronic debility, baseline impairments   Functional limitations due to impairments use of walker, increased risk of falls   Clinical Presentation (PT Evaluation Complexity) stable   Clinical Presentation Rationale at baseline mobility per patient. clinical judgement, medical status   Clinical Decision Making (Complexity) low complexity   Planned Therapy Interventions (PT) balance training;gait training;progressive activity/exercise;home program guidelines;strengthening   Risk & Benefits of therapy have been explained evaluation/treatment results reviewed;participants voiced agreement with care plan;participants included;patient   Clinical Impression Comments Currently she demonstrates deconditioning and poor insight to her balance needs, however able to mobilize without physical assistance. Anticipate she will do well with discharge to a more supportive living environment and physical therapy services to address her chonic balance and strength deficits.   PT Total Evaluation Time   PT Eval, Low Complexity Minutes (61017) 20   Physical Therapy Goals   PT Frequency 3x/week   PT  Predicted Duration/Target Date for Goal Attainment 06/25/24   PT Goals Gait   PT: Gait Modified independent;Rolling walker;150 feet   PT Discharge Planning   PT Plan 1/3 Friday. ambulate, standing balance, standing strengthening   PT Discharge Recommendation (DC Rec) home with home care physical therapy   PT Rationale for DC Rec Patient from apartment alone, plans to move into [facility] in the coming days. She has a significant falls history which has resulted in decreased engagement in her daily routine and activity outside of her apartment. Currently she demonstrates deconditioning and poor insight to her balance needs, however able to mobilize without physical assistance. Anticipate she will do well with discharge to a more supportive living environment and physical therapy services to address her chonic balance and strength deficits.   PT Brief overview of current status IND bed mobility. SBA sit to/from stand with walker. SBA ambulation 150' with walker   Total Session Time   Total Session Time (sum of timed and untimed services) 20     Thank you for your referral.  Maria Del Carmen Simpson, PT, DPT, ATC, LAT    [facility]  O: [phone]  E: [email or web address]

## 2024-06-22 LAB
ABO/RH(D): NORMAL
ANION GAP SERPL CALCULATED.3IONS-SCNC: 15 MMOL/L (ref 7–15)
ANTIBODY SCREEN: NEGATIVE
BUN SERPL-MCNC: 33.1 MG/DL (ref 8–23)
CA-I BLD-MCNC: 4 MG/DL (ref 4.4–5.2)
CALCIUM SERPL-MCNC: 7.2 MG/DL (ref 8.8–10.2)
CHLORIDE SERPL-SCNC: 102 MMOL/L (ref 98–107)
CK SERPL-CCNC: 180 U/L (ref 26–192)
CREAT SERPL-MCNC: 2.29 MG/DL (ref 0.51–0.95)
DEPRECATED HCO3 PLAS-SCNC: 24 MMOL/L (ref 22–29)
EGFRCR SERPLBLD CKD-EPI 2021: 21 ML/MIN/1.73M2
ERYTHROCYTE [DISTWIDTH] IN BLOOD BY AUTOMATED COUNT: 16.3 % (ref 10–15)
FOLATE SERPL-MCNC: 7.4 NG/ML (ref 4.6–34.8)
GLUCOSE BLDC GLUCOMTR-MCNC: 192 MG/DL (ref 70–99)
GLUCOSE BLDC GLUCOMTR-MCNC: 203 MG/DL (ref 70–99)
GLUCOSE BLDC GLUCOMTR-MCNC: 224 MG/DL (ref 70–99)
GLUCOSE BLDC GLUCOMTR-MCNC: 89 MG/DL (ref 70–99)
GLUCOSE BLDC GLUCOMTR-MCNC: 92 MG/DL (ref 70–99)
GLUCOSE BLDC GLUCOMTR-MCNC: 99 MG/DL (ref 70–99)
GLUCOSE SERPL-MCNC: 97 MG/DL (ref 70–99)
HCT VFR BLD AUTO: 22.3 % (ref 35–47)
HGB BLD-MCNC: 6.7 G/DL (ref 11.7–15.7)
HGB BLD-MCNC: 8.3 G/DL (ref 11.7–15.7)
MAGNESIUM SERPL-MCNC: 1.9 MG/DL (ref 1.7–2.3)
MCH RBC QN AUTO: 24 PG (ref 26.5–33)
MCHC RBC AUTO-ENTMCNC: 30 G/DL (ref 31.5–36.5)
MCV RBC AUTO: 80 FL (ref 78–100)
NT-PROBNP SERPL-MCNC: ABNORMAL PG/ML (ref 0–1800)
PHOSPHATE SERPL-MCNC: 4.6 MG/DL (ref 2.5–4.5)
PLATELET # BLD AUTO: 174 10E3/UL (ref 150–450)
POTASSIUM SERPL-SCNC: 4.6 MMOL/L (ref 3.4–5.3)
PTH-INTACT SERPL-MCNC: 318 PG/ML (ref 15–65)
RBC # BLD AUTO: 2.79 10E6/UL (ref 3.8–5.2)
RETICS # AUTO: 0.04 10E6/UL (ref 0.03–0.1)
RETICS/RBC NFR AUTO: 1.3 % (ref 0.5–2)
SODIUM SERPL-SCNC: 141 MMOL/L (ref 135–145)
SPECIMEN EXPIRATION DATE: NORMAL
WBC # BLD AUTO: 5.3 10E3/UL (ref 4–11)

## 2024-06-22 PROCEDURE — 258N000003 HC RX IP 258 OP 636: Mod: JZ | Performed by: NURSE PRACTITIONER

## 2024-06-22 PROCEDURE — 250N000011 HC RX IP 250 OP 636: Mod: JZ | Performed by: PEDIATRICS

## 2024-06-22 PROCEDURE — 86900 BLOOD TYPING SEROLOGIC ABO: CPT | Performed by: NURSE PRACTITIONER

## 2024-06-22 PROCEDURE — 99233 SBSQ HOSP IP/OBS HIGH 50: CPT | Performed by: NURSE PRACTITIONER

## 2024-06-22 PROCEDURE — 82550 ASSAY OF CK (CPK): CPT | Performed by: NURSE PRACTITIONER

## 2024-06-22 PROCEDURE — 83970 ASSAY OF PARATHORMONE: CPT | Performed by: NURSE PRACTITIONER

## 2024-06-22 PROCEDURE — 120N000001 HC R&B MED SURG/OB

## 2024-06-22 PROCEDURE — 250N000013 HC RX MED GY IP 250 OP 250 PS 637: Performed by: HOSPITALIST

## 2024-06-22 PROCEDURE — 36415 COLL VENOUS BLD VENIPUNCTURE: CPT | Performed by: NURSE PRACTITIONER

## 2024-06-22 PROCEDURE — 80048 BASIC METABOLIC PNL TOTAL CA: CPT | Performed by: NURSE PRACTITIONER

## 2024-06-22 PROCEDURE — 83880 ASSAY OF NATRIURETIC PEPTIDE: CPT | Performed by: NURSE PRACTITIONER

## 2024-06-22 PROCEDURE — 82746 ASSAY OF FOLIC ACID SERUM: CPT | Performed by: NURSE PRACTITIONER

## 2024-06-22 PROCEDURE — 85018 HEMOGLOBIN: CPT | Performed by: NURSE PRACTITIONER

## 2024-06-22 PROCEDURE — 85027 COMPLETE CBC AUTOMATED: CPT | Performed by: NURSE PRACTITIONER

## 2024-06-22 PROCEDURE — 83735 ASSAY OF MAGNESIUM: CPT | Performed by: PEDIATRICS

## 2024-06-22 PROCEDURE — 250N000013 HC RX MED GY IP 250 OP 250 PS 637: Performed by: NURSE PRACTITIONER

## 2024-06-22 PROCEDURE — 250N000013 HC RX MED GY IP 250 OP 250 PS 637: Performed by: PEDIATRICS

## 2024-06-22 PROCEDURE — 84100 ASSAY OF PHOSPHORUS: CPT | Performed by: NURSE PRACTITIONER

## 2024-06-22 PROCEDURE — 250N000011 HC RX IP 250 OP 636: Mod: JZ | Performed by: NURSE PRACTITIONER

## 2024-06-22 PROCEDURE — 82330 ASSAY OF CALCIUM: CPT | Performed by: NURSE PRACTITIONER

## 2024-06-22 RX ORDER — LISINOPRIL 2.5 MG/1
2.5 TABLET ORAL DAILY
Status: DISCONTINUED | OUTPATIENT
Start: 2024-06-22 | End: 2024-06-24

## 2024-06-22 RX ORDER — FUROSEMIDE 10 MG/ML
60 INJECTION INTRAMUSCULAR; INTRAVENOUS EVERY 8 HOURS
Status: DISCONTINUED | OUTPATIENT
Start: 2024-06-22 | End: 2024-06-23

## 2024-06-22 RX ORDER — LORAZEPAM 0.5 MG/1
0.5 TABLET ORAL ONCE
Status: COMPLETED | OUTPATIENT
Start: 2024-06-22 | End: 2024-06-22

## 2024-06-22 RX ORDER — MAGNESIUM SULFATE HEPTAHYDRATE 40 MG/ML
2 INJECTION, SOLUTION INTRAVENOUS ONCE
Status: COMPLETED | OUTPATIENT
Start: 2024-06-22 | End: 2024-06-22

## 2024-06-22 RX ORDER — TORSEMIDE 20 MG/1
40 TABLET ORAL DAILY
Status: DISCONTINUED | OUTPATIENT
Start: 2024-06-22 | End: 2024-06-23

## 2024-06-22 RX ORDER — FUROSEMIDE 10 MG/ML
60 INJECTION INTRAMUSCULAR; INTRAVENOUS ONCE
Status: COMPLETED | OUTPATIENT
Start: 2024-06-22 | End: 2024-06-22

## 2024-06-22 RX ADMIN — FUROSEMIDE 60 MG: 10 INJECTION, SOLUTION INTRAVENOUS at 22:32

## 2024-06-22 RX ADMIN — HYDRALAZINE HYDROCHLORIDE 25 MG: 25 TABLET ORAL at 20:18

## 2024-06-22 RX ADMIN — CALCIUM GLUCONATE 2 G: 98 INJECTION, SOLUTION INTRAVENOUS at 08:33

## 2024-06-22 RX ADMIN — HYDRALAZINE HYDROCHLORIDE 25 MG: 25 TABLET ORAL at 14:15

## 2024-06-22 RX ADMIN — LABETALOL HYDROCHLORIDE 10 MG: 5 INJECTION INTRAVENOUS at 23:00

## 2024-06-22 RX ADMIN — FAMOTIDINE 20 MG: 10 INJECTION, SOLUTION INTRAVENOUS at 11:33

## 2024-06-22 RX ADMIN — CLOPIDOGREL 75 MG: 75 TABLET ORAL at 08:34

## 2024-06-22 RX ADMIN — INSULIN ASPART 2 UNITS: 100 INJECTION, SOLUTION INTRAVENOUS; SUBCUTANEOUS at 11:46

## 2024-06-22 RX ADMIN — HYDRALAZINE HYDROCHLORIDE 25 MG: 25 TABLET ORAL at 08:35

## 2024-06-22 RX ADMIN — MAGNESIUM SULFATE HEPTAHYDRATE 2 G: 40 INJECTION, SOLUTION INTRAVENOUS at 10:07

## 2024-06-22 RX ADMIN — ISOSORBIDE MONONITRATE 30 MG: 30 TABLET, EXTENDED RELEASE ORAL at 08:35

## 2024-06-22 RX ADMIN — LISINOPRIL 2.5 MG: 2.5 TABLET ORAL at 11:33

## 2024-06-22 RX ADMIN — FUROSEMIDE 60 MG: 10 INJECTION, SOLUTION INTRAVENOUS at 14:15

## 2024-06-22 RX ADMIN — FUROSEMIDE 60 MG: 10 INJECTION, SOLUTION INTRAVENOUS at 08:34

## 2024-06-22 RX ADMIN — LORAZEPAM 0.5 MG: 0.5 TABLET ORAL at 22:59

## 2024-06-22 RX ADMIN — METOPROLOL SUCCINATE 100 MG: 100 TABLET, EXTENDED RELEASE ORAL at 08:35

## 2024-06-22 RX ADMIN — HEPARIN SODIUM 5000 UNITS: 10000 INJECTION, SOLUTION INTRAVENOUS; SUBCUTANEOUS at 05:06

## 2024-06-22 RX ADMIN — INSULIN ASPART 2 UNITS: 100 INJECTION, SOLUTION INTRAVENOUS; SUBCUTANEOUS at 17:16

## 2024-06-22 ASSESSMENT — ACTIVITIES OF DAILY LIVING (ADL)
ADLS_ACUITY_SCORE: 31

## 2024-06-22 NOTE — PROGRESS NOTES
Blood pressure was elevated this evening and down some after Pm dose of oral Hydralazine. She has been up assist of one with a walker. Blood sugar was elevated and covered.   Report given to next nurse.

## 2024-06-22 NOTE — PLAN OF CARE
Goal Outcome Evaluation:      Plan of Care Reviewed With: patient    Overall Patient Progress: improvingOverall Patient Progress: improving    Outcome Evaluation: A/Ox4. Pt denies pain. VSS on RA. Bedtime BG at 233. 0200 BG check was 89, given orange juice. BG rechecked again at 99. Tele shows NSR. Hgb at 6.7, provider notified.

## 2024-06-22 NOTE — PLAN OF CARE
Goal Outcome Evaluation:      Plan of Care Reviewed With: patient    Overall Patient Progress: no changeOverall Patient Progress: no change    Outcome Evaluation: Pt is A & O x 4, but some intermittient confusion noted. Hypertensive in 180s this AM, schedule PO medications given and down to 170s otherwise vitally stable and on room air. Denies any pain today. IV lasix per MAR, voiding large amounts but pt keeps missing measuring hat in toilet to  measure. Up in chair today. Moves with SBA with walker/GB. One time dose of calcium gluconate given this morning. Magnesium replaced and to be rechecked in AM. Hemoglobin recheck increased to 8.3. IV is saline locked. Remains NSR on telemetry.

## 2024-06-22 NOTE — PROGRESS NOTES
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  Upon initial evaluation in the emergency room, she was found to have severe hypomagnesemia and hypocalcemia.  She is at high risk for an adverse outcome including worsening electrolyte disturbances and/or consequences of those disturbances such as rhabdomyolysis or cardiac arrhythmia, so hospitalization is considered medically necessary.       Principal Problem:    Hypomagnesemia  Active Problems:    Weakness    Hypocalcemia    Hypertension goal BP (blood pressure) < 140/90    Coronary artery disease involving native coronary artery of native heart without angina pectoris    Type 2 diabetes mellitus with diabetic polyneuropathy, without long-term current use of insulin (H)    Stage 3b chronic kidney disease (H)    Hyperphosphatemia    Heart failure with preserved ejection fraction, NYHA class I (H)    Chronic anemia    Lower extremity edema    Acute encephalopathy    Hypoalbuminemia    Platelet function defect (H)     Severe hypomagnesemia  Severe hypocalcemia  Mild hyperphosphatemia  Acute encephalopathy  Generalized muscle weakness  Presented to the ED with magnesium 0.7, calcium 5.6-5.8, and ionized calcium 3.1 and clinical symptoms likely related to the severe electrolyte disturbances including confusion concerning for acute encephalopathy and generalized muscle weakness with risk for rhabdomyolysis.  Suspect electrolyte disturbances are due to recent poor oral nutrient intake, but hypomagnesemia may be exacerbated by chronic PPI treatment.    Renal magnesium wasting is possible although not strongly  suspected.  She has not had worsening diarrhea recently and diarrheal symptoms are usually adequately controlled with use of Imodium about 3 days/week.  She is been to GI and she is is having work up for malabsorptive versus inflammatory process,-infectious etiology rule out.         She was given IV magnesium and calcium in the ED. She also presented with mild hyperphosphatemia also likely related to hypomagnesemia and hypocalcemia although chronic kidney disease may contribute as well.      Mag 1.7 on 6/21, ionized Ca 3.6  Receiving an additional 2 g calcium gluconate.  CK total on down trend. Fractional excretion of mag 2.3%     She is alert and appropriate on exam today does not feel as weak.  PT evaluated and recommend home PT at [facility] as she will be moving in there at discharge.    Ionized Ca 4.0 on 6/22     -Continue cardiac monitoring  -2g calcium gluc today  -recheck mag, ca, k, phos in am  -Continue magnesium supplementation using the high replacement protocol with ongoing magnesium monitoring as indicated  -Encourage adequate oral intake (she will be moving to assisted living where she will have meals more readily available)  -Hold statin and continue to trend ck total  -PPI discontinued on admission as it was worrisome for malabsorption as cause for profound electrolyte disturbances, add back low dose h2 blocker due to acute on chronic anemia        Possible demand ischemia  CAD  Presented with higher troponin level 134 compared with 107 on April 9 and has known CAD.  She does not have angina or acute ischemic EKG changes.  Higher troponin level on admission compared with previously may be due to severe hypomagnesemia which can cause worsening myocardial ischemia but likely due to demand ischemia as her weight and BNP up significantly from previous as well.  -No recheck of troponin unless her clinical status changes  -aggressive diuresis     Heart failure with preserved EF  Bilateral lower  extremity edema  Known chronic heart failure with preserved EF but grade 1 diastolic dysfunction per most recent echocardiogram on April 8.  Presented with weight gain 10 to 15 pounds and bilateral lower extremity edema, but BNP was  decreased compared with April 8.  She denied other symptoms of decompensated heart failure, and radiographic findings are not supportive of pulmonary edema.  I    The previous chronic thiazide diuretic therapy was reportedly discontinued about a month ago and and not been taking an alternate diuretic since then and this may also be contributing to current signs of volume overload.  She did receive 1 dose IV Lasix in the ER.    Higher troponin level on admission compared with previously may be due to severe hypomagnesemia which can cause worsening myocardial ischemia but likely due to demand ischemia as her weight is up significantly and BNP up significantly from previous as well.  --Her weight has been consistently 127 pounds at clinic visits and 143 pounds on presentation.  Her diuretic has been recently discontinued.  She was placed on 80 mg of oral furosemide on admission.  She is hypertensive and anemic to 6.7 as well.    Plan  -will aggressively diuresis with furosemide 60 mg q 8 hours and if she does not respond to this consider adding diuril versus metaxalone versus changing to bumex. Once we get weight down with IV diuresis will like change to oral torsemide for better absorption.  -daily weights   -I/O's     Abdominal Discomfort/distention  Chronic diarrhea.  Patient complaining of abdominal distention on 6/21.  She has had a soft BM and active BS.  She has had recent GI work up and EGD is the next step as they are ruling out an inflammatory process.  KUB negative.  Discomfort has since resolved.  Her diarrhea is controlled.    Severe hypertension  Headache  Presented with headache and was severely hypertensive in the ER with blood pressure as high as 208/80.  Severe  hypertension was treated in the ER with a combination of oral lisinopril, oral hydrochlorothiazide, oral Toprol-XL, and oral hydralazine as well as a dose of IV Lasix.  Headache resolved as blood pressure improved, so suspect headache was due to severe hypertension.  Patient reported that previous chronic lisinopril hydrochlorothiazide was discontinued and that she has been taking Toprol-XL and hydralazine to treat hypertension.    Plan;  -add back ACE1 at low dose  -diurese furosemide 60 mg q 8 hours  -Continue Toprol-XL and hydralazine at her chronic doses  -hold oral fursemide 80 mg while diuresing with IV diuretic.  Will change this to torsemide 40 mg daily if she does not have continued response with furosemide     Type 2 diabetes with retinopathy  Type 2 diabetes with known mild nonproliferative retinopathy bilaterally recently treated with glipizide and Ozempic.  A1c in January was 8.6.  At presentation today, blood sugar 82 despite not taking glipizide dose this morning and in context of poor oral intake.  A1c 7.4  -Hold chronic dose of glipizide  -Ozempic is usually administered on Sundays -hold  -Monitor blood sugars 4 times daily  -hypoglycemia treatment protocol  -continue sliding scale NovoLog during hospitalization    Stage IIIb CKD  Proteinuria  Known stage IIIb CKD with heavy proteinuria on recent urine testing in April 2024.  Patient had previously been treated with ACE inhibitor, but appears as though it may have been discontinued in the last month for reasons that are not clear.  Renal function still at baseline.    -trend renal function  -Monitor I/O  -Avoid nephrotoxic medication as able  -Adjust medication dosing accordingly for changing renal function  -re-initiate low dose ACE1     Hypoalbuminemia  Serum albumin low at 3.2.  This could be due to inadequate oral nutritional protein intake, but heavy proteinuria could also contribute.  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  Admission hemoglobin 8.1, similar to previously.  No recent bleeding noted.  Patient is taking both aspirin and Plavix chronically that causes platelet function defect and increase her risk for bleeding.  As above hemoglobin 8 to 9 range and is 7.3 ----6.7 over the past 24 hours.  She is not tachycardic or hypotensive.  She better clinically and appears well perfused.  She is total body volume up with a BNP of  21,616.  Diuretic recently discontinued.  She has been hypertensive as well, thus suspect worsening anemia due to volume overload.  Weight at her clinic vists 124 pounds and 143 pounds on admission  -send reticulocyte, b12 and folate  -diurese as above  -stop heparin subcutaneous for dvt prophylaxis and add sequential devices  -hold aspirin but continue Plavix for now but hold if there is increasing concern for bleeding  -Ordered recheck hemoglobin 12 noon and depending on that result will either opt for transfusion versus serial hemoglobins        Diet: Combination Diet Moderate Consistent Carb (60 g CHO per Meal) Diet; 2 gm NA Diet  Fluid restriction 2000 ML FLUID    DVT Prophylaxis: Pneumatic Compression Devices  Uriarte Catheter: Not present  Lines: None     Cardiac Monitoring: ACTIVE order. Indication: Electrolyte Imbalance (24 hours)- Magnesium <1.3 mg/ml; Potassium < =2.8 or > 5.5 mg/ml  Code Status: No CPR- Do NOT Intubate      Clinically Significant Risk Factors          # Hypocalcemia: Lowest Ca = 5.6 mg/dL in last 2 days, will monitor and replace as appropriate   # Hypomagnesemia: Lowest Mg = 0.7 mg/dL in last 2 days, will replace as needed   # Hypoalbuminemia: Lowest albumin = 3.2 g/dL at 6/20/2024 12:40 PM, will monitor as appropriate  # Coagulation Defect: INR = 1.27 (Ref range: 0.85 - 1.15) and/or PTT = N/A, will monitor for bleeding    # Hypertension:  Noted on problem list             # DMII: A1C = 7.4 % (Ref range: <5.7 %) within past 6 months, PRESENT ON ADMISSION      # Financial/Environmental Concerns: none         Disposition Plan     Medically Ready for Discharge: Anticipated in 2-4 Days     The patient's care was discussed with the  entire care team .    Pily Villar CNP  Hospitalist Service  [facility]  Securely message with [employer] (more info)  Text page via "[employer]" Paging/Directory   ______________________________________________________________________    Interval History   Patient anemic with hemoglobin of 6.7 overnight    Physical Exam   Vital Signs: Temp: 97.9  F (36.6  C) Temp src: Oral BP: (!) 180/77 Pulse: 80   Resp: 18 SpO2: 94 % O2 Device: None (Room air)    Weight: 135 lbs 5.8 oz    Gen:  Lying in bed no acute distress  HEENT:  normocephalic, atraumatic, oropharynx clear  Resp:  decreased bibasilarly, normal respiratory effort  Card:  S1,S2, RRR no murmur, rub or gallop  Abd:  Soft, nondistended, non tender,  normoactive bowel sounds   Neuro:  Neuro exam non focal      Medical Decision Making         45 MINUTES SPENT BY ME on the date of service doing chart review, history, exam, documentation & further activities per the note.        Data     I have personally reviewed the following data over the past 24 hrs:    5.3  \   6.7 (LL)   / 174     141 102 33.1 (H) /  92   4.6 24 2.29 (H) \     Trop: N/A BNP: 21,616 (H)     Ferritin:  N/A % Retic:  N/A LDH:  N/A       Imaging results reviewed over the past 24 hrs:   Recent Results (from the past 24 hour(s))   X-ray Abdomen flat port    Narrative    ABDOMEN ONE VIEW PORTABLE  6/21/2024 4:16 PM     HISTORY: Abdominal discomfort/distention.    COMPARISON: CT abdomen and pelvis 4/4/2023.       Impression    IMPRESSION: Gas distended loops of small and large bowel with an  overall nonobstructive bowel gas pattern. Small amount of stool in the  colon. Cholecystectomy clips.  Degenerative changes of the spine.    NBA FAGAN MD         SYSTEM ID:  [id number]

## 2024-06-23 LAB
ALBUMIN SERPL BCG-MCNC: 3.3 G/DL (ref 3.5–5.2)
ALP SERPL-CCNC: 90 U/L (ref 40–150)
ALT SERPL W P-5'-P-CCNC: 8 U/L (ref 0–50)
ANION GAP SERPL CALCULATED.3IONS-SCNC: 12 MMOL/L (ref 7–15)
AST SERPL W P-5'-P-CCNC: 19 U/L (ref 0–45)
BILIRUB SERPL-MCNC: 0.2 MG/DL
BUN SERPL-MCNC: 41.1 MG/DL (ref 8–23)
CA-I BLD-MCNC: 4.2 MG/DL (ref 4.4–5.2)
CALCIUM SERPL-MCNC: 8 MG/DL (ref 8.8–10.2)
CHLORIDE SERPL-SCNC: 99 MMOL/L (ref 98–107)
CK SERPL-CCNC: 129 U/L (ref 26–192)
CREAT SERPL-MCNC: 2.67 MG/DL (ref 0.51–0.95)
DEPRECATED HCO3 PLAS-SCNC: 27 MMOL/L (ref 22–29)
EGFRCR SERPLBLD CKD-EPI 2021: 17 ML/MIN/1.73M2
ERYTHROCYTE [DISTWIDTH] IN BLOOD BY AUTOMATED COUNT: 16.2 % (ref 10–15)
GLUCOSE BLDC GLUCOMTR-MCNC: 142 MG/DL (ref 70–99)
GLUCOSE BLDC GLUCOMTR-MCNC: 194 MG/DL (ref 70–99)
GLUCOSE BLDC GLUCOMTR-MCNC: 196 MG/DL (ref 70–99)
GLUCOSE BLDC GLUCOMTR-MCNC: 228 MG/DL (ref 70–99)
GLUCOSE BLDC GLUCOMTR-MCNC: 229 MG/DL (ref 70–99)
GLUCOSE SERPL-MCNC: 133 MG/DL (ref 70–99)
HCT VFR BLD AUTO: 24.9 % (ref 35–47)
HGB BLD-MCNC: 7.5 G/DL (ref 11.7–15.7)
MAGNESIUM SERPL-MCNC: 2.1 MG/DL (ref 1.7–2.3)
MCH RBC QN AUTO: 23.7 PG (ref 26.5–33)
MCHC RBC AUTO-ENTMCNC: 30.1 G/DL (ref 31.5–36.5)
MCV RBC AUTO: 79 FL (ref 78–100)
PHOSPHATE SERPL-MCNC: 5.2 MG/DL (ref 2.5–4.5)
PLATELET # BLD AUTO: 179 10E3/UL (ref 150–450)
POTASSIUM SERPL-SCNC: 4.7 MMOL/L (ref 3.4–5.3)
PROT SERPL-MCNC: 6.4 G/DL (ref 6.4–8.3)
RBC # BLD AUTO: 3.16 10E6/UL (ref 3.8–5.2)
SODIUM SERPL-SCNC: 138 MMOL/L (ref 135–145)
VIT B12 SERPL-MCNC: 202 PG/ML (ref 232–1245)
WBC # BLD AUTO: 5.5 10E3/UL (ref 4–11)

## 2024-06-23 PROCEDURE — 120N000001 HC R&B MED SURG/OB

## 2024-06-23 PROCEDURE — 85027 COMPLETE CBC AUTOMATED: CPT | Performed by: NURSE PRACTITIONER

## 2024-06-23 PROCEDURE — 83735 ASSAY OF MAGNESIUM: CPT | Performed by: NURSE PRACTITIONER

## 2024-06-23 PROCEDURE — 80053 COMPREHEN METABOLIC PANEL: CPT | Performed by: NURSE PRACTITIONER

## 2024-06-23 PROCEDURE — 84100 ASSAY OF PHOSPHORUS: CPT | Performed by: NURSE PRACTITIONER

## 2024-06-23 PROCEDURE — 82607 VITAMIN B-12: CPT | Performed by: NURSE PRACTITIONER

## 2024-06-23 PROCEDURE — 82330 ASSAY OF CALCIUM: CPT | Performed by: NURSE PRACTITIONER

## 2024-06-23 PROCEDURE — 99232 SBSQ HOSP IP/OBS MODERATE 35: CPT | Performed by: NURSE PRACTITIONER

## 2024-06-23 PROCEDURE — 250N000011 HC RX IP 250 OP 636: Mod: JZ | Performed by: NURSE PRACTITIONER

## 2024-06-23 PROCEDURE — 36415 COLL VENOUS BLD VENIPUNCTURE: CPT | Performed by: NURSE PRACTITIONER

## 2024-06-23 PROCEDURE — 250N000013 HC RX MED GY IP 250 OP 250 PS 637: Performed by: PEDIATRICS

## 2024-06-23 PROCEDURE — 250N000013 HC RX MED GY IP 250 OP 250 PS 637: Performed by: NURSE PRACTITIONER

## 2024-06-23 PROCEDURE — 82550 ASSAY OF CK (CPK): CPT | Performed by: NURSE PRACTITIONER

## 2024-06-23 RX ORDER — CALCIUM GLUCONATE 20 MG/ML
1 INJECTION, SOLUTION INTRAVENOUS ONCE
Status: COMPLETED | OUTPATIENT
Start: 2024-06-23 | End: 2024-06-23

## 2024-06-23 RX ORDER — TORSEMIDE 20 MG/1
40 TABLET ORAL
Status: DISCONTINUED | OUTPATIENT
Start: 2024-06-23 | End: 2024-06-24 | Stop reason: HOSPADM

## 2024-06-23 RX ADMIN — LISINOPRIL 2.5 MG: 2.5 TABLET ORAL at 09:01

## 2024-06-23 RX ADMIN — TORSEMIDE 40 MG: 20 TABLET ORAL at 16:09

## 2024-06-23 RX ADMIN — METOPROLOL SUCCINATE 100 MG: 100 TABLET, EXTENDED RELEASE ORAL at 09:01

## 2024-06-23 RX ADMIN — CALCIUM GLUCONATE 1 G: 20 INJECTION, SOLUTION INTRAVENOUS at 11:24

## 2024-06-23 RX ADMIN — INSULIN ASPART 1 UNITS: 100 INJECTION, SOLUTION INTRAVENOUS; SUBCUTANEOUS at 09:01

## 2024-06-23 RX ADMIN — ISOSORBIDE MONONITRATE 30 MG: 30 TABLET, EXTENDED RELEASE ORAL at 09:01

## 2024-06-23 RX ADMIN — ACETAMINOPHEN 975 MG: 325 TABLET, FILM COATED ORAL at 23:36

## 2024-06-23 RX ADMIN — HYDRALAZINE HYDROCHLORIDE 25 MG: 25 TABLET ORAL at 09:01

## 2024-06-23 RX ADMIN — FUROSEMIDE 60 MG: 10 INJECTION, SOLUTION INTRAVENOUS at 05:37

## 2024-06-23 RX ADMIN — HYDRALAZINE HYDROCHLORIDE 25 MG: 25 TABLET ORAL at 14:26

## 2024-06-23 RX ADMIN — CLOPIDOGREL 75 MG: 75 TABLET ORAL at 09:01

## 2024-06-23 RX ADMIN — HYDRALAZINE HYDROCHLORIDE 25 MG: 25 TABLET ORAL at 22:09

## 2024-06-23 RX ADMIN — INSULIN ASPART 2 UNITS: 100 INJECTION, SOLUTION INTRAVENOUS; SUBCUTANEOUS at 12:02

## 2024-06-23 RX ADMIN — TORSEMIDE 40 MG: 20 TABLET ORAL at 09:01

## 2024-06-23 RX ADMIN — INSULIN ASPART 2 UNITS: 100 INJECTION, SOLUTION INTRAVENOUS; SUBCUTANEOUS at 17:16

## 2024-06-23 ASSESSMENT — ACTIVITIES OF DAILY LIVING (ADL)
ADLS_ACUITY_SCORE: 31

## 2024-06-23 NOTE — PLAN OF CARE
Goal Outcome Evaluation:      Plan of Care Reviewed With: patient    Overall Patient Progress: improvingOverall Patient Progress: improving      Labetalol given at 2300 for Sbp> 200, rechecked for 189/78. Continues to be hypertensive in 160s throughout the night otherwise stable. Denies pain. Up SBA GBW. Tele showed NSR.    Attending Attestation (For Attendings USE Only)...

## 2024-06-23 NOTE — PLAN OF CARE
Goal Outcome Evaluation:      Plan of Care Reviewed With: patient    Overall Patient Progress: improvingOverall Patient Progress: improving    Outcome Evaluation: Pt is A & O x 4,Hypertensive in 180-200s this evening, schedule PO medications given and labetalol IV. otherwise vitally stable and on room air. Denies any pain today. IV lasix per MAR, voiding large amounts but pt keeps missing measuring hat in toilet to  measure. Up in chair today. Moves with SBA with walker/GB. IV is saline locked. Remains NSR on telemetry. Anxious tonight about moving and family issues. SW consult ordered, PRN ativan given

## 2024-06-23 NOTE — PROGRESS NOTES
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  Upon initial evaluation in the emergency room, she was found to have severe hypomagnesemia and hypocalcemia.       Principal Problem:    Hypomagnesemia  Active Problems:    Weakness    Hypocalcemia    Hypertension goal BP (blood pressure) < 140/90    Coronary artery disease involving native coronary artery of native heart without angina pectoris    Type 2 diabetes mellitus with diabetic polyneuropathy, without long-term current use of insulin (H)    Stage 3b chronic kidney disease (H)    Hyperphosphatemia    Heart failure with preserved ejection fraction, NYHA class I (H)    Chronic anemia    Lower extremity edema    Acute encephalopathy    Hypoalbuminemia    Platelet function defect (H)     Severe hypomagnesemia  Severe hypocalcemia  Mild hyperphosphatemia  Acute encephalopathy  Generalized muscle weakness  Presented to the ED with magnesium 0.7, calcium 5.6-5.8, and ionized calcium 3.1 and clinical symptoms likely related to the severe electrolyte disturbances including confusion concerning for acute encephalopathy and generalized muscle weakness with risk for rhabdomyolysis.  Suspect electrolyte disturbances are due to recent poor oral nutrient intake, but hypomagnesemia may be exacerbated by chronic PPI treatment.    Renal magnesium wasting is possible although not strongly suspected.  She has not had worsening diarrhea recently and diarrheal symptoms are usually adequately controlled with use of Imodium about 3 days/week.  She is been to GI and she is is having work up for malabsorptive versus inflammatory  process,-infectious etiology ruled out.         She was given IV magnesium and calcium in the ED. She also presented with mild hyperphosphatemia also likely related to hypomagnesemia and hypocalcemia although chronic kidney disease may contribute as well.      Electrolyte hypomagnesemia resolved despite active diuresis.  Phosphorus normal, Serum calcium 8 with hypoalbuminemia, ionized calcium 4.2. CK total normalized    PPI discontinued on admission as it was worrisome for malabsorption as cause for profound electrolyte disturbances,  h2 blocker added back due to acute on chronic anemia     She is alert and appropriate on exam today does not feel as weak.  PT evaluated and recommend home PT at [facility] as she will be moving in there at discharge.        -Continue cardiac monitoring  -1g calcium gluc today  -recheck mag, ca, k, phos in am  -Continue magnesium supplementation using the high replacement protocol with ongoing magnesium monitoring as indicated  -Encourage adequate oral intake (she will be moving to assisted living where she will have meals more readily available)  -Holding statin -ck normalized will resume tomorrow        Possible demand ischemia  CAD  Presented with higher troponin level 134 compared with 107 on April 9 and has known CAD.  She does not have angina or acute ischemic EKG changes.  Higher troponin level on admission compared with previously may be due to severe hypomagnesemia which can cause worsening myocardial ischemia but likely due to demand ischemia as her weight and BNP up significantly from previous as well.  -No recheck of troponin unless her clinical status changes  -aggressive diuresis     Heart failure with preserved EF  Bilateral lower extremity edema  Known chronic heart failure with preserved EF but grade 1 diastolic dysfunction per most recent echocardiogram on April 8.  Presented with weight gain 10 to 15 pounds and bilateral lower extremity edema, but BNP was   decreased compared with April 8.        The previous chronic thiazide diuretic therapy was reportedly discontinued about a month ago and and not been taking an alternate diuretic since then and this may also be contributing to current signs of volume overload.  She did receive 1 dose IV Lasix in the ER.    Higher troponin level on admission compared with previously may be due to severe hypomagnesemia which can cause worsening myocardial ischemia but likely due to demand ischemia as her weight is up significantly and BNP up significantly from previous as well.  --Her weight has been consistently 127 pounds at clinic visits and 143 pounds on presentation.  Her diuretic has been recently discontinued.  She was placed on 80 mg of oral furosemide on admission.  She is hypertensive and anemic to 6.7  on 6/22 as well.  Still 8 pounds up from last clinic visit but liter out since  yesterday.  Plan  -Changed to oral torsemide 40 mg twice daily  -daily weights   -I/O's     Abdominal Discomfort/distention  Chronic diarrhea.  Patient complaining of abdominal distention on 6/21.  She has had a soft BM and active BS.  She has had recent GI work up and EGD is the next step as they are ruling out an inflammatory process.  KUB negative.  Discomfort has since resolved.  Her diarrhea is controlled.     Severe hypertension  Headache  Presented with headache and was severely hypertensive in the ER with blood pressure as high as 208/80.  Severe hypertension was treated in the ER with a combination of oral lisinopril, oral hydrochlorothiazide, oral Toprol-XL, and oral hydralazine as well as a dose of IV Lasix.  Headache resolved as blood pressure improved, so suspect headache was due to severe hypertension.  Patient reported that previous chronic lisinopril hydrochlorothiazide was discontinued and that she has been taking Toprol-XL and hydralazine to treat hypertension.    Plan;  - ACE1 restarted at low dose 2.5 mg daily  -torsemide 40 mg  bid  -Continue Toprol-XL and hydralazine at her chronic doses       Type 2 diabetes with retinopathy  Type 2 diabetes with known mild nonproliferative retinopathy bilaterally recently treated with glipizide and Ozempic.  A1c in January was 8.6.  At presentation today, blood sugar 82 despite not taking glipizide dose this morning and in context of poor oral intake.  A1c 7.4.  Blood sugars 142-196  -Hold chronic dose of glipizide  -Ozempic is usually administered on Sundays -hold  -Monitor blood sugars 4 times daily  -hypoglycemia treatment protocol  -continue sliding scale NovoLog during hospitalization     Stage IIIb CKD  Proteinuria  Known stage IIIb CKD with heavy proteinuria on recent urine testing in April 2024.  Patient had previously been treated with ACE inhibitor, but appears as though it may have been discontinued in the last month for reasons that are not clear.  Renal function slightly elevated from baseline.    -trend renal function  -Monitor I/O  -Avoid nephrotoxic medication as able  -Adjust medication dosing accordingly for changing renal function  -continue low dose ACE1  -she needs follow up with Nephrology however she was unable to get in until November.  Will attempt to get her in sooner from hospital but she may need to go outside this system.     Hypoalbuminemia  Serum albumin low at 3.2 on admission.  This could be due to inadequate oral nutritional protein intake, but heavy proteinuria could also contribute.  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  Admission hemoglobin 8.1, similar to previously.  No recent bleeding noted.  Patient is taking both aspirin and Plavix chronically that causes platelet function defect and increase her risk for bleeding.  As above hemoglobin 8 to 9 range but dropped to 6.7 on 6/22.  She was not tachycardic  nor hypotensive.  She felt better clinically and appears well perfused.  She is total body volume up with a BNP of  21,616.  Diuretic recently discontinued.  She has been hypertensive as well, thus suspect worsening anemia due to volume overload.  Weight at her clinic vists 127 pounds and 143 pounds on admission. Retic and folate normal.  Hgb 8.3 after diuresis on 6/22 and after diuresis.  Stopped prophylactic heparin.  Hgb 7.5 on 6/23.    -continue diurese as above  -hold aspirin but continue Plavix for now but hold plavix if there is increasing concern for bleeding  -recheck hgb in am          Diet: Combination Diet Moderate Consistent Carb (60 g CHO per Meal) Diet; 2 gm NA Diet  Fluid restriction 2000 ML FLUID    DVT Prophylaxis: Pneumatic Compression Devices  Uriarte Catheter: Not present  Lines: None     Cardiac Monitoring: ACTIVE order. Indication: Electrolyte Imbalance (24 hours)- Magnesium <1.3 mg/ml; Potassium < =2.8 or > 5.5 mg/ml  Code Status: No CPR- Do NOT Intubate      Clinically Significant Risk Factors          # Hypocalcemia: Lowest Ca = 7.2 mg/dL in last 2 days, will monitor and replace as appropriate     # Hypoalbuminemia: Lowest albumin = 3.2 g/dL at 6/20/2024 12:40 PM, will monitor as appropriate  # Coagulation Defect: INR = 1.27 (Ref range: 0.85 - 1.15) and/or PTT = N/A, will monitor for bleeding   # Acute Kidney Injury, unspecified: based on a >150% or 0.3 mg/dL increase in last creatinine compared to past 90 day average, will monitor renal function  # Hypertension: Noted on problem list             # DMII: A1C = 7.4 % (Ref range: <5.7 %) within past 6 months, PRESENT ON ADMISSION      # Financial/Environmental Concerns: none         Disposition Plan     Medically Ready for Discharge: Anticipated Tomorrow       The patient's care was discussed with the  entire care team .    Pily Villar CNP  Hospitalist Service  [facility]  Securely message with Vocera (more  info)  Text page via [facility] Paging/Directory   ___________________________________    Interval History   No acute events overnight    Physical Exam   Vital Signs: Temp: 97.9  F (36.6  C) Temp src: Oral BP: (!) 165/73 Pulse: 77   Resp: 18 SpO2: 95 % O2 Device: None (Room air)    Weight: 135 lbs 5.8 oz    Gen:  sitting in chair, no acute distress  HEENT:  normocephalic, atraumatic, oropharynx clear  Resp:  decreased bibasilarly  Card:  S1,S2, RRR no murmur, rub or gallop, 2+ LE edema to knee bilaterally  Abd:  Soft, nondistended, non tender,  normoactive bowel sounds   Neuro:  Neuro exam non focal      Medical Decision Making       45 MINUTES SPENT BY ME on the date of service doing chart review, history, exam, documentation & further activities per the note.        Data     I have personally reviewed the following data over the past 24 hrs:    5.5  \   7.5 (L)   / 179     138 99 41.1 (H) /  142 (H)   4.7 27 2.67 (H) \     ALT: 8 AST: 19 AP: 90 TBILI: 0.2   ALB: 3.3 (L) TOT PROTEIN: 6.4 LIPASE: N/A       Imaging results reviewed over the past 24 hrs:   No results found for this or any previous visit (from the past 24 hour(s)).

## 2024-06-23 NOTE — PLAN OF CARE
Goal Outcome Evaluation:           Overall Patient Progress: improvingOverall Patient Progress: improving    Outcome Evaluation: Pt is A & O x 4, VSS except hypertensive however order parameters not met for prn labetalol. Scheduled PO blood pressures given per MAR. Denies any pain. Lasix changed to PO demadex today, good urine output but patient continues to miss measuring hat in the toilet. BLE remain swollen. Pt ambulating in kim today. Up with SBA with walker/GB. x 1 dose of calcium gluconate given today otherwise IV saline locked. Magnesium WNL and to be rechecked in Am per protocol. Remains NSR on telemetry.

## 2024-06-23 NOTE — PROGRESS NOTES
Care Management Follow Up    Length of Stay (days): 3    Expected Discharge Date: 06/24/2024     Concerns to be Addressed: discharge planning       Patient plan of care discussed at interdisciplinary rounds: Yes    Anticipated Discharge Disposition: Assisted Living, Home Care     Anticipated Discharge Services: Home Care- Accepted by [facility] for home PT     Anticipated Discharge DME: None    Patient/family educated on Medicare website which has current facility and service quality ratings: yes    Education Provided on the Discharge Plan: Yes    Patient/Family in Agreement with the Plan: yes    Referrals Placed by CM/SW: Internal Clinic Care Coordination, Homecare, Post Acute Facilities    Private pay costs discussed: Not applicable    Additional Information:  Writer visited with patient today.  Discussed anticipated discharge tomorrow to her new apartment at [facility].  Patient stated she hadn't heard from her daughter about her things being moved.      Writer called and spoke with daughter, Neyda.  She stated that she would transport patient if discharged tomorrow.  Neyda stated she has to be at patient's current apartment to allow the cleaning lady in.  Discussed that CM will call her tomorrow, but could anticipate either a late morning or early afternoon discharge.      Sabine Ma, [employer]   [phone]

## 2024-06-24 VITALS
HEIGHT: 64 IN | HEART RATE: 76 BPM | WEIGHT: 123.5 LBS | TEMPERATURE: 98.1 F | OXYGEN SATURATION: 97 % | SYSTOLIC BLOOD PRESSURE: 143 MMHG | DIASTOLIC BLOOD PRESSURE: 68 MMHG | BODY MASS INDEX: 21.08 KG/M2 | RESPIRATION RATE: 18 BRPM

## 2024-06-24 LAB
ANION GAP SERPL CALCULATED.3IONS-SCNC: 12 MMOL/L (ref 7–15)
BUN SERPL-MCNC: 47.7 MG/DL (ref 8–23)
CA-I BLD-MCNC: 4.5 MG/DL (ref 4.4–5.2)
CALCIUM SERPL-MCNC: 8 MG/DL (ref 8.8–10.2)
CHLORIDE SERPL-SCNC: 97 MMOL/L (ref 98–107)
CREAT SERPL-MCNC: 2.68 MG/DL (ref 0.51–0.95)
DEPRECATED HCO3 PLAS-SCNC: 28 MMOL/L (ref 22–29)
EGFRCR SERPLBLD CKD-EPI 2021: 17 ML/MIN/1.73M2
ERYTHROCYTE [DISTWIDTH] IN BLOOD BY AUTOMATED COUNT: 16.1 % (ref 10–15)
GLUCOSE BLDC GLUCOMTR-MCNC: 142 MG/DL (ref 70–99)
GLUCOSE BLDC GLUCOMTR-MCNC: 170 MG/DL (ref 70–99)
GLUCOSE BLDC GLUCOMTR-MCNC: 230 MG/DL (ref 70–99)
GLUCOSE SERPL-MCNC: 149 MG/DL (ref 70–99)
HCT VFR BLD AUTO: 23.1 % (ref 35–47)
HGB BLD-MCNC: 7.1 G/DL (ref 11.7–15.7)
HGB BLD-MCNC: 8 G/DL (ref 11.7–15.7)
MAGNESIUM SERPL-MCNC: 1.7 MG/DL (ref 1.7–2.3)
MCH RBC QN AUTO: 24.1 PG (ref 26.5–33)
MCHC RBC AUTO-ENTMCNC: 30.7 G/DL (ref 31.5–36.5)
MCV RBC AUTO: 78 FL (ref 78–100)
PLATELET # BLD AUTO: 151 10E3/UL (ref 150–450)
POTASSIUM SERPL-SCNC: 4.4 MMOL/L (ref 3.4–5.3)
RBC # BLD AUTO: 2.95 10E6/UL (ref 3.8–5.2)
SODIUM SERPL-SCNC: 137 MMOL/L (ref 135–145)
WBC # BLD AUTO: 5.9 10E3/UL (ref 4–11)

## 2024-06-24 PROCEDURE — 82330 ASSAY OF CALCIUM: CPT | Performed by: NURSE PRACTITIONER

## 2024-06-24 PROCEDURE — 250N000013 HC RX MED GY IP 250 OP 250 PS 637: Performed by: HOSPITALIST

## 2024-06-24 PROCEDURE — 80048 BASIC METABOLIC PNL TOTAL CA: CPT | Performed by: NURSE PRACTITIONER

## 2024-06-24 PROCEDURE — 36415 COLL VENOUS BLD VENIPUNCTURE: CPT | Performed by: NURSE PRACTITIONER

## 2024-06-24 PROCEDURE — 83735 ASSAY OF MAGNESIUM: CPT | Performed by: NURSE PRACTITIONER

## 2024-06-24 PROCEDURE — 99239 HOSP IP/OBS DSCHRG MGMT >30: CPT | Performed by: NURSE PRACTITIONER

## 2024-06-24 PROCEDURE — 250N000013 HC RX MED GY IP 250 OP 250 PS 637: Performed by: PEDIATRICS

## 2024-06-24 PROCEDURE — 85027 COMPLETE CBC AUTOMATED: CPT | Performed by: NURSE PRACTITIONER

## 2024-06-24 PROCEDURE — 250N000013 HC RX MED GY IP 250 OP 250 PS 637: Performed by: NURSE PRACTITIONER

## 2024-06-24 PROCEDURE — 250N000011 HC RX IP 250 OP 636: Mod: JZ | Performed by: NURSE PRACTITIONER

## 2024-06-24 PROCEDURE — 85018 HEMOGLOBIN: CPT | Performed by: NURSE PRACTITIONER

## 2024-06-24 RX ORDER — MAGNESIUM SULFATE HEPTAHYDRATE 40 MG/ML
2 INJECTION, SOLUTION INTRAVENOUS ONCE
Status: COMPLETED | OUTPATIENT
Start: 2024-06-24 | End: 2024-06-24

## 2024-06-24 RX ORDER — LISINOPRIL 5 MG/1
5 TABLET ORAL DAILY
Qty: 30 TABLET | Refills: 0 | Status: SHIPPED | OUTPATIENT
Start: 2024-06-24 | End: 2024-07-16

## 2024-06-24 RX ORDER — LISINOPRIL 2.5 MG/1
5 TABLET ORAL DAILY
Status: DISCONTINUED | OUTPATIENT
Start: 2024-06-24 | End: 2024-06-24 | Stop reason: HOSPADM

## 2024-06-24 RX ORDER — LANOLIN ALCOHOL/MO/W.PET/CERES
3 CREAM (GRAM) TOPICAL
Status: DISCONTINUED | OUTPATIENT
Start: 2024-06-24 | End: 2024-06-24 | Stop reason: HOSPADM

## 2024-06-24 RX ORDER — FAMOTIDINE 20 MG/1
20 TABLET, FILM COATED ORAL 2 TIMES DAILY
Qty: 30 TABLET | Refills: 0 | Status: SHIPPED | OUTPATIENT
Start: 2024-06-24 | End: 2024-07-17

## 2024-06-24 RX ADMIN — LISINOPRIL 5 MG: 2.5 TABLET ORAL at 09:26

## 2024-06-24 RX ADMIN — METOPROLOL SUCCINATE 100 MG: 100 TABLET, EXTENDED RELEASE ORAL at 09:26

## 2024-06-24 RX ADMIN — ISOSORBIDE MONONITRATE 30 MG: 30 TABLET, EXTENDED RELEASE ORAL at 09:26

## 2024-06-24 RX ADMIN — CLOPIDOGREL 75 MG: 75 TABLET ORAL at 09:26

## 2024-06-24 RX ADMIN — FAMOTIDINE 20 MG: 10 INJECTION, SOLUTION INTRAVENOUS at 09:25

## 2024-06-24 RX ADMIN — TORSEMIDE 40 MG: 20 TABLET ORAL at 09:25

## 2024-06-24 RX ADMIN — HYDRALAZINE HYDROCHLORIDE 25 MG: 25 TABLET ORAL at 09:26

## 2024-06-24 RX ADMIN — INSULIN ASPART 1 UNITS: 100 INJECTION, SOLUTION INTRAVENOUS; SUBCUTANEOUS at 09:25

## 2024-06-24 RX ADMIN — INSULIN ASPART 2 UNITS: 100 INJECTION, SOLUTION INTRAVENOUS; SUBCUTANEOUS at 11:33

## 2024-06-24 RX ADMIN — Medication 3 MG: at 02:28

## 2024-06-24 ASSESSMENT — ACTIVITIES OF DAILY LIVING (ADL)
ADLS_ACUITY_SCORE: 31

## 2024-06-24 NOTE — PROGRESS NOTES
Care Management Discharge Note    Discharge Date: 06/24/2024       Discharge Disposition: Assisted Living, Home Care    Discharge Services: Home Care    Discharge DME: None    Discharge Transportation: family or friend will provide    Private pay costs discussed: Not applicable    Does the patient's insurance plan have a 3 day qualifying hospital stay waiver?  No    Patient/family educated on Medicare website which has current facility and service quality ratings: yes    Education Provided on the Discharge Plan: Yes    Persons Notified of Discharge Plans: Patient, daughter- Neyda      Patient/Family in Agreement with the Plan: yes    Handoff Referral Completed: Yes    Additional Information:  Writer called and spoke with Quentin #[phone], nurse at [facility].  Quentin stated that he can accept patient late morning or early afternoon.  Awaiting discharge orders.  Writer is faxing Quentin other updated notes from patent's stay.      Writer will call daughterNeyda, once orders are completed and discuss time of transport.      1220- Discharge orders are completed.  Writer visited with patient and her daughter, Neyda.  Discussed discharge time of between 1300 and 1330.  Neyda transporting.  Discussed the home care PT services through [facility].  Patient and daughter had no questions or concerns about the discharge plan for today.    Writer has called and spoken with Quentin at [facility].  He stated that he has received patient's discharge orders and has no questions.  Discussed the discharge of between 1300 and 1330.  Quentin thanked writer for the information.      Sabine Ma YIFAN  [facility]   [phone]

## 2024-06-24 NOTE — PLAN OF CARE
Physical Therapy Discharge Summary    Reason for therapy discharge:    Discharged to [facility] with rehab    Progress towards therapy goal(s). See goals on Care Plan in [facility] electronic health record for goal details.  Goals partially met.  Barriers to achieving goals:   discharge from facility.    Therapy recommendation(s):    Continued therapy is recommended.  Rationale/Recommendations:  to address baseline debility, endurance and safety.      Thank you for your referral.  Maria Del Carmen Simpson, PT, DPT, ATC, [facility]  O: [phone]  E: [email or web address]

## 2024-06-24 NOTE — DISCHARGE SUMMARY
[facility]  Hospitalist Discharge Summary      Date of Admission:  6/20/2024  Date of Discharge:  6/24/2024  Discharging Provider: Pily Villar CNP  Discharge Service: Hospitalist Service    # DMII: A1C = 7.4 % (Ref range: <5.7 %) within past 6 months       Follow-ups Needed After Discharge    Follow up with primary care provider, Byron Holm, within 7   days for hospital follow- up.  The following labs/tests are recommended:   comprehensive metabolic panel, cbc.  Follow up with Nephrology.          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  Upon initial evaluation in the emergency room, she was found to have severe hypomagnesemia and hypocalcemia.       Principal Problem:    Hypomagnesemia and hypocalcemia with acute metabolic encephalopathy  Active Problems:    Weakness    Hypertension goal BP (blood pressure) < 140/90    Coronary artery disease involving native coronary artery of native heart without angina pectoris    Type 2 diabetes mellitus with diabetic polyneuropathy, without long-term current use of insulin (H)    Stage 3b chronic kidney disease (H)    Hyperphosphatemia    Heart failure with preserved ejection fraction, NYHA class I (H)    Chronic anemia    Lower extremity edema   Hypoalbuminemia    Platelet function defect (H)     Severe hypomagnesemia and hypocalcemia with acute metabolic encephalopathy  Mild hyperphosphatemia  Generalized muscle weakness  Presented to the ED with magnesium 0.7, calcium 5.6-5.8, and ionized calcium 3.1 and clinical symptoms likely related to the severe electrolyte disturbances including confusion concerning for acute  metabolic encephalopathy from severe hypomagnesemia and hypocalcemia.  She also had generalized muscle weakness in association with hypomagnesemia and hypocalcemia with risk for rhabdomyolysis.  Suspect electrolyte disturbances are due to recent poor oral nutrient intake, but hypomagnesemia may be exacerbated by chronic PPI treatment.    Renal magnesium wasting thought possible although not strongly suspected.  She had not had worsening diarrhea recently and diarrheal symptoms are usually adequately controlled with use of Imodium about 3 days/week.  She is been to GI and she is is having work up for malabsorptive versus inflammatory process,-infectious etiology ruled out.         She was given IV magnesium and calcium in the ED. She also presented with mild hyperphosphatemia also likely related to hypomagnesemia and hypocalcemia although chronic kidney disease may contribute as well.      Electrolyte hypomagnesemia and hypocalcemia resolved after replacement.  Phosphorus normal.  CK total normalized     PPI discontinued on admission as it was worrisome for malabsorption as cause for profound electrolyte disturbances,  h2 blocker added back due to acute on chronic anemia and she will discharge home on famotidine 20 mg daily.     She is alert and appropriate on exam on day of discharge and does not feel as weak.  PT evaluated and recommend home PT at [facility] as she will be moving in there at discharge.     She is encouraged to increase nutritional intake and will have meals available to her now as she will be moving to assisted living.     Possible demand ischemia  CAD  Presented with higher troponin level 134 compared with 107 on April 9 and has known CAD.  She does not have angina or acute ischemic EKG changes.  Higher troponin level on admission compared with previously may be due to severe hypomagnesemia which can cause worsening myocardial ischemia but likely due to demand ischemia as her weight and BNP  "up significantly from previous as well.  She has not had an anginal symptoms while hospitalized.  She was aggressively diuresed over the course of her hospitalization.     Heart failure with preserved EF  Bilateral lower extremity edema  Known chronic heart failure with preserved EF but grade 1 diastolic dysfunction per most recent echocardiogram on April 8.  Presented with weight gain 10 to 15 pounds and bilateral lower extremity edema, but BNP was  decreased compared with April 8.        The previous chronic thiazide diuretic therapy was reportedly discontinued about a month ago and and not been taking an alternate diuretic since then and this may also be contributing to volume overload.  She did receive 1 dose IV Lasix in the ER.    Higher troponin level on admission compared with previously may be due to severe hypomagnesemia which can cause worsening myocardial ischemia but likely due to demand ischemia as her weight and BNP up significantly.  --Her weight has been consistently 127 pounds at clinic visits and 143 pounds on presentation.  Her diuretic had been recently discontinued.  She was placed on 80 mg of oral furosemide on admission.  She has been hypetensive to greater than 175 systolic.  She was diuresed with IV furosemide over several days and will discharge home on torsemide 40 mg daily.  I have instructed her to weigh herself daily and she will follow up with heart failure services as well as Nephrology.  Weight on presentation was 143 pounds and on day of discharge is 123 pounds although it is unclear the accuracy of this as I/o\"s do not seem to match the weights.         Abdominal Discomfort/distention  Chronic diarrhea.  Patient complained of abdominal distention on 6/21.  She has had a soft BMs and active BS.  She has had recent GI work up and EGD is the next step as they are ruling out an inflammatory process.  KUB negative.  Discomfort has since resolved.  Her diarrhea is controlled.     Severe " hypertension  Headache  Presented with headache and was severely hypertensive in the ER with blood pressure as high as 208/80.  Severe hypertension was treated in the ER with a combination of oral lisinopril, oral hydrochlorothiazide, oral Toprol-XL, and oral hydralazine as well as a dose of IV Lasix.  Headache resolved as blood pressure improved, so suspect headache was due to severe hypertension.  Patient reported that previous chronic lisinopril hydrochlorothiazide was discontinued and that she has been taking Toprol-XL and hydralazine to treat hypertension.    Ace inhibitor resumed at 5 mg daily, she was also placed on torsemide 40 mg daily.  She continues on Toprol XL and hydralazine as well.  Blood pressure improved from admission.  She will need to see Nephrology.  She is unable to get into [facility] until November.  I have made referral to [facility] and they will be calling her.        Type 2 diabetes with retinopathy  Type 2 diabetes with known mild nonproliferative retinopathy bilaterally recently treated with glipizide and Ozempic.  A1c in January was 8.6.  At presentation today, blood sugar 82 despite not taking glipizide dose that ammorning and in context of poor oral intake.  A1c 7.4.  Blood sugars 142-196  Continue home regimen.     Stage IIIb CKD  Proteinuria  Known stage IIIb CKD with heavy proteinuria on recent urine testing in April 2024.  Patient had previously been treated with ACE inhibitor, but appears as though it may have been discontinued in the last month for reasons that are not clear.  Renal function slightly elevated from baseline.    She was placed back on Lisinopril this admission. She will follow with Nephrology as above.  She should have recheck of her renal labs at her follow up appointment.  Creatinine on day of discharge is 2.68 which is slightly higher than baseline of 2.28-2.38.    Hypoalbuminemia  Serum albumin low at 3.2 on admission.  This could be due  to inadequate oral nutritional protein intake, but heavy proteinuria could also contribute.     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  Admission hemoglobin 8.1, similar to previously.  No recent bleeding noted.  Patient is taking both aspirin and Plavix chronically that causes platelet function defect and increase her risk for bleeding.  As above hemoglobin 8 to 9 range but dropped to 6.7 on 6/22.  She was not tachycardic nor hypotensive.  She felt better clinically and appears well perfused.  She is total body volume up with a BNP of  21,616.  Diuretic recently discontinued.  She has been hypertensive as well, thus suspect worsening anemia due to volume overload.  Weight at her clinic vists 127 pounds and 143 pounds on admission although unclear accuracy of this. Retic and folate normal.  Hgb 8.3 after diuresis on 6/22 and after diuresis.    Hemoglobin 8 on day of discharge.   She should follow with GI for inflammatory bowel work up as she has already been in the process of.      Consultations This Hospital Stay   CARE MANAGEMENT / SOCIAL WORK IP CONSULT  PHYSICAL THERAPY ADULT IP CONSULT  OCCUPATIONAL THERAPY ADULT IP CONSULT  CARE MANAGEMENT / SOCIAL WORK IP CONSULT    Code Status   No CPR- Do NOT Intubate    Time Spent on this Encounter   I, Pily Villar CNP, personally saw the patient today and spent greater than 30 minutes discharging this patient.       Pily Villar CNP  [facility] MED[facility] SURGICAL  [address] [place] [address]  Phone: [phone]  ______________________________________________________________________    Physical Exam   Vital Signs: Temp: 98.1  F (36.7  C) Temp src: Oral BP: (!) 143/68 Pulse: 76   Resp: 18 SpO2: 97 % O2 Device: None (Room air)    Weight: 123 lbs 8 oz    Gen:  Lying in bed no acute distress  HEENT:  normocephalic,  atraumatic, oropharynx clear  Resp:  CTA  Card:  S1,S2, RRR no murmur, rub or gallop  Abd:  Soft per RN exam, non tender,  normoactive bowel sounds   Neuro:  Neuro exam non focal         Primary Care Physician   Byron Holm    Discharge Orders      Follow-Up with Cardiology [facility] Heart Failure Discharge      Home Care Referral      Adult Nephrology  Referral      Adult Nephrology  Referral      Primary Care - Care Coordination Referral      Follow-up and recommended labs and tests     Follow up with primary care provider, Byron Holm, within 7 days for hospital follow- up.  The following labs/tests are recommended: comprehensive metabolic panel, cbc.     Activity    Your activity upon discharge: activity as tolerated with assistive device     Reason for your hospital stay    Electrolyte disturbances  Heart failure exacerbation  Acute on chronic kidney failure     Discharge Instructions    Ok to admit to assisted living     No CPR- Do NOT Intubate     Diet    Follow this diet upon discharge: Orders Placed This Encounter      Fluid restriction 2000 ML FLUID      Combination Diet Moderate Consistent Carb (60 g CHO per Meal) Diet; 2 gm NA Diet       Significant Results and Procedures   Most Recent 3 CBC's:  Recent Labs   Lab Test 06/24/24  1118 06/24/24  0520 06/23/24  0540 06/22/24  1150 06/22/24  0526   WBC  --  5.9 5.5  --  5.3   HGB 8.0* 7.1* 7.5*   < > 6.7*   MCV  --  78 79  --  80   PLT  --  151 179  --  174    < > = values in this interval not displayed.     Most Recent 3 BMP's:  Recent Labs   Lab Test 06/24/24  1131 06/24/24  0755 06/24/24  0520 06/23/24  0808 06/23/24  0540 06/22/24  0734 06/22/24  0526   NA  --   --  137  --  138  --  141   POTASSIUM  --   --  4.4  --  4.7  --  4.6   CHLORIDE  --   --  97*  --  99  --  102   CO2  --   --  28  --  27  --  24   BUN  --   --  47.7*  --  41.1*  --  33.1*   CR  --   --  2.68*  --  2.67*  --  2.29*   ANIONGAP  --   --  12   --  12  --  15   ANDREI  --   --  8.0*  --  8.0*  --  7.2*   * 142* 149*   < > 133*   < > 97    < > = values in this interval not displayed.       Discharge Medications   Current Discharge Medication List        START taking these medications    Details   famotidine (PEPCID) 20 MG tablet Take 1 tablet (20 mg) by mouth 2 times daily  Qty: 30 tablet, Refills: 0    Associated Diagnoses: Gastroesophageal reflux disease without esophagitis      lisinopril (ZESTRIL) 5 MG tablet Take 1 tablet (5 mg) by mouth daily  Qty: 30 tablet, Refills: 0    Associated Diagnoses: Acute on chronic congestive heart failure, unspecified heart failure type (H); Hypertension goal BP (blood pressure) < 140/90      torsemide 40 MG TABS Take 40 mg by mouth daily  Qty: 30 tablet, Refills: 0    Associated Diagnoses: Acute on chronic congestive heart failure, unspecified heart failure type (H)           CONTINUE these medications which have NOT CHANGED    Details   aspirin 81 MG EC tablet Take 1 tablet (81 mg) by mouth daily  Qty: 90 tablet, Refills: 3    Associated Diagnoses: Coronary artery disease involving native coronary artery of native heart without angina pectoris      atorvastatin (LIPITOR) 80 MG tablet Take 1 tablet (80 mg) by mouth at bedtime  Qty: 90 tablet, Refills: 3    Associated Diagnoses: Hyperlipidemia LDL goal <100      clopidogrel (PLAVIX) 75 MG tablet Take 1 tablet (75 mg) by mouth daily  Qty: 90 tablet, Refills: 3    Associated Diagnoses: Coronary artery disease involving native coronary artery of native heart without angina pectoris      gabapentin (NEURONTIN) 300 MG capsule TAKE ONE TO TWO CAPSULES BY MOUTH EVERY EVENING  Qty: 180 capsule, Refills: 3    Associated Diagnoses: Type 2 diabetes mellitus with diabetic polyneuropathy, without long-term current use of insulin (H)      glipiZIDE (GLUCOTROL) 10 MG tablet Take 2 tablets (20 mg) by mouth 2 times daily (before meals)  Qty: 360 tablet, Refills: 3    Associated  Diagnoses: Type 2 diabetes mellitus with diabetic polyneuropathy, without long-term current use of insulin (H)      hydrALAZINE (APRESOLINE) 25 MG tablet Take 1 tablet (25 mg) by mouth 3 times daily  Qty: 270 tablet, Refills: 3    Associated Diagnoses: Hypertension goal BP (blood pressure) < 140/90      isosorbide mononitrate (IMDUR) 30 MG 24 hr tablet Take 1 tablet (30 mg) by mouth daily  Qty: 90 tablet, Refills: 3    Associated Diagnoses: Coronary artery disease involving native coronary artery of native heart without angina pectoris      loperamide (IMODIUM A-D) 2 MG tablet Take 1 tablet (2 mg) by mouth daily  Qty: 30 tablet, Refills: 2    Associated Diagnoses: Chronic diarrhea      metoprolol succinate ER (TOPROL XL) 100 MG 24 hr tablet Take 1 tablet (100 mg) by mouth daily  Qty: 90 tablet, Refills: 3    Associated Diagnoses: Hypertension goal BP (blood pressure) < 140/90      semaglutide (OZEMPIC) 2 MG/3ML pen Inject 0.25 mg Subcutaneous every 7 days  Qty: 3 mL, Refills: 3    Associated Diagnoses: Type 2 diabetes mellitus with diabetic polyneuropathy, without long-term current use of insulin (H)           STOP taking these medications       omeprazole (PRILOSEC) 40 MG DR capsule Comments:   Reason for Stopping:             Allergies   Allergies   Allergen Reactions    Sulfa Antibiotics Hives

## 2024-06-24 NOTE — PLAN OF CARE
Goal Outcome Evaluation:      Plan of Care Reviewed With: patient    Overall Patient Progress: no changeOverall Patient Progress: no change         VSS. Afebrile. Pt c/o chronic pain in bilateral feet. PRN tylenol given per MAR which was somewhat effective. Pt also c/o not being able to sleep tonight. Order obtained for PRN melatonin which was given per MAR.  LS clear throughout. Some traced edema noted to bilateral lower extremities. No concerns at this time.

## 2024-06-24 NOTE — PLAN OF CARE
Goal Outcome Evaluation:      Plan of Care Reviewed With: patient 4-hr shift    Overall Patient Progress: no changeOverall Patient Progress: no change    Outcome Evaluation: Patient BS- 229 pre-dinner, 2 unit novolog given. Up to chair SBA. VSS and afebrile. No complaint of pain.

## 2024-06-24 NOTE — DISCHARGE INSTRUCTIONS
[facility] to set up your appointment.  That appointment will be at the [facility].  If your able to be seen at an earlier date please    cancel your appointment set for Nov. 26th in [place].  If you do not hear from [facility] within the week, please give them a call  To set up an appointment.

## 2024-06-24 NOTE — PLAN OF CARE
S-(situation): Patient discharged to [facility] via W/C to front entrance for transport with daughter    B-(background): Hypomagnesemia    A-(assessment): Pt is A & O x 4, VSS, afebrile, on room air. Denies any pain. Up in chair today. BLE remain edematous but improved. Moving well with SBA with walker/GB.     R-(recommendations): Discharge instructions reviewed with patient and daughter. Listed belongings gathered and returned to patient.          Discharge Nursing Criteria:   Patient Education given and documented: (STROKE, CHF, Diabetes):  {Yes   Care Plan and Patient education resolved: Yes    New Medications- pt has been educated about purpose and side effects: Yes    Vaccines  Influenza status verified at discharge:  Not Applicable    MISC  Prescriptions if needed, hard copies sent with patient  NA  Home medications returned to patient: NA  Medication Bin checked and emptied on discharge Yes  Patient reports post-discharge pain management plan is effective: Yes

## 2024-06-25 ENCOUNTER — TELEPHONE (OUTPATIENT)
Dept: NEPHROLOGY | Facility: CLINIC | Age: 84
End: 2024-06-25
Payer: COMMERCIAL

## 2024-06-25 ENCOUNTER — PATIENT OUTREACH (OUTPATIENT)
Dept: CARE COORDINATION | Facility: CLINIC | Age: 84
End: 2024-06-25
Payer: COMMERCIAL

## 2024-06-25 NOTE — TELEPHONE ENCOUNTER
"Returned call and spoke with pt's Daughter \"Neyda\".   Informed there is availability to have her Mother scheduled with a Nephrology Fellow at the [facility] in [place] the end of July.  Or she could contact [facility] for locations and availability.    Neyda stated she would like to schedule with [facility] in [place].  Informed Neyda the Speciality  will be contacting her to schedule.    Soni Brown LPN       "

## 2024-06-25 NOTE — TELEPHONE ENCOUNTER
"Called and spoke with pts Daughter \"Neyda\" (Auth to discuss in chart).  Regarding urgent referral to Nephrology.  Offered 6/25/24 at 1:00pm with Dr. Fernandez at [facility] in [place].    Neyda declined this appt.  Stated they are moving her Mother in to [facility] today.    Pt is currently scheduled with Dr. Gonzalez 11/26/24 at 3:00pm at the [place] location.  Pts Dtg Neyda requesting a sooner appt.  Informed this LPN will review schedules for a sooner appt.  She can expect a return call.  Neyda agreed with plan.    Soni Brown LPN        "

## 2024-06-25 NOTE — PROGRESS NOTES
Clinic Care Coordination Contact  [facility]/Voicemail    Clinical Data: Care Coordinator Outreach    Outreach Documentation Number of Outreach Attempt   6/25/2024   1:23 PM 1       Left message on patient's voicemail with call back information and requested return call.    Plan: Care Coordinator will try to reach patient again in 1-2 business days.    Sheri Francois RN Care Coordination   [facility]  Email: [email or web address]  Phone: [phone]

## 2024-06-25 NOTE — LETTER
[facility] CARE COORDINATION  [address]   [place] [address]    June 26, 2024    Eliza Dubois  [address]   [place] [address]      Dear Eliza,    I am a clinic care coordinator who works with Byron Holm DO with the [facility]. I wanted to introduce myself and provide you with my contact information for you to be able to call me with any questions or concerns. Below is a description of clinic care coordination and how I can further assist you.       The clinic care coordination team is made up of a registered nurse, , financial resource worker and community health worker who understand the health care system. The goal of clinic care coordination is to help you manage your health and improve access to the health care system. Our team works alongside your provider to assist you in determining your health and social needs. We can help you obtain health care and community resources, providing you with necessary information and education. We can work with you through any barriers and develop a care plan that helps coordinate and strengthen the communication between you and your care team.  Our services are voluntary and are offered without charge to you personally.    Please feel free to contact me with any questions or concerns regarding care coordination and what we can offer.      We are focused on providing you with the highest-quality healthcare experience possible.    Sincerely,     Sheri Francois RN Care Coordination   [facility] [place], [place]  Phone: [phone]

## 2024-06-25 NOTE — TELEPHONE ENCOUNTER
[facility] Call Center    Phone Message    May a detailed message be left on voicemail: yes     Reason for Call: Other: Patient being referred for URGENT appointment by referring provider.        Acute Kidney Injury/Decreased eGFR  Chronic Kidney Disease  Hypertension     Patient is already scheduled on 11/26 for an appointment. Writer sending a encounter message for review and follow-up with Pt to discuss if sooner appointment is possible/available.     Thank you!    Action Taken: Other: [facility]    Travel Screening: Not Applicable     Date of Service:

## 2024-06-25 NOTE — TELEPHONE ENCOUNTER
"Returned call and spoke with \"Neyda\".  Confirmed Nephrology Appt.with Dr. MARY JANE Smith. 7/31/24 at 12:45pm and non fasting lab appt at 12:00pm.  Gave clinic address:   [facility]   [address] [place] [address]     Informed Neyda the 11/26/24 appt with Dr. Gonzalez at the [place] site will be canceled since sooner appt has been scheduled.  Neyda agreed and had no further questions.       Soni Brown LPN        "

## 2024-06-26 NOTE — PROGRESS NOTES
Clinic Care Coordination Contact  [facility]/Voicemail    Clinical Data: Care Coordinator Outreach    Outreach Documentation Number of Outreach Attempt   6/25/2024   1:23 PM 1   6/26/2024  10:59 AM 2       Left message on patient's voicemail with call back information and requested return call.    Plan: Care Coordinator will send care coordination introduction letter with care coordinator contact information and explanation of care coordination services via mail. Care Coordinator will do no further outreaches at this time.    Sheri Francois RN Care Coordination   [facility] Jas Pascal  Email: [email or web address]  Phone: [phone]

## 2024-06-27 NOTE — CONFIDENTIAL NOTE
DIAGNOSIS:  Stage 3b chronic kidney disease    DATE RECEIVED:  07.31.2024    NOTES STATUS DETAILS   OFFICE NOTE from referring provider Internal 06.20.2024 Pily Villar CNP    OFFICE NOTE from other specialist  Internal 0.24.2024 Byron Holm DO    DISCHARGE REPORT from the ER Internal 06.20.2024   MEDICATION LIST Internal    LABS     CBC Internal 06.24.2024   CMP Internal 06.24.2024   BMP Internal 06.24.2024   UA Internal 06.20.2024   URINE PROTEIN Internal 06.20.2024

## 2024-07-16 DIAGNOSIS — I50.9 ACUTE ON CHRONIC CONGESTIVE HEART FAILURE, UNSPECIFIED HEART FAILURE TYPE (H): ICD-10-CM

## 2024-07-16 DIAGNOSIS — I10 HYPERTENSION GOAL BP (BLOOD PRESSURE) < 140/90: ICD-10-CM

## 2024-07-17 DIAGNOSIS — K21.9 GASTROESOPHAGEAL REFLUX DISEASE WITHOUT ESOPHAGITIS: ICD-10-CM

## 2024-07-17 DIAGNOSIS — I50.9 ACUTE ON CHRONIC CONGESTIVE HEART FAILURE, UNSPECIFIED HEART FAILURE TYPE (H): ICD-10-CM

## 2024-07-17 RX ORDER — LISINOPRIL 5 MG/1
5 TABLET ORAL DAILY
Qty: 30 TABLET | Refills: 0 | Status: SHIPPED | OUTPATIENT
Start: 2024-07-17 | End: 2024-08-12

## 2024-07-17 RX ORDER — FAMOTIDINE 20 MG/1
20 TABLET, FILM COATED ORAL 2 TIMES DAILY
Qty: 180 TABLET | Refills: 2 | Status: SHIPPED | OUTPATIENT
Start: 2024-07-17

## 2024-07-18 ENCOUNTER — TELEPHONE (OUTPATIENT)
Dept: INTERNAL MEDICINE | Facility: CLINIC | Age: 84
End: 2024-07-18
Payer: COMMERCIAL

## 2024-07-18 DIAGNOSIS — R53.1 WEAKNESS: Primary | ICD-10-CM

## 2024-07-18 NOTE — TELEPHONE ENCOUNTER
I have placed an order for an elevated toilet seat.  Please fax this off to the eagerly awaiting an appropriately trained commode augmentation specialist.    Mihai

## 2024-07-18 NOTE — TELEPHONE ENCOUNTER
Order/Referral Request    Who is requesting: [facility]    Orders being requested: Raised toilet seat (3-4 inch)    Reason service is needed/diagnosis: Patient having trouble standing up from seated position    When are orders needed by: ASAP    Has this been discussed with Provider: No    Does patient have a preference on a Group/Provider/Facility? Fax over to [facility] [phone]    Does patient have an appointment scheduled?: No    Where to send orders: Fax    Okay to leave a detailed message?: No at Other phone number:  [phone]

## 2024-07-19 ENCOUNTER — TELEPHONE (OUTPATIENT)
Dept: NEPHROLOGY | Facility: CLINIC | Age: 84
End: 2024-07-19

## 2024-07-19 NOTE — TELEPHONE ENCOUNTER
PT was contacted by phone and a message left reminding them of their upcoming appointment on July 31, 2024    Carlos Vogel CMA on 7/19/2024 at 1:24 PM

## 2024-07-20 DIAGNOSIS — E11.42 TYPE 2 DIABETES MELLITUS WITH DIABETIC POLYNEUROPATHY, WITHOUT LONG-TERM CURRENT USE OF INSULIN (H): ICD-10-CM

## 2024-07-22 RX ORDER — GABAPENTIN 300 MG/1
CAPSULE ORAL
Qty: 180 CAPSULE | Refills: 3 | Status: SHIPPED | OUTPATIENT
Start: 2024-07-22

## 2024-07-23 ENCOUNTER — DOCUMENTATION ONLY (OUTPATIENT)
Dept: OTHER | Facility: CLINIC | Age: 84
End: 2024-07-23
Payer: COMMERCIAL

## 2024-07-23 ENCOUNTER — TELEPHONE (OUTPATIENT)
Dept: INTERNAL MEDICINE | Facility: CLINIC | Age: 84
End: 2024-07-23
Payer: COMMERCIAL

## 2024-07-23 NOTE — TELEPHONE ENCOUNTER
Reason for Call:  Form, our goal is to have forms completed with 72 hours, however, some forms may require a visit or additional information.    Type of letter, form or note:  Home Health Certification    Who is the form from?: Home care    Where did the form come from: form was faxed in    What clinic location was the form placed at?: [facility]    Where the form was placed: Given to physician    What number is listed as a contact on the form?: [phone]       Additional comments: Knute Nelson    Call taken on 7/23/2024 at 3:06 PM by Mimi Gutiérrez

## 2024-07-25 ENCOUNTER — TELEPHONE (OUTPATIENT)
Dept: NEPHROLOGY | Facility: CLINIC | Age: 84
End: 2024-07-25
Payer: COMMERCIAL

## 2024-07-25 DIAGNOSIS — Z53.9 DIAGNOSIS NOT YET DEFINED: Primary | ICD-10-CM

## 2024-07-25 PROCEDURE — G0180 MD CERTIFICATION HHA PATIENT: HCPCS | Performed by: INTERNAL MEDICINE

## 2024-07-25 NOTE — TELEPHONE ENCOUNTER
no response when phone picked up; sent letter; pt will be seen by Dr. Elaine at scheduled visit; no changes to appt date or time- KB 7.25.24//

## 2024-07-30 ENCOUNTER — TELEPHONE (OUTPATIENT)
Dept: INTERNAL MEDICINE | Facility: CLINIC | Age: 84
End: 2024-07-30
Payer: COMMERCIAL

## 2024-07-30 DIAGNOSIS — K29.50 CHRONIC GASTRITIS WITHOUT BLEEDING, UNSPECIFIED GASTRITIS TYPE: Primary | ICD-10-CM

## 2024-07-30 NOTE — TELEPHONE ENCOUNTER
Patient is wanting to change to Omeprazole, she has better control of her symptoms with this medication than she does with the Famotadine.    Rx is pended.    Mimi Gutiérrez XRO/

## 2024-07-30 NOTE — TELEPHONE ENCOUNTER
Order/Referral Request    Who is requesting: Quentin - Director of Nursing     Orders being requested: Order sent over to PCP two weeks ago needs to be signed and faxed back.    Quentin also states pt was prescribed Famotidine 20 MG tablets taking twice a day; but patient states she has better relief taking Oneprazole 20 MG capsules. Can PCP sent prescript for Oneprazole 20 MG Capsules?       Reason service is needed/diagnosis: Unknown    When are orders needed by: Unk    Has this been discussed with Provider: Yes    Does patient have a preference on a Group/Provider/Facility? Unk    Does patient have an appointment scheduled?: Yes: LAB/SPECIALTY    Where to send orders: Fax [phone]    Okay to leave a detailed message?: Yes at Other phone number:  Quentin [phone]

## 2024-07-31 ENCOUNTER — PRE VISIT (OUTPATIENT)
Dept: NEPHROLOGY | Facility: CLINIC | Age: 84
End: 2024-07-31

## 2024-07-31 RX ORDER — PANTOPRAZOLE SODIUM 40 MG/1
40 TABLET, DELAYED RELEASE ORAL DAILY
Qty: 90 TABLET | Refills: 1 | Status: SHIPPED | OUTPATIENT
Start: 2024-07-31

## 2024-07-31 NOTE — TELEPHONE ENCOUNTER
I have placed a prescription for Protonix.  As the patient is on Plavix, there is a potential for interaction with the omeprazole and this medication substitution should resolve any concerns.    Mihai

## 2024-08-12 DIAGNOSIS — I50.9 ACUTE ON CHRONIC CONGESTIVE HEART FAILURE, UNSPECIFIED HEART FAILURE TYPE (H): ICD-10-CM

## 2024-08-12 DIAGNOSIS — I10 HYPERTENSION GOAL BP (BLOOD PRESSURE) < 140/90: ICD-10-CM

## 2024-08-12 RX ORDER — LISINOPRIL 5 MG/1
5 TABLET ORAL DAILY
Qty: 30 TABLET | Refills: 0 | Status: SHIPPED | OUTPATIENT
Start: 2024-08-12 | End: 2024-08-27

## 2024-08-13 ENCOUNTER — TELEPHONE (OUTPATIENT)
Dept: INTERNAL MEDICINE | Facility: CLINIC | Age: 84
End: 2024-08-13
Payer: COMMERCIAL

## 2024-08-13 DIAGNOSIS — G62.9 NEUROPATHY: Primary | ICD-10-CM

## 2024-08-13 RX ORDER — GABAPENTIN 300 MG/1
300 CAPSULE ORAL AT BEDTIME
Qty: 90 CAPSULE | Refills: 1 | Status: SHIPPED | OUTPATIENT
Start: 2024-08-13

## 2024-08-13 RX ORDER — ACETAMINOPHEN 500 MG
500-1000 TABLET ORAL AT BEDTIME
Qty: 500 TABLET | Refills: 1 | Status: SHIPPED | OUTPATIENT
Start: 2024-08-13 | End: 2024-08-23

## 2024-08-13 NOTE — TELEPHONE ENCOUNTER
New Medication Request        What medication are you requesting?: Tylenol  Gabapentin 300mg decreased to 1 capsule at bedtime instead of two    Reason for medication request: Would like to take at bedtime PRN    Have you taken this medication before?: Yes:     Patient would like to have Tylenol 500-1000mg PRN at bedtime for pain      Patient offered an appointment? No    Preferred Pharmacy:   [id number] - [facility] [address] [address]  Phone: [phone] Fax: [phone]    Orders pended, call GEOVANNY Saavedra at [facility] with medication changes.    CHARLES MontoyaN, RN

## 2024-08-20 ENCOUNTER — DOCUMENTATION ONLY (OUTPATIENT)
Dept: INTERNAL MEDICINE | Facility: CLINIC | Age: 84
End: 2024-08-20
Payer: COMMERCIAL

## 2024-08-20 NOTE — LETTER
"August 20, 2024      Eliza Dubois  [address]   [place] [address]        To Whom It May Concern,     The following are your requested orders on \"letterhead\".    Medications as per medication list.  Low concentrated carbohydrate diet as tolerated.  Patient to reside in assisted living/memory care.          Sincerely,        Byron Holm, DO          "

## 2024-08-23 ENCOUNTER — TELEPHONE (OUTPATIENT)
Dept: INTERNAL MEDICINE | Facility: CLINIC | Age: 84
End: 2024-08-23
Payer: COMMERCIAL

## 2024-08-23 DIAGNOSIS — G62.9 NEUROPATHY: ICD-10-CM

## 2024-08-23 RX ORDER — ACETAMINOPHEN 500 MG
500 TABLET ORAL AT BEDTIME
Qty: 500 TABLET | Refills: 1 | Status: SHIPPED | OUTPATIENT
Start: 2024-08-23

## 2024-08-23 RX ORDER — ACETAMINOPHEN 500 MG
500 TABLET ORAL DAILY PRN
Qty: 500 TABLET | Refills: 1 | Status: SHIPPED | OUTPATIENT
Start: 2024-08-23

## 2024-08-23 RX ORDER — ACETAMINOPHEN 500 MG
500-1000 TABLET ORAL AT BEDTIME
Qty: 500 TABLET | Refills: 1 | Status: CANCELLED | OUTPATIENT
Start: 2024-08-23

## 2024-08-23 NOTE — TELEPHONE ENCOUNTER
GEOVANNY Saavedra manager for [facility]. Patient has  Order for tylenol in Evening. 1-2 tablets. Facility can't have ranges for orders according to policy.  Got to have set value. She is only taking 1 tablet night.     Requesting orders:   Please rewrite order for 1 tablet 500 mg Tylenol @ . Please rewrite order for PRN 1 tablet 500 mg Tylenol as needed.     Please advise and return call to GEOVANNY Saavedra manager    Pj Sykes RN on 8/23/2024 at 1:05 PM

## 2024-08-23 NOTE — TELEPHONE ENCOUNTER
Quentin, RN manager returning call. Needs order faxed to [facility] in [place]. Unable to take verbal order.     T'd up. Provider please review T'd medications and approve as appropriate.     Pj Sykes RN on 8/23/2024 at 4:04 PM

## 2024-08-23 NOTE — TELEPHONE ENCOUNTER
Patient Contact    Attempt # 1    Was call answered?  No.  Left message on voicemail with information to call me back.    Pj Sykes RN on 8/23/2024 at 2:42 PM

## 2024-08-26 DIAGNOSIS — I10 HYPERTENSION GOAL BP (BLOOD PRESSURE) < 140/90: ICD-10-CM

## 2024-08-26 DIAGNOSIS — I50.9 ACUTE ON CHRONIC CONGESTIVE HEART FAILURE, UNSPECIFIED HEART FAILURE TYPE (H): ICD-10-CM

## 2024-08-27 ENCOUNTER — TELEPHONE (OUTPATIENT)
Dept: INTERNAL MEDICINE | Facility: CLINIC | Age: 84
End: 2024-08-27
Payer: COMMERCIAL

## 2024-08-27 RX ORDER — TORSEMIDE 20 MG/1
40 TABLET ORAL DAILY
Qty: 60 TABLET | Refills: 0 | Status: SHIPPED | OUTPATIENT
Start: 2024-08-27

## 2024-08-27 RX ORDER — LISINOPRIL 5 MG/1
5 TABLET ORAL DAILY
Qty: 30 TABLET | Refills: 0 | Status: SHIPPED | OUTPATIENT
Start: 2024-08-27

## 2024-08-27 NOTE — TELEPHONE ENCOUNTER
Call from GEOVANNY Saavedra with [facility].   He is wanting to request a change in patients gabapentin order.     She is currently taking gabapentin 300 mg cap at bedtime.  She is not taking gabapentin any other time throughout the day.     He is requesting order to discontinue the gabapentin order for 2 caps, and only keep the one order for bedtime.     Patient care order pended below for provider to review and sign.  Please fax order to [phone].    Katelynn CHANDLER, RN

## 2024-08-28 NOTE — TELEPHONE ENCOUNTER
It would seem that I made this recommendation on August 13.    Please forward my acceptance of the patient's medication changes to her medication dispensing person .    Mihai

## 2024-10-08 ENCOUNTER — OFFICE VISIT (OUTPATIENT)
Dept: INTERNAL MEDICINE | Facility: CLINIC | Age: 84
End: 2024-10-08
Payer: COMMERCIAL

## 2024-10-08 VITALS
TEMPERATURE: 97.4 F | BODY MASS INDEX: 22.88 KG/M2 | HEIGHT: 63 IN | RESPIRATION RATE: 16 BRPM | WEIGHT: 129.1 LBS | OXYGEN SATURATION: 100 % | HEART RATE: 76 BPM | DIASTOLIC BLOOD PRESSURE: 70 MMHG | SYSTOLIC BLOOD PRESSURE: 160 MMHG

## 2024-10-08 DIAGNOSIS — E83.42 HYPOMAGNESEMIA: ICD-10-CM

## 2024-10-08 DIAGNOSIS — E83.51 HYPOCALCEMIA: ICD-10-CM

## 2024-10-08 DIAGNOSIS — N18.32 STAGE 3B CHRONIC KIDNEY DISEASE (H): ICD-10-CM

## 2024-10-08 DIAGNOSIS — N30.00 ACUTE CYSTITIS WITHOUT HEMATURIA: ICD-10-CM

## 2024-10-08 DIAGNOSIS — I10 HYPERTENSION GOAL BP (BLOOD PRESSURE) < 140/90: ICD-10-CM

## 2024-10-08 DIAGNOSIS — D69.1 PLATELET FUNCTION DEFECT (H): ICD-10-CM

## 2024-10-08 DIAGNOSIS — R53.83 FATIGUE, UNSPECIFIED TYPE: ICD-10-CM

## 2024-10-08 DIAGNOSIS — I25.10 CORONARY ARTERY DISEASE INVOLVING NATIVE CORONARY ARTERY OF NATIVE HEART WITHOUT ANGINA PECTORIS: ICD-10-CM

## 2024-10-08 DIAGNOSIS — E11.42 TYPE 2 DIABETES MELLITUS WITH DIABETIC POLYNEUROPATHY, WITHOUT LONG-TERM CURRENT USE OF INSULIN (H): Primary | ICD-10-CM

## 2024-10-08 LAB
ALBUMIN UR-MCNC: 100 MG/DL
ANION GAP SERPL CALCULATED.3IONS-SCNC: 11 MMOL/L (ref 7–15)
APPEARANCE UR: ABNORMAL
BACTERIA #/AREA URNS HPF: ABNORMAL /HPF
BILIRUB UR QL STRIP: NEGATIVE
BUN SERPL-MCNC: 77.7 MG/DL (ref 8–23)
CALCIUM SERPL-MCNC: 8.9 MG/DL (ref 8.8–10.4)
CHLORIDE SERPL-SCNC: 105 MMOL/L (ref 98–107)
CHOLEST SERPL-MCNC: 115 MG/DL
COLOR UR AUTO: YELLOW
CREAT SERPL-MCNC: 3.85 MG/DL (ref 0.51–0.95)
CREAT UR-MCNC: 70.7 MG/DL
EGFRCR SERPLBLD CKD-EPI 2021: 11 ML/MIN/1.73M2
ERYTHROCYTE [DISTWIDTH] IN BLOOD BY AUTOMATED COUNT: 16.4 % (ref 10–15)
EST. AVERAGE GLUCOSE BLD GHB EST-MCNC: 166 MG/DL
FASTING STATUS PATIENT QL REPORTED: NO
FASTING STATUS PATIENT QL REPORTED: NO
GLUCOSE SERPL-MCNC: 301 MG/DL (ref 70–99)
GLUCOSE UR STRIP-MCNC: 100 MG/DL
HBA1C MFR BLD: 7.4 %
HCO3 SERPL-SCNC: 24 MMOL/L (ref 22–29)
HCT VFR BLD AUTO: 26.5 % (ref 35–47)
HDLC SERPL-MCNC: 47 MG/DL
HGB BLD-MCNC: 8.2 G/DL (ref 11.7–15.7)
HGB UR QL STRIP: NEGATIVE
KETONES UR STRIP-MCNC: NEGATIVE MG/DL
LDLC SERPL CALC-MCNC: 44 MG/DL
LEUKOCYTE ESTERASE UR QL STRIP: ABNORMAL
MAGNESIUM SERPL-MCNC: 1.8 MG/DL (ref 1.7–2.3)
MCH RBC QN AUTO: 27 PG (ref 26.5–33)
MCHC RBC AUTO-ENTMCNC: 30.9 G/DL (ref 31.5–36.5)
MCV RBC AUTO: 87 FL (ref 78–100)
MICROALBUMIN UR-MCNC: 613 MG/L
MICROALBUMIN/CREAT UR: 867.04 MG/G CR (ref 0–25)
NITRATE UR QL: NEGATIVE
NONHDLC SERPL-MCNC: 68 MG/DL
PH UR STRIP: 6 [PH] (ref 5–7)
PLATELET # BLD AUTO: 158 10E3/UL (ref 150–450)
POTASSIUM SERPL-SCNC: 5.7 MMOL/L (ref 3.4–5.3)
RBC # BLD AUTO: 3.04 10E6/UL (ref 3.8–5.2)
RBC URINE: 0 /HPF
SODIUM SERPL-SCNC: 140 MMOL/L (ref 135–145)
SP GR UR STRIP: 1.01 (ref 1–1.03)
SQUAMOUS EPITHELIAL: 3 /HPF
TRIGL SERPL-MCNC: 119 MG/DL
UROBILINOGEN UR STRIP-MCNC: NORMAL MG/DL
WBC # BLD AUTO: 7.7 10E3/UL (ref 4–11)
WBC URINE: 6 /HPF

## 2024-10-08 PROCEDURE — 83036 HEMOGLOBIN GLYCOSYLATED A1C: CPT | Performed by: INTERNAL MEDICINE

## 2024-10-08 PROCEDURE — 82570 ASSAY OF URINE CREATININE: CPT | Performed by: INTERNAL MEDICINE

## 2024-10-08 PROCEDURE — 36415 COLL VENOUS BLD VENIPUNCTURE: CPT | Performed by: INTERNAL MEDICINE

## 2024-10-08 PROCEDURE — 80061 LIPID PANEL: CPT | Performed by: INTERNAL MEDICINE

## 2024-10-08 PROCEDURE — 80048 BASIC METABOLIC PNL TOTAL CA: CPT | Performed by: INTERNAL MEDICINE

## 2024-10-08 PROCEDURE — G0008 ADMIN INFLUENZA VIRUS VAC: HCPCS | Performed by: INTERNAL MEDICINE

## 2024-10-08 PROCEDURE — 82043 UR ALBUMIN QUANTITATIVE: CPT | Performed by: INTERNAL MEDICINE

## 2024-10-08 PROCEDURE — 90662 IIV NO PRSV INCREASED AG IM: CPT | Performed by: INTERNAL MEDICINE

## 2024-10-08 PROCEDURE — 99214 OFFICE O/P EST MOD 30 MIN: CPT | Mod: 25 | Performed by: INTERNAL MEDICINE

## 2024-10-08 PROCEDURE — 83735 ASSAY OF MAGNESIUM: CPT | Performed by: INTERNAL MEDICINE

## 2024-10-08 PROCEDURE — 85027 COMPLETE CBC AUTOMATED: CPT | Performed by: INTERNAL MEDICINE

## 2024-10-08 PROCEDURE — 81001 URINALYSIS AUTO W/SCOPE: CPT | Performed by: INTERNAL MEDICINE

## 2024-10-08 ASSESSMENT — PAIN SCALES - GENERAL: PAINLEVEL: NO PAIN (0)

## 2024-10-08 ASSESSMENT — PATIENT HEALTH QUESTIONNAIRE - PHQ9
SUM OF ALL RESPONSES TO PHQ QUESTIONS 1-9: 4
10. IF YOU CHECKED OFF ANY PROBLEMS, HOW DIFFICULT HAVE THESE PROBLEMS MADE IT FOR YOU TO DO YOUR WORK, TAKE CARE OF THINGS AT HOME, OR GET ALONG WITH OTHER PEOPLE: NOT DIFFICULT AT ALL
SUM OF ALL RESPONSES TO PHQ QUESTIONS 1-9: 4

## 2024-10-08 NOTE — PROGRESS NOTES
Daisy Kay is a 84 year old, presenting for the following health issues:  Diabetes      10/8/2024     1:27 PM   Additional Questions   Roomed by Serenity COTTON         10/8/2024     1:27 PM   Patient Reported Additional Medications   Patient reports taking the following new medications omeprazole     History of Present Illness       CKD: She is not using over the counter pain medicine.     Diabetes:   She presents for follow up of diabetes.    She is not checking blood glucose.        She is concerned about frequent infections.   She is having numbness in feet and burning in feet.            She eats 2-3 servings of fruits and vegetables daily.She consumes 1 sweetened beverage(s) daily.She exercises with enough effort to increase her heart rate 20 to 29 minutes per day.  She exercises with enough effort to increase her heart rate 3 or less days per week.   She is taking medications regularly.           EMR reviewed including:             Complaint, History of Chief Complaint, Corresponding Review of Systems, and Complaint Specific Physical Examination.    #1   Follow-up on type 2 diabetes  Patient currently taking glipizide twice daily.  Most recent hemoglobin A1c in June was 7.4.  Denies polyuria or polydipsia.  Watching her diet closely.          Exam:   LUNGS: clear bilaterally, airflow is brisk, no intercostal retraction or stridor is noted. No coughing is noted during visit.   HEART:  regular without rubs, clicks, gallops, or murmurs. PMI is nondisplaced. Upstrokes are brisk. S1,S2 are heard.   GI: Abdomen is soft, without rebound, guarding or tenderness. Bowel sounds are appropriate. No renal bruits are heard.   NEURO: Pt is alert and appropriate. No neurologic lateralization is noted. Cranial nerves 2-12 are intact. Peripheral sensory and motor function are grossly normal.       #2   Follow-up on coronary artery disease.  History of previous stenting.  History of prior myocardial  "infarction.  Recent echocardiogram demonstrates ejection fraction of 50 to 55%.  No valve disease noted.  Denies any chest pain, syncope, near syncope, or edema.          Exam:  As above      #3   History of recent hypocalcemia hypomagnesemia.  Hospital notes appreciated.  Patient notes some ongoing fatigue but notes that she does not have similar symptoms as to prior to her hospitalization.  Continues diuretic usage with Demadex.  Denies muscle cramping.          Exam:  As above   MS: Minimal crepitance is noted in the extremities. No deformity is present. Muscle strength is appropriate and equal bilaterally. No acute joint erythema or swelling is present.        Patient has been interviewed, applicable history and applied review of systems have been performed.    Vital Signs:   BP (!) 160/70 (BP Location: Right arm, Patient Position: Chair)   Pulse 76   Temp 97.4  F (36.3  C) (Temporal)   Resp 16   Ht 1.6 m (5' 3\")   Wt 58.6 kg (129 lb 1.6 oz)   SpO2 100%   BMI 22.87 kg/m        Decision Making    Problem and Complexity     1. Type 2 diabetes mellitus with diabetic polyneuropathy, without long-term current use of insulin (H)  Check hemoglobin A1c.  Continue current medication.  Consider the addition of metformin or even a SGLT2 antagonist.  - Hemoglobin A1c; Future  - Hemoglobin A1c    2. Stage 3b chronic kidney disease (H)  Currently stable without progression.  - Albumin Random Urine Quantitative with Creat Ratio; Future  - Albumin Random Urine Quantitative with Creat Ratio    3. Coronary artery disease involving native coronary artery of native heart without angina pectoris  Stable.  Continue statin therapy, beta-blocker, blood sugar and blood pressure control.  Continue aspirin  - Lipid panel reflex to direct LDL Non-fasting; Future  - Lipid panel reflex to direct LDL Non-fasting    4. Platelet function defect (H)  Follow CBC and platelet count  - CBC with platelets; Future  - CBC with platelets    5. " Fatigue, unspecified type  Rule out urinary tract infection  - UA Macroscopic with reflex to Microscopic and Culture - Lab Collect; Future    6. Hypertension goal BP (blood pressure) < 140/90  Controlled on current medication.  Follow electrolytes  - Basic metabolic panel  (Ca, Cl, CO2, Creat, Gluc, K, Na, BUN); Future  - Basic metabolic panel  (Ca, Cl, CO2, Creat, Gluc, K, Na, BUN)    7. Hypocalcemia  Check calcium levels.  - Ionized Calcium; Future    8. Hypomagnesemia  Check magnesium level.  Consider supplementation if necessary  - Magnesium; Future  - Magnesium    9. Acute cystitis without hematuria  Check urinary analysis for possible infection as patient does have intermittent symptoms  - UA Macroscopic with reflex to Microscopic and Culture - Lab Collect                                FOLLOW UP   I have asked the patient to make an appointment for followup with me in 1 week.  This will help maintain her continued longitudinal care.    Regarding routine vaccinations:  I have reviewed the patient's vaccination schedule and discussed the benefits of prophylactic vaccination in detail.  I recommend the patient contact their pharmacist for vaccinations.  Discussed that most insurance companies now favor reimbursement to the pharmacies and it will financially behoove the patient to have vaccinations performed at their pharmacy.        I have carefully explained the diagnosis and treatment options to the patient.  The patient has displayed an understanding of the above, and all subsequent questions were answered.      DO GEMA Campos    Portions of this note were produced using [employer]  Although every attempt at real-time proof reading has been made, occasional grammar/syntax errors may have been missed.

## 2024-10-08 NOTE — LETTER
October 18, 2024      Eliza Dubois  [address]    [place] [address]        Dear ,    We are writing to inform you of your test results.    Microalbumin is elevated suggesting protein loss through the kidneys.  It is however improved over previous values earlier in the year.   Urinary analysis shows no outstanding irregularities.   The cholesterol is within acceptable limits.   Chemistry panel shows a markedly elevated blood sugar of 301 and extremely low kidney function with a GFR of 11.   The glycosylated hemoglobin is within acceptable limits suggesting adequate blood sugar control.   Please follow-up as scheduled with your nephrologist.     Resulted Orders   UA Macroscopic with reflex to Microscopic and Culture - Lab Collect   Result Value Ref Range    Color Urine Yellow Colorless, Straw, Light Yellow, Yellow    Appearance Urine Slightly Cloudy (A) Clear    Glucose Urine 100 (A) Negative mg/dL    Bilirubin Urine Negative Negative    Ketones Urine Negative Negative mg/dL    Specific Gravity Urine 1.015 1.003 - 1.035    Blood Urine Negative Negative    pH Urine 6.0 5.0 - 7.0    Protein Albumin Urine 100 (A) Negative mg/dL    Urobilinogen Urine Normal Normal, 2.0 mg/dL    Nitrite Urine Negative Negative    Leukocyte Esterase Urine Trace (A) Negative    Bacteria Urine Many (A) None Seen /HPF    RBC Urine 0 <=2 /HPF    WBC Urine 6 (H) <=5 /HPF    Squamous Epithelials Urine 3 (H) <=1 /HPF    Narrative    Urine Culture not indicated   Albumin Random Urine Quantitative with Creat Ratio   Result Value Ref Range    Creatinine Urine mg/dL 70.7 mg/dL      Comment:      The reference ranges have not been established in urine creatinine. The results should be integrated into the clinical context for interpretation.    Albumin Urine mg/L 613.0 mg/L      Comment:      The reference ranges have not been established in urine albumin. The results should be integrated into the clinical context for  interpretation.    Albumin Urine mg/g Cr 867.04 (H) 0.00 - 25.00 mg/g Cr      Comment:      Microalbuminuria is defined as an albumin:creatinine ratio of 17 to 299 for males and 25 to 299 for females. A ratio of albumin:creatinine of 300 or higher is indicative of overt proteinuria.  Due to biologic variability, positive results should be confirmed by a second, first-morning random or 24-hour timed urine specimen. If there is discrepancy, a third specimen is recommended. When 2 out of 3 results are in the microalbuminuria range, this is evidence for incipient nephropathy and warrants increased efforts at glucose control, blood pressure control, and institution of therapy with an angiotensin-converting-enzyme (ACE) inhibitor (if the patient can tolerate it).     Lipid panel reflex to direct LDL Non-fasting   Result Value Ref Range    Cholesterol 115 <200 mg/dL    Triglycerides 119 <150 mg/dL    Direct Measure HDL 47 (L) >=50 mg/dL    LDL Cholesterol Calculated 44 <100 mg/dL    Non HDL Cholesterol 68 <130 mg/dL    Patient Fasting > 8hrs? No     Narrative    Cholesterol  Desirable: < 200 mg/dL  Borderline High: 200 - 239 mg/dL  High: >= 240 mg/dL    Triglycerides  Normal: < 150 mg/dL  Borderline High: 150 - 199 mg/dL  High: 200-499 mg/dL  Very High: >= 500 mg/dL    Direct Measure HDL  Female: >= 50 mg/dL   Male: >= 40 mg/dL    LDL Cholesterol  Desirable: < 100 mg/dL  Above Desirable: 100 - 129 mg/dL   Borderline High: 130 - 159 mg/dL   High:  160 - 189 mg/dL   Very High: >= 190 mg/dL    Non HDL Cholesterol  Desirable: < 130 mg/dL  Above Desirable: 130 - 159 mg/dL  Borderline High: 160 - 189 mg/dL  High: 190 - 219 mg/dL  Very High: >= 220 mg/dL   Hemoglobin A1c   Result Value Ref Range    Estimated Average Glucose 166 (H) <117 mg/dL    Hemoglobin A1C 7.4 (H) <5.7 %      Comment:      Normal <5.7%   Prediabetes 5.7-6.4%    Diabetes 6.5% or higher     Note: Adopted from ADA consensus guidelines.   Basic metabolic panel   (Ca, Cl, CO2, Creat, Gluc, K, Na, BUN)   Result Value Ref Range    Sodium 140 135 - 145 mmol/L    Potassium 5.7 (H) 3.4 - 5.3 mmol/L    Chloride 105 98 - 107 mmol/L    Carbon Dioxide (CO2) 24 22 - 29 mmol/L    Anion Gap 11 7 - 15 mmol/L    Urea Nitrogen 77.7 (H) 8.0 - 23.0 mg/dL    Creatinine 3.85 (H) 0.51 - 0.95 mg/dL    GFR Estimate 11 (L) >60 mL/min/1.73m2      Comment:      eGFR calculated using 2021 CKD-EPI equation.    Calcium 8.9 8.8 - 10.4 mg/dL      Comment:      Reference intervals for this test were updated on 7/16/2024 to reflect our healthy population more accurately. There may be differences in the flagging of prior results with similar values performed with this method. Those prior results can be interpreted in the context of the updated reference intervals.    Glucose 301 (H) 70 - 99 mg/dL    Patient Fasting > 8hrs? No    CBC with platelets   Result Value Ref Range    WBC Count 7.7 4.0 - 11.0 10e3/uL    RBC Count 3.04 (L) 3.80 - 5.20 10e6/uL    Hemoglobin 8.2 (L) 11.7 - 15.7 g/dL    Hematocrit 26.5 (L) 35.0 - 47.0 %    MCV 87 78 - 100 fL    MCH 27.0 26.5 - 33.0 pg    MCHC 30.9 (L) 31.5 - 36.5 g/dL    RDW 16.4 (H) 10.0 - 15.0 %    Platelet Count 158 150 - 450 10e3/uL   Magnesium   Result Value Ref Range    Magnesium 1.8 1.7 - 2.3 mg/dL       If you have any questions or concerns, please call the clinic at the number listed above.       Sincerely,      Byron Holm,

## 2024-10-18 NOTE — RESULT ENCOUNTER NOTE
Dear Eliza, your recent test results are attached.    Microalbumin is elevated suggesting protein loss through the kidneys.  It is however improved over previous values earlier in the year.  Urinary analysis shows no outstanding irregularities.  The cholesterol is within acceptable limits.  Chemistry panel shows a markedly elevated blood sugar of 301 and extremely low kidney function with a GFR of 11.  The glycosylated hemoglobin is within acceptable limits suggesting adequate blood sugar control.  Please follow-up as scheduled with your nephrologist.    You will be contacted with any outstanding results as they are available.  Feel free to contact me via the office or My Chart if you have any questions regarding the above.

## 2024-10-26 DIAGNOSIS — I50.9 ACUTE ON CHRONIC CONGESTIVE HEART FAILURE, UNSPECIFIED HEART FAILURE TYPE (H): ICD-10-CM

## 2024-10-26 DIAGNOSIS — I10 HYPERTENSION GOAL BP (BLOOD PRESSURE) < 140/90: ICD-10-CM

## 2024-10-29 RX ORDER — LISINOPRIL 5 MG/1
5 TABLET ORAL DAILY
Qty: 30 TABLET | Refills: 0 | Status: SHIPPED | OUTPATIENT
Start: 2024-10-29

## 2024-11-20 ENCOUNTER — TRANSFERRED RECORDS (OUTPATIENT)
Dept: HEALTH INFORMATION MANAGEMENT | Facility: CLINIC | Age: 84
End: 2024-11-20
Payer: COMMERCIAL

## 2024-11-20 LAB — RETINOPATHY: POSITIVE

## 2024-11-21 ENCOUNTER — LAB (OUTPATIENT)
Dept: LAB | Facility: CLINIC | Age: 84
End: 2024-11-21
Payer: COMMERCIAL

## 2024-11-21 DIAGNOSIS — E83.51 HYPOCALCEMIA: ICD-10-CM

## 2024-11-21 LAB — CA-I BLD-MCNC: 4.7 MG/DL (ref 4.4–5.2)

## 2024-11-24 DIAGNOSIS — I50.9 ACUTE ON CHRONIC CONGESTIVE HEART FAILURE, UNSPECIFIED HEART FAILURE TYPE (H): ICD-10-CM

## 2024-11-24 DIAGNOSIS — I10 HYPERTENSION GOAL BP (BLOOD PRESSURE) < 140/90: ICD-10-CM

## 2024-11-25 RX ORDER — LISINOPRIL 5 MG/1
5 TABLET ORAL DAILY
Qty: 30 TABLET | Refills: 0 | Status: SHIPPED | OUTPATIENT
Start: 2024-11-25

## 2024-12-23 DIAGNOSIS — K29.50 CHRONIC GASTRITIS WITHOUT BLEEDING, UNSPECIFIED GASTRITIS TYPE: Primary | ICD-10-CM

## 2024-12-31 RX ORDER — PANTOPRAZOLE SODIUM 40 MG/1
40 TABLET, DELAYED RELEASE ORAL DAILY
Qty: 131 TABLET | Refills: 0 | Status: SHIPPED | OUTPATIENT
Start: 2024-12-31

## 2024-12-31 NOTE — TELEPHONE ENCOUNTER
Clinic RN: Please investigate patient's chart or contact patient if the information cannot be found because the medication is listed as historical or discontinued. Confirm patient is taking this medication. Document findings and route refill encounter to provider for approval or denial.    Olamide Kennedy, CHARLESN, RN      
Patient Contact    Attempt # 1    Was call answered?  No.  Left message on voicemail with information to call me back.    Upon callback, please inquire if patient is still taking protonix and if so verify dosage and how often. Verify pharmacy.    Leslye Mxion RN on 12/27/2024 at 12:19 PM      
Patient just had refill of omeprazole 12/06. Please clarify which medication patient should be taking.     Patient Contact    Attempt # 2    Was call answered?  No.  Left message on voicemail with information to call me back.    Pj Sykes RN on 12/30/2024 at 3:36 PM      
Pharmacy sent another request for patient's Pantoprazole, please advise.    Sivan Elmore CMA   
Two attempts have been made to contact patient regarding prescription. Routing to PCP to approve or deny.  
No

## 2025-01-21 DIAGNOSIS — I10 ESSENTIAL HYPERTENSION WITH GOAL BLOOD PRESSURE LESS THAN 140/90: ICD-10-CM

## 2025-01-21 DIAGNOSIS — N18.32 STAGE 3B CHRONIC KIDNEY DISEASE (H): Primary | ICD-10-CM

## 2025-01-28 ENCOUNTER — PATIENT OUTREACH (OUTPATIENT)
Dept: NEPHROLOGY | Facility: CLINIC | Age: 85
End: 2025-01-28

## 2025-01-28 ENCOUNTER — OFFICE VISIT (OUTPATIENT)
Dept: NEPHROLOGY | Facility: CLINIC | Age: 85
End: 2025-01-28
Payer: COMMERCIAL

## 2025-01-28 ENCOUNTER — PATIENT OUTREACH (OUTPATIENT)
Dept: CARE COORDINATION | Facility: CLINIC | Age: 85
End: 2025-01-28

## 2025-01-28 ENCOUNTER — LAB (OUTPATIENT)
Dept: LAB | Facility: CLINIC | Age: 85
End: 2025-01-28
Payer: COMMERCIAL

## 2025-01-28 VITALS
OXYGEN SATURATION: 97 % | DIASTOLIC BLOOD PRESSURE: 80 MMHG | BODY MASS INDEX: 23.93 KG/M2 | SYSTOLIC BLOOD PRESSURE: 193 MMHG | HEART RATE: 78 BPM | WEIGHT: 135.1 LBS

## 2025-01-28 DIAGNOSIS — E55.9 VITAMIN D DEFICIENCY: Primary | ICD-10-CM

## 2025-01-28 DIAGNOSIS — N18.5 ANEMIA IN STAGE 5 CHRONIC KIDNEY DISEASE, NOT ON CHRONIC DIALYSIS (H): ICD-10-CM

## 2025-01-28 DIAGNOSIS — I10 PRIMARY HYPERTENSION: Primary | ICD-10-CM

## 2025-01-28 DIAGNOSIS — I10 ESSENTIAL HYPERTENSION WITH GOAL BLOOD PRESSURE LESS THAN 140/90: ICD-10-CM

## 2025-01-28 DIAGNOSIS — D63.1 ANEMIA IN STAGE 5 CHRONIC KIDNEY DISEASE, NOT ON CHRONIC DIALYSIS (H): ICD-10-CM

## 2025-01-28 DIAGNOSIS — N18.32 STAGE 3B CHRONIC KIDNEY DISEASE (H): ICD-10-CM

## 2025-01-28 DIAGNOSIS — N18.5 CKD (CHRONIC KIDNEY DISEASE) STAGE 5, GFR LESS THAN 15 ML/MIN (H): ICD-10-CM

## 2025-01-28 LAB
ALBUMIN MFR UR ELPH: >600 MG/DL
ALBUMIN SERPL BCG-MCNC: 3.8 G/DL (ref 3.5–5.2)
ALBUMIN UR-MCNC: 600 MG/DL
ANION GAP SERPL CALCULATED.3IONS-SCNC: 15 MMOL/L (ref 7–15)
APPEARANCE UR: CLEAR
BILIRUB UR QL STRIP: NEGATIVE
BUN SERPL-MCNC: 55.6 MG/DL (ref 8–23)
CALCIUM SERPL-MCNC: 8.5 MG/DL (ref 8.8–10.4)
CHLORIDE SERPL-SCNC: 111 MMOL/L (ref 98–107)
COLOR UR AUTO: ABNORMAL
CREAT SERPL-MCNC: 3.9 MG/DL (ref 0.51–0.95)
CREAT UR-MCNC: 67.3 MG/DL
EGFRCR SERPLBLD CKD-EPI 2021: 11 ML/MIN/1.73M2
ERYTHROCYTE [DISTWIDTH] IN BLOOD BY AUTOMATED COUNT: 15.9 % (ref 10–15)
FERRITIN SERPL-MCNC: 18 NG/ML (ref 11–328)
GLUCOSE SERPL-MCNC: 228 MG/DL (ref 70–99)
GLUCOSE UR STRIP-MCNC: 200 MG/DL
HCO3 SERPL-SCNC: 20 MMOL/L (ref 22–29)
HCT VFR BLD AUTO: 27.1 % (ref 35–47)
HGB BLD-MCNC: 8.3 G/DL (ref 11.7–15.7)
HGB UR QL STRIP: ABNORMAL
IRON BINDING CAPACITY (ROCHE): 311 UG/DL (ref 240–430)
IRON SATN MFR SERPL: 15 % (ref 15–46)
IRON SERPL-MCNC: 48 UG/DL (ref 37–145)
KETONES UR STRIP-MCNC: NEGATIVE MG/DL
LEUKOCYTE ESTERASE UR QL STRIP: ABNORMAL
MCH RBC QN AUTO: 26.4 PG (ref 26.5–33)
MCHC RBC AUTO-ENTMCNC: 30.6 G/DL (ref 31.5–36.5)
MCV RBC AUTO: 86 FL (ref 78–100)
NITRATE UR QL: NEGATIVE
PH UR STRIP: 6.5 [PH] (ref 5–7)
PHOSPHATE SERPL-MCNC: 5.2 MG/DL (ref 2.5–4.5)
PLATELET # BLD AUTO: 184 10E3/UL (ref 150–450)
POTASSIUM SERPL-SCNC: 5.8 MMOL/L (ref 3.4–5.3)
PROT/CREAT 24H UR: NORMAL MG/G{CREAT}
PTH-INTACT SERPL-MCNC: 574 PG/ML (ref 15–65)
RBC # BLD AUTO: 3.14 10E6/UL (ref 3.8–5.2)
RBC #/AREA URNS AUTO: ABNORMAL /HPF
SKIP: ABNORMAL
SODIUM SERPL-SCNC: 146 MMOL/L (ref 135–145)
SP GR UR STRIP: 1.02 (ref 1–1.03)
UROBILINOGEN UR STRIP-MCNC: NORMAL MG/DL
VIT D+METAB SERPL-MCNC: <6 NG/ML (ref 20–50)
WBC # BLD AUTO: 9.7 10E3/UL (ref 4–11)
WBC #/AREA URNS AUTO: ABNORMAL /HPF
WBC CLUMPS #/AREA URNS HPF: PRESENT /HPF

## 2025-01-28 PROCEDURE — 82728 ASSAY OF FERRITIN: CPT

## 2025-01-28 PROCEDURE — 83970 ASSAY OF PARATHORMONE: CPT

## 2025-01-28 PROCEDURE — 84156 ASSAY OF PROTEIN URINE: CPT

## 2025-01-28 PROCEDURE — 36415 COLL VENOUS BLD VENIPUNCTURE: CPT

## 2025-01-28 PROCEDURE — 81001 URINALYSIS AUTO W/SCOPE: CPT

## 2025-01-28 PROCEDURE — 85027 COMPLETE CBC AUTOMATED: CPT

## 2025-01-28 PROCEDURE — 83540 ASSAY OF IRON: CPT

## 2025-01-28 PROCEDURE — 83550 IRON BINDING TEST: CPT

## 2025-01-28 PROCEDURE — 99205 OFFICE O/P NEW HI 60 MIN: CPT | Performed by: INTERNAL MEDICINE

## 2025-01-28 PROCEDURE — G2211 COMPLEX E/M VISIT ADD ON: HCPCS | Performed by: INTERNAL MEDICINE

## 2025-01-28 PROCEDURE — 82306 VITAMIN D 25 HYDROXY: CPT

## 2025-01-28 PROCEDURE — 80069 RENAL FUNCTION PANEL: CPT

## 2025-01-28 RX ORDER — AMLODIPINE BESYLATE 10 MG/1
10 TABLET ORAL DAILY
Qty: 90 TABLET | Refills: 3 | Status: SHIPPED | OUTPATIENT
Start: 2025-01-28

## 2025-01-28 NOTE — NURSING NOTE
Eliza Dubois's goals for this visit include:   Chief Complaint   Patient presents with    Consult     Referred by Dr. Holm  Persistent proteinuria       She requests these members of her care team be copied on today's visit information: no     PCP: Byron Holm    Referring Provider:  Referred Self, MD  No address on file    BP (!) 193/80 (BP Location: Left arm, Patient Position: Chair, Cuff Size: Adult Regular)   Pulse 78   Wt 61.3 kg (135 lb 1.6 oz)   SpO2 97%   BMI 23.93 kg/m      Do you need any medication refills at today's visit? No     RAFIQ Block   Neph/Pulm Teams  [facility]

## 2025-01-28 NOTE — PROGRESS NOTES
Anemia Management Note - Enrollment    SUBJECTIVE/OBJECTIVE:    Referred by Dr. Ольга Gonzalez on 2025  Primary Diagnosis: Anemia in Chronic Kidney Disease (N18.5, D63.1)     Secondary Diagnosis: Chronic Kidney Disease, Stage 5 (N18.5)    Hgb goal range: 10-11  Epo/Darbo:   TBD- Needs IV iron first, based on low iron levels. Also needs controlled HTN  Iron regimen: TBD-needs IV iron    Labs : [id number]  RX/TX plans : TBD    Recent MITZI use, transfusion, IV iron: none  No history of stroke, MI, and blood clots or cancers. CAD, heart failure, HTN    Contact: OK to speak with Daughter Lucy and Son Terrell regarding scheduling, billing and medical information per consent to communicate dated 215        Latest Ref Rng & Units 2024 2024 2024 2024 2024 10/8/2024 2025   Anemia   Hemoglobin 11.7 - 15.7 g/dL 8.1  7.2  8.3     6.7  7.5  8.0     7.1  8.2  8.3    TSAT 15 - 46 %       15    Ferritin 11 - 328 ng/mL       18        Multiple values from one day are sorted in reverse-chronological order     BP Readings from Last 3 Encounters:   25 (!) 193/80   10/08/24 (!) 160/70   24 (!) 143/68     Wt Readings from Last 2 Encounters:   25 61.3 kg (135 lb 1.6 oz)   10/08/24 58.6 kg (129 lb 1.6 oz)     Current Outpatient Medications   Medication Sig Dispense Refill    acetaminophen (TYLENOL) 500 MG tablet Take 1 tablet (500 mg) by mouth at bedtime. 500 tablet 1    acetaminophen (TYLENOL) 500 MG tablet Take 1 tablet (500 mg) by mouth daily as needed for mild pain. 500 tablet 1    amLODIPine (NORVASC) 10 MG tablet Take 1 tablet (10 mg) by mouth daily. 90 tablet 3    aspirin 81 MG EC tablet Take 1 tablet (81 mg) by mouth daily 90 tablet 3    atorvastatin (LIPITOR) 80 MG tablet Take 1 tablet (80 mg) by mouth at bedtime 90 tablet 3    clopidogrel (PLAVIX) 75 MG tablet Take 1 tablet (75 mg) by mouth daily 90 tablet 3    famotidine (PEPCID) 20 MG tablet Take 1 tablet (20 mg) by  mouth 2 times daily 180 tablet 2    gabapentin (NEURONTIN) 300 MG capsule Take 1 capsule (300 mg) by mouth at bedtime 90 capsule 1    gabapentin (NEURONTIN) 300 MG capsule TAKE 2 CAPS (600MG) BY MOUTH IN THE EVENING 180 capsule 3    glipiZIDE (GLUCOTROL) 10 MG tablet Take 2 tablets (20 mg) by mouth 2 times daily (before meals) 360 tablet 3    hydrALAZINE (APRESOLINE) 25 MG tablet Take 1 tablet (25 mg) by mouth 3 times daily 270 tablet 3    isosorbide mononitrate (IMDUR) 30 MG 24 hr tablet Take 1 tablet (30 mg) by mouth daily 90 tablet 3    loperamide (IMODIUM A-D) 2 MG tablet Take 1 tablet (2 mg) by mouth daily 30 tablet 2    metoprolol succinate ER (TOPROL XL) 100 MG 24 hr tablet Take 1 tablet (100 mg) by mouth daily 90 tablet 3    omeprazole (PRILOSEC) 40 MG DR capsule TAKE ONE CAPSULE BY MOUTH TWICE A DAY AS NEEDED Strength: 40 mg 180 capsule 3    pantoprazole (PROTONIX) 40 MG EC tablet TAKE ONE TABLET BY MOUTH ONCE DAILY 131 tablet 0    torsemide (DEMADEX) 20 MG tablet TAKE 2 TABLETS BY MOUTH EVERY DAY (Patient not taking: Reported on 1/28/2025) 60 tablet 0       ASSESSMENT:  Hgb Not at goal/Known   Ferritin: Not at goal of (>100ng/mL)  TSat: not at goal of >30%  Iron regimen recommended: IV iron  Recommended MITZI regimen: TBD-needs IV iron and HTN controlled first  Blood Pressure: uncontrolled. Monitor closely   Goals Addressed    None         PLAN:  1. Patient to be called within next 1-2 days for enrollment in Anemia Management Service.  2. To be discussed: anemia overview, monitoring service, and goal hemoglobin range and rationale and risks of MITZI blood clots, stroke, and increase in blood pressure  3. Dose location: [facility]  4. Labs: [place]  5. Pharmacy: [facility]    Next call date:  [id number]    Carrie Leopold, RN BSN  Anemia Services  [facility]  [email or web address]  Office: [phone]  Fax [phone]

## 2025-01-28 NOTE — PROGRESS NOTES
Nephrology Note: Patient Outreach Encounter    REASON FOR CALL:     REASON FOR CALL: Met with patient in clinic. CKD 5                                  SITUATION/BACKROUND:   Patient is being treated for CKD Stage 5.    Patient Journey Status:  Active    ASSESSMENT:     Patient is familiar with the term dialysis however has little education regarding it.    Eliza denies any uremic symptoms at this time.  We reviewed referrals for CKD education and importance of planning ahead. Next step will be to refer to surgical consult if pt is agreeable after CKD education. She is leaning toward hemodialysis. She does not think she would be a good candidate for a home modality.   Neph Tracking Flowsheet Last Filled Values       Kidney Smart CKD Basics Referral 01/28/25    Preferred Modality Hemodialysis    Patient's Referral Dates Auto Populate Patient's Referral Dates    Anemia Services Referral 1/28/25    Home Care Referral 6/24/24    Journey Referral 1/28/25                PLAN:     Education:  Method:  general discussion/verbal explanation  Discussed: uremic symptoms  monitoring fluid volume status  Referrals for Kidney Smart and then surgical consult for access placement  These interventions were used: Empathy/Understanding  Education material provided and patient was given an opportunity to ask questions.      Follow Up:   Patient to follow up as scheduled at next appointment     Patient verbalized understanding and will follow up as recommended.    Camelia Barraza RN

## 2025-01-28 NOTE — PROGRESS NOTES
iVsh Dutta MD    CC: Advanced CKD    HPI: Eliza Dubois is a 84 year old female  who presents for evaluation of advanced CKD.  She lives by herself in an assisted living.  She is here today with her daughter.    She has a longstanding history of diabetes dating back to at least 2002.  Her hemoglobin over the years have been fluctuating.  Her diabetes is complicated by diabetic retinopathy and she follows with retina specialist every 3 months.  She also have hypertension and her blood pressure has been recently uncontrolled.  Her blood pressure has been ranging between 160-190/70-80.  She is currently on hydralazine and on lisinopril 5 mg daily.    She was admitted to the hospital in April 2024 with hypertensive emergency and volume overload.  She was diagnosed with acute congestive heart failure and was diuresed with IV Lasix over several days and was discharged on torsemide 40 mg daily.  However she does not seem to be taking any torsemide at this point and she is not sure that she ever took it at home.  Nonetheless, she reports mild edema currently that is  rather dependent.      She has a history of diarrhea and that has resulted in episodes of hypomagnesemia and hypokalemia at times.  She is maintained on loperamide and her diarrhea is much better.  She was admitted to the hospital in June 2024 with hypomagnesemia and hypokalemia causing encephalopathy.    She reports that her energy is okay.  Her appetite is poor. She eats just because she needs to eat but she has no appetite.  She denies any dysgeusia or myoclonic jerks or itching.  She denies any chest pain or shortness of breath or orthopnea or PND's.    Social history: she used to live in her own apartment until September 2024 when she moved to an assisted living.  She moved to the assisted living because she was lonely and she was not taking food for herself.  She is .  She has 1 daughter and 1 son and 2 grandsons and 1 great great son.  She  is retired.      Allergies   Allergen Reactions    Sulfa Antibiotics Hives       Current Outpatient Medications   Medication Sig Dispense Refill    acetaminophen (TYLENOL) 500 MG tablet Take 1 tablet (500 mg) by mouth at bedtime. 500 tablet 1    acetaminophen (TYLENOL) 500 MG tablet Take 1 tablet (500 mg) by mouth daily as needed for mild pain. 500 tablet 1    aspirin 81 MG EC tablet Take 1 tablet (81 mg) by mouth daily 90 tablet 3    atorvastatin (LIPITOR) 80 MG tablet Take 1 tablet (80 mg) by mouth at bedtime 90 tablet 3    clopidogrel (PLAVIX) 75 MG tablet Take 1 tablet (75 mg) by mouth daily 90 tablet 3    famotidine (PEPCID) 20 MG tablet Take 1 tablet (20 mg) by mouth 2 times daily 180 tablet 2    gabapentin (NEURONTIN) 300 MG capsule Take 1 capsule (300 mg) by mouth at bedtime 90 capsule 1    gabapentin (NEURONTIN) 300 MG capsule TAKE 2 CAPS (600MG) BY MOUTH IN THE EVENING 180 capsule 3    glipiZIDE (GLUCOTROL) 10 MG tablet Take 2 tablets (20 mg) by mouth 2 times daily (before meals) 360 tablet 3    hydrALAZINE (APRESOLINE) 25 MG tablet Take 1 tablet (25 mg) by mouth 3 times daily 270 tablet 3    isosorbide mononitrate (IMDUR) 30 MG 24 hr tablet Take 1 tablet (30 mg) by mouth daily 90 tablet 3    lisinopril (ZESTRIL) 5 MG tablet TAKE ONE TABLET BY MOUTH ONCE DAILY 30 tablet 0    loperamide (IMODIUM A-D) 2 MG tablet Take 1 tablet (2 mg) by mouth daily 30 tablet 2    metoprolol succinate ER (TOPROL XL) 100 MG 24 hr tablet Take 1 tablet (100 mg) by mouth daily 90 tablet 3    omeprazole (PRILOSEC) 40 MG DR capsule TAKE ONE CAPSULE BY MOUTH TWICE A DAY AS NEEDED Strength: 40 mg 180 capsule 3    pantoprazole (PROTONIX) 40 MG EC tablet TAKE ONE TABLET BY MOUTH ONCE DAILY 131 tablet 0    torsemide (DEMADEX) 20 MG tablet TAKE 2 TABLETS BY MOUTH EVERY DAY (Patient not taking: Reported on 1/28/2025) 60 tablet 0     No current facility-administered medications for this visit.       Past Medical History:   Diagnosis Date     Diabetic eye exam (H) 05/01/14    Heart attack (H)     Pure hypercholesterolemia     Stented coronary artery     Type II or unspecified type diabetes mellitus without mention of complication, not stated as uncontrolled 2002    Avandamet.    Unspecified disease of pericardium 12/05/08    Pericarditis w/mild to moderate pericardial effusion.    Unspecified essential hypertension        Past Surgical History:   Procedure Laterality Date    ABDOMEN SURGERY      COLONOSCOPY  04/15/09    COLONOSCOPY  6/18/2014    Procedure: COMBINED COLONOSCOPY, SINGLE BIOPSY/POLYPECTOMY BY BIOPSY;  Surgeon: Earle Mon MD;  Location:  GI     LAPAROSCOPY, SURGICAL; CHOLECYSTECTOMY  1997    Cholecystectomy, Laparoscopic    PHACOEMULSIFICATION CLEAR CORNEA WITH STANDARD INTRAOCULAR LENS IMPLANT Right 3/16/2016    Procedure: PHACOEMULSIFICATION CLEAR CORNEA WITH STANDARD INTRAOCULAR LENS IMPLANT;  Surgeon: George Lemus MD;  Location: [facility]    PHACOEMULSIFICATION CLEAR CORNEA WITH STANDARD INTRAOCULAR LENS IMPLANT Left 3/30/2016    Procedure: PHACOEMULSIFICATION CLEAR CORNEA WITH STANDARD INTRAOCULAR LENS IMPLANT;  Surgeon: George Lemus MD;  Location: [facility]    ZZC NONSPECIFIC PROCEDURE  1988    Hysterectomy       Social History     Tobacco Use    Smoking status: Never    Smokeless tobacco: Never   Vaping Use    Vaping status: Never Used   Substance Use Topics    Alcohol use: No     Alcohol/week: 0.0 standard drinks of alcohol    Drug use: No       Family History   Problem Relation Age of Onset    Diabetes Mother     Arthritis Mother     Heart Disease Mother     Hypertension Mother     Lipids Mother     Neurologic Disorder Mother         neuropathy    Osteoporosis Mother     Obesity Mother     EYE* Mother         blind    Diabetes Father     Genitourinary Problems Father         gall stones    Heart Disease Father         MI    Cancer Maternal Grandmother     Heart Disease Maternal Grandmother     Cancer Other          Grandparents    Alcohol/Drug No family hx of     Alzheimer Disease No family hx of     Anesthesia Reaction No family hx of     Blood Disease No family hx of     Depression No family hx of     Genetic Disorder No family hx of     Gastrointestinal Disease No family hx of     Gynecology No family hx of     Psychotic Disorder No family hx of     Respiratory No family hx of     Cerebrovascular Disease No family hx of        ROS: A 12 system review of systems was negative other than noted here or above.     Exam:  BP (!) 193/80 (BP Location: Left arm, Patient Position: Chair, Cuff Size: Adult Regular)   Pulse 78   Wt 61.3 kg (135 lb 1.6 oz)   SpO2 97%   BMI 23.93 kg/m      BP Readings from Last 6 Encounters:   01/28/25 (!) 193/80   10/08/24 (!) 160/70   06/24/24 (!) 143/68   05/01/24 130/80   04/30/24 (!) 188/78   04/24/24 (!) 156/80     Wt Readings from Last 4 Encounters:   01/28/25 61.3 kg (135 lb 1.6 oz)   10/08/24 58.6 kg (129 lb 1.6 oz)   06/24/24 56 kg (123 lb 8 oz)   05/01/24 59 kg (130 lb)     GENERAL APPEARANCE: alert and no distress  EYES: PERRL  HENT: mouth without ulcers or lesions  NECK: supple, no adenopathy  RESP: lungs clear to auscultation - no rales, rhonchi or wheezes  CV: regular rhythm, normal rate, no rub   ABDOMEN:  soft, nontender, no HSM or masses and bowel sounds normal  MS: extremities normal- no gross deformities noted, no evidence of inflammation in joints, no muscle tenderness  SKIN: no rash  NEURO: Normal strength and tone, sensory exam grossly normal, mentation intact and speech normal  PSYCH: mentation appears normal. and affect normal/bright  Trace LE edema around ankles bilaterally    Results: Reviewed in details with the patient    Assessment/Plan:   Problem #1 chronic kidney disease stage V: Her creatinine has been progressively worsening since 2020.  Unfortunately she has not seen any nephrologist before.  Her blood pressure is also rather uncontrolled.  I suspect that her CKD is  likely secondary to diabetic kidney disease and hypertensive nephrosclerosis. Her GFR is currently at 11 mL/min.  -We discussed today that she is at a very advanced stage of kidney disease and we have to prepare for the next stage.  She can choose between dialysis or conservative kidney management.  I did explain to her that there are 2 forms of dialysis: hemodialysis and peritoneal dialysis;. I explained the procedures and the logistics of every dialysis modality.  She is not a candidate for a cadaveric kidney transplant given her age and comorbidities.  Her daughter offered to donate for her and I explained that this is a process that needs evaluation of the donor and recipient.  - Our nurse today spent some time also explaining to her about kidney disease and we will refer her to the CKD education classes.  -Volume status: She has trace lower extremity edema however I did not add any torsemide at this point but this is something to consider at her upcoming visit  - I will see her in another 4 weeks to to follow-up on her decision    Problem #2 anemia: Likely anemia of chronic kidney disease.  Her hemoglobin is below goal.  - Will refer to the anemia clinic.  - Will check iron studies    Problem #3 hypertension: Partially essential and partially related to her chronic kidney disease.  Her blood pressure is markedly above goal.  -Will start her on amlodipine 10 mg daily  - Will stop her lisinopril given her hyperkalemia  - She was instructed to measure blood pressure twice daily at home and report back in 2 weeks    Problem #4 CKD MBD: Her calcium is borderline low.  Her albumin is slightly low.  Her phosphorus level is elevated.  Her vitamin D and PTH are pending    Problem #5 hyperkalemia: Secondary to advanced kidney disease and lisinopril  - Will stop her lisinopril given her very low GFR    Problem #6 metabolic acidosis: Her bicarb is borderline.  Will hold on any sodium bicarb at this point    The total time  of this encounter amounted to 60 minutes on the day of the encounter 1/28/2025. This time included face to face time spent with the patient, preparatory work and chart review, ordering tests, and performing post visit documentation.    The longitudinal plan of care for this patient was addressed during this visit. Due to the added complexity in care, I will continue to support the patient with subsequent management of this condition(s) and with the ongoing continuity of care of this condition(s).     *This dictation was prepared in part using Dragon recognition software.  As a result errors may occur. When identified these transcription errors have been corrected.  While every attempt is made to correct errors during dictation, errors may still exist.     Ольга Gonzalez MD   [facility]   Department of Medicine   Division of Renal Disease and Hypertension

## 2025-01-28 NOTE — PATIENT INSTRUCTIONS
It was a pleasure taking care of you today.  I've included a brief summary of our discussion and care plan from today's visit below.  Please review this information with your primary care provider.     My recommendations are summarized as follows:  -Please stop lisinopril and start amlodipine 10 mg daily  -will refer you to the anemia clinic and kidney education classes    Who do I call with any questions after my visit?  Please be in touch if there are any further questions that arise following today's visit.  There are multiple ways to contact your nephrology care team.       During business hours, you may reach your Nephrology Care Team or schedule or reschedule an appointment or lab at [phone].       If you need to schedule imaging, please call [phone].   To schedule a COVID test, please call [phone].     You can always send a secure message through RF nano. RF nano messages are answered by your nurse or doctor typically within 24-48 hours. Please allow extra time on weekends and holidays.       For urgent/emergent questions after business hours, you may reach the on-call Nephrology Fellow by contacting the [facility]  at [phone].     How will I get the results of any tests ordered?    You will receive all of your results.  If you have signed up for RF nano, any tests ordered at your visit will be available to you once resulted on Systems[employer]. Typically the physician reviews them and may or may not make further recommendations. If there are urgent results that require a change in your care plan, your physician or nurse will call you to discuss the next steps. If you are not on Systems[employer]ett, a letter may be generated and mailed to you with your results.

## 2025-01-29 ENCOUNTER — TELEPHONE (OUTPATIENT)
Dept: NEPHROLOGY | Facility: CLINIC | Age: 85
End: 2025-01-29
Payer: COMMERCIAL

## 2025-01-29 RX ORDER — ERGOCALCIFEROL 1.25 MG/1
50000 CAPSULE, LIQUID FILLED ORAL WEEKLY
Qty: 10 CAPSULE | Refills: 0 | Status: SHIPPED | OUTPATIENT
Start: 2025-01-29

## 2025-01-29 NOTE — PROGRESS NOTES
Spoke to daughter Neyda briefly, however she couldn't talk and preferred a call back tomorrow afternoon    Carrie Leopold, RN BSN  Anemia Services  [facility]  [email or web address]  Office: [phone]  Fax [phone]

## 2025-01-29 NOTE — TELEPHONE ENCOUNTER
Called and spoke with pt.  Read back Result Note message received from Dr. Gonzalez:    Ольга Gonzalez MD  1/29/2025  1:45 PM CST      Hello, her vitamin D is very low.  I ordered vitamin D supplements 50,000 units once weekly for 10 weeks.  Can you please inform her as she does not have MyChart.  Thanks     Component      Latest Ref Rng 1/28/2025  9:40 AM   Vitamin D, Total (25-Hydroxy)      20 - 50 ng/mL <6 (L)       Legend:  (L) Low    Pt verbalized understanding and agreed with plan.  Stated she will  the prescription at her pharmacy tomorrow.    Encouraged to call if further questions or concerns.    Soni Brown LPN

## 2025-01-30 NOTE — PROGRESS NOTES
Spoke to daughter Neyda. Discussed anemia management services and need for IV iron to bring up iron numbers and hopefully also Hgb. She does not believe her mom has received IV iron previously. Discussed that [facility] would be the closest infusion center, and she voiced preference to have home infusion provide iron if possible. Discussed need for first dose done at infusion center first, then home infusion might be able to finish off the course.   Writer will check with [facility] re: preferred iron formulation, in case home visits are an option after the first dose. Writer will follow up with Neyda once orders are in so she can schedule first dose at [facility].  Neyda voiced understanding of the plan. Was given call back number.    Follow up date: [id number]    Carrie Leopold, RN BSN  Anemia Services  [facility]  [email or web address]  Office: [phone]  Fax [phone]

## 2025-01-31 PROBLEM — D63.1 ANEMIA OF CHRONIC RENAL FAILURE, STAGE 5 (H): Status: ACTIVE | Noted: 2025-01-31

## 2025-01-31 PROBLEM — N18.5 CKD (CHRONIC KIDNEY DISEASE) STAGE 5, GFR LESS THAN 15 ML/MIN (H): Status: ACTIVE | Noted: 2025-01-31

## 2025-01-31 PROBLEM — N18.5 ANEMIA OF CHRONIC RENAL FAILURE, STAGE 5 (H): Status: ACTIVE | Noted: 2025-01-31

## 2025-02-18 ENCOUNTER — PATIENT OUTREACH (OUTPATIENT)
Dept: NEPHROLOGY | Facility: CLINIC | Age: 85
End: 2025-02-18

## 2025-02-18 ENCOUNTER — APPOINTMENT (OUTPATIENT)
Dept: GENERAL RADIOLOGY | Facility: CLINIC | Age: 85
End: 2025-02-18
Attending: STUDENT IN AN ORGANIZED HEALTH CARE EDUCATION/TRAINING PROGRAM
Payer: MEDICARE

## 2025-02-18 ENCOUNTER — HOSPITAL ENCOUNTER (OUTPATIENT)
Facility: CLINIC | Age: 85
Setting detail: OBSERVATION
Discharge: OTHER INSTITUTION | End: 2025-02-19
Attending: STUDENT IN AN ORGANIZED HEALTH CARE EDUCATION/TRAINING PROGRAM | Admitting: INTERNAL MEDICINE
Payer: MEDICARE

## 2025-02-18 ENCOUNTER — LAB (OUTPATIENT)
Dept: LAB | Facility: CLINIC | Age: 85
End: 2025-02-18
Payer: COMMERCIAL

## 2025-02-18 ENCOUNTER — OFFICE VISIT (OUTPATIENT)
Dept: NEPHROLOGY | Facility: CLINIC | Age: 85
End: 2025-02-18
Payer: COMMERCIAL

## 2025-02-18 VITALS
HEART RATE: 77 BPM | OXYGEN SATURATION: 88 % | BODY MASS INDEX: 24.27 KG/M2 | SYSTOLIC BLOOD PRESSURE: 152 MMHG | DIASTOLIC BLOOD PRESSURE: 58 MMHG | WEIGHT: 137 LBS

## 2025-02-18 DIAGNOSIS — N18.5 CKD (CHRONIC KIDNEY DISEASE) STAGE 5, GFR LESS THAN 15 ML/MIN (H): ICD-10-CM

## 2025-02-18 DIAGNOSIS — N18.5 TYPE 2 DIABETES MELLITUS WITH STAGE 5 CHRONIC KIDNEY DISEASE NOT ON CHRONIC DIALYSIS, UNSPECIFIED WHETHER LONG TERM INSULIN USE (H): ICD-10-CM

## 2025-02-18 DIAGNOSIS — E11.65 INADEQUATELY CONTROLLED DIABETES MELLITUS (H): ICD-10-CM

## 2025-02-18 DIAGNOSIS — E87.5 HYPERKALEMIA: Primary | ICD-10-CM

## 2025-02-18 DIAGNOSIS — J96.01 ACUTE RESPIRATORY FAILURE WITH HYPOXIA (H): ICD-10-CM

## 2025-02-18 DIAGNOSIS — E11.22 TYPE 2 DIABETES MELLITUS WITH STAGE 5 CHRONIC KIDNEY DISEASE NOT ON CHRONIC DIALYSIS, UNSPECIFIED WHETHER LONG TERM INSULIN USE (H): ICD-10-CM

## 2025-02-18 DIAGNOSIS — R73.9 HYPERGLYCEMIA: ICD-10-CM

## 2025-02-18 DIAGNOSIS — N18.5 CKD (CHRONIC KIDNEY DISEASE) STAGE 5, GFR LESS THAN 15 ML/MIN (H): Primary | ICD-10-CM

## 2025-02-18 DIAGNOSIS — D63.1 ANEMIA IN STAGE 5 CHRONIC KIDNEY DISEASE, NOT ON CHRONIC DIALYSIS (H): ICD-10-CM

## 2025-02-18 DIAGNOSIS — R91.8 PULMONARY INFILTRATE: ICD-10-CM

## 2025-02-18 DIAGNOSIS — I50.9 ACUTE ON CHRONIC CONGESTIVE HEART FAILURE, UNSPECIFIED HEART FAILURE TYPE (H): ICD-10-CM

## 2025-02-18 DIAGNOSIS — N18.5 ANEMIA IN STAGE 5 CHRONIC KIDNEY DISEASE, NOT ON CHRONIC DIALYSIS (H): ICD-10-CM

## 2025-02-18 DIAGNOSIS — E87.70 GENERALIZED EDEMA DUE TO FLUID OVERLOAD: ICD-10-CM

## 2025-02-18 DIAGNOSIS — R60.1 GENERALIZED EDEMA DUE TO FLUID OVERLOAD: ICD-10-CM

## 2025-02-18 LAB
ALBUMIN SERPL BCG-MCNC: 3.9 G/DL (ref 3.5–5.2)
ANION GAP SERPL CALCULATED.3IONS-SCNC: 14 MMOL/L (ref 7–15)
ANION GAP SERPL CALCULATED.3IONS-SCNC: 16 MMOL/L (ref 7–15)
ATRIAL RATE - MUSE: 83 BPM
BASOPHILS # BLD AUTO: 0 10E3/UL (ref 0–0.2)
BASOPHILS NFR BLD AUTO: 0 %
BUN SERPL-MCNC: 52.5 MG/DL (ref 8–23)
BUN SERPL-MCNC: 55.3 MG/DL (ref 8–23)
CALCIUM SERPL-MCNC: 8.2 MG/DL (ref 8.8–10.4)
CALCIUM SERPL-MCNC: 8.7 MG/DL (ref 8.8–10.4)
CHLORIDE SERPL-SCNC: 109 MMOL/L (ref 98–107)
CHLORIDE SERPL-SCNC: 110 MMOL/L (ref 98–107)
CREAT SERPL-MCNC: 4 MG/DL (ref 0.51–0.95)
CREAT SERPL-MCNC: 4.13 MG/DL (ref 0.51–0.95)
DIASTOLIC BLOOD PRESSURE - MUSE: NORMAL MMHG
EGFRCR SERPLBLD CKD-EPI 2021: 10 ML/MIN/1.73M2
EGFRCR SERPLBLD CKD-EPI 2021: 10 ML/MIN/1.73M2
EOSINOPHIL # BLD AUTO: 0.1 10E3/UL (ref 0–0.7)
EOSINOPHIL NFR BLD AUTO: 2 %
ERYTHROCYTE [DISTWIDTH] IN BLOOD BY AUTOMATED COUNT: 17.1 % (ref 10–15)
FLUAV RNA SPEC QL NAA+PROBE: NEGATIVE
FLUBV RNA RESP QL NAA+PROBE: NEGATIVE
GLUCOSE BLDC GLUCOMTR-MCNC: 256 MG/DL (ref 70–99)
GLUCOSE SERPL-MCNC: 228 MG/DL (ref 70–99)
GLUCOSE SERPL-MCNC: 368 MG/DL (ref 70–99)
HCO3 SERPL-SCNC: 15 MMOL/L (ref 22–29)
HCO3 SERPL-SCNC: 20 MMOL/L (ref 22–29)
HCT VFR BLD AUTO: 24.5 % (ref 35–47)
HGB BLD-MCNC: 7.5 G/DL (ref 11.7–15.7)
HGB BLD-MCNC: 8 G/DL (ref 11.7–15.7)
IMM GRANULOCYTES # BLD: 0 10E3/UL
IMM GRANULOCYTES NFR BLD: 0 %
INTERPRETATION ECG - MUSE: NORMAL
LYMPHOCYTES # BLD AUTO: 1.1 10E3/UL (ref 0.8–5.3)
LYMPHOCYTES NFR BLD AUTO: 13 %
MAGNESIUM SERPL-MCNC: 1.4 MG/DL (ref 1.7–2.3)
MCH RBC QN AUTO: 26.8 PG (ref 26.5–33)
MCHC RBC AUTO-ENTMCNC: 30.6 G/DL (ref 31.5–36.5)
MCV RBC AUTO: 88 FL (ref 78–100)
MONOCYTES # BLD AUTO: 0.7 10E3/UL (ref 0–1.3)
MONOCYTES NFR BLD AUTO: 9 %
NEUTROPHILS # BLD AUTO: 6 10E3/UL (ref 1.6–8.3)
NEUTROPHILS NFR BLD AUTO: 75 %
NRBC # BLD AUTO: 0 10E3/UL
NRBC BLD AUTO-RTO: 0 /100
NT-PROBNP SERPL-MCNC: ABNORMAL PG/ML (ref 0–1800)
P AXIS - MUSE: 66 DEGREES
PHOSPHATE SERPL-MCNC: 4.6 MG/DL (ref 2.5–4.5)
PLATELET # BLD AUTO: 144 10E3/UL (ref 150–450)
POTASSIUM SERPL-SCNC: 6.1 MMOL/L (ref 3.4–5.3)
POTASSIUM SERPL-SCNC: 6.4 MMOL/L (ref 3.4–5.3)
PR INTERVAL - MUSE: 192 MS
QRS DURATION - MUSE: 92 MS
QT - MUSE: 386 MS
QTC - MUSE: 453 MS
R AXIS - MUSE: 67 DEGREES
RBC # BLD AUTO: 2.8 10E6/UL (ref 3.8–5.2)
RSV RNA SPEC NAA+PROBE: NEGATIVE
SARS-COV-2 RNA RESP QL NAA+PROBE: NEGATIVE
SODIUM SERPL-SCNC: 140 MMOL/L (ref 135–145)
SODIUM SERPL-SCNC: 144 MMOL/L (ref 135–145)
SYSTOLIC BLOOD PRESSURE - MUSE: NORMAL MMHG
T AXIS - MUSE: 61 DEGREES
VENTRICULAR RATE- MUSE: 83 BPM
WBC # BLD AUTO: 8 10E3/UL (ref 4–11)

## 2025-02-18 PROCEDURE — 96375 TX/PRO/DX INJ NEW DRUG ADDON: CPT | Performed by: STUDENT IN AN ORGANIZED HEALTH CARE EDUCATION/TRAINING PROGRAM

## 2025-02-18 PROCEDURE — 85004 AUTOMATED DIFF WBC COUNT: CPT | Performed by: STUDENT IN AN ORGANIZED HEALTH CARE EDUCATION/TRAINING PROGRAM

## 2025-02-18 PROCEDURE — 36415 COLL VENOUS BLD VENIPUNCTURE: CPT | Performed by: STUDENT IN AN ORGANIZED HEALTH CARE EDUCATION/TRAINING PROGRAM

## 2025-02-18 PROCEDURE — 99291 CRITICAL CARE FIRST HOUR: CPT | Mod: 25 | Performed by: STUDENT IN AN ORGANIZED HEALTH CARE EDUCATION/TRAINING PROGRAM

## 2025-02-18 PROCEDURE — 87637 SARSCOV2&INF A&B&RSV AMP PRB: CPT | Performed by: STUDENT IN AN ORGANIZED HEALTH CARE EDUCATION/TRAINING PROGRAM

## 2025-02-18 PROCEDURE — 99291 CRITICAL CARE FIRST HOUR: CPT | Performed by: STUDENT IN AN ORGANIZED HEALTH CARE EDUCATION/TRAINING PROGRAM

## 2025-02-18 PROCEDURE — 96374 THER/PROPH/DIAG INJ IV PUSH: CPT | Performed by: STUDENT IN AN ORGANIZED HEALTH CARE EDUCATION/TRAINING PROGRAM

## 2025-02-18 PROCEDURE — 80069 RENAL FUNCTION PANEL: CPT

## 2025-02-18 PROCEDURE — 99215 OFFICE O/P EST HI 40 MIN: CPT | Performed by: INTERNAL MEDICINE

## 2025-02-18 PROCEDURE — G2211 COMPLEX E/M VISIT ADD ON: HCPCS | Performed by: INTERNAL MEDICINE

## 2025-02-18 PROCEDURE — 36415 COLL VENOUS BLD VENIPUNCTURE: CPT

## 2025-02-18 PROCEDURE — 82962 GLUCOSE BLOOD TEST: CPT

## 2025-02-18 PROCEDURE — 83735 ASSAY OF MAGNESIUM: CPT | Performed by: STUDENT IN AN ORGANIZED HEALTH CARE EDUCATION/TRAINING PROGRAM

## 2025-02-18 PROCEDURE — 250N000012 HC RX MED GY IP 250 OP 636 PS 637: Performed by: STUDENT IN AN ORGANIZED HEALTH CARE EDUCATION/TRAINING PROGRAM

## 2025-02-18 PROCEDURE — 93005 ELECTROCARDIOGRAM TRACING: CPT | Performed by: STUDENT IN AN ORGANIZED HEALTH CARE EDUCATION/TRAINING PROGRAM

## 2025-02-18 PROCEDURE — 85025 COMPLETE CBC W/AUTO DIFF WBC: CPT

## 2025-02-18 PROCEDURE — 250N000013 HC RX MED GY IP 250 OP 250 PS 637: Performed by: STUDENT IN AN ORGANIZED HEALTH CARE EDUCATION/TRAINING PROGRAM

## 2025-02-18 PROCEDURE — 83880 ASSAY OF NATRIURETIC PEPTIDE: CPT | Performed by: STUDENT IN AN ORGANIZED HEALTH CARE EDUCATION/TRAINING PROGRAM

## 2025-02-18 PROCEDURE — 93010 ELECTROCARDIOGRAM REPORT: CPT | Performed by: STUDENT IN AN ORGANIZED HEALTH CARE EDUCATION/TRAINING PROGRAM

## 2025-02-18 PROCEDURE — 250N000011 HC RX IP 250 OP 636: Mod: JZ | Performed by: STUDENT IN AN ORGANIZED HEALTH CARE EDUCATION/TRAINING PROGRAM

## 2025-02-18 PROCEDURE — 71045 X-RAY EXAM CHEST 1 VIEW: CPT

## 2025-02-18 RX ORDER — DEXTROSE MONOHYDRATE 25 G/50ML
25-50 INJECTION, SOLUTION INTRAVENOUS
Status: DISCONTINUED | OUTPATIENT
Start: 2025-02-18 | End: 2025-02-19 | Stop reason: HOSPADM

## 2025-02-18 RX ORDER — TORSEMIDE 20 MG/1
20 TABLET ORAL DAILY
Qty: 90 TABLET | Refills: 0 | Status: SHIPPED | OUTPATIENT
Start: 2025-02-18

## 2025-02-18 RX ORDER — NICOTINE POLACRILEX 4 MG
15-30 LOZENGE BUCCAL
Status: DISCONTINUED | OUTPATIENT
Start: 2025-02-18 | End: 2025-02-19 | Stop reason: HOSPADM

## 2025-02-18 RX ORDER — BUMETANIDE 0.25 MG/ML
1 INJECTION, SOLUTION INTRAMUSCULAR; INTRAVENOUS ONCE
Status: COMPLETED | OUTPATIENT
Start: 2025-02-18 | End: 2025-02-18

## 2025-02-18 RX ADMIN — SODIUM ZIRCONIUM CYCLOSILICATE 10 G: 5 POWDER, FOR SUSPENSION ORAL at 20:01

## 2025-02-18 RX ADMIN — INSULIN ASPART 1 UNITS: 100 INJECTION, SOLUTION INTRAVENOUS; SUBCUTANEOUS at 22:51

## 2025-02-18 RX ADMIN — BUMETANIDE 1 MG: 0.25 INJECTION INTRAMUSCULAR; INTRAVENOUS at 20:01

## 2025-02-18 ASSESSMENT — ACTIVITIES OF DAILY LIVING (ADL)
ADLS_ACUITY_SCORE: 59

## 2025-02-18 ASSESSMENT — COLUMBIA-SUICIDE SEVERITY RATING SCALE - C-SSRS
1. IN THE PAST MONTH, HAVE YOU WISHED YOU WERE DEAD OR WISHED YOU COULD GO TO SLEEP AND NOT WAKE UP?: NO
2. HAVE YOU ACTUALLY HAD ANY THOUGHTS OF KILLING YOURSELF IN THE PAST MONTH?: NO
6. HAVE YOU EVER DONE ANYTHING, STARTED TO DO ANYTHING, OR PREPARED TO DO ANYTHING TO END YOUR LIFE?: NO

## 2025-02-18 ASSESSMENT — PAIN SCALES - GENERAL: PAINLEVEL_OUTOF10: NO PAIN (0)

## 2025-02-18 NOTE — PROGRESS NOTES
Left message for Eliza to return call as soon as able. Critical value received for potassium level of 6.4. Dr. Gonzalez would like for Eliza to seek care in an emergency room today for acute treatment of this level.   Dr. Gonzalez educated on low potassium diet at the office visit today and made a medication adjustment.     Dialysis planning: needs access referral- prefers [facility].    Neph Tracking Flowsheet Last Filled Values       Kidney Smart CKD Basics Referral 01/28/25    Preferred Modality Hemodialysis    Patient's Referral Dates Auto Populate Patient's Referral Dates    Anemia Services Referral 1/28/25    Home Care Referral 6/24/24    Journey Referral 1/28/25

## 2025-02-18 NOTE — PATIENT INSTRUCTIONS
It was a pleasure taking care of you today.  I've included a brief summary of our discussion and care plan from today's visit below.  Please review this information with your primary care provider.     My recommendations are summarized as follows:  -Follow up with the anemia clinic  -will start  a water pill called torsemide 20 mg once daily; take it in the morning. Please repeat lab tests in 2 weeks  -Will refer you to the surgeon to create a port to connect to the hemodialysis machine  -low potassium diet    Who do I call with any questions after my visit?  Please be in touch if there are any further questions that arise following today's visit.  There are multiple ways to contact your nephrology care team.       During business hours, you may reach your Nephrology Care Team or schedule or reschedule an appointment or lab at [phone].       If you need to schedule imaging, please call [phone].   To schedule a COVID test, please call [phone].     You can always send a secure message through Reelio. Reelio messages are answered by your nurse or doctor typically within 24-48 hours. Please allow extra time on weekends and holidays.       For urgent/emergent questions after business hours, you may reach the on-call Nephrology Fellow by contacting the [facility]  at [phone].     How will I get the results of any tests ordered?    You will receive all of your results.  If you have signed up for Reelio, any tests ordered at your visit will be available to you once resulted on [employer]. Typically the physician reviews them and may or may not make further recommendations. If there are urgent results that require a change in your care plan, your physician or nurse will call you to discuss the next steps. If you are not on MyChart, a letter may be generated and mailed to you with your results.

## 2025-02-18 NOTE — NURSING NOTE
Eliza Dubois's goals for this visit include:   Chief Complaint   Patient presents with    RECHECK     Recheck CKD5  LOV 1/28/25       She requests these members of her care team be copied on today's visit information: no    PCP: Byron Holm    Referring Provider:  Referred Self, MD  No address on file    BP (!) 152/58 (BP Location: Left arm, Patient Position: Sitting, Cuff Size: Adult Regular)   Pulse 77   Wt 62.1 kg (137 lb)   SpO2 (!) 88%   BMI 24.27 kg/m      Do you need any medication refills at today's visit? Unsure    Soni Brown LPN

## 2025-02-18 NOTE — PROGRESS NOTES
Message #2 left for patient to discuss elevated potassium level and recommendation to seek emergent care for treatment.     Would also like to inform patient that she does have iron infusions scheduled from anemia services. Dr. Gonzalez had the impression that the anemia team had been unable to reach her.     Also if she would like to be set up for vascular surgery through [facility] which may be [facility] vs. [facility] vs. [facility].

## 2025-02-18 NOTE — PROGRESS NOTES
"Please contact patient at Home Phone [phone]    CC: Advanced CKD    HPI: Eliza Dubois is a 84 year old female  who presents for evaluation of advanced CKD.  She lives by herself in an assisted living.  She is here today with her friend.  During her last visit, she was with her daughter Neyda but uses from this visit that there is some disconnect between the mom and the daughter.    She has a longstanding history of diabetes dating back to at least 2002.  Her HbA1c over the years have been fluctuating.  Her diabetes is complicated by diabetic retinopathy and she follows with retina specialist every 3 months.  She also have hypertension and her blood pressure has been recently uncontrolled.     She was admitted to the hospital in April 2024 with hypertensive emergency and volume overload.  She was diagnosed with acute congestive heart failure and was diuresed with IV Lasix over several days and was discharged on torsemide 40 mg daily.  However she does not seem to be taking any torsemide at this point and she is not sure that she ever took it at home.  Nonetheless, she reports mild edema currently that is  rather dependent.      She has a history of diarrhea and that has resulted in episodes of hypomagnesemia and hypokalemia at times.  She is maintained on loperamide and her diarrhea is much better.  She was admitted to the hospital in June 2024 with hypomagnesemia and hypokalemia causing encephalopathy.    Interval hx:  She is disappointed in the facility she lives in. They do not provide a diabetic diet.  She was told to \"eat half her portions \"to control her diabetes mellitus.   She reports shortness of breath, recent orthopnea and PND's.  She reports that her energy is okay.  Her appetite is ok. She denies any dysgeusia or myoclonic jerks or itching.  She denies any chest pain or itching.    Social history: she used to live in her own apartment until September 2024 when she moved to an assisted living.  " She moved to the assisted living because she was lonely and she was not taking food for herself.  She is .  She has 1 daughter and 1 son and 2 grandsons and 1 great great son.  She is retired.      Allergies   Allergen Reactions    Sulfa Antibiotics Hives       Current Outpatient Medications   Medication Sig Dispense Refill    acetaminophen (TYLENOL) 500 MG tablet Take 1 tablet (500 mg) by mouth daily as needed for mild pain. 500 tablet 1    amLODIPine (NORVASC) 10 MG tablet Take 1 tablet (10 mg) by mouth daily. 90 tablet 3    aspirin 81 MG EC tablet Take 1 tablet (81 mg) by mouth daily 90 tablet 3    atorvastatin (LIPITOR) 80 MG tablet Take 1 tablet (80 mg) by mouth at bedtime 90 tablet 3    clopidogrel (PLAVIX) 75 MG tablet Take 1 tablet (75 mg) by mouth daily 90 tablet 3    famotidine (PEPCID) 20 MG tablet Take 1 tablet (20 mg) by mouth 2 times daily 180 tablet 2    gabapentin (NEURONTIN) 300 MG capsule Take 1 capsule (300 mg) by mouth at bedtime 90 capsule 1    glipiZIDE (GLUCOTROL) 10 MG tablet Take 2 tablets (20 mg) by mouth 2 times daily (before meals) 360 tablet 3    hydrALAZINE (APRESOLINE) 25 MG tablet Take 1 tablet (25 mg) by mouth 3 times daily 270 tablet 3    isosorbide mononitrate (IMDUR) 30 MG 24 hr tablet Take 1 tablet (30 mg) by mouth daily 90 tablet 3    loperamide (IMODIUM A-D) 2 MG tablet Take 1 tablet (2 mg) by mouth daily 30 tablet 2    metoprolol succinate ER (TOPROL XL) 100 MG 24 hr tablet Take 1 tablet (100 mg) by mouth daily 90 tablet 3    omeprazole (PRILOSEC) 40 MG DR capsule TAKE ONE CAPSULE BY MOUTH TWICE A DAY AS NEEDED Strength: 40 mg 180 capsule 3    pantoprazole (PROTONIX) 40 MG EC tablet TAKE ONE TABLET BY MOUTH ONCE DAILY 131 tablet 0    vitamin D2 (ERGOCALCIFEROL) 18295 units (1250 mcg) capsule Take 1 capsule (50,000 Units) by mouth once a week. 10 capsule 0    acetaminophen (TYLENOL) 500 MG tablet Take 1 tablet (500 mg) by mouth at bedtime. (Patient not taking: Reported  on 2/18/2025) 500 tablet 1    gabapentin (NEURONTIN) 300 MG capsule TAKE 2 CAPS (600MG) BY MOUTH IN THE EVENING (Patient not taking: Reported on 2/18/2025) 180 capsule 3    torsemide (DEMADEX) 20 MG tablet TAKE 2 TABLETS BY MOUTH EVERY DAY (Patient not taking: Reported on 2/18/2025) 60 tablet 0     No current facility-administered medications for this visit.       Past Medical History:   Diagnosis Date    Diabetic eye exam (H) 05/01/14    Heart attack (H)     Pure hypercholesterolemia     Stented coronary artery     Type II or unspecified type diabetes mellitus without mention of complication, not stated as uncontrolled 2002    [facility].    Unspecified disease of pericardium 12/05/08    Pericarditis w/mild to moderate pericardial effusion.    Unspecified essential hypertension        Past Surgical History:   Procedure Laterality Date    ABDOMEN SURGERY      COLONOSCOPY  04/15/09    COLONOSCOPY  6/18/2014    Procedure: COMBINED COLONOSCOPY, SINGLE BIOPSY/POLYPECTOMY BY BIOPSY;  Surgeon: Earle Mon MD;  Location: [facility] LAPAROSCOPY, SURGICAL; CHOLECYSTECTOMY  1997    Cholecystectomy, Laparoscopic    PHACOEMULSIFICATION CLEAR CORNEA WITH STANDARD INTRAOCULAR LENS IMPLANT Right 3/16/2016    Procedure: PHACOEMULSIFICATION CLEAR CORNEA WITH STANDARD INTRAOCULAR LENS IMPLANT;  Surgeon: George Lemus MD;  Location: [facility]    PHACOEMULSIFICATION CLEAR CORNEA WITH STANDARD INTRAOCULAR LENS IMPLANT Left 3/30/2016    Procedure: PHACOEMULSIFICATION CLEAR CORNEA WITH STANDARD INTRAOCULAR LENS IMPLANT;  Surgeon: George Lemus MD;  Location: [facility]    ZZC NONSPECIFIC PROCEDURE  1988    Hysterectomy       Social History     Tobacco Use    Smoking status: Never    Smokeless tobacco: Never   Vaping Use    Vaping status: Never Used   Substance Use Topics    Alcohol use: No     Alcohol/week: 0.0 standard drinks of alcohol    Drug use: No       Family History   Problem Relation Age of Onset    Diabetes Mother      Arthritis Mother     Heart Disease Mother     Hypertension Mother     Lipids Mother     Neurologic Disorder Mother         neuropathy    Osteoporosis Mother     Obesity Mother     EYE* Mother         blind    Diabetes Father     Genitourinary Problems Father         gall stones    Heart Disease Father         MI    Cancer Maternal Grandmother     Heart Disease Maternal Grandmother     Cancer Other         Grandparents    Alcohol/Drug No family hx of     Alzheimer Disease No family hx of     Anesthesia Reaction No family hx of     Blood Disease No family hx of     Depression No family hx of     Genetic Disorder No family hx of     Gastrointestinal Disease No family hx of     Gynecology No family hx of     Psychotic Disorder No family hx of     Respiratory No family hx of     Cerebrovascular Disease No family hx of        ROS: A 12 system review of systems was negative other than noted here or above.     Exam:  BP (!) 152/58 (BP Location: Left arm, Patient Position: Sitting, Cuff Size: Adult Regular)   Pulse 77   Wt 62.1 kg (137 lb)   SpO2 (!) 88%   BMI 24.27 kg/m      BP Readings from Last 6 Encounters:   02/18/25 (!) 152/58   01/28/25 (!) 193/80   10/08/24 (!) 160/70   06/24/24 (!) 143/68   05/01/24 130/80   04/30/24 (!) 188/78     Wt Readings from Last 4 Encounters:   02/18/25 62.1 kg (137 lb)   01/28/25 61.3 kg (135 lb 1.6 oz)   10/08/24 58.6 kg (129 lb 1.6 oz)   06/24/24 56 kg (123 lb 8 oz)     GENERAL APPEARANCE: alert and no distress  EYES: PERRL  HENT: mouth without ulcers or lesions  NECK: supple, no adenopathy  RESP: lungs clear to auscultation - no rales, rhonchi or wheezes  CV: regular rhythm, normal rate, no rub   ABDOMEN:  soft, nontender, no HSM or masses and bowel sounds normal  MS: extremities normal- no gross deformities noted, no evidence of inflammation in joints, no muscle tenderness  SKIN: no rash  NEURO: Normal strength and tone, sensory exam grossly normal, mentation intact and speech  normal  PSYCH: mentation appears normal. and affect normal/bright  Trace LE edema around ankles bilaterally    Results: Reviewed in details with the patient    Assessment/Plan:   Problem #1 chronic kidney disease stage V: Her creatinine has been progressively worsening since 2020.  Her blood pressure is also rather uncontrolled.  I suspect that her CKD is likely secondary to diabetic kidney disease and hypertensive nephrosclerosis. Her GFR is currently at 10 mL/min.  - She has already attended kidney education classes and she wants to be on dialysis.   I did explain to her that there are 2 forms of dialysis: hemodialysis and peritoneal dialysis;. I explained the procedures and the logistics of every dialysis modality.  She chooses to be on hemodialysis. She is not a candidate for a cadaveric kidney transplant given her age and comorbidities.    - Will refer her for an AV fistula creation.  - Volume status: She has trace lower extremity edema; she also complains of shortness of breath and orthopnea.  I did start her on torsemide 20 mg daily today and asked her to start today.      Problem #2 anemia: Likely anemia of chronic kidney disease.  This is also probably contributing to her shortness of breath.  Her hemoglobin is below goal.  She was already referred to the anemia clinic and she has iron infusion scheduled for March.  - She will need to start MITZI.    Problem #3 hypertension: Partially essential and partially related to her chronic kidney disease.  Her blood pressure is above goal.  - She has been off the lisinopril given her hyperkalemia and she is currently on amlodipine 10 mg daily  - Will start torsemide 20 mg daily which will also help with better blood pressure control  - She was instructed to measure blood pressure more frequently at the facility    Problem #4 CKD MBD low vitamin D: Her calcium is borderline low.   Her phosphorus level is elevated.   -She was started on vitamin D 50,000 units  weekly    Problem #5 hyperkalemia: Secondary to advanced kidney disease   -She has been off the lisinopril and reports not taking it.  We tried calling her several times today to go to the emergency room to have her hyperkalemia fixed with Lokelma however we were not able to reach her.  We ended up calling her daughter Neyda who will relay the message to her.  - She was started today on torsemide which will help with that.  - We will repeat a renal panel in 1 week  - She was instructed to follow a low potassium diet and was given a handout of a low potassium diet.  -She should remain off the lisinopril    Problem #6 metabolic acidosis: Her bicarb is borderline.  Will hold on any sodium bicarb at this point    The total time of this encounter amounted to 51 minutes on the day of the encounter 2/18/2025. This time included face to face time spent with the patient, preparatory work and chart review, ordering tests, and performing post visit documentation.    The longitudinal plan of care for this patient was addressed during this visit. Due to the added complexity in care, I will continue to support the patient with subsequent management of this condition(s) and with the ongoing continuity of care of this condition(s).     *This dictation was prepared in part using Dragon recognition software.  As a result errors may occur. When identified these transcription errors have been corrected.  While every attempt is made to correct errors during dictation, errors may still exist.     Ольга Gonzalez MD   [facility]   Department of Medicine   Division of Renal Disease and Hypertension

## 2025-02-18 NOTE — PROGRESS NOTES
Message #3 left for patient.     Contacted ZULEMA Ayala on file who will continue to work to reach her mother to inform her about the recommendation for the ER visit for the high potassium level and to return call to clinic.

## 2025-02-19 VITALS
OXYGEN SATURATION: 94 % | DIASTOLIC BLOOD PRESSURE: 78 MMHG | RESPIRATION RATE: 18 BRPM | TEMPERATURE: 97.8 F | HEART RATE: 81 BPM | BODY MASS INDEX: 23.73 KG/M2 | SYSTOLIC BLOOD PRESSURE: 176 MMHG | HEIGHT: 64 IN | WEIGHT: 139 LBS

## 2025-02-19 LAB
ALBUMIN SERPL BCG-MCNC: 3.3 G/DL (ref 3.5–5.2)
ANION GAP SERPL CALCULATED.3IONS-SCNC: 10 MMOL/L (ref 7–15)
ANION GAP SERPL CALCULATED.3IONS-SCNC: 13 MMOL/L (ref 7–15)
BASOPHILS # BLD AUTO: 0 10E3/UL (ref 0–0.2)
BASOPHILS NFR BLD AUTO: 0 %
BUN SERPL-MCNC: 54.9 MG/DL (ref 8–23)
BUN SERPL-MCNC: 57.3 MG/DL (ref 8–23)
CALCIUM SERPL-MCNC: 8 MG/DL (ref 8.8–10.4)
CALCIUM SERPL-MCNC: 8.3 MG/DL (ref 8.8–10.4)
CHLORIDE SERPL-SCNC: 111 MMOL/L (ref 98–107)
CHLORIDE SERPL-SCNC: 112 MMOL/L (ref 98–107)
CREAT SERPL-MCNC: 3.92 MG/DL (ref 0.51–0.95)
CREAT SERPL-MCNC: 4.05 MG/DL (ref 0.51–0.95)
EGFRCR SERPLBLD CKD-EPI 2021: 10 ML/MIN/1.73M2
EGFRCR SERPLBLD CKD-EPI 2021: 11 ML/MIN/1.73M2
EOSINOPHIL # BLD AUTO: 0.2 10E3/UL (ref 0–0.7)
EOSINOPHIL NFR BLD AUTO: 3 %
ERYTHROCYTE [DISTWIDTH] IN BLOOD BY AUTOMATED COUNT: 17 % (ref 10–15)
ERYTHROCYTE [DISTWIDTH] IN BLOOD BY AUTOMATED COUNT: 17.1 % (ref 10–15)
GLUCOSE BLDC GLUCOMTR-MCNC: 194 MG/DL (ref 70–99)
GLUCOSE BLDC GLUCOMTR-MCNC: 273 MG/DL (ref 70–99)
GLUCOSE BLDC GLUCOMTR-MCNC: 82 MG/DL (ref 70–99)
GLUCOSE BLDC GLUCOMTR-MCNC: 94 MG/DL (ref 70–99)
GLUCOSE SERPL-MCNC: 187 MG/DL (ref 70–99)
GLUCOSE SERPL-MCNC: 74 MG/DL (ref 70–99)
HCO3 SERPL-SCNC: 19 MMOL/L (ref 22–29)
HCO3 SERPL-SCNC: 19 MMOL/L (ref 22–29)
HCT VFR BLD AUTO: 21.4 % (ref 35–47)
HCT VFR BLD AUTO: 22.3 % (ref 35–47)
HGB BLD-MCNC: 6.6 G/DL (ref 11.7–15.7)
HGB BLD-MCNC: 6.9 G/DL (ref 11.7–15.7)
HOLD SPECIMEN: NORMAL
IMM GRANULOCYTES # BLD: 0 10E3/UL
IMM GRANULOCYTES NFR BLD: 1 %
LYMPHOCYTES # BLD AUTO: 1 10E3/UL (ref 0.8–5.3)
LYMPHOCYTES NFR BLD AUTO: 14 %
MCH RBC QN AUTO: 26.8 PG (ref 26.5–33)
MCH RBC QN AUTO: 27.2 PG (ref 26.5–33)
MCHC RBC AUTO-ENTMCNC: 30.8 G/DL (ref 31.5–36.5)
MCHC RBC AUTO-ENTMCNC: 30.9 G/DL (ref 31.5–36.5)
MCV RBC AUTO: 87 FL (ref 78–100)
MCV RBC AUTO: 88 FL (ref 78–100)
MONOCYTES # BLD AUTO: 0.5 10E3/UL (ref 0–1.3)
MONOCYTES NFR BLD AUTO: 7 %
NEUTROPHILS # BLD AUTO: 5.2 10E3/UL (ref 1.6–8.3)
NEUTROPHILS NFR BLD AUTO: 75 %
NRBC # BLD AUTO: 0 10E3/UL
NRBC BLD AUTO-RTO: 0 /100
PHOSPHATE SERPL-MCNC: 4.9 MG/DL (ref 2.5–4.5)
PLATELET # BLD AUTO: 107 10E3/UL (ref 150–450)
PLATELET # BLD AUTO: 130 10E3/UL (ref 150–450)
POTASSIUM SERPL-SCNC: 5.6 MMOL/L (ref 3.4–5.3)
POTASSIUM SERPL-SCNC: 5.7 MMOL/L (ref 3.4–5.3)
POTASSIUM SERPL-SCNC: 5.8 MMOL/L (ref 3.4–5.3)
RBC # BLD AUTO: 2.46 10E6/UL (ref 3.8–5.2)
RBC # BLD AUTO: 2.54 10E6/UL (ref 3.8–5.2)
SODIUM SERPL-SCNC: 140 MMOL/L (ref 135–145)
SODIUM SERPL-SCNC: 144 MMOL/L (ref 135–145)
WBC # BLD AUTO: 5.3 10E3/UL (ref 4–11)
WBC # BLD AUTO: 7 10E3/UL (ref 4–11)

## 2025-02-19 PROCEDURE — 80069 RENAL FUNCTION PANEL: CPT | Performed by: STUDENT IN AN ORGANIZED HEALTH CARE EDUCATION/TRAINING PROGRAM

## 2025-02-19 PROCEDURE — 96365 THER/PROPH/DIAG IV INF INIT: CPT

## 2025-02-19 PROCEDURE — 250N000013 HC RX MED GY IP 250 OP 250 PS 637: Performed by: FAMILY MEDICINE

## 2025-02-19 PROCEDURE — 36415 COLL VENOUS BLD VENIPUNCTURE: CPT | Performed by: STUDENT IN AN ORGANIZED HEALTH CARE EDUCATION/TRAINING PROGRAM

## 2025-02-19 PROCEDURE — 82962 GLUCOSE BLOOD TEST: CPT

## 2025-02-19 PROCEDURE — 84132 ASSAY OF SERUM POTASSIUM: CPT | Performed by: FAMILY MEDICINE

## 2025-02-19 PROCEDURE — 250N000013 HC RX MED GY IP 250 OP 250 PS 637: Performed by: INTERNAL MEDICINE

## 2025-02-19 PROCEDURE — G0378 HOSPITAL OBSERVATION PER HR: HCPCS

## 2025-02-19 PROCEDURE — 250N000011 HC RX IP 250 OP 636: Performed by: STUDENT IN AN ORGANIZED HEALTH CARE EDUCATION/TRAINING PROGRAM

## 2025-02-19 PROCEDURE — 82565 ASSAY OF CREATININE: CPT | Performed by: INTERNAL MEDICINE

## 2025-02-19 PROCEDURE — 36415 COLL VENOUS BLD VENIPUNCTURE: CPT | Performed by: INTERNAL MEDICINE

## 2025-02-19 PROCEDURE — 36415 COLL VENOUS BLD VENIPUNCTURE: CPT | Performed by: FAMILY MEDICINE

## 2025-02-19 PROCEDURE — 85041 AUTOMATED RBC COUNT: CPT | Performed by: FAMILY MEDICINE

## 2025-02-19 PROCEDURE — 85025 COMPLETE CBC W/AUTO DIFF WBC: CPT | Performed by: INTERNAL MEDICINE

## 2025-02-19 RX ORDER — TORSEMIDE 20 MG/1
20 TABLET ORAL DAILY
Status: DISCONTINUED | OUTPATIENT
Start: 2025-02-19 | End: 2025-02-19 | Stop reason: HOSPADM

## 2025-02-19 RX ORDER — ACETAMINOPHEN 650 MG/1
650 SUPPOSITORY RECTAL EVERY 4 HOURS PRN
Status: DISCONTINUED | OUTPATIENT
Start: 2025-02-19 | End: 2025-02-19 | Stop reason: HOSPADM

## 2025-02-19 RX ORDER — PANTOPRAZOLE SODIUM 40 MG/1
40 TABLET, DELAYED RELEASE ORAL DAILY
Status: DISCONTINUED | OUTPATIENT
Start: 2025-02-19 | End: 2025-02-19 | Stop reason: HOSPADM

## 2025-02-19 RX ORDER — AMOXICILLIN 250 MG
2 CAPSULE ORAL 2 TIMES DAILY PRN
Status: DISCONTINUED | OUTPATIENT
Start: 2025-02-19 | End: 2025-02-19 | Stop reason: HOSPADM

## 2025-02-19 RX ORDER — METOPROLOL SUCCINATE 100 MG/1
100 TABLET, EXTENDED RELEASE ORAL DAILY
Status: DISCONTINUED | OUTPATIENT
Start: 2025-02-19 | End: 2025-02-19

## 2025-02-19 RX ORDER — MAGNESIUM SULFATE 1 G/100ML
1 INJECTION INTRAVENOUS ONCE
Status: COMPLETED | OUTPATIENT
Start: 2025-02-19 | End: 2025-02-19

## 2025-02-19 RX ORDER — PROCHLORPERAZINE MALEATE 5 MG/1
5 TABLET ORAL EVERY 6 HOURS PRN
Status: DISCONTINUED | OUTPATIENT
Start: 2025-02-19 | End: 2025-02-19 | Stop reason: HOSPADM

## 2025-02-19 RX ORDER — ACETAMINOPHEN 325 MG/1
650 TABLET ORAL EVERY 4 HOURS PRN
Status: DISCONTINUED | OUTPATIENT
Start: 2025-02-19 | End: 2025-02-19 | Stop reason: HOSPADM

## 2025-02-19 RX ORDER — TORSEMIDE 20 MG/1
20 TABLET ORAL ONCE
Status: COMPLETED | OUTPATIENT
Start: 2025-02-19 | End: 2025-02-19

## 2025-02-19 RX ORDER — ASPIRIN 81 MG/1
81 TABLET ORAL AT BEDTIME
Status: DISCONTINUED | OUTPATIENT
Start: 2025-02-19 | End: 2025-02-19 | Stop reason: HOSPADM

## 2025-02-19 RX ORDER — ISOSORBIDE MONONITRATE 30 MG/1
30 TABLET, EXTENDED RELEASE ORAL DAILY
Status: DISCONTINUED | OUTPATIENT
Start: 2025-02-19 | End: 2025-02-19

## 2025-02-19 RX ORDER — ONDANSETRON 4 MG/1
4 TABLET, ORALLY DISINTEGRATING ORAL EVERY 6 HOURS PRN
Status: DISCONTINUED | OUTPATIENT
Start: 2025-02-19 | End: 2025-02-19 | Stop reason: HOSPADM

## 2025-02-19 RX ORDER — AMOXICILLIN 250 MG
1 CAPSULE ORAL 2 TIMES DAILY PRN
Status: DISCONTINUED | OUTPATIENT
Start: 2025-02-19 | End: 2025-02-19 | Stop reason: HOSPADM

## 2025-02-19 RX ORDER — AMLODIPINE BESYLATE 5 MG/1
10 TABLET ORAL DAILY
Status: DISCONTINUED | OUTPATIENT
Start: 2025-02-19 | End: 2025-02-19 | Stop reason: HOSPADM

## 2025-02-19 RX ORDER — ISOSORBIDE MONONITRATE 30 MG/1
30 TABLET, EXTENDED RELEASE ORAL DAILY
Status: DISCONTINUED | OUTPATIENT
Start: 2025-02-19 | End: 2025-02-19 | Stop reason: HOSPADM

## 2025-02-19 RX ORDER — ONDANSETRON 2 MG/ML
4 INJECTION INTRAMUSCULAR; INTRAVENOUS EVERY 6 HOURS PRN
Status: DISCONTINUED | OUTPATIENT
Start: 2025-02-19 | End: 2025-02-19 | Stop reason: HOSPADM

## 2025-02-19 RX ORDER — METOPROLOL SUCCINATE 100 MG/1
100 TABLET, EXTENDED RELEASE ORAL DAILY
Status: DISCONTINUED | OUTPATIENT
Start: 2025-02-19 | End: 2025-02-19 | Stop reason: HOSPADM

## 2025-02-19 RX ORDER — TORSEMIDE 20 MG/1
20 TABLET ORAL DAILY
Status: DISCONTINUED | OUTPATIENT
Start: 2025-02-19 | End: 2025-02-19

## 2025-02-19 RX ORDER — FAMOTIDINE 20 MG/1
20 TABLET, FILM COATED ORAL 2 TIMES DAILY
Status: DISCONTINUED | OUTPATIENT
Start: 2025-02-19 | End: 2025-02-19 | Stop reason: HOSPADM

## 2025-02-19 RX ADMIN — ISOSORBIDE MONONITRATE 30 MG: 30 TABLET, EXTENDED RELEASE ORAL at 06:58

## 2025-02-19 RX ADMIN — AMLODIPINE BESYLATE 10 MG: 5 TABLET ORAL at 06:58

## 2025-02-19 RX ADMIN — PANTOPRAZOLE SODIUM 40 MG: 40 TABLET, DELAYED RELEASE ORAL at 08:16

## 2025-02-19 RX ADMIN — MAGNESIUM SULFATE HEPTAHYDRATE 1 G: 1 INJECTION, SOLUTION INTRAVENOUS at 00:22

## 2025-02-19 RX ADMIN — METOPROLOL SUCCINATE 100 MG: 100 TABLET, EXTENDED RELEASE ORAL at 06:58

## 2025-02-19 RX ADMIN — FAMOTIDINE 20 MG: 20 TABLET, FILM COATED ORAL at 08:16

## 2025-02-19 RX ADMIN — TORSEMIDE 20 MG: 20 TABLET ORAL at 06:58

## 2025-02-19 RX ADMIN — TORSEMIDE 20 MG: 20 TABLET ORAL at 12:26

## 2025-02-19 ASSESSMENT — ACTIVITIES OF DAILY LIVING (ADL)
ADLS_ACUITY_SCORE: 59

## 2025-02-19 NOTE — ED PROVIDER NOTES
History     Chief Complaint   Patient presents with    Abnormal Labs     HPI  Eliza Dubois is a 84 year old female who ending for concerns of abnormal lab work.  Patient has extensive past medical history including chronic kidney disease, chronic anemia, hypocalcemia, platelet dysfunction, chronic fatigue, history of bilateral pleural effusions pneumonia, hyper magnesium, stage V kidney disease, type 2 diabetes.  She was seen by her nephrologist today we discussed dialysis with the patient and ultimately she is open to this idea.  She was ultimately also found to be hyperkalemic and was recently changed from her lisinopril to amlodipine for concerns of the hyperkalemia and provided with torsemide.  This was all started today and has not had any the doses.  She was sent here after being noted to have a hyperkalemia for Lokelma.  She was also borderline hyperkalemic likely secondary to her chronic kidney pathology.      Allergies:  Allergies   Allergen Reactions    Sulfa Antibiotics Hives       Problem List:    Patient Active Problem List    Diagnosis Date Noted    Hyperkalemia 02/18/2025     Priority: Medium    Anemia of chronic renal failure, stage 5 (H) 01/31/2025     Priority: Medium    CKD (chronic kidney disease) stage 5, GFR less than 15 ml/min (H) 01/31/2025     Priority: Medium    Hypocalcemia 06/20/2024     Priority: Medium    Chronic anemia 06/20/2024     Priority: Medium    Hypoalbuminemia 06/20/2024     Priority: Medium    Platelet function defect (H) 06/20/2024     Priority: Medium    Lower extremity edema 06/20/2024     Priority: Medium    Acute encephalopathy 06/20/2024     Priority: Medium    Heart failure with preserved ejection fraction, NYHA class I (H) 04/24/2024     Priority: Medium    Weakness 01/28/2021     Priority: Medium    Vaccine reaction, initial encounter 01/28/2021     Priority: Medium    Fatigue, unspecified type 01/28/2021     Priority: Medium    History of COVID-19 01/28/2021      Priority: Medium    Bilateral pleural effusion 12/13/2020     Priority: Medium    Bilateral pneumonia 12/13/2020     Priority: Medium    Pneumonia due to 2019 novel coronavirus 12/12/2020     Priority: Medium    Prerenal azotemia 12/12/2020     Priority: Medium    Hyperphosphatemia 12/11/2020     Priority: Medium    Closed fracture of proximal end of left humerus with routine healing 12/09/2020     Priority: Medium    Nausea vomiting and diarrhea 12/09/2020     Priority: Medium    Hypomagnesemia 12/09/2020     Priority: Medium    Generalized muscle weakness 12/08/2020     Priority: Medium    E. coli UTI 12/08/2020     Priority: Medium    2019 novel coronavirus disease (COVID-19) 12/08/2020     Priority: Medium    Stage 3b chronic kidney disease (H) 05/27/2019     Priority: Medium    Type 2 diabetes mellitus with diabetic polyneuropathy, without long-term current use of insulin (H) 10/11/2016     Priority: Medium    Essential hypertension with goal blood pressure less than 140/90 09/12/2016     Priority: Medium    Gastroesophageal reflux disease without esophagitis 05/13/2016     Priority: Medium    Coronary artery disease involving native coronary artery of native heart without angina pectoris 02/25/2016     Priority: Medium    Major depressive disorder, recurrent episode, moderate (H) 11/10/2015     Priority: Medium    Anxiety 05/08/2012     Priority: Medium    Insomnia 05/03/2012     Priority: Medium    Hypertension goal BP (blood pressure) < 140/90 01/20/2011     Priority: Medium    Hyperlipidemia LDL goal <100 10/31/2010     Priority: Medium    Esophageal reflux 08/30/2007     Priority: Medium    Irritable bowel syndrome 09/25/2006     Priority: Medium        Past Medical History:    Past Medical History:   Diagnosis Date    Diabetic eye exam (H) 05/01/14    Heart attack (H)     Pure hypercholesterolemia     Stented coronary artery     Type II or unspecified type diabetes mellitus without mention of  complication, not stated as uncontrolled 2002    Unspecified disease of pericardium 12/05/08    Unspecified essential hypertension        Past Surgical History:    Past Surgical History:   Procedure Laterality Date    ABDOMEN SURGERY      COLONOSCOPY  04/15/09    COLONOSCOPY  6/18/2014    Procedure: COMBINED COLONOSCOPY, SINGLE BIOPSY/POLYPECTOMY BY BIOPSY;  Surgeon: Earle Mon MD;  Location:  GI     LAPAROSCOPY, SURGICAL; CHOLECYSTECTOMY  1997    Cholecystectomy, Laparoscopic    PHACOEMULSIFICATION CLEAR CORNEA WITH STANDARD INTRAOCULAR LENS IMPLANT Right 3/16/2016    Procedure: PHACOEMULSIFICATION CLEAR CORNEA WITH STANDARD INTRAOCULAR LENS IMPLANT;  Surgeon: George Lemus MD;  Location: [facility]    PHACOEMULSIFICATION CLEAR CORNEA WITH STANDARD INTRAOCULAR LENS IMPLANT Left 3/30/2016    Procedure: PHACOEMULSIFICATION CLEAR CORNEA WITH STANDARD INTRAOCULAR LENS IMPLANT;  Surgeon: George Lemus MD;  Location: [facility]    ZZC NONSPECIFIC PROCEDURE  1988    Hysterectomy       Family History:    Family History   Problem Relation Age of Onset    Diabetes Mother     Arthritis Mother     Heart Disease Mother     Hypertension Mother     Lipids Mother     Neurologic Disorder Mother         neuropathy    Osteoporosis Mother     Obesity Mother     EYE* Mother         blind    Diabetes Father     Genitourinary Problems Father         gall stones    Heart Disease Father         MI    Cancer Maternal Grandmother     Heart Disease Maternal Grandmother     Cancer Other         Grandparents    Alcohol/Drug No family hx of     Alzheimer Disease No family hx of     Anesthesia Reaction No family hx of     Blood Disease No family hx of     Depression No family hx of     Genetic Disorder No family hx of     Gastrointestinal Disease No family hx of     Gynecology No family hx of     Psychotic Disorder No family hx of     Respiratory No family hx of     Cerebrovascular Disease No family hx of        Social History:  Marital  "Status:   [5]  Social History     Tobacco Use    Smoking status: Never    Smokeless tobacco: Never   Vaping Use    Vaping status: Never Used   Substance Use Topics    Alcohol use: No     Alcohol/week: 0.0 standard drinks of alcohol    Drug use: No        Medications:    acetaminophen (TYLENOL) 500 MG tablet  amLODIPine (NORVASC) 10 MG tablet  aspirin 81 MG EC tablet  atorvastatin (LIPITOR) 80 MG tablet  clopidogrel (PLAVIX) 75 MG tablet  famotidine (PEPCID) 20 MG tablet  gabapentin (NEURONTIN) 300 MG capsule  glipiZIDE (GLUCOTROL) 10 MG tablet  hydrALAZINE (APRESOLINE) 25 MG tablet  isosorbide mononitrate (IMDUR) 30 MG 24 hr tablet  loperamide (IMODIUM A-D) 2 MG tablet  metoprolol succinate ER (TOPROL XL) 100 MG 24 hr tablet  omeprazole (PRILOSEC) 40 MG DR capsule  pantoprazole (PROTONIX) 40 MG EC tablet  vitamin D2 (ERGOCALCIFEROL) 49632 units (1250 mcg) capsule  torsemide (DEMADEX) 20 MG tablet          Review of Systems   All other systems reviewed and are negative.      Physical Exam   BP: (!) 165/74  Pulse: 84  Temp: 97.6  F (36.4  C)  Resp: 18  Height: 162.6 cm (5' 4\")  Weight: 63 kg (139 lb)  SpO2: 92 %      Physical Exam  Vitals and nursing note reviewed.   Constitutional:       General: She is not in acute distress.     Appearance: Normal appearance. She is not diaphoretic.   HENT:      Head: Normocephalic and atraumatic.      Mouth/Throat:      Mouth: Mucous membranes are moist.   Eyes:      General: No scleral icterus.     Conjunctiva/sclera: Conjunctivae normal.   Cardiovascular:      Rate and Rhythm: Normal rate.      Heart sounds: Normal heart sounds.   Pulmonary:      Effort: No respiratory distress.      Breath sounds: Normal breath sounds.   Abdominal:      General: Abdomen is flat.   Musculoskeletal:      Cervical back: Neck supple.   Skin:     General: Skin is warm.      Findings: No rash.   Neurological:      Mental Status: She is alert.         ED Course        Procedures         EKG self " interpreted at 1915.  Normal sinus rhythm at 83 bpm with apparent 12 192, QRS of 92 and a QTc of 453.  No significant arrhythmia ectopic beats axis deviation or bundle-branch blocks.  EKG consistent with previous done in June 2024.  No signs of STEMI.    Critical Care time:  was 35 minutes for this patient excluding procedures.      Results for orders placed or performed during the hospital encounter of 02/18/25 (from the past 24 hours)   EKG 12-lead, tracing only   Result Value Ref Range    Systolic Blood Pressure  mmHg    Diastolic Blood Pressure  mmHg    Ventricular Rate 83 BPM    Atrial Rate 83 BPM    TN Interval 192 ms    QRS Duration 92 ms     ms    QTc 453 ms    P Axis 66 degrees    R AXIS 67 degrees    T Axis 61 degrees    Interpretation ECG       Sinus rhythm  Possible Anterior infarct , age undetermined  Abnormal ECG  No previous ECGs available  Confirmed by SEE ED PROVIDER NOTE FOR, ECG INTERPRETATION ([id number]),  Claribel Cheatham ([id number]) on 2/18/2025 7:57:18 PM     Basic metabolic panel   Result Value Ref Range    Sodium 140 135 - 145 mmol/L    Potassium 6.1 (HH) 3.4 - 5.3 mmol/L    Chloride 109 (H) 98 - 107 mmol/L    Carbon Dioxide (CO2) 15 (L) 22 - 29 mmol/L    Anion Gap 16 (H) 7 - 15 mmol/L    Urea Nitrogen 55.3 (H) 8.0 - 23.0 mg/dL    Creatinine 4.00 (H) 0.51 - 0.95 mg/dL    GFR Estimate 10 (L) >60 mL/min/1.73m2    Calcium 8.2 (L) 8.8 - 10.4 mg/dL    Glucose 368 (H) 70 - 99 mg/dL   CBC with platelets differential    Narrative    The following orders were created for panel order CBC with platelets differential.  Procedure                               Abnormality         Status                     ---------                               -----------         ------                     CBC with platelets and d...[[id number]]  Abnormal            Final result                 Please view results for these tests on the individual orders.   CBC with platelets and differential   Result Value Ref Range     WBC Count 8.0 4.0 - 11.0 10e3/uL    RBC Count 2.80 (L) 3.80 - 5.20 10e6/uL    Hemoglobin 7.5 (L) 11.7 - 15.7 g/dL    Hematocrit 24.5 (L) 35.0 - 47.0 %    MCV 88 78 - 100 fL    MCH 26.8 26.5 - 33.0 pg    MCHC 30.6 (L) 31.5 - 36.5 g/dL    RDW 17.1 (H) 10.0 - 15.0 %    Platelet Count 144 (L) 150 - 450 10e3/uL    % Neutrophils 75 %    % Lymphocytes 13 %    % Monocytes 9 %    % Eosinophils 2 %    % Basophils 0 %    % Immature Granulocytes 0 %    NRBCs per 100 WBC 0 <1 /100    Absolute Neutrophils 6.0 1.6 - 8.3 10e3/uL    Absolute Lymphocytes 1.1 0.8 - 5.3 10e3/uL    Absolute Monocytes 0.7 0.0 - 1.3 10e3/uL    Absolute Eosinophils 0.1 0.0 - 0.7 10e3/uL    Absolute Basophils 0.0 0.0 - 0.2 10e3/uL    Absolute Immature Granulocytes 0.0 <=0.4 10e3/uL    Absolute NRBCs 0.0 10e3/uL   Nt probnp inpatient (BNP)   Result Value Ref Range    N terminal Pro BNP Inpatient 20,348 (H) 0 - 1,800 pg/mL   XR Chest Port 1 View    Narrative    EXAM: XR CHEST PORT 1 VIEW  LOCATION: [facility]  DATE: 2/18/2025    INDICATION: rales   renal disease   HF   hypoxic  COMPARISON: X-ray 6/20/2024      Impression    IMPRESSION: Low lung volumes. Trace bilateral pleural effusions and mild bibasilar pulmonary opacities which may reflect atelectasis or pneumonic infiltrates, left greater than right. Stable heart size and central vascular prominence. Chronic appearing   left proximal humerus fracture deformity.   Influenza A/B, RSV and SARS-CoV2 PCR (COVID-19) Nose    Specimen: Nose; Swab   Result Value Ref Range    Influenza A PCR Negative Negative    Influenza B PCR Negative Negative    RSV PCR Negative Negative    SARS CoV2 PCR Negative Negative    Narrative    Testing was performed using the Xpert Xpress CoV2/Flu/RSV Assay on the Cepheid GeneXpert Instrument. This test should be ordered for the detection of SARS-CoV2, influenza, and RSV viruses in individuals with signs and symptoms of respiratory tract infection.  This test is for in vitro diagnostic use under the [place] FDA for laboratories certified under CLIA to perform high or moderate complexity testing. This test has been [place] FDA cleared. A negative result does not rule out the presence of PCR inhibitors in the specimen or target RNA in concentration below the limit of detection for the assay. If only one viral target is positive but coinfection with multiple targets is suspected, the sample should be re-tested with another FDA cleared, approved, or authorized test, if coninfection would change clinical management. This test was validated by the [facility]. These laboratories are certified under the Clinical Laboratory Improvement Amendments of 1988 (CLIA-88) as qualified to perfom high complexity laboratory testing.       Medications   sodium zirconium cyclosilicate (LOKELMA) packet 10 g (10 g Oral $Given 2/18/25 2001)     Followed by   sodium zirconium cyclosilicate (LOKELMA) packet 10 g (has no administration in time range)   bumetanide (BUMEX) injection 1 mg (1 mg Intravenous $Given 2/18/25 2001)       Assessments & Plan (with Medical Decision Making)     I have reviewed the nursing notes.    I have reviewed the findings, diagnosis, plan and need for follow up with the patient.      Medical Decision Making  Eliza Dubois is a 84 year old female who ending for concerns of abnormal lab work.  Patient has extensive past medical history including chronic kidney disease, chronic anemia, hypocalcemia, platelet dysfunction, chronic fatigue, history of bilateral pleural effusions pneumonia, hyper magnesium, stage V kidney disease, type 2 diabetes.  She was seen by her nephrologist today we discussed dialysis with the patient and ultimately she is open to this idea.  She was ultimately also found to be hyperkalemic and was recently changed from her lisinopril to amlodipine for concerns of the hyperkalemia and provided with torsemide.  This was all started  today and has not had any the doses.  She was sent here after being noted to have a hyperkalemia for Lokelma.  She was also borderline hyperkalemic likely secondary to her chronic kidney pathology.     Vitals show blood pressure of 164/74 temperature of 97.6 pulse of 84 and oxygen saturation of 92% room air.  Her EKG was interpreted with no obvious signs of significant hyperkalemic changes.  Will repeat CBC and BMP.    Workup shows a unremarkable influenza COVID RSV.  Her BNP was elevated 20,000 within patient's baseline.  She is however about 10 pounds up over the past 3 to 4 months.  Her BMP showed a potassium 6.1 with a bicarb of 15 anion gap of 16 creatinine of 4.0 and a glucose of 368.  On chart review nephrology noted starting Lokelma and had provided patient torsemide has not been provided therefore we will also give her some Bumex at this point for both the hyperkalemia as well as the concern for fluid overload due to the chest imaging also showing some mild infiltration and patient being moderately hypoxic with the requirement of 1 to 2 L of nasal cannula oxygen.  She is anemic at 7.5 which seems to be within patient's baseline previous as well as mildly low and her platelets are 144.  On review of all her previous labs that she is patient's moderately within similar range over the past 4 months however due to her stage V kidney disease we did discuss keeping here however after discussion with nephrology they noted that patient would possibly benefit from a closer evaluation by nephrologist and consideration of earlier dialysis due to the likelihood of her kidney function worsening with a mild diuresis that we will likely need to be completed safely as well as with her hyperkalemia may require further intervention.  Ultimately did discuss the case with the hospitalist team here and they are in agreement to keep the patient here however we will plan to transfer to higher level of care at this point due to  patient's renal requirements.  No bed availability in the [facility] system therefore we will go forward with [facility] as our next option per patient request.    Ultimately no beds at [facility] or [facility].  Will continue to look for bed availability.  Will repeat a BMP at 2 AM.  Evaluate for oxygen requirements as well as patient's need for further potassium management.  At that time we will assess if any further Lokelma or other agents are required for further addressing for hyperkalemia.    Discussed with Dr. Katz to follow-up on BMP at 2 AM.    New Prescriptions    No medications on file       Final diagnoses:   Hyperkalemia   CKD (chronic kidney disease) stage 5, GFR less than 15 ml/min (H)   Acute respiratory failure with hypoxia (H)   Hyperglycemia   Generalized edema due to fluid overload   Pulmonary infiltrate       2/18/2025   [facility] EMERGENCY DEPT       Denis Perez MD  02/18/25 [id number]

## 2025-02-19 NOTE — MEDICATION SCRIBE - ADMISSION MEDICATION HISTORY
Medication Scribe Admission Medication History    Admission medication history is complete. The information provided in this note is only as accurate as the sources available at the time of the update.    Information Source(s): Patient and CareEverywhere/SureScripts via N/A    Pertinent Information: med scribe review post RN review. Cleared reconcile list with dispense history.     Changes made to PTA medication list:  Added: None  Deleted: None  Changed: None    Allergies reviewed with patient and updates made in EHR: no    Medication History Completed By: MELANI MORRIS 2/19/2025 1:16 PM    PTA Med List   Medication Sig Last Dose/Taking    acetaminophen (TYLENOL) 500 MG tablet Take 1 tablet (500 mg) by mouth daily as needed for mild pain. Past Month    amLODIPine (NORVASC) 10 MG tablet Take 1 tablet (10 mg) by mouth daily. 2/18/2025 Morning    aspirin 81 MG EC tablet Take 1 tablet (81 mg) by mouth daily 2/17/2025 Bedtime    atorvastatin (LIPITOR) 80 MG tablet Take 1 tablet (80 mg) by mouth at bedtime 2/17/2025 Bedtime    clopidogrel (PLAVIX) 75 MG tablet Take 1 tablet (75 mg) by mouth daily 2/18/2025 Morning    famotidine (PEPCID) 20 MG tablet Take 1 tablet (20 mg) by mouth 2 times daily 2/18/2025 Morning    gabapentin (NEURONTIN) 300 MG capsule Take 1 capsule (300 mg) by mouth at bedtime 2/17/2025 Bedtime    glipiZIDE (GLUCOTROL) 10 MG tablet Take 2 tablets (20 mg) by mouth 2 times daily (before meals) 2/18/2025 Evening    hydrALAZINE (APRESOLINE) 25 MG tablet Take 1 tablet (25 mg) by mouth 3 times daily 2/18/2025 Noon    isosorbide mononitrate (IMDUR) 30 MG 24 hr tablet Take 1 tablet (30 mg) by mouth daily 2/18/2025 Morning    loperamide (IMODIUM A-D) 2 MG tablet Take 1 tablet (2 mg) by mouth daily 2/17/2025 Morning    metoprolol succinate ER (TOPROL XL) 100 MG 24 hr tablet Take 1 tablet (100 mg) by mouth daily 2/18/2025 Morning    omeprazole (PRILOSEC) 40 MG DR capsule TAKE ONE CAPSULE BY MOUTH TWICE A DAY AS  NEEDED Strength: 40 mg (Patient taking differently: Take 40 mg by mouth 2 times daily as needed. TAKE ONE CAPSULE BY MOUTH TWICE A DAY AS NEEDED Strength: 40 mg) 2/18/2025 Morning    pantoprazole (PROTONIX) 40 MG EC tablet TAKE ONE TABLET BY MOUTH ONCE DAILY 2/18/2025 Morning    vitamin D2 (ERGOCALCIFEROL) 87908 units (1250 mcg) capsule Take 1 capsule (50,000 Units) by mouth once a week. 2/17/2025 Morning

## 2025-02-19 NOTE — ED NOTES
Writer entered room to perform phlebotomy. Patient family had questions regarding transfer status, writer advised having no knowledge of plan. Primary RN advised of family questions, will round when she is finished with other patient tasks.

## 2025-02-19 NOTE — ED PROVIDER NOTES
I was asked by . Dr. Perez to take over the care of this patient at shift change and follow-up on lab results that are pending at 2:00 this morning.  He stated the patient is an 84-year-old patient who presented to the ER after being seen by her nephrologist earlier today to have Lokelma initiated secondary to an elevated potassium level noted with blood testing done at her nephrology clinic.  Patient is apparently a candidate for hemodialysis and they are going to initiate the process of getting her set up for dialysis access in the next month.  Dr. Perez stated that the patient received 1 dose of Lokelma orally here in the ER.  She also was found to have some fluid overload and is mildly hypoxic requiring 1 L of oxygen per minute to keep her oxygen saturations above 90%.  He stated there currently are no beds available in the area both at the [facility], [facility], and [facility].  He stated that he spoke with nephrology and they felt she would be best served by being admitted especially if she needs some diuresis for close monitoring of her kidneys and she may need dialysis sooner than later.  I did review his ER provider note and copied a summary of his recommendations below:       Medical Decision Making  Eliza Dubois is a 84 year old female who ending for concerns of abnormal lab work.  Patient has extensive past medical history including chronic kidney disease, chronic anemia, hypocalcemia, platelet dysfunction, chronic fatigue, history of bilateral pleural effusions pneumonia, hyper magnesium, stage V kidney disease, type 2 diabetes.  She was seen by her nephrologist today we discussed dialysis with the patient and ultimately she is open to this idea.  She was ultimately also found to be hyperkalemic and was recently changed from her lisinopril to amlodipine for concerns of the hyperkalemia and provided with torsemide.  This was all started today  and has not had any the doses.  She was sent here after being noted to have a hyperkalemia for Lokelma.  She was also borderline hyperkalemic likely secondary to her chronic kidney pathology.      Vitals show blood pressure of 164/74 temperature of 97.6 pulse of 84 and oxygen saturation of 92% room air.  Her EKG was interpreted with no obvious signs of significant hyperkalemic changes.  Will repeat CBC and BMP.     Workup shows a unremarkable influenza COVID RSV.  Her BNP was elevated 20,000 within patient's baseline.  She is however about 10 pounds up over the past 3 to 4 months.  Her BMP showed a potassium 6.1 with a bicarb of 15 anion gap of 16 creatinine of 4.0 and a glucose of 368.  On chart review nephrology noted starting Lokelma and had provided patient torsemide has not been provided therefore we will also give her some Bumex at this point for both the hyperkalemia as well as the concern for fluid overload due to the chest imaging also showing some mild infiltration and patient being moderately hypoxic with the requirement of 1 to 2 L of nasal cannula oxygen.  She is anemic at 7.5 which seems to be within patient's baseline previous as well as mildly low and her platelets are 144.  On review of all her previous labs that she is patient's moderately within similar range over the past 4 months however due to her stage V kidney disease we did discuss keeping here however after discussion with nephrology they noted that patient would possibly benefit from a closer evaluation by nephrologist and consideration of earlier dialysis due to the likelihood of her kidney function worsening with a mild diuresis that we will likely need to be completed safely as well as with her hyperkalemia may require further intervention.  Ultimately did discuss the case with the hospitalist team here and they are in agreement to keep the patient here however we will plan to transfer to higher level of care at this point due to  patient's renal requirements.  No bed availability in the [facility] system therefore we will go forward with [facility] as our next option per patient request.     Ultimately no beds at [facility] or [facility] systems.  Will continue to look for bed availability.  Will repeat a BMP at 2 AM.  Evaluate for oxygen requirements as well as patient's need for further potassium management.  At that time we will assess if any further Lokelma or other agents are required for further addressing for hyperkalemia.     Discussed with Dr. Katz to follow-up on BMP at 2 AM.    I introduced myself to the patient.  She states that she is comfortable currently.  Discussed the plan with her for the repeat blood draw at 2 AM.  There were still looking for a bed placement for her.  She was in agreement with the plan of care at this time.      Results for orders placed or performed during the hospital encounter of 02/18/25   XR Chest Port 1 View     Status: None    Narrative    EXAM: XR CHEST PORT 1 VIEW  LOCATION: [facility]  DATE: 2/18/2025    INDICATION: rales   renal disease   HF   hypoxic  COMPARISON: X-ray 6/20/2024      Impression    IMPRESSION: Low lung volumes. Trace bilateral pleural effusions and mild bibasilar pulmonary opacities which may reflect atelectasis or pneumonic infiltrates, left greater than right. Stable heart size and central vascular prominence. Chronic appearing   left proximal humerus fracture deformity.   Basic metabolic panel     Status: Abnormal   Result Value Ref Range    Sodium 140 135 - 145 mmol/L    Potassium 6.1 (HH) 3.4 - 5.3 mmol/L    Chloride 109 (H) 98 - 107 mmol/L    Carbon Dioxide (CO2) 15 (L) 22 - 29 mmol/L    Anion Gap 16 (H) 7 - 15 mmol/L    Urea Nitrogen 55.3 (H) 8.0 - 23.0 mg/dL    Creatinine 4.00 (H) 0.51 - 0.95 mg/dL    GFR Estimate 10 (L) >60 mL/min/1.73m2    Calcium 8.2 (L) 8.8 - 10.4 mg/dL    Glucose 368 (H) 70 - 99 mg/dL   CBC with platelets and  differential     Status: Abnormal   Result Value Ref Range    WBC Count 8.0 4.0 - 11.0 10e3/uL    RBC Count 2.80 (L) 3.80 - 5.20 10e6/uL    Hemoglobin 7.5 (L) 11.7 - 15.7 g/dL    Hematocrit 24.5 (L) 35.0 - 47.0 %    MCV 88 78 - 100 fL    MCH 26.8 26.5 - 33.0 pg    MCHC 30.6 (L) 31.5 - 36.5 g/dL    RDW 17.1 (H) 10.0 - 15.0 %    Platelet Count 144 (L) 150 - 450 10e3/uL    % Neutrophils 75 %    % Lymphocytes 13 %    % Monocytes 9 %    % Eosinophils 2 %    % Basophils 0 %    % Immature Granulocytes 0 %    NRBCs per 100 WBC 0 <1 /100    Absolute Neutrophils 6.0 1.6 - 8.3 10e3/uL    Absolute Lymphocytes 1.1 0.8 - 5.3 10e3/uL    Absolute Monocytes 0.7 0.0 - 1.3 10e3/uL    Absolute Eosinophils 0.1 0.0 - 0.7 10e3/uL    Absolute Basophils 0.0 0.0 - 0.2 10e3/uL    Absolute Immature Granulocytes 0.0 <=0.4 10e3/uL    Absolute NRBCs 0.0 10e3/uL   Nt probnp inpatient (BNP)     Status: Abnormal   Result Value Ref Range    N terminal Pro BNP Inpatient 20,348 (H) 0 - 1,800 pg/mL   Influenza A/B, RSV and SARS-CoV2 PCR (COVID-19) Nose     Status: Normal    Specimen: Nose; Swab   Result Value Ref Range    Influenza A PCR Negative Negative    Influenza B PCR Negative Negative    RSV PCR Negative Negative    SARS CoV2 PCR Negative Negative    Narrative    Testing was performed using the Xpert Xpress CoV2/Flu/RSV Assay on the [employer] GeneXpert Instrument. This test should be ordered for the detection of SARS-CoV2, influenza, and RSV viruses in individuals with signs and symptoms of respiratory tract infection. This test is for in vitro diagnostic use under the [place] FDA for laboratories certified under CLIA to perform high or moderate complexity testing. This test has been [place] FDA cleared. A negative result does not rule out the presence of PCR inhibitors in the specimen or target RNA in concentration below the limit of detection for the assay. If only one viral target is positive but coinfection with multiple targets is suspected, the sample  should be re-tested with another FDA cleared, approved, or authorized test, if coninfection would change clinical management. This test was validated by the [facility]. These laboratories are certified under the Clinical Laboratory Improvement Amendments of 1988 (CLIA-88) as qualified to perfom high complexity laboratory testing.   Magnesium     Status: Abnormal   Result Value Ref Range    Magnesium 1.4 (L) 1.7 - 2.3 mg/dL   Glucose by meter     Status: Abnormal   Result Value Ref Range    GLUCOSE BY METER POCT 256 (H) 70 - 99 mg/dL   Basic metabolic panel     Status: Abnormal   Result Value Ref Range    Sodium 140 135 - 145 mmol/L    Potassium 5.8 (H) 3.4 - 5.3 mmol/L    Chloride 111 (H) 98 - 107 mmol/L    Carbon Dioxide (CO2) 19 (L) 22 - 29 mmol/L    Anion Gap 10 7 - 15 mmol/L    Urea Nitrogen 57.3 (H) 8.0 - 23.0 mg/dL    Creatinine 3.92 (H) 0.51 - 0.95 mg/dL    GFR Estimate 11 (L) >60 mL/min/1.73m2    Calcium 8.0 (L) 8.8 - 10.4 mg/dL    Glucose 187 (H) 70 - 99 mg/dL   EKG 12-lead, tracing only     Status: None   Result Value Ref Range    Systolic Blood Pressure  mmHg    Diastolic Blood Pressure  mmHg    Ventricular Rate 83 BPM    Atrial Rate 83 BPM    AZ Interval 192 ms    QRS Duration 92 ms     ms    QTc 453 ms    P Axis 66 degrees    R AXIS 67 degrees    T Axis 61 degrees    Interpretation ECG       Sinus rhythm  Possible Anterior infarct , age undetermined  Abnormal ECG  No previous ECGs available  Confirmed by SEE ED PROVIDER NOTE FOR, ECG INTERPRETATION ([id number]),  Claribel Cheatham ([id number]) on 2/18/2025 7:57:18 PM     CBC with platelets differential     Status: Abnormal    Narrative    The following orders were created for panel order CBC with platelets differential.  Procedure                               Abnormality         Status                     ---------                               -----------         ------                     CBC with platelets and d...[[id number]]   Abnormal            Final result                 Please view results for these tests on the individual orders.       The patient's repeat blood test shows improvement in her potassium down to 5.8 which is kind of her baseline range in review of her lab results over the last several months.  She continues to need the oxygen at 1 L/min by nasal cannula to keep her sats above 90%, however.  The patient is on the wait list for the next available bed as has been recommended by nephrology for close monitoring given the need for diuresis because of her shortness of breath/volume overload.    Dx:  1. Hyperkalemia    2. CKD (chronic kidney disease) stage 5, GFR less than 15 ml/min (H)    3. Acute respiratory failure with hypoxia (H)    4. Hyperglycemia    5. Generalized edema due to fluid overload    6. Pulmonary infiltrate        Patient signed out to Dr. Tawanda Lion at shift change.       Anuj Katz,   02/19/25 0423

## 2025-02-19 NOTE — ED TRIAGE NOTES
Pt reports she saw her nephrologist today and [facility] for follow up labs and they called her back this evening advising her to be seen due to high potassium levels. She denies any chest pain.      Triage Assessment (Adult)       Row Name 02/18/25 [id number]          Triage Assessment    Airway WDL WDL        Respiratory WDL    Respiratory WDL WDL        Skin Circulation/Temperature WDL    Skin Circulation/Temperature WDL WDL        Cardiac WDL    Cardiac WDL WDL        Peripheral/Neurovascular WDL    Peripheral Neurovascular WDL WDL        Cognitive/Neuro/Behavioral WDL    Cognitive/Neuro/Behavioral WDL WDL

## 2025-02-19 NOTE — PROGRESS NOTES
"Transfer Type: [facility]  Transfer Triage Note    Date of call: 02/19/25  Time of call: 10:22 AM    Current Patient Location:  [facility]   Current Level of Care: ED    Vitals: Temp: 97.6  F (36.4  C) Temp src: Oral BP: (!) 143/70 Pulse: 69   Resp: 15 SpO2: 93 % Height: 162.6 cm (5' 4\") Weight: 63 kg (139 lb)  O2 Device: Nasal cannula at Oxygen Delivery: 4 LPM  Diagnosis: CKD, fluid overload, anemia, likely needs HD initiation   Reason for requested transfer: Further diagnostic work up, management, and consultation for specialized care   Isolation Needs: None    Care everywhere has been updated and reviewed: Yes  Necessary images have been sent through PACS: Yes    If patient is transferring for specialty care or specific procedure, the specialist required has participated in the transfer call and agreed with need for transfer and anticipated timeline: No    Transfer accepted: Yes  Stability of Patient: Patient is vitally stable, with no critical labs, and will likely remain stable throughout the transfer process  Is the patient appropriate for [facility]? NA   Level of Care Needed: Med Surg  Telemetry Needed:  Med (Remote) Telemetry  Expected Time of Arrival for Transfer: 0-8 hours  Arrival Location:  [facility]     Recommendations for Management and Stabilization: Not needed    Additional Comments:     Eliza Dubois is a 84 year old female  who presents for evaluation of advanced CKD.  She lives by herself in an assisted living.  She is here today with her friend.  She has a longstanding history of diabetes dating back to at least 2002.  Her HbA1c over the years have been fluctuating.  Her diabetes is complicated by diabetic retinopathy and she follows with retina specialist every 3 months.She was found to be weak with c/o SOB. Saw PCP and labs revealed hyperkalemia and sent to ED.     Work up in ED reveals hyperkalemia which she received Lokelma. She was also fluid " overloaded and Nephrology was consulted from the ED. She is being diuresed but given her advancing CKD, decision was made that she will likely need HD.   She was also found to be anemic with Hgb down 6.6 (7.5), no active bleeding No Blood given due to pulm status.   No dialysis at [facility], will transfer to [facility], for likely initiation of HD. See Nephrology note in epic.   Transfer to Bed: med tele       D/w Dr. June   To notify the admitting provider that the patient has arrived at their destination:    For [facility]: Identify the correct provider on [employer]: [facility] and then find the provider who has the title CAPTAIN: DIRECT ADMITS or CAPTAIN: ER / DIRECT ADMIT / PT PLACEMENT next to their name. Use [employer] to contact that person.      Jerri Amanda PA-C

## 2025-02-19 NOTE — ED PROVIDER NOTES
Transfer of Care Note  This patient was signed out to me and I assumed care from Dr. Katz at change of shift.  Eliza Dubois is a 84 year old female who presented to the emergency department with a chief complaint of Abnormal Labs  .  Please see the original providers history and physical for complete details.    The following issues were signed out to me to follow up on:  dispostiion      Pertant Lab/Imaging Findings During this Visit was     Results for orders placed or performed during the hospital encounter of 02/18/25 (from the past 24 hours)   EKG 12-lead, tracing only   Result Value Ref Range    Systolic Blood Pressure  mmHg    Diastolic Blood Pressure  mmHg    Ventricular Rate 83 BPM    Atrial Rate 83 BPM    ID Interval 192 ms    QRS Duration 92 ms     ms    QTc 453 ms    P Axis 66 degrees    R AXIS 67 degrees    T Axis 61 degrees    Interpretation ECG       Sinus rhythm  Possible Anterior infarct , age undetermined  Abnormal ECG  No previous ECGs available  Confirmed by SEE ED PROVIDER NOTE FOR, ECG INTERPRETATION ([id number]),  Claribel Cheatham ([id number]) on 2/18/2025 7:57:18 PM     Basic metabolic panel   Result Value Ref Range    Sodium 140 135 - 145 mmol/L    Potassium 6.1 (HH) 3.4 - 5.3 mmol/L    Chloride 109 (H) 98 - 107 mmol/L    Carbon Dioxide (CO2) 15 (L) 22 - 29 mmol/L    Anion Gap 16 (H) 7 - 15 mmol/L    Urea Nitrogen 55.3 (H) 8.0 - 23.0 mg/dL    Creatinine 4.00 (H) 0.51 - 0.95 mg/dL    GFR Estimate 10 (L) >60 mL/min/1.73m2    Calcium 8.2 (L) 8.8 - 10.4 mg/dL    Glucose 368 (H) 70 - 99 mg/dL   CBC with platelets differential    Narrative    The following orders were created for panel order CBC with platelets differential.  Procedure                               Abnormality         Status                     ---------                               -----------         ------                     CBC with platelets and d...[[id number]]  Abnormal            Final result                  Please view results for these tests on the individual orders.   CBC with platelets and differential   Result Value Ref Range    WBC Count 8.0 4.0 - 11.0 10e3/uL    RBC Count 2.80 (L) 3.80 - 5.20 10e6/uL    Hemoglobin 7.5 (L) 11.7 - 15.7 g/dL    Hematocrit 24.5 (L) 35.0 - 47.0 %    MCV 88 78 - 100 fL    MCH 26.8 26.5 - 33.0 pg    MCHC 30.6 (L) 31.5 - 36.5 g/dL    RDW 17.1 (H) 10.0 - 15.0 %    Platelet Count 144 (L) 150 - 450 10e3/uL    % Neutrophils 75 %    % Lymphocytes 13 %    % Monocytes 9 %    % Eosinophils 2 %    % Basophils 0 %    % Immature Granulocytes 0 %    NRBCs per 100 WBC 0 <1 /100    Absolute Neutrophils 6.0 1.6 - 8.3 10e3/uL    Absolute Lymphocytes 1.1 0.8 - 5.3 10e3/uL    Absolute Monocytes 0.7 0.0 - 1.3 10e3/uL    Absolute Eosinophils 0.1 0.0 - 0.7 10e3/uL    Absolute Basophils 0.0 0.0 - 0.2 10e3/uL    Absolute Immature Granulocytes 0.0 <=0.4 10e3/uL    Absolute NRBCs 0.0 10e3/uL   Nt probnp inpatient (BNP)   Result Value Ref Range    N terminal Pro BNP Inpatient 20,348 (H) 0 - 1,800 pg/mL   Magnesium   Result Value Ref Range    Magnesium 1.4 (L) 1.7 - 2.3 mg/dL   XR Chest Port 1 View    Narrative    EXAM: XR CHEST PORT 1 VIEW  LOCATION: [facility]  DATE: 2/18/2025    INDICATION: rales   renal disease   HF   hypoxic  COMPARISON: X-ray 6/20/2024      Impression    IMPRESSION: Low lung volumes. Trace bilateral pleural effusions and mild bibasilar pulmonary opacities which may reflect atelectasis or pneumonic infiltrates, left greater than right. Stable heart size and central vascular prominence. Chronic appearing   left proximal humerus fracture deformity.   Influenza A/B, RSV and SARS-CoV2 PCR (COVID-19) Nose    Specimen: Nose; Swab   Result Value Ref Range    Influenza A PCR Negative Negative    Influenza B PCR Negative Negative    RSV PCR Negative Negative    SARS CoV2 PCR Negative Negative    Narrative    Testing was performed using the [employer] Xpress CoV2/Flu/RSV  Assay on the [employer] GeneXpert Instrument. This test should be ordered for the detection of SARS-CoV2, influenza, and RSV viruses in individuals with signs and symptoms of respiratory tract infection. This test is for in vitro diagnostic use under the [place] FDA for laboratories certified under CLIA to perform high or moderate complexity testing. This test has been [place] FDA cleared. A negative result does not rule out the presence of PCR inhibitors in the specimen or target RNA in concentration below the limit of detection for the assay. If only one viral target is positive but coinfection with multiple targets is suspected, the sample should be re-tested with another FDA cleared, approved, or authorized test, if coninfection would change clinical management. This test was validated by the [facility] [employer]. These laboratories are certified under the Clinical Laboratory Improvement Amendments of 1988 (CLIA-88) as qualified to perfom high complexity laboratory testing.   Glucose by meter   Result Value Ref Range    GLUCOSE BY METER POCT 256 (H) 70 - 99 mg/dL   Basic metabolic panel   Result Value Ref Range    Sodium 140 135 - 145 mmol/L    Potassium 5.8 (H) 3.4 - 5.3 mmol/L    Chloride 111 (H) 98 - 107 mmol/L    Carbon Dioxide (CO2) 19 (L) 22 - 29 mmol/L    Anion Gap 10 7 - 15 mmol/L    Urea Nitrogen 57.3 (H) 8.0 - 23.0 mg/dL    Creatinine 3.92 (H) 0.51 - 0.95 mg/dL    GFR Estimate 11 (L) >60 mL/min/1.73m2    Calcium 8.0 (L) 8.8 - 10.4 mg/dL    Glucose 187 (H) 70 - 99 mg/dL   Glucose by meter   Result Value Ref Range    GLUCOSE BY METER POCT 82 70 - 99 mg/dL   CBC with platelets   Result Value Ref Range    WBC Count 5.3 4.0 - 11.0 10e3/uL    RBC Count 2.46 (L) 3.80 - 5.20 10e6/uL    Hemoglobin 6.6 (LL) 11.7 - 15.7 g/dL    Hematocrit 21.4 (L) 35.0 - 47.0 %    MCV 87 78 - 100 fL    MCH 26.8 26.5 - 33.0 pg    MCHC 30.8 (L) 31.5 - 36.5 g/dL    RDW 17.0 (H) 10.0 - 15.0 %    Platelet Count 107 (L) 150 - 450 10e3/uL    Renal panel   Result Value Ref Range    Sodium 144 135 - 145 mmol/L    Potassium 5.6 (H) 3.4 - 5.3 mmol/L    Chloride 112 (H) 98 - 107 mmol/L    Carbon Dioxide (CO2) 19 (L) 22 - 29 mmol/L    Anion Gap 13 7 - 15 mmol/L    Glucose 74 70 - 99 mg/dL    Urea Nitrogen 54.9 (H) 8.0 - 23.0 mg/dL    Creatinine 4.05 (H) 0.51 - 0.95 mg/dL    GFR Estimate 10 (L) >60 mL/min/1.73m2    Calcium 8.3 (L) 8.8 - 10.4 mg/dL    Albumin 3.3 (L) 3.5 - 5.2 g/dL    Phosphorus 4.9 (H) 2.5 - 4.5 mg/dL   Glucose by meter   Result Value Ref Range    GLUCOSE BY METER POCT 94 70 - 99 mg/dL   Glucose by meter   Result Value Ref Range    GLUCOSE BY METER POCT 194 (H) 70 - 99 mg/dL   Potassium   Result Value Ref Range    Potassium 5.7 (H) 3.4 - 5.3 mmol/L   [place] Draw    Narrative    The following orders were created for panel order [place] Draw.  Procedure                               Abnormality         Status                     ---------                               -----------         ------                     Extra Purple Top Tube[[id number]]                            Final result                 Please view results for these tests on the individual orders.   Extra Purple Top Tube   Result Value Ref Range    Hold Specimen [facility]    CBC with platelets differential    Narrative    The following orders were created for panel order CBC with platelets differential.  Procedure                               Abnormality         Status                     ---------                               -----------         ------                     CBC with platelets and d...[[id number]]  Abnormal            Final result               RBC and Platelet Morphology[[id number]]                                                   Please view results for these tests on the individual orders.   CBC with platelets and differential   Result Value Ref Range    WBC Count 7.0 4.0 - 11.0 10e3/uL    RBC Count 2.54 (L) 3.80 - 5.20 10e6/uL    Hemoglobin 6.9 (LL) 11.7 - 15.7  g/dL    Hematocrit 22.3 (L) 35.0 - 47.0 %    MCV 88 78 - 100 fL    MCH 27.2 26.5 - 33.0 pg    MCHC 30.9 (L) 31.5 - 36.5 g/dL    RDW 17.1 (H) 10.0 - 15.0 %    Platelet Count 130 (L) 150 - 450 10e3/uL    % Neutrophils 75 %    % Lymphocytes 14 %    % Monocytes 7 %    % Eosinophils 3 %    % Basophils 0 %    % Immature Granulocytes 1 %    NRBCs per 100 WBC 0 <1 /100    Absolute Neutrophils 5.2 1.6 - 8.3 10e3/uL    Absolute Lymphocytes 1.0 0.8 - 5.3 10e3/uL    Absolute Monocytes 0.5 0.0 - 1.3 10e3/uL    Absolute Eosinophils 0.2 0.0 - 0.7 10e3/uL    Absolute Basophils 0.0 0.0 - 0.2 10e3/uL    Absolute Immature Granulocytes 0.0 <=0.4 10e3/uL    Absolute NRBCs 0.0 10e3/uL   Glucose by meter   Result Value Ref Range    GLUCOSE BY METER POCT 273 (H) 70 - 99 mg/dL         My focused follow up physical exam shows:   Vitals:  B/P: 176/78, T: 97.8, P: 81, R: 18  Gen:  Pt appears stable and no apparent distress      ED Course & Medical Decision Making:  Following up on the patient today, patient received 20 of torsemide this morning per her nephrologist recommendations but did not have much output.  She was requiring some increasing oxygen so I did call nephrology back and they recommended giving additional dose of torsemide for total of 40.  An additional 20 was given and patient's had a little bit more output.  Oxygen levels have remained stable.  I rechecked the potassium and it has gone up a bit.  I did look in patient's Lokelma was only ordered for once a day and after talking to pharmacy we both agreed for the first 48 hours it should be a little bit more often so I did write for more Lokelma to be given.  Patient's hemoglobin was rechecked, did go down this morning but when we rechecked it it did seem like it is trending up.  I do not think he is having a GI bleed, this is likely from chronic renal disease.  At this point a bed did finally open up at [facility] and has been accepted and will be transferred  via ground ambulance at this time.  Patient remained stable.         Impression and Disposition:  Hyperkalemia, chronic kidney disease           This note was completed in part using Dragon voice recognition, and may contain word and grammatical errors.        Tawanda Lion MD  02/19/25 [id number]

## 2025-02-24 ENCOUNTER — PATIENT OUTREACH (OUTPATIENT)
Dept: NEPHROLOGY | Facility: CLINIC | Age: 85
End: 2025-02-24
Payer: COMMERCIAL

## 2025-02-24 NOTE — TELEPHONE ENCOUNTER
Nephrology Note: Patient Outreach Encounter    REASON FOR CALL:     REASON FOR CALL: Chronic Disease Management                                  SITUATION/BACKROUND:   Patient is being treated for CKD Stage 5. Patient with elevated potassium level last week and was advised ER visit. ER visit led to hospitalization. Upon chart review, it appears that she has initiated dialysis while inpatient at [facility] via [facility].    Patient Journey Status:  Active    ASSESSMENT:     Left message for patient in inquire on hospitalization.        PLAN:       Follow Up:    To resolve CKD Journey and cancel future appointments after discussing with patient. Message left for her to discuss.     .

## 2025-02-25 ENCOUNTER — LAB REQUISITION (OUTPATIENT)
Dept: LAB | Facility: CLINIC | Age: 85
End: 2025-02-25

## 2025-02-25 ENCOUNTER — DOCUMENTATION ONLY (OUTPATIENT)
Dept: GERIATRICS | Facility: CLINIC | Age: 85
End: 2025-02-25
Payer: COMMERCIAL

## 2025-02-25 DIAGNOSIS — Z11.1 ENCOUNTER FOR SCREENING FOR RESPIRATORY TUBERCULOSIS: ICD-10-CM

## 2025-02-26 ENCOUNTER — PATIENT OUTREACH (OUTPATIENT)
Dept: CARE COORDINATION | Facility: CLINIC | Age: 85
End: 2025-02-26
Payer: COMMERCIAL

## 2025-02-26 PROCEDURE — 86481 TB AG RESPONSE T-CELL SUSP: CPT | Performed by: FAMILY MEDICINE

## 2025-02-26 PROCEDURE — 36415 COLL VENOUS BLD VENIPUNCTURE: CPT | Performed by: FAMILY MEDICINE

## 2025-02-26 PROCEDURE — P9604 ONE-WAY ALLOW PRORATED TRIP: HCPCS | Performed by: FAMILY MEDICINE

## 2025-02-26 NOTE — PROGRESS NOTES
Chart Review Completed. Neph Journey updated to reflect that patient initiated hemodialysis via tunneled line while hospitalized at [facility] 2/20/25.  Canceled future Dr. Gonzalez appointment and lab work given she will now follow with nephrologist at [facility] Dialysis unit. Attempted to reach patient x2- mailbox full. [facility] message sent to patient. Resolved CKD Journey.    Neph Tracking Flowsheet Last Filled Values       Kidney Smart CKD Basics Referral 01/28/25    Kidney Smart CKD Basics Complete --  Completed. Unknown date    Preferred Modality Hemodialysis  Prefers In Center HD    Final Modality Hemodialysis    Patient's Referral Dates Auto Populate Patient's Referral Dates    Anemia Services Referral 1/28/25    Home Care Referral 6/24/24    Journey Referral 1/28/25    Diaylsis Access Type CVC  placed 2/20/25 while inpt at [facility]    Dialysis Optimal Start? No    Non-optimal Start Detail HD With CVC  Pt newly established with Dr. Gonzalez (2 visits). Had Kidney Smart Class however admission necessary due to hyperkalemia, fluid overload, fatigue. Pt started diaysis inpt at [facility] 2/20/25. Discharged to [facility] Unit, first outpt run 2/26.

## 2025-02-26 NOTE — PROGRESS NOTES
Anemia Management Note - Discharge    Referred by Dr. Ольга Gonzalez on 1/28/2025     Per ADA Denise, she has started outpatient HD    Patient meets criteria for discharge from Anemia Clinic        Latest Ref Rng & Units 6/22/2024 6/23/2024 6/24/2024 10/8/2024 1/28/2025 2/18/2025 2/19/2025   Anemia   Hemoglobin 11.7 - 15.7 g/dL 8.3     6.7  7.5  8.0     7.1  8.2  8.3  7.5     8.0  6.9     6.6    TSAT 15 - 46 %     15      Ferritin 11 - 328 ng/mL     18          Multiple values from one day are sorted in reverse-chronological order      Goals Addressed    None         Anemia labs discontinued/outside lab/clinic notified (if applicable), Rx discontinued/Therapy Plans cancelled, removed from active patient list, patient and/or caregiver and referring provider notified as appropriate.     Carrie Leopold, RN BSN  Anemia Services  [facility]  [email or web address]  Office: [phone]  Fax [phone]

## 2025-02-27 ENCOUNTER — TRANSITIONAL CARE UNIT VISIT (OUTPATIENT)
Dept: GERIATRICS | Facility: CLINIC | Age: 85
End: 2025-02-27
Payer: COMMERCIAL

## 2025-02-27 VITALS
OXYGEN SATURATION: 90 % | BODY MASS INDEX: 20.14 KG/M2 | DIASTOLIC BLOOD PRESSURE: 72 MMHG | RESPIRATION RATE: 18 BRPM | SYSTOLIC BLOOD PRESSURE: 121 MMHG | WEIGHT: 118 LBS | TEMPERATURE: 98.1 F | HEART RATE: 67 BPM | HEIGHT: 64 IN

## 2025-02-27 PROBLEM — N18.4 CHRONIC KIDNEY DISEASE, STAGE 4 (SEVERE) (H): Status: ACTIVE | Noted: 2025-01-31

## 2025-02-27 PROBLEM — Z86.73 PERSONAL HISTORY OF TRANSIENT ISCHEMIC ATTACK (TIA), AND CEREBRAL INFARCTION WITHOUT RESIDUAL DEFICITS: Status: ACTIVE | Noted: 2025-02-25

## 2025-02-27 LAB
GAMMA INTERFERON BACKGROUND BLD IA-ACNC: 0.05 IU/ML
M TB IFN-G BLD-IMP: NEGATIVE
M TB IFN-G CD4+ BCKGRND COR BLD-ACNC: 1.34 IU/ML
MITOGEN IGNF BCKGRD COR BLD-ACNC: -0.01 IU/ML
MITOGEN IGNF BCKGRD COR BLD-ACNC: -0.01 IU/ML
QUANTIFERON MITOGEN: 1.39 IU/ML
QUANTIFERON NIL TUBE: 0.05 IU/ML
QUANTIFERON TB1 TUBE: 0.04 IU/ML
QUANTIFERON TB2 TUBE: 0.04

## 2025-02-27 RX ORDER — CARVEDILOL 6.25 MG/1
6.25 TABLET ORAL 2 TIMES DAILY WITH MEALS
COMMUNITY

## 2025-02-27 RX ORDER — LOSARTAN POTASSIUM 100 MG/1
100 TABLET ORAL DAILY
COMMUNITY

## 2025-02-27 NOTE — LETTER
2/27/2025      Eliza Dubois  [address]  [address]  [place] [address]        No notes on file      Sincerely,        ANKITA Fuentes CNP    Electronically signed

## 2025-02-27 NOTE — PROGRESS NOTES
[facility]    PRIMARY CARE PROVIDER AND CLINIC:  Byron Holm DO, [address]  / [place] [id number]***  Chief Complaint   Patient presents with    Hospital F/U      [facility] Medical Record Number:  [id number]  Place of Service where encounter took place:  [facility] [[id number]]    Eliza Dubois  is a 84 year old  (1940), admitted to the above facility from  [facility] . Hospital stay 2/19/2025 through 2/25/2025..   HPI:    ***    CODE STATUS/ADVANCE DIRECTIVES DISCUSSION:  Prior  DNR  ALLERGIES:   Allergies   Allergen Reactions    Sodium Sulfate Hives    Sulfa Antibiotics Hives      PAST MEDICAL HISTORY:   Past Medical History:   Diagnosis Date    Diabetic eye exam (H) 05/01/14    Heart attack (H)     Pure hypercholesterolemia     Stented coronary artery     Type II or unspecified type diabetes mellitus without mention of complication, not stated as uncontrolled 2002    [place]et.    Unspecified disease of pericardium 12/05/08    Pericarditis w/mild to moderate pericardial effusion.    Unspecified essential hypertension       PAST SURGICAL HISTORY:   has a past surgical history that includes NONSPECIFIC PROCEDURE (1988); LAPAROSCOPY, SURGICAL; CHOLECYSTECTOMY (1997); colonoscopy (04/15/09); Colonoscopy (6/18/2014); Abdomen surgery; Phacoemulsification clear cornea with standard intraocular lens implant (Right, 3/16/2016); and Phacoemulsification clear cornea with standard intraocular lens implant (Left, 3/30/2016).  FAMILY HISTORY: family history includes Arthritis in her mother; Cancer in her maternal grandmother and another family member; Diabetes in her father and mother; EYE* in her mother; Genitourinary Problems in her father; Heart Disease in her father, maternal grandmother, and mother; Hypertension in her mother; Lipids in her mother; Neurologic Disorder in her mother; Obesity in her mother; Osteoporosis in her  mother.  SOCIAL HISTORY:   reports that she has never smoked. She has never used smokeless tobacco. She reports that she does not drink alcohol and does not use drugs.  Patient's living condition: {LIVES WITH (NURSING HOME):737300}    Post Discharge Medication Reconciliation Status:   MED REC REQUIRED{TIP  Click the link below to document or use med rec list, use list to pull in response :293998}  Post Medication Reconciliation Status: {MED REC LIST:053876}       Current Outpatient Medications   Medication Sig Dispense Refill    acetaminophen (TYLENOL) 500 MG tablet Take 1 tablet (500 mg) by mouth daily as needed for mild pain. 500 tablet 1    amLODIPine (NORVASC) 10 MG tablet Take 1 tablet (10 mg) by mouth daily. 90 tablet 3    aspirin 81 MG EC tablet Take 1 tablet (81 mg) by mouth daily 90 tablet 3    atorvastatin (LIPITOR) 80 MG tablet Take 1 tablet (80 mg) by mouth at bedtime 90 tablet 3    clopidogrel (PLAVIX) 75 MG tablet Take 1 tablet (75 mg) by mouth daily 90 tablet 3    famotidine (PEPCID) 20 MG tablet Take 1 tablet (20 mg) by mouth 2 times daily 180 tablet 2    gabapentin (NEURONTIN) 300 MG capsule Take 1 capsule (300 mg) by mouth at bedtime 90 capsule 1    glipiZIDE (GLUCOTROL) 10 MG tablet Take 2 tablets (20 mg) by mouth 2 times daily (before meals) 360 tablet 3    hydrALAZINE (APRESOLINE) 25 MG tablet Take 1 tablet (25 mg) by mouth 3 times daily 270 tablet 3    isosorbide mononitrate (IMDUR) 30 MG 24 hr tablet Take 1 tablet (30 mg) by mouth daily 90 tablet 3    loperamide (IMODIUM A-D) 2 MG tablet Take 1 tablet (2 mg) by mouth daily 30 tablet 2    metoprolol succinate ER (TOPROL XL) 100 MG 24 hr tablet Take 1 tablet (100 mg) by mouth daily 90 tablet 3    omeprazole (PRILOSEC) 40 MG DR capsule TAKE ONE CAPSULE BY MOUTH TWICE A DAY AS NEEDED Strength: 40 mg (Patient taking differently: Take 40 mg by mouth 2 times daily as needed. TAKE ONE CAPSULE BY MOUTH TWICE A DAY AS NEEDED Strength: 40 mg) 180  "capsule 3    pantoprazole (PROTONIX) 40 MG EC tablet TAKE ONE TABLET BY MOUTH ONCE DAILY 131 tablet 0    torsemide (DEMADEX) 20 MG tablet Take 1 tablet (20 mg) by mouth daily. 90 tablet 0    vitamin D2 (ERGOCALCIFEROL) 88302 units (1250 mcg) capsule Take 1 capsule (50,000 Units) by mouth once a week. 10 capsule 0     No current facility-administered medications for this visit.       ROS:  {ROS FGS:210934}    Vitals:  /72   Pulse 67   Temp 98.1  F (36.7  C)   Resp 18   Ht 1.626 m (5' 4\")   Wt 53.5 kg (118 lb)   SpO2 (!) 90%   BMI 20.25 kg/m    Exam:  {Nursing home physical exam :452954}    Lab/Diagnostic data:  {fgslab:612146}    ASSESSMENT/PLAN:    {FGS DX2:326583}    Orders:  {fgsorders:029494}  ***    Electronically signed by:  Earle Freitas ***                "

## 2025-03-03 ENCOUNTER — DOCUMENTATION ONLY (OUTPATIENT)
Dept: OTHER | Facility: CLINIC | Age: 85
End: 2025-03-03
Payer: COMMERCIAL

## 2025-03-06 ENCOUNTER — TRANSITIONAL CARE UNIT VISIT (OUTPATIENT)
Dept: GERIATRICS | Facility: CLINIC | Age: 85
End: 2025-03-06
Payer: COMMERCIAL

## 2025-03-06 VITALS
RESPIRATION RATE: 18 BRPM | OXYGEN SATURATION: 97 % | DIASTOLIC BLOOD PRESSURE: 63 MMHG | WEIGHT: 125 LBS | BODY MASS INDEX: 21.34 KG/M2 | HEIGHT: 64 IN | HEART RATE: 72 BPM | SYSTOLIC BLOOD PRESSURE: 144 MMHG | TEMPERATURE: 97.5 F

## 2025-03-06 DIAGNOSIS — I10 HYPERTENSION GOAL BP (BLOOD PRESSURE) < 140/90: ICD-10-CM

## 2025-03-06 DIAGNOSIS — J34.89 LESION OF NOSE: ICD-10-CM

## 2025-03-06 DIAGNOSIS — E78.5 HYPERLIPIDEMIA, UNSPECIFIED HYPERLIPIDEMIA TYPE: ICD-10-CM

## 2025-03-06 DIAGNOSIS — K21.9 GASTRO-ESOPHAGEAL REFLUX DISEASE WITHOUT ESOPHAGITIS: ICD-10-CM

## 2025-03-06 DIAGNOSIS — K58.2 IRRITABLE BOWEL SYNDROME WITH BOTH CONSTIPATION AND DIARRHEA: ICD-10-CM

## 2025-03-06 DIAGNOSIS — N18.6 ESRD (END STAGE RENAL DISEASE) ON DIALYSIS (H): ICD-10-CM

## 2025-03-06 DIAGNOSIS — G47.00 INSOMNIA, UNSPECIFIED TYPE: ICD-10-CM

## 2025-03-06 DIAGNOSIS — M62.81 GENERALIZED MUSCLE WEAKNESS: ICD-10-CM

## 2025-03-06 DIAGNOSIS — E11.42 TYPE 2 DIABETES MELLITUS WITH DIABETIC POLYNEUROPATHY, WITHOUT LONG-TERM CURRENT USE OF INSULIN (H): ICD-10-CM

## 2025-03-06 DIAGNOSIS — I50.9 CONGESTIVE HEART FAILURE, UNSPECIFIED HF CHRONICITY, UNSPECIFIED HEART FAILURE TYPE (H): ICD-10-CM

## 2025-03-06 DIAGNOSIS — Z99.2 ESRD (END STAGE RENAL DISEASE) ON DIALYSIS (H): ICD-10-CM

## 2025-03-06 DIAGNOSIS — N18.5 ANEMIA OF CHRONIC RENAL FAILURE, STAGE 5 (H): ICD-10-CM

## 2025-03-06 DIAGNOSIS — E87.5 HYPERKALEMIA: Primary | ICD-10-CM

## 2025-03-06 DIAGNOSIS — D63.1 ANEMIA OF CHRONIC RENAL FAILURE, STAGE 5 (H): ICD-10-CM

## 2025-03-06 NOTE — LETTER
3/6/2025      Eliza Dubois  [address]  [facility] [address]        [facility] GERIATRICS    Chief Complaint   Patient presents with     RECHECK     HPI:  Eliza Dubois is a 84 year old  (1940), who is being seen today for an episodic care visit at: [facility] ([facility]) [[id number]].  Patient was hospitalized at [facility] . Hospital stay 2/19/2025 through 2/25/2025.    Patient is a 84 year-old female with past medical history significant for CKD5 now with ESRD on HD, DM2, coronary artery disease, hyperlipidemia, hypertension, peripheral neuropathy and GERD. Patient presented to ED with shortness of breath and lethargy and was diagnosed with hyperkalemia, for which she had IV lasix and lokelma. Patient has a PCAD placement in right chest and had her first hemodialysis session on 2/24/25. She had her first outpatient HD on 2/26/24 and is to continue a MWF schedule.     Today, patient reports that dialysis is going well, although, yesterday she felt queasy after dialysis in the afternnon but was kourtney to eat dinner. Advised her to ask for zofran from the dialysis nurses prior to the session. Patient says she woke up at 3 am this morning and couldn't go back to sleep. Her daughter, Lucy, is present and says that her dosage of melatonin at the hospital was 5 mg; thus replied that we can increase melatonin to 6 mg at bedtime. Patient denies any general pain or pain from dialysis catheter, lightheadedness, shortness of breath or chest pain. She says that she is going to treat dialysis like it is work; so it won't interfere with her life plans. She would like to eventually go back to work at the [employer]. She and her daughter have toured the [employer] and she is thinking of staying there once they find out more details about how she can stick with a renal diet since she doesn't cook her own meals anymore.       Allergies, and PMH/PSH reviewed in  "EPIC today.  REVIEW OF SYSTEMS:  4 point ROS including Respiratory, CV, GI and , other than that noted in the HPI,  is negative    Objective:   BP (!) 144/63   Pulse 72   Temp 97.5  F (36.4  C)   Resp 18   Ht 1.626 m (5' 4\")   Wt 56.7 kg (125 lb)   SpO2 97%   BMI 21.46 kg/m    GENERAL APPEARANCE:  Alert, in no distress, pale  ENT:  Mouth and posterior oropharynx normal, moist mucous membranes  EYES:  EOM, conjunctivae, lids, pupils and irises normal, glasses on  RESP:  respiratory effort and palpation of chest normal, lungs clear to auscultation   CV:  regular rate and rhythm, no murmur, rub, or gallop, no edema, +2 pedal pulses  ABDOMEN:  normal bowel sounds, soft, nontender, no hepatosplenomegaly or other masses  M/S:   generalized weakness; normal ROM  SKIN:  right chest catheter with no sxsx of infection, lesion present, type:nodule, raised, pearly edges, bleeding, appearance:variegated, slightly red, location: left nasal ala, size: 5 mm  NEURO:   Cranial nerves 2-12 are normal tested and grossly at patient's baseline  PSYCH:  oriented X 3, affect and mood normal    Lab Requisition on 02/28/2025   Component Date Value Ref Range Status     Sodium 02/28/2025 135  135 - 145 mmol/L Final     Potassium 02/28/2025 4.4  3.4 - 5.3 mmol/L Final     Chloride 02/28/2025 95 (L)  98 - 107 mmol/L Final     Carbon Dioxide (CO2) 02/28/2025 25  22 - 29 mmol/L Final     Anion Gap 02/28/2025 15  7 - 15 mmol/L Final     Glucose 02/28/2025 121 (H)  70 - 99 mg/dL Final     Urea Nitrogen 02/28/2025 45.6 (H)  8.0 - 23.0 mg/dL Final     Creatinine 02/28/2025 4.04 (H)  0.51 - 0.95 mg/dL Final     GFR Estimate 02/28/2025 10 (L)  >60 mL/min/1.73m2 Final    eGFR calculated using 2021 CKD-EPI equation.     Calcium 02/28/2025 8.5 (L)  8.8 - 10.4 mg/dL Final     Albumin 02/28/2025 3.6  3.5 - 5.2 g/dL Final     Phosphorus 02/28/2025 5.1 (H)  2.5 - 4.5 mg/dL Final     Hemoglobin 02/28/2025 8.3 (L)  11.7 - 15.7 g/dL Final "       Assessment/Plan:    Hyperkalemia  Generalized muscle weakness  ESRD (end stage renal disease) on dialysis (H)  HD on MWF at [facility] in [place]; nephrologist taking over lab studies  Tylenol 500 mg once daily as needed for pain.  Renal diet with erogocalciferol 50 k units weekly,  PT/OT to evaluate and treat.  2/28/25: K+ 4.4 WNL     Hyperlipidemia, unspecified hyperlipidemia type  Congestive heart failure, unspecified HF chronicity, unspecified heart failure type (H)  Hypertension goal BP (blood pressure) < 140/90  Continue aspirin 81 mg daily, amlodipine 10 mg daily, losartan 100 mg daily, atorvastatin 80 mg at bedtime, clopidogrel 75 mg daily, and carvedilol 6.25 mg daily. (Hospital stopped hydralazine, metoprolol and reduced dosage of carvedilol).   Patient had LLE swelling in hospital and was on torsemide 20 mg daily.   Continue daily weights and monitor BPs     Type 2 diabetes mellitus with diabetic polyneuropathy, without long-term current use of insulin (H)  Last A1c 7.4. Continue glipizide 20 mg before meals BID  PTA gabapentin 300 mg at bedtime     Anemia of chronic renal failure, stage 5 (H)  Last Hgb was 7.5; baseline 8  2/28/25: Hgb 8.3     Gastro-esophageal reflux disease without esophagitis  Continue famotidine 20 mg BID and pantoprazole 40 mg QAM     Insomnia, unspecified type  Begin melatonin 3 mg at bedtime.  Per patient request, increased melatonin to 6 mg at bedtime.      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      Lesion of nose   Present for 1-2 months; concerning for non-melanoma skin cancer  Nodule, raised, pearly edges, bleeding, appearance:variegated, slightly red, location: left nasal ala, size: 5 mm  Referral to dermatology         Orders:  Melatonin 6 mg at bedtime    Electronically signed by: ANKITA Fuentes CNP         Sincerely,        ANKITA Fuentes CNP    Electronically signed

## 2025-03-06 NOTE — PROGRESS NOTES
[facility] GERIATRICS    Chief Complaint   Patient presents with    RECHECK     HPI:  Eliza Dubois is a 84 year old  (1940), who is being seen today for an episodic care visit at: [facility] ([facility]) [[id number]].  Patient was hospitalized at [facility] . Hospital stay 2/19/2025 through 2/25/2025.    Patient is a 84 year-old female with past medical history significant for CKD5 now with ESRD on HD, DM2, coronary artery disease, hyperlipidemia, hypertension, peripheral neuropathy and GERD. Patient presented to ED with shortness of breath and lethargy and was diagnosed with hyperkalemia, for which she had IV lasix and lokelma. Patient has a PCAD placement in right chest and had her first hemodialysis session on 2/24/25. She had her first outpatient HD on 2/26/24 and is to continue a MWF schedule.     Today, patient reports that dialysis is going well, although, yesterday she felt queasy after dialysis in the afternnon but was kourtney to eat dinner. Advised her to ask for zofran from the dialysis nurses prior to the session. Patient says she woke up at 3 am this morning and couldn't go back to sleep. Her daughter, Lucy, is present and says that her dosage of melatonin at the hospital was 5 mg; thus replied that we can increase melatonin to 6 mg at bedtime. Patient denies any general pain or pain from dialysis catheter, lightheadedness, shortness of breath or chest pain. She says that she is going to treat dialysis like it is work; so it won't interfere with her life plans. She would like to eventually go back to work at the [employer]. She and her daughter have toured the [employer] and she is thinking of staying there once they find out more details about how she can stick with a renal diet since she doesn't cook her own meals anymore.       Allergies, and PMH/PSH reviewed in EPIC today.  REVIEW OF SYSTEMS:  4 point ROS including Respiratory, CV, GI and , other  "than that noted in the HPI,  is negative    Objective:   BP (!) 144/63   Pulse 72   Temp 97.5  F (36.4  C)   Resp 18   Ht 1.626 m (5' 4\")   Wt 56.7 kg (125 lb)   SpO2 97%   BMI 21.46 kg/m    GENERAL APPEARANCE:  Alert, in no distress, pale  ENT:  Mouth and posterior oropharynx normal, moist mucous membranes  EYES:  EOM, conjunctivae, lids, pupils and irises normal, glasses on  RESP:  respiratory effort and palpation of chest normal, lungs clear to auscultation   CV:  regular rate and rhythm, no murmur, rub, or gallop, no edema, +2 pedal pulses  ABDOMEN:  normal bowel sounds, soft, nontender, no hepatosplenomegaly or other masses  M/S:   generalized weakness; normal ROM  SKIN:  right chest catheter with no sxsx of infection, lesion present, type:nodule, raised, pearly edges, bleeding, appearance:variegated, slightly red, location: left nasal ala, size: 5 mm  NEURO:   Cranial nerves 2-12 are normal tested and grossly at patient's baseline  PSYCH:  oriented X 3, affect and mood normal    Lab Requisition on 02/28/2025   Component Date Value Ref Range Status    Sodium 02/28/2025 135  135 - 145 mmol/L Final    Potassium 02/28/2025 4.4  3.4 - 5.3 mmol/L Final    Chloride 02/28/2025 95 (L)  98 - 107 mmol/L Final    Carbon Dioxide (CO2) 02/28/2025 25  22 - 29 mmol/L Final    Anion Gap 02/28/2025 15  7 - 15 mmol/L Final    Glucose 02/28/2025 121 (H)  70 - 99 mg/dL Final    Urea Nitrogen 02/28/2025 45.6 (H)  8.0 - 23.0 mg/dL Final    Creatinine 02/28/2025 4.04 (H)  0.51 - 0.95 mg/dL Final    GFR Estimate 02/28/2025 10 (L)  >60 mL/min/1.73m2 Final    eGFR calculated using 2021 CKD-EPI equation.    Calcium 02/28/2025 8.5 (L)  8.8 - 10.4 mg/dL Final    Albumin 02/28/2025 3.6  3.5 - 5.2 g/dL Final    Phosphorus 02/28/2025 5.1 (H)  2.5 - 4.5 mg/dL Final    Hemoglobin 02/28/2025 8.3 (L)  11.7 - 15.7 g/dL Final       Assessment/Plan:    Hyperkalemia  Generalized muscle weakness  ESRD (end stage renal disease) on dialysis " (H)  HD on MWF at [facility] in [place]; nephrologist taking over lab studies  Tylenol 500 mg once daily as needed for pain.  Renal diet with erogocalciferol 50 k units weekly,  PT/OT to evaluate and treat.  2/28/25: K+ 4.4 WNL     Hyperlipidemia, unspecified hyperlipidemia type  Congestive heart failure, unspecified HF chronicity, unspecified heart failure type (H)  Hypertension goal BP (blood pressure) < 140/90  Continue aspirin 81 mg daily, amlodipine 10 mg daily, losartan 100 mg daily, atorvastatin 80 mg at bedtime, clopidogrel 75 mg daily, and carvedilol 6.25 mg daily. (Hospital stopped hydralazine, metoprolol and reduced dosage of carvedilol).   Patient had LLE swelling in hospital and was on torsemide 20 mg daily.   Continue daily weights and monitor BPs     Type 2 diabetes mellitus with diabetic polyneuropathy, without long-term current use of insulin (H)  Last A1c 7.4. Continue glipizide 20 mg before meals BID  PTA gabapentin 300 mg at bedtime     Anemia of chronic renal failure, stage 5 (H)  Last Hgb was 7.5; baseline 8  2/28/25: Hgb 8.3     Gastro-esophageal reflux disease without esophagitis  Continue famotidine 20 mg BID and pantoprazole 40 mg QAM     Insomnia, unspecified type  Begin melatonin 3 mg at bedtime.  Per patient request, increased melatonin to 6 mg at bedtime.      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      Lesion of nose   Present for 1-2 months; concerning for non-melanoma skin cancer  Nodule, raised, pearly edges, bleeding, appearance:variegated, slightly red, location: left nasal ala, size: 5 mm  Referral to dermatology         Orders:  Melatonin 6 mg at bedtime    Electronically signed by: ANKITA Fuentes CNP

## 2025-03-13 ENCOUNTER — TRANSITIONAL CARE UNIT VISIT (OUTPATIENT)
Dept: GERIATRICS | Facility: CLINIC | Age: 85
End: 2025-03-13
Payer: COMMERCIAL

## 2025-03-13 VITALS
HEART RATE: 75 BPM | OXYGEN SATURATION: 97 % | SYSTOLIC BLOOD PRESSURE: 143 MMHG | RESPIRATION RATE: 20 BRPM | WEIGHT: 126.6 LBS | TEMPERATURE: 98.2 F | BODY MASS INDEX: 21.73 KG/M2 | DIASTOLIC BLOOD PRESSURE: 65 MMHG

## 2025-03-13 DIAGNOSIS — N18.6 ESRD (END STAGE RENAL DISEASE) ON DIALYSIS (H): Primary | ICD-10-CM

## 2025-03-13 DIAGNOSIS — E78.5 HYPERLIPIDEMIA, UNSPECIFIED HYPERLIPIDEMIA TYPE: ICD-10-CM

## 2025-03-13 DIAGNOSIS — N18.5 ANEMIA OF CHRONIC RENAL FAILURE, STAGE 5 (H): ICD-10-CM

## 2025-03-13 DIAGNOSIS — Z99.2 ESRD (END STAGE RENAL DISEASE) ON DIALYSIS (H): Primary | ICD-10-CM

## 2025-03-13 DIAGNOSIS — M62.81 GENERALIZED MUSCLE WEAKNESS: ICD-10-CM

## 2025-03-13 DIAGNOSIS — E87.5 HYPERKALEMIA: ICD-10-CM

## 2025-03-13 DIAGNOSIS — G47.00 INSOMNIA, UNSPECIFIED TYPE: ICD-10-CM

## 2025-03-13 DIAGNOSIS — D63.1 ANEMIA OF CHRONIC RENAL FAILURE, STAGE 5 (H): ICD-10-CM

## 2025-03-13 DIAGNOSIS — E11.42 TYPE 2 DIABETES MELLITUS WITH DIABETIC POLYNEUROPATHY, WITHOUT LONG-TERM CURRENT USE OF INSULIN (H): ICD-10-CM

## 2025-03-13 DIAGNOSIS — K58.2 IRRITABLE BOWEL SYNDROME WITH BOTH CONSTIPATION AND DIARRHEA: ICD-10-CM

## 2025-03-13 DIAGNOSIS — I10 HYPERTENSION GOAL BP (BLOOD PRESSURE) < 140/90: ICD-10-CM

## 2025-03-13 DIAGNOSIS — J34.89 LESION OF NOSE: ICD-10-CM

## 2025-03-13 DIAGNOSIS — K21.9 GASTRO-ESOPHAGEAL REFLUX DISEASE WITHOUT ESOPHAGITIS: ICD-10-CM

## 2025-03-13 DIAGNOSIS — I50.9 CONGESTIVE HEART FAILURE, UNSPECIFIED HF CHRONICITY, UNSPECIFIED HEART FAILURE TYPE (H): ICD-10-CM

## 2025-03-13 PROCEDURE — 99309 SBSQ NF CARE MODERATE MDM 30: CPT

## 2025-03-13 NOTE — PROGRESS NOTES
[facility] GERIATRICS    Chief Complaint   Patient presents with    RECHECK     HPI:  Eliza Dubois is a 84 year old  (1940), who is being seen today for an episodic care visit at[facility] ([facility] [[id number]]. Today's concern is: ***    Allergies, and PMH/PSH reviewed in EPIC today.  REVIEW OF SYSTEMS:  {zttofg16:657560}    Objective:   BP (!) 143/65   Pulse 75   Temp 98.2  F (36.8  C)   Resp 20   Wt 57.4 kg (126 lb 9.6 oz)   SpO2 97%   BMI 21.73 kg/m    {Nursing home physical exam :660313}    {fgslab:569047}    Assessment/Plan:  {FGS DX2:696285}    MED REC REQUIRED{TIP  Click the link below to document or use med rec list, use list to pull in response :049187}  Post Medication Reconciliation Status: {MED REC LIST:222213}      Orders:  {fgsorders:687493}  ***    Electronically signed by: Earle Freitas ***        Alert, in no distress, pale  ENT:  Mouth and posterior oropharynx normal, moist mucous membranes  EYES:  EOM, conjunctivae, lids, pupils and irises normal, glasses on  RESP:  respiratory effort and palpation of chest normal, lungs clear to auscultation   CV:  regular rate and rhythm, no murmur, rub, or gallop, no edema, +2 pedal pulses  ABDOMEN:  normal bowel sounds, soft, nontender, no hepatosplenomegaly or other masses  M/S:   generalized weakness; normal ROM  SKIN:  right chest catheter with no sxsx of infection, lesion present, type:nodule, raised, pearly edges, bleeding, appearance:variegated, slightly red, location: left nasal ala, size: 5 mm  NEURO:   Cranial nerves 2-12 are normal tested and grossly at patient's baseline  PSYCH:  oriented X 3, affect and mood normal    Labs done in SNF are in [place] EPIC. Please refer to them using [facility]Care Everywhere.    Assessment/Plan:    ESRD (end stage renal disease) on dialysis (H)  Hyperkalemia- resolved  Generalized muscle weakness  HD on MWF at [facility] in [place]; nephrologist taking over lab studies  Tylenol 500 mg once daily as needed for pain.  Renal diet with erogocalciferol 50 k units weekly,  PT/OT to evaluate and treat.  2/28/25: K+ 4.4 WNL     Hyperlipidemia, unspecified hyperlipidemia type  Congestive heart failure, unspecified HF chronicity, unspecified heart failure type (H)  Hypertension goal BP (blood pressure) < 140/90  Continue aspirin 81 mg daily, amlodipine 10 mg daily, losartan 100 mg daily, atorvastatin 80 mg at bedtime, clopidogrel 75 mg daily, and carvedilol 6.25 mg daily. (Hospital stopped hydralazine, metoprolol and reduced dosage of carvedilol).   Patient had LLE swelling in hospital and was on torsemide 20 mg daily.   Continue daily weights and monitor BPs     Type 2 diabetes mellitus with diabetic polyneuropathy, without long-term current use of insulin (H)  Last A1c 7.4. Continue glipizide 20 mg before meals BID  PTA gabapentin 300  mg at bedtime  Due to BG's of 442 and 356, will start Lantus with 50% starting dose due to ESRD  Start lantus insulin; inject 5 units subcutaneous daily at bedtime.  To prevent hypoglycemia, will decrease glipizide to 5 mg po BID before meals   Monitor BG QID and notify provider if BG > 400 or < 90.      Anemia of chronic renal failure, stage 5 (H)  Last Hgb was 7.5; baseline 8  2/28/25: Hgb 8.3     Gastro-esophageal reflux disease without esophagitis  Continue famotidine 20 mg BID and pantoprazole 40 mg QAM     Insomnia, unspecified type  Begin melatonin 3 mg at bedtime.  Per patient request, increased melatonin to 6 mg at bedtime; scheduled at 2100.       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      Lesion of nose   Present for 1-2 months; concerning for non-melanoma skin cancer  Nodule, raised, pearly edges, bleeding, appearance:variegated, slightly red, location: left nasal ala, size: 5 mm  Referral to dermatology         Orders:  Melatonin 6 mg at bedtime, scheduled at 2100.   Start Lantus insulin; inject 5 units subcutaneous daily at bedtime.  Decrease glipizide to 5 mg po BID before meals.   Monitor BG QID and notify provider if BG > 400 or < 90.       Electronically signed by: ANKITA Fuentes CNP

## 2025-03-13 NOTE — LETTER
3/13/2025      Eliza Dubois  [address]  [place] [address]        No notes on file      Sincerely,        ANKITA Fuentes CNP    Electronically signed   Resp 20   Wt 57.4 kg (126 lb 9.6 oz)   SpO2 97%   BMI 21.73 kg/m    GENERAL APPEARANCE:  Alert, in no distress, pale  ENT:  Mouth and posterior oropharynx normal, moist mucous membranes  EYES:  EOM, conjunctivae, lids, pupils and irises normal, glasses on  RESP:  respiratory effort and palpation of chest normal, lungs clear to auscultation   CV:  regular rate and rhythm, no murmur, rub, or gallop, no edema, +2 pedal pulses  ABDOMEN:  normal bowel sounds, soft, nontender, no hepatosplenomegaly or other masses  M/S:   generalized weakness; normal ROM  SKIN:  right chest catheter with no sxsx of infection, lesion present, type:nodule, raised, pearly edges, bleeding, appearance:variegated, slightly red, location: left nasal ala, size: 5 mm  NEURO:   Cranial nerves 2-12 are normal tested and grossly at patient's baseline  PSYCH:  oriented X 3, affect and mood normal    Labs done in SNF are in [place] EPIC. Please refer to them using [facility] Everywhere.    Assessment/Plan:    ESRD (end stage renal disease) on dialysis (H)  Hyperkalemia- resolved  Generalized muscle weakness  HD on MWF at [facility] in [place]; nephrologist taking over lab studies  Tylenol 500 mg once daily as needed for pain.  Renal diet with erogocalciferol 50 k units weekly,  PT/OT to evaluate and treat.  2/28/25: K+ 4.4 WNL     Hyperlipidemia, unspecified hyperlipidemia type  Congestive heart failure, unspecified HF chronicity, unspecified heart failure type (H)  Hypertension goal BP (blood pressure) < 140/90  Continue aspirin 81 mg daily, amlodipine 10 mg daily, losartan 100 mg daily, atorvastatin 80 mg at bedtime, clopidogrel 75 mg daily, and carvedilol 6.25 mg daily. (Hospital stopped hydralazine, metoprolol and reduced dosage of carvedilol).   Patient had LLE swelling in hospital and was on torsemide 20 mg daily.   Continue daily weights and monitor BPs     Type 2 diabetes mellitus with diabetic polyneuropathy, without long-term current use  of insulin (H)  Last A1c 7.4. Continue glipizide 20 mg before meals BID  PTA gabapentin 300 mg at bedtime  Due to BG's of 442 and 356, will start Lantus with 50% starting dose due to ESRD  Start lantus insulin; inject 5 units subcutaneous daily at bedtime.  To prevent hypoglycemia, will decrease glipizide to 5 mg po BID before meals   Monitor BG QID and notify provider if BG > 400 or < 90.      Anemia of chronic renal failure, stage 5 (H)  Last Hgb was 7.5; baseline 8  2/28/25: Hgb 8.3     Gastro-esophageal reflux disease without esophagitis  Continue famotidine 20 mg BID and pantoprazole 40 mg QAM     Insomnia, unspecified type  Begin melatonin 3 mg at bedtime.  Per patient request, increased melatonin to 6 mg at bedtime; scheduled at 2100.       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      Lesion of nose   Present for 1-2 months; concerning for non-melanoma skin cancer  Nodule, raised, pearly edges, bleeding, appearance:variegated, slightly red, location: left nasal ala, size: 5 mm  Referral to dermatology         Orders:  Melatonin 6 mg at bedtime, scheduled at 2100.   Start Lantus insulin; inject 5 units subcutaneous daily at bedtime.  Decrease glipizide to 5 mg po BID before meals.   Monitor BG QID and notify provider if BG > 400 or < 90.       Electronically signed by: ANKITA Fuentes CNP         Sincerely,        ANKITA Fuentes CNP    Electronically signed

## 2025-03-17 ENCOUNTER — TRANSITIONAL CARE UNIT VISIT (OUTPATIENT)
Dept: GERIATRICS | Facility: CLINIC | Age: 85
End: 2025-03-17
Payer: COMMERCIAL

## 2025-03-17 VITALS
TEMPERATURE: 97.5 F | BODY MASS INDEX: 21.22 KG/M2 | RESPIRATION RATE: 17 BRPM | DIASTOLIC BLOOD PRESSURE: 62 MMHG | WEIGHT: 123.6 LBS | SYSTOLIC BLOOD PRESSURE: 126 MMHG | HEART RATE: 64 BPM | OXYGEN SATURATION: 94 %

## 2025-03-17 DIAGNOSIS — Z99.2 ESRD (END STAGE RENAL DISEASE) ON DIALYSIS (H): ICD-10-CM

## 2025-03-17 DIAGNOSIS — D63.1 ANEMIA OF CHRONIC RENAL FAILURE, STAGE 5 (H): ICD-10-CM

## 2025-03-17 DIAGNOSIS — M62.81 GENERALIZED MUSCLE WEAKNESS: ICD-10-CM

## 2025-03-17 DIAGNOSIS — K21.9 GASTRO-ESOPHAGEAL REFLUX DISEASE WITHOUT ESOPHAGITIS: ICD-10-CM

## 2025-03-17 DIAGNOSIS — E11.42 TYPE 2 DIABETES MELLITUS WITH DIABETIC POLYNEUROPATHY, WITHOUT LONG-TERM CURRENT USE OF INSULIN (H): Primary | ICD-10-CM

## 2025-03-17 DIAGNOSIS — K58.2 IRRITABLE BOWEL SYNDROME WITH BOTH CONSTIPATION AND DIARRHEA: ICD-10-CM

## 2025-03-17 DIAGNOSIS — I10 HYPERTENSION GOAL BP (BLOOD PRESSURE) < 140/90: ICD-10-CM

## 2025-03-17 DIAGNOSIS — G47.00 INSOMNIA, UNSPECIFIED TYPE: ICD-10-CM

## 2025-03-17 DIAGNOSIS — J34.89 LESION OF NOSE: ICD-10-CM

## 2025-03-17 DIAGNOSIS — E78.5 HYPERLIPIDEMIA, UNSPECIFIED HYPERLIPIDEMIA TYPE: ICD-10-CM

## 2025-03-17 DIAGNOSIS — I50.9 CONGESTIVE HEART FAILURE, UNSPECIFIED HF CHRONICITY, UNSPECIFIED HEART FAILURE TYPE (H): ICD-10-CM

## 2025-03-17 DIAGNOSIS — E87.5 HYPERKALEMIA: ICD-10-CM

## 2025-03-17 DIAGNOSIS — N18.6 ESRD (END STAGE RENAL DISEASE) ON DIALYSIS (H): ICD-10-CM

## 2025-03-17 DIAGNOSIS — N18.5 ANEMIA OF CHRONIC RENAL FAILURE, STAGE 5 (H): ICD-10-CM

## 2025-03-17 PROBLEM — D69.1 PLATELET FUNCTION DEFECT (H): Status: RESOLVED | Noted: 2024-06-20 | Resolved: 2025-03-17

## 2025-03-17 PROCEDURE — 99309 SBSQ NF CARE MODERATE MDM 30: CPT

## 2025-03-17 RX ORDER — GLIPIZIDE 5 MG/1
5 TABLET ORAL
COMMUNITY
Start: 2025-03-17

## 2025-03-17 RX ORDER — INSULIN GLARGINE 100 [IU]/ML
5 INJECTION, SOLUTION SUBCUTANEOUS AT BEDTIME
COMMUNITY
Start: 2025-03-17

## 2025-03-17 NOTE — PROGRESS NOTES
[facility] GERIATRICS    Chief Complaint   Patient presents with    RECHECK     HPI:  Eliza Dubois is a 84 year old  (1940), who is being seen today for an episodic care visit at: [facility] AND REHAB [facility] ([facility]) [[id number]]. Patient was hospitalized at [facility] . Hospital stay 2/19/2025 through 2/25/2025.     Patient is a 84 year-old female with past medical history significant for CKD5 now with ESRD on HD, DM2, coronary artery disease, hyperlipidemia, hypertension, peripheral neuropathy and GERD. Patient presented to ED with shortness of breath and lethargy and was diagnosed with hyperkalemia, for which she had IV lasix and lokelma. Patient has a PCAD placement in right chest and had her first hemodialysis session on 2/24/25. She had her first outpatient HD on 2/26/24 and is to continue a MWF schedule.      Today's concern is: Patient began lantus 5 units at bedtime on 3/14/25, while decreasing glipizide from 20 mg BID to 5 mg BID. On 3/16/25, her BG were as follows: 124, 267, 274, and 275. On 3/17/25, her CGM alarmed at 0550 with a BG of 61; she was given juice and her BG increased to 75 at 0607. To prevent hypoglycemia, patient is okay with discontinuing glipizide. Advised patient that we would rather have her average BG be in the 200's than dip below 100 with her ESRD. We will also help her obtain more low carb snacks. Patient says last night was the first night she slept very well as she received melatonin at bedtime. She is moving her bowels every 3 days. Denies lightheadedness, shortness of breath or chest pain.  She is anxious about moving to [facility] with coordinating getting all her things from [facility].     Allergies, and PMH/PSH reviewed in [facility] today.  REVIEW OF SYSTEMS:  4 point ROS including Respiratory, CV, GI and , other than that noted in the HPI,  is negative    Objective:   /62   Pulse 64   Temp 97.5  F (36.4  C)   Resp 17   Wt  56.1 kg (123 lb 9.6 oz)   SpO2 94%   BMI 21.22 kg/m    GENERAL APPEARANCE:  Alert, in no distress, pale  ENT:  Mouth and posterior oropharynx normal, moist mucous membranes  EYES:  EOM, conjunctivae, lids, pupils and irises normal, glasses on  RESP:  respiratory effort and palpation of chest normal, lungs clear to auscultation   CV:  regular rate and rhythm, no murmur, rub, or gallop, no edema, +2 pedal pulses  ABDOMEN:  normal bowel sounds, soft, nontender, no hepatosplenomegaly or other masses  M/S:   generalized weakness; normal ROM  SKIN:  right chest catheter with no sxsx of infection, lesion present, type:nodule, raised, pearly edges, bleeding, appearance:variegated, slightly red, location: left nasal ala, size: 5 mm  NEURO:   Cranial nerves 2-12 are normal tested and grossly at patient's baseline  PSYCH:  oriented X 3, affect and mood normal    Labs done in SNF are in [place] EPIC. Please refer to them using [employer]/Care Everywhere.    Assessment/Plan:    ESRD (end stage renal disease) on dialysis (H)  Hyperkalemia- resolved  Generalized muscle weakness  HD on MWF at [facility] in [place]; nephrologist taking over lab studies  Tylenol 500 mg once daily as needed for pain.  Renal diet with erogocalciferol 50 k units weekly,  PT/OT to evaluate and treat.  2/28/25: K+ 4.4 WNL     Hyperlipidemia, unspecified hyperlipidemia type  Congestive heart failure, unspecified HF chronicity, unspecified heart failure type (H)  Hypertension goal BP (blood pressure) < 140/90  Continue aspirin 81 mg daily, amlodipine 10 mg daily, losartan 100 mg daily, atorvastatin 80 mg at bedtime, clopidogrel 75 mg daily, and carvedilol 6.25 mg daily. (Hospital stopped hydralazine, metoprolol and reduced dosage of carvedilol).   Patient had LLE swelling in hospital and was on torsemide 20 mg daily.   Continue daily weights and monitor BPs     Type 2 diabetes mellitus with diabetic polyneuropathy, without long-term current use of insulin  (H)  Last A1c 7.4. Continue glipizide 20 mg before meals BID  PTA gabapentin 300 mg at bedtime  Due to BG's of 442 and 356, will start Lantus with 50% starting dose due to ESRD  Start lantus insulin; inject 5 units subcutaneous daily at bedtime.  To prevent hypoglycemia, will discontinue glipizide.   Monitor BG QID and notify provider if BG > 400 or < 90.      Anemia of chronic renal failure, stage 5 (H)  Last Hgb was 7.5; baseline 8  2/28/25: Hgb 8.3     Gastro-esophageal reflux disease without esophagitis  Continue famotidine 20 mg BID and pantoprazole 40 mg QAM     Insomnia, unspecified type  Begin melatonin 3 mg at bedtime.  Per patient request, increased melatonin to 6 mg at bedtime; scheduled at 2100.       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      Lesion of nose   Present for 1-2 months; concerning for non-melanoma skin cancer  Nodule, raised, pearly edges, bleeding, appearance:variegated, slightly red, location: left nasal ala, size: 5 mm  Referral to dermatology- appt made for 9/11/25.        Orders:  Discontinue glipizide  Offer low-carb snacks as needed. Dx: DM2    Electronically signed by: ANKITA Fuentes CNP

## 2025-03-17 NOTE — LETTER
3/17/2025      Eliza Dubois  [address]  [place] [address]        [facility] GERIATRICS    Chief Complaint   Patient presents with     RECHECK     HPI:  Eliza Dubois is a 84 year old  (1940), who is being seen today for an episodic care visit at: [facility] ([facility]) [[id number]]. Patient was hospitalized at [facility] . Hospital stay 2/19/2025 through 2/25/2025.     Patient is a 84 year-old female with past medical history significant for CKD5 now with ESRD on HD, DM2, coronary artery disease, hyperlipidemia, hypertension, peripheral neuropathy and GERD. Patient presented to ED with shortness of breath and lethargy and was diagnosed with hyperkalemia, for which she had IV lasix and lokelma. Patient has a PCAD placement in right chest and had her first hemodialysis session on 2/24/25. She had her first outpatient HD on 2/26/24 and is to continue a MWF schedule.      Today's concern is: Patient began lantus 5 units at bedtime on 3/14/25, while decreasing glipizide from 20 mg BID to 5 mg BID. On 3/16/25, her BG were as follows: 124, 267, 274, and 275. On 3/17/25, her CGM alarmed at 0550 with a BG of 61; she was given juice and her BG increased to 75 at 0607. To prevent hypoglycemia, patient is okay with discontinuing glipizide. Advised patient that we would rather have her average BG be in the 200's than dip below 100 with her ESRD. We will also help her obtain more low carb snacks. Patient says last night was the first night she slept very well as she received melatonin at bedtime. She is moving her bowels every 3 days. Denies lightheadedness, shortness of breath or chest pain.  She is anxious about moving to [facility] with coordinating getting all her things from [facility].     Allergies, and PMH/PSH reviewed in EPIC today.  REVIEW OF SYSTEMS:  4 point ROS including Respiratory, CV, GI and , other than that noted in the HPI,  is  negative    Objective:   /62   Pulse 64   Temp 97.5  F (36.4  C)   Resp 17   Wt 56.1 kg (123 lb 9.6 oz)   SpO2 94%   BMI 21.22 kg/m    GENERAL APPEARANCE:  Alert, in no distress, pale  ENT:  Mouth and posterior oropharynx normal, moist mucous membranes  EYES:  EOM, conjunctivae, lids, pupils and irises normal, glasses on  RESP:  respiratory effort and palpation of chest normal, lungs clear to auscultation   CV:  regular rate and rhythm, no murmur, rub, or gallop, no edema, +2 pedal pulses  ABDOMEN:  normal bowel sounds, soft, nontender, no hepatosplenomegaly or other masses  M/S:   generalized weakness; normal ROM  SKIN:  right chest catheter with no sxsx of infection, lesion present, type:nodule, raised, pearly edges, bleeding, appearance:variegated, slightly red, location: left nasal ala, size: 5 mm  NEURO:   Cranial nerves 2-12 are normal tested and grossly at patient's baseline  PSYCH:  oriented X 3, affect and mood normal    Labs done in SNF are in [place] EPIC. Please refer to them using [employer]/Care Everywhere.    Assessment/Plan:    ESRD (end stage renal disease) on dialysis (H)  Hyperkalemia- resolved  Generalized muscle weakness  HD on MWF at [facility] in [place]; nephrologist taking over lab studies  Tylenol 500 mg once daily as needed for pain.  Renal diet with erogocalciferol 50 k units weekly,  PT/OT to evaluate and treat.  2/28/25: K+ 4.4 WNL     Hyperlipidemia, unspecified hyperlipidemia type  Congestive heart failure, unspecified HF chronicity, unspecified heart failure type (H)  Hypertension goal BP (blood pressure) < 140/90  Continue aspirin 81 mg daily, amlodipine 10 mg daily, losartan 100 mg daily, atorvastatin 80 mg at bedtime, clopidogrel 75 mg daily, and carvedilol 6.25 mg daily. (Hospital stopped hydralazine, metoprolol and reduced dosage of carvedilol).   Patient had LLE swelling in hospital and was on torsemide 20 mg daily.   Continue daily weights and monitor BPs     Type 2  diabetes mellitus with diabetic polyneuropathy, without long-term current use of insulin (H)  Last A1c 7.4. Continue glipizide 20 mg before meals BID  PTA gabapentin 300 mg at bedtime  Due to BG's of 442 and 356, will start Lantus with 50% starting dose due to ESRD  Start lantus insulin; inject 5 units subcutaneous daily at bedtime.  To prevent hypoglycemia, will discontinue glipizide.   Monitor BG QID and notify provider if BG > 400 or < 90.      Anemia of chronic renal failure, stage 5 (H)  Last Hgb was 7.5; baseline 8  2/28/25: Hgb 8.3     Gastro-esophageal reflux disease without esophagitis  Continue famotidine 20 mg BID and pantoprazole 40 mg QAM     Insomnia, unspecified type  Begin melatonin 3 mg at bedtime.  Per patient request, increased melatonin to 6 mg at bedtime; scheduled at 2100.       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      Lesion of nose   Present for 1-2 months; concerning for non-melanoma skin cancer  Nodule, raised, pearly edges, bleeding, appearance:variegated, slightly red, location: left nasal ala, size: 5 mm  Referral to dermatology- appt made for 9/11/25.        Orders:  Discontinue glipizide  Offer low-carb snacks as needed. Dx: DM2    Electronically signed by: ANKITA Fuentes CNP         Sincerely,        ANKITA Fuentes CNP    Electronically signed   no pain, swelling or deformity of joints

## 2025-03-22 ENCOUNTER — HEALTH MAINTENANCE LETTER (OUTPATIENT)
Age: 85
End: 2025-03-22

## 2025-03-24 ENCOUNTER — TRANSITIONAL CARE UNIT VISIT (OUTPATIENT)
Dept: GERIATRICS | Facility: CLINIC | Age: 85
End: 2025-03-24
Payer: COMMERCIAL

## 2025-03-24 VITALS
WEIGHT: 125.6 LBS | HEIGHT: 64 IN | OXYGEN SATURATION: 95 % | SYSTOLIC BLOOD PRESSURE: 128 MMHG | TEMPERATURE: 97.5 F | BODY MASS INDEX: 21.44 KG/M2 | DIASTOLIC BLOOD PRESSURE: 79 MMHG | RESPIRATION RATE: 19 BRPM | HEART RATE: 70 BPM

## 2025-03-24 DIAGNOSIS — I10 HYPERTENSION GOAL BP (BLOOD PRESSURE) < 140/90: ICD-10-CM

## 2025-03-24 DIAGNOSIS — N18.5 ANEMIA OF CHRONIC RENAL FAILURE, STAGE 5 (H): ICD-10-CM

## 2025-03-24 DIAGNOSIS — N18.6 ESRD (END STAGE RENAL DISEASE) ON DIALYSIS (H): Primary | ICD-10-CM

## 2025-03-24 DIAGNOSIS — D63.1 ANEMIA OF CHRONIC RENAL FAILURE, STAGE 5 (H): ICD-10-CM

## 2025-03-24 DIAGNOSIS — Z99.2 ESRD (END STAGE RENAL DISEASE) ON DIALYSIS (H): Primary | ICD-10-CM

## 2025-03-24 DIAGNOSIS — I50.9 CONGESTIVE HEART FAILURE, UNSPECIFIED HF CHRONICITY, UNSPECIFIED HEART FAILURE TYPE (H): ICD-10-CM

## 2025-03-24 DIAGNOSIS — G47.00 INSOMNIA, UNSPECIFIED TYPE: ICD-10-CM

## 2025-03-24 DIAGNOSIS — E78.5 HYPERLIPIDEMIA, UNSPECIFIED HYPERLIPIDEMIA TYPE: ICD-10-CM

## 2025-03-24 DIAGNOSIS — M62.81 GENERALIZED MUSCLE WEAKNESS: ICD-10-CM

## 2025-03-24 DIAGNOSIS — J34.89 LESION OF NOSE: ICD-10-CM

## 2025-03-24 DIAGNOSIS — E87.5 HYPERKALEMIA: ICD-10-CM

## 2025-03-24 DIAGNOSIS — K21.9 GASTRO-ESOPHAGEAL REFLUX DISEASE WITHOUT ESOPHAGITIS: ICD-10-CM

## 2025-03-24 DIAGNOSIS — E11.42 TYPE 2 DIABETES MELLITUS WITH DIABETIC POLYNEUROPATHY, WITHOUT LONG-TERM CURRENT USE OF INSULIN (H): ICD-10-CM

## 2025-03-24 DIAGNOSIS — K58.2 IRRITABLE BOWEL SYNDROME WITH BOTH CONSTIPATION AND DIARRHEA: ICD-10-CM

## 2025-03-24 PROCEDURE — 99309 SBSQ NF CARE MODERATE MDM 30: CPT

## 2025-03-24 NOTE — PROGRESS NOTES
[facility] GERIATRICS    Chief Complaint   Patient presents with    RECHECK     HPI:  Eliza Dubois is a 84 year old  (1940), who is being seen today for an episodic care visit at: [facility] ([facility]) [[id number]]. Patient was hospitalized at [facility] . Hospital stay 2/19/2025 through 2/25/2025.     Patient is a 84 year-old female with past medical history significant for CKD5 now with ESRD on HD, DM2, coronary artery disease, hyperlipidemia, hypertension, peripheral neuropathy and GERD. Patient presented to ED with shortness of breath and lethargy and was diagnosed with hyperkalemia, for which she had IV lasix and lokelma. Patient has a PCAD placement in right chest and had her first hemodialysis session on 2/24/25. She had her first outpatient HD on 2/26/24 and is to continue a MWF schedule.     Today's concern is: Patient has had some high BG's during the day, for example on 3/23/25, her BG at 1200 was 320 and her BG at 1700 was 367. Patient is agreeable to increase lantus from 5 units to 7 units at bedtime, making sure we increase the dose slowly to avoid hypoglycemia. Patient's dialysis schedule is changing to 6 am on Tues, Thurs and Saturdays so she would like her bedtime medications to be given between 1930 and 2000 so the melatonin can take effect and she can get to bed earlier. She says that her son is moving her things out of [facility] and she will be moving into [employer] in a couple weeks after working more on her balance. She has been getting low carb snacks but is concerned about the level of sodium in Diet [place] so she is going to start drinking Crystal Light. Patient has no other concerns.     Allergies, and PMH/PSH reviewed in EPIC today.  REVIEW OF SYSTEMS:  4 point ROS including Respiratory, CV, GI and , other than that noted in the HPI,  is negative    Objective:   /79   Pulse 70   Temp 97.5  F (36.4  C)   Resp 19   Ht  "1.626 m (5' 4\")   Wt 57 kg (125 lb 9.6 oz)   SpO2 95%   BMI 21.56 kg/m    GENERAL APPEARANCE:  Alert, in no distress, pale  ENT:  Mouth and posterior oropharynx normal, moist mucous membranes  EYES:  EOM, conjunctivae, lids, pupils and irises normal, glasses on  RESP:  respiratory effort and palpation of chest normal, lungs clear to auscultation   CV:  regular rate and rhythm, no murmur, rub, or gallop, no edema, +2 pedal pulses  ABDOMEN:  normal bowel sounds, soft, nontender, no hepatosplenomegaly or other masses  M/S:   generalized weakness; normal ROM  SKIN:  right chest catheter with no sxsx of infection, lesion present, type:nodule, raised, pearly edges, bleeding, appearance:variegated, slightly red, location: left nasal ala, size: 5 mm  NEURO:   Cranial nerves 2-12 are normal tested and grossly at patient's baseline  PSYCH:  oriented X 3, affect and mood normal    Labs done in SNF are in [place] EPIC. Please refer to them using [employer]/Care Everywhere.    Assessment/Plan:    ESRD (end stage renal disease) on dialysis (H)  Hyperkalemia- resolved  Generalized muscle weakness  HD on Tues, Thurs, Saturday at [facility] in [place]; nephrologist taking over lab studies  Tylenol 500 mg once daily as needed for pain.  Renal diet with erogocalciferol 50 k units weekly,  PT/OT to evaluate and treat.  2/28/25: K+ 4.4 WNL  Sometimes feels nauseous after dialysis, will add order for PRN Zofran.     Hyperlipidemia, unspecified hyperlipidemia type  Congestive heart failure, unspecified HF chronicity, unspecified heart failure type (H)  Hypertension goal BP (blood pressure) < 140/90  Continue aspirin 81 mg daily, amlodipine 10 mg daily, losartan 100 mg daily, atorvastatin 80 mg at bedtime, clopidogrel 75 mg daily, and carvedilol 6.25 mg daily. (Hospital stopped hydralazine, metoprolol and reduced dosage of carvedilol).   Patient had LLE swelling in hospital and was on torsemide 20 mg daily.   Continue daily weights and " monitor BPs     Type 2 diabetes mellitus with diabetic polyneuropathy, without long-term current use of insulin (H)  Last A1c 7.4. Continue glipizide 20 mg before meals BID  PTA gabapentin 300 mg at bedtime  Due to BG's of 442 and 356, will start Lantus with 50% starting dose due to ESRD  Start lantus insulin; inject 5 units subcutaneous daily at bedtime.  To prevent hypoglycemia, will discontinue glipizide.   Monitor BG QID and notify provider if BG > 400 or < 90.   3/24/25: Increase Lantus to 7 units QHS     Anemia of chronic renal failure, stage 5 (H)  Last Hgb was 7.5; baseline 8  2/28/25: Hgb 8.3     Gastro-esophageal reflux disease without esophagitis  Continue famotidine 20 mg BID and pantoprazole 40 mg QAM     Insomnia, unspecified type  Begin melatonin 3 mg at bedtime.  Per patient request, increased melatonin to 6 mg at bedtime.       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      Lesion of nose   Present for 1-2 months; concerning for non-melanoma skin cancer  Nodule, raised, pearly edges, bleeding, appearance:variegated, slightly red, location: left nasal ala, size: 5 mm  Referral to dermatology- appt made for 9/11/25.         Orders:  Lantus insulin; inject 7 units subcutaneous daily at bedtime.  Change administration time of bedtime medications to be given between 1930 and 2000.  Orders Placed This Encounter   Medications    ondansetron (ZOFRAN) 4 MG tablet     Sig: Take 1 tablet (4 mg) by mouth every 8 hours as needed for nausea.         Electronically signed by: ANKITA Fuentes CNP

## 2025-03-24 NOTE — LETTER
3/24/2025      Eliza Dubois  [address]  [place] [address]        [facility] GERIATRICS    Chief Complaint   Patient presents with     RECHECK     HPI:  Eliza Dubois is a 84 year old  (1940), who is being seen today for an episodic care visit at: [facility] REHAB [facility] ([facility]) [[id number]]. Patient was hospitalized at [facility] . Hospital stay 2/19/2025 through 2/25/2025.     Patient is a 84 year-old female with past medical history significant for CKD5 now with ESRD on HD, DM2, coronary artery disease, hyperlipidemia, hypertension, peripheral neuropathy and GERD. Patient presented to ED with shortness of breath and lethargy and was diagnosed with hyperkalemia, for which she had IV lasix and lokelma. Patient has a PCAD placement in right chest and had her first hemodialysis session on 2/24/25. She had her first outpatient HD on 2/26/24 and is to continue a MWF schedule.     Today's concern is: Patient has had some high BG's during the day, for example on 3/23/25, her BG at 1200 was 320 and her BG at 1700 was 367. Patient is agreeable to increase lantus from 5 units to 7 units at bedtime, making sure we increase the dose slowly to avoid hypoglycemia. Patient's dialysis schedule is changing to 6 am on Tues, Thurs and Saturdays so she would like her bedtime medications to be given between 1930 and 2000 so the melatonin can take effect and she can get to bed earlier. She says that her son is moving her things out of [facility] and she will be moving into [employer] in a couple weeks after working more on her balance. She has been getting low carb snacks but is concerned about the level of sodium in Diet [place] so she is going to start drinking Crystal Light. Patient has no other concerns.     Allergies, and PMH/PSH reviewed in EPIC today.  REVIEW OF SYSTEMS:  4 point ROS including Respiratory, CV, GI and , other than that noted in the HPI,  is  Called pt back. Pt states she has been still experiencing fevers on and off. Pt states she feels that she currently does not have a fever at the moment but feels warm. MA gave pt triage Covid 19 line to call due to pt having fever, cough.    "negative    Objective:   /79   Pulse 70   Temp 97.5  F (36.4  C)   Resp 19   Ht 1.626 m (5' 4\")   Wt 57 kg (125 lb 9.6 oz)   SpO2 95%   BMI 21.56 kg/m    GENERAL APPEARANCE:  Alert, in no distress, pale  ENT:  Mouth and posterior oropharynx normal, moist mucous membranes  EYES:  EOM, conjunctivae, lids, pupils and irises normal, glasses on  RESP:  respiratory effort and palpation of chest normal, lungs clear to auscultation   CV:  regular rate and rhythm, no murmur, rub, or gallop, no edema, +2 pedal pulses  ABDOMEN:  normal bowel sounds, soft, nontender, no hepatosplenomegaly or other masses  M/S:   generalized weakness; normal ROM  SKIN:  right chest catheter with no sxsx of infection, lesion present, type:nodule, raised, pearly edges, bleeding, appearance:variegated, slightly red, location: left nasal ala, size: 5 mm  NEURO:   Cranial nerves 2-12 are normal tested and grossly at patient's baseline  PSYCH:  oriented X 3, affect and mood normal    Labs done in SNF are in [place] EPIC. Please refer to them using EPIC/Care Everywhere.    Assessment/Plan:    ESRD (end stage renal disease) on dialysis (H)  Hyperkalemia- resolved  Generalized muscle weakness  HD on Tues, Thurs, Saturday at [facility] in [place]; nephrologist taking over lab studies  Tylenol 500 mg once daily as needed for pain.  Renal diet with erogocalciferol 50 k units weekly,  PT/OT to evaluate and treat.  2/28/25: K+ 4.4 WNL  Sometimes feels nauseous after dialysis, will add order for PRN Zofran.     Hyperlipidemia, unspecified hyperlipidemia type  Congestive heart failure, unspecified HF chronicity, unspecified heart failure type (H)  Hypertension goal BP (blood pressure) < 140/90  Continue aspirin 81 mg daily, amlodipine 10 mg daily, losartan 100 mg daily, atorvastatin 80 mg at bedtime, clopidogrel 75 mg daily, and carvedilol 6.25 mg daily. (Hospital stopped hydralazine, metoprolol and reduced dosage of carvedilol).   Patient had LLE " swelling in hospital and was on torsemide 20 mg daily.   Continue daily weights and monitor BPs     Type 2 diabetes mellitus with diabetic polyneuropathy, without long-term current use of insulin (H)  Last A1c 7.4. Continue glipizide 20 mg before meals BID  PTA gabapentin 300 mg at bedtime  Due to BG's of 442 and 356, will start Lantus with 50% starting dose due to ESRD  Start lantus insulin; inject 5 units subcutaneous daily at bedtime.  To prevent hypoglycemia, will discontinue glipizide.   Monitor BG QID and notify provider if BG > 400 or < 90.   3/24/25: Increase Lantus to 7 units QHS     Anemia of chronic renal failure, stage 5 (H)  Last Hgb was 7.5; baseline 8  2/28/25: Hgb 8.3     Gastro-esophageal reflux disease without esophagitis  Continue famotidine 20 mg BID and pantoprazole 40 mg QAM     Insomnia, unspecified type  Begin melatonin 3 mg at bedtime.  Per patient request, increased melatonin to 6 mg at bedtime.       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      Lesion of nose   Present for 1-2 months; concerning for non-melanoma skin cancer  Nodule, raised, pearly edges, bleeding, appearance:variegated, slightly red, location: left nasal ala, size: 5 mm  Referral to dermatology- appt made for 9/11/25.         Orders:  Lantus insulin; inject 7 units subcutaneous daily at bedtime.  Change administration time of bedtime medications to be given between 1930 and 2000.  Orders Placed This Encounter   Medications     ondansetron (ZOFRAN) 4 MG tablet     Sig: Take 1 tablet (4 mg) by mouth every 8 hours as needed for nausea.         Electronically signed by: ANKITA Fuentes CNP         Sincerely,        ANKITA Fuentes CNP    Electronically signed

## 2025-03-25 RX ORDER — ONDANSETRON 4 MG/1
4 TABLET, FILM COATED ORAL EVERY 8 HOURS PRN
COMMUNITY
Start: 2025-03-25

## 2025-04-01 DIAGNOSIS — K29.50 CHRONIC GASTRITIS WITHOUT BLEEDING, UNSPECIFIED GASTRITIS TYPE: ICD-10-CM

## 2025-04-01 RX ORDER — PANTOPRAZOLE SODIUM 40 MG/1
40 TABLET, DELAYED RELEASE ORAL DAILY
Qty: 90 TABLET | Refills: 0 | Status: SHIPPED | OUTPATIENT
Start: 2025-04-01

## 2025-04-03 ENCOUNTER — TELEPHONE (OUTPATIENT)
Dept: GERIATRICS | Facility: CLINIC | Age: 85
End: 2025-04-03
Payer: COMMERCIAL

## 2025-04-04 ENCOUNTER — TRANSITIONAL CARE UNIT VISIT (OUTPATIENT)
Dept: GERIATRICS | Facility: CLINIC | Age: 85
End: 2025-04-04
Payer: COMMERCIAL

## 2025-04-04 ENCOUNTER — LAB REQUISITION (OUTPATIENT)
Dept: LAB | Facility: CLINIC | Age: 85
End: 2025-04-04
Payer: MEDICARE

## 2025-04-04 VITALS
SYSTOLIC BLOOD PRESSURE: 119 MMHG | DIASTOLIC BLOOD PRESSURE: 86 MMHG | HEART RATE: 70 BPM | RESPIRATION RATE: 17 BRPM | BODY MASS INDEX: 21.31 KG/M2 | TEMPERATURE: 97.7 F | OXYGEN SATURATION: 90 % | HEIGHT: 64 IN | WEIGHT: 124.8 LBS

## 2025-04-04 DIAGNOSIS — N30.01 ACUTE CYSTITIS WITH HEMATURIA: ICD-10-CM

## 2025-04-04 DIAGNOSIS — N18.6 ESRD (END STAGE RENAL DISEASE) ON DIALYSIS (H): Primary | ICD-10-CM

## 2025-04-04 DIAGNOSIS — E11.42 TYPE 2 DIABETES MELLITUS WITH DIABETIC POLYNEUROPATHY, WITHOUT LONG-TERM CURRENT USE OF INSULIN (H): ICD-10-CM

## 2025-04-04 DIAGNOSIS — M62.81 GENERALIZED MUSCLE WEAKNESS: ICD-10-CM

## 2025-04-04 DIAGNOSIS — N18.5 ANEMIA OF CHRONIC RENAL FAILURE, STAGE 5 (H): ICD-10-CM

## 2025-04-04 DIAGNOSIS — K21.9 GASTRO-ESOPHAGEAL REFLUX DISEASE WITHOUT ESOPHAGITIS: ICD-10-CM

## 2025-04-04 DIAGNOSIS — E87.5 HYPERKALEMIA: ICD-10-CM

## 2025-04-04 DIAGNOSIS — K58.2 IRRITABLE BOWEL SYNDROME WITH BOTH CONSTIPATION AND DIARRHEA: ICD-10-CM

## 2025-04-04 DIAGNOSIS — D63.1 ANEMIA OF CHRONIC RENAL FAILURE, STAGE 5 (H): ICD-10-CM

## 2025-04-04 DIAGNOSIS — I10 HYPERTENSION GOAL BP (BLOOD PRESSURE) < 140/90: ICD-10-CM

## 2025-04-04 DIAGNOSIS — Z99.2 ESRD (END STAGE RENAL DISEASE) ON DIALYSIS (H): Primary | ICD-10-CM

## 2025-04-04 DIAGNOSIS — E78.5 HYPERLIPIDEMIA, UNSPECIFIED HYPERLIPIDEMIA TYPE: ICD-10-CM

## 2025-04-04 DIAGNOSIS — R31.9 HEMATURIA, UNSPECIFIED: ICD-10-CM

## 2025-04-04 DIAGNOSIS — I50.9 CONGESTIVE HEART FAILURE, UNSPECIFIED HF CHRONICITY, UNSPECIFIED HEART FAILURE TYPE (H): ICD-10-CM

## 2025-04-04 PROBLEM — J96.01 ACUTE RESPIRATORY FAILURE WITH HYPOXIA (H): Status: ACTIVE | Noted: 2025-02-25

## 2025-04-04 PROBLEM — Z79.82 LONG TERM (CURRENT) USE OF ASPIRIN: Status: ACTIVE | Noted: 2025-02-24

## 2025-04-04 PROBLEM — E11.22 TYPE 2 DIABETES MELLITUS WITH CHRONIC KIDNEY DISEASE (H): Status: ACTIVE | Noted: 2025-02-24

## 2025-04-04 PROBLEM — B19.10 UNSPECIFIED VIRAL HEPATITIS B WITHOUT HEPATIC COMA: Status: ACTIVE | Noted: 2025-02-24

## 2025-04-04 PROBLEM — Z79.84 LONG TERM (CURRENT) USE OF ORAL HYPOGLYCEMIC DRUGS: Status: ACTIVE | Noted: 2025-02-24

## 2025-04-04 PROBLEM — I25.10 ATHEROSCLEROTIC HEART DISEASE OF NATIVE CORONARY ARTERY WITHOUT ANGINA PECTORIS: Status: ACTIVE | Noted: 2025-02-24

## 2025-04-04 PROBLEM — I12.9 HYPERTENSIVE CHRONIC KIDNEY DISEASE WITH STAGE 1 THROUGH STAGE 4 CHRONIC KIDNEY DISEASE, OR UNSPECIFIED CHRONIC KIDNEY DISEASE: Status: ACTIVE | Noted: 2025-02-26

## 2025-04-04 PROBLEM — E44.0 MODERATE PROTEIN-CALORIE MALNUTRITION: Status: ACTIVE | Noted: 2025-02-25

## 2025-04-04 PROBLEM — R19.7 DIARRHEA, UNSPECIFIED: Status: ACTIVE | Noted: 2025-02-28

## 2025-04-04 PROBLEM — N25.81 SECONDARY HYPERPARATHYROIDISM OF RENAL ORIGIN: Status: ACTIVE | Noted: 2025-03-05

## 2025-04-04 PROBLEM — G62.9 POLYNEUROPATHY, UNSPECIFIED: Status: ACTIVE | Noted: 2025-02-25

## 2025-04-04 PROBLEM — R11.0 NAUSEA: Status: ACTIVE | Noted: 2025-03-24

## 2025-04-04 PROBLEM — Z79.02 LONG TERM (CURRENT) USE OF ANTITHROMBOTICS/ANTIPLATELETS: Status: ACTIVE | Noted: 2025-02-24

## 2025-04-04 PROBLEM — N18.9 ANEMIA IN CHRONIC KIDNEY DISEASE: Status: ACTIVE | Noted: 2025-02-24

## 2025-04-04 PROBLEM — R52 PAIN, UNSPECIFIED: Status: ACTIVE | Noted: 2025-02-25

## 2025-04-04 PROBLEM — E55.9 VITAMIN D DEFICIENCY, UNSPECIFIED: Status: ACTIVE | Noted: 2025-02-25

## 2025-04-04 PROBLEM — G47.00 INSOMNIA, UNSPECIFIED: Status: ACTIVE | Noted: 2025-02-25

## 2025-04-04 PROBLEM — K29.50 UNSPECIFIED CHRONIC GASTRITIS WITHOUT BLEEDING: Status: ACTIVE | Noted: 2025-02-25

## 2025-04-04 PROBLEM — E83.39 OTHER DISORDERS OF PHOSPHORUS METABOLISM: Status: ACTIVE | Noted: 2025-03-25

## 2025-04-04 PROBLEM — E87.8 OTHER DISORDERS OF ELECTROLYTE AND FLUID BALANCE, NOT ELSEWHERE CLASSIFIED: Status: ACTIVE | Noted: 2025-02-25

## 2025-04-04 PROBLEM — I13.2 HYPERTENSIVE HEART AND CHRONIC KIDNEY DISEASE WITH HEART FAILURE AND WITH STAGE 5 CHRONIC KIDNEY DISEASE, OR END STAGE RENAL DISEASE (H): Status: ACTIVE | Noted: 2025-02-24

## 2025-04-04 PROBLEM — E87.1 HYPO-OSMOLALITY AND HYPONATREMIA: Status: ACTIVE | Noted: 2025-02-25

## 2025-04-04 PROBLEM — T78.2XXA ANAPHYLACTIC SHOCK, UNSPECIFIED, INITIAL ENCOUNTER: Status: ACTIVE | Noted: 2025-02-25

## 2025-04-04 PROBLEM — T78.40XA ALLERGY, UNSPECIFIED, INITIAL ENCOUNTER: Status: ACTIVE | Noted: 2025-02-25

## 2025-04-04 LAB
ALBUMIN UR-MCNC: 300 MG/DL
APPEARANCE UR: ABNORMAL
BACTERIA #/AREA URNS HPF: ABNORMAL /HPF
BILIRUB UR QL STRIP: NEGATIVE
COLOR UR AUTO: ABNORMAL
GLUCOSE UR STRIP-MCNC: NEGATIVE MG/DL
HGB UR QL STRIP: ABNORMAL
KETONES UR STRIP-MCNC: NEGATIVE MG/DL
LEUKOCYTE ESTERASE UR QL STRIP: ABNORMAL
MUCOUS THREADS #/AREA URNS LPF: PRESENT /LPF
NITRATE UR QL: NEGATIVE
PH UR STRIP: 7.5 [PH] (ref 5–7)
RBC URINE: 142 /HPF
SP GR UR STRIP: 1.02 (ref 1–1.03)
SQUAMOUS EPITHELIAL: 3 /HPF
UROBILINOGEN UR STRIP-MCNC: 4 MG/DL
WBC CLUMPS #/AREA URNS HPF: PRESENT /HPF
WBC URINE: 130 /HPF

## 2025-04-04 PROCEDURE — 99309 SBSQ NF CARE MODERATE MDM 30: CPT

## 2025-04-04 PROCEDURE — 87186 SC STD MICRODIL/AGAR DIL: CPT | Mod: ORL

## 2025-04-04 PROCEDURE — 81001 URINALYSIS AUTO W/SCOPE: CPT | Mod: ORL

## 2025-04-04 NOTE — PROGRESS NOTES
"[facility] GERIATRICS    Chief Complaint   Patient presents with    RECHECK     HPI:  Eliza Dubois is a 84 year old  (1940), who is being seen today for an episodic care visit at: [facility] ([facility] [[id number]]. Patient was hospitalized at [facility] . Hospital stay 2/19/2025 through 2/25/2025.     Patient is a 84 year-old female with past medical history significant for CKD5 now with ESRD on HD, DM2, coronary artery disease, hyperlipidemia, hypertension, peripheral neuropathy and GERD. Patient presented to ED with shortness of breath and lethargy and was diagnosed with hyperkalemia, for which she had IV lasix and lokelma. Patient has a PCAD placement in right chest and had her first hemodialysis session on 2/24/25. She had her first outpatient HD on 2/26/24 and is to continue a MWF schedule.     Today's concern is: Staff reports that patient began having hematuria on 4/3/25 with over a tablespoon of blood. Patient says she has had mulitple bladder infections in the past and they usually have bloody urine as a symptom. Patient denies dysuria, urgency, frequency or suprapubic pain but says that she had no energy, beginning on 4/3/25. Denies fever or chills, but did have some dizzziness during PT today. At bedtime yesterday, she also had a BG of 400, but that was after having some chocolate ice cream. She has been having some higher BG's which could be attributed to UTI. Ordered a UA/UC for 4/4/25.     Allergies, and PMH/PSH reviewed in [facility] today.  REVIEW OF SYSTEMS:  4 point ROS including Respiratory, CV, GI and , other than that noted in the HPI,  is negative    Objective:   /86   Pulse 70   Temp 97.7  F (36.5  C)   Resp 17   Ht 1.626 m (5' 4\")   Wt 56.6 kg (124 lb 12.8 oz)   SpO2 (!) 90%   BMI 21.42 kg/m    GENERAL APPEARANCE:  Alert, in no distress, pale  ENT:  Mouth and posterior oropharynx normal, moist mucous membranes  EYES:  EOM, " conjunctivae, lids, pupils and irises normal, glasses on  RESP:  respiratory effort and palpation of chest normal, lungs clear to auscultation   CV:  regular rate and rhythm, no murmur, rub, or gallop, no edema, +2 pedal pulses  ABDOMEN:  normal bowel sounds, soft, nontender, no hepatosplenomegaly or other masses  M/S:   generalized weakness; normal ROM  SKIN:  right chest catheter with no sxsx of infection, lesion present, type:nodule, raised, pearly edges, bleeding, appearance:variegated, slightly red, location: left nasal ala, size: 5 mm  NEURO:   Cranial nerves 2-12 are normal tested and grossly at patient's baseline  PSYCH:  oriented X 3, affect and mood normal    Most Recent Urinalysis:  Recent Labs   Lab Test 04/04/25  1515 10/08/20  1041 01/20/20  1041   COLOR Orange*   < > Yellow   APPEARANCE Cloudy*   < > Clear   URINEGLC Negative   < > 500*   URINEBILI Negative   < > Negative   URINEKETONE Negative   < > Negative   SG 1.017   < > 1.020   UBLD Large*   < > Trace*   URINEPH 7.5*   < > 5.5   PROTEIN 300*   < > Negative   UROBILINOGEN  --   --  0.2   NITRITE Negative   < > Positive*   LEUKEST Moderate*   < > Small*   RBCU 142*   < > 2-5*   WBCU 130*   < > 5-10*    < > = values in this interval not displayed.       Assessment/Plan:    Acute cystitis with hematuria   Hematuria began on 4/3/25 without any other urinary symptoms or fever/chills   UA on 4/4/25 see above  UC with > 100 K e coli.   Start Ciprofloxacin 250 mg BID x 5 days (4/5/25 - 4/10/25) and do not give before dialysis. Awaiting sensitivities.    ESRD (end stage renal disease) on dialysis (H)  Hyperkalemia- resolved  Generalized muscle weakness  HD on Tues, Thurs, Saturday at [facility] in [place]; nephrologist taking over lab studies  Tylenol 500 mg once daily as needed for pain.  Renal diet with erogocalciferol 50 k units weekly,  PT/OT to evaluate and treat.  2/28/25: K+ 4.4 WNL  Sometimes feels nauseous after dialysis, will add order for PRN  Zofran.     Hyperlipidemia, unspecified hyperlipidemia type  Congestive heart failure, unspecified HF chronicity, unspecified heart failure type (H)  Hypertension goal BP (blood pressure) < 140/90  Continue aspirin 81 mg daily, amlodipine 10 mg daily, losartan 100 mg daily, atorvastatin 80 mg at bedtime, clopidogrel 75 mg daily, and carvedilol 6.25 mg daily. (Hospital stopped hydralazine, metoprolol and reduced dosage of carvedilol).   Patient had LLE swelling in hospital and was on torsemide 20 mg daily.   Continue daily weights and monitor BPs     Type 2 diabetes mellitus with diabetic polyneuropathy, without long-term current use of insulin (H)  Last A1c 7.4. Continue glipizide 20 mg before meals BID  PTA gabapentin 300 mg at bedtime  Due to BG's of 442 and 356, will start Lantus with 50% starting dose due to ESRD  Start lantus insulin; inject 5 units subcutaneous daily at bedtime.  To prevent hypoglycemia, will discontinue glipizide.   Monitor BG QID and notify provider if BG > 400 or < 90.   3/24/25: Increase Lantus to 7 units QHS  3/27/25: Lantus increased to 10 units at bedtime  4/1/25: With BG's trending in upper 300's at dinner time and at bedtime, will restart glipizide 10 mg BID with lunch and supper, with continued monitoring to prevent hypoglycemia.    Anemia of chronic renal failure, stage 5 (H)  Last Hgb was 7.5; baseline 8  2/28/25: Hgb 8.3     Gastro-esophageal reflux disease without esophagitis  Continue famotidine 20 mg BID and pantoprazole 40 mg QA     Insomnia, unspecified type  Begin melatonin 3 mg at bedtime.  Per patient request, increased melatonin to 6 mg at bedtime.       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      Lesion of nose   Present for 1-2 months; concerning for non-melanoma skin cancer  Nodule, raised, pearly edges, bleeding, appearance:variegated, slightly red, location: left nasal ala, size: 5  mm  Referral to dermatology- appt made for 9/11/25.        Orders:  UA/UC on 4/4/25 due to hematuria.   Start Ciprofloxacin 250 mg BID x 5 days (4/5/25 - 4/10/25) and do not give before dialysis. Awaiting sensitivities and may have to change antibiotic therapy for > 100 K e coli.     Electronically signed by: ANKITA Fuentes CNP

## 2025-04-04 NOTE — TELEPHONE ENCOUNTER
Facility called    Pt's BS was 400 at HS.   She ate chocolate ice cream after dinner.   She received 10 units Lantus    No new orders  ANKITA Mitchell CNP on 4/3/2025 at 8:46 PM

## 2025-04-04 NOTE — LETTER
" 4/4/2025      Eliza Dubois  [address]  [address]  [place] [id number]        [facility] GERIATRICS    Chief Complaint   Patient presents with     RECHECK     HPI:  Eliza Dubois is a 84 year old  (1940), who is being seen today for an episodic care visit at: [facility] AND [facility] ([facility]) [[id number]]. Patient was hospitalized at [facility] . Hospital stay 2/19/2025 through 2/25/2025.     Patient is a 84 year-old female with past medical history significant for CKD5 now with ESRD on HD, DM2, coronary artery disease, hyperlipidemia, hypertension, peripheral neuropathy and GERD. Patient presented to ED with shortness of breath and lethargy and was diagnosed with hyperkalemia, for which she had IV lasix and lokelma. Patient has a PCAD placement in right chest and had her first hemodialysis session on 2/24/25. She had her first outpatient HD on 2/26/24 and is to continue a MWF schedule.     Today's concern is: Staff reports that patient began having hematuria on 4/3/25 with over a tablespoon of blood. Patient says she has had mulitple bladder infections in the past and they usually have bloody urine as a symptom. Patient denies dysuria, urgency, frequency or suprapubic pain but says that she had no energy, beginning on 4/3/25. Denies fever or chills, but did have some dizzziness during PT today. At bedtime yesterday, she also had a BG of 400, but that was after having some chocolate ice cream. She has been having some higher BG's which could be attributed to UTI. Ordered a UA/UC for 4/4/25.     Allergies, and PMH/PSH reviewed in [facility] today.  REVIEW OF SYSTEMS:  4 point ROS including Respiratory, CV, GI and , other than that noted in the HPI,  is negative    Objective:   /86   Pulse 70   Temp 97.7  F (36.5  C)   Resp 17   Ht 1.626 m (5' 4\")   Wt 56.6 kg (124 lb 12.8 oz)   SpO2 (!) 90%   BMI 21.42 kg/m    GENERAL APPEARANCE:  Alert, in no distress, " pale  ENT:  Mouth and posterior oropharynx normal, moist mucous membranes  EYES:  EOM, conjunctivae, lids, pupils and irises normal, glasses on  RESP:  respiratory effort and palpation of chest normal, lungs clear to auscultation   CV:  regular rate and rhythm, no murmur, rub, or gallop, no edema, +2 pedal pulses  ABDOMEN:  normal bowel sounds, soft, nontender, no hepatosplenomegaly or other masses  M/S:   generalized weakness; normal ROM  SKIN:  right chest catheter with no sxsx of infection, lesion present, type:nodule, raised, pearly edges, bleeding, appearance:variegated, slightly red, location: left nasal ala, size: 5 mm  NEURO:   Cranial nerves 2-12 are normal tested and grossly at patient's baseline  PSYCH:  oriented X 3, affect and mood normal    Most Recent Urinalysis:  Recent Labs   Lab Test 04/04/25  1515 10/08/20  1041 01/20/20  1041   COLOR Orange*   < > Yellow   APPEARANCE Cloudy*   < > Clear   URINEGLC Negative   < > 500*   URINEBILI Negative   < > Negative   URINEKETONE Negative   < > Negative   SG 1.017   < > 1.020   UBLD Large*   < > Trace*   URINEPH 7.5*   < > 5.5   PROTEIN 300*   < > Negative   UROBILINOGEN  --   --  0.2   NITRITE Negative   < > Positive*   LEUKEST Moderate*   < > Small*   RBCU 142*   < > 2-5*   WBCU 130*   < > 5-10*    < > = values in this interval not displayed.       Assessment/Plan:    Acute cystitis with hematuria   Hematuria began on 4/3/25 without any other urinary symptoms or fever/chills   UA on 4/4/25 see above  UC with > 100 K e coli.   Start Ciprofloxacin 250 mg BID x 5 days (4/5/25 - 4/10/25) and do not give before dialysis. Awaiting sensitivities.    ESRD (end stage renal disease) on dialysis (H)  Hyperkalemia- resolved  Generalized muscle weakness  HD on Tues, Thurs, Saturday at [facility] in [place]; nephrologist taking over lab studies  Tylenol 500 mg once daily as needed for pain.  Renal diet with erogocalciferol 50 k units weekly,  PT/OT to evaluate and  treat.  2/28/25: K+ 4.4 WNL  Sometimes feels nauseous after dialysis, will add order for PRN Zofran.     Hyperlipidemia, unspecified hyperlipidemia type  Congestive heart failure, unspecified HF chronicity, unspecified heart failure type (H)  Hypertension goal BP (blood pressure) < 140/90  Continue aspirin 81 mg daily, amlodipine 10 mg daily, losartan 100 mg daily, atorvastatin 80 mg at bedtime, clopidogrel 75 mg daily, and carvedilol 6.25 mg daily. (Hospital stopped hydralazine, metoprolol and reduced dosage of carvedilol).   Patient had LLE swelling in hospital and was on torsemide 20 mg daily.   Continue daily weights and monitor BPs     Type 2 diabetes mellitus with diabetic polyneuropathy, without long-term current use of insulin (H)  Last A1c 7.4. Continue glipizide 20 mg before meals BID  PTA gabapentin 300 mg at bedtime  Due to BG's of 442 and 356, will start Lantus with 50% starting dose due to ESRD  Start lantus insulin; inject 5 units subcutaneous daily at bedtime.  To prevent hypoglycemia, will discontinue glipizide.   Monitor BG QID and notify provider if BG > 400 or < 90.   3/24/25: Increase Lantus to 7 units QHS  3/27/25: Lantus increased to 10 units at bedtime  4/1/25: With BG's trending in upper 300's at dinner time and at bedtime, will restart glipizide 10 mg BID with lunch and supper, with continued monitoring to prevent hypoglycemia.    Anemia of chronic renal failure, stage 5 (H)  Last Hgb was 7.5; baseline 8  2/28/25: Hgb 8.3     Gastro-esophageal reflux disease without esophagitis  Continue famotidine 20 mg BID and pantoprazole 40 mg QAM     Insomnia, unspecified type  Begin melatonin 3 mg at bedtime.  Per patient request, increased melatonin to 6 mg at bedtime.       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      Lesion of nose   Present for 1-2 months; concerning for non-melanoma skin cancer  Nodule, raised, pearly  edges, bleeding, appearance:variegated, slightly red, location: left nasal ala, size: 5 mm  Referral to dermatology- appt made for 9/11/25.        Orders:  UA/UC on 4/4/25 due to hematuria.   Start Ciprofloxacin 250 mg BID x 5 days (4/5/25 - 4/10/25) and do not give before dialysis. Awaiting sensitivities and may have to change antibiotic therapy for > 100 K e coli.     Electronically signed by: ANKITA Fuentes CNP         Sincerely,        ANKITA Fuentes CNP    Electronically signed

## 2025-04-06 LAB
BACTERIA UR CULT: ABNORMAL
BACTERIA UR CULT: ABNORMAL

## 2025-04-07 ENCOUNTER — PATIENT OUTREACH (OUTPATIENT)
Dept: NEPHROLOGY | Facility: CLINIC | Age: 85
End: 2025-04-07
Payer: COMMERCIAL

## 2025-04-07 ENCOUNTER — DOCUMENTATION ONLY (OUTPATIENT)
Dept: NEPHROLOGY | Facility: CLINIC | Age: 85
End: 2025-04-07
Payer: COMMERCIAL

## 2025-04-07 NOTE — PROGRESS NOTES
Neph Tracking Flowsheet Last Filled Values       [facility] CKD Basics Referral 01/28/25    K[facility]ney Smart CKD Basics Complete 02/03/25    Preferred Modality Hemodialysis  Prefers In Center HD    Final Modality Hemodialysis    Patient's Referral Dates Auto Populate Patient's Referral Dates    Anemia Services Referral 1/28/25    Home Care Referral 6/24/24    Journey Referral 1/28/25    Diaylsis Access Type CVC  placed 2/20/25 while inpt at [facility]    Dialysis Optimal Start? No    Non-optimal Start Detail Inpatient          Zulma Lancaster, RN, BSN  Nephrology Clinic Manager  [facility]

## 2025-04-07 NOTE — PROGRESS NOTES
Results received by Dr Gonzalez. Patient is on dialysis at the [facility] and is no longer being followed by dr Gonzalez. Dr Gonzalez's name removed from the care team.   The lab results have already been addressed by iJl Fagan APRN CNP. Closing encounter.  GEOVANNY Gordon Care Coordinator  Nephrology

## 2025-04-07 NOTE — PROGRESS NOTES
"[facility] GERIATRICS    PRIMARY CARE PROVIDER AND CLINIC:  Byron Holm, , [address]  / [place] [address]  Chief Complaint   Patient presents with    Hospital F/U      [facility] Medical Record Number:  [id number]  Place of Service where encounter took place:  [facility] AND REHAB [facility] ([facility]) [[id number]]    Eliza Dubois  is a 84 year old  (1940), admitted to the above facility from  [facility] . Hospital stay 2/19/25 through 2/25/25..   HPI:    As per DNP's note:\"Patient is a 84 year-old female with past medical history significant for CKD5 now with ESRD on HD, DM2, coronary artery disease, hyperlipidemia, hypertension, peripheral neuropathy and GERD. Patient presented to ED with shortness of breath and lethargy and was diagnosed with hyperkalemia, for which she had IV lasix and Lokema. Patient has a PCAD placement in right chest and had her first hemodialysis session on 2/24/25. She had her first outpatient HD on 2/26/24 and is to continue a MWF schedule.\"      TODAY:  -ESRD:  Patient reports that she tolerates the dialysis.  To prevent feeling nauseous she takes  crackers.    -Acute cystitis, E-coli with hematuria   Started on cipro from 4/5-4/10  Patient reports history of recurrent UTI.  She does not have dysuria and that this stopped.      -Cardia:  while inpatient , metoprolol and hydralazine, coreg dose reduced.   Patient denies chest pain or shortness of breath, wheezing or cough.      -T2D:  Hyperglycemia at U in 300 and 400 range, started on Lantus and later increased. Glipizide discontinued later renewed due to hyperglycemia.  Patient reports his sugar up-and-down.  This morning was 93.  She is sure that to go up after breakfast.  ======================================================================    CODE STATUS/ADVANCE DIRECTIVES DISCUSSION:  No CPR- Do NOT Intubate    ALLERGIES:   Allergies   Allergen Reactions    Sodium Sulfate Hives "    Sulfa Antibiotics Hives      PAST MEDICAL HISTORY:   Past Medical History:   Diagnosis Date    Diabetic eye exam (H) 05/01/14    Heart attack (H)     Pure hypercholesterolemia     Stented coronary artery     Type II or unspecified type diabetes mellitus without mention of complication, not stated as uncontrolled 2002    [place]andamet.    Unspecified disease of pericardium 12/05/08    Pericarditis w/mild to moderate pericardial effusion.    Unspecified essential hypertension       PAST SURGICAL HISTORY:   has a past surgical history that includes NONSPECIFIC PROCEDURE (1988); LAPAROSCOPY, SURGICAL; CHOLECYSTECTOMY (1997); colonoscopy (04/15/09); Colonoscopy (6/18/2014); Abdomen surgery; Phacoemulsification clear cornea with standard intraocular lens implant (Right, 3/16/2016); and Phacoemulsification clear cornea with standard intraocular lens implant (Left, 3/30/2016).  FAMILY HISTORY: family history includes Arthritis in her mother; Cancer in her maternal grandmother and another family member; Diabetes in her father and mother; EYE* in her mother; Genitourinary Problems in her father; Heart Disease in her father, maternal grandmother, and mother; Hypertension in her mother; Lipids in her mother; Neurologic Disorder in her mother; Obesity in her mother; Osteoporosis in her mother.  SOCIAL HISTORY:   reports that she has never smoked. She has never used smokeless tobacco. She reports that she does not drink alcohol and does not use drugs.  Patient's living condition: lives in an assisted living facility    Post Discharge Medication Reconciliation Status:   MED REC REQUIRED  Post Medication Reconciliation Status: medication reconcilation previously completed during another office visit       Current Outpatient Medications   Medication Sig Dispense Refill    acetaminophen (TYLENOL) 500 MG tablet Take 1 tablet (500 mg) by mouth daily as needed for mild pain. 500 tablet 1    amLODIPine (NORVASC) 10 MG tablet Take 1 tablet  "(10 mg) by mouth daily. 90 tablet 3    aspirin 81 MG EC tablet Take 1 tablet (81 mg) by mouth daily 90 tablet 3    atorvastatin (LIPITOR) 80 MG tablet Take 1 tablet (80 mg) by mouth at bedtime 90 tablet 3    carvedilol (COREG) 6.25 MG tablet Take 6.25 mg by mouth 2 times daily (with meals).      clopidogrel (PLAVIX) 75 MG tablet Take 1 tablet (75 mg) by mouth daily 90 tablet 3    famotidine (PEPCID) 20 MG tablet Take 1 tablet (20 mg) by mouth 2 times daily. 180 tablet 1    gabapentin (NEURONTIN) 300 MG capsule Take 1 capsule (300 mg) by mouth at bedtime 90 capsule 1    glipiZIDE (GLUCOTROL) 5 MG tablet Take 10 mg by mouth 2 times daily (before meals).      insulin glargine (LANTUS VIAL) 100 UNIT/ML vial Inject 10 Units subcutaneously at bedtime.      loperamide (IMODIUM A-D) 2 MG tablet Take 1 tablet (2 mg) by mouth daily 30 tablet 2    losartan (COZAAR) 100 MG tablet Take 100 mg by mouth daily.      melatonin 3 MG tablet Take 6 mg by mouth at bedtime.      ondansetron (ZOFRAN) 4 MG tablet Take 1 tablet (4 mg) by mouth every 8 hours as needed for nausea.      pantoprazole (PROTONIX) 40 MG EC tablet Take 1 tablet (40 mg) by mouth daily. 90 tablet 0    vitamin D2 (ERGOCALCIFEROL) 80451 units (1250 mcg) capsule Take 1 capsule (50,000 Units) by mouth once a week. 10 capsule 0     No current facility-administered medications for this visit.       ROS:  10 point ROS of systems including Constitutional, Eyes, Respiratory, Cardiovascular, Gastroenterology, Genitourinary, Integumentary, Musculoskeletal, Psychiatric were all negative except for pertinent positives noted in my HPI.    Vitals:  BP (!) 144/66   Pulse 78   Temp 98.1  F (36.7  C)   Resp 16   Ht 1.626 m (5' 4\")   Wt 58.8 kg (129 lb 9.6 oz)   SpO2 93%   BMI 22.25 kg/m    Exam:  GENERAL APPEARANCE:  in no distress,   RESP:  Unlabored breathing. CTA b/l.   CV:  S1S2 audible, regular HR, no murmur appreciated.   ABDOMEN:  soft, NT/ND, BS audible.   M/S: Soft " table tennis ball like mass over the bursitis area  SKIN: Catheter over anterior surface of the chest wall on the right side.  Papular mass on her the left side of the nose.  There is a therapy prescription over right elbow on the posterior surface.  Frail and thin skin over forearms.  Scattered ecchymosis.  Dry scab-like lesion on the lower part of the neck on the posterior surface.  Scattered actinic keratosis  NEURO:   No NFD appreciated on observation.   PSYCH:  affect and mood normal    Lab/Diagnostic data: Reviewed in the chart and EHR.      ASSESSMENT/PLAN:  ----------------------------  ESRD (end stage renal disease) on dialysis (H)  Anemia of chronic renal failure, stage 5 (H)  -tolerating well. No concern.   -nephrology routine follow up      Type 2 diabetes mellitus with diabetic polyneuropathy, without long-term current use of insulin (H)   A1C 7.4 10/08/2024    A1C 7.4 06/20/2024   - controlled.   -hyperglycemia at TCU, started on lantus with subsequent dose increase, and Glipzide initially stopped but later resumed.   -Continue to monitor adjust medications as needed.      Hypertension goal BP (blood pressure) < 140/90  Congestive heart failure, unspecified HF chronicity, unspecified heart failure type (H)  -- cardiac wise compensated on the current regimen. Continue.   - cardiology routine follow up      Acute cystitis with hematuria  -E-coli with hematuria, Started on cipro from 4/5-4/10.  -History of recurrent UTI.  Feeling well today.  Continue plan of care.    Gastro-esophageal reflux disease without esophagitis  - No concern today. Continue current plan of care.       Right olecranon bursitis:  -Asymptomatic.  Current therapy initiated and the in 24 hours managed to reduce the  size 1.5 cm. Continue current POC  Orders:  NNO      Electronically signed by:  Roderick Marquis MD

## 2025-04-08 ENCOUNTER — TRANSITIONAL CARE UNIT VISIT (OUTPATIENT)
Dept: GERIATRICS | Facility: CLINIC | Age: 85
End: 2025-04-08
Payer: COMMERCIAL

## 2025-04-08 VITALS
TEMPERATURE: 97.7 F | RESPIRATION RATE: 18 BRPM | DIASTOLIC BLOOD PRESSURE: 59 MMHG | BODY MASS INDEX: 22.25 KG/M2 | SYSTOLIC BLOOD PRESSURE: 138 MMHG | WEIGHT: 129.6 LBS | HEART RATE: 67 BPM | OXYGEN SATURATION: 93 %

## 2025-04-08 DIAGNOSIS — I10 HYPERTENSION GOAL BP (BLOOD PRESSURE) < 140/90: ICD-10-CM

## 2025-04-08 DIAGNOSIS — M70.21 OLECRANON BURSITIS OF RIGHT ELBOW: ICD-10-CM

## 2025-04-08 DIAGNOSIS — N18.6 ESRD (END STAGE RENAL DISEASE) ON DIALYSIS (H): Primary | ICD-10-CM

## 2025-04-08 DIAGNOSIS — I50.9 CONGESTIVE HEART FAILURE, UNSPECIFIED HF CHRONICITY, UNSPECIFIED HEART FAILURE TYPE (H): ICD-10-CM

## 2025-04-08 DIAGNOSIS — E78.5 HYPERLIPIDEMIA, UNSPECIFIED HYPERLIPIDEMIA TYPE: ICD-10-CM

## 2025-04-08 DIAGNOSIS — K21.9 GASTRO-ESOPHAGEAL REFLUX DISEASE WITHOUT ESOPHAGITIS: ICD-10-CM

## 2025-04-08 DIAGNOSIS — N18.5 ANEMIA OF CHRONIC RENAL FAILURE, STAGE 5 (H): ICD-10-CM

## 2025-04-08 DIAGNOSIS — J34.89 LESION OF NOSE: ICD-10-CM

## 2025-04-08 DIAGNOSIS — G47.00 INSOMNIA, UNSPECIFIED TYPE: ICD-10-CM

## 2025-04-08 DIAGNOSIS — Z99.2 ESRD (END STAGE RENAL DISEASE) ON DIALYSIS (H): Primary | ICD-10-CM

## 2025-04-08 DIAGNOSIS — D63.1 ANEMIA OF CHRONIC RENAL FAILURE, STAGE 5 (H): ICD-10-CM

## 2025-04-08 DIAGNOSIS — K58.2 IRRITABLE BOWEL SYNDROME WITH BOTH CONSTIPATION AND DIARRHEA: ICD-10-CM

## 2025-04-08 DIAGNOSIS — E87.5 HYPERKALEMIA: ICD-10-CM

## 2025-04-08 DIAGNOSIS — M62.81 GENERALIZED MUSCLE WEAKNESS: ICD-10-CM

## 2025-04-08 DIAGNOSIS — N30.01 ACUTE CYSTITIS WITH HEMATURIA: ICD-10-CM

## 2025-04-08 DIAGNOSIS — E11.42 TYPE 2 DIABETES MELLITUS WITH DIABETIC POLYNEUROPATHY, WITHOUT LONG-TERM CURRENT USE OF INSULIN (H): ICD-10-CM

## 2025-04-08 PROCEDURE — 99309 SBSQ NF CARE MODERATE MDM 30: CPT

## 2025-04-08 NOTE — LETTER
" 4/8/2025      Eliza Dubois  [address]  [place] [place] [facility] [address]        [facility] GERIATRICS    Chief Complaint   Patient presents with     RECHECK     HPI:  Eliza Dubois is a 84 year old  (1940), who is being seen today for an episodic care visit at: [facility] ([facility]) [[id number]]. Patient was hospitalized at [facility] . Hospital stay 2/19/2025 through 2/25/2025.     Patient is a 84 year-old female with past medical history significant for CKD5 now with ESRD on HD, DM2, coronary artery disease, hyperlipidemia, hypertension, peripheral neuropathy and GERD. Patient presented to ED with shortness of breath and lethargy and was diagnosed with hyperkalemia, for which she had IV lasix and lokelma. Patient has a PCAD placement in right chest and had her first hemodialysis session on 2/24/25. She had her first outpatient HD on 2/26/24 and is to continue a MWF schedule.     Today's concern is: Patient had a fall on 4/7/25 at 0750 while attempting to self-transfer off [facility] and said her \"legs gave up\". Patient did not hit her head but had a 1.5 cm skin tear on left forearm. Per physical therapy patient still needs assistance during ambulation due to leg weakness an d loss of balance. Patient reports that she has a history of twitches or jerks of her legs but it seemed like the twitches were more severe prior to her fall. Of note, patient was receiving dialysis on Tues, Thurs, Sat and they switched her back to MWF schedule and she had not had dialysis since Thursday prior to the fall on Monday. Today, 4/8/25, patient feels a little fuzzy and nauseated but otherwise has no concerns. Denies any urinary symptoms and has not had hematuria since starting Ciprofloxacin for UTI. She developed olecranon bursitis on her right elbow and therapy did lymphedema therapy and kinesiotaping this morning. Denies lightheadedness, shortness of breath or chest " pain.      Allergies, and PMH/PSH reviewed in EPIC today.  REVIEW OF SYSTEMS:  4 point ROS including Respiratory, CV, GI and , other than that noted in the HPI,  is negative    Objective:   /59   Pulse 67   Temp 97.7  F (36.5  C)   Resp 18   Wt 58.8 kg (129 lb 9.6 oz)   SpO2 93%   BMI 22.25 kg/m    GENERAL APPEARANCE:  Alert, in no distress, pale  ENT:  Mouth and posterior oropharynx normal, moist mucous membranes  EYES:  EOM, conjunctivae, lids, pupils and irises normal, glasses on  RESP:  respiratory effort and palpation of chest normal, lungs clear to auscultation   CV:  regular rate and rhythm, no murmur, rub, or gallop, no edema, +2 pedal pulses  ABDOMEN:  normal bowel sounds, soft, nontender, no hepatosplenomegaly or other masses  M/S:   generalized weakness; normal ROM  SKIN:  right chest catheter with no sxsx of infection, lesion present, type:nodule, raised, pearly edges, bleeding, appearance:variegated, slightly red, location: left nasal ala, size: 5 mm.   Soft golf-ball sized protrusion of right elbow with no erythema, warmth or tenderness to palpation.   NEURO:   Cranial nerves 2-12 are normal tested and grossly at patient's baseline  PSYCH:  oriented X 3, affect and mood normal       [facility]. Please refer to them using [facility]/Care Everywhere.    Assessment/Plan:    Acute cystitis with hematuria   Hematuria began on 4/3/25 without any other urinary symptoms or fever/chills   UA on 4/4/25 see above  UC with > 100 K e coli.   Start Ciprofloxacin 250 mg BID x 5 days (4/5/25 - 4/10/25) and do not give before dialysis. Sensitive to Ciprofloxacin.     ESRD (end stage renal disease) on dialysis (H)  Hyperkalemia- resolved  Generalized muscle weakness  HD on Tues, Thurs, Saturday at [facility] in [place]; nephrologist taking over lab studies. Dialysis changed to F  Tylenol 500 mg once daily as needed for pain.  Renal diet with erogocalciferol 50 k units weekly,  PT/OT  to evaluate and treat.  2/28/25: K+ 4.4 WNL  Sometimes feels nauseous after dialysis, will add order for PRN Zofran.     Hyperlipidemia, unspecified hyperlipidemia type  Congestive heart failure, unspecified HF chronicity, unspecified heart failure type (H)  Hypertension goal BP (blood pressure) < 140/90  Continue aspirin 81 mg daily, amlodipine 10 mg daily, losartan 100 mg daily, atorvastatin 80 mg at bedtime, clopidogrel 75 mg daily, and carvedilol 6.25 mg daily. (Hospital stopped hydralazine, metoprolol and reduced dosage of carvedilol).   Patient had LLE swelling in hospital and was on torsemide 20 mg daily.   Continue daily weights and monitor BPs     Type 2 diabetes mellitus with diabetic polyneuropathy, without long-term current use of insulin (H)  Last A1c 7.4. Continue glipizide 20 mg before meals BID  PTA gabapentin 300 mg at bedtime  Due to BG's of 442 and 356, will start Lantus with 50% starting dose due to ESRD  Start lantus insulin; inject 5 units subcutaneous daily at bedtime.  To prevent hypoglycemia, will discontinue glipizide.   Monitor BG QID and notify provider if BG > 400 or < 90.   3/24/25: Increase Lantus to 7 units QHS  3/27/25: Lantus increased to 10 units at bedtime  4/1/25: With BG's trending in upper 300's at dinner time and at bedtime, will restart glipizide 10 mg BID with lunch and supper, with continued monitoring to prevent hypoglycemia.     Anemia of chronic renal failure, stage 5 (H)  Last Hgb was 7.5; baseline 8  2/28/25: Hgb 8.3     Gastro-esophageal reflux disease without esophagitis  Continue famotidine 20 mg BID and pantoprazole 40 mg QAM     Insomnia, unspecified type  Begin melatonin 3 mg at bedtime.  Per patient request, increased melatonin to 6 mg at bedtime.       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      Lesion of nose   Present for 1-2 months; concerning for non-melanoma skin cancer  Nodule,  raised, pearly edges, bleeding, appearance:variegated, slightly red, location: left nasal ala, size: 5 mm  Referral to dermatology- appt made for 9/11/25.      Olecranon bursitis of right elbow   Asymptomatic; PT providing lymphedema therapy and kinesiotaping until resolution.      Orders:  No changes to treatment plan.      Electronically signed by: ANKITA Fuentes CNP         Sincerely,        ANKITA Fuentes CNP    Electronically signed

## 2025-04-08 NOTE — PROGRESS NOTES
"[facility] GERIATRICS    Chief Complaint   Patient presents with    RECHECK     HPI:  Eliza Dubois is a 84 year old  (1940), who is being seen today for an episodic care visit at: [facility] ([facility]) [[id number]]. Patient was hospitalized at [facility] . Hospital stay 2/19/2025 through 2/25/2025.     Patient is a 84 year-old female with past medical history significant for CKD5 now with ESRD on HD, DM2, coronary artery disease, hyperlipidemia, hypertension, peripheral neuropathy and GERD. Patient presented to ED with shortness of breath and lethargy and was diagnosed with hyperkalemia, for which she had IV lasix and lokelma. Patient has a PCAD placement in right chest and had her first hemodialysis session on 2/24/25. She had her first outpatient HD on 2/26/24 and is to continue a MWF schedule.     Today's concern is: Patient had a fall on 4/7/25 at 0750 while attempting to self-transfer off [facility] and said her \"legs gave up\". Patient did not hit her head but had a 1.5 cm skin tear on left forearm. Per physical therapy patient still needs assistance during ambulation due to leg weakness an d loss of balance. Patient reports that she has a history of twitches or jerks of her legs but it seemed like the twitches were more severe prior to her fall. Of note, patient was receiving dialysis on Tues, Thurs, Sat and they switched her back to MWF schedule and she had not had dialysis since Thursday prior to the fall on Monday. Today, 4/8/25, patient feels a little fuzzy and nauseated but otherwise has no concerns. Denies any urinary symptoms and has not had hematuria since starting Ciprofloxacin for UTI. She developed olecranon bursitis on her right elbow and therapy did lymphedema therapy and kinesiotaping this morning. Denies lightheadedness, shortness of breath or chest pain.      Allergies, and PMH/PSH reviewed in 7write today.  REVIEW OF SYSTEMS:  4 point ROS including " Respiratory, CV, GI and , other than that noted in the HPI,  is negative    Objective:   /59   Pulse 67   Temp 97.7  F (36.5  C)   Resp 18   Wt 58.8 kg (129 lb 9.6 oz)   SpO2 93%   BMI 22.25 kg/m    GENERAL APPEARANCE:  Alert, in no distress, pale  ENT:  Mouth and posterior oropharynx normal, moist mucous membranes  EYES:  EOM, conjunctivae, lids, pupils and irises normal, glasses on  RESP:  respiratory effort and palpation of chest normal, lungs clear to auscultation   CV:  regular rate and rhythm, no murmur, rub, or gallop, no edema, +2 pedal pulses  ABDOMEN:  normal bowel sounds, soft, nontender, no hepatosplenomegaly or other masses  M/S:   generalized weakness; normal ROM  SKIN:  right chest catheter with no sxsx of infection, lesion present, type:nodule, raised, pearly edges, bleeding, appearance:variegated, slightly red, location: left nasal ala, size: 5 mm.   Soft golf-ball sized protrusion of right elbow with no erythema, warmth or tenderness to palpation.   NEURO:   Cranial nerves 2-12 are normal tested and grossly at patient's baseline  PSYCH:  oriented X 3, affect and mood normal       Labs done in SNF are in [place] EPIC. Please refer to them using Psychiatric/Care Everywhere.    Assessment/Plan:    Acute cystitis with hematuria   Hematuria began on 4/3/25 without any other urinary symptoms or fever/chills   UA on 4/4/25 see above  UC with > 100 K e coli.   Start Ciprofloxacin 250 mg BID x 5 days (4/5/25 - 4/10/25) and do not give before dialysis. Sensitive to Ciprofloxacin.     ESRD (end stage renal disease) on dialysis (H)  Hyperkalemia- resolved  Generalized muscle weakness  HD on Tues, Thurs, Saturday at [facility] in [place]; nephrologist taking over lab studies. Dialysis changed to F  Tylenol 500 mg once daily as needed for pain.  Renal diet with erogocalciferol 50 k units weekly,  PT/OT to evaluate and treat.  2/28/25: K+ 4.4 WNL  Sometimes feels nauseous after dialysis, will add order  for PRN Zofran.     Hyperlipidemia, unspecified hyperlipidemia type  Congestive heart failure, unspecified HF chronicity, unspecified heart failure type (H)  Hypertension goal BP (blood pressure) < 140/90  Continue aspirin 81 mg daily, amlodipine 10 mg daily, losartan 100 mg daily, atorvastatin 80 mg at bedtime, clopidogrel 75 mg daily, and carvedilol 6.25 mg daily. (Hospital stopped hydralazine, metoprolol and reduced dosage of carvedilol).   Patient had LLE swelling in hospital and was on torsemide 20 mg daily.   Continue daily weights and monitor BPs     Type 2 diabetes mellitus with diabetic polyneuropathy, without long-term current use of insulin (H)  Last A1c 7.4. Continue glipizide 20 mg before meals BID  PTA gabapentin 300 mg at bedtime  Due to BG's of 442 and 356, will start Lantus with 50% starting dose due to ESRD  Start lantus insulin; inject 5 units subcutaneous daily at bedtime.  To prevent hypoglycemia, will discontinue glipizide.   Monitor BG QID and notify provider if BG > 400 or < 90.   3/24/25: Increase Lantus to 7 units QHS  3/27/25: Lantus increased to 10 units at bedtime  4/1/25: With BG's trending in upper 300's at dinner time and at bedtime, will restart glipizide 10 mg BID with lunch and supper, with continued monitoring to prevent hypoglycemia.     Anemia of chronic renal failure, stage 5 (H)  Last Hgb was 7.5; baseline 8  2/28/25: Hgb 8.3     Gastro-esophageal reflux disease without esophagitis  Continue famotidine 20 mg BID and pantoprazole 40 mg QAM     Insomnia, unspecified type  Begin melatonin 3 mg at bedtime.  Per patient request, increased melatonin to 6 mg at bedtime.       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      Lesion of nose   Present for 1-2 months; concerning for non-melanoma skin cancer  Nodule, raised, pearly edges, bleeding, appearance:variegated, slightly red, location: left nasal ala, size: 5  mm  Referral to dermatology- appt made for 9/11/25.      Olecranon bursitis of right elbow   Asymptomatic; PT providing lymphedema therapy and kinesiotaping until resolution.      Orders:  No changes to treatment plan.      Electronically signed by: ANKITA Fuentes CNP

## 2025-04-09 ENCOUNTER — TRANSITIONAL CARE UNIT VISIT (OUTPATIENT)
Dept: GERIATRICS | Facility: CLINIC | Age: 85
End: 2025-04-09
Payer: COMMERCIAL

## 2025-04-09 VITALS
RESPIRATION RATE: 16 BRPM | SYSTOLIC BLOOD PRESSURE: 144 MMHG | WEIGHT: 129.6 LBS | OXYGEN SATURATION: 93 % | HEART RATE: 78 BPM | HEIGHT: 64 IN | DIASTOLIC BLOOD PRESSURE: 66 MMHG | BODY MASS INDEX: 22.13 KG/M2 | TEMPERATURE: 98.1 F

## 2025-04-09 DIAGNOSIS — N18.5 ANEMIA OF CHRONIC RENAL FAILURE, STAGE 5 (H): ICD-10-CM

## 2025-04-09 DIAGNOSIS — M70.21 OLECRANON BURSITIS OF RIGHT ELBOW: ICD-10-CM

## 2025-04-09 DIAGNOSIS — D63.1 ANEMIA OF CHRONIC RENAL FAILURE, STAGE 5 (H): ICD-10-CM

## 2025-04-09 DIAGNOSIS — I10 HYPERTENSION GOAL BP (BLOOD PRESSURE) < 140/90: ICD-10-CM

## 2025-04-09 DIAGNOSIS — M77.11 LATERAL EPICONDYLITIS OF RIGHT ELBOW: ICD-10-CM

## 2025-04-09 DIAGNOSIS — N18.6 ESRD (END STAGE RENAL DISEASE) ON DIALYSIS (H): Primary | ICD-10-CM

## 2025-04-09 DIAGNOSIS — N30.01 ACUTE CYSTITIS WITH HEMATURIA: ICD-10-CM

## 2025-04-09 DIAGNOSIS — Z99.2 ESRD (END STAGE RENAL DISEASE) ON DIALYSIS (H): Primary | ICD-10-CM

## 2025-04-09 DIAGNOSIS — E11.42 TYPE 2 DIABETES MELLITUS WITH DIABETIC POLYNEUROPATHY, WITHOUT LONG-TERM CURRENT USE OF INSULIN (H): ICD-10-CM

## 2025-04-09 DIAGNOSIS — I50.9 CONGESTIVE HEART FAILURE, UNSPECIFIED HF CHRONICITY, UNSPECIFIED HEART FAILURE TYPE (H): ICD-10-CM

## 2025-04-09 DIAGNOSIS — K21.9 GASTRO-ESOPHAGEAL REFLUX DISEASE WITHOUT ESOPHAGITIS: ICD-10-CM

## 2025-04-09 PROCEDURE — 99305 1ST NF CARE MODERATE MDM 35: CPT | Performed by: FAMILY MEDICINE

## 2025-04-09 NOTE — LETTER
" 4/9/2025      Eliza Dubois  [address]  [place] [address]        [facility] GERIATRICS    PRIMARY CARE PROVIDER AND CLINIC:  Byron Holm DO, [address]  / [facility] [address]  Chief Complaint   Patient presents with     Hospital F/U      [place] Medical Record Number:  [id number]  Place of Service where encounter took place:  [facility] ([facility]) [[id number]]    Eliza Dubois  is a 84 year old  (1940), admitted to the above facility from  [facility] . Hospital stay 2/19/25 through 2/25/25..   HPI:    As per DNP's note:\"Patient is a 84 year-old female with past medical history significant for CKD5 now with ESRD on HD, DM2, coronary artery disease, hyperlipidemia, hypertension, peripheral neuropathy and GERD. Patient presented to ED with shortness of breath and lethargy and was diagnosed with hyperkalemia, for which she had IV lasix and Lokema. Patient has a PCAD placement in right chest and had her first hemodialysis session on 2/24/25. She had her first outpatient HD on 2/26/24 and is to continue a MWF schedule.\"      TODAY:  -ESRD:  Patient reports that she tolerates the dialysis.  To prevent feeling nauseous she takes  crackers.    -Acute cystitis, E-coli with hematuria   Started on cipro from 4/5-4/10  Patient reports history of recurrent UTI.  She does not have dysuria and that this stopped.      -Cardia:  while inpatient , metoprolol and hydralazine, coreg dose reduced.   Patient denies chest pain or shortness of breath, wheezing or cough.      -T2D:  Hyperglycemia at [facility] in 300 and 400 range, started on Lantus and later increased. Glipizide discontinued later renewed due to hyperglycemia.  Patient reports his sugar up-and-down.  This morning was 93.  She is sure that to go up after breakfast.  ======================================================================    CODE STATUS/ADVANCE DIRECTIVES DISCUSSION:  No CPR- " Do NOT Intubate    ALLERGIES:   Allergies   Allergen Reactions     Sodium Sulfate Hives     Sulfa Antibiotics Hives      PAST MEDICAL HISTORY:   Past Medical History:   Diagnosis Date     Diabetic eye exam (H) 05/01/14     Heart attack (H)      Pure hypercholesterolemia      Stented coronary artery      Type II or unspecified type diabetes mellitus without mention of complication, not stated as uncontrolled 2002    [place].     Unspecified disease of pericardium 12/05/08    Pericarditis w/mild to moderate pericardial effusion.     Unspecified essential hypertension       PAST SURGICAL HISTORY:   has a past surgical history that includes NONSPECIFIC PROCEDURE (1988); LAPAROSCOPY, SURGICAL; CHOLECYSTECTOMY (1997); colonoscopy (04/15/09); Colonoscopy (6/18/2014); Abdomen surgery; Phacoemulsification clear cornea with standard intraocular lens implant (Right, 3/16/2016); and Phacoemulsification clear cornea with standard intraocular lens implant (Left, 3/30/2016).  FAMILY HISTORY: family history includes Arthritis in her mother; Cancer in her maternal grandmother and another family member; Diabetes in her father and mother; EYE* in her mother; Genitourinary Problems in her father; Heart Disease in her father, maternal grandmother, and mother; Hypertension in her mother; Lipids in her mother; Neurologic Disorder in her mother; Obesity in her mother; Osteoporosis in her mother.  SOCIAL HISTORY:   reports that she has never smoked. She has never used smokeless tobacco. She reports that she does not drink alcohol and does not use drugs.  Patient's living condition: lives in an assisted living facility    Post Discharge Medication Reconciliation Status:   MED REC REQUIRED  Post Medication Reconciliation Status: medication reconcilation previously completed during another office visit       Current Outpatient Medications   Medication Sig Dispense Refill     acetaminophen (TYLENOL) 500 MG tablet Take 1 tablet (500 mg) by  "mouth daily as needed for mild pain. 500 tablet 1     amLODIPine (NORVASC) 10 MG tablet Take 1 tablet (10 mg) by mouth daily. 90 tablet 3     aspirin 81 MG EC tablet Take 1 tablet (81 mg) by mouth daily 90 tablet 3     atorvastatin (LIPITOR) 80 MG tablet Take 1 tablet (80 mg) by mouth at bedtime 90 tablet 3     carvedilol (COREG) 6.25 MG tablet Take 6.25 mg by mouth 2 times daily (with meals).       clopidogrel (PLAVIX) 75 MG tablet Take 1 tablet (75 mg) by mouth daily 90 tablet 3     famotidine (PEPCID) 20 MG tablet Take 1 tablet (20 mg) by mouth 2 times daily. 180 tablet 1     gabapentin (NEURONTIN) 300 MG capsule Take 1 capsule (300 mg) by mouth at bedtime 90 capsule 1     glipiZIDE (GLUCOTROL) 5 MG tablet Take 10 mg by mouth 2 times daily (before meals).       insulin glargine (LANTUS VIAL) 100 UNIT/ML vial Inject 10 Units subcutaneously at bedtime.       loperamide (IMODIUM A-D) 2 MG tablet Take 1 tablet (2 mg) by mouth daily 30 tablet 2     losartan (COZAAR) 100 MG tablet Take 100 mg by mouth daily.       melatonin 3 MG tablet Take 6 mg by mouth at bedtime.       ondansetron (ZOFRAN) 4 MG tablet Take 1 tablet (4 mg) by mouth every 8 hours as needed for nausea.       pantoprazole (PROTONIX) 40 MG EC tablet Take 1 tablet (40 mg) by mouth daily. 90 tablet 0     vitamin D2 (ERGOCALCIFEROL) 25842 units (1250 mcg) capsule Take 1 capsule (50,000 Units) by mouth once a week. 10 capsule 0     No current facility-administered medications for this visit.       ROS:  10 point ROS of systems including Constitutional, Eyes, Respiratory, Cardiovascular, Gastroenterology, Genitourinary, Integumentary, Musculoskeletal, Psychiatric were all negative except for pertinent positives noted in my HPI.    Vitals:  BP (!) 144/66   Pulse 78   Temp 98.1  F (36.7  C)   Resp 16   Ht 1.626 m (5' 4\")   Wt 58.8 kg (129 lb 9.6 oz)   SpO2 93%   BMI 22.25 kg/m    Exam:  GENERAL APPEARANCE:  in no distress,   RESP:  Unlabored breathing. " CTA b/l.   CV:  S1S2 audible, regular HR, no murmur appreciated.   ABDOMEN:  soft, NT/ND, BS audible.   M/S: Soft table tennis ball like mass over the bursitis area  SKIN: Catheter over anterior surface of the chest wall on the right side.  Papular mass on her the left side of the nose.  There is a therapy prescription over right elbow on the posterior surface.  Frail and thin skin over forearms.  Scattered ecchymosis.  Dry scab-like lesion on the lower part of the neck on the posterior surface.  Scattered actinic keratosis  NEURO:   No NFD appreciated on observation.   PSYCH:  affect and mood normal    Lab/Diagnostic data: Reviewed in the chart and EHR.      ASSESSMENT/PLAN:  ----------------------------  ESRD (end stage renal disease) on dialysis (H)  Anemia of chronic renal failure, stage 5 (H)  -tolerating well. No concern.   -nephrology routine follow up      Type 2 diabetes mellitus with diabetic polyneuropathy, without long-term current use of insulin (H)   A1C 7.4 10/08/2024    A1C 7.4 06/20/2024   - controlled.   -hyperglycemia at TCU, started on lantus with subsequent dose increase, and Glipzide initially stopped but later resumed.   -Continue to monitor adjust medications as needed.      Hypertension goal BP (blood pressure) < 140/90  Congestive heart failure, unspecified HF chronicity, unspecified heart failure type (H)  -- cardiac wise compensated on the current regimen. Continue.   - cardiology routine follow up      Acute cystitis with hematuria  -E-coli with hematuria, Started on cipro from 4/5-4/10.  -History of recurrent UTI.  Feeling well today.  Continue plan of care.    Gastro-esophageal reflux disease without esophagitis  - No concern today. Continue current plan of care.       Right olecranon bursitis:  -Asymptomatic.  Current therapy initiated and the in 24 hours managed to reduce the  size 1.5 cm. Continue current POC  Orders:  NNO      Electronically signed by:  Roderick Marquis MD                      Sincerely,        Roderick Marquis MD    Electronically signed

## 2025-04-10 PROBLEM — J96.01 ACUTE RESPIRATORY FAILURE WITH HYPOXIA (H): Status: RESOLVED | Noted: 2025-02-25 | Resolved: 2025-04-10

## 2025-04-12 ENCOUNTER — HEALTH MAINTENANCE LETTER (OUTPATIENT)
Age: 85
End: 2025-04-12

## 2025-04-15 ENCOUNTER — TRANSITIONAL CARE UNIT VISIT (OUTPATIENT)
Dept: GERIATRICS | Facility: CLINIC | Age: 85
End: 2025-04-15
Payer: COMMERCIAL

## 2025-04-15 VITALS
HEART RATE: 76 BPM | BODY MASS INDEX: 21.83 KG/M2 | DIASTOLIC BLOOD PRESSURE: 88 MMHG | SYSTOLIC BLOOD PRESSURE: 126 MMHG | WEIGHT: 127.2 LBS | RESPIRATION RATE: 17 BRPM | TEMPERATURE: 98.1 F | OXYGEN SATURATION: 92 %

## 2025-04-15 DIAGNOSIS — E11.42 TYPE 2 DIABETES MELLITUS WITH DIABETIC POLYNEUROPATHY, WITHOUT LONG-TERM CURRENT USE OF INSULIN (H): ICD-10-CM

## 2025-04-15 DIAGNOSIS — D63.1 ANEMIA OF CHRONIC RENAL FAILURE, STAGE 5 (H): ICD-10-CM

## 2025-04-15 DIAGNOSIS — M62.81 GENERALIZED MUSCLE WEAKNESS: ICD-10-CM

## 2025-04-15 DIAGNOSIS — E87.5 HYPERKALEMIA: ICD-10-CM

## 2025-04-15 DIAGNOSIS — M70.21 OLECRANON BURSITIS OF RIGHT ELBOW: ICD-10-CM

## 2025-04-15 DIAGNOSIS — N30.01 ACUTE CYSTITIS WITH HEMATURIA: ICD-10-CM

## 2025-04-15 DIAGNOSIS — G47.00 INSOMNIA, UNSPECIFIED TYPE: ICD-10-CM

## 2025-04-15 DIAGNOSIS — K58.2 IRRITABLE BOWEL SYNDROME WITH BOTH CONSTIPATION AND DIARRHEA: ICD-10-CM

## 2025-04-15 DIAGNOSIS — J34.89 LESION OF NOSE: ICD-10-CM

## 2025-04-15 DIAGNOSIS — E78.5 HYPERLIPIDEMIA, UNSPECIFIED HYPERLIPIDEMIA TYPE: ICD-10-CM

## 2025-04-15 DIAGNOSIS — K21.9 GASTRO-ESOPHAGEAL REFLUX DISEASE WITHOUT ESOPHAGITIS: ICD-10-CM

## 2025-04-15 DIAGNOSIS — N18.6 ESRD (END STAGE RENAL DISEASE) ON DIALYSIS (H): Primary | ICD-10-CM

## 2025-04-15 DIAGNOSIS — Z99.2 ESRD (END STAGE RENAL DISEASE) ON DIALYSIS (H): Primary | ICD-10-CM

## 2025-04-15 DIAGNOSIS — N18.5 ANEMIA OF CHRONIC RENAL FAILURE, STAGE 5 (H): ICD-10-CM

## 2025-04-15 DIAGNOSIS — M77.11 LATERAL EPICONDYLITIS OF RIGHT ELBOW: ICD-10-CM

## 2025-04-15 DIAGNOSIS — I10 HYPERTENSION GOAL BP (BLOOD PRESSURE) < 140/90: ICD-10-CM

## 2025-04-15 DIAGNOSIS — I50.9 CONGESTIVE HEART FAILURE, UNSPECIFIED HF CHRONICITY, UNSPECIFIED HEART FAILURE TYPE (H): ICD-10-CM

## 2025-04-15 PROCEDURE — 99309 SBSQ NF CARE MODERATE MDM 30: CPT

## 2025-04-15 NOTE — LETTER
4/15/2025      Eliza Dubois  [address]  [facility] [address]        [facility] GERIATRICS    Chief Complaint   Patient presents with     RECHECK     HPI:  Eliza Dubois is a 84 year old  (1940), who is being seen today for an episodic care visit at: [facility] ([facility]) [[id number]]. Patient was hospitalized at  . Hospital stay 2/19/2025 through 2/25/2025.     Patient is a 84 year-old female with past medical history significant for CKD5 now with ESRD on HD, DM2, coronary artery disease, hyperlipidemia, hypertension, peripheral neuropathy and GERD. Patient presented to ED with shortness of breath and lethargy and was diagnosed with hyperkalemia, for which she had IV lasix and lokelma. Patient has a PCAD placement in right chest and had her first hemodialysis session on 2/24/25. She had her first outpatient HD on 2/26/24 and is to continue a MWF schedule.     Today's concern is: Patient having some low blood sugars in the early AM that cause her glucose monitor to beep and wake her up. The last AM BG's since 4/11/15 are as follows: 83, 73, 116, 86 and today 70, which increased to 147 after juice and toast. Patient agreeable to decreasing glipizide to 5 mg daily. Patient denies any urinary symptoms, hematuria or suprapubic pain. Denies constipation, headache, trouble breathing or chest pain. Does endorse some tiredness. Patient says she has been more cognizant of surroundings since her fall to prevent future falls.     Allergies, and PMH/PSH reviewed in [facility] today.  REVIEW OF SYSTEMS:  4 point ROS including Respiratory, CV, GI and , other than that noted in the HPI,  is negative    Objective:   /88   Pulse 76   Temp 98.1  F (36.7  C)   Resp 17   Wt 57.7 kg (127 lb 3.2 oz)   SpO2 92%   BMI 21.83 kg/m    GENERAL APPEARANCE:  Alert, in no distress, pale  ENT:  Mouth and posterior oropharynx normal, moist mucous  membranes  EYES:  EOM, conjunctivae, lids, pupils and irises normal, glasses on  RESP:  respiratory effort and palpation of chest normal, lungs clear to auscultation   CV:  regular rate and rhythm, no murmur, rub, or gallop, no edema, +2 pedal pulses  ABDOMEN:  normal bowel sounds, soft, nontender, no hepatosplenomegaly or other masses  M/S:   generalized weakness; normal ROM  SKIN:  right chest catheter with no sxsx of infection, lesion present, type:nodule, raised, pearly edges, bleeding, appearance:variegated, slightly red, location: left nasal ala, size: 5 mm.   Soft golf-ball sized protrusion of right elbow with no erythema, warmth or tenderness to palpation.   NEURO:   Cranial nerves 2-12 are normal tested and grossly at patient's baseline  PSYCH:  oriented X 3, affect and mood normal    Labs done in SNF are in [place] EPIC. Please refer to them using EPIC/Care Everywhere.    Assessment/Plan:    Acute cystitis with hematuria- resolved  Hematuria began on 4/3/25 without any other urinary symptoms or fever/chills   UA on 4/4/25 see above  UC with > 100 K e coli.   Start Ciprofloxacin 250 mg BID x 5 days (4/5/25 - 4/10/25) and do not give before dialysis. Sensitive to Ciprofloxacin.     ESRD (end stage renal disease) on dialysis (H)  Hyperkalemia- resolved  Generalized muscle weakness  HD on Tues, Thurs, Saturday at [facility] in [place]; nephrologist taking over lab studies. Dialysis changed to [facility]  Tylenol 500 mg once daily as needed for pain.  Renal diet with erogocalciferol 50 k units weekly,  PT/OT to evaluate and treat.  2/28/25: K+ 4.4 WNL  Sometimes feels nauseous after dialysis, will add order for PRN Zofran.     Hyperlipidemia, unspecified hyperlipidemia type  Congestive heart failure, unspecified HF chronicity, unspecified heart failure type (H)  Hypertension goal BP (blood pressure) < 140/90  Continue aspirin 81 mg daily, amlodipine 10 mg daily, losartan 100 mg daily, atorvastatin 80 mg at bedtime,  clopidogrel 75 mg daily, and carvedilol 6.25 mg daily. (Hospital stopped hydralazine, metoprolol and reduced dosage of carvedilol).   Patient had LLE swelling in hospital and was on torsemide 20 mg daily.   Continue daily weights and monitor BPs     Type 2 diabetes mellitus with diabetic polyneuropathy, without long-term current use of insulin (H)  Last A1c 7.4. Continue glipizide 20 mg before meals BID  PTA gabapentin 300 mg at bedtime  Due to BG's of 442 and 356, will start Lantus with 50% starting dose due to ESRD  Start lantus insulin; inject 5 units subcutaneous daily at bedtime.  To prevent hypoglycemia, will discontinue glipizide.   Monitor BG QID and notify provider if BG > 400 or < 90.   3/24/25: Increase Lantus to 7 units QHS  3/27/25: Lantus increased to 10 units at bedtime  4/1/25: With BG's trending in upper 300's at dinner time and at bedtime, will restart glipizide 10 mg BID with lunch and supper, with continued monitoring to prevent hypoglycemia.  4/15/24: With several AM BG's in the 70's, will decrease glipizide to 5 mg daily with lunch and supper.      Anemia of chronic renal failure, stage 5 (H)  Last Hgb was 7.5; baseline 8  2/28/25: Hgb 8.3     Gastro-esophageal reflux disease without esophagitis  Continue famotidine 20 mg BID and pantoprazole 40 mg QAM     Insomnia, unspecified type  Begin melatonin 3 mg at bedtime.  Per patient request, increased melatonin to 6 mg at bedtime.       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      Lesion of nose   Present for 1-2 months; concerning for non-melanoma skin cancer  Nodule, raised, pearly edges, bleeding, appearance:variegated, slightly red, location: left nasal ala, size: 5 mm  Referral to dermatology- appt made for 9/11/25.      Olecranon bursitis of right elbow   Asymptomatic; PT providing lymphedema therapy and kinesiotaping until resolution.         Orders:  Decrease  glipizide to 5 mg daily with lunch and supper.   Order for RN to teach self-administration of Lantus.     Electronically signed by: ANKITA Fuentes CNP       Sincerely,        ANKITA Fuentes CNP    Electronically signed

## 2025-04-16 ENCOUNTER — TELEPHONE (OUTPATIENT)
Dept: GERIATRICS | Facility: CLINIC | Age: 85
End: 2025-04-16
Payer: COMMERCIAL

## 2025-04-16 NOTE — PROGRESS NOTES
[facility] GERIATRICS    Chief Complaint   Patient presents with    RECHECK     HPI:  Eliza Dubois is a 84 year old  (1940), who is being seen today for an episodic care visit at: [facility] ([facility]) [[id number]]. Patient was hospitalized at [facility] . Hospital stay 2/19/2025 through 2/25/2025.     Patient is a 84 year-old female with past medical history significant for CKD5 now with ESRD on HD, DM2, coronary artery disease, hyperlipidemia, hypertension, peripheral neuropathy and GERD. Patient presented to ED with shortness of breath and lethargy and was diagnosed with hyperkalemia, for which she had IV lasix and lokelma. Patient has a PCAD placement in right chest and had her first hemodialysis session on 2/24/25. She had her first outpatient HD on 2/26/24 and is to continue a MWF schedule.     Today's concern is: Patient having some low blood sugars in the early AM that cause her glucose monitor to beep and wake her up. The last AM BG's since 4/11/15 are as follows: 83, 73, 116, 86 and today 70, which increased to 147 after juice and toast. Patient agreeable to decreasing glipizide to 5 mg daily. Patient denies any urinary symptoms, hematuria or suprapubic pain. Denies constipation, headache, trouble breathing or chest pain. Does endorse some tiredness. Patient says she has been more cognizant of surroundings since her fall to prevent future falls.     Allergies, and PMH/PSH reviewed in EPIC today.  REVIEW OF SYSTEMS:  4 point ROS including Respiratory, CV, GI and , other than that noted in the HPI,  is negative    Objective:   /88   Pulse 76   Temp 98.1  F (36.7  C)   Resp 17   Wt 57.7 kg (127 lb 3.2 oz)   SpO2 92%   BMI 21.83 kg/m    GENERAL APPEARANCE:  Alert, in no distress, pale  ENT:  Mouth and posterior oropharynx normal, moist mucous membranes  EYES:  EOM, conjunctivae, lids, pupils and irises normal, glasses on  RESP:  respiratory  effort and palpation of chest normal, lungs clear to auscultation   CV:  regular rate and rhythm, no murmur, rub, or gallop, no edema, +2 pedal pulses  ABDOMEN:  normal bowel sounds, soft, nontender, no hepatosplenomegaly or other masses  M/S:   generalized weakness; normal ROM  SKIN:  right chest catheter with no sxsx of infection, lesion present, type:nodule, raised, pearly edges, bleeding, appearance:variegated, slightly red, location: left nasal ala, size: 5 mm.   Soft golf-ball sized protrusion of right elbow with no erythema, warmth or tenderness to palpation.   NEURO:   Cranial nerves 2-12 are normal tested and grossly at patient's baseline  PSYCH:  oriented X 3, affect and mood normal    Labs done in SNF are in [place] EPIC. Please refer to them using [facility]/Care Everywhere.    Assessment/Plan:    Acute cystitis with hematuria- resolved  Hematuria began on 4/3/25 without any other urinary symptoms or fever/chills   UA on 4/4/25 see above  UC with > 100 K e coli.   Start Ciprofloxacin 250 mg BID x 5 days (4/5/25 - 4/10/25) and do not give before dialysis. Sensitive to Ciprofloxacin.     ESRD (end stage renal disease) on dialysis (H)  Hyperkalemia- resolved  Generalized muscle weakness  HD on Tues, Thurs, Saturday at [facility] in [place]; nephrologist taking over lab studies. Dialysis changed to [facility]  Tylenol 500 mg once daily as needed for pain.  Renal diet with erogocalciferol 50 k units weekly,  PT/OT to evaluate and treat.  2/28/25: K+ 4.4 WNL  Sometimes feels nauseous after dialysis, will add order for PRN Zofran.     Hyperlipidemia, unspecified hyperlipidemia type  Congestive heart failure, unspecified HF chronicity, unspecified heart failure type (H)  Hypertension goal BP (blood pressure) < 140/90  Continue aspirin 81 mg daily, amlodipine 10 mg daily, losartan 100 mg daily, atorvastatin 80 mg at bedtime, clopidogrel 75 mg daily, and carvedilol 6.25 mg daily. (Hospital stopped hydralazine, metoprolol and  reduced dosage of carvedilol).   Patient had LLE swelling in hospital and was on torsemide 20 mg daily.   Continue daily weights and monitor BPs     Type 2 diabetes mellitus with diabetic polyneuropathy, without long-term current use of insulin (H)  Last A1c 7.4. Continue glipizide 20 mg before meals BID  PTA gabapentin 300 mg at bedtime  Due to BG's of 442 and 356, will start Lantus with 50% starting dose due to ESRD  Start lantus insulin; inject 5 units subcutaneous daily at bedtime.  To prevent hypoglycemia, will discontinue glipizide.   Monitor BG QID and notify provider if BG > 400 or < 90.   3/24/25: Increase Lantus to 7 units QHS  3/27/25: Lantus increased to 10 units at bedtime  4/1/25: With BG's trending in upper 300's at dinner time and at bedtime, will restart glipizide 10 mg BID with lunch and supper, with continued monitoring to prevent hypoglycemia.  4/15/24: With several AM BG's in the 70's, will decrease glipizide to 5 mg daily with lunch and supper.      Anemia of chronic renal failure, stage 5 (H)  Last Hgb was 7.5; baseline 8  2/28/25: Hgb 8.3     Gastro-esophageal reflux disease without esophagitis  Continue famotidine 20 mg BID and pantoprazole 40 mg QAM     Insomnia, unspecified type  Begin melatonin 3 mg at bedtime.  Per patient request, increased melatonin to 6 mg at bedtime.       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      Lesion of nose   Present for 1-2 months; concerning for non-melanoma skin cancer  Nodule, raised, pearly edges, bleeding, appearance:variegated, slightly red, location: left nasal ala, size: 5 mm  Referral to dermatology- appt made for 9/11/25.      Olecranon bursitis of right elbow   Asymptomatic; PT providing lymphedema therapy and kinesiotaping until resolution.         Orders:  Decrease glipizide to 5 mg daily with lunch and supper.   Order for RN to teach self-administration of Lantus.      Electronically signed by: ANKITA Fuentes CNP

## 2025-04-17 NOTE — TELEPHONE ENCOUNTER
RN called for elevated SBP > 190, feeling odd. Given scheduled coreg 6.25, but still elevated. HR in upper 80's. Had HD today as per RN.     Order: give another dose of coreg 6.25 mg x1 stat. Start hydralazine 25 mg tid prn SBP > 180 mmHg.     Dr. Marquis

## 2025-04-22 ENCOUNTER — DISCHARGE SUMMARY NURSING HOME (OUTPATIENT)
Dept: GERIATRICS | Facility: CLINIC | Age: 85
End: 2025-04-22
Payer: COMMERCIAL

## 2025-04-22 ENCOUNTER — MEDICAL CORRESPONDENCE (OUTPATIENT)
Dept: HEALTH INFORMATION MANAGEMENT | Facility: CLINIC | Age: 85
End: 2025-04-22

## 2025-04-22 VITALS
OXYGEN SATURATION: 99 % | DIASTOLIC BLOOD PRESSURE: 87 MMHG | SYSTOLIC BLOOD PRESSURE: 153 MMHG | TEMPERATURE: 97.7 F | WEIGHT: 129 LBS | HEART RATE: 76 BPM | RESPIRATION RATE: 18 BRPM | BODY MASS INDEX: 22.14 KG/M2

## 2025-04-22 DIAGNOSIS — E11.42 TYPE 2 DIABETES MELLITUS WITH DIABETIC POLYNEUROPATHY, WITHOUT LONG-TERM CURRENT USE OF INSULIN (H): ICD-10-CM

## 2025-04-22 DIAGNOSIS — N18.5 ANEMIA OF CHRONIC RENAL FAILURE, STAGE 5 (H): ICD-10-CM

## 2025-04-22 DIAGNOSIS — Z99.2 ESRD (END STAGE RENAL DISEASE) ON DIALYSIS (H): Primary | ICD-10-CM

## 2025-04-22 DIAGNOSIS — K21.9 GASTRO-ESOPHAGEAL REFLUX DISEASE WITHOUT ESOPHAGITIS: ICD-10-CM

## 2025-04-22 DIAGNOSIS — E87.5 HYPERKALEMIA: ICD-10-CM

## 2025-04-22 DIAGNOSIS — I50.9 CONGESTIVE HEART FAILURE, UNSPECIFIED HF CHRONICITY, UNSPECIFIED HEART FAILURE TYPE (H): ICD-10-CM

## 2025-04-22 DIAGNOSIS — M70.21 OLECRANON BURSITIS OF RIGHT ELBOW: ICD-10-CM

## 2025-04-22 DIAGNOSIS — M62.81 GENERALIZED MUSCLE WEAKNESS: ICD-10-CM

## 2025-04-22 DIAGNOSIS — D63.1 ANEMIA OF CHRONIC RENAL FAILURE, STAGE 5 (H): ICD-10-CM

## 2025-04-22 DIAGNOSIS — N18.6 ESRD (END STAGE RENAL DISEASE) ON DIALYSIS (H): Primary | ICD-10-CM

## 2025-04-22 DIAGNOSIS — I10 HYPERTENSION GOAL BP (BLOOD PRESSURE) < 140/90: ICD-10-CM

## 2025-04-22 PROCEDURE — 99316 NF DSCHRG MGMT 30 MIN+: CPT

## 2025-04-22 RX ORDER — HYDRALAZINE HYDROCHLORIDE 25 MG/1
25 TABLET, FILM COATED ORAL 3 TIMES DAILY PRN
COMMUNITY
Start: 2025-04-22

## 2025-04-22 NOTE — LETTER
4/22/2025      Eliza Leslieson  [address]  [place] [address]        [facility] GERIATRICS DISCHARGE SUMMARY  PATIENT'S NAME: Eliza Dubois  YOB: 1940  MEDICAL RECORD NUMBER:  [id number]  Place of Service where encounter took place:  [facility] ([facility]) [[id number]]    PRIMARY CARE PROVIDER AND CLINIC RESPONSIBLE AFTER TRANSFER:   Byron Holm DO, [address]  [facility] [place] [address]    [facility] Provider     Transferring providers: ANKITA Fuentes CNP, Roderick Marquis MD  Recent Hospitalization/ED:  [facility]  stay 2/19/25 to 2/25/25.  Date of SNF Admission:  2/25/25  Date of SNF (anticipated) Discharge:  4/24/25  Discharged to: new assisted living for patient [facility]  Cognitive Scores: SLUMS: 29/30  Physical Function: Ambulating 200 ft with RW and SBA/CGA  DME: wheelchair ordered through [facility]    CODE STATUS/ADVANCE DIRECTIVES DISCUSSION:  No CPR- Do NOT Intubate   ALLERGIES: Sodium sulfate and Sulfa antibiotics    NURSING FACILITY COURSE   Medication Changes/Rationale:   Recent changes to glipizide 5 mg from twice daily to once daily in the evening and lantus decreased to 8 units at bedtime from 10 units due to AM hypoglycemia.     Summary of nursing facility stay:   Patient is a 84 year-old female with past medical history significant for CKD5 now with ESRD on HD, DM2, coronary artery disease, hyperlipidemia, hypertension, peripheral neuropathy and GERD. Patient presented to ED with shortness of breath and lethargy and was diagnosed with hyperkalemia, for which she had IV lasix and lokelma. Patient has a PCAD placement in right chest and had her first hemodialysis session on 2/24/25. She had her first outpatient HD on 2/26/24 and is to continue a MWF schedule.     ESRD (end stage renal disease) on dialysis (H)  Hyperkalemia- resolved  Generalized muscle weakness  HD on Tues, Thurs, Saturday at  [facility] in [place]; nephrologist taking over lab studies. Dialysis changed to [facility]  Tylenol 500 mg once daily as needed for pain.  Renal diet with erogocalciferol 50 k units weekly,  PT/OT for rehabilitation  2/28/25: K+ 4.4 WNL  Sometimes feels nauseous after dialysis, will add order for PRN Zofran.     Hyperlipidemia, unspecified hyperlipidemia type  Congestive heart failure, unspecified HF chronicity, unspecified heart failure type (H)  Hypertension goal BP (blood pressure) < 140/90  Continue aspirin 81 mg daily, amlodipine 10 mg daily, losartan 100 mg daily, atorvastatin 80 mg at bedtime, clopidogrel 75 mg daily, and carvedilol 6.25 mg daily. (Hospital stopped hydralazine, metoprolol and reduced dosage of carvedilol).   Patient had LLE swelling in hospital and was on torsemide 20 mg daily.   Continue daily weights and monitor Bps  4/16/25: Patient with BP of 188/78 after dialysis. Started on Hydralazine 25 mg TID PRN for systolic BP above 180.   May benefit to increase carvedilol dosage if BP's continue to be over 140/90.      Type 2 diabetes mellitus with diabetic polyneuropathy, without long-term current use of insulin (H)  Last A1c 7.4. Continue glipizide 20 mg before meals BID  PTA gabapentin 300 mg at bedtime  Due to BG's of 442 and 356, will start Lantus with 50% starting dose due to ESRD  Start lantus insulin; inject 5 units subcutaneous daily at bedtime.  To prevent hypoglycemia, will discontinue glipizide.   Monitor BG QID and notify provider if BG > 400 or < 90.   3/24/25: Increase Lantus to 7 units QHS  3/27/25: Lantus increased to 10 units at bedtime  4/1/25: With BG's trending in upper 300's at dinner time and at bedtime, will restart glipizide 10 mg BID with lunch and supper, with continued monitoring to prevent hypoglycemia.  4/15/24: With several AM BG's in the 70's, will decrease glipizide to 5 mg daily with lunch and supper.   4/22/25: AM BG of 63. Will decrease Lantus to 8 units subcutaneous  daily at bedtime and discontinue glipizide with lunch.      Anemia of chronic renal failure, stage 5 (H)  Last Hgb was 7.5; baseline 8  2/28/25: Hgb 8.3     Gastro-esophageal reflux disease without esophagitis  Continue famotidine 20 mg BID and pantoprazole 40 mg QAM     Insomnia, unspecified type  Begin melatonin 3 mg at bedtime.  Per patient request, increased melatonin to 6 mg at bedtime.       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      Lesion of nose   Present for 1-2 months; concerning for non-melanoma skin cancer  Nodule, raised, pearly edges, bleeding, appearance:variegated, slightly red, location: left nasal ala, size: 5 mm  Referral to dermatology- appt made for 9/11/25.      Olecranon bursitis of right elbow   Asymptomatic; PT providing lymphedema therapy and kinesiotaping until resolution. Decreasing in size.     Discharge Medications:  MED REC REQUIRED  Post Medication Reconciliation Status:  Discharge medications reconciled, continue medications without change       Current Outpatient Medications   Medication Sig Dispense Refill     acetaminophen (TYLENOL) 500 MG tablet Take 1 tablet (500 mg) by mouth daily as needed for mild pain. 500 tablet 1     amLODIPine (NORVASC) 10 MG tablet Take 1 tablet (10 mg) by mouth daily. 90 tablet 3     aspirin 81 MG EC tablet Take 1 tablet (81 mg) by mouth daily 90 tablet 3     atorvastatin (LIPITOR) 80 MG tablet Take 1 tablet (80 mg) by mouth at bedtime 90 tablet 3     carvedilol (COREG) 6.25 MG tablet Take 6.25 mg by mouth 2 times daily (with meals).       clopidogrel (PLAVIX) 75 MG tablet Take 1 tablet (75 mg) by mouth daily 90 tablet 3     famotidine (PEPCID) 20 MG tablet Take 1 tablet (20 mg) by mouth 2 times daily. 180 tablet 1     gabapentin (NEURONTIN) 300 MG capsule Take 1 capsule (300 mg) by mouth at bedtime 90 capsule 1     glipiZIDE (GLUCOTROL) 5 MG tablet Take 5 mg by mouth daily  (with dinner).       insulin glargine (LANTUS VIAL) 100 UNIT/ML vial Inject 8 Units subcutaneously at bedtime.       loperamide (IMODIUM A-D) 2 MG tablet Take 1 tablet (2 mg) by mouth daily 30 tablet 2     losartan (COZAAR) 100 MG tablet Take 100 mg by mouth daily.       melatonin 3 MG tablet Take 6 mg by mouth at bedtime.       ondansetron (ZOFRAN) 4 MG tablet Take 1 tablet (4 mg) by mouth every 8 hours as needed for nausea.       pantoprazole (PROTONIX) 40 MG EC tablet Take 1 tablet (40 mg) by mouth daily. 90 tablet 0     vitamin D2 (ERGOCALCIFEROL) 90618 units (1250 mcg) capsule Take 1 capsule (50,000 Units) by mouth once a week. 10 capsule 0        Controlled medications:   not applicable/none     Past Medical History:   Past Medical History:   Diagnosis Date     Diabetic eye exam (H) 05/01/14     Heart attack (H)      Pure hypercholesterolemia      Stented coronary artery      Type II or unspecified type diabetes mellitus without mention of complication, not stated as uncontrolled 2002    Avandamet.     Unspecified disease of pericardium 12/05/08    Pericarditis w/mild to moderate pericardial effusion.     Unspecified essential hypertension      Physical Exam:   Vitals: BP (!) 153/87   Pulse 76   Temp 97.7  F (36.5  C)   Resp 18   Wt 58.5 kg (129 lb)   SpO2 99%   BMI 22.14 kg/m    BMI: Body mass index is 22.14 kg/m .  GENERAL APPEARANCE:  Alert, in no distress, pale  ENT:  Mouth and posterior oropharynx normal, moist mucous membranes  EYES:  EOM, conjunctivae, lids, pupils and irises normal, glasses on  RESP:  respiratory effort and palpation of chest normal, lungs clear to auscultation   CV:  regular rate and rhythm, no murmur, rub, or gallop, no edema, +2 pedal pulses  ABDOMEN:  normal bowel sounds, soft, nontender, no hepatosplenomegaly or other masses  M/S:   generalized weakness; normal ROM  SKIN:  right chest catheter with no sxsx of infection, lesion present, type:nodule, raised, pearly edges,  bleeding, appearance:variegated, slightly red, location: left nasal ala, size: 5 mm.   Soft quarter sized protrusion of right elbow with no erythema, warmth or tenderness to palpation.   NEURO:   Cranial nerves 2-12 are normal tested and grossly at patient's baseline  PSYCH:  oriented X 3, affect and mood normal     SNF labs: Labs done in SNF are in [place] EPIC. Please refer to them using EPIC/Care Everywhere.    DISCHARGE PLAN:  Follow up labs: Labs done at [facility]  Medical Follow Up:      Follow up with primary care provider in 1-2 weeks  Current [place] scheduled appointments:  Appointments in Next Year      Sep 11, 2025 10:30 AM  (Arrive by 10:15 AM)  [facility] Dermatology with Alexandra Borrero PA-C  [facility] ([facility] ) [phone]           Discharge Services: Home Care:  Occupational Therapy, Physical Therapy, Registered Nurse, and Home Health Aide  Discharge Instructions Verbalized to Patient at Discharge:   Continue to follow your diet:  Renal diet.     TOTAL DISCHARGE TIME:   Greater than 30 minutes  Electronically signed by:  ANKITA Fuentes CNP     Documentation of Face to Face and Certification for Home Health Services    I certify that patient: Eliza Dubois is under my care and that I, or a nurse practitioner or physician's assistant working with me, had a face-to-face encounter that meets the physician face-to-face encounter requirements with this patient on: 4/22/2025.    This encounter with the patient was in whole, or in part, for the following medical condition, which is the primary reason for home health care: ESRD (end stage renal disease) on dialysis (H), Hyperkalemia, Generalized muscle weakness, Type 2 diabetes mellitus with diabetic polyneuropathy.    I certify that, based on my findings, the following services are medically necessary home health services: Nursing, Occupational Therapy, Physical Therapy, and home health aide  .    My clinical findings support the need for the above services because: Nurse is needed: To assess blood glucose levels and hypertension after changes in medications or other medical regimen.., Occupational Therapy Services are needed to assess and treat cognitive ability and address ADL safety due to impairment in mobility., and Physical Therapy Services are needed to assess and treat the following functional impairments: physical deconditioning and loss of balance/stability.    Further, I certify that my clinical findings support that this patient is homebound (i.e. absences from home require considerable and taxing effort and are for medical reasons or [employer] services or infrequently or of short duration when for other reasons) because: Requires assistance of another person or specialized equipment to access medical services because patient: Requires supervision of another for safe transfer...    Based on the above findings. I certify that this patient is confined to the home and needs intermittent skilled nursing care, physical therapy and/or speech therapy.  The patient is under my care, and I have initiated the establishment of the plan of care.  This patient will be followed by a physician who will periodically review the plan of care.  Physician/Provider to provide follow up care: Byron Holm    Attending hospital physician (the Medicare certified [facility] provider): ANKITA Fuentes CNP  Physician Signature: See electronic signature associated with these discharge orders.  Date: 4/22/2025              Sincerely,        ANKITA Fuentes CNP    Electronically signed

## 2025-04-22 NOTE — PROGRESS NOTES
[facility] GERIATRICS DISCHARGE SUMMARY  PATIENT'S NAME: Eliza Dubois  YOB: 1940  MEDICAL RECORD NUMBER:  [id number]  Place of Service where encounter took place:  [facility] ([facility]) [[id number]]    PRIMARY CARE PROVIDER AND CLINIC RESPONSIBLE AFTER TRANSFER:   Byron Holm DO, [address] [facility] / [place] [address]    FMG Provider     Transferring providers: ANKITA Fuentes CNP, Roderick Marquis MD  Recent Hospitalization/ED:  [facility]  stay 2/19/25 to 2/25/25.  Date of SNF Admission:  2/25/25  Date of SNF (anticipated) Discharge:  4/24/25  Discharged to: new assisted living for patient [facility]  Cognitive Scores: SLUMS: 29/30  Physical Function: Ambulating 200 ft with RW and SBA/CGA  DME: wheelchair ordered through [facility]    CODE STATUS/ADVANCE DIRECTIVES DISCUSSION:  No CPR- Do NOT Intubate   ALLERGIES: Sodium sulfate and Sulfa antibiotics    NURSING FACILITY COURSE   Medication Changes/Rationale:   Recent changes to glipizide 5 mg from twice daily to once daily in the evening and lantus decreased to 8 units at bedtime from 10 units due to AM hypoglycemia.     Summary of nursing facility stay:   Patient is a 84 year-old female with past medical history significant for CKD5 now with ESRD on HD, DM2, coronary artery disease, hyperlipidemia, hypertension, peripheral neuropathy and GERD. Patient presented to ED with shortness of breath and lethargy and was diagnosed with hyperkalemia, for which she had IV lasix and lokelma. Patient has a PCAD placement in right chest and had her first hemodialysis session on 2/24/25. She had her first outpatient HD on 2/26/24 and is to continue a MWF schedule.     ESRD (end stage renal disease) on dialysis (H)  Hyperkalemia- resolved  Generalized muscle weakness  HD on Tues, Thurs, Saturday at [facility] in [place]; nephrologist taking over lab studies. Dialysis changed to MWF  Tylenol  500 mg once daily as needed for pain.  Renal diet with erogocalciferol 50 k units weekly,  PT/OT for rehabilitation  2/28/25: K+ 4.4 WNL  Sometimes feels nauseous after dialysis, will add order for PRN Zofran.     Hyperlipidemia, unspecified hyperlipidemia type  Congestive heart failure, unspecified HF chronicity, unspecified heart failure type (H)  Hypertension goal BP (blood pressure) < 140/90  Continue aspirin 81 mg daily, amlodipine 10 mg daily, losartan 100 mg daily, atorvastatin 80 mg at bedtime, clopidogrel 75 mg daily, and carvedilol 6.25 mg daily. (Hospital stopped hydralazine, metoprolol and reduced dosage of carvedilol).   Patient had LLE swelling in hospital and was on torsemide 20 mg daily.   Continue daily weights and monitor Bps  4/16/25: Patient with BP of 188/78 after dialysis. Started on Hydralazine 25 mg TID PRN for systolic BP above 180.   May benefit to increase carvedilol dosage if BP's continue to be over 140/90.      Type 2 diabetes mellitus with diabetic polyneuropathy, without long-term current use of insulin (H)  Last A1c 7.4. Continue glipizide 20 mg before meals BID  PTA gabapentin 300 mg at bedtime  Due to BG's of 442 and 356, will start Lantus with 50% starting dose due to ESRD  Start lantus insulin; inject 5 units subcutaneous daily at bedtime.  To prevent hypoglycemia, will discontinue glipizide.   Monitor BG QID and notify provider if BG > 400 or < 90.   3/24/25: Increase Lantus to 7 units QHS  3/27/25: Lantus increased to 10 units at bedtime  4/1/25: With BG's trending in upper 300's at dinner time and at bedtime, will restart glipizide 10 mg BID with lunch and supper, with continued monitoring to prevent hypoglycemia.  4/15/24: With several AM BG's in the 70's, will decrease glipizide to 5 mg daily with lunch and supper.   4/22/25: AM BG of 63. Will decrease Lantus to 8 units subcutaneous daily at bedtime and discontinue glipizide with lunch.      Anemia of chronic renal failure,  stage 5 (H)  Last Hgb was 7.5; baseline 8  2/28/25: Hgb 8.3     Gastro-esophageal reflux disease without esophagitis  Continue famotidine 20 mg BID and pantoprazole 40 mg QAM     Insomnia, unspecified type  Begin melatonin 3 mg at bedtime.  Per patient request, increased melatonin to 6 mg at bedtime.       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      Lesion of nose   Present for 1-2 months; concerning for non-melanoma skin cancer  Nodule, raised, pearly edges, bleeding, appearance:variegated, slightly red, location: left nasal ala, size: 5 mm  Referral to dermatology- appt made for 9/11/25.      Olecranon bursitis of right elbow   Asymptomatic; PT providing lymphedema therapy and kinesiotaping until resolution. Decreasing in size.     Discharge Medications:  MED REC REQUIRED  Post Medication Reconciliation Status:  Discharge medications reconciled, continue medications without change       Current Outpatient Medications   Medication Sig Dispense Refill    acetaminophen (TYLENOL) 500 MG tablet Take 1 tablet (500 mg) by mouth daily as needed for mild pain. 500 tablet 1    amLODIPine (NORVASC) 10 MG tablet Take 1 tablet (10 mg) by mouth daily. 90 tablet 3    aspirin 81 MG EC tablet Take 1 tablet (81 mg) by mouth daily 90 tablet 3    atorvastatin (LIPITOR) 80 MG tablet Take 1 tablet (80 mg) by mouth at bedtime 90 tablet 3    carvedilol (COREG) 6.25 MG tablet Take 6.25 mg by mouth 2 times daily (with meals).      clopidogrel (PLAVIX) 75 MG tablet Take 1 tablet (75 mg) by mouth daily 90 tablet 3    famotidine (PEPCID) 20 MG tablet Take 1 tablet (20 mg) by mouth 2 times daily. 180 tablet 1    gabapentin (NEURONTIN) 300 MG capsule Take 1 capsule (300 mg) by mouth at bedtime 90 capsule 1    glipiZIDE (GLUCOTROL) 5 MG tablet Take 5 mg by mouth daily (with dinner).      insulin glargine (LANTUS VIAL) 100 UNIT/ML vial Inject 8 Units subcutaneously at  bedtime.      loperamide (IMODIUM A-D) 2 MG tablet Take 1 tablet (2 mg) by mouth daily 30 tablet 2    losartan (COZAAR) 100 MG tablet Take 100 mg by mouth daily.      melatonin 3 MG tablet Take 6 mg by mouth at bedtime.      ondansetron (ZOFRAN) 4 MG tablet Take 1 tablet (4 mg) by mouth every 8 hours as needed for nausea.      pantoprazole (PROTONIX) 40 MG EC tablet Take 1 tablet (40 mg) by mouth daily. 90 tablet 0    vitamin D2 (ERGOCALCIFEROL) 81824 units (1250 mcg) capsule Take 1 capsule (50,000 Units) by mouth once a week. 10 capsule 0        Controlled medications:   not applicable/none     Past Medical History:   Past Medical History:   Diagnosis Date    Diabetic eye exam (H) 05/01/14    Heart attack (H)     Pure hypercholesterolemia     Stented coronary artery     Type II or unspecified type diabetes mellitus without mention of complication, not stated as uncontrolled 2002    [place]et.    Unspecified disease of pericardium 12/05/08    Pericarditis w/mild to moderate pericardial effusion.    Unspecified essential hypertension      Physical Exam:   Vitals: BP (!) 153/87   Pulse 76   Temp 97.7  F (36.5  C)   Resp 18   Wt 58.5 kg (129 lb)   SpO2 99%   BMI 22.14 kg/m    BMI: Body mass index is 22.14 kg/m .  GENERAL APPEARANCE:  Alert, in no distress, pale  ENT:  Mouth and posterior oropharynx normal, moist mucous membranes  EYES:  EOM, conjunctivae, lids, pupils and irises normal, glasses on  RESP:  respiratory effort and palpation of chest normal, lungs clear to auscultation   CV:  regular rate and rhythm, no murmur, rub, or gallop, no edema, +2 pedal pulses  ABDOMEN:  normal bowel sounds, soft, nontender, no hepatosplenomegaly or other masses  M/S:   generalized weakness; normal ROM  SKIN:  right chest catheter with no sxsx of infection, lesion present, type:nodule, raised, pearly edges, bleeding, appearance:variegated, slightly red, location: left nasal ala, size: 5 mm.   Soft quarter sized protrusion of  right elbow with no erythema, warmth or tenderness to palpation.   NEURO:   Cranial nerves 2-12 are normal tested and grossly at patient's baseline  PSYCH:  oriented X 3, affect and mood normal     SNF labs: Labs done in SNF are in [place] EPIC. Please refer to them using EPIC/Care Everywhere.    DISCHARGE PLAN:  Follow up labs: Labs done at [facility] Follow Up:      Follow up with primary care provider in 1-2 weeks  Current [place] scheduled appointments:  Appointments in Next Year      Sep 11, 2025 10:30 AM  (Arrive by 10:15 AM)  [facility] D[facility] with Alexandra Borrero PA-C  [facility] ([facility] ) [phone]           Discharge Services: Home Care:  Occupational Therapy, Physical Therapy, Registered Nurse, and Home Health Aide  Discharge Instructions Verbalized to Patient at Discharge:   Continue to follow your diet:  Renal diet.     TOTAL DISCHARGE TIME:   Greater than 30 minutes  Electronically signed by:  ANKITA Fuentes CNP     Documentation of Face to Face and Certification for Home Health Services    I certify that patient: Eliza Dubois is under my care and that I, or a nurse practitioner or physician's assistant working with me, had a face-to-face encounter that meets the physician face-to-face encounter requirements with this patient on: 4/22/2025.    This encounter with the patient was in whole, or in part, for the following medical condition, which is the primary reason for home health care: ESRD (end stage renal disease) on dialysis (H), Hyperkalemia, Generalized muscle weakness, Type 2 diabetes mellitus with diabetic polyneuropathy.    I certify that, based on my findings, the following services are medically necessary home health services: Nursing, Occupational Therapy, Physical Therapy, and home health aide .    My clinical findings support the need for the above services because: Nurse is needed: To assess blood glucose levels and  hypertension after changes in medications or other medical regimen.., Occupational Therapy Services are needed to assess and treat cognitive ability and address ADL safety due to impairment in mobility., and Physical Therapy Services are needed to assess and treat the following functional impairments: physical deconditioning and loss of balance/stability.    Further, I certify that my clinical findings support that this patient is homebound (i.e. absences from home require considerable and taxing effort and are for medical reasons or Judaism services or infrequently or of short duration when for other reasons) because: Requires assistance of another person or specialized equipment to access medical services because patient: Requires supervision of another for safe transfer...    Based on the above findings. I certify that this patient is confined to the home and needs intermittent skilled nursing care, physical therapy and/or speech therapy.  The patient is under my care, and I have initiated the establishment of the plan of care.  This patient will be followed by a physician who will periodically review the plan of care.  Physician/Provider to provide follow up care: Byron Holm    Attending hospital physician (the Medicare certified [place] provider): ANKITA Fuentes CNP  Physician Signature: See electronic signature associated with these discharge orders.  Date: 4/22/2025

## 2025-04-26 ENCOUNTER — HOSPITAL ENCOUNTER (EMERGENCY)
Facility: CLINIC | Age: 85
Discharge: HOME | End: 2025-04-27
Attending: STUDENT IN AN ORGANIZED HEALTH CARE EDUCATION/TRAINING PROGRAM | Admitting: STUDENT IN AN ORGANIZED HEALTH CARE EDUCATION/TRAINING PROGRAM
Payer: MEDICARE

## 2025-04-26 VITALS
HEART RATE: 77 BPM | RESPIRATION RATE: 20 BRPM | DIASTOLIC BLOOD PRESSURE: 76 MMHG | TEMPERATURE: 98.1 F | SYSTOLIC BLOOD PRESSURE: 171 MMHG | OXYGEN SATURATION: 97 %

## 2025-04-26 DIAGNOSIS — Z13.9 ENCOUNTER FOR MEDICAL SCREENING EXAMINATION: ICD-10-CM

## 2025-04-26 PROCEDURE — 99283 EMERGENCY DEPT VISIT LOW MDM: CPT | Performed by: STUDENT IN AN ORGANIZED HEALTH CARE EDUCATION/TRAINING PROGRAM

## 2025-04-27 NOTE — ED PROVIDER NOTES
History     Chief Complaint   Patient presents with    Vascular Access Problem     HPI  lEiza Dubois is a 84 year old female who presents the emergency department by EMS from local nursing home for concerns that her dialysis access in her right chest got wet during a shower.  She is asymptomatic.  There was a covering on her access ports that it got wet during the shower.    Allergies:  Allergies   Allergen Reactions    Sodium Sulfate Hives    Sulfa Antibiotics Hives       Problem List:    Patient Active Problem List    Diagnosis Date Noted    Other disorders of phosphorus metabolism 03/25/2025     Priority: Medium    Nausea 03/24/2025     Priority: Medium    Secondary hyperparathyroidism of renal origin 03/05/2025     Priority: Medium    Diarrhea, unspecified 02/28/2025     Priority: Medium    Hypertensive chronic kidney disease with stage 1 through stage 4 chronic kidney disease, or unspecified chronic kidney disease 02/26/2025     Priority: Medium    Personal history of transient ischemic attack (TIA), and cerebral infarction without residual deficits 02/25/2025     Priority: Medium    End stage renal disease (H) 02/25/2025     Priority: Medium    Anaphylactic shock, unspecified, initial encounter 02/25/2025     Priority: Medium    Allergy, unspecified, initial encounter 02/25/2025     Priority: Medium    Hypo-osmolality and hyponatremia 02/25/2025     Priority: Medium    Moderate protein-calorie malnutrition 02/25/2025     Priority: Medium    Vitamin D deficiency, unspecified 02/25/2025     Priority: Medium    Unspecified chronic gastritis without bleeding 02/25/2025     Priority: Medium    Polyneuropathy, unspecified 02/25/2025     Priority: Medium    Other disorders of electrolyte and fluid balance, not elsewhere classified 02/25/2025     Priority: Medium    Pain, unspecified 02/25/2025     Priority: Medium    Essential (primary) hypertension 02/25/2025     Priority: Medium    Insomnia, unspecified  02/25/2025     Priority: Medium    Hypertensive heart and chronic kidney disease with heart failure and with stage 5 chronic kidney disease, or end stage renal disease (H) 02/24/2025     Priority: Medium    Dependence on renal dialysis 02/24/2025     Priority: Medium    Long term (current) use of aspirin 02/24/2025     Priority: Medium    Long term (current) use of oral hypoglycemic drugs 02/24/2025     Priority: Medium    Long term (current) use of antithrombotics/antiplatelets 02/24/2025     Priority: Medium    Unspecified viral hepatitis B without hepatic coma 02/24/2025     Priority: Medium    Anemia in chronic kidney disease 02/24/2025     Priority: Medium    Type 2 diabetes mellitus with chronic kidney disease (H) 02/24/2025     Priority: Medium    Atherosclerotic heart disease of native coronary artery without angina pectoris 02/24/2025     Priority: Medium    Heart failure, unspecified (H) 02/24/2025     Priority: Medium    Hyperkalemia 02/18/2025     Priority: Medium    Anemia of chronic renal failure, stage 5 (H) 01/31/2025     Priority: Medium    Chronic kidney disease, stage 4 (severe) (H) 01/31/2025     Priority: Medium    Hypocalcemia 06/20/2024     Priority: Medium    Chronic anemia 06/20/2024     Priority: Medium    Hypoalbuminemia 06/20/2024     Priority: Medium    Lower extremity edema 06/20/2024     Priority: Medium    Acute encephalopathy 06/20/2024     Priority: Medium    Heart failure with preserved ejection fraction, NYHA class I (H) 04/24/2024     Priority: Medium    Weakness 01/28/2021     Priority: Medium    Vaccine reaction, initial encounter 01/28/2021     Priority: Medium    Fatigue, unspecified type 01/28/2021     Priority: Medium    History of COVID-19 01/28/2021     Priority: Medium    Bilateral pleural effusion 12/13/2020     Priority: Medium    Bilateral pneumonia 12/13/2020     Priority: Medium    Pneumonia due to 2019 novel coronavirus 12/12/2020     Priority: Medium    Prerenal  azotemia 12/12/2020     Priority: Medium    Hyperphosphatemia 12/11/2020     Priority: Medium    Closed fracture of proximal end of left humerus with routine healing 12/09/2020     Priority: Medium    Nausea vomiting and diarrhea 12/09/2020     Priority: Medium    Hypomagnesemia 12/09/2020     Priority: Medium    Generalized muscle weakness 12/08/2020     Priority: Medium    E. coli UTI 12/08/2020     Priority: Medium    2019 novel coronavirus disease (COVID-19) 12/08/2020     Priority: Medium    Stage 3b chronic kidney disease (H) 05/27/2019     Priority: Medium    Type 2 diabetes mellitus with diabetic polyneuropathy, without long-term current use of insulin (H) 10/11/2016     Priority: Medium    Essential hypertension 09/12/2016     Priority: Medium    Gastroesophageal reflux disease without esophagitis 05/13/2016     Priority: Medium    Coronary atherosclerosis 02/25/2016     Priority: Medium     2/3/16 NILTON RCA (3.5 x 38 Xience), LAD 40%, OM 60-70%, LM 40%      Chest pain on exertion 02/02/2016     Priority: Medium    Anxiety 05/08/2012     Priority: Medium    Insomnia 05/03/2012     Priority: Medium    Hypertension goal BP (blood pressure) < 140/90 01/20/2011     Priority: Medium    Hyperlipidemia, unspecified 10/31/2010     Priority: Medium    Gastro-esophageal reflux disease without esophagitis 08/30/2007     Priority: Medium    Irritable bowel syndrome 09/25/2006     Priority: Medium    Type 2 diabetes mellitus without complications (H) 04/12/2002     Priority: Medium     Problem list name updated by automated process. Provider to review          Past Medical History:    Past Medical History:   Diagnosis Date    Diabetic eye exam (H) 05/01/14    Heart attack (H)     Pure hypercholesterolemia     Stented coronary artery     Type II or unspecified type diabetes mellitus without mention of complication, not stated as uncontrolled 2002    Unspecified disease of pericardium 12/05/08    Unspecified essential  hypertension        Past Surgical History:    Past Surgical History:   Procedure Laterality Date    ABDOMEN SURGERY      COLONOSCOPY  04/15/09    COLONOSCOPY  6/18/2014    Procedure: COMBINED COLONOSCOPY, SINGLE BIOPSY/POLYPECTOMY BY BIOPSY;  Surgeon: Earle Mon MD;  Location:  GI     LAPAROSCOPY, SURGICAL; CHOLECYSTECTOMY  1997    Cholecystectomy, Laparoscopic    PHACOEMULSIFICATION CLEAR CORNEA WITH STANDARD INTRAOCULAR LENS IMPLANT Right 3/16/2016    Procedure: PHACOEMULSIFICATION CLEAR CORNEA WITH STANDARD INTRAOCULAR LENS IMPLANT;  Surgeon: George Lemus MD;  Location:  [facility]    PHACOEMULSIFICATION CLEAR CORNEA WITH STANDARD INTRAOCULAR LENS IMPLANT Left 3/30/2016    Procedure: PHACOEMULSIFICATION CLEAR CORNEA WITH STANDARD INTRAOCULAR LENS IMPLANT;  Surgeon: George Lemus MD;  Location:  EC    ZZC NONSPECIFIC PROCEDURE  1988    Hysterectomy       Family History:    Family History   Problem Relation Age of Onset    Diabetes Mother     Arthritis Mother     Heart Disease Mother     Hypertension Mother     Lipids Mother     Neurologic Disorder Mother         neuropathy    Osteoporosis Mother     Obesity Mother     EYE* Mother         blind    Diabetes Father     Genitourinary Problems Father         gall stones    Heart Disease Father         MI    Cancer Maternal Grandmother     Heart Disease Maternal Grandmother     Cancer Other         Grandparents    Alcohol/Drug No family hx of     Alzheimer Disease No family hx of     Anesthesia Reaction No family hx of     Blood Disease No family hx of     Depression No family hx of     Genetic Disorder No family hx of     Gastrointestinal Disease No family hx of     Gynecology No family hx of     Psychotic Disorder No family hx of     Respiratory No family hx of     Cerebrovascular Disease No family hx of        Social History:  Marital Status:   [5]  Social History     Tobacco Use    Smoking status: Never    Smokeless tobacco: Never   Vaping Use     Vaping status: Never Used   Substance Use Topics    Alcohol use: No     Alcohol/week: 0.0 standard drinks of alcohol    Drug use: No        Medications:    acetaminophen (TYLENOL) 500 MG tablet  amLODIPine (NORVASC) 10 MG tablet  aspirin 81 MG EC tablet  atorvastatin (LIPITOR) 80 MG tablet  carvedilol (COREG) 6.25 MG tablet  clopidogrel (PLAVIX) 75 MG tablet  famotidine (PEPCID) 20 MG tablet  gabapentin (NEURONTIN) 300 MG capsule  glipiZIDE (GLUCOTROL) 5 MG tablet  hydrALAZINE (APRESOLINE) 25 MG tablet  insulin glargine (LANTUS VIAL) 100 UNIT/ML vial  loperamide (IMODIUM A-D) 2 MG tablet  losartan (COZAAR) 100 MG tablet  melatonin 3 MG tablet  ondansetron (ZOFRAN) 4 MG tablet  pantoprazole (PROTONIX) 40 MG EC tablet  vitamin D2 (ERGOCALCIFEROL) 81766 units (1250 mcg) capsule          Review of Systems  Gen: No fevers, no unintentional weight changes  Head and neck: No headache, no neck pain  Eye: No eye pain, no vision changes  Respiratory: No shortness of breath, no cough  Cardiovascular: No chest pain, no palpitations  Abdominal: No vomiting, no constipation, no diarrhea  Urinary: No dysuria, no hematuria  Musculoskeletal: No joint redness, no trauma  Neurologic: No confusion, no weakness, no sensation changes  Psychiatric: No suicidal ideation, no homicidal ideation    Physical Exam   BP: (!) 171/76  Pulse: 77  Temp: 98.1  F (36.7  C)  Resp: 20  SpO2: 97 %      Physical Exam  General: Alert, awake, no distress  Head: Normocephalic, atraumatic  Eyes: Grossly intact extraocular movements, conjunctiva clear, pupils equal   Mouth: Mucous membranes moist  Neck: Supple, grossly full range of motion, no observable masses  Respiratory: No distress,  clear to auscultation bilaterally  Cardiac: S1 and S2 cardiac sounds appreciated, normal rate, no edema  Abdominal: Soft, not distended, no tenderness appreciated  Neurologic: Normal level of consciousness, speech is clear and appropriate, moving all extremities grossly  normal and symmetric  Skin: No gross rashes or lesions, dialysis access in the right upper chest that is clean dry and intact, intact Tegaderm, no erythematous changes  Psych: Normal mood and appropriate for situation        ED Course        Procedures                  No results found. However, due to the size of the patient record, not all encounters were searched. Please check Results Review for a complete set of results.    Medications - No data to display    Assessments & Plan (with Medical Decision Making)   She is hypertensive, rest of vitals are reassuring.  I cannot appreciate any abnormalities with the current dialysis access in the right upper chest.  Appears stable to be discharged.  Encouraged the nursing to contact the dialysis team for further recommendations.    I have reviewed the nursing notes.    I have reviewed the findings, diagnosis, plan and need for follow up with the patient.          Discharge Medication List as of 4/27/2025 12:25 AM          Final diagnoses:   Encounter for medical screening examination       4/26/2025   [facility] EMERGENCY DEPT       Aramis Mcgee MD  04/27/25 0686

## 2025-04-27 NOTE — ED TRIAGE NOTES
Triage Assessment (Adult)       Row Name 04/26/25 2344          Triage Assessment    Airway WDL WDL        Respiratory WDL    Respiratory WDL WDL                   Dialysis catheter got wet during shower.  Nursing home concerned and wanted it to be looked at.

## 2025-04-28 ENCOUNTER — DOCUMENTATION ONLY (OUTPATIENT)
Dept: GERIATRICS | Facility: CLINIC | Age: 85
End: 2025-04-28
Payer: COMMERCIAL

## 2025-04-28 ENCOUNTER — TELEPHONE (OUTPATIENT)
Dept: INTERNAL MEDICINE | Facility: CLINIC | Age: 85
End: 2025-04-28
Payer: COMMERCIAL

## 2025-04-28 DIAGNOSIS — Z09 HOSPITAL DISCHARGE FOLLOW-UP: ICD-10-CM

## 2025-04-28 NOTE — TELEPHONE ENCOUNTER
Home Care is calling regarding an established patient with [facility].  Requesting orders from: Byron Holm  RN APPROVED: RN able to provide verbal orders.  Home Care will send orders for signature.  RN will close encounter.  Is this a request for a temporary pause in the home care episode?  No    Orders Requested    Skilled Nursing  Request for initial certification (first set of orders)   Frequency: 1x per week for 4 weeks, then 1x every other week for 6 weeks.   RN gave verbal order: Yes      Physical Therapy  Request for initial evaluation and treatment (one time)   RN gave verbal order: Yes      Occupational Therapy  Request for initial evaluation and treatment (one time)   RN gave verbal order: Yes      HHA (Home Health Aide)  Request for initial certification (first set of orders)   Frequency: 1x per week for 8 weeks   RN gave verbal order: Yes        Phone number Home Care can be reached at: [phone]   Okay to leave a detailed message?: Yes    Contacts       Contact Date/Time Type Contact Phone/Fax    04/28/2025 08:32 AM CDT Phone (Incoming) GEOVANNY Copeland (Home Care) [phone]     [facility]            CHARLES ArciniegaN, RN

## 2025-04-29 ENCOUNTER — TRANSCRIBE ORDERS (OUTPATIENT)
Dept: OTHER | Age: 85
End: 2025-04-29

## 2025-04-29 ENCOUNTER — MEDICAL CORRESPONDENCE (OUTPATIENT)
Dept: HEALTH INFORMATION MANAGEMENT | Facility: CLINIC | Age: 85
End: 2025-04-29
Payer: COMMERCIAL

## 2025-04-29 ENCOUNTER — TELEPHONE (OUTPATIENT)
Dept: FAMILY MEDICINE | Facility: CLINIC | Age: 85
End: 2025-04-29
Payer: COMMERCIAL

## 2025-04-29 ENCOUNTER — TELEPHONE (OUTPATIENT)
Dept: OTHER | Age: 85
End: 2025-04-29

## 2025-04-29 DIAGNOSIS — Z99.2 DEPENDENCE ON RENAL DIALYSIS: ICD-10-CM

## 2025-04-29 DIAGNOSIS — E11.42 TYPE 2 DIABETES MELLITUS WITH DIABETIC POLYNEUROPATHY, WITHOUT LONG-TERM CURRENT USE OF INSULIN (H): ICD-10-CM

## 2025-04-29 DIAGNOSIS — E87.5 HYPERKALEMIA: Primary | ICD-10-CM

## 2025-04-29 DIAGNOSIS — N18.4 CHRONIC KIDNEY DISEASE, STAGE 4 (SEVERE) (H): ICD-10-CM

## 2025-04-29 NOTE — TELEPHONE ENCOUNTER
FYI - Status Update    Who is Calling: patient    Update: pt is currently staying at health facility, will go private and medication can still be sent to [facility]    Does caller want a call/response back: No

## 2025-04-30 ENCOUNTER — MEDICAL CORRESPONDENCE (OUTPATIENT)
Dept: HEALTH INFORMATION MANAGEMENT | Facility: CLINIC | Age: 85
End: 2025-04-30
Payer: COMMERCIAL

## 2025-05-05 ENCOUNTER — TELEPHONE (OUTPATIENT)
Dept: INTERNAL MEDICINE | Facility: CLINIC | Age: 85
End: 2025-05-05
Payer: COMMERCIAL

## 2025-05-05 ENCOUNTER — TELEPHONE (OUTPATIENT)
Dept: GERIATRICS | Facility: CLINIC | Age: 85
End: 2025-05-05
Payer: COMMERCIAL

## 2025-05-05 NOTE — TELEPHONE ENCOUNTER
Patient calling from [facility].     She was given a continuous glucose monitor and the sensor will  tomorrow, needing refills. Says its a lyre and dexcom which is not the same.     Patient disconnected call (previously muted writer).     Please call patient back and acquire further if needing PCP to sign or directing to geriatrics at [facility].     Pj Sykes RN on 2025 at 3:26 PM

## 2025-05-05 NOTE — TELEPHONE ENCOUNTER
Patient's son, Terrell, (C2C on file) is calling and asking how patient's  medication list could be sent to the facility patient is looking to reside, [facility].      Informed Terrell that he can  a release of information from the clinic or hospital, have his mom fill it out and sign in, then the Rebekahmary ellen odom will have the permission to ask for her medication records by faxing our clinic.  Patient's son stated understanding.    Mylene Mensah RN

## 2025-05-05 NOTE — TELEPHONE ENCOUNTER
RN Triage    Patient Contact    Attempt # 1    Was call answered?  No.  Left message on voicemail with information to call me back. Forwarding to PCP also.    Makenzie Barraza RN on 5/5/2025 at 3:49 PM

## 2025-05-06 ENCOUNTER — TELEPHONE (OUTPATIENT)
Dept: FAMILY MEDICINE | Facility: CLINIC | Age: 85
End: 2025-05-06
Payer: COMMERCIAL

## 2025-05-06 NOTE — TELEPHONE ENCOUNTER
RN Triage    Patient Contact    Attempt # 2    Was call answered?  No.  Left message on voicemail with information to call me back.    Makenzie Barraza RN on 5/6/2025 at 8:17 AM

## 2025-05-06 NOTE — TELEPHONE ENCOUNTER
Reason for Call:  Appointment Request    Patient requesting this type of appt: Chronic Diease Management/Medication/Follow-Up    Requested provider:  Dr. Holm    Reason patient unable to be scheduled: Needs to be scheduled by clinic to [facility] and managing medication after home health.and to sign orders at [facility].      When does patient want to be seen/preferred time:  as soon as possible    Comments: Pt entering assisted living needs to est care, was seen 10/2024. Please call the patient on the number on chart.    Could we send this information to you in [place] or would you prefer to receive a phone call?:   Patient would prefer a phone call   Okay to leave a detailed message?: Yes at Cell number on file:    Telephone Information:   Mobile [phone]           Call taken on 5/6/2025 at 2:22 PM by Felicia Olmedo

## 2025-05-07 DIAGNOSIS — Z53.9 DIAGNOSIS NOT YET DEFINED: Primary | ICD-10-CM

## 2025-05-07 PROCEDURE — G0180 MD CERTIFICATION HHA PATIENT: HCPCS | Performed by: INTERNAL MEDICINE

## 2025-05-07 NOTE — TELEPHONE ENCOUNTER
RN spoke with patient. She does not have CGM in place. She does not know what she has.     RN did schedule 3 month follow-up with PCP.     RN huddled with TINO Whitfield, who will get patient scheduled with PCP instead of same day provider.     Pj Sykes RN on 5/7/2025 at 10:28 AM

## 2025-05-12 ENCOUNTER — OFFICE VISIT (OUTPATIENT)
Dept: INTERNAL MEDICINE | Facility: CLINIC | Age: 85
End: 2025-05-12
Payer: MEDICARE

## 2025-05-12 VITALS
WEIGHT: 131 LBS | OXYGEN SATURATION: 97 % | HEART RATE: 74 BPM | DIASTOLIC BLOOD PRESSURE: 60 MMHG | HEIGHT: 64 IN | TEMPERATURE: 98.6 F | RESPIRATION RATE: 16 BRPM | BODY MASS INDEX: 22.36 KG/M2 | SYSTOLIC BLOOD PRESSURE: 135 MMHG

## 2025-05-12 DIAGNOSIS — Z99.2 DIALYSIS PATIENT: ICD-10-CM

## 2025-05-12 DIAGNOSIS — E78.5 HYPERLIPIDEMIA LDL GOAL <100: ICD-10-CM

## 2025-05-12 DIAGNOSIS — E11.42 TYPE 2 DIABETES MELLITUS WITH DIABETIC POLYNEUROPATHY, WITHOUT LONG-TERM CURRENT USE OF INSULIN (H): Primary | ICD-10-CM

## 2025-05-12 DIAGNOSIS — G62.9 NEUROPATHY: ICD-10-CM

## 2025-05-12 DIAGNOSIS — I25.10 CORONARY ARTERY DISEASE INVOLVING NATIVE CORONARY ARTERY OF NATIVE HEART WITHOUT ANGINA PECTORIS: ICD-10-CM

## 2025-05-12 DIAGNOSIS — N18.5 CRF (CHRONIC RENAL FAILURE), STAGE 5 (H): ICD-10-CM

## 2025-05-12 DIAGNOSIS — N18.5 CKD (CHRONIC KIDNEY DISEASE) STAGE 5, GFR LESS THAN 15 ML/MIN (H): ICD-10-CM

## 2025-05-12 DIAGNOSIS — I10 HYPERTENSION GOAL BP (BLOOD PRESSURE) < 140/90: ICD-10-CM

## 2025-05-12 LAB
ALBUMIN SERPL BCG-MCNC: 4.3 G/DL (ref 3.5–5.2)
ALP SERPL-CCNC: 186 U/L (ref 40–150)
ALT SERPL W P-5'-P-CCNC: 20 U/L (ref 0–50)
ANION GAP SERPL CALCULATED.3IONS-SCNC: 18 MMOL/L (ref 7–15)
AST SERPL W P-5'-P-CCNC: 16 U/L (ref 0–45)
BILIRUB DIRECT SERPL-MCNC: 0.13 MG/DL (ref 0–0.3)
BILIRUB SERPL-MCNC: 0.4 MG/DL
BUN SERPL-MCNC: 95.4 MG/DL (ref 8–23)
CALCIUM SERPL-MCNC: 8.8 MG/DL (ref 8.8–10.4)
CHLORIDE SERPL-SCNC: 98 MMOL/L (ref 98–107)
CREAT SERPL-MCNC: 5.53 MG/DL (ref 0.51–0.95)
EGFRCR SERPLBLD CKD-EPI 2021: 7 ML/MIN/1.73M2
ERYTHROCYTE [DISTWIDTH] IN BLOOD BY AUTOMATED COUNT: 14.6 % (ref 10–15)
EST. AVERAGE GLUCOSE BLD GHB EST-MCNC: 157 MG/DL
GLUCOSE SERPL-MCNC: 372 MG/DL (ref 70–99)
HBA1C MFR BLD: 7.1 %
HCO3 SERPL-SCNC: 21 MMOL/L (ref 22–29)
HCT VFR BLD AUTO: 29.2 % (ref 35–47)
HGB BLD-MCNC: 9.7 G/DL (ref 11.7–15.7)
MCH RBC QN AUTO: 30 PG (ref 26.5–33)
MCHC RBC AUTO-ENTMCNC: 33.2 G/DL (ref 31.5–36.5)
MCV RBC AUTO: 90 FL (ref 78–100)
PHOSPHATE SERPL-MCNC: 6.1 MG/DL (ref 2.5–4.5)
PLATELET # BLD AUTO: 137 10E3/UL (ref 150–450)
POTASSIUM SERPL-SCNC: 4.6 MMOL/L (ref 3.4–5.3)
PROT SERPL-MCNC: 6.7 G/DL (ref 6.4–8.3)
RBC # BLD AUTO: 3.23 10E6/UL (ref 3.8–5.2)
SODIUM SERPL-SCNC: 137 MMOL/L (ref 135–145)
WBC # BLD AUTO: 7.7 10E3/UL (ref 4–11)

## 2025-05-12 PROCEDURE — 83036 HEMOGLOBIN GLYCOSYLATED A1C: CPT | Performed by: INTERNAL MEDICINE

## 2025-05-12 PROCEDURE — 80053 COMPREHEN METABOLIC PANEL: CPT | Performed by: INTERNAL MEDICINE

## 2025-05-12 PROCEDURE — 84100 ASSAY OF PHOSPHORUS: CPT | Performed by: INTERNAL MEDICINE

## 2025-05-12 PROCEDURE — 82248 BILIRUBIN DIRECT: CPT | Performed by: INTERNAL MEDICINE

## 2025-05-12 PROCEDURE — 36415 COLL VENOUS BLD VENIPUNCTURE: CPT | Performed by: INTERNAL MEDICINE

## 2025-05-12 PROCEDURE — 85027 COMPLETE CBC AUTOMATED: CPT | Performed by: INTERNAL MEDICINE

## 2025-05-12 PROCEDURE — 99214 OFFICE O/P EST MOD 30 MIN: CPT | Performed by: INTERNAL MEDICINE

## 2025-05-12 PROCEDURE — 3078F DIAST BP <80 MM HG: CPT | Performed by: INTERNAL MEDICINE

## 2025-05-12 PROCEDURE — 3075F SYST BP GE 130 - 139MM HG: CPT | Performed by: INTERNAL MEDICINE

## 2025-05-12 PROCEDURE — 1126F AMNT PAIN NOTED NONE PRSNT: CPT | Performed by: INTERNAL MEDICINE

## 2025-05-12 RX ORDER — KETOROLAC TROMETHAMINE 30 MG/ML
1 INJECTION, SOLUTION INTRAMUSCULAR; INTRAVENOUS DAILY
Qty: 1 EACH | Refills: 0 | Status: SHIPPED | OUTPATIENT
Start: 2025-05-12

## 2025-05-12 SDOH — HEALTH STABILITY: PHYSICAL HEALTH: ON AVERAGE, HOW MANY MINUTES DO YOU ENGAGE IN EXERCISE AT THIS LEVEL?: 0 MIN

## 2025-05-12 SDOH — HEALTH STABILITY: PHYSICAL HEALTH: ON AVERAGE, HOW MANY DAYS PER WEEK DO YOU ENGAGE IN MODERATE TO STRENUOUS EXERCISE (LIKE A BRISK WALK)?: 0 DAYS

## 2025-05-12 ASSESSMENT — PATIENT HEALTH QUESTIONNAIRE - PHQ9
SUM OF ALL RESPONSES TO PHQ QUESTIONS 1-9: 2
SUM OF ALL RESPONSES TO PHQ QUESTIONS 1-9: 2
10. IF YOU CHECKED OFF ANY PROBLEMS, HOW DIFFICULT HAVE THESE PROBLEMS MADE IT FOR YOU TO DO YOUR WORK, TAKE CARE OF THINGS AT HOME, OR GET ALONG WITH OTHER PEOPLE: SOMEWHAT DIFFICULT

## 2025-05-12 ASSESSMENT — SOCIAL DETERMINANTS OF HEALTH (SDOH): HOW OFTEN DO YOU GET TOGETHER WITH FRIENDS OR RELATIVES?: ONCE A WEEK

## 2025-05-12 ASSESSMENT — PAIN SCALES - GENERAL: PAINLEVEL_OUTOF10: NO PAIN (0)

## 2025-05-12 NOTE — PROGRESS NOTES
Assessment & Plan     Type 2 diabetes mellitus with diabetic polyneuropathy, without long-term current use of insulin (H)    - Continuous Glucose  (FREESTYLE FUAD 3 READER) DOUGLAS; 1 each daily.  - Basic metabolic panel  (Ca, Cl, CO2, Creat, Gluc, K, Na, BUN); Future  - Hemoglobin A1c; Future    Neuropathy  Controlled    CRF (chronic renal failure), stage 5 (H)  Had dialysis  - CBC with platelets; Future    Dialysis patient  Recent records not available from dialysis unit    Hypertension goal BP (blood pressure) < 140/90  Controlled    Hyperlipidemia LDL goal <100  Check hepatic function- Hepatic panel (Albumin, ALT, AST, Bili, Alk Phos, TP); Future    Coronary artery disease involving native coronary artery of native heart without angina pectoris  Stable    Patient has been advised of split billing requirements and indicates understanding: Yes        Counseling  Appropriate preventive services were addressed with this patient via screening, questionnaire, or discussion as appropriate for fall prevention, nutrition, physical activity, Tobacco-use cessation, social engagement, weight loss and cognition.  Checklist reviewing preventive services available has been given to the patient.  Reviewed patient's diet, addressing concerns and/or questions.   Discussed possible causes of fatigue. Updated plan of care.  Patient reported difficulty with activities of daily living were addressed today.Addressed any concerns about safety while driving.  The patient was provided with written information regarding signs of hearing loss.   Information on urinary incontinence and treatment options given to patient.           Daisy Kay is a 84 year old, presenting for the following health issues:  Forms (Forms for new move to [facility])      5/12/2025     9:29 AM   Additional Questions   Accompanied by Neyda-daughter     History of Present Illness       Reason for visit:  [facility]erc[facility] with enough effort to increase her heart rate 9 or less minutes per day.  She exercises with enough effort to increase her heart rate 3 or less days per week.   She is taking medications regularly.                  Review of Systems  CONSTITUTIONAL: NEGATIVE for fever, chills, change in weight  INTEGUMENTARY/SKIN: NEGATIVE for worrisome rashes, moles or lesions  EYES: NEGATIVE for vision changes or irritation  ENT/MOUTH: NEGATIVE for ear, mouth and throat problems  RESP: NEGATIVE for significant cough or SOB  BREAST: NEGATIVE for masses, tenderness or discharge  CV: NEGATIVE for chest pain, palpitations or peripheral edema  GI: NEGATIVE for nausea, abdominal pain, heartburn, or change in bowel habits  : NEGATIVE for frequency, dysuria, or hematuria  MUSCULOSKELETAL: NEGATIVE for significant arthralgias or myalgia  NEURO: NEGATIVE for weakness, dizziness or paresthesias  ENDOCRINE: NEGATIVE for temperature intolerance, skin/hair changes  HEME: NEGATIVE for bleeding problems  PSYCHIATRIC: NEGATIVE for changes in mood or affect      Objective    BP (!) 150/60   Pulse 74   Temp 98.6  F (37  C) (Temporal)   Resp 16   Ht 1.626 m (5' 4\")   Wt 59.4 kg (131 lb)   SpO2 97%   BMI 22.49 kg/m    Body mass index is 22.49 kg/m .  Physical Exam   GENERAL: alert and no distress  EYES: Eyes grossly normal to inspection, PERRL and conjunctivae and sclerae normal  HENT: ear canals and TM's normal, nose and mouth without ulcers or lesions  NECK: no adenopathy, no asymmetry, masses, or scars  RESP: lungs clear to auscultation - no rales, rhonchi or wheezes  CV: regular rate and rhythm, normal S1 S2, no S3 or S4, no murmur, click or rub, no peripheral edema  ABDOMEN: soft, nontender, no hepatosplenomegaly, no masses and bowel sounds normal  MS: no gross musculoskeletal defects noted, no edema  SKIN: no suspicious lesions or rashes  NEURO: Normal strength and tone, mentation intact and speech normal  PSYCH: mentation " appears normal, affect normal/bright  Diabetic foot exam: normal DP and PT pulses, no trophic changes or ulcerative lesions, and decreased sensory function in the feet.  Lab work and radiographs performed during the hospitalization and since have been reviewed with the patient.        Signed Electronically by: Byron Holm DO

## 2025-05-20 DIAGNOSIS — E11.42 TYPE 2 DIABETES MELLITUS WITH DIABETIC POLYNEUROPATHY, WITHOUT LONG-TERM CURRENT USE OF INSULIN (H): Primary | ICD-10-CM

## 2025-05-20 NOTE — TELEPHONE ENCOUNTER
Medication Question or Refill        What medication are you calling about (include dose and sig)?: Lantus insulin and Needles, patient is almost out of needles    Preferred Pharmacy:   [facility] - [place], [place] - [address]  [address]  [facility] [address]  Phone: [phone] Fax: [phone]      Controlled Substance Agreement on file:   CSA -- Patient Level:    CSA: None found at the patient level.       Who prescribed the medication?: PCP    Do you need a refill? Yes    When did you use the medication last? Ongoing    Patient offered an appointment? Yes: declined patient was recently seen    Do you have any questions or concerns?  Yes: patient states she has only a couple needles left      Could we send this information to you in [facility] or would you prefer to receive a phone call?:   Patient would prefer a phone call   Okay to leave a detailed message?: Yes at Cell number on file:    Telephone Information:   Mobile [phone]   Mobile [phone]

## 2025-05-21 RX ORDER — INSULIN GLARGINE 100 [IU]/ML
8 INJECTION, SOLUTION SUBCUTANEOUS AT BEDTIME
Qty: 10 ML | Refills: 2 | Status: SHIPPED | OUTPATIENT
Start: 2025-05-21

## 2025-05-22 ENCOUNTER — TELEPHONE (OUTPATIENT)
Dept: INTERNAL MEDICINE | Facility: CLINIC | Age: 85
End: 2025-05-22
Payer: MEDICARE

## 2025-05-22 DIAGNOSIS — E11.42 TYPE 2 DIABETES MELLITUS WITH DIABETIC POLYNEUROPATHY, WITHOUT LONG-TERM CURRENT USE OF INSULIN (H): Primary | ICD-10-CM

## 2025-05-22 NOTE — TELEPHONE ENCOUNTER
Prior Authorization Retail Medication Request    Medication/Dose: insulin glargine (LANTUS VIAL) 100 UNIT/ML vial  Diagnosis and ICD code (if different than what is on RX):      Type 2 diabetes mellitus with diabetic polyneuropathy, without long-term current use of insulin (H) [E11.42]  - Primary     New/renewal/insurance change PA/secondary ins. PA:  Previously Tried and Failed:  na  Rationale:  na    Insurance   Primary: Medicare  Insurance ID:  [id number]     Secondary (if applicable):na  Insurance ID:  na    Pharmacy Information (if different than what is on RX)  Name:  [facility]  Phone:  [phone]  Fax:[phone]    Clinic Information  Preferred routing pool for dept communication: [facility]

## 2025-05-27 ENCOUNTER — TELEPHONE (OUTPATIENT)
Dept: INTERNAL MEDICINE | Facility: CLINIC | Age: 85
End: 2025-05-27
Payer: MEDICARE

## 2025-05-27 DIAGNOSIS — E11.42 TYPE 2 DIABETES MELLITUS WITH DIABETIC POLYNEUROPATHY, WITHOUT LONG-TERM CURRENT USE OF INSULIN (H): ICD-10-CM

## 2025-05-27 NOTE — TELEPHONE ENCOUNTER
Refill for pen sent with PCP out. Refilled previous listed dosing of 8 units but please confirm with patient.

## 2025-05-27 NOTE — TELEPHONE ENCOUNTER
Central Prior Authorization Team   Phone: [phone]    PA Initiation    Medication: insulin glargine (LANTUS VIAL) 100 UNIT/ML vial  Insurance Company: [employer] - Phone [phone] Fax [phone]  Pharmacy Filling the Rx: [facility] - [place], [place] - [address]  Filling Pharmacy Phone: [phone]  Filling Pharmacy Fax:    Start Date: 5/27/2025    [facility] KEY: [id number]

## 2025-05-27 NOTE — TELEPHONE ENCOUNTER
Patient called and would like RX for Lantus PEN and Needles for this.    Reviewed patient chart at appears PCP sent RX for Lantus vial 5/21/25 and needle/syringes 5/23/25.    Patient states she does not want the vial and to draw up insulin.    She still as Lantus pen and 2 needles form [facility] when she was discharged. But will be out as of 5/26    Please advise and call patient back either way to follow up.    Veronica Huerta RN  [facility] - Registered Nurse  Clinic Triage Mark   May 27, 2025

## 2025-05-27 NOTE — LETTER
Zaynab 3, 2025      Eliza Dubois  [address] [facility] [address]        Dear Eliza,     We have tried to reach you by phone, your voicemail is full. We have also sent a my chart message, with no response. Regarding your RX:   insulin glargine (LANTUS PEN) 100 UNIT/ML pen 15 mL 1 5/29/2025 -- No   Sig - Route: Inject 8 Units subcutaneously at bedtime. - Subcutaneous     Are you still injecting 8 Units at bedtime? Please call the clinic at [phone] so we can verify the dose.          Sincerely,        Neyda

## 2025-05-28 NOTE — TELEPHONE ENCOUNTER
PRIOR AUTHORIZATION DENIED    Medication: insulin glargine (LANTUS VIAL) 100 UNIT/ML vial    Denial Date: 5/28/2025    Denial Rational:  Patient must have a history of trial & failure to the formulary alternative(s) or have a contraindication or intolerance to the formulary alternatives:            Appeal Information:    If you would like to appeal, please supply P/A team with a letter of medical necessity with clinical reason.

## 2025-05-29 DIAGNOSIS — K21.9 GASTROESOPHAGEAL REFLUX DISEASE WITHOUT ESOPHAGITIS: ICD-10-CM

## 2025-05-29 DIAGNOSIS — G62.9 NEUROPATHY: ICD-10-CM

## 2025-05-29 DIAGNOSIS — E11.42 TYPE 2 DIABETES MELLITUS WITH DIABETIC POLYNEUROPATHY, WITHOUT LONG-TERM CURRENT USE OF INSULIN (H): ICD-10-CM

## 2025-05-29 DIAGNOSIS — I25.10 CORONARY ARTERY DISEASE INVOLVING NATIVE CORONARY ARTERY OF NATIVE HEART WITHOUT ANGINA PECTORIS: ICD-10-CM

## 2025-05-29 DIAGNOSIS — K52.9 CHRONIC DIARRHEA: ICD-10-CM

## 2025-05-29 DIAGNOSIS — K29.50 CHRONIC GASTRITIS WITHOUT BLEEDING, UNSPECIFIED GASTRITIS TYPE: ICD-10-CM

## 2025-05-29 DIAGNOSIS — E55.9 VITAMIN D DEFICIENCY: ICD-10-CM

## 2025-05-29 DIAGNOSIS — I10 PRIMARY HYPERTENSION: ICD-10-CM

## 2025-05-29 DIAGNOSIS — E78.5 HYPERLIPIDEMIA LDL GOAL <100: ICD-10-CM

## 2025-05-29 RX ORDER — CLOPIDOGREL BISULFATE 75 MG/1
75 TABLET ORAL DAILY
Qty: 90 TABLET | Refills: 3 | Status: SHIPPED | OUTPATIENT
Start: 2025-05-29

## 2025-05-29 RX ORDER — GABAPENTIN 300 MG/1
300 CAPSULE ORAL AT BEDTIME
Qty: 90 CAPSULE | Refills: 1 | Status: SHIPPED | OUTPATIENT
Start: 2025-05-29

## 2025-05-29 RX ORDER — ONDANSETRON 4 MG/1
4 TABLET, FILM COATED ORAL EVERY 8 HOURS PRN
Qty: 20 TABLET | Refills: 0 | Status: SHIPPED | OUTPATIENT
Start: 2025-05-29

## 2025-05-29 RX ORDER — PANTOPRAZOLE SODIUM 40 MG/1
40 TABLET, DELAYED RELEASE ORAL DAILY
Qty: 90 TABLET | Refills: 0 | Status: SHIPPED | OUTPATIENT
Start: 2025-05-29

## 2025-05-29 RX ORDER — FAMOTIDINE 20 MG/1
20 TABLET, FILM COATED ORAL
Qty: 180 TABLET | Refills: 1 | Status: SHIPPED | OUTPATIENT
Start: 2025-05-29

## 2025-05-29 RX ORDER — GLIPIZIDE 5 MG/1
5 TABLET ORAL
Qty: 90 TABLET | Refills: 3 | Status: SHIPPED | OUTPATIENT
Start: 2025-05-29

## 2025-05-29 RX ORDER — HYDRALAZINE HYDROCHLORIDE 25 MG/1
25 TABLET, FILM COATED ORAL 3 TIMES DAILY PRN
Qty: 270 TABLET | Refills: 3 | Status: SHIPPED | OUTPATIENT
Start: 2025-05-29

## 2025-05-29 RX ORDER — ERGOCALCIFEROL 1.25 MG/1
50000 CAPSULE, LIQUID FILLED ORAL WEEKLY
Qty: 10 CAPSULE | Refills: 0 | Status: SHIPPED | OUTPATIENT
Start: 2025-05-29

## 2025-05-29 RX ORDER — CARVEDILOL 6.25 MG/1
6.25 TABLET ORAL 2 TIMES DAILY WITH MEALS
Qty: 180 TABLET | Refills: 3 | Status: SHIPPED | OUTPATIENT
Start: 2025-05-29

## 2025-05-29 RX ORDER — LOPERAMIDE HYDROCHLORIDE 2 MG/1
2 TABLET ORAL DAILY
Qty: 30 TABLET | Refills: 2 | Status: SHIPPED | OUTPATIENT
Start: 2025-05-29

## 2025-05-29 RX ORDER — ACYCLOVIR 800 MG/1
TABLET ORAL
Qty: 6 EACH | Refills: 1 | Status: SHIPPED | OUTPATIENT
Start: 2025-05-29

## 2025-05-29 RX ORDER — SYRINGE-NEEDLE,INSULIN,0.5 ML 27GX1/2"
SYRINGE, EMPTY DISPOSABLE MISCELLANEOUS DAILY
Qty: 30 EACH | Refills: 1 | Status: SHIPPED | OUTPATIENT
Start: 2025-05-29

## 2025-05-29 RX ORDER — AMLODIPINE BESYLATE 10 MG/1
10 TABLET ORAL DAILY
Qty: 90 TABLET | Refills: 3 | Status: SHIPPED | OUTPATIENT
Start: 2025-05-29

## 2025-05-29 RX ORDER — ATORVASTATIN CALCIUM 80 MG/1
80 TABLET, FILM COATED ORAL AT BEDTIME
Qty: 90 TABLET | Refills: 3 | Status: SHIPPED | OUTPATIENT
Start: 2025-05-29

## 2025-05-29 RX ORDER — INSULIN DEGLUDEC 100 U/ML
8 INJECTION, SOLUTION SUBCUTANEOUS AT BEDTIME
Qty: 10 ML | Refills: 0 | Status: SHIPPED | OUTPATIENT
Start: 2025-05-29

## 2025-05-29 RX ORDER — LOSARTAN POTASSIUM 100 MG/1
100 TABLET ORAL DAILY
Qty: 90 TABLET | Refills: 3 | Status: SHIPPED | OUTPATIENT
Start: 2025-05-29

## 2025-05-29 NOTE — TELEPHONE ENCOUNTER
I have sent a prescription for the requested/allowed/covered insulin to the patient's pharmacy.    Mihai

## 2025-05-30 NOTE — TELEPHONE ENCOUNTER
Attempted contacting patient, no answer, left a message asking for a return call. Sending Labour[employer]ethart message.     Angela Gage, VF

## 2025-06-03 NOTE — TELEPHONE ENCOUNTER
A letter was sent to the nia tto verify the dosing for RX:   insulin glargine (LANTUS PEN) 100 UNIT/ML pen 15 mL 1 5/29/2025 -- No   Sig - Route: Inject 8 Units subcutaneously at bedtime. - Subcutaneous     Neyda

## 2025-06-16 ENCOUNTER — TELEPHONE (OUTPATIENT)
Dept: INTERNAL MEDICINE | Facility: CLINIC | Age: 85
End: 2025-06-16
Payer: MEDICARE

## 2025-06-16 NOTE — TELEPHONE ENCOUNTER
Patient Quality Outreach    Patient is due for the following:   Physical Annual Wellness Visit    Action(s) Taken:   Patient has upcoming appointment, these items will be addressed at that time.    Type of outreach:    Chart review performed, no outreach needed.    Questions for provider review:    None         Sivan Elmore MA  Chart routed to None.

## 2025-08-05 DIAGNOSIS — E55.9 VITAMIN D DEFICIENCY: ICD-10-CM

## 2025-08-06 RX ORDER — ERGOCALCIFEROL 1.25 MG/1
50000 CAPSULE, LIQUID FILLED ORAL WEEKLY
Qty: 10 CAPSULE | Refills: 0 | Status: SHIPPED | OUTPATIENT
Start: 2025-08-06

## 2025-08-07 ENCOUNTER — OFFICE VISIT (OUTPATIENT)
Dept: INTERNAL MEDICINE | Facility: CLINIC | Age: 85
End: 2025-08-07
Payer: MEDICARE

## 2025-08-07 VITALS
WEIGHT: 130.2 LBS | BODY MASS INDEX: 22.23 KG/M2 | TEMPERATURE: 97.5 F | RESPIRATION RATE: 16 BRPM | HEIGHT: 64 IN | SYSTOLIC BLOOD PRESSURE: 120 MMHG | DIASTOLIC BLOOD PRESSURE: 55 MMHG | HEART RATE: 71 BPM | OXYGEN SATURATION: 98 %

## 2025-08-07 DIAGNOSIS — I25.10 CORONARY ARTERY DISEASE INVOLVING NATIVE CORONARY ARTERY OF NATIVE HEART WITHOUT ANGINA PECTORIS: ICD-10-CM

## 2025-08-07 DIAGNOSIS — E11.42 TYPE 2 DIABETES MELLITUS WITH DIABETIC POLYNEUROPATHY, WITHOUT LONG-TERM CURRENT USE OF INSULIN (H): ICD-10-CM

## 2025-08-07 DIAGNOSIS — Z99.2 DIALYSIS PATIENT: ICD-10-CM

## 2025-08-07 DIAGNOSIS — E78.5 HYPERLIPIDEMIA LDL GOAL <100: ICD-10-CM

## 2025-08-07 DIAGNOSIS — G62.9 NEUROPATHY: ICD-10-CM

## 2025-08-07 DIAGNOSIS — I10 HYPERTENSION GOAL BP (BLOOD PRESSURE) < 140/90: ICD-10-CM

## 2025-08-07 DIAGNOSIS — I50.30 HEART FAILURE WITH PRESERVED EJECTION FRACTION, NYHA CLASS I (H): ICD-10-CM

## 2025-08-07 DIAGNOSIS — I13.2 HYPERTENSIVE HEART AND CHRONIC KIDNEY DISEASE WITH HEART FAILURE AND WITH STAGE 5 CHRONIC KIDNEY DISEASE, OR END STAGE RENAL DISEASE (H): Primary | ICD-10-CM

## 2025-08-07 LAB
ALBUMIN SERPL BCG-MCNC: 4.3 G/DL (ref 3.5–5.2)
ALP SERPL-CCNC: 135 U/L (ref 40–150)
ALT SERPL W P-5'-P-CCNC: 10 U/L (ref 0–50)
ANION GAP SERPL CALCULATED.3IONS-SCNC: 15 MMOL/L (ref 7–15)
AST SERPL W P-5'-P-CCNC: 24 U/L (ref 0–45)
BILIRUB DIRECT SERPL-MCNC: 0.18 MG/DL (ref 0–0.3)
BILIRUB SERPL-MCNC: 0.6 MG/DL
BUN SERPL-MCNC: 39.9 MG/DL (ref 8–23)
CALCIUM SERPL-MCNC: 9.7 MG/DL (ref 8.8–10.4)
CHLORIDE SERPL-SCNC: 96 MMOL/L (ref 98–107)
CREAT SERPL-MCNC: 3.6 MG/DL (ref 0.51–0.95)
EGFRCR SERPLBLD CKD-EPI 2021: 12 ML/MIN/1.73M2
EST. AVERAGE GLUCOSE BLD GHB EST-MCNC: 171 MG/DL
GLUCOSE SERPL-MCNC: 297 MG/DL (ref 70–99)
HBA1C MFR BLD: 7.6 %
HCO3 SERPL-SCNC: 26 MMOL/L (ref 22–29)
POTASSIUM SERPL-SCNC: 4.7 MMOL/L (ref 3.4–5.3)
PROT SERPL-MCNC: 7.1 G/DL (ref 6.4–8.3)
SODIUM SERPL-SCNC: 137 MMOL/L (ref 135–145)

## 2025-08-07 RX ORDER — CLOPIDOGREL BISULFATE 75 MG/1
75 TABLET ORAL DAILY
Qty: 90 TABLET | Refills: 3 | Status: SHIPPED | OUTPATIENT
Start: 2025-08-07

## 2025-08-07 RX ORDER — GABAPENTIN 300 MG/1
300 CAPSULE ORAL AT BEDTIME
Qty: 90 CAPSULE | Refills: 3 | Status: SHIPPED | OUTPATIENT
Start: 2025-08-07

## 2025-08-07 SDOH — HEALTH STABILITY: PHYSICAL HEALTH: ON AVERAGE, HOW MANY DAYS PER WEEK DO YOU ENGAGE IN MODERATE TO STRENUOUS EXERCISE (LIKE A BRISK WALK)?: 0 DAYS

## 2025-08-07 SDOH — HEALTH STABILITY: PHYSICAL HEALTH: ON AVERAGE, HOW MANY MINUTES DO YOU ENGAGE IN EXERCISE AT THIS LEVEL?: 0 MIN

## 2025-08-07 ASSESSMENT — PAIN SCALES - GENERAL: PAINLEVEL_OUTOF10: NO PAIN (0)

## 2025-08-07 ASSESSMENT — SOCIAL DETERMINANTS OF HEALTH (SDOH): HOW OFTEN DO YOU GET TOGETHER WITH FRIENDS OR RELATIVES?: THREE TIMES A WEEK

## 2025-08-11 LAB
CREAT UR-MCNC: 76.1 MG/DL
MICROALBUMIN UR-MCNC: 301.2 MG/L
MICROALBUMIN/CREAT UR: 395.8 MG/G CR (ref 0–25)

## 2025-08-26 DIAGNOSIS — K29.50 CHRONIC GASTRITIS WITHOUT BLEEDING, UNSPECIFIED GASTRITIS TYPE: ICD-10-CM

## 2025-08-26 RX ORDER — PANTOPRAZOLE SODIUM 40 MG/1
40 TABLET, DELAYED RELEASE ORAL DAILY
Qty: 90 TABLET | Refills: 2 | Status: SHIPPED | OUTPATIENT
Start: 2025-08-26